# Patient Record
Sex: FEMALE | Race: WHITE | NOT HISPANIC OR LATINO | Employment: FULL TIME | URBAN - METROPOLITAN AREA
[De-identification: names, ages, dates, MRNs, and addresses within clinical notes are randomized per-mention and may not be internally consistent; named-entity substitution may affect disease eponyms.]

---

## 2024-10-29 ENCOUNTER — TELEPHONE (OUTPATIENT)
Dept: CARDIOLOGY | Facility: MEDICAL CENTER | Age: 59
End: 2024-10-29
Payer: COMMERCIAL

## 2024-10-29 ENCOUNTER — HOSPITAL ENCOUNTER (OUTPATIENT)
Dept: RADIOLOGY | Facility: MEDICAL CENTER | Age: 59
End: 2024-10-29

## 2024-10-30 ENCOUNTER — APPOINTMENT (OUTPATIENT)
Dept: RADIOLOGY | Facility: MEDICAL CENTER | Age: 59
DRG: 003 | End: 2024-10-30
Attending: STUDENT IN AN ORGANIZED HEALTH CARE EDUCATION/TRAINING PROGRAM
Payer: COMMERCIAL

## 2024-10-30 ENCOUNTER — APPOINTMENT (OUTPATIENT)
Dept: RADIOLOGY | Facility: MEDICAL CENTER | Age: 59
DRG: 003 | End: 2024-10-30
Attending: INTERNAL MEDICINE
Payer: COMMERCIAL

## 2024-10-30 ENCOUNTER — APPOINTMENT (OUTPATIENT)
Dept: RADIOLOGY | Facility: MEDICAL CENTER | Age: 59
DRG: 003 | End: 2024-10-30
Attending: EMERGENCY MEDICINE
Payer: COMMERCIAL

## 2024-10-30 ENCOUNTER — HOSPITAL ENCOUNTER (INPATIENT)
Facility: MEDICAL CENTER | Age: 59
LOS: 1 days | End: 2024-10-31
Attending: EMERGENCY MEDICINE | Admitting: INTERNAL MEDICINE
Payer: COMMERCIAL

## 2024-10-30 ENCOUNTER — APPOINTMENT (OUTPATIENT)
Dept: RADIOLOGY | Facility: MEDICAL CENTER | Age: 59
DRG: 003 | End: 2024-10-30
Attending: ANESTHESIOLOGY
Payer: COMMERCIAL

## 2024-10-30 ENCOUNTER — APPOINTMENT (OUTPATIENT)
Dept: CARDIOLOGY | Facility: MEDICAL CENTER | Age: 59
DRG: 003 | End: 2024-10-30
Attending: ANESTHESIOLOGY
Payer: COMMERCIAL

## 2024-10-30 ENCOUNTER — APPOINTMENT (OUTPATIENT)
Dept: RADIOLOGY | Facility: MEDICAL CENTER | Age: 59
DRG: 003 | End: 2024-10-30
Attending: THORACIC SURGERY (CARDIOTHORACIC VASCULAR SURGERY)
Payer: COMMERCIAL

## 2024-10-30 ENCOUNTER — ANESTHESIA EVENT (OUTPATIENT)
Dept: SURGERY | Facility: MEDICAL CENTER | Age: 59
End: 2024-10-30
Payer: COMMERCIAL

## 2024-10-30 ENCOUNTER — APPOINTMENT (OUTPATIENT)
Dept: CARDIOLOGY | Facility: MEDICAL CENTER | Age: 59
DRG: 003 | End: 2024-10-30
Attending: INTERNAL MEDICINE
Payer: COMMERCIAL

## 2024-10-30 ENCOUNTER — ANESTHESIA (OUTPATIENT)
Dept: SURGERY | Facility: MEDICAL CENTER | Age: 59
End: 2024-10-30
Payer: COMMERCIAL

## 2024-10-30 DIAGNOSIS — R57.0 CARDIOGENIC SHOCK (HCC): ICD-10-CM

## 2024-10-30 DIAGNOSIS — I21.02 ST ELEVATION MYOCARDIAL INFARCTION INVOLVING LEFT ANTERIOR DESCENDING (LAD) CORONARY ARTERY (HCC): Primary | ICD-10-CM

## 2024-10-30 PROBLEM — I51.89 SEVERE LEFT VENTRICULAR SYSTOLIC DYSFUNCTION: Status: ACTIVE | Noted: 2024-10-30

## 2024-10-30 PROBLEM — J96.01 ACUTE RESPIRATORY FAILURE WITH HYPOXIA (HCC): Status: ACTIVE | Noted: 2024-10-30

## 2024-10-30 PROBLEM — I21.3 STEMI (ST ELEVATION MYOCARDIAL INFARCTION) (HCC): Status: ACTIVE | Noted: 2024-10-30

## 2024-10-30 PROBLEM — I51.9 SEVERE LEFT VENTRICULAR SYSTOLIC DYSFUNCTION: Status: ACTIVE | Noted: 2024-10-30

## 2024-10-30 PROBLEM — E87.20 METABOLIC ACIDOSIS: Status: ACTIVE | Noted: 2024-10-30

## 2024-10-30 PROBLEM — R57.9 SHOCK (HCC): Status: ACTIVE | Noted: 2024-10-30

## 2024-10-30 PROBLEM — I21.9 ACUTE MYOCARDIAL INFARCTION (HCC): Status: ACTIVE | Noted: 2024-10-30

## 2024-10-30 PROBLEM — R79.89 ELEVATED TROPONIN: Status: ACTIVE | Noted: 2024-10-30

## 2024-10-30 LAB
ABO + RH BLD: NORMAL
ABO GROUP BLD: NORMAL
ACT BLD: 153 SEC (ref 74–137)
ACT BLD: 177 SEC (ref 74–137)
ACT BLD: 183 SEC (ref 74–137)
ACT BLD: 208 SEC (ref 74–137)
ACT BLD: 208 SEC (ref 74–137)
ACT BLD: 244 SEC (ref 74–137)
ACT BLD: 250 SEC (ref 74–137)
ACT BLD: 299 SEC (ref 74–137)
ACT BLD: 428 SEC (ref 74–137)
ALBUMIN SERPL BCP-MCNC: 2.2 G/DL (ref 3.2–4.9)
ALBUMIN SERPL BCP-MCNC: 2.4 G/DL (ref 3.2–4.9)
ALBUMIN SERPL BCP-MCNC: 2.5 G/DL (ref 3.2–4.9)
ALBUMIN SERPL BCP-MCNC: 3.8 G/DL (ref 3.2–4.9)
ALBUMIN/GLOB SERPL: 1.3 G/DL
ALBUMIN/GLOB SERPL: 1.9 G/DL
ALBUMIN/GLOB SERPL: 2.2 G/DL
ALBUMIN/GLOB SERPL: 2.4 G/DL
ALP SERPL-CCNC: 141 U/L (ref 30–99)
ALP SERPL-CCNC: 43 U/L (ref 30–99)
ALP SERPL-CCNC: 46 U/L (ref 30–99)
ALP SERPL-CCNC: 51 U/L (ref 30–99)
ALT SERPL-CCNC: 134 U/L (ref 2–50)
ALT SERPL-CCNC: 174 U/L (ref 2–50)
ALT SERPL-CCNC: 84 U/L (ref 2–50)
ALT SERPL-CCNC: 84 U/L (ref 2–50)
AMPHET UR QL SCN: NEGATIVE
ANION GAP SERPL CALC-SCNC: 14 MMOL/L (ref 7–16)
ANION GAP SERPL CALC-SCNC: 16 MMOL/L (ref 7–16)
ANION GAP SERPL CALC-SCNC: 18 MMOL/L (ref 7–16)
ANION GAP SERPL CALC-SCNC: 20 MMOL/L (ref 7–16)
ANISOCYTOSIS BLD QL SMEAR: ABNORMAL
APTT PPP: 53.7 SEC (ref 24.7–36)
APTT PPP: >240 SEC (ref 24.7–36)
APTT PPP: >240 SEC (ref 24.7–36)
ARTERIAL PATENCY WRIST A: ABNORMAL
AST SERPL-CCNC: 234 U/L (ref 12–45)
AST SERPL-CCNC: 354 U/L (ref 12–45)
AST SERPL-CCNC: 411 U/L (ref 12–45)
AST SERPL-CCNC: 419 U/L (ref 12–45)
B-OH-BUTYR SERPL-MCNC: 0.33 MMOL/L (ref 0.02–0.27)
BARBITURATES UR QL SCN: NEGATIVE
BARCODED ABORH UBTYP: 5100
BARCODED ABORH UBTYP: 6200
BARCODED ABORH UBTYP: 7300
BARCODED PRD CODE UBPRD: NORMAL
BARCODED UNIT NUM UBUNT: NORMAL
BASE EXCESS BLDA CALC-SCNC: -10 MMOL/L (ref -4–3)
BASE EXCESS BLDA CALC-SCNC: -11 MMOL/L (ref -4–3)
BASE EXCESS BLDA CALC-SCNC: -12 MMOL/L (ref -4–3)
BASE EXCESS BLDA CALC-SCNC: -2 MMOL/L (ref -4–3)
BASE EXCESS BLDA CALC-SCNC: -3 MMOL/L (ref -4–3)
BASE EXCESS BLDA CALC-SCNC: -5 MMOL/L (ref -4–3)
BASE EXCESS BLDA CALC-SCNC: -8 MMOL/L (ref -4–3)
BASE EXCESS BLDA CALC-SCNC: -9 MMOL/L (ref -4–3)
BASE EXCESS BLDA CALC-SCNC: -9 MMOL/L (ref -4–3)
BASE EXCESS BLDV CALC-SCNC: -1 MMOL/L (ref -4–3)
BASE EXCESS BLDV CALC-SCNC: -10 MMOL/L (ref -4–3)
BASE EXCESS BLDV CALC-SCNC: -11 MMOL/L (ref -4–3)
BASE EXCESS BLDV CALC-SCNC: -22 MMOL/L (ref -4–3)
BASE EXCESS BLDV CALC-SCNC: -3 MMOL/L (ref -4–3)
BASE EXCESS BLDV CALC-SCNC: -6 MMOL/L (ref -4–3)
BASE EXCESS BLDV CALC-SCNC: -9 MMOL/L (ref -4–3)
BASOPHILS # BLD AUTO: 0 % (ref 0–1.8)
BASOPHILS # BLD AUTO: 0 % (ref 0–1.8)
BASOPHILS # BLD AUTO: 0.1 % (ref 0–1.8)
BASOPHILS # BLD AUTO: 0.4 % (ref 0–1.8)
BASOPHILS # BLD: 0 K/UL (ref 0–0.12)
BASOPHILS # BLD: 0 K/UL (ref 0–0.12)
BASOPHILS # BLD: 0.02 K/UL (ref 0–0.12)
BASOPHILS # BLD: 0.2 K/UL (ref 0–0.12)
BENZODIAZ UR QL SCN: POSITIVE
BILIRUB SERPL-MCNC: 0.3 MG/DL (ref 0.1–1.5)
BILIRUB SERPL-MCNC: 0.4 MG/DL (ref 0.1–1.5)
BILIRUB SERPL-MCNC: 0.4 MG/DL (ref 0.1–1.5)
BILIRUB SERPL-MCNC: 0.7 MG/DL (ref 0.1–1.5)
BLD GP AB SCN SERPL QL: NORMAL
BODY TEMPERATURE: ABNORMAL DEGREES
BODY TEMPERATURE: NORMAL DEGREES
BREATHS SETTING VENT: 10
BREATHS SETTING VENT: 14
BREATHS SETTING VENT: 24
BREATHS SETTING VENT: 24
BREATHS SETTING VENT: 26
BREATHS SETTING VENT: 30
BUN SERPL-MCNC: 30 MG/DL (ref 8–22)
BUN SERPL-MCNC: 31 MG/DL (ref 8–22)
BUN SERPL-MCNC: 33 MG/DL (ref 8–22)
BUN SERPL-MCNC: 35 MG/DL (ref 8–22)
BUN SERPL-MCNC: 36 MG/DL (ref 8–22)
BUN SERPL-MCNC: 37 MG/DL (ref 8–22)
BURR CELLS BLD QL SMEAR: NORMAL
BURR CELLS BLD QL SMEAR: NORMAL
BZE UR QL SCN: NEGATIVE
CA-I BLD ISE-SCNC: 1.14 MMOL/L (ref 1.1–1.3)
CA-I BLD ISE-SCNC: 1.15 MMOL/L (ref 1.1–1.3)
CA-I BLD ISE-SCNC: 1.19 MMOL/L (ref 1.1–1.3)
CA-I BLD ISE-SCNC: 1.39 MMOL/L (ref 1.1–1.3)
CA-I SERPL-SCNC: 0.8 MMOL/L (ref 1.1–1.3)
CA-I SERPL-SCNC: 1.3 MMOL/L (ref 1.1–1.3)
CALCIUM ALBUM COR SERPL-MCNC: 11.2 MG/DL (ref 8.5–10.5)
CALCIUM ALBUM COR SERPL-MCNC: 7.2 MG/DL (ref 8.5–10.5)
CALCIUM ALBUM COR SERPL-MCNC: 8.6 MG/DL (ref 8.5–10.5)
CALCIUM ALBUM COR SERPL-MCNC: 9.2 MG/DL (ref 8.5–10.5)
CALCIUM SERPL-MCNC: 10.2 MG/DL (ref 8.5–10.5)
CALCIUM SERPL-MCNC: 5.8 MG/DL (ref 8.5–10.5)
CALCIUM SERPL-MCNC: 8 MG/DL (ref 8.5–10.5)
CALCIUM SERPL-MCNC: 8.4 MG/DL (ref 8.5–10.5)
CALCIUM SERPL-MCNC: 8.7 MG/DL (ref 8.5–10.5)
CALCIUM SERPL-MCNC: 9.9 MG/DL (ref 8.5–10.5)
CANNABINOIDS UR QL SCN: NEGATIVE
CFT BLD TEG: >17 MIN (ref 4.6–9.1)
CFT P HPASE BLD TEG: 10.2 MIN (ref 4.3–8.3)
CFT P HPASE BLD TEG: 15.6 MIN (ref 4.3–8.3)
CFT P HPASE BLD TEG: 7.5 MIN (ref 4.3–8.3)
CHLORIDE SERPL-SCNC: 101 MMOL/L (ref 96–112)
CHLORIDE SERPL-SCNC: 108 MMOL/L (ref 96–112)
CHLORIDE SERPL-SCNC: 108 MMOL/L (ref 96–112)
CHLORIDE SERPL-SCNC: 111 MMOL/L (ref 96–112)
CHLORIDE SERPL-SCNC: 115 MMOL/L (ref 96–112)
CHLORIDE SERPL-SCNC: 117 MMOL/L (ref 96–112)
CK SERPL-CCNC: 2370 U/L (ref 0–154)
CK SERPL-CCNC: 3196 U/L (ref 0–154)
CK SERPL-CCNC: 3983 U/L (ref 0–154)
CLOT ANGLE BLD TEG: <39 DEGREES (ref 63–78)
CLOT LYSIS 30M P MA LENFR BLD TEG: ABNORMAL % (ref 0–2.6)
CLOT LYSIS 30M P MA LENFR BLD TEG: ABNORMAL % (ref 0–2.6)
CO2 BLDA-SCNC: 16 MMOL/L (ref 20–33)
CO2 BLDA-SCNC: 17 MMOL/L (ref 20–33)
CO2 BLDA-SCNC: 17 MMOL/L (ref 20–33)
CO2 BLDA-SCNC: 19 MMOL/L (ref 20–33)
CO2 BLDA-SCNC: 20 MMOL/L (ref 20–33)
CO2 BLDA-SCNC: 20 MMOL/L (ref 20–33)
CO2 BLDA-SCNC: 22 MMOL/L (ref 20–33)
CO2 BLDA-SCNC: 24 MMOL/L (ref 20–33)
CO2 BLDA-SCNC: 24 MMOL/L (ref 20–33)
CO2 BLDV-SCNC: 18 MMOL/L (ref 20–33)
CO2 BLDV-SCNC: 20 MMOL/L (ref 20–33)
CO2 BLDV-SCNC: 22 MMOL/L (ref 20–33)
CO2 BLDV-SCNC: 23 MMOL/L (ref 20–33)
CO2 BLDV-SCNC: 23 MMOL/L (ref 20–33)
CO2 BLDV-SCNC: 27 MMOL/L (ref 20–33)
CO2 SERPL-SCNC: 12 MMOL/L (ref 20–33)
CO2 SERPL-SCNC: 17 MMOL/L (ref 20–33)
CO2 SERPL-SCNC: 18 MMOL/L (ref 20–33)
CO2 SERPL-SCNC: 18 MMOL/L (ref 20–33)
CO2 SERPL-SCNC: 20 MMOL/L (ref 20–33)
CO2 SERPL-SCNC: 22 MMOL/L (ref 20–33)
COMPONENT F 8504F: NORMAL
COMPONENT P 8504P: NORMAL
COMPONENT R 8504R: NORMAL
CREAT SERPL-MCNC: 0.6 MG/DL (ref 0.5–1.4)
CREAT SERPL-MCNC: 1.21 MG/DL (ref 0.5–1.4)
CREAT SERPL-MCNC: 1.35 MG/DL (ref 0.5–1.4)
CREAT SERPL-MCNC: 1.54 MG/DL (ref 0.5–1.4)
CREAT SERPL-MCNC: 1.54 MG/DL (ref 0.5–1.4)
CREAT SERPL-MCNC: 1.87 MG/DL (ref 0.5–1.4)
CT.EXTRINSIC BLD ROTEM: ABNORMAL MIN (ref 0.8–2.1)
DELSYS IDSYS: ABNORMAL
DELSYS IDSYS: NORMAL
EKG IMPRESSION: NORMAL
EKG IMPRESSION: NORMAL
END TIDAL CARBON DIOXIDE IECO2: 1 MMHG
END TIDAL CARBON DIOXIDE IECO2: 29 MMHG
END TIDAL CARBON DIOXIDE IECO2: 31 MMHG
END TIDAL CARBON DIOXIDE IECO2: 31 MMHG
END TIDAL CARBON DIOXIDE IECO2: 34 MMHG
END TIDAL CARBON DIOXIDE IECO2: 37 MMHG
END TIDAL CARBON DIOXIDE IECO2: 37 MMHG
END TIDAL CARBON DIOXIDE IECO2: 8 MMHG
END TIDAL CARBON DIOXIDE IECO2: 9 MMHG
EOSINOPHIL # BLD AUTO: 0 K/UL (ref 0–0.51)
EOSINOPHIL # BLD AUTO: 0 K/UL (ref 0–0.51)
EOSINOPHIL # BLD AUTO: 0.02 K/UL (ref 0–0.51)
EOSINOPHIL # BLD AUTO: 0.21 K/UL (ref 0–0.51)
EOSINOPHIL NFR BLD: 0 % (ref 0–6.9)
EOSINOPHIL NFR BLD: 0 % (ref 0–6.9)
EOSINOPHIL NFR BLD: 0.1 % (ref 0–6.9)
EOSINOPHIL NFR BLD: 0.4 % (ref 0–6.9)
ERYTHROCYTE [DISTWIDTH] IN BLOOD BY AUTOMATED COUNT: 43.5 FL (ref 35.9–50)
ERYTHROCYTE [DISTWIDTH] IN BLOOD BY AUTOMATED COUNT: 45.1 FL (ref 35.9–50)
ERYTHROCYTE [DISTWIDTH] IN BLOOD BY AUTOMATED COUNT: 46.5 FL (ref 35.9–50)
ERYTHROCYTE [DISTWIDTH] IN BLOOD BY AUTOMATED COUNT: 47.4 FL (ref 35.9–50)
EST. AVERAGE GLUCOSE BLD GHB EST-MCNC: 137 MG/DL
FENTANYL UR QL: POSITIVE
FIBRINOGEN PPP-MCNC: 135 MG/DL (ref 215–460)
FIBRINOGEN PPP-MCNC: 248 MG/DL (ref 215–460)
GFR SERPLBLD CREATININE-BSD FMLA CKD-EPI: 103 ML/MIN/1.73 M 2
GFR SERPLBLD CREATININE-BSD FMLA CKD-EPI: 31 ML/MIN/1.73 M 2
GFR SERPLBLD CREATININE-BSD FMLA CKD-EPI: 39 ML/MIN/1.73 M 2
GFR SERPLBLD CREATININE-BSD FMLA CKD-EPI: 39 ML/MIN/1.73 M 2
GFR SERPLBLD CREATININE-BSD FMLA CKD-EPI: 45 ML/MIN/1.73 M 2
GFR SERPLBLD CREATININE-BSD FMLA CKD-EPI: 52 ML/MIN/1.73 M 2
GLOBULIN SER CALC-MCNC: 0.9 G/DL (ref 1.9–3.5)
GLOBULIN SER CALC-MCNC: 1.1 G/DL (ref 1.9–3.5)
GLOBULIN SER CALC-MCNC: 1.3 G/DL (ref 1.9–3.5)
GLOBULIN SER CALC-MCNC: 3 G/DL (ref 1.9–3.5)
GLUCOSE BLD STRIP.AUTO-MCNC: 165 MG/DL (ref 65–99)
GLUCOSE BLD STRIP.AUTO-MCNC: 165 MG/DL (ref 65–99)
GLUCOSE BLD STRIP.AUTO-MCNC: 218 MG/DL (ref 65–99)
GLUCOSE BLD STRIP.AUTO-MCNC: 248 MG/DL (ref 65–99)
GLUCOSE BLD STRIP.AUTO-MCNC: 249 MG/DL (ref 65–99)
GLUCOSE BLD STRIP.AUTO-MCNC: 280 MG/DL (ref 65–99)
GLUCOSE BLD STRIP.AUTO-MCNC: 284 MG/DL (ref 65–99)
GLUCOSE BLD STRIP.AUTO-MCNC: 301 MG/DL (ref 65–99)
GLUCOSE BLD STRIP.AUTO-MCNC: 399 MG/DL (ref 65–99)
GLUCOSE BLD STRIP.AUTO-MCNC: 406 MG/DL (ref 65–99)
GLUCOSE BLD STRIP.AUTO-MCNC: 435 MG/DL (ref 65–99)
GLUCOSE BLD STRIP.AUTO-MCNC: 454 MG/DL (ref 65–99)
GLUCOSE BLD STRIP.AUTO-MCNC: 475 MG/DL (ref 65–99)
GLUCOSE SERPL-MCNC: 173 MG/DL (ref 65–99)
GLUCOSE SERPL-MCNC: 192 MG/DL (ref 65–99)
GLUCOSE SERPL-MCNC: 266 MG/DL (ref 65–99)
GLUCOSE SERPL-MCNC: 285 MG/DL (ref 65–99)
GLUCOSE SERPL-MCNC: 385 MG/DL (ref 65–99)
GLUCOSE SERPL-MCNC: 441 MG/DL (ref 65–99)
HBA1C MFR BLD: 6.4 % (ref 4–5.6)
HCO3 BLDA-SCNC: 14.5 MMOL/L (ref 17–25)
HCO3 BLDA-SCNC: 16.2 MMOL/L (ref 17–25)
HCO3 BLDA-SCNC: 16.3 MMOL/L (ref 17–25)
HCO3 BLDA-SCNC: 17.4 MMOL/L (ref 17–25)
HCO3 BLDA-SCNC: 17.5 MMOL/L (ref 17–25)
HCO3 BLDA-SCNC: 17.6 MMOL/L (ref 17–25)
HCO3 BLDA-SCNC: 18 MMOL/L (ref 17–25)
HCO3 BLDA-SCNC: 18.6 MMOL/L (ref 17–25)
HCO3 BLDA-SCNC: 18.6 MMOL/L (ref 17–25)
HCO3 BLDA-SCNC: 21 MMOL/L (ref 17–25)
HCO3 BLDA-SCNC: 22.8 MMOL/L (ref 17–25)
HCO3 BLDA-SCNC: 23.1 MMOL/L (ref 17–25)
HCO3 BLDV-SCNC: 14.8 MMOL/L (ref 24–28)
HCO3 BLDV-SCNC: 16.8 MMOL/L (ref 24–28)
HCO3 BLDV-SCNC: 17 MMOL/L (ref 24–28)
HCO3 BLDV-SCNC: 17.1 MMOL/L (ref 24–28)
HCO3 BLDV-SCNC: 18.9 MMOL/L (ref 24–28)
HCO3 BLDV-SCNC: 20.7 MMOL/L (ref 24–28)
HCO3 BLDV-SCNC: 21 MMOL/L (ref 24–28)
HCO3 BLDV-SCNC: 21.6 MMOL/L (ref 24–28)
HCO3 BLDV-SCNC: 25.2 MMOL/L (ref 24–28)
HCT VFR BLD AUTO: 17.9 % (ref 37–47)
HCT VFR BLD AUTO: 31.9 % (ref 37–47)
HCT VFR BLD AUTO: 32.3 % (ref 37–47)
HCT VFR BLD AUTO: 32.7 % (ref 37–47)
HCT VFR BLD AUTO: 44.6 % (ref 37–47)
HCT VFR BLD CALC: 32 % (ref 37–47)
HCT VFR BLD CALC: 32 % (ref 37–47)
HGB BLD-MCNC: 10.8 G/DL (ref 12–16)
HGB BLD-MCNC: 10.9 G/DL (ref 12–16)
HGB BLD-MCNC: 11.3 G/DL (ref 12–16)
HGB BLD-MCNC: 15.1 G/DL (ref 12–16)
HGB BLD-MCNC: 5.7 G/DL (ref 12–16)
HIV 1+2 AB+HIV1 P24 AG SERPL QL IA: NORMAL
HOROWITZ INDEX BLDA+IHG-RTO: 1107 MM[HG]
HOROWITZ INDEX BLDA+IHG-RTO: 1307 MM[HG]
HOROWITZ INDEX BLDA+IHG-RTO: 1360 MM[HG]
HOROWITZ INDEX BLDA+IHG-RTO: 1503 MM[HG]
HOROWITZ INDEX BLDA+IHG-RTO: 155 MM[HG]
HOROWITZ INDEX BLDA+IHG-RTO: 236 MM[HG]
HOROWITZ INDEX BLDA+IHG-RTO: 260 MM[HG]
HOROWITZ INDEX BLDA+IHG-RTO: 266 MM[HG]
HOROWITZ INDEX BLDA+IHG-RTO: 78 MM[HG]
HOROWITZ INDEX BLDA+IHG-RTO: 970 MM[HG]
HOROWITZ INDEX BLDV+IHG-RTO: 123 MM[HG]
HOROWITZ INDEX BLDV+IHG-RTO: 130 MM[HG]
HOROWITZ INDEX BLDV+IHG-RTO: 137 MM[HG]
HOROWITZ INDEX BLDV+IHG-RTO: 143 MM[HG]
HOROWITZ INDEX BLDV+IHG-RTO: 167 MM[HG]
HOROWITZ INDEX BLDV+IHG-RTO: 38 MM[HG]
HOROWITZ INDEX BLDV+IHG-RTO: 42 MM[HG]
HOROWITZ INDEX BLDV+IHG-RTO: 62 MM[HG]
IMM GRANULOCYTES # BLD AUTO: 0.58 K/UL (ref 0–0.11)
IMM GRANULOCYTES # BLD AUTO: 1.95 K/UL (ref 0–0.11)
IMM GRANULOCYTES NFR BLD AUTO: 2.5 % (ref 0–0.9)
IMM GRANULOCYTES NFR BLD AUTO: 3.8 % (ref 0–0.9)
INR PPP: 1.25 (ref 0.87–1.13)
INR PPP: 1.45 (ref 0.87–1.13)
INR PPP: 2.53 (ref 0.87–1.13)
LACTATE BLD-SCNC: 11.4 MMOL/L (ref 0.5–2)
LACTATE BLD-SCNC: 2.8 MMOL/L (ref 0.5–2)
LACTATE BLD-SCNC: 2.9 MMOL/L (ref 0.5–2)
LACTATE BLD-SCNC: 3.9 MMOL/L (ref 0.5–2)
LACTATE BLD-SCNC: 4.5 MMOL/L (ref 0.5–2)
LACTATE BLD-SCNC: 4.6 MMOL/L (ref 0.5–2)
LACTATE BLD-SCNC: 4.9 MMOL/L (ref 0.5–2)
LACTATE BLD-SCNC: 7.8 MMOL/L (ref 0.5–2)
LACTATE BLD-SCNC: 7.9 MMOL/L (ref 0.5–2)
LACTATE BLD-SCNC: 9 MMOL/L (ref 0.5–2)
LACTATE BLD-SCNC: 9.1 MMOL/L (ref 0.5–2)
LACTATE BLD-SCNC: 9.1 MMOL/L (ref 0.5–2)
LACTATE BLD-SCNC: 9.4 MMOL/L (ref 0.5–2)
LACTATE BLD-SCNC: 9.8 MMOL/L (ref 0.5–2)
LACTATE SERPL-SCNC: 10.5 MMOL/L (ref 0.5–2)
LACTATE SERPL-SCNC: 2.6 MMOL/L (ref 0.5–2)
LACTATE SERPL-SCNC: 5.2 MMOL/L (ref 0.5–2)
LACTATE SERPL-SCNC: 7.3 MMOL/L (ref 0.5–2)
LACTATE SERPL-SCNC: 8 MMOL/L (ref 0.5–2)
LIPASE SERPL-CCNC: 44 U/L (ref 11–82)
LYMPHOCYTES # BLD AUTO: 0.88 K/UL (ref 1–4.8)
LYMPHOCYTES # BLD AUTO: 1.28 K/UL (ref 1–4.8)
LYMPHOCYTES # BLD AUTO: 2.4 K/UL (ref 1–4.8)
LYMPHOCYTES # BLD AUTO: 3.61 K/UL (ref 1–4.8)
LYMPHOCYTES NFR BLD: 5.6 % (ref 22–41)
LYMPHOCYTES NFR BLD: 6.9 % (ref 22–41)
LYMPHOCYTES NFR BLD: 7.1 % (ref 22–41)
LYMPHOCYTES NFR BLD: 8.6 % (ref 22–41)
MAGNESIUM SERPL-MCNC: 1.9 MG/DL (ref 1.5–2.5)
MANUAL DIFF BLD: NORMAL
MANUAL DIFF BLD: NORMAL
MCF BLD TEG: <40 MM (ref 52–69)
MCF.PLATELET INHIB BLD ROTEM: 17.8 MM (ref 15–32)
MCF.PLATELET INHIB BLD ROTEM: 5.1 MM (ref 15–32)
MCF.PLATELET INHIB BLD ROTEM: <4 MM (ref 15–32)
MCH RBC QN AUTO: 31.1 PG (ref 27–33)
MCH RBC QN AUTO: 31.5 PG (ref 27–33)
MCH RBC QN AUTO: 32.2 PG (ref 27–33)
MCH RBC QN AUTO: 32.3 PG (ref 27–33)
MCHC RBC AUTO-ENTMCNC: 31.8 G/DL (ref 32.2–35.5)
MCHC RBC AUTO-ENTMCNC: 33.3 G/DL (ref 32.2–35.5)
MCHC RBC AUTO-ENTMCNC: 33.9 G/DL (ref 32.2–35.5)
MCHC RBC AUTO-ENTMCNC: 35.4 G/DL (ref 32.2–35.5)
MCV RBC AUTO: 101.1 FL (ref 81.4–97.8)
MCV RBC AUTO: 88.9 FL (ref 81.4–97.8)
MCV RBC AUTO: 93.2 FL (ref 81.4–97.8)
MCV RBC AUTO: 95.5 FL (ref 81.4–97.8)
METAMYELOCYTES NFR BLD MANUAL: 0.9 %
METHADONE UR QL SCN: NEGATIVE
MICROCYTES BLD QL SMEAR: ABNORMAL
MODE IMODE: ABNORMAL
MONOCYTES # BLD AUTO: 0.33 K/UL (ref 0–0.85)
MONOCYTES # BLD AUTO: 1.09 K/UL (ref 0–0.85)
MONOCYTES # BLD AUTO: 1.2 K/UL (ref 0–0.85)
MONOCYTES # BLD AUTO: 2.92 K/UL (ref 0–0.85)
MONOCYTES NFR BLD AUTO: 2.6 % (ref 0–13.4)
MONOCYTES NFR BLD AUTO: 4.3 % (ref 0–13.4)
MONOCYTES NFR BLD AUTO: 4.8 % (ref 0–13.4)
MONOCYTES NFR BLD AUTO: 5.7 % (ref 0–13.4)
MORPHOLOGY BLD-IMP: NORMAL
MORPHOLOGY BLD-IMP: NORMAL
MYELOCYTES NFR BLD MANUAL: 0.9 %
NEUTROPHILS # BLD AUTO: 11.58 K/UL (ref 1.82–7.42)
NEUTROPHILS # BLD AUTO: 19.88 K/UL (ref 1.82–7.42)
NEUTROPHILS # BLD AUTO: 23.8 K/UL (ref 1.82–7.42)
NEUTROPHILS # BLD AUTO: 41.99 K/UL (ref 1.82–7.42)
NEUTROPHILS NFR BLD: 82.6 % (ref 44–72)
NEUTROPHILS NFR BLD: 83.6 % (ref 44–72)
NEUTROPHILS NFR BLD: 86.9 % (ref 44–72)
NEUTROPHILS NFR BLD: 87.9 % (ref 44–72)
NEUTS BAND NFR BLD MANUAL: 1.7 % (ref 0–10)
NEUTS BAND NFR BLD MANUAL: 2.6 % (ref 0–10)
NRBC # BLD AUTO: 0 K/UL
NRBC BLD-RTO: 0 /100 WBC (ref 0–0.2)
NT-PROBNP SERPL IA-MCNC: 506 PG/ML (ref 0–125)
O2/TOTAL GAS SETTING VFR VENT: 100 %
O2/TOTAL GAS SETTING VFR VENT: 100 %
O2/TOTAL GAS SETTING VFR VENT: 30 %
O2/TOTAL GAS SETTING VFR VENT: 50 %
O2/TOTAL GAS SETTING VFR VENT: 70 %
O2/TOTAL GAS SETTING VFR VENT: 90 %
O2/TOTAL GAS SETTING VFR VENT: 90 %
OPIATES UR QL SCN: POSITIVE
OXYCODONE UR QL SCN: NEGATIVE
PA AA BLD-ACNC: 37 % (ref 0–11)
PA AA BLD-ACNC: 49.7 % (ref 0–11)
PA ADP BLD-ACNC: 82.3 % (ref 0–17)
PA ADP BLD-ACNC: 96.7 % (ref 0–17)
PATH REV: NORMAL
PATH REV: NORMAL
PCO2 BLDA: 34.1 MMHG (ref 26–37)
PCO2 BLDA: 36 MMHG (ref 26–37)
PCO2 BLDA: 37.5 MMHG (ref 26–37)
PCO2 BLDA: 37.6 MMHG (ref 26–37)
PCO2 BLDA: 41 MMHG (ref 26–37)
PCO2 BLDA: 41.1 MMHG (ref 26–37)
PCO2 BLDA: 42.5 MMHG (ref 26–37)
PCO2 BLDA: 43 MMHG (ref 26–37)
PCO2 BLDA: 46 MMHG (ref 26–37)
PCO2 BLDA: 47.6 MMHG (ref 26–37)
PCO2 BLDA: 47.6 MMHG (ref 26–37)
PCO2 BLDA: 48.2 MMHG (ref 26–37)
PCO2 BLDV: 17.8 MMHG (ref 41–51)
PCO2 BLDV: 44.9 MMHG (ref 41–51)
PCO2 BLDV: 44.9 MMHG (ref 41–51)
PCO2 BLDV: 47.4 MMHG (ref 41–51)
PCO2 BLDV: 49.9 MMHG (ref 41–51)
PCO2 BLDV: 50.3 MMHG (ref 41–51)
PCO2 BLDV: 58.8 MMHG (ref 41–51)
PCO2 BLDV: 59.3 MMHG (ref 41–51)
PCO2 BLDV: 91.9 MMHG (ref 41–51)
PCO2 TEMP ADJ BLDA: 33.2 MMHG (ref 26–37)
PCO2 TEMP ADJ BLDA: 33.7 MMHG (ref 26–37)
PCO2 TEMP ADJ BLDA: 34.9 MMHG (ref 26–37)
PCO2 TEMP ADJ BLDA: 37.7 MMHG (ref 26–37)
PCO2 TEMP ADJ BLDA: 38.9 MMHG (ref 26–37)
PCO2 TEMP ADJ BLDA: 40.1 MMHG (ref 26–37)
PCO2 TEMP ADJ BLDA: 40.2 MMHG (ref 26–37)
PCO2 TEMP ADJ BLDA: 40.2 MMHG (ref 26–37)
PCO2 TEMP ADJ BLDA: 46 MMHG (ref 26–37)
PCO2 TEMP ADJ BLDA: 46 MMHG (ref 26–37)
PCO2 TEMP ADJ BLDA: 46.1 MMHG (ref 26–37)
PCO2 TEMP ADJ BLDV: 16.8 MMHG (ref 41–51)
PCO2 TEMP ADJ BLDV: 41.6 MMHG (ref 41–51)
PCO2 TEMP ADJ BLDV: 41.7 MMHG (ref 41–51)
PCO2 TEMP ADJ BLDV: 45.2 MMHG (ref 41–51)
PCO2 TEMP ADJ BLDV: 47.1 MMHG (ref 41–51)
PCO2 TEMP ADJ BLDV: 49.9 MMHG (ref 41–51)
PCO2 TEMP ADJ BLDV: 58.3 MMHG (ref 41–51)
PCO2 TEMP ADJ BLDV: 59.3 MMHG (ref 41–51)
PCO2 TEMP ADJ BLDV: 86.5 MMHG (ref 41–51)
PCP UR QL SCN: NEGATIVE
PEAK INSPIRATORY PRESSURE IPIP: 10 CMH20
PEEP END EXPIRATORY PRESSURE IPEEP: 10 CMH20
PEEP END EXPIRATORY PRESSURE IPEEP: 12 CMH20
PH BLDA: 7.17 [PH] (ref 7.4–7.5)
PH BLDA: 7.19 [PH] (ref 7.4–7.5)
PH BLDA: 7.19 [PH] (ref 7.4–7.5)
PH BLDA: 7.2 [PH] (ref 7.4–7.5)
PH BLDA: 7.24 [PH] (ref 7.4–7.5)
PH BLDA: 7.25 [PH] (ref 7.4–7.5)
PH BLDA: 7.25 [PH] (ref 7.4–7.5)
PH BLDA: 7.26 [PH] (ref 7.4–7.5)
PH BLDA: 7.28 [PH] (ref 7.4–7.5)
PH BLDA: 7.3 [PH] (ref 7.4–7.5)
PH BLDA: 7.35 [PH] (ref 7.4–7.5)
PH BLDA: 7.36 [PH] (ref 7.4–7.5)
PH BLDV: 6.82 [PH] (ref 7.31–7.45)
PH BLDV: 7.15 [PH] (ref 7.31–7.45)
PH BLDV: 7.16 [PH] (ref 7.31–7.45)
PH BLDV: 7.18 [PH] (ref 7.31–7.45)
PH BLDV: 7.18 [PH] (ref 7.31–7.45)
PH BLDV: 7.19 [PH] (ref 7.31–7.45)
PH BLDV: 7.24 [PH] (ref 7.31–7.45)
PH BLDV: 7.33 [PH] (ref 7.31–7.45)
PH BLDV: 7.59 [PH] (ref 7.31–7.45)
PH TEMP ADJ BLDA: 7.19 [PH] (ref 7.4–7.5)
PH TEMP ADJ BLDA: 7.2 [PH] (ref 7.4–7.5)
PH TEMP ADJ BLDA: 7.21 [PH] (ref 7.4–7.5)
PH TEMP ADJ BLDA: 7.24 [PH] (ref 7.4–7.5)
PH TEMP ADJ BLDA: 7.27 [PH] (ref 7.4–7.5)
PH TEMP ADJ BLDA: 7.27 [PH] (ref 7.4–7.5)
PH TEMP ADJ BLDA: 7.28 [PH] (ref 7.4–7.5)
PH TEMP ADJ BLDA: 7.29 [PH] (ref 7.4–7.5)
PH TEMP ADJ BLDA: 7.32 [PH] (ref 7.4–7.5)
PH TEMP ADJ BLDA: 7.36 [PH] (ref 7.4–7.5)
PH TEMP ADJ BLDA: 7.38 [PH] (ref 7.4–7.5)
PH TEMP ADJ BLDV: 6.83 [PH] (ref 7.31–7.45)
PH TEMP ADJ BLDV: 7.15 [PH] (ref 7.31–7.45)
PH TEMP ADJ BLDV: 7.16 [PH] (ref 7.31–7.45)
PH TEMP ADJ BLDV: 7.2 [PH] (ref 7.31–7.45)
PH TEMP ADJ BLDV: 7.21 [PH] (ref 7.31–7.45)
PH TEMP ADJ BLDV: 7.21 [PH] (ref 7.31–7.45)
PH TEMP ADJ BLDV: 7.24 [PH] (ref 7.31–7.45)
PH TEMP ADJ BLDV: 7.35 [PH] (ref 7.31–7.45)
PH TEMP ADJ BLDV: 7.61 [PH] (ref 7.31–7.45)
PLATELET # BLD AUTO: 112 K/UL (ref 164–446)
PLATELET # BLD AUTO: 156 K/UL (ref 164–446)
PLATELET # BLD AUTO: 180 K/UL (ref 164–446)
PLATELET # BLD AUTO: 450 K/UL (ref 164–446)
PLATELET BLD QL SMEAR: NORMAL
PLATELET BLD QL SMEAR: NORMAL
PMV BLD AUTO: 10.9 FL (ref 9–12.9)
PMV BLD AUTO: 10.9 FL (ref 9–12.9)
PMV BLD AUTO: 11 FL (ref 9–12.9)
PMV BLD AUTO: 11.2 FL (ref 9–12.9)
PO2 BLDA: 130 MMHG (ref 64–87)
PO2 BLDA: 133 MMHG (ref 64–87)
PO2 BLDA: 155 MMHG (ref 64–87)
PO2 BLDA: 165 MMHG (ref 64–87)
PO2 BLDA: 291 MMHG (ref 64–87)
PO2 BLDA: 332 MMHG (ref 64–87)
PO2 BLDA: 392 MMHG (ref 64–87)
PO2 BLDA: 408 MMHG (ref 64–87)
PO2 BLDA: 451 MMHG (ref 64–87)
PO2 BLDA: 56 MMHG (ref 64–87)
PO2 BLDA: 68 MMHG (ref 64–87)
PO2 BLDA: 70 MMHG (ref 64–87)
PO2 BLDV: 31 MMHG (ref 25–40)
PO2 BLDV: 37 MMHG (ref 25–40)
PO2 BLDV: 38 MMHG (ref 25–40)
PO2 BLDV: 39 MMHG (ref 25–40)
PO2 BLDV: 41 MMHG (ref 25–40)
PO2 BLDV: 43 MMHG (ref 25–40)
PO2 BLDV: 50 MMHG (ref 25–40)
PO2 TEMP ADJ BLDA: 127 MMHG (ref 64–87)
PO2 TEMP ADJ BLDA: 131 MMHG (ref 64–87)
PO2 TEMP ADJ BLDA: 155 MMHG (ref 64–87)
PO2 TEMP ADJ BLDA: 165 MMHG (ref 64–87)
PO2 TEMP ADJ BLDA: 283 MMHG (ref 64–87)
PO2 TEMP ADJ BLDA: 326 MMHG (ref 64–87)
PO2 TEMP ADJ BLDA: 384 MMHG (ref 64–87)
PO2 TEMP ADJ BLDA: 401 MMHG (ref 64–87)
PO2 TEMP ADJ BLDA: 441 MMHG (ref 64–87)
PO2 TEMP ADJ BLDA: 52 MMHG (ref 64–87)
PO2 TEMP ADJ BLDA: 66 MMHG (ref 64–87)
PO2 TEMP ADJ BLDV: 31 MMHG (ref 25–40)
PO2 TEMP ADJ BLDV: 34 MMHG (ref 25–40)
PO2 TEMP ADJ BLDV: 35 MMHG (ref 25–40)
PO2 TEMP ADJ BLDV: 35 MMHG (ref 25–40)
PO2 TEMP ADJ BLDV: 37 MMHG (ref 25–40)
PO2 TEMP ADJ BLDV: 37 MMHG (ref 25–40)
PO2 TEMP ADJ BLDV: 38 MMHG (ref 25–40)
PO2 TEMP ADJ BLDV: 38 MMHG (ref 25–40)
PO2 TEMP ADJ BLDV: 46 MMHG (ref 25–40)
POIKILOCYTOSIS BLD QL SMEAR: NORMAL
POIKILOCYTOSIS BLD QL SMEAR: NORMAL
POTASSIUM BLD-SCNC: 2.7 MMOL/L (ref 3.6–5.5)
POTASSIUM BLD-SCNC: 3 MMOL/L (ref 3.6–5.5)
POTASSIUM BLD-SCNC: 3.2 MMOL/L (ref 3.6–5.5)
POTASSIUM BLD-SCNC: 4 MMOL/L (ref 3.6–5.5)
POTASSIUM SERPL-SCNC: 2.2 MMOL/L (ref 3.6–5.5)
POTASSIUM SERPL-SCNC: 3.2 MMOL/L (ref 3.6–5.5)
POTASSIUM SERPL-SCNC: 3.4 MMOL/L (ref 3.6–5.5)
POTASSIUM SERPL-SCNC: 3.9 MMOL/L (ref 3.6–5.5)
POTASSIUM SERPL-SCNC: 4.1 MMOL/L (ref 3.6–5.5)
POTASSIUM SERPL-SCNC: 4.2 MMOL/L (ref 3.6–5.5)
PRODUCT TYPE UPROD: NORMAL
PROPOXYPH UR QL SCN: NEGATIVE
PROT SERPL-MCNC: 3.1 G/DL (ref 6–8.2)
PROT SERPL-MCNC: 3.5 G/DL (ref 6–8.2)
PROT SERPL-MCNC: 3.8 G/DL (ref 6–8.2)
PROT SERPL-MCNC: 6.8 G/DL (ref 6–8.2)
PROTHROMBIN TIME: 15.7 SEC (ref 12–14.6)
PROTHROMBIN TIME: 17.7 SEC (ref 12–14.6)
PROTHROMBIN TIME: 27.4 SEC (ref 12–14.6)
RBC # BLD AUTO: 1.77 M/UL (ref 4.2–5.4)
RBC # BLD AUTO: 3.51 M/UL (ref 4.2–5.4)
RBC # BLD AUTO: 3.59 M/UL (ref 4.2–5.4)
RBC # BLD AUTO: 4.67 M/UL (ref 4.2–5.4)
RBC BLD AUTO: PRESENT
RBC BLD AUTO: PRESENT
RH BLD: NORMAL
SAO2 % BLDA: 100 % (ref 93–99)
SAO2 % BLDA: 81 % (ref 93–99)
SAO2 % BLDA: 87 % (ref 93–99)
SAO2 % BLDA: 89 % (ref 93–99)
SAO2 % BLDA: 98 % (ref 93–99)
SAO2 % BLDA: 99 % (ref 93–99)
SAO2 % BLDV: 33 %
SAO2 % BLDV: 48 %
SAO2 % BLDV: 55 %
SAO2 % BLDV: 56 %
SAO2 % BLDV: 62 %
SAO2 % BLDV: 63 %
SAO2 % BLDV: 67 %
SAO2 % BLDV: 68 %
SAO2 % BLDV: 92 %
SODIUM BLD-SCNC: 146 MMOL/L (ref 135–145)
SODIUM BLD-SCNC: 147 MMOL/L (ref 135–145)
SODIUM BLD-SCNC: 149 MMOL/L (ref 135–145)
SODIUM BLD-SCNC: 150 MMOL/L (ref 135–145)
SODIUM SERPL-SCNC: 136 MMOL/L (ref 135–145)
SODIUM SERPL-SCNC: 146 MMOL/L (ref 135–145)
SODIUM SERPL-SCNC: 146 MMOL/L (ref 135–145)
SODIUM SERPL-SCNC: 147 MMOL/L (ref 135–145)
SODIUM SERPL-SCNC: 147 MMOL/L (ref 135–145)
SODIUM SERPL-SCNC: 149 MMOL/L (ref 135–145)
SPECIMEN DRAWN FROM PATIENT: ABNORMAL
SPECIMEN DRAWN FROM PATIENT: NORMAL
TEG ALGORITHM TGALG: ABNORMAL
TIDAL VOLUME IVT: 300 ML
TIDAL VOLUME IVT: 350 ML
TROPONIN T SERPL-MCNC: 7031 NG/L (ref 6–19)
TSH SERPL DL<=0.005 MIU/L-ACNC: 2.21 UIU/ML (ref 0.38–5.33)
UNIT STATUS USTAT: NORMAL
WBC # BLD AUTO: 12.8 K/UL (ref 4.8–10.8)
WBC # BLD AUTO: 22.9 K/UL (ref 4.8–10.8)
WBC # BLD AUTO: 27.9 K/UL (ref 4.8–10.8)
WBC # BLD AUTO: 50.9 K/UL (ref 4.8–10.8)

## 2024-10-30 PROCEDURE — 85347 COAGULATION TIME ACTIVATED: CPT | Mod: 91

## 2024-10-30 PROCEDURE — 85027 COMPLETE CBC AUTOMATED: CPT | Mod: 91

## 2024-10-30 PROCEDURE — 5A0221D ASSISTANCE WITH CARDIAC OUTPUT USING IMPELLER PUMP, CONTINUOUS: ICD-10-PCS | Performed by: INTERNAL MEDICINE

## 2024-10-30 PROCEDURE — 700111 HCHG RX REV CODE 636 W/ 250 OVERRIDE (IP): Performed by: INTERNAL MEDICINE

## 2024-10-30 PROCEDURE — 84132 ASSAY OF SERUM POTASSIUM: CPT

## 2024-10-30 PROCEDURE — 85018 HEMOGLOBIN: CPT

## 2024-10-30 PROCEDURE — 81015 MICROSCOPIC EXAM OF URINE: CPT

## 2024-10-30 PROCEDURE — 85014 HEMATOCRIT: CPT | Mod: 91

## 2024-10-30 PROCEDURE — 99223 1ST HOSP IP/OBS HIGH 75: CPT | Mod: 57 | Performed by: THORACIC SURGERY (CARDIOTHORACIC VASCULAR SURGERY)

## 2024-10-30 PROCEDURE — 700111 HCHG RX REV CODE 636 W/ 250 OVERRIDE (IP): Mod: JZ | Performed by: INTERNAL MEDICINE

## 2024-10-30 PROCEDURE — 84295 ASSAY OF SERUM SODIUM: CPT

## 2024-10-30 PROCEDURE — 160031 HCHG SURGERY MINUTES - 1ST 30 MINS LEVEL 5: Performed by: THORACIC SURGERY (CARDIOTHORACIC VASCULAR SURGERY)

## 2024-10-30 PROCEDURE — 81003 URINALYSIS AUTO W/O SCOPE: CPT

## 2024-10-30 PROCEDURE — 99291 CRITICAL CARE FIRST HOUR: CPT | Mod: 25,GC | Performed by: EMERGENCY MEDICINE

## 2024-10-30 PROCEDURE — 700105 HCHG RX REV CODE 258

## 2024-10-30 PROCEDURE — 5A12012 PERFORMANCE OF CARDIAC OUTPUT, SINGLE, MANUAL: ICD-10-PCS | Performed by: INTERNAL MEDICINE

## 2024-10-30 PROCEDURE — 94003 VENT MGMT INPAT SUBQ DAY: CPT

## 2024-10-30 PROCEDURE — 700105 HCHG RX REV CODE 258: Performed by: INTERNAL MEDICINE

## 2024-10-30 PROCEDURE — 71045 X-RAY EXAM CHEST 1 VIEW: CPT

## 2024-10-30 PROCEDURE — C1894 INTRO/SHEATH, NON-LASER: HCPCS | Performed by: THORACIC SURGERY (CARDIOTHORACIC VASCULAR SURGERY)

## 2024-10-30 PROCEDURE — 027034Z DILATION OF CORONARY ARTERY, ONE ARTERY WITH DRUG-ELUTING INTRALUMINAL DEVICE, PERCUTANEOUS APPROACH: ICD-10-PCS | Performed by: INTERNAL MEDICINE

## 2024-10-30 PROCEDURE — 02HV33Z INSERTION OF INFUSION DEVICE INTO SUPERIOR VENA CAVA, PERCUTANEOUS APPROACH: ICD-10-PCS | Performed by: INTERNAL MEDICINE

## 2024-10-30 PROCEDURE — B2111ZZ FLUOROSCOPY OF MULTIPLE CORONARY ARTERIES USING LOW OSMOLAR CONTRAST: ICD-10-PCS | Performed by: INTERNAL MEDICINE

## 2024-10-30 PROCEDURE — 700111 HCHG RX REV CODE 636 W/ 250 OVERRIDE (IP): Performed by: THORACIC SURGERY (CARDIOTHORACIC VASCULAR SURGERY)

## 2024-10-30 PROCEDURE — 86923 COMPATIBILITY TEST ELECTRIC: CPT

## 2024-10-30 PROCEDURE — 700101 HCHG RX REV CODE 250: Performed by: EMERGENCY MEDICINE

## 2024-10-30 PROCEDURE — A9270 NON-COVERED ITEM OR SERVICE: HCPCS

## 2024-10-30 PROCEDURE — 84443 ASSAY THYROID STIM HORMONE: CPT

## 2024-10-30 PROCEDURE — 87086 URINE CULTURE/COLONY COUNT: CPT

## 2024-10-30 PROCEDURE — 700111 HCHG RX REV CODE 636 W/ 250 OVERRIDE (IP): Mod: JZ | Performed by: ANESTHESIOLOGY

## 2024-10-30 PROCEDURE — 36620 INSERTION CATHETER ARTERY: CPT

## 2024-10-30 PROCEDURE — 83690 ASSAY OF LIPASE: CPT

## 2024-10-30 PROCEDURE — 83735 ASSAY OF MAGNESIUM: CPT

## 2024-10-30 PROCEDURE — 71275 CT ANGIOGRAPHY CHEST: CPT

## 2024-10-30 PROCEDURE — 160042 HCHG SURGERY MINUTES - EA ADDL 1 MIN LEVEL 5: Performed by: THORACIC SURGERY (CARDIOTHORACIC VASCULAR SURGERY)

## 2024-10-30 PROCEDURE — 82330 ASSAY OF CALCIUM: CPT

## 2024-10-30 PROCEDURE — 700105 HCHG RX REV CODE 258: Performed by: ANESTHESIOLOGY

## 2024-10-30 PROCEDURE — 5A1945Z RESPIRATORY VENTILATION, 24-96 CONSECUTIVE HOURS: ICD-10-PCS | Performed by: EMERGENCY MEDICINE

## 2024-10-30 PROCEDURE — 700111 HCHG RX REV CODE 636 W/ 250 OVERRIDE (IP)

## 2024-10-30 PROCEDURE — 4A023N8 MEASUREMENT OF CARDIAC SAMPLING AND PRESSURE, BILATERAL, PERCUTANEOUS APPROACH: ICD-10-PCS | Performed by: INTERNAL MEDICINE

## 2024-10-30 PROCEDURE — 93308 TTE F-UP OR LMTD: CPT | Mod: 26 | Performed by: INTERNAL MEDICINE

## 2024-10-30 PROCEDURE — 93005 ELECTROCARDIOGRAM TRACING: CPT | Performed by: EMERGENCY MEDICINE

## 2024-10-30 PROCEDURE — 87389 HIV-1 AG W/HIV-1&-2 AB AG IA: CPT

## 2024-10-30 PROCEDURE — 93005 ELECTROCARDIOGRAM TRACING: CPT | Performed by: INTERNAL MEDICINE

## 2024-10-30 PROCEDURE — 5A2204Z RESTORATION OF CARDIAC RHYTHM, SINGLE: ICD-10-PCS | Performed by: INTERNAL MEDICINE

## 2024-10-30 PROCEDURE — C1751 CATH, INF, PER/CENT/MIDLINE: HCPCS

## 2024-10-30 PROCEDURE — 83880 ASSAY OF NATRIURETIC PEPTIDE: CPT

## 2024-10-30 PROCEDURE — 5A1522G EXTRACORPOREAL OXYGENATION, MEMBRANE, PERIPHERAL VENO-ARTERIAL: ICD-10-PCS | Performed by: THORACIC SURGERY (CARDIOTHORACIC VASCULAR SURGERY)

## 2024-10-30 PROCEDURE — 33990 INSJ PERQ VAD L HRT ARTERIAL: CPT | Performed by: INTERNAL MEDICINE

## 2024-10-30 PROCEDURE — 85384 FIBRINOGEN ACTIVITY: CPT | Mod: 91

## 2024-10-30 PROCEDURE — 96365 THER/PROPH/DIAG IV INF INIT: CPT

## 2024-10-30 PROCEDURE — 700101 HCHG RX REV CODE 250

## 2024-10-30 PROCEDURE — 33952 ECMO/ECLS INSJ PRPH CANNULA: CPT

## 2024-10-30 PROCEDURE — 99292 CRITICAL CARE ADDL 30 MIN: CPT | Mod: 25,GC | Performed by: EMERGENCY MEDICINE

## 2024-10-30 PROCEDURE — 361088 HCHG OXYGENATOR PERFUSION HLS SET ADVANCED 7.0 ADULT

## 2024-10-30 PROCEDURE — 93325 DOPPLER ECHO COLOR FLOW MAPG: CPT

## 2024-10-30 PROCEDURE — 700111 HCHG RX REV CODE 636 W/ 250 OVERRIDE (IP): Mod: JZ | Performed by: EMERGENCY MEDICINE

## 2024-10-30 PROCEDURE — 80048 BASIC METABOLIC PNL TOTAL CA: CPT | Mod: 91

## 2024-10-30 PROCEDURE — C1769 GUIDE WIRE: HCPCS | Performed by: THORACIC SURGERY (CARDIOTHORACIC VASCULAR SURGERY)

## 2024-10-30 PROCEDURE — 85610 PROTHROMBIN TIME: CPT | Mod: 91

## 2024-10-30 PROCEDURE — P9034 PLATELETS, PHERESIS: HCPCS

## 2024-10-30 PROCEDURE — 83605 ASSAY OF LACTIC ACID: CPT | Mod: 91

## 2024-10-30 PROCEDURE — 82803 BLOOD GASES ANY COMBINATION: CPT | Mod: 91

## 2024-10-30 PROCEDURE — C1751 CATH, INF, PER/CENT/MIDLINE: HCPCS | Performed by: THORACIC SURGERY (CARDIOTHORACIC VASCULAR SURGERY)

## 2024-10-30 PROCEDURE — 700111 HCHG RX REV CODE 636 W/ 250 OVERRIDE (IP): Mod: JZ

## 2024-10-30 PROCEDURE — 02HP32Z INSERTION OF MONITORING DEVICE INTO PULMONARY TRUNK, PERCUTANEOUS APPROACH: ICD-10-PCS | Performed by: INTERNAL MEDICINE

## 2024-10-30 PROCEDURE — 94799 UNLISTED PULMONARY SVC/PX: CPT

## 2024-10-30 PROCEDURE — 33947 ECMO/ECLS INITIATION ARTERY: CPT

## 2024-10-30 PROCEDURE — 80307 DRUG TEST PRSMV CHEM ANLYZR: CPT

## 2024-10-30 PROCEDURE — 86900 BLOOD TYPING SEROLOGIC ABO: CPT

## 2024-10-30 PROCEDURE — 99292 CRITICAL CARE ADDL 30 MIN: CPT | Mod: 25 | Performed by: INTERNAL MEDICINE

## 2024-10-30 PROCEDURE — P9045 ALBUMIN (HUMAN), 5%, 250 ML: HCPCS | Mod: JZ | Performed by: ANESTHESIOLOGY

## 2024-10-30 PROCEDURE — 770022 HCHG ROOM/CARE - ICU (200)

## 2024-10-30 PROCEDURE — 99153 MOD SED SAME PHYS/QHP EA: CPT

## 2024-10-30 PROCEDURE — 03HY32Z INSERTION OF MONITORING DEVICE INTO UPPER ARTERY, PERCUTANEOUS APPROACH: ICD-10-PCS | Performed by: EMERGENCY MEDICINE

## 2024-10-30 PROCEDURE — 33952 ECMO/ECLS INSJ PRPH CANNULA: CPT | Performed by: THORACIC SURGERY (CARDIOTHORACIC VASCULAR SURGERY)

## 2024-10-30 PROCEDURE — 700101 HCHG RX REV CODE 250: Performed by: ANESTHESIOLOGY

## 2024-10-30 PROCEDURE — 700117 HCHG RX CONTRAST REV CODE 255: Performed by: INTERNAL MEDICINE

## 2024-10-30 PROCEDURE — 92978 ENDOLUMINL IVUS OCT C 1ST: CPT | Mod: 26,LD | Performed by: INTERNAL MEDICINE

## 2024-10-30 PROCEDURE — 92950 HEART/LUNG RESUSCITATION CPR: CPT

## 2024-10-30 PROCEDURE — 84484 ASSAY OF TROPONIN QUANT: CPT

## 2024-10-30 PROCEDURE — 86850 RBC ANTIBODY SCREEN: CPT

## 2024-10-30 PROCEDURE — 83036 HEMOGLOBIN GLYCOSYLATED A1C: CPT

## 2024-10-30 PROCEDURE — 33993 REPOSG PERQ R/L HRT VAD: CPT

## 2024-10-30 PROCEDURE — 33947 ECMO/ECLS INITIATION ARTERY: CPT | Performed by: THORACIC SURGERY (CARDIOTHORACIC VASCULAR SURGERY)

## 2024-10-30 PROCEDURE — 36556 INSERT NON-TUNNEL CV CATH: CPT | Performed by: INTERNAL MEDICINE

## 2024-10-30 PROCEDURE — 92950 HEART/LUNG RESUSCITATION CPR: CPT | Performed by: INTERNAL MEDICINE

## 2024-10-30 PROCEDURE — A9270 NON-COVERED ITEM OR SERVICE: HCPCS | Performed by: INTERNAL MEDICINE

## 2024-10-30 PROCEDURE — 700102 HCHG RX REV CODE 250 W/ 637 OVERRIDE(OP): Performed by: INTERNAL MEDICINE

## 2024-10-30 PROCEDURE — 93460 R&L HRT ART/VENTRICLE ANGIO: CPT | Mod: 26,59 | Performed by: INTERNAL MEDICINE

## 2024-10-30 PROCEDURE — 99291 CRITICAL CARE FIRST HOUR: CPT | Mod: AI,25 | Performed by: INTERNAL MEDICINE

## 2024-10-30 PROCEDURE — 700111 HCHG RX REV CODE 636 W/ 250 OVERRIDE (IP): Performed by: STUDENT IN AN ORGANIZED HEALTH CARE EDUCATION/TRAINING PROGRAM

## 2024-10-30 PROCEDURE — B240ZZ3 ULTRASONOGRAPHY OF SINGLE CORONARY ARTERY, INTRAVASCULAR: ICD-10-PCS | Performed by: INTERNAL MEDICINE

## 2024-10-30 PROCEDURE — 700101 HCHG RX REV CODE 250: Performed by: INTERNAL MEDICINE

## 2024-10-30 PROCEDURE — 700102 HCHG RX REV CODE 250 W/ 637 OVERRIDE(OP)

## 2024-10-30 PROCEDURE — 37799 UNLISTED PX VASCULAR SURGERY: CPT

## 2024-10-30 PROCEDURE — 30233N1 TRANSFUSION OF NONAUTOLOGOUS RED BLOOD CELLS INTO PERIPHERAL VEIN, PERCUTANEOUS APPROACH: ICD-10-PCS | Performed by: INTERNAL MEDICINE

## 2024-10-30 PROCEDURE — 70450 CT HEAD/BRAIN W/O DYE: CPT

## 2024-10-30 PROCEDURE — 36620 INSERTION CATHETER ARTERY: CPT | Performed by: EMERGENCY MEDICINE

## 2024-10-30 PROCEDURE — P9016 RBC LEUKOCYTES REDUCED: HCPCS | Mod: 91

## 2024-10-30 PROCEDURE — 94002 VENT MGMT INPAT INIT DAY: CPT

## 2024-10-30 PROCEDURE — 361089

## 2024-10-30 PROCEDURE — 503001 HCHG PERFUSION: Performed by: THORACIC SURGERY (CARDIOTHORACIC VASCULAR SURGERY)

## 2024-10-30 PROCEDURE — 02HA3RZ INSERTION OF SHORT-TERM EXTERNAL HEART ASSIST SYSTEM INTO HEART, PERCUTANEOUS APPROACH: ICD-10-PCS | Performed by: INTERNAL MEDICINE

## 2024-10-30 PROCEDURE — 30233K1 TRANSFUSION OF NONAUTOLOGOUS FROZEN PLASMA INTO PERIPHERAL VEIN, PERCUTANEOUS APPROACH: ICD-10-PCS | Performed by: INTERNAL MEDICINE

## 2024-10-30 PROCEDURE — 36430 TRANSFUSION BLD/BLD COMPNT: CPT

## 2024-10-30 PROCEDURE — 33993 REPOSG PERQ R/L HRT VAD: CPT | Performed by: INTERNAL MEDICINE

## 2024-10-30 PROCEDURE — 85007 BL SMEAR W/DIFF WBC COUNT: CPT

## 2024-10-30 PROCEDURE — 85730 THROMBOPLASTIN TIME PARTIAL: CPT | Mod: 91

## 2024-10-30 PROCEDURE — B2151ZZ FLUOROSCOPY OF LEFT HEART USING LOW OSMOLAR CONTRAST: ICD-10-PCS | Performed by: INTERNAL MEDICINE

## 2024-10-30 PROCEDURE — 36415 COLL VENOUS BLD VENIPUNCTURE: CPT

## 2024-10-30 PROCEDURE — 80503 PATH CLIN CONSLTJ SF 5-20: CPT

## 2024-10-30 PROCEDURE — 82010 KETONE BODYS QUAN: CPT

## 2024-10-30 PROCEDURE — 86901 BLOOD TYPING SEROLOGIC RH(D): CPT

## 2024-10-30 PROCEDURE — 160009 HCHG ANES TIME/MIN: Performed by: THORACIC SURGERY (CARDIOTHORACIC VASCULAR SURGERY)

## 2024-10-30 PROCEDURE — 92941 PRQ TRLML REVSC TOT OCCL AMI: CPT | Mod: LD | Performed by: INTERNAL MEDICINE

## 2024-10-30 PROCEDURE — 80053 COMPREHEN METABOLIC PANEL: CPT | Mod: 91

## 2024-10-30 PROCEDURE — 85576 BLOOD PLATELET AGGREGATION: CPT | Mod: 91

## 2024-10-30 PROCEDURE — 0BH17EZ INSERTION OF ENDOTRACHEAL AIRWAY INTO TRACHEA, VIA NATURAL OR ARTIFICIAL OPENING: ICD-10-PCS | Performed by: EMERGENCY MEDICINE

## 2024-10-30 PROCEDURE — 86140 C-REACTIVE PROTEIN: CPT

## 2024-10-30 PROCEDURE — 33952 ECMO/ECLS INSJ PRPH CANNULA: CPT | Mod: AS | Performed by: NURSE PRACTITIONER

## 2024-10-30 PROCEDURE — 02WAXRZ REVISION OF SHORT-TERM EXTERNAL HEART ASSIST SYSTEM IN HEART, EXTERNAL APPROACH: ICD-10-PCS | Performed by: INTERNAL MEDICINE

## 2024-10-30 PROCEDURE — 160048 HCHG OR STATISTICAL LEVEL 1-5: Performed by: THORACIC SURGERY (CARDIOTHORACIC VASCULAR SURGERY)

## 2024-10-30 PROCEDURE — 82962 GLUCOSE BLOOD TEST: CPT | Mod: 91

## 2024-10-30 PROCEDURE — 99291 CRITICAL CARE FIRST HOUR: CPT

## 2024-10-30 PROCEDURE — 36556 INSERT NON-TUNNEL CV CATH: CPT

## 2024-10-30 PROCEDURE — 99152 MOD SED SAME PHYS/QHP 5/>YRS: CPT | Performed by: INTERNAL MEDICINE

## 2024-10-30 PROCEDURE — 93325 DOPPLER ECHO COLOR FLOW MAPG: CPT | Mod: 26 | Performed by: INTERNAL MEDICINE

## 2024-10-30 PROCEDURE — 5A1935Z RESPIRATORY VENTILATION, LESS THAN 24 CONSECUTIVE HOURS: ICD-10-PCS | Performed by: EMERGENCY MEDICINE

## 2024-10-30 PROCEDURE — 99152 MOD SED SAME PHYS/QHP 5/>YRS: CPT

## 2024-10-30 PROCEDURE — 82550 ASSAY OF CK (CPK): CPT | Mod: 91

## 2024-10-30 PROCEDURE — 85025 COMPLETE CBC W/AUTO DIFF WBC: CPT | Mod: 91

## 2024-10-30 RX ORDER — ASPIRIN 81 MG/1
81 TABLET ORAL DAILY
Status: DISCONTINUED | OUTPATIENT
Start: 2024-10-30 | End: 2024-10-30

## 2024-10-30 RX ORDER — HEPARIN SODIUM 200 [USP'U]/100ML
INJECTION, SOLUTION INTRAVENOUS
Status: COMPLETED
Start: 2024-10-30 | End: 2024-10-30

## 2024-10-30 RX ORDER — CLOPIDOGREL BISULFATE 75 MG/1
75 TABLET ORAL DAILY
Status: DISCONTINUED | OUTPATIENT
Start: 2024-10-31 | End: 2024-10-30

## 2024-10-30 RX ORDER — HEPARIN SODIUM,PORCINE 1000/ML
VIAL (ML) INJECTION
Status: DISCONTINUED | OUTPATIENT
Start: 2024-10-30 | End: 2024-10-30 | Stop reason: HOSPADM

## 2024-10-30 RX ORDER — DEXMEDETOMIDINE HYDROCHLORIDE 4 UG/ML
0-1.5 INJECTION, SOLUTION INTRAVENOUS CONTINUOUS
Status: DISCONTINUED | OUTPATIENT
Start: 2024-10-30 | End: 2024-10-31 | Stop reason: HOSPADM

## 2024-10-30 RX ORDER — SODIUM CHLORIDE 9 MG/ML
INJECTION, SOLUTION INTRAVENOUS CONTINUOUS
Status: ACTIVE | OUTPATIENT
Start: 2024-10-30 | End: 2024-10-31

## 2024-10-30 RX ORDER — CALCIUM CHLORIDE 100 MG/ML
1 INJECTION INTRAVENOUS; INTRAVENTRICULAR ONCE
Status: COMPLETED | OUTPATIENT
Start: 2024-10-30 | End: 2024-10-30

## 2024-10-30 RX ORDER — HYDROMORPHONE HYDROCHLORIDE 1 MG/ML
1 INJECTION, SOLUTION INTRAMUSCULAR; INTRAVENOUS; SUBCUTANEOUS
Status: DISCONTINUED | OUTPATIENT
Start: 2024-10-30 | End: 2024-10-31 | Stop reason: HOSPADM

## 2024-10-30 RX ORDER — POTASSIUM CHLORIDE 29.8 MG/ML
40 INJECTION INTRAVENOUS ONCE
Status: COMPLETED | OUTPATIENT
Start: 2024-10-30 | End: 2024-10-30

## 2024-10-30 RX ORDER — CALCIUM CHLORIDE 100 MG/ML
1 INJECTION INTRAVENOUS; INTRAVENTRICULAR
Status: ACTIVE | OUTPATIENT
Start: 2024-10-30 | End: 2024-10-30

## 2024-10-30 RX ORDER — GUAIFENESIN/DEXTROMETHORPHAN 100-10MG/5
10 SYRUP ORAL EVERY 6 HOURS PRN
Status: DISCONTINUED | OUTPATIENT
Start: 2024-10-30 | End: 2024-10-30

## 2024-10-30 RX ORDER — HYDROMORPHONE HYDROCHLORIDE 1 MG/ML
1-2 INJECTION, SOLUTION INTRAMUSCULAR; INTRAVENOUS; SUBCUTANEOUS
Status: DISCONTINUED | OUTPATIENT
Start: 2024-10-30 | End: 2024-10-30

## 2024-10-30 RX ORDER — POLYETHYLENE GLYCOL 3350 17 G/17G
1 POWDER, FOR SOLUTION ORAL
Status: DISCONTINUED | OUTPATIENT
Start: 2024-10-30 | End: 2024-10-30

## 2024-10-30 RX ORDER — VERAPAMIL HYDROCHLORIDE 2.5 MG/ML
INJECTION, SOLUTION INTRAVENOUS
Status: COMPLETED
Start: 2024-10-30 | End: 2024-10-30

## 2024-10-30 RX ORDER — ACETAMINOPHEN 325 MG/1
650 TABLET ORAL EVERY 6 HOURS PRN
Status: DISCONTINUED | OUTPATIENT
Start: 2024-10-30 | End: 2024-10-30

## 2024-10-30 RX ORDER — ASPIRIN 81 MG/1
81 TABLET, CHEWABLE ORAL DAILY
Status: DISCONTINUED | OUTPATIENT
Start: 2024-10-31 | End: 2024-10-31 | Stop reason: HOSPADM

## 2024-10-30 RX ORDER — AMOXICILLIN 250 MG
2 CAPSULE ORAL EVERY EVENING
Status: DISCONTINUED | OUTPATIENT
Start: 2024-10-30 | End: 2024-10-30

## 2024-10-30 RX ORDER — EPINEPHRINE HCL IN 0.9 % NACL 4MG/250ML
PLASTIC BAG, INJECTION (ML) INTRAVENOUS
Status: COMPLETED
Start: 2024-10-30 | End: 2024-10-30

## 2024-10-30 RX ORDER — EPINEPHRINE HCL IN 0.9 % NACL 4MG/250ML
0-.5 PLASTIC BAG, INJECTION (ML) INTRAVENOUS CONTINUOUS
Status: DISCONTINUED | OUTPATIENT
Start: 2024-10-30 | End: 2024-10-30

## 2024-10-30 RX ORDER — CALCIUM CHLORIDE 100 MG/ML
INJECTION INTRAVENOUS; INTRAVENTRICULAR
Status: COMPLETED
Start: 2024-10-30 | End: 2024-10-30

## 2024-10-30 RX ORDER — ALBUMIN, HUMAN INJ 5% 5 %
SOLUTION INTRAVENOUS PRN
Status: DISCONTINUED | OUTPATIENT
Start: 2024-10-30 | End: 2024-10-30 | Stop reason: SURG

## 2024-10-30 RX ORDER — ROCURONIUM BROMIDE 10 MG/ML
100 INJECTION, SOLUTION INTRAVENOUS ONCE
Status: COMPLETED | OUTPATIENT
Start: 2024-10-30 | End: 2024-10-30

## 2024-10-30 RX ORDER — MIDAZOLAM HYDROCHLORIDE 1 MG/ML
INJECTION INTRAMUSCULAR; INTRAVENOUS
Status: DISCONTINUED
Start: 2024-10-30 | End: 2024-10-30

## 2024-10-30 RX ORDER — PROMETHAZINE HYDROCHLORIDE 25 MG/1
12.5-25 TABLET ORAL EVERY 4 HOURS PRN
Status: DISCONTINUED | OUTPATIENT
Start: 2024-10-30 | End: 2024-10-30

## 2024-10-30 RX ORDER — NOREPINEPHRINE BITARTRATE 0.03 MG/ML
0-1 INJECTION, SOLUTION INTRAVENOUS CONTINUOUS
Status: DISCONTINUED | OUTPATIENT
Start: 2024-10-30 | End: 2024-10-30

## 2024-10-30 RX ORDER — ATORVASTATIN CALCIUM 80 MG/1
80 TABLET, FILM COATED ORAL EVERY EVENING
Status: DISCONTINUED | OUTPATIENT
Start: 2024-10-30 | End: 2024-10-31

## 2024-10-30 RX ORDER — FAMOTIDINE 20 MG/1
20 TABLET, FILM COATED ORAL DAILY
Status: DISCONTINUED | OUTPATIENT
Start: 2024-10-30 | End: 2024-10-30

## 2024-10-30 RX ORDER — FAMOTIDINE 20 MG/1
20 TABLET, FILM COATED ORAL 2 TIMES DAILY
Status: DISCONTINUED | OUTPATIENT
Start: 2024-10-30 | End: 2024-10-30

## 2024-10-30 RX ORDER — MIDAZOLAM HYDROCHLORIDE 1 MG/ML
1-5 INJECTION INTRAMUSCULAR; INTRAVENOUS
Status: DISCONTINUED | OUTPATIENT
Start: 2024-10-30 | End: 2024-10-30

## 2024-10-30 RX ORDER — FUROSEMIDE 10 MG/ML
80 INJECTION INTRAMUSCULAR; INTRAVENOUS
Status: DISCONTINUED | OUTPATIENT
Start: 2024-10-30 | End: 2024-10-30

## 2024-10-30 RX ORDER — ONDANSETRON 2 MG/ML
4 INJECTION INTRAMUSCULAR; INTRAVENOUS EVERY 4 HOURS PRN
Status: DISCONTINUED | OUTPATIENT
Start: 2024-10-30 | End: 2024-10-31 | Stop reason: HOSPADM

## 2024-10-30 RX ORDER — PHENYLEPHRINE HCL IN 0.9% NACL 1 MG/10 ML
SYRINGE (ML) INTRAVENOUS
Status: ACTIVE
Start: 2024-10-30 | End: 2024-10-31

## 2024-10-30 RX ORDER — ALBUMIN HUMAN 50 G/1000ML
SOLUTION INTRAVENOUS
Status: ACTIVE
Start: 2024-10-30 | End: 2024-10-31

## 2024-10-30 RX ORDER — DEXTROSE MONOHYDRATE 25 G/50ML
25 INJECTION, SOLUTION INTRAVENOUS
Status: DISCONTINUED | OUTPATIENT
Start: 2024-10-30 | End: 2024-10-30

## 2024-10-30 RX ORDER — DOBUTAMINE HYDROCHLORIDE 100 MG/100ML
5 INJECTION INTRAVENOUS CONTINUOUS
Status: DISCONTINUED | OUTPATIENT
Start: 2024-10-30 | End: 2024-10-30

## 2024-10-30 RX ORDER — DOBUTAMINE HYDROCHLORIDE 100 MG/100ML
INJECTION INTRAVENOUS
Status: DISCONTINUED | OUTPATIENT
Start: 2024-10-30 | End: 2024-10-30 | Stop reason: SURG

## 2024-10-30 RX ORDER — DEXTROSE MONOHYDRATE 25 G/50ML
12.5-25 INJECTION, SOLUTION INTRAVENOUS PRN
Status: DISCONTINUED | OUTPATIENT
Start: 2024-10-30 | End: 2024-10-31 | Stop reason: HOSPADM

## 2024-10-30 RX ORDER — CEFAZOLIN SODIUM 1 G/3ML
INJECTION, POWDER, FOR SOLUTION INTRAMUSCULAR; INTRAVENOUS PRN
Status: DISCONTINUED | OUTPATIENT
Start: 2024-10-30 | End: 2024-10-30 | Stop reason: SURG

## 2024-10-30 RX ORDER — POTASSIUM CHLORIDE 1500 MG/1
40 TABLET, EXTENDED RELEASE ORAL ONCE
Status: DISCONTINUED | OUTPATIENT
Start: 2024-10-30 | End: 2024-10-30

## 2024-10-30 RX ORDER — PROCHLORPERAZINE EDISYLATE 5 MG/ML
INJECTION INTRAMUSCULAR; INTRAVENOUS
Status: COMPLETED
Start: 2024-10-30 | End: 2024-10-30

## 2024-10-30 RX ORDER — ONDANSETRON 4 MG/1
4 TABLET, ORALLY DISINTEGRATING ORAL EVERY 4 HOURS PRN
Status: DISCONTINUED | OUTPATIENT
Start: 2024-10-30 | End: 2024-10-30

## 2024-10-30 RX ORDER — PROMETHAZINE HYDROCHLORIDE 25 MG/1
12.5-25 SUPPOSITORY RECTAL EVERY 4 HOURS PRN
Status: DISCONTINUED | OUTPATIENT
Start: 2024-10-30 | End: 2024-10-31 | Stop reason: HOSPADM

## 2024-10-30 RX ORDER — ALBUMIN HUMAN 50 G/1000ML
12.5 SOLUTION INTRAVENOUS
Status: ACTIVE | OUTPATIENT
Start: 2024-10-30 | End: 2024-10-30

## 2024-10-30 RX ORDER — POLYETHYLENE GLYCOL 3350 17 G/17G
1 POWDER, FOR SOLUTION ORAL
Status: DISCONTINUED | OUTPATIENT
Start: 2024-10-30 | End: 2024-10-31 | Stop reason: HOSPADM

## 2024-10-30 RX ORDER — HYDRALAZINE HYDROCHLORIDE 20 MG/ML
10 INJECTION INTRAMUSCULAR; INTRAVENOUS EVERY 4 HOURS PRN
Status: DISCONTINUED | OUTPATIENT
Start: 2024-10-30 | End: 2024-10-31 | Stop reason: HOSPADM

## 2024-10-30 RX ORDER — HEPARIN SODIUM 1000 [USP'U]/ML
INJECTION, SOLUTION INTRAVENOUS; SUBCUTANEOUS
Status: COMPLETED
Start: 2024-10-30 | End: 2024-10-30

## 2024-10-30 RX ORDER — NOREPINEPHRINE BITARTRATE 0.03 MG/ML
0-1 INJECTION, SOLUTION INTRAVENOUS CONTINUOUS
Status: DISCONTINUED | OUTPATIENT
Start: 2024-10-30 | End: 2024-10-31 | Stop reason: HOSPADM

## 2024-10-30 RX ORDER — DOBUTAMINE HYDROCHLORIDE 100 MG/100ML
INJECTION INTRAVENOUS
Status: COMPLETED
Start: 2024-10-30 | End: 2024-10-30

## 2024-10-30 RX ORDER — ACETAMINOPHEN 325 MG/1
650 TABLET ORAL EVERY 6 HOURS PRN
Status: DISCONTINUED | OUTPATIENT
Start: 2024-10-30 | End: 2024-10-31 | Stop reason: HOSPADM

## 2024-10-30 RX ORDER — DEXMEDETOMIDINE HYDROCHLORIDE 4 UG/ML
.1-1.5 INJECTION, SOLUTION INTRAVENOUS CONTINUOUS
Status: DISCONTINUED | OUTPATIENT
Start: 2024-10-30 | End: 2024-10-30

## 2024-10-30 RX ORDER — SODIUM CHLORIDE 9 MG/ML
1.5 INJECTION, SOLUTION INTRAVENOUS CONTINUOUS
Status: DISCONTINUED | OUTPATIENT
Start: 2024-10-30 | End: 2024-10-30

## 2024-10-30 RX ORDER — ATORVASTATIN CALCIUM 80 MG/1
80 TABLET, FILM COATED ORAL EVERY EVENING
Status: DISCONTINUED | OUTPATIENT
Start: 2024-10-30 | End: 2024-10-30

## 2024-10-30 RX ORDER — PROMETHAZINE HYDROCHLORIDE 25 MG/1
12.5-25 TABLET ORAL EVERY 4 HOURS PRN
Status: DISCONTINUED | OUTPATIENT
Start: 2024-10-30 | End: 2024-10-31 | Stop reason: HOSPADM

## 2024-10-30 RX ORDER — BISACODYL 10 MG
10 SUPPOSITORY, RECTAL RECTAL
Status: DISCONTINUED | OUTPATIENT
Start: 2024-10-30 | End: 2024-10-31 | Stop reason: HOSPADM

## 2024-10-30 RX ORDER — DOPAMINE HYDROCHLORIDE 160 MG/100ML
INJECTION, SOLUTION INTRAVENOUS
Status: COMPLETED
Start: 2024-10-30 | End: 2024-10-30

## 2024-10-30 RX ORDER — CLOPIDOGREL BISULFATE 75 MG/1
75 TABLET ORAL DAILY
Status: DISCONTINUED | OUTPATIENT
Start: 2024-10-31 | End: 2024-10-31 | Stop reason: HOSPADM

## 2024-10-30 RX ORDER — ONDANSETRON 4 MG/1
4 TABLET, ORALLY DISINTEGRATING ORAL EVERY 4 HOURS PRN
Status: DISCONTINUED | OUTPATIENT
Start: 2024-10-30 | End: 2024-10-31 | Stop reason: HOSPADM

## 2024-10-30 RX ORDER — PROCHLORPERAZINE EDISYLATE 5 MG/ML
5-10 INJECTION INTRAMUSCULAR; INTRAVENOUS EVERY 4 HOURS PRN
Status: DISCONTINUED | OUTPATIENT
Start: 2024-10-30 | End: 2024-10-31 | Stop reason: HOSPADM

## 2024-10-30 RX ORDER — LIDOCAINE HYDROCHLORIDE 20 MG/ML
INJECTION, SOLUTION INFILTRATION; PERINEURAL
Status: COMPLETED
Start: 2024-10-30 | End: 2024-10-30

## 2024-10-30 RX ORDER — CALCIUM GLUCONATE 20 MG/ML
2 INJECTION, SOLUTION INTRAVENOUS ONCE
Status: COMPLETED | OUTPATIENT
Start: 2024-10-30 | End: 2024-10-30

## 2024-10-30 RX ORDER — FAMOTIDINE 20 MG/1
20 TABLET, FILM COATED ORAL
Status: DISCONTINUED | OUTPATIENT
Start: 2024-10-31 | End: 2024-10-31 | Stop reason: HOSPADM

## 2024-10-30 RX ORDER — PHENYLEPHRINE HCL IN 0.9% NACL 1 MG/10 ML
100-500 SYRINGE (ML) INTRAVENOUS
Status: ACTIVE | OUTPATIENT
Start: 2024-10-30 | End: 2024-10-30

## 2024-10-30 RX ORDER — SODIUM CHLORIDE 9 MG/ML
INJECTION, SOLUTION INTRAVENOUS
Status: DISCONTINUED | OUTPATIENT
Start: 2024-10-30 | End: 2024-10-30 | Stop reason: SURG

## 2024-10-30 RX ORDER — AMOXICILLIN 250 MG
2 CAPSULE ORAL 2 TIMES DAILY
Status: DISCONTINUED | OUTPATIENT
Start: 2024-10-30 | End: 2024-10-31 | Stop reason: HOSPADM

## 2024-10-30 RX ORDER — NITROGLYCERIN 20 MG/100ML
0-200 INJECTION INTRAVENOUS CONTINUOUS
Status: DISCONTINUED | OUTPATIENT
Start: 2024-10-30 | End: 2024-10-30

## 2024-10-30 RX ORDER — HEPARIN SODIUM,PORCINE 1000/ML
VIAL (ML) INJECTION PRN
Status: DISCONTINUED | OUTPATIENT
Start: 2024-10-30 | End: 2024-10-30 | Stop reason: SURG

## 2024-10-30 RX ORDER — ASPIRIN 81 MG/1
81 TABLET, CHEWABLE ORAL DAILY
Status: DISCONTINUED | OUTPATIENT
Start: 2024-10-30 | End: 2024-10-30

## 2024-10-30 RX ORDER — SODIUM CHLORIDE 9 MG/ML
INJECTION, SOLUTION INTRAVENOUS CONTINUOUS
Status: DISCONTINUED | OUTPATIENT
Start: 2024-10-30 | End: 2024-10-31 | Stop reason: HOSPADM

## 2024-10-30 RX ORDER — HEPARIN SODIUM 5000 [USP'U]/100ML
0-1000 INJECTION, SOLUTION INTRAVENOUS CONTINUOUS
Status: DISCONTINUED | OUTPATIENT
Start: 2024-10-30 | End: 2024-10-30

## 2024-10-30 RX ORDER — HYDROMORPHONE HYDROCHLORIDE 2 MG/ML
2 INJECTION, SOLUTION INTRAMUSCULAR; INTRAVENOUS; SUBCUTANEOUS
Status: DISCONTINUED | OUTPATIENT
Start: 2024-10-30 | End: 2024-10-31 | Stop reason: HOSPADM

## 2024-10-30 RX ORDER — EPINEPHRINE 0.1 MG/ML
0-1 SYRINGE (ML) INJECTION
Status: ACTIVE | OUTPATIENT
Start: 2024-10-30 | End: 2024-10-30

## 2024-10-30 RX ORDER — INSULIN LISPRO 100 [IU]/ML
2-9 INJECTION, SOLUTION INTRAVENOUS; SUBCUTANEOUS EVERY 6 HOURS
Status: DISCONTINUED | OUTPATIENT
Start: 2024-10-30 | End: 2024-10-30

## 2024-10-30 RX ADMIN — HEPARIN SODIUM 330 UNITS/HR: 1000 INJECTION, SOLUTION INTRAVENOUS; SUBCUTANEOUS at 06:46

## 2024-10-30 RX ADMIN — VASOPRESSIN 0.03 UNITS/MIN: 0.2 SOLUTION INTRAVENOUS at 09:15

## 2024-10-30 RX ADMIN — ASPIRIN 81 MG: 81 TABLET, CHEWABLE ORAL at 06:00

## 2024-10-30 RX ADMIN — SODIUM BICARBONATE 100 MEQ: 84 INJECTION INTRAVENOUS at 12:21

## 2024-10-30 RX ADMIN — CALCIUM CHLORIDE 1 G: 100 INJECTION, SOLUTION INTRAVENOUS at 15:20

## 2024-10-30 RX ADMIN — HYDROMORPHONE HYDROCHLORIDE 2 MG: 2 INJECTION INTRAMUSCULAR; INTRAVENOUS; SUBCUTANEOUS at 22:49

## 2024-10-30 RX ADMIN — SODIUM CHLORIDE 11 UNITS/HR: 9 INJECTION, SOLUTION INTRAVENOUS at 16:48

## 2024-10-30 RX ADMIN — CALCIUM CHLORIDE 1 G: 100 INJECTION INTRAVENOUS; INTRAVENTRICULAR at 08:20

## 2024-10-30 RX ADMIN — SODIUM BICARBONATE 100 MEQ: 84 INJECTION INTRAVENOUS at 07:15

## 2024-10-30 RX ADMIN — EPINEPHRINE 0.1 MCG/KG/MIN: 1 INJECTION INTRAMUSCULAR; INTRAVENOUS; SUBCUTANEOUS at 04:20

## 2024-10-30 RX ADMIN — FENTANYL CITRATE 100 MCG: 50 INJECTION, SOLUTION INTRAMUSCULAR; INTRAVENOUS at 07:26

## 2024-10-30 RX ADMIN — KETAMINE HYDROCHLORIDE 100 MG: 50 INJECTION INTRAMUSCULAR; INTRAVENOUS at 01:12

## 2024-10-30 RX ADMIN — NOREPINEPHRINE BITARTRATE 0.22 MCG/KG/MIN: 1 INJECTION INTRAVENOUS at 00:13

## 2024-10-30 RX ADMIN — DOPAMINE HYDROCHLORIDE 10 MCG/KG/MIN: 160 INJECTION, SOLUTION INTRAVENOUS at 01:18

## 2024-10-30 RX ADMIN — CALCIUM GLUCONATE 2 G: 20 INJECTION, SOLUTION INTRAVENOUS at 13:07

## 2024-10-30 RX ADMIN — SODIUM BICARBONATE 25 MEQ: 84 INJECTION, SOLUTION INTRAVENOUS at 10:00

## 2024-10-30 RX ADMIN — DOBUTAMINE HYDROCHLORIDE 5 MCG/KG/MIN: 100 INJECTION INTRAVENOUS at 09:15

## 2024-10-30 RX ADMIN — FUROSEMIDE 80 MG: 10 INJECTION, SOLUTION INTRAVENOUS at 05:23

## 2024-10-30 RX ADMIN — ALBUMIN (HUMAN) 250 ML: 12.5 SOLUTION INTRAVENOUS at 10:48

## 2024-10-30 RX ADMIN — DOBUTAMINE HYDROCHLORIDE 5 MCG/KG/MIN: 100 INJECTION INTRAVENOUS at 06:41

## 2024-10-30 RX ADMIN — DOBUTAMINE HYDROCHLORIDE 5 MCG/KG/MIN: 100 INJECTION INTRAVENOUS at 05:59

## 2024-10-30 RX ADMIN — ALBUMIN (HUMAN) 250 ML: 12.5 SOLUTION INTRAVENOUS at 10:41

## 2024-10-30 RX ADMIN — SODIUM CHLORIDE: 9 INJECTION, SOLUTION INTRAVENOUS at 12:14

## 2024-10-30 RX ADMIN — SODIUM CHLORIDE: 9 INJECTION, SOLUTION INTRAVENOUS at 13:50

## 2024-10-30 RX ADMIN — AMIODARONE HYDROCHLORIDE 0.5 MG/MIN: 1.8 INJECTION, SOLUTION INTRAVENOUS at 21:22

## 2024-10-30 RX ADMIN — INSULIN HUMAN 4 UNITS: 100 INJECTION, SOLUTION PARENTERAL at 07:05

## 2024-10-30 RX ADMIN — CALCIUM GLUCONATE 2 G: 20 INJECTION, SOLUTION INTRAVENOUS at 15:05

## 2024-10-30 RX ADMIN — HEPARIN SODIUM 10000 UNITS: 1000 INJECTION, SOLUTION INTRAVENOUS; SUBCUTANEOUS at 10:10

## 2024-10-30 RX ADMIN — Medication 100 MEQ: at 12:21

## 2024-10-30 RX ADMIN — HEPARIN SODIUM: 1000 INJECTION, SOLUTION INTRAVENOUS; SUBCUTANEOUS at 02:59

## 2024-10-30 RX ADMIN — DEXMEDETOMIDINE HYDROCHLORIDE 0.2 MCG/KG/HR: 4 INJECTION, SOLUTION INTRAVENOUS at 06:37

## 2024-10-30 RX ADMIN — IOHEXOL 100 ML: 350 INJECTION, SOLUTION INTRAVENOUS at 19:15

## 2024-10-30 RX ADMIN — AMIODARONE HYDROCHLORIDE 1 MG/MIN: 1.8 INJECTION, SOLUTION INTRAVENOUS at 07:34

## 2024-10-30 RX ADMIN — EPINEPHRINE 0.03 MCG/KG/MIN: 1 INJECTION INTRAMUSCULAR; INTRAVENOUS; SUBCUTANEOUS at 00:57

## 2024-10-30 RX ADMIN — FENTANYL CITRATE 100 MCG: 50 INJECTION, SOLUTION INTRAMUSCULAR; INTRAVENOUS at 02:58

## 2024-10-30 RX ADMIN — HEPARIN SODIUM 570 UNITS/HR: 5000 INJECTION, SOLUTION INTRAVENOUS at 06:43

## 2024-10-30 RX ADMIN — ROCURONIUM BROMIDE 100 MG: 50 INJECTION, SOLUTION INTRAVENOUS at 01:12

## 2024-10-30 RX ADMIN — NOREPINEPHRINE BITARTRATE 0.7 MCG/KG/MIN: 32 SOLUTION INTRAVENOUS at 23:58

## 2024-10-30 RX ADMIN — AMIODARONE HYDROCHLORIDE 1 MG/MIN: 1.8 INJECTION, SOLUTION INTRAVENOUS at 13:17

## 2024-10-30 RX ADMIN — HEPARIN SODIUM 2000 UNITS: 200 INJECTION, SOLUTION INTRAVENOUS at 02:58

## 2024-10-30 RX ADMIN — NOREPINEPHRINE BITARTRATE 1 MCG/KG/MIN: 1 INJECTION INTRAVENOUS at 11:00

## 2024-10-30 RX ADMIN — ROCURONIUM BROMIDE 100 MG: 10 INJECTION, SOLUTION INTRAVENOUS at 09:20

## 2024-10-30 RX ADMIN — SODIUM CHLORIDE 4.5 UNITS/HR: 9 INJECTION, SOLUTION INTRAVENOUS at 05:37

## 2024-10-30 RX ADMIN — NOREPINEPHRINE BITARTRATE 0.6 MCG/KG/MIN: 1 INJECTION INTRAVENOUS at 07:35

## 2024-10-30 RX ADMIN — CALCIUM CHLORIDE 1 G: 100 INJECTION INTRAVENOUS; INTRAVENTRICULAR at 18:24

## 2024-10-30 RX ADMIN — HYDROMORPHONE HYDROCHLORIDE 1 MG: 1 INJECTION, SOLUTION INTRAMUSCULAR; INTRAVENOUS; SUBCUTANEOUS at 16:52

## 2024-10-30 RX ADMIN — EPINEPHRINE 1 MG: 0.1 INJECTION INTRAVENOUS at 18:24

## 2024-10-30 RX ADMIN — MIDAZOLAM HYDROCHLORIDE 2 MG: 1 INJECTION, SOLUTION INTRAMUSCULAR; INTRAVENOUS at 03:09

## 2024-10-30 RX ADMIN — IOHEXOL 107 ML: 350 INJECTION, SOLUTION INTRAVENOUS at 03:09

## 2024-10-30 RX ADMIN — HYDROMORPHONE HYDROCHLORIDE 2 MG: 2 INJECTION INTRAMUSCULAR; INTRAVENOUS; SUBCUTANEOUS at 18:19

## 2024-10-30 RX ADMIN — CALCIUM CHLORIDE 1 G: 100 INJECTION INTRAVENOUS; INTRAVENTRICULAR at 15:20

## 2024-10-30 RX ADMIN — NOREPINEPHRINE BITARTRATE 1 MCG/KG/MIN: 32 SOLUTION INTRAVENOUS at 12:21

## 2024-10-30 RX ADMIN — SODIUM CHLORIDE: 9 INJECTION, SOLUTION INTRAVENOUS at 09:15

## 2024-10-30 RX ADMIN — AMIODARONE HYDROCHLORIDE 1 MG/MIN: 1.8 INJECTION, SOLUTION INTRAVENOUS at 01:51

## 2024-10-30 RX ADMIN — NOREPINEPHRINE BITARTRATE 1 MCG/KG/MIN: 1 INJECTION INTRAVENOUS at 09:50

## 2024-10-30 RX ADMIN — SODIUM BICARBONATE 100 MEQ: 84 INJECTION INTRAVENOUS at 08:21

## 2024-10-30 RX ADMIN — SODIUM BICARBONATE 50 MEQ: 84 INJECTION INTRAVENOUS at 04:18

## 2024-10-30 RX ADMIN — POTASSIUM CHLORIDE 40 MEQ: 29.8 INJECTION, SOLUTION INTRAVENOUS at 14:24

## 2024-10-30 RX ADMIN — SODIUM BICARBONATE 50 MEQ: 84 INJECTION INTRAVENOUS at 05:38

## 2024-10-30 RX ADMIN — CEFAZOLIN 2 G: 1 INJECTION, POWDER, FOR SOLUTION INTRAMUSCULAR; INTRAVENOUS at 09:20

## 2024-10-30 RX ADMIN — HEPARIN SODIUM: 1000 INJECTION, SOLUTION INTRAVENOUS; SUBCUTANEOUS at 02:58

## 2024-10-30 RX ADMIN — LIDOCAINE HYDROCHLORIDE: 20 INJECTION, SOLUTION INFILTRATION; PERINEURAL at 03:00

## 2024-10-30 RX ADMIN — NOREPINEPHRINE BITARTRATE 1 MCG/KG/MIN: 32 SOLUTION INTRAVENOUS at 14:57

## 2024-10-30 RX ADMIN — FAMOTIDINE 20 MG: 10 INJECTION, SOLUTION INTRAVENOUS at 06:10

## 2024-10-30 RX ADMIN — POTASSIUM BICARBONATE 50 MEQ: 978 TABLET, EFFERVESCENT ORAL at 16:21

## 2024-10-30 ASSESSMENT — PAIN DESCRIPTION - PAIN TYPE
TYPE: ACUTE PAIN

## 2024-10-30 ASSESSMENT — ENCOUNTER SYMPTOMS
FEVER: 0
ROS GI COMMENTS: UNABLE TO ASSESS
DOUBLE VISION: 0
WEIGHT LOSS: 0
BLURRED VISION: 0
CHILLS: 0

## 2024-10-30 ASSESSMENT — FIBROSIS 4 INDEX: FIB4 SCORE: 3.35

## 2024-10-31 ENCOUNTER — APPOINTMENT (OUTPATIENT)
Dept: CARDIOLOGY | Facility: MEDICAL CENTER | Age: 59
End: 2024-10-31
Attending: INTERNAL MEDICINE
Payer: COMMERCIAL

## 2024-10-31 ENCOUNTER — APPOINTMENT (OUTPATIENT)
Dept: RADIOLOGY | Facility: MEDICAL CENTER | Age: 59
DRG: 003 | End: 2024-10-31
Attending: INTERNAL MEDICINE
Payer: COMMERCIAL

## 2024-10-31 VITALS
WEIGHT: 226.85 LBS | HEART RATE: 63 BPM | TEMPERATURE: 98.2 F | RESPIRATION RATE: 14 BRPM | OXYGEN SATURATION: 94 % | BODY MASS INDEX: 42.83 KG/M2 | HEIGHT: 61 IN | DIASTOLIC BLOOD PRESSURE: 53 MMHG | SYSTOLIC BLOOD PRESSURE: 95 MMHG

## 2024-10-31 PROBLEM — I49.01 VENTRICULAR FIBRILLATION (HCC): Status: ACTIVE | Noted: 2024-10-31

## 2024-10-31 PROBLEM — R74.01 TRANSAMINITIS: Status: ACTIVE | Noted: 2024-10-31

## 2024-10-31 PROBLEM — N17.9 AKI (ACUTE KIDNEY INJURY) (HCC): Status: ACTIVE | Noted: 2024-10-31

## 2024-10-31 PROBLEM — R58 RETROPERITONEAL BLEED: Status: ACTIVE | Noted: 2024-10-31

## 2024-10-31 LAB
ALBUMIN SERPL BCP-MCNC: 3.1 G/DL (ref 3.2–4.9)
ALBUMIN/GLOB SERPL: 2.1 G/DL
ALP SERPL-CCNC: 51 U/L (ref 30–99)
ALT SERPL-CCNC: 149 U/L (ref 2–50)
ANION GAP SERPL CALC-SCNC: 11 MMOL/L (ref 7–16)
ANION GAP SERPL CALC-SCNC: 12 MMOL/L (ref 7–16)
ANION GAP SERPL CALC-SCNC: 13 MMOL/L (ref 7–16)
ANION GAP SERPL CALC-SCNC: 15 MMOL/L (ref 7–16)
ANION GAP SERPL CALC-SCNC: 15 MMOL/L (ref 7–16)
APPEARANCE UR: ABNORMAL
ARTERIAL PATENCY WRIST A: ABNORMAL
ARTERIAL PATENCY WRIST A: NORMAL
ARTERIAL PATENCY WRIST A: NORMAL
AST SERPL-CCNC: 376 U/L (ref 12–45)
BACTERIA #/AREA URNS HPF: ABNORMAL /HPF
BASE EXCESS BLDA CALC-SCNC: 0 MMOL/L (ref -4–3)
BASE EXCESS BLDA CALC-SCNC: 1 MMOL/L (ref -4–3)
BASE EXCESS BLDA CALC-SCNC: 2 MMOL/L (ref -4–3)
BASE EXCESS BLDV CALC-SCNC: -1 MMOL/L (ref -4–3)
BASE EXCESS BLDV CALC-SCNC: 0 MMOL/L (ref -4–3)
BASE EXCESS BLDV CALC-SCNC: 1 MMOL/L (ref -4–3)
BASE EXCESS BLDV CALC-SCNC: 3 MMOL/L (ref -4–3)
BASOPHILS # BLD AUTO: 0.1 % (ref 0–1.8)
BASOPHILS # BLD AUTO: 0.2 % (ref 0–1.8)
BASOPHILS # BLD AUTO: 0.2 % (ref 0–1.8)
BASOPHILS # BLD: 0.03 K/UL (ref 0–0.12)
BILIRUB CONJ SERPL-MCNC: <0.2 MG/DL (ref 0.1–0.5)
BILIRUB INDIRECT SERPL-MCNC: NORMAL MG/DL (ref 0–1)
BILIRUB SERPL-MCNC: 0.6 MG/DL (ref 0.1–1.5)
BILIRUB UR QL STRIP.AUTO: ABNORMAL
BODY TEMPERATURE: ABNORMAL DEGREES
BODY TEMPERATURE: NORMAL DEGREES
BODY TEMPERATURE: NORMAL DEGREES
BREATHS SETTING VENT: 14
BUN SERPL-MCNC: 41 MG/DL (ref 8–22)
BUN SERPL-MCNC: 43 MG/DL (ref 8–22)
BUN SERPL-MCNC: 43 MG/DL (ref 8–22)
BUN SERPL-MCNC: 46 MG/DL (ref 8–22)
BUN SERPL-MCNC: 49 MG/DL (ref 8–22)
CALCIUM ALBUM COR SERPL-MCNC: 10.1 MG/DL (ref 8.5–10.5)
CALCIUM SERPL-MCNC: 8.3 MG/DL (ref 8.5–10.5)
CALCIUM SERPL-MCNC: 8.6 MG/DL (ref 8.5–10.5)
CALCIUM SERPL-MCNC: 9.4 MG/DL (ref 8.5–10.5)
CASTS URNS QL MICRO: ABNORMAL /LPF (ref 0–2)
CFT BLD TEG: >17 MIN (ref 4.6–9.1)
CFT P HPASE BLD TEG: 10.9 MIN (ref 4.3–8.3)
CHLORIDE SERPL-SCNC: 109 MMOL/L (ref 96–112)
CHLORIDE SERPL-SCNC: 110 MMOL/L (ref 96–112)
CLOT ANGLE BLD TEG: 44.5 DEGREES (ref 63–78)
CLOT LYSIS 30M P MA LENFR BLD TEG: 0 % (ref 0–2.6)
CO2 BLDA-SCNC: 24 MMOL/L (ref 20–33)
CO2 BLDA-SCNC: 25 MMOL/L (ref 20–33)
CO2 BLDA-SCNC: 26 MMOL/L (ref 20–33)
CO2 BLDA-SCNC: 26 MMOL/L (ref 20–33)
CO2 BLDA-SCNC: 27 MMOL/L (ref 20–33)
CO2 BLDA-SCNC: 28 MMOL/L (ref 20–33)
CO2 BLDV-SCNC: 25 MMOL/L (ref 20–33)
CO2 BLDV-SCNC: 26 MMOL/L (ref 20–33)
CO2 BLDV-SCNC: 28 MMOL/L (ref 20–33)
CO2 BLDV-SCNC: 30 MMOL/L (ref 20–33)
CO2 SERPL-SCNC: 22 MMOL/L (ref 20–33)
CO2 SERPL-SCNC: 22 MMOL/L (ref 20–33)
CO2 SERPL-SCNC: 23 MMOL/L (ref 20–33)
CO2 SERPL-SCNC: 24 MMOL/L (ref 20–33)
CO2 SERPL-SCNC: 25 MMOL/L (ref 20–33)
COLOR UR: YELLOW
CREAT SERPL-MCNC: 1.9 MG/DL (ref 0.5–1.4)
CREAT SERPL-MCNC: 1.97 MG/DL (ref 0.5–1.4)
CREAT SERPL-MCNC: 1.97 MG/DL (ref 0.5–1.4)
CREAT SERPL-MCNC: 2.36 MG/DL (ref 0.5–1.4)
CREAT SERPL-MCNC: 2.52 MG/DL (ref 0.5–1.4)
CRP SERPL HS-MCNC: 8.24 MG/DL (ref 0–0.75)
CT.EXTRINSIC BLD ROTEM: >5 MIN (ref 0.8–2.1)
DELSYS IDSYS: ABNORMAL
DELSYS IDSYS: NORMAL
DELSYS IDSYS: NORMAL
EKG IMPRESSION: NORMAL
EOSINOPHIL # BLD AUTO: 0 K/UL (ref 0–0.51)
EOSINOPHIL NFR BLD: 0 % (ref 0–6.9)
EPITHELIAL CELLS 1715: ABNORMAL /HPF (ref 0–5)
EPITHELIAL CELLS RENAL  1715R: PRESENT /HPF
ERYTHROCYTE [DISTWIDTH] IN BLOOD BY AUTOMATED COUNT: 45.4 FL (ref 35.9–50)
ERYTHROCYTE [DISTWIDTH] IN BLOOD BY AUTOMATED COUNT: 46.6 FL (ref 35.9–50)
ERYTHROCYTE [DISTWIDTH] IN BLOOD BY AUTOMATED COUNT: 46.7 FL (ref 35.9–50)
ERYTHROCYTE [DISTWIDTH] IN BLOOD BY AUTOMATED COUNT: 47.8 FL (ref 35.9–50)
FIBRINOGEN PPP-MCNC: 240 MG/DL (ref 215–460)
GFR SERPLBLD CREATININE-BSD FMLA CKD-EPI: 21 ML/MIN/1.73 M 2
GFR SERPLBLD CREATININE-BSD FMLA CKD-EPI: 23 ML/MIN/1.73 M 2
GFR SERPLBLD CREATININE-BSD FMLA CKD-EPI: 29 ML/MIN/1.73 M 2
GFR SERPLBLD CREATININE-BSD FMLA CKD-EPI: 30 ML/MIN/1.73 M 2
GLOBULIN SER CALC-MCNC: 1.5 G/DL (ref 1.9–3.5)
GLUCOSE BLD STRIP.AUTO-MCNC: 105 MG/DL (ref 65–99)
GLUCOSE BLD STRIP.AUTO-MCNC: 105 MG/DL (ref 65–99)
GLUCOSE BLD STRIP.AUTO-MCNC: 114 MG/DL (ref 65–99)
GLUCOSE BLD STRIP.AUTO-MCNC: 116 MG/DL (ref 65–99)
GLUCOSE BLD STRIP.AUTO-MCNC: 117 MG/DL (ref 65–99)
GLUCOSE BLD STRIP.AUTO-MCNC: 118 MG/DL (ref 65–99)
GLUCOSE BLD STRIP.AUTO-MCNC: 118 MG/DL (ref 65–99)
GLUCOSE BLD STRIP.AUTO-MCNC: 119 MG/DL (ref 65–99)
GLUCOSE BLD STRIP.AUTO-MCNC: 119 MG/DL (ref 65–99)
GLUCOSE BLD STRIP.AUTO-MCNC: 120 MG/DL (ref 65–99)
GLUCOSE BLD STRIP.AUTO-MCNC: 122 MG/DL (ref 65–99)
GLUCOSE BLD STRIP.AUTO-MCNC: 126 MG/DL (ref 65–99)
GLUCOSE BLD STRIP.AUTO-MCNC: 133 MG/DL (ref 65–99)
GLUCOSE BLD STRIP.AUTO-MCNC: 139 MG/DL (ref 65–99)
GLUCOSE BLD STRIP.AUTO-MCNC: 146 MG/DL (ref 65–99)
GLUCOSE BLD STRIP.AUTO-MCNC: 158 MG/DL (ref 65–99)
GLUCOSE BLD STRIP.AUTO-MCNC: 158 MG/DL (ref 65–99)
GLUCOSE BLD STRIP.AUTO-MCNC: 98 MG/DL (ref 65–99)
GLUCOSE SERPL-MCNC: 114 MG/DL (ref 65–99)
GLUCOSE SERPL-MCNC: 114 MG/DL (ref 65–99)
GLUCOSE SERPL-MCNC: 123 MG/DL (ref 65–99)
GLUCOSE SERPL-MCNC: 129 MG/DL (ref 65–99)
GLUCOSE SERPL-MCNC: 142 MG/DL (ref 65–99)
GLUCOSE UR STRIP.AUTO-MCNC: 100 MG/DL
GRANULAR CASTS  1716G: PRESENT /LPF
HCO3 BLDA-SCNC: 23.1 MMOL/L (ref 17–25)
HCO3 BLDA-SCNC: 24.4 MMOL/L (ref 17–25)
HCO3 BLDA-SCNC: 24.4 MMOL/L (ref 17–25)
HCO3 BLDA-SCNC: 24.5 MMOL/L (ref 17–25)
HCO3 BLDA-SCNC: 24.5 MMOL/L (ref 17–25)
HCO3 BLDA-SCNC: 24.9 MMOL/L (ref 17–25)
HCO3 BLDA-SCNC: 25.4 MMOL/L (ref 17–25)
HCO3 BLDA-SCNC: 26.9 MMOL/L (ref 17–25)
HCO3 BLDV-SCNC: 23.8 MMOL/L (ref 24–28)
HCO3 BLDV-SCNC: 25.3 MMOL/L (ref 24–28)
HCO3 BLDV-SCNC: 26.6 MMOL/L (ref 24–28)
HCO3 BLDV-SCNC: 28.4 MMOL/L (ref 24–28)
HCT VFR BLD AUTO: 26.2 % (ref 37–47)
HCT VFR BLD AUTO: 27 % (ref 37–47)
HCT VFR BLD AUTO: 28.4 % (ref 37–47)
HCT VFR BLD AUTO: 30.6 % (ref 37–47)
HGB BLD-MCNC: 10.1 G/DL (ref 12–16)
HGB BLD-MCNC: 11 G/DL (ref 12–16)
HGB BLD-MCNC: 9.2 G/DL (ref 12–16)
HGB BLD-MCNC: 9.6 G/DL (ref 12–16)
HOROWITZ INDEX BLDA+IHG-RTO: 207 MM[HG]
HOROWITZ INDEX BLDA+IHG-RTO: 213 MM[HG]
HOROWITZ INDEX BLDA+IHG-RTO: 230 MM[HG]
HOROWITZ INDEX BLDA+IHG-RTO: 247 MM[HG]
HOROWITZ INDEX BLDA+IHG-RTO: 377 MM[HG]
HOROWITZ INDEX BLDA+IHG-RTO: 813 MM[HG]
HOROWITZ INDEX BLDV+IHG-RTO: 147 MM[HG]
HOROWITZ INDEX BLDV+IHG-RTO: 150 MM[HG]
HOROWITZ INDEX BLDV+IHG-RTO: 150 MM[HG]
HYALINE CAST   1831: PRESENT /LPF
IMM GRANULOCYTES # BLD AUTO: 0.14 K/UL (ref 0–0.11)
IMM GRANULOCYTES # BLD AUTO: 0.14 K/UL (ref 0–0.11)
IMM GRANULOCYTES # BLD AUTO: 0.16 K/UL (ref 0–0.11)
IMM GRANULOCYTES NFR BLD AUTO: 0.7 % (ref 0–0.9)
IMM GRANULOCYTES NFR BLD AUTO: 0.7 % (ref 0–0.9)
IMM GRANULOCYTES NFR BLD AUTO: 0.8 % (ref 0–0.9)
INR PPP: 1.36 (ref 0.87–1.13)
KETONES UR STRIP.AUTO-MCNC: NEGATIVE MG/DL
LACTATE BLD-SCNC: 1.2 MMOL/L (ref 0.5–2)
LACTATE BLD-SCNC: 1.4 MMOL/L (ref 0.5–2)
LACTATE BLD-SCNC: 1.4 MMOL/L (ref 0.5–2)
LACTATE BLD-SCNC: 1.9 MMOL/L (ref 0.5–2)
LACTATE BLD-SCNC: 2.1 MMOL/L (ref 0.5–2)
LACTATE BLD-SCNC: 2.7 MMOL/L (ref 0.5–2)
LACTATE BLD-SCNC: 2.8 MMOL/L (ref 0.5–2)
LACTATE BLD-SCNC: 2.9 MMOL/L (ref 0.5–2)
LACTATE BLD-SCNC: 3 MMOL/L (ref 0.5–2)
LACTATE SERPL-SCNC: 1.8 MMOL/L (ref 0.5–2)
LACTATE SERPL-SCNC: 2.2 MMOL/L (ref 0.5–2)
LACTATE SERPL-SCNC: 3.1 MMOL/L (ref 0.5–2)
LACTATE SERPL-SCNC: 3.3 MMOL/L (ref 0.5–2)
LDH SERPL L TO P-CCNC: 1008 U/L (ref 107–266)
LEUKOCYTE ESTERASE UR QL STRIP.AUTO: NEGATIVE
LYMPHOCYTES # BLD AUTO: 1.25 K/UL (ref 1–4.8)
LYMPHOCYTES # BLD AUTO: 1.33 K/UL (ref 1–4.8)
LYMPHOCYTES # BLD AUTO: 1.46 K/UL (ref 1–4.8)
LYMPHOCYTES NFR BLD: 6.3 % (ref 22–41)
LYMPHOCYTES NFR BLD: 6.7 % (ref 22–41)
LYMPHOCYTES NFR BLD: 7.3 % (ref 22–41)
MAGNESIUM SERPL-MCNC: 1.5 MG/DL (ref 1.5–2.5)
MCF BLD TEG: 57.2 MM (ref 52–69)
MCF.PLATELET INHIB BLD ROTEM: 19.9 MM (ref 15–32)
MCH RBC QN AUTO: 30.8 PG (ref 27–33)
MCH RBC QN AUTO: 31.8 PG (ref 27–33)
MCHC RBC AUTO-ENTMCNC: 35.1 G/DL (ref 32.2–35.5)
MCHC RBC AUTO-ENTMCNC: 35.6 G/DL (ref 32.2–35.5)
MCHC RBC AUTO-ENTMCNC: 35.6 G/DL (ref 32.2–35.5)
MCHC RBC AUTO-ENTMCNC: 35.9 G/DL (ref 32.2–35.5)
MCV RBC AUTO: 86.5 FL (ref 81.4–97.8)
MCV RBC AUTO: 86.6 FL (ref 81.4–97.8)
MCV RBC AUTO: 87.6 FL (ref 81.4–97.8)
MCV RBC AUTO: 88.4 FL (ref 81.4–97.8)
MICRO URNS: ABNORMAL
MODE IMODE: ABNORMAL
MODE IMODE: NORMAL
MODE IMODE: NORMAL
MONOCYTES # BLD AUTO: 1.48 K/UL (ref 0–0.85)
MONOCYTES # BLD AUTO: 1.71 K/UL (ref 0–0.85)
MONOCYTES # BLD AUTO: 1.89 K/UL (ref 0–0.85)
MONOCYTES NFR BLD AUTO: 7.4 % (ref 0–13.4)
MONOCYTES NFR BLD AUTO: 8.6 % (ref 0–13.4)
MONOCYTES NFR BLD AUTO: 9.5 % (ref 0–13.4)
NEUTROPHILS # BLD AUTO: 16.45 K/UL (ref 1.82–7.42)
NEUTROPHILS # BLD AUTO: 16.78 K/UL (ref 1.82–7.42)
NEUTROPHILS # BLD AUTO: 16.93 K/UL (ref 1.82–7.42)
NEUTROPHILS NFR BLD: 82.9 % (ref 44–72)
NEUTROPHILS NFR BLD: 84.1 % (ref 44–72)
NEUTROPHILS NFR BLD: 84.5 % (ref 44–72)
NITRITE UR QL STRIP.AUTO: POSITIVE
NRBC # BLD AUTO: 0 K/UL
NRBC BLD-RTO: 0 /100 WBC (ref 0–0.2)
O2/TOTAL GAS SETTING VFR VENT: 30 %
PA AA BLD-ACNC: 68.3 % (ref 0–11)
PA ADP BLD-ACNC: 56.7 % (ref 0–17)
PCO2 BLDA: 29 MMHG (ref 26–37)
PCO2 BLDA: 31.8 MMHG (ref 26–37)
PCO2 BLDA: 32.7 MMHG (ref 26–37)
PCO2 BLDA: 33.3 MMHG (ref 26–37)
PCO2 BLDA: 34.7 MMHG (ref 26–37)
PCO2 BLDA: 36.6 MMHG (ref 26–37)
PCO2 BLDA: 44.5 MMHG (ref 26–37)
PCO2 BLDA: 51.5 MMHG (ref 26–37)
PCO2 BLDV: 36 MMHG (ref 41–51)
PCO2 BLDV: 38.8 MMHG (ref 41–51)
PCO2 BLDV: 50.5 MMHG (ref 41–51)
PCO2 BLDV: 53.7 MMHG (ref 41–51)
PCO2 TEMP ADJ BLDA: 28.8 MMHG (ref 26–37)
PCO2 TEMP ADJ BLDA: 31.6 MMHG (ref 26–37)
PCO2 TEMP ADJ BLDA: 32.5 MMHG (ref 26–37)
PCO2 TEMP ADJ BLDA: 33 MMHG (ref 26–37)
PCO2 TEMP ADJ BLDA: 34.2 MMHG (ref 26–37)
PCO2 TEMP ADJ BLDA: 35.8 MMHG (ref 26–37)
PCO2 TEMP ADJ BLDA: 43.4 MMHG (ref 26–37)
PCO2 TEMP ADJ BLDA: 49.9 MMHG (ref 26–37)
PCO2 TEMP ADJ BLDV: 35.7 MMHG (ref 41–51)
PCO2 TEMP ADJ BLDV: 38.5 MMHG (ref 41–51)
PCO2 TEMP ADJ BLDV: 49.8 MMHG (ref 41–51)
PCO2 TEMP ADJ BLDV: 52.3 MMHG (ref 41–51)
PEEP END EXPIRATORY PRESSURE IPEEP: 10 CMH20
PH BLDA: 7.33 [PH] (ref 7.4–7.5)
PH BLDA: 7.36 [PH] (ref 7.4–7.5)
PH BLDA: 7.43 [PH] (ref 7.4–7.5)
PH BLDA: 7.46 [PH] (ref 7.4–7.5)
PH BLDA: 7.48 [PH] (ref 7.4–7.5)
PH BLDA: 7.48 [PH] (ref 7.4–7.5)
PH BLDA: 7.49 [PH] (ref 7.4–7.5)
PH BLDA: 7.51 [PH] (ref 7.4–7.5)
PH BLDV: 7.3 [PH] (ref 7.31–7.45)
PH BLDV: 7.36 [PH] (ref 7.31–7.45)
PH BLDV: 7.4 [PH] (ref 7.31–7.45)
PH BLDV: 7.46 [PH] (ref 7.31–7.45)
PH TEMP ADJ BLDA: 7.34 [PH] (ref 7.4–7.5)
PH TEMP ADJ BLDA: 7.37 [PH] (ref 7.4–7.5)
PH TEMP ADJ BLDA: 7.44 [PH] (ref 7.4–7.5)
PH TEMP ADJ BLDA: 7.46 [PH] (ref 7.4–7.5)
PH TEMP ADJ BLDA: 7.48 [PH] (ref 7.4–7.5)
PH TEMP ADJ BLDA: 7.49 [PH] (ref 7.4–7.5)
PH TEMP ADJ BLDA: 7.5 [PH] (ref 7.4–7.5)
PH TEMP ADJ BLDA: 7.51 [PH] (ref 7.4–7.5)
PH TEMP ADJ BLDV: 7.31 [PH] (ref 7.31–7.45)
PH TEMP ADJ BLDV: 7.36 [PH] (ref 7.31–7.45)
PH TEMP ADJ BLDV: 7.4 [PH] (ref 7.31–7.45)
PH TEMP ADJ BLDV: 7.46 [PH] (ref 7.31–7.45)
PH UR STRIP.AUTO: 5.5 [PH] (ref 5–8)
PHOSPHATE SERPL-MCNC: 3.6 MG/DL (ref 2.5–4.5)
PLATELET # BLD AUTO: 105 K/UL (ref 164–446)
PLATELET # BLD AUTO: 122 K/UL (ref 164–446)
PLATELET # BLD AUTO: 136 K/UL (ref 164–446)
PLATELET # BLD AUTO: 137 K/UL (ref 164–446)
PMV BLD AUTO: 10.9 FL (ref 9–12.9)
PMV BLD AUTO: 11.3 FL (ref 9–12.9)
PMV BLD AUTO: 11.3 FL (ref 9–12.9)
PMV BLD AUTO: 11.5 FL (ref 9–12.9)
PO2 BLDA: 106 MMHG (ref 64–87)
PO2 BLDA: 113 MMHG (ref 64–87)
PO2 BLDA: 244 MMHG (ref 64–87)
PO2 BLDA: 62 MMHG (ref 64–87)
PO2 BLDA: 64 MMHG (ref 64–87)
PO2 BLDA: 69 MMHG (ref 64–87)
PO2 BLDA: 74 MMHG (ref 64–87)
PO2 BLDA: 90 MMHG (ref 64–87)
PO2 BLDV: 32 MMHG (ref 25–40)
PO2 BLDV: 44 MMHG (ref 25–40)
PO2 BLDV: 45 MMHG (ref 25–40)
PO2 BLDV: 45 MMHG (ref 25–40)
PO2 TEMP ADJ BLDA: 103 MMHG (ref 64–87)
PO2 TEMP ADJ BLDA: 110 MMHG (ref 64–87)
PO2 TEMP ADJ BLDA: 241 MMHG (ref 64–87)
PO2 TEMP ADJ BLDA: 62 MMHG (ref 64–87)
PO2 TEMP ADJ BLDA: 63 MMHG (ref 64–87)
PO2 TEMP ADJ BLDA: 68 MMHG (ref 64–87)
PO2 TEMP ADJ BLDA: 72 MMHG (ref 64–87)
PO2 TEMP ADJ BLDA: 88 MMHG (ref 64–87)
PO2 TEMP ADJ BLDV: 31 MMHG (ref 25–40)
PO2 TEMP ADJ BLDV: 43 MMHG (ref 25–40)
PO2 TEMP ADJ BLDV: 44 MMHG (ref 25–40)
PO2 TEMP ADJ BLDV: 44 MMHG (ref 25–40)
POTASSIUM SERPL-SCNC: 4.9 MMOL/L (ref 3.6–5.5)
POTASSIUM SERPL-SCNC: 5 MMOL/L (ref 3.6–5.5)
POTASSIUM SERPL-SCNC: 5 MMOL/L (ref 3.6–5.5)
POTASSIUM SERPL-SCNC: 5.4 MMOL/L (ref 3.6–5.5)
POTASSIUM SERPL-SCNC: 5.7 MMOL/L (ref 3.6–5.5)
PREALB SERPL-MCNC: 14 MG/DL (ref 18–38)
PROT SERPL-MCNC: 4.6 G/DL (ref 6–8.2)
PROT UR QL STRIP: 30 MG/DL
PROTHROMBIN TIME: 16.8 SEC (ref 12–14.6)
RBC # BLD AUTO: 2.99 M/UL (ref 4.2–5.4)
RBC # BLD AUTO: 3.12 M/UL (ref 4.2–5.4)
RBC # BLD AUTO: 3.28 M/UL (ref 4.2–5.4)
RBC # BLD AUTO: 3.46 M/UL (ref 4.2–5.4)
RBC # URNS HPF: ABNORMAL /HPF (ref 0–2)
RBC UR QL AUTO: ABNORMAL
SAO2 % BLDA: 100 % (ref 93–99)
SAO2 % BLDA: 94 % (ref 93–99)
SAO2 % BLDA: 94 % (ref 93–99)
SAO2 % BLDA: 95 % (ref 93–99)
SAO2 % BLDA: 96 % (ref 93–99)
SAO2 % BLDA: 98 % (ref 93–99)
SAO2 % BLDA: 98 % (ref 93–99)
SAO2 % BLDA: 99 % (ref 93–99)
SAO2 % BLDV: 65 %
SAO2 % BLDV: 75 %
SAO2 % BLDV: 78 %
SAO2 % BLDV: 81 %
SODIUM SERPL-SCNC: 145 MMOL/L (ref 135–145)
SODIUM SERPL-SCNC: 145 MMOL/L (ref 135–145)
SODIUM SERPL-SCNC: 147 MMOL/L (ref 135–145)
SP GR UR STRIP.AUTO: 1.02
SPECIMEN DRAWN FROM PATIENT: ABNORMAL
SPECIMEN DRAWN FROM PATIENT: NORMAL
SPECIMEN DRAWN FROM PATIENT: NORMAL
TEG ALGORITHM TGALG: ABNORMAL
TIDAL VOLUME IVT: 350 ML
UFH PPP CHRO-ACNC: <0.1 IU/ML
UROBILINOGEN UR STRIP.AUTO-MCNC: 0.2 EU/DL
WBC # BLD AUTO: 18.1 K/UL (ref 4.8–10.8)
WBC # BLD AUTO: 19.8 K/UL (ref 4.8–10.8)
WBC # BLD AUTO: 19.9 K/UL (ref 4.8–10.8)
WBC # BLD AUTO: 20 K/UL (ref 4.8–10.8)
WBC #/AREA URNS HPF: ABNORMAL /HPF

## 2024-10-31 PROCEDURE — 93325 DOPPLER ECHO COLOR FLOW MAPG: CPT

## 2024-10-31 PROCEDURE — 85025 COMPLETE CBC W/AUTO DIFF WBC: CPT | Mod: 91

## 2024-10-31 PROCEDURE — 85520 HEPARIN ASSAY: CPT

## 2024-10-31 PROCEDURE — 83735 ASSAY OF MAGNESIUM: CPT

## 2024-10-31 PROCEDURE — 99291 CRITICAL CARE FIRST HOUR: CPT | Performed by: INTERNAL MEDICINE

## 2024-10-31 PROCEDURE — 83605 ASSAY OF LACTIC ACID: CPT | Mod: 91

## 2024-10-31 PROCEDURE — 36430 TRANSFUSION BLD/BLD COMPNT: CPT

## 2024-10-31 PROCEDURE — A9270 NON-COVERED ITEM OR SERVICE: HCPCS | Performed by: INTERNAL MEDICINE

## 2024-10-31 PROCEDURE — P9045 ALBUMIN (HUMAN), 5%, 250 ML: HCPCS | Mod: JZ | Performed by: EMERGENCY MEDICINE

## 2024-10-31 PROCEDURE — 33993 REPOSG PERQ R/L HRT VAD: CPT | Performed by: INTERNAL MEDICINE

## 2024-10-31 PROCEDURE — 93005 ELECTROCARDIOGRAM TRACING: CPT | Performed by: INTERNAL MEDICINE

## 2024-10-31 PROCEDURE — 93010 ELECTROCARDIOGRAM REPORT: CPT | Mod: 59 | Performed by: INTERNAL MEDICINE

## 2024-10-31 PROCEDURE — 82803 BLOOD GASES ANY COMBINATION: CPT | Mod: 91

## 2024-10-31 PROCEDURE — 80048 BASIC METABOLIC PNL TOTAL CA: CPT | Mod: 91

## 2024-10-31 PROCEDURE — 80053 COMPREHEN METABOLIC PANEL: CPT

## 2024-10-31 PROCEDURE — 82962 GLUCOSE BLOOD TEST: CPT | Mod: 91

## 2024-10-31 PROCEDURE — 99291 CRITICAL CARE FIRST HOUR: CPT | Mod: 25 | Performed by: INTERNAL MEDICINE

## 2024-10-31 PROCEDURE — 700111 HCHG RX REV CODE 636 W/ 250 OVERRIDE (IP): Mod: JZ | Performed by: EMERGENCY MEDICINE

## 2024-10-31 PROCEDURE — 94003 VENT MGMT INPAT SUBQ DAY: CPT

## 2024-10-31 PROCEDURE — 33993 REPOSG PERQ R/L HRT VAD: CPT

## 2024-10-31 PROCEDURE — 700102 HCHG RX REV CODE 250 W/ 637 OVERRIDE(OP): Performed by: INTERNAL MEDICINE

## 2024-10-31 PROCEDURE — 85384 FIBRINOGEN ACTIVITY: CPT

## 2024-10-31 PROCEDURE — 33949 ECMO/ECLS DAILY MGMT ARTERY: CPT | Performed by: THORACIC SURGERY (CARDIOTHORACIC VASCULAR SURGERY)

## 2024-10-31 PROCEDURE — 33958 ECMO/ECLS REPOS PERPH CNULA: CPT

## 2024-10-31 PROCEDURE — 83615 LACTATE (LD) (LDH) ENZYME: CPT

## 2024-10-31 PROCEDURE — 02WAXRZ REVISION OF SHORT-TERM EXTERNAL HEART ASSIST SYSTEM IN HEART, EXTERNAL APPROACH: ICD-10-PCS | Performed by: INTERNAL MEDICINE

## 2024-10-31 PROCEDURE — 71045 X-RAY EXAM CHEST 1 VIEW: CPT

## 2024-10-31 PROCEDURE — 84134 ASSAY OF PREALBUMIN: CPT

## 2024-10-31 PROCEDURE — P9016 RBC LEUKOCYTES REDUCED: HCPCS | Mod: 91

## 2024-10-31 PROCEDURE — 99152 MOD SED SAME PHYS/QHP 5/>YRS: CPT | Performed by: INTERNAL MEDICINE

## 2024-10-31 PROCEDURE — 93312 ECHO TRANSESOPHAGEAL: CPT | Mod: 26 | Performed by: INTERNAL MEDICINE

## 2024-10-31 PROCEDURE — 84100 ASSAY OF PHOSPHORUS: CPT

## 2024-10-31 PROCEDURE — 86923 COMPATIBILITY TEST ELECTRIC: CPT | Mod: 91

## 2024-10-31 PROCEDURE — 99292 CRITICAL CARE ADDL 30 MIN: CPT | Performed by: INTERNAL MEDICINE

## 2024-10-31 PROCEDURE — 82248 BILIRUBIN DIRECT: CPT

## 2024-10-31 PROCEDURE — 33949 ECMO/ECLS DAILY MGMT ARTERY: CPT

## 2024-10-31 PROCEDURE — 700111 HCHG RX REV CODE 636 W/ 250 OVERRIDE (IP): Performed by: INTERNAL MEDICINE

## 2024-10-31 PROCEDURE — 700101 HCHG RX REV CODE 250: Performed by: INTERNAL MEDICINE

## 2024-10-31 PROCEDURE — 700111 HCHG RX REV CODE 636 W/ 250 OVERRIDE (IP): Mod: JZ

## 2024-10-31 PROCEDURE — 85610 PROTHROMBIN TIME: CPT

## 2024-10-31 RX ORDER — MAGNESIUM SULFATE HEPTAHYDRATE 40 MG/ML
4 INJECTION, SOLUTION INTRAVENOUS ONCE
Status: COMPLETED | OUTPATIENT
Start: 2024-10-31 | End: 2024-10-31

## 2024-10-31 RX ORDER — HEPARIN SODIUM 5000 [USP'U]/100ML
0-50 INJECTION, SOLUTION INTRAVENOUS CONTINUOUS
Status: DISCONTINUED | OUTPATIENT
Start: 2024-10-31 | End: 2024-10-31

## 2024-10-31 RX ORDER — HEPARIN SODIUM 5000 [USP'U]/100ML
0-50 INJECTION, SOLUTION INTRAVENOUS CONTINUOUS
Status: CANCELLED | OUTPATIENT
Start: 2024-10-31

## 2024-10-31 RX ORDER — ATORVASTATIN CALCIUM 40 MG/1
40 TABLET, FILM COATED ORAL EVERY EVENING
Status: DISCONTINUED | OUTPATIENT
Start: 2024-10-31 | End: 2024-10-31 | Stop reason: HOSPADM

## 2024-10-31 RX ORDER — ALBUMIN HUMAN 50 G/1000ML
12.5 SOLUTION INTRAVENOUS ONCE
Status: COMPLETED | OUTPATIENT
Start: 2024-10-31 | End: 2024-10-31

## 2024-10-31 RX ORDER — MIDAZOLAM HYDROCHLORIDE 1 MG/ML
2 INJECTION INTRAMUSCULAR; INTRAVENOUS ONCE
Status: COMPLETED | OUTPATIENT
Start: 2024-10-31 | End: 2024-10-31

## 2024-10-31 RX ORDER — EPINEPHRINE HCL IN DEXTROSE 5% 100 MCG/10
SYRINGE (ML) INTRAVENOUS
Status: DISCONTINUED
Start: 2024-10-31 | End: 2024-10-31 | Stop reason: HOSPADM

## 2024-10-31 RX ORDER — MIDAZOLAM HYDROCHLORIDE 1 MG/ML
INJECTION INTRAMUSCULAR; INTRAVENOUS
Status: COMPLETED
Start: 2024-10-31 | End: 2024-10-31

## 2024-10-31 RX ADMIN — NOREPINEPHRINE BITARTRATE 0.6 MCG/KG/MIN: 32 SOLUTION INTRAVENOUS at 02:35

## 2024-10-31 RX ADMIN — ALBUMIN (HUMAN) 12.5 G: 12.5 INJECTION, SOLUTION INTRAVENOUS at 00:42

## 2024-10-31 RX ADMIN — NOREPINEPHRINE BITARTRATE 0.65 MCG/KG/MIN: 32 SOLUTION INTRAVENOUS at 05:51

## 2024-10-31 RX ADMIN — MIDAZOLAM HYDROCHLORIDE 1 MG: 1 INJECTION, SOLUTION INTRAMUSCULAR; INTRAVENOUS at 08:31

## 2024-10-31 RX ADMIN — KETAMINE HYDROCHLORIDE 100 MG: 50 INJECTION INTRAMUSCULAR; INTRAVENOUS at 15:48

## 2024-10-31 RX ADMIN — CLOPIDOGREL BISULFATE 75 MG: 75 TABLET ORAL at 05:15

## 2024-10-31 RX ADMIN — SENNOSIDES AND DOCUSATE SODIUM 2 TABLET: 50; 8.6 TABLET ORAL at 05:15

## 2024-10-31 RX ADMIN — MIDAZOLAM HYDROCHLORIDE 1 MG: 1 INJECTION INTRAMUSCULAR; INTRAVENOUS at 08:31

## 2024-10-31 RX ADMIN — FAMOTIDINE 20 MG: 20 TABLET, FILM COATED ORAL at 05:15

## 2024-10-31 RX ADMIN — FENTANYL CITRATE 50 MCG: 50 INJECTION, SOLUTION INTRAMUSCULAR; INTRAVENOUS at 07:59

## 2024-10-31 RX ADMIN — VASOPRESSIN 0.02 UNITS/MIN: 0.2 SOLUTION INTRAVENOUS at 00:47

## 2024-10-31 RX ADMIN — HYDROMORPHONE HYDROCHLORIDE 1 MG: 1 INJECTION, SOLUTION INTRAMUSCULAR; INTRAVENOUS; SUBCUTANEOUS at 02:47

## 2024-10-31 RX ADMIN — HYDROMORPHONE HYDROCHLORIDE 1 MG: 1 INJECTION, SOLUTION INTRAMUSCULAR; INTRAVENOUS; SUBCUTANEOUS at 15:48

## 2024-10-31 RX ADMIN — ASPIRIN 81 MG: 81 TABLET, CHEWABLE ORAL at 05:16

## 2024-10-31 RX ADMIN — DEXMEDETOMIDINE HYDROCHLORIDE 0.3 MCG/KG/HR: 4 INJECTION, SOLUTION INTRAVENOUS at 02:23

## 2024-10-31 RX ADMIN — NOREPINEPHRINE BITARTRATE 0.65 MCG/KG/MIN: 32 SOLUTION INTRAVENOUS at 10:10

## 2024-10-31 RX ADMIN — MAGNESIUM SULFATE HEPTAHYDRATE 4 G: 40 INJECTION, SOLUTION INTRAVENOUS at 10:22

## 2024-10-31 RX ADMIN — VASOPRESSIN 0.02 UNITS/MIN: 0.2 SOLUTION INTRAVENOUS at 13:46

## 2024-10-31 RX ADMIN — DEXMEDETOMIDINE HYDROCHLORIDE 0.7 MCG/KG/HR: 4 INJECTION, SOLUTION INTRAVENOUS at 13:45

## 2024-10-31 RX ADMIN — FENTANYL CITRATE 50 MCG: 50 INJECTION, SOLUTION INTRAMUSCULAR; INTRAVENOUS at 08:31

## 2024-10-31 ASSESSMENT — PAIN DESCRIPTION - PAIN TYPE
TYPE: ACUTE PAIN

## 2024-10-31 ASSESSMENT — FIBROSIS 4 INDEX: FIB4 SCORE: 14.9

## 2024-11-02 LAB
BACTERIA UR CULT: NORMAL
SIGNIFICANT IND 70042: NORMAL
SITE SITE: NORMAL
SOURCE SOURCE: NORMAL

## 2024-11-13 LAB
LV EJECT FRACT  99904: 25
LV EJECT FRACT MOD 2C 99903: 29.1
LV EJECT FRACT MOD 4C 99902: 29.15
LV EJECT FRACT MOD BP 99901: 25.47

## 2024-11-27 ENCOUNTER — APPOINTMENT (OUTPATIENT)
Dept: RADIOLOGY | Facility: MEDICAL CENTER | Age: 59
End: 2024-11-27
Attending: STUDENT IN AN ORGANIZED HEALTH CARE EDUCATION/TRAINING PROGRAM
Payer: COMMERCIAL

## 2024-11-27 ENCOUNTER — HOSPITAL ENCOUNTER (INPATIENT)
Facility: MEDICAL CENTER | Age: 59
End: 2024-11-27
Attending: HOSPITALIST | Admitting: HOSPITALIST
Payer: COMMERCIAL

## 2024-11-27 DIAGNOSIS — G93.40 ENCEPHALOPATHY ACUTE: ICD-10-CM

## 2024-11-27 DIAGNOSIS — I21.02 ST ELEVATION MYOCARDIAL INFARCTION INVOLVING LEFT ANTERIOR DESCENDING (LAD) CORONARY ARTERY (HCC): ICD-10-CM

## 2024-11-27 PROBLEM — I74.10 AORTIC MURAL THROMBUS (HCC): Status: ACTIVE | Noted: 2024-11-27

## 2024-11-27 PROBLEM — Z71.89 ACP (ADVANCE CARE PLANNING): Status: ACTIVE | Noted: 2024-11-27

## 2024-11-27 PROBLEM — I50.20 HFREF (HEART FAILURE WITH REDUCED EJECTION FRACTION) (HCC): Status: ACTIVE | Noted: 2024-11-27

## 2024-11-27 PROBLEM — J18.9 PNEUMONIA: Status: ACTIVE | Noted: 2024-11-27

## 2024-11-27 PROBLEM — R33.9 URINARY RETENTION: Status: ACTIVE | Noted: 2024-11-27

## 2024-11-27 PROBLEM — I63.9 CVA (CEREBRAL VASCULAR ACCIDENT) (HCC): Status: ACTIVE | Noted: 2024-11-27

## 2024-11-27 PROBLEM — R73.9 HYPERGLYCEMIA: Status: ACTIVE | Noted: 2024-11-27

## 2024-11-27 PROBLEM — I25.10 CAD (CORONARY ARTERY DISEASE): Status: ACTIVE | Noted: 2024-11-27

## 2024-11-27 LAB — EKG IMPRESSION: NORMAL

## 2024-11-27 PROCEDURE — 93005 ELECTROCARDIOGRAM TRACING: CPT | Performed by: STUDENT IN AN ORGANIZED HEALTH CARE EDUCATION/TRAINING PROGRAM

## 2024-11-27 PROCEDURE — 71045 X-RAY EXAM CHEST 1 VIEW: CPT

## 2024-11-27 PROCEDURE — 93010 ELECTROCARDIOGRAM REPORT: CPT | Performed by: STUDENT IN AN ORGANIZED HEALTH CARE EDUCATION/TRAINING PROGRAM

## 2024-11-27 PROCEDURE — 99291 CRITICAL CARE FIRST HOUR: CPT | Performed by: STUDENT IN AN ORGANIZED HEALTH CARE EDUCATION/TRAINING PROGRAM

## 2024-11-27 PROCEDURE — 770020 HCHG ROOM/CARE - TELE (206)

## 2024-11-27 RX ORDER — INSULIN GLARGINE 100 [IU]/ML
23 INJECTION, SOLUTION SUBCUTANEOUS EVERY EVENING
Status: ON HOLD | COMMUNITY
End: 2025-01-07

## 2024-11-27 RX ORDER — ONDANSETRON 2 MG/ML
4 INJECTION INTRAMUSCULAR; INTRAVENOUS EVERY 4 HOURS PRN
Status: DISCONTINUED | OUTPATIENT
Start: 2024-11-27 | End: 2024-12-30 | Stop reason: HOSPADM

## 2024-11-27 RX ORDER — QUETIAPINE FUMARATE 25 MG/1
25 TABLET, FILM COATED ORAL 2 TIMES DAILY
Status: DISCONTINUED | OUTPATIENT
Start: 2024-11-27 | End: 2024-12-10

## 2024-11-27 RX ORDER — ASPIRIN 81 MG/1
81 TABLET ORAL DAILY
Status: ON HOLD | COMMUNITY
Start: 2024-11-28 | End: 2025-01-07

## 2024-11-27 RX ORDER — DEXTROSE MONOHYDRATE 25 G/50ML
25 INJECTION, SOLUTION INTRAVENOUS
Status: DISCONTINUED | OUTPATIENT
Start: 2024-11-27 | End: 2024-12-07

## 2024-11-27 RX ORDER — FUROSEMIDE 20 MG/1
20 TABLET ORAL
Status: DISCONTINUED | OUTPATIENT
Start: 2024-11-27 | End: 2024-12-30 | Stop reason: HOSPADM

## 2024-11-27 RX ORDER — MIDODRINE HYDROCHLORIDE 5 MG/1
10 TABLET ORAL
Status: DISCONTINUED | OUTPATIENT
Start: 2024-11-28 | End: 2024-11-28

## 2024-11-27 RX ORDER — INSULIN ASPART 100 [IU]/ML
0-3 INJECTION, SOLUTION INTRAVENOUS; SUBCUTANEOUS NIGHTLY PRN
Status: ON HOLD | COMMUNITY
End: 2025-01-07

## 2024-11-27 RX ORDER — PROCHLORPERAZINE EDISYLATE 5 MG/ML
5-10 INJECTION INTRAMUSCULAR; INTRAVENOUS EVERY 4 HOURS PRN
Status: DISCONTINUED | OUTPATIENT
Start: 2024-11-27 | End: 2024-12-22

## 2024-11-27 RX ORDER — LINEZOLID 2 MG/ML
600 INJECTION, SOLUTION INTRAVENOUS EVERY 12 HOURS
Status: DISCONTINUED | OUTPATIENT
Start: 2024-11-28 | End: 2024-11-30

## 2024-11-27 RX ORDER — LANSOPRAZOLE 30 MG/1
30 TABLET, ORALLY DISINTEGRATING, DELAYED RELEASE ORAL
Status: ON HOLD | COMMUNITY
Start: 2024-11-28 | End: 2025-01-07

## 2024-11-27 RX ORDER — ENOXAPARIN SODIUM 100 MG/ML
1 INJECTION SUBCUTANEOUS 2 TIMES DAILY
Status: DISCONTINUED | OUTPATIENT
Start: 2024-11-28 | End: 2024-12-02

## 2024-11-27 RX ORDER — ASPIRIN 81 MG/1
81 TABLET, CHEWABLE ORAL DAILY
Status: DISCONTINUED | OUTPATIENT
Start: 2024-11-28 | End: 2024-11-27

## 2024-11-27 RX ORDER — ASPIRIN 81 MG/1
81 TABLET, CHEWABLE ORAL DAILY
Status: DISCONTINUED | OUTPATIENT
Start: 2024-11-28 | End: 2024-12-03

## 2024-11-27 RX ORDER — ONDANSETRON 4 MG/1
4 TABLET, ORALLY DISINTEGRATING ORAL EVERY 4 HOURS PRN
Status: DISCONTINUED | OUTPATIENT
Start: 2024-11-27 | End: 2024-11-28

## 2024-11-27 RX ORDER — MIDODRINE HYDROCHLORIDE 5 MG/1
10 TABLET ORAL 3 TIMES DAILY
Status: ON HOLD | COMMUNITY
End: 2025-01-07

## 2024-11-27 RX ORDER — INSULIN LISPRO 100 [IU]/ML
1-6 INJECTION, SOLUTION INTRAVENOUS; SUBCUTANEOUS
Status: DISCONTINUED | OUTPATIENT
Start: 2024-11-28 | End: 2024-12-07

## 2024-11-27 RX ORDER — INSULIN ASPART 100 [IU]/ML
0-20 INJECTION, SOLUTION INTRAVENOUS; SUBCUTANEOUS
Status: ON HOLD | COMMUNITY
End: 2025-01-07

## 2024-11-27 RX ORDER — THIAMINE HCL 50 MG
100 TABLET ORAL DAILY
Status: ON HOLD | COMMUNITY
Start: 2024-11-28 | End: 2025-01-07

## 2024-11-27 RX ORDER — VITAMIN B COMPLEX
1000 TABLET ORAL DAILY
Status: ON HOLD | COMMUNITY
Start: 2024-11-28 | End: 2025-01-07

## 2024-11-27 RX ORDER — INSULIN LISPRO 100 [IU]/ML
0.2 INJECTION, SOLUTION INTRAVENOUS; SUBCUTANEOUS
Status: DISCONTINUED | OUTPATIENT
Start: 2024-11-28 | End: 2024-12-07

## 2024-11-27 RX ORDER — ATORVASTATIN CALCIUM 80 MG/1
80 TABLET, FILM COATED ORAL EVERY EVENING
Status: DISCONTINUED | OUTPATIENT
Start: 2024-11-28 | End: 2024-12-30 | Stop reason: HOSPADM

## 2024-11-27 RX ORDER — ACETAMINOPHEN 500 MG
1000 TABLET ORAL EVERY 8 HOURS
Status: ON HOLD | COMMUNITY
End: 2025-01-07

## 2024-11-27 RX ORDER — ATORVASTATIN CALCIUM 10 MG/1
80 TABLET, FILM COATED ORAL NIGHTLY
Status: ON HOLD | COMMUNITY
End: 2025-01-07

## 2024-11-27 RX ORDER — QUETIAPINE FUMARATE 100 MG/1
150 TABLET, FILM COATED ORAL NIGHTLY
Status: ON HOLD | COMMUNITY
End: 2025-01-07

## 2024-11-27 RX ORDER — PROMETHAZINE HYDROCHLORIDE 12.5 MG/1
12.5-25 SUPPOSITORY RECTAL EVERY 4 HOURS PRN
Status: DISCONTINUED | OUTPATIENT
Start: 2024-11-27 | End: 2024-12-30 | Stop reason: HOSPADM

## 2024-11-27 RX ORDER — LABETALOL HYDROCHLORIDE 5 MG/ML
10 INJECTION, SOLUTION INTRAVENOUS EVERY 4 HOURS PRN
Status: DISCONTINUED | OUTPATIENT
Start: 2024-11-27 | End: 2024-12-30 | Stop reason: HOSPADM

## 2024-11-27 RX ORDER — VALPROIC ACID 250 MG/5ML
500 SOLUTION ORAL 2 TIMES DAILY
Status: DISCONTINUED | OUTPATIENT
Start: 2024-11-27 | End: 2024-12-12

## 2024-11-27 RX ORDER — GUANFACINE 1 MG/1
2 TABLET ORAL NIGHTLY
Status: ON HOLD | COMMUNITY
End: 2025-01-07

## 2024-11-27 RX ORDER — PROMETHAZINE HYDROCHLORIDE 25 MG/1
12.5-25 TABLET ORAL EVERY 4 HOURS PRN
Status: DISCONTINUED | OUTPATIENT
Start: 2024-11-27 | End: 2024-11-28

## 2024-11-27 RX ORDER — QUETIAPINE FUMARATE 25 MG/1
25 TABLET, FILM COATED ORAL 2 TIMES DAILY
Status: ON HOLD | COMMUNITY
End: 2025-01-07

## 2024-11-27 RX ORDER — PHENOL 1.4 %
10 AEROSOL, SPRAY (ML) MUCOUS MEMBRANE NIGHTLY
Status: ON HOLD | COMMUNITY
End: 2025-01-07

## 2024-11-27 RX ORDER — NYSTATIN 100000 [USP'U]/ML
500000 SUSPENSION ORAL 4 TIMES DAILY
Status: ON HOLD | COMMUNITY
End: 2025-01-07

## 2024-11-27 ASSESSMENT — FIBROSIS 4 INDEX: FIB4 SCORE: 4.22

## 2024-11-27 ASSESSMENT — PAIN DESCRIPTION - PAIN TYPE: TYPE: ACUTE PAIN

## 2024-11-27 NOTE — PROGRESS NOTES
[facility] DIRECT ADMISSION REPORT  Transferring facility: [facility]  Transferring physician: jason    Chief complaint: chest pain  Pertinent history & patient course:     59-year-old female history of obesity, hypertension, diabetes presented to [facility] as a transfer from [facility] with chest pain.  Diagnosed with NSTEMI eventually transferred to the [facility] with an Impella device as patient had two-vessel disease that was difficult to intervene on    [facility] patient had to coronary stents successfully placed and hospital course complicated by pneumonia which patient is currently on IV Zosyn that was started on 11/26/2024.    Hospital course complicated by encephalopathy/delirium requiring as needed Haldol and Seroquel.  Also consulted psychiatry/===started on Depakote     Intermittent restraints.    Tube Feeds ongoing    Patient has a mural thrombus for which she was started on weight-based Lovenox.  They stated that they will be starting patient back on her Eliquis today.      Is on Lantus 23 units for her hyperglycemia     known EF is 25%.  Patient has been on midodrine p.o. and this has not allowed them to use goal-directed medical therapy due to her hypotension    Patient has some abdominal hematomas likely related to Lovenox injections patient's hemoglobin has been stable otherwise      Pertinent imaging & lab results:   Consultants called prior to transfer and pertinent input from consultants:  N/a  Code Status: full per transferring provider, I personally verified with the transferring provider patient's code status and the transferring provider has confirmed this with the patient.  Reason for Transfer: Transfer back agreement  Further work up or recommendations requested prior to transfer: n/a    Patient accepted for transfer: Yes  Accepting [facility] Facility: [facility] - Nursing to notify the Triage Coordinator in the RTOC via Voalte or Phone ext. [phone] when patient arrives to the unit. The Triage  Coordinator will assign the admitting provider.    Consultants to be called upon arrival:   Cardiology consult    Admission status: Inpatient.   Floor requested: tele      The admitting provider is the point of contact for questions or concerns regarding patient's care.

## 2024-11-28 ENCOUNTER — HOSPITAL ENCOUNTER (OUTPATIENT)
Dept: RADIOLOGY | Facility: MEDICAL CENTER | Age: 59
End: 2024-11-28

## 2024-11-28 ENCOUNTER — APPOINTMENT (OUTPATIENT)
Dept: RADIOLOGY | Facility: MEDICAL CENTER | Age: 59
End: 2024-11-28
Attending: STUDENT IN AN ORGANIZED HEALTH CARE EDUCATION/TRAINING PROGRAM
Payer: COMMERCIAL

## 2024-11-28 LAB
ALBUMIN SERPL BCP-MCNC: 3 G/DL (ref 3.2–4.9)
ALBUMIN/GLOB SERPL: 0.8 G/DL
ALP SERPL-CCNC: 162 U/L (ref 30–99)
ALT SERPL-CCNC: 14 U/L (ref 2–50)
ANION GAP SERPL CALC-SCNC: 12 MMOL/L (ref 7–16)
APPEARANCE UR: CLEAR
AST SERPL-CCNC: 26 U/L (ref 12–45)
BACTERIA #/AREA URNS HPF: ABNORMAL /HPF
BACTERIA BLD CULT: NORMAL
BASOPHILS # BLD AUTO: 0.9 % (ref 0–1.8)
BASOPHILS # BLD: 0.13 K/UL (ref 0–0.12)
BILIRUB SERPL-MCNC: 0.7 MG/DL (ref 0.1–1.5)
BILIRUB UR QL STRIP.AUTO: NEGATIVE
BUN SERPL-MCNC: 41 MG/DL (ref 8–22)
CALCIUM ALBUM COR SERPL-MCNC: 8.6 MG/DL (ref 8.5–10.5)
CALCIUM SERPL-MCNC: 7.8 MG/DL (ref 8.5–10.5)
CASTS URNS QL MICRO: ABNORMAL /LPF (ref 0–2)
CHLORIDE SERPL-SCNC: 118 MMOL/L (ref 96–112)
CO2 SERPL-SCNC: 17 MMOL/L (ref 20–33)
COLOR UR: YELLOW
CREAT SERPL-MCNC: 1.34 MG/DL (ref 0.5–1.4)
CRP SERPL HS-MCNC: 15.61 MG/DL (ref 0–0.75)
EOSINOPHIL # BLD AUTO: 0.12 K/UL (ref 0–0.51)
EOSINOPHIL NFR BLD: 0.8 % (ref 0–6.9)
EPITHELIAL CELLS 1715: ABNORMAL /HPF (ref 0–5)
ERYTHROCYTE [DISTWIDTH] IN BLOOD BY AUTOMATED COUNT: 72.1 FL (ref 35.9–50)
ERYTHROCYTE [SEDIMENTATION RATE] IN BLOOD BY WESTERGREN METHOD: 140 MM/HOUR (ref 0–25)
GFR SERPLBLD CREATININE-BSD FMLA CKD-EPI: 46 ML/MIN/1.73 M 2
GLOBULIN SER CALC-MCNC: 4 G/DL (ref 1.9–3.5)
GLUCOSE BLD STRIP.AUTO-MCNC: 147 MG/DL (ref 65–99)
GLUCOSE BLD STRIP.AUTO-MCNC: 160 MG/DL (ref 65–99)
GLUCOSE BLD STRIP.AUTO-MCNC: 184 MG/DL (ref 65–99)
GLUCOSE BLD STRIP.AUTO-MCNC: 208 MG/DL (ref 65–99)
GLUCOSE SERPL-MCNC: 134 MG/DL (ref 65–99)
GLUCOSE UR STRIP.AUTO-MCNC: NEGATIVE MG/DL
HCT VFR BLD AUTO: 35.5 % (ref 37–47)
HGB BLD-MCNC: 10.7 G/DL (ref 12–16)
KETONES UR STRIP.AUTO-MCNC: ABNORMAL MG/DL
LEUKOCYTE ESTERASE UR QL STRIP.AUTO: ABNORMAL
LYMPHOCYTES # BLD AUTO: 0.49 K/UL (ref 1–4.8)
LYMPHOCYTES NFR BLD: 3.4 % (ref 22–41)
MAGNESIUM SERPL-MCNC: 2.9 MG/DL (ref 1.5–2.5)
MANUAL DIFF BLD: NORMAL
MCH RBC QN AUTO: 31 PG (ref 27–33)
MCHC RBC AUTO-ENTMCNC: 30.1 G/DL (ref 32.2–35.5)
MCV RBC AUTO: 102.9 FL (ref 81.4–97.8)
MICRO URNS: ABNORMAL
MONOCYTES # BLD AUTO: 1.75 K/UL (ref 0–0.85)
MONOCYTES NFR BLD AUTO: 12.1 % (ref 0–13.4)
MORPHOLOGY BLD-IMP: NORMAL
MYELOCYTES NFR BLD MANUAL: 2.6 %
NEUTROPHILS # BLD AUTO: 11.63 K/UL (ref 1.82–7.42)
NEUTROPHILS NFR BLD: 80.2 % (ref 44–72)
NITRITE UR QL STRIP.AUTO: NEGATIVE
NRBC # BLD AUTO: 0 K/UL
NRBC BLD-RTO: 0 /100 WBC (ref 0–0.2)
NT-PROBNP SERPL IA-MCNC: ABNORMAL PG/ML (ref 0–125)
PH UR STRIP.AUTO: 6.5 [PH] (ref 5–8)
PLATELET # BLD AUTO: 382 K/UL (ref 164–446)
PLATELET BLD QL SMEAR: NORMAL
PMV BLD AUTO: 12.3 FL (ref 9–12.9)
POTASSIUM SERPL-SCNC: 5.2 MMOL/L (ref 3.6–5.5)
PROCALCITONIN SERPL-MCNC: 0.12 NG/ML
PROT SERPL-MCNC: 7 G/DL (ref 6–8.2)
PROT UR QL STRIP: 30 MG/DL
RBC # BLD AUTO: 3.45 M/UL (ref 4.2–5.4)
RBC # URNS HPF: ABNORMAL /HPF (ref 0–2)
RBC BLD AUTO: NORMAL
RBC UR QL AUTO: ABNORMAL
SIGNIFICANT IND 70042: NORMAL
SITE SITE: NORMAL
SODIUM SERPL-SCNC: 147 MMOL/L (ref 135–145)
SOURCE SOURCE: NORMAL
SP GR UR STRIP.AUTO: 1.02
UROBILINOGEN UR STRIP.AUTO-MCNC: 1 EU/DL
WBC # BLD AUTO: 14.5 K/UL (ref 4.8–10.8)
WBC #/AREA URNS HPF: ABNORMAL /HPF

## 2024-11-28 PROCEDURE — 83880 ASSAY OF NATRIURETIC PEPTIDE: CPT

## 2024-11-28 PROCEDURE — 700111 HCHG RX REV CODE 636 W/ 250 OVERRIDE (IP): Mod: JZ | Performed by: STUDENT IN AN ORGANIZED HEALTH CARE EDUCATION/TRAINING PROGRAM

## 2024-11-28 PROCEDURE — 700102 HCHG RX REV CODE 250 W/ 637 OVERRIDE(OP): Performed by: HOSPITALIST

## 2024-11-28 PROCEDURE — 87641 MR-STAPH DNA AMP PROBE: CPT

## 2024-11-28 PROCEDURE — 99233 SBSQ HOSP IP/OBS HIGH 50: CPT | Performed by: HOSPITALIST

## 2024-11-28 PROCEDURE — 81001 URINALYSIS AUTO W/SCOPE: CPT

## 2024-11-28 PROCEDURE — 80053 COMPREHEN METABOLIC PANEL: CPT

## 2024-11-28 PROCEDURE — A9270 NON-COVERED ITEM OR SERVICE: HCPCS | Performed by: HOSPITALIST

## 2024-11-28 PROCEDURE — 85007 BL SMEAR W/DIFF WBC COUNT: CPT

## 2024-11-28 PROCEDURE — 84145 PROCALCITONIN (PCT): CPT

## 2024-11-28 PROCEDURE — 36415 COLL VENOUS BLD VENIPUNCTURE: CPT

## 2024-11-28 PROCEDURE — 700102 HCHG RX REV CODE 250 W/ 637 OVERRIDE(OP): Performed by: STUDENT IN AN ORGANIZED HEALTH CARE EDUCATION/TRAINING PROGRAM

## 2024-11-28 PROCEDURE — 85652 RBC SED RATE AUTOMATED: CPT

## 2024-11-28 PROCEDURE — 83735 ASSAY OF MAGNESIUM: CPT

## 2024-11-28 PROCEDURE — 85027 COMPLETE CBC AUTOMATED: CPT

## 2024-11-28 PROCEDURE — 82962 GLUCOSE BLOOD TEST: CPT | Mod: 91

## 2024-11-28 PROCEDURE — 700105 HCHG RX REV CODE 258: Performed by: STUDENT IN AN ORGANIZED HEALTH CARE EDUCATION/TRAINING PROGRAM

## 2024-11-28 PROCEDURE — 87040 BLOOD CULTURE FOR BACTERIA: CPT

## 2024-11-28 PROCEDURE — 770020 HCHG ROOM/CARE - TELE (206)

## 2024-11-28 PROCEDURE — A9270 NON-COVERED ITEM OR SERVICE: HCPCS | Performed by: STUDENT IN AN ORGANIZED HEALTH CARE EDUCATION/TRAINING PROGRAM

## 2024-11-28 PROCEDURE — 86140 C-REACTIVE PROTEIN: CPT

## 2024-11-28 RX ORDER — PROMETHAZINE HYDROCHLORIDE 25 MG/1
12.5-25 TABLET ORAL EVERY 4 HOURS PRN
Status: DISCONTINUED | OUTPATIENT
Start: 2024-11-28 | End: 2024-12-29

## 2024-11-28 RX ORDER — MIDODRINE HYDROCHLORIDE 5 MG/1
5 TABLET ORAL
Status: DISCONTINUED | OUTPATIENT
Start: 2024-11-28 | End: 2024-12-03

## 2024-11-28 RX ORDER — ONDANSETRON 4 MG/1
4 TABLET, ORALLY DISINTEGRATING ORAL EVERY 4 HOURS PRN
Status: DISCONTINUED | OUTPATIENT
Start: 2024-11-28 | End: 2024-12-29

## 2024-11-28 RX ADMIN — ENOXAPARIN SODIUM 80 MG: 100 INJECTION SUBCUTANEOUS at 06:17

## 2024-11-28 RX ADMIN — Medication 10 MG: at 20:47

## 2024-11-28 RX ADMIN — LINEZOLID 600 MG: 600 INJECTION, SOLUTION INTRAVENOUS at 06:26

## 2024-11-28 RX ADMIN — TICAGRELOR 90 MG: 90 TABLET ORAL at 17:03

## 2024-11-28 RX ADMIN — FUROSEMIDE 20 MG: 20 TABLET ORAL at 03:52

## 2024-11-28 RX ADMIN — QUETIAPINE FUMARATE 25 MG: 50 TABLET ORAL at 06:16

## 2024-11-28 RX ADMIN — ASPIRIN 81 MG: 81 TABLET, CHEWABLE ORAL at 06:16

## 2024-11-28 RX ADMIN — LINEZOLID 600 MG: 600 INJECTION, SOLUTION INTRAVENOUS at 17:21

## 2024-11-28 RX ADMIN — INSULIN LISPRO 1 UNITS: 100 INJECTION, SOLUTION INTRAVENOUS; SUBCUTANEOUS at 17:36

## 2024-11-28 RX ADMIN — MIDODRINE HYDROCHLORIDE 10 MG: 5 TABLET ORAL at 08:43

## 2024-11-28 RX ADMIN — INSULIN LISPRO 2 UNITS: 100 INJECTION, SOLUTION INTRAVENOUS; SUBCUTANEOUS at 20:41

## 2024-11-28 RX ADMIN — QUETIAPINE FUMARATE 150 MG: 100 TABLET ORAL at 20:47

## 2024-11-28 RX ADMIN — INSULIN LISPRO 1 UNITS: 100 INJECTION, SOLUTION INTRAVENOUS; SUBCUTANEOUS at 08:56

## 2024-11-28 RX ADMIN — MIDODRINE HYDROCHLORIDE 5 MG: 5 TABLET ORAL at 17:03

## 2024-11-28 RX ADMIN — VALPROIC ACID 500 MG: 250 SOLUTION ORAL at 17:03

## 2024-11-28 RX ADMIN — PIPERACILLIN AND TAZOBACTAM 3.38 G: 3; .375 INJECTION, POWDER, FOR SOLUTION INTRAVENOUS at 12:47

## 2024-11-28 RX ADMIN — PIPERACILLIN AND TAZOBACTAM 3.38 G: 3; .375 INJECTION, POWDER, FOR SOLUTION INTRAVENOUS at 20:47

## 2024-11-28 RX ADMIN — PIPERACILLIN AND TAZOBACTAM 3.38 G: 3; .375 INJECTION, POWDER, FOR SOLUTION INTRAVENOUS at 03:53

## 2024-11-28 RX ADMIN — INSULIN GLARGINE-YFGN 17 UNITS: 100 INJECTION, SOLUTION SUBCUTANEOUS at 17:38

## 2024-11-28 RX ADMIN — ATORVASTATIN CALCIUM 80 MG: 80 TABLET, FILM COATED ORAL at 19:22

## 2024-11-28 RX ADMIN — VALPROIC ACID 500 MG: 250 SOLUTION ORAL at 06:26

## 2024-11-28 RX ADMIN — TICAGRELOR 90 MG: 90 TABLET ORAL at 06:17

## 2024-11-28 RX ADMIN — ENOXAPARIN SODIUM 80 MG: 100 INJECTION SUBCUTANEOUS at 17:02

## 2024-11-28 RX ADMIN — QUETIAPINE FUMARATE 25 MG: 50 TABLET ORAL at 17:03

## 2024-11-28 ASSESSMENT — FIBROSIS 4 INDEX: FIB4 SCORE: 1.07

## 2024-11-28 ASSESSMENT — PAIN DESCRIPTION - PAIN TYPE
TYPE: ACUTE PAIN

## 2024-11-28 NOTE — PROGRESS NOTES
Hospital Medicine Daily Progress Note    Date of Service  11/28/2024    Chief Complaint  Kiara Qureshi is a 59 y.o. female admitted 11/27/2024 with STEMI    Hospital Course  Patient has a history of obesity, hypertension, hyperlipidemia.  She was seen at [facility] as a transfer initially for anterior STEMI requiring lytics.  Patient underwent an LAD PCI but hospital course was complicated by retroperitoneal bleed, ventricular fibrillation and PEA arrest and cardiogenic shock requiring VA ECMO and Impella.  She is now status post ECMO decannulation on November 4 and had left femoral embolectomy, PCI to the LAD, unsuccessful PCI to OM1 and Impella removal on November 8.  Hospital course was complicated by patient developing encephalopathy and delirium, to which she was given valproic acid and quetiapine.  Patient has been weaned off her present medications and was found to have hypotension to which she was started on p.o. midodrine 10 mg 3 times daily.  Goal-directed medical therapy was held due to borderline low blood pressure.  Her echocardiogram on November 24 shows 25 to 30% EF with akinesis of the apical, apical anterior wall, apical septum, apical inferior wall, and apical lateral wall of the left ventricle.  Hypokinesis seen of the basal anterolateral wall, mid anterolateral wall, anterior wall, mid inferolateral wall, and basal inferolateral wall of the left ventricle.     On November 25, patient had CT angiogram of the chest which shows pulmonary edema, small bilateral pleural effusions, small 3 mm filling defect along the lateral wall of the descending thoracic aorta which may represent focal aortic mural thrombus versus ulcerated plaque.  CT angiogram of the abdomen pelvis showing increased organization of the rim-enhancing left retroperitoneal hematomas measuring up to 9.3 x 4.4 cm.  Tiny filling defect/mural thrombus within the posterior right common iliac artery.  Multiple nonenhancing intermediate  density fluid collections within the subcutaneous tissues overlying the right lower quadrant and right inguinal regions measuring up to 5.7 x 2.4 cm.  Additional nonenhancing low-density fluid collection closely associated with the left femoral vein measuring 4.7 x 3.9 cm, likely represents evolving hematomas.     Due to patient's uptrending leukocytosis and CAT scan findings of suspected pneumonia, patient was started on vancomycin and Zosyn on November 26.  Patient was then transferred back to [facility] for further management.    Interval Problem Update  11/28 patient is in bed, patient is new to me today, patient was transferred from [facility] last night, records have been reviewed, continue cardiac medications, continue IV antibiotics, tube feedings, patient continues to be encephalopathic oriented x 1, does not follows commands, monitoring daily labs, discussed with bedside nurse charge nurse  pharmacist.    Overall prognosis is guarded    I have discussed this patient's plan of care and discharge plan at IDT rounds today with Case Management, Nursing, Nursing leadership, and other members of the IDT team.    Consultants/Specialty      Code Status  Full Code    Disposition  The patient is not medically cleared for discharge to home or a post-acute facility.      I have placed the appropriate orders for post-discharge needs.    Review of Systems  Review of Systems   Unable to perform ROS: Medical condition        Physical Exam  Temp:  [36.4 °C (97.5 °F)] 36.4 °C (97.5 °F)  Pulse:  [49-81] 71  Resp:  [18] 18  BP: (112-129)/(62-80) 116/65  SpO2:  [92 %-95 %] 94 %    Physical Exam  Vitals and nursing note reviewed.   Constitutional:       Appearance: She is ill-appearing.   Eyes:      General:         Right eye: No discharge.         Left eye: No discharge.      Conjunctiva/sclera: Conjunctivae normal.   Cardiovascular:      Rate and Rhythm: Normal rate and regular rhythm.      Pulses: Normal pulses.      Heart  sounds: Normal heart sounds.   Pulmonary:      Effort: Pulmonary effort is normal.      Breath sounds: Normal breath sounds.   Abdominal:      General: Bowel sounds are normal. There is no distension.      Tenderness: There is no abdominal tenderness.      Comments: Small tunneling groin area, mild discharge.    Musculoskeletal:         General: Normal range of motion.      Cervical back: Normal range of motion and neck supple.      Right lower leg: No edema.      Left lower leg: No edema.   Skin:     General: Skin is warm.      Capillary Refill: Capillary refill takes less than 2 seconds.   Neurological:      General: No focal deficit present.      Mental Status: She is alert.      Comments: Oriented x 1   Psychiatric:         Mood and Affect: Mood normal.         Fluids    Intake/Output Summary (Last 24 hours) at 11/28/2024 1147  Last data filed at 11/28/2024 0353  Gross per 24 hour   Intake --   Output 1500 ml   Net -1500 ml        Laboratory  Recent Labs     11/28/24  0135   WBC 14.5*   RBC 3.45*   HEMOGLOBIN 10.7*   HEMATOCRIT 35.5*   .9*   MCH 31.0   MCHC 30.1*   RDW 72.1*   PLATELETCT 382   MPV 12.3     Recent Labs     11/28/24  0135   SODIUM 147*   POTASSIUM 5.2   CHLORIDE 118*   CO2 17*   GLUCOSE 134*   BUN 41*   CREATININE 1.34   CALCIUM 7.8*                   Imaging  DX-ABDOMEN FOR TUBE PLACEMENT   Final Result      There is a new small bowel feeding tube which terminates in the small bowel of the right mid abdomen.      DX-CHEST-LIMITED (1 VIEW)   Final Result         Asymmetric pulmonary infiltrates, left worse than right.           Assessment/Plan  * Heart failure with reduced ejection fraction (HCC)- (present on admission)  Assessment & Plan  Unable to start goal-directed medical therapy due to hypotension.  Echocardiogram showing EF 25 to 30% EF with akinesis of the apical, apical anterior wall, apical septum, apical inferior wall, and apical lateral wall of the left ventricle.  Hypokinesis  seen of the basal anterolateral wall, mid anterolateral wall, anterior wall, mid inferolateral wall, and basal inferolateral wall of the left ventricle.  Continue midodrine 10 po tid, lipitor 80 mg po qhs   at bedside, appears euvolemic. Start lasix 20 mg po daily, cautious diuresis    Continue monitoring for signs of fluid overload    ACP (advance care planning)  Assessment & Plan  Patient was full code at outside facility.  Attempted to call  to inquire about CODE STATUS unsuccessful.  Patient will be left as full code for now.    Pneumonia  Assessment & Plan  Patient found to have increasing white blood cell count up to 15,000 at outside facility  CT angio chest showing persistent irregular tree-in-bud nodularity and bilateral bronchial wall thickening on a background of groundglass opacities within the dependent portions of the right middle, right lower, and left lower lobes (right greater than left). Increased bilateral upper lobe patchy perihilar groundglass opacities.   Started on vancomycin and Zosyn on 9/26 at outside facility on 11/26, can continue for now   Send COVID swab    Continue Zosyn and Zyvox  Follow-up cultures    Aortic mural thrombus (HCC)  Assessment & Plan  CT at outside facility showing focal aortic mural thrombus along the lateral wall of the descending thoracic aorta and also a tiny filling defect/mural thrombus within the posterior right common iliac artery  Patient has been on Lovenox outside facility, continue Lovenox IV  Monitor H&H    Continue monitoring H&H    Encephalopathy  Assessment & Plan  Hospital course complicated by encephalopathy.  Likely from combination of CVA, STEMI, hyperglycemia  Restraints ordered  Has a nasojejunal feeding tube placed by interventional radiology from outside facility  Continue Seroquel and Depakene as recommended by neurology/psychiatry at outside facility.    CVA (cerebral vascular accident) (HCC)  Assessment & Plan  Patient had MRI on  11/17/24 at OSH which shwed Punctate acute infarct of the right centrum semiovale. Scattered foci of enhancement in multiple vascular distributions as described may represent recent subacute infarcts. Diffuse stippled susceptibility signal throughout the supratentorial and infratentorial brain. Overall findings may represent sequelae of fat emboli or amyloid related angiopathy.   Continue asa, lipitor, lovenox    Continue to be encephalopathic    CAD (coronary artery disease)  Assessment & Plan  Patient with recent complicated STEMI with PCI to LAD and mid LAD.  Complicated by V-fib arrest and cardiogenic shock requiring VA ECMO and Impella and retroperitoneal hematomas. At [facility] underwent impella supported PCI to mid LAD just distal to prior stent but unsuccessful with PCI to 100% occluded OM1 on 11/6. Patient was decannulated and had Impella removed.    Continue Lipitor    Continue monitoring    Urinary retention  Assessment & Plan  Beckwith catheter in place    Hyperglycemia  Assessment & Plan  Has had uncontrolled sugars outside hospital  Sliding scale           VTE prophylaxis: Lovenox    I have performed a physical exam and reviewed and updated ROS and Plan today (11/28/2024). In review of yesterday's note (11/27/2024), there are no changes except as documented above.      Total time of 55 minutes spent prepping to see patient (e.g. reviewing  tests/imaging results, notes from consultants, bedside nurse, night shift ) obtaining and/or reviewing separately obtained history. Performing a medically appropriate examination and evaluation.  Counseling and educating the patient.  Ordering medications, tests, or procedures.  Referring and communicating with other health care professionals.  Documenting clinical information in EPIC.  Independently interpreting results and communicating results to patient.  Care coordination.

## 2024-11-28 NOTE — HOSPITAL COURSE
Patient has a history of obesity, hypertension, hyperlipidemia.  She was seen at [facility] as a transfer initially for anterior STEMI requiring lytics.  Patient underwent an LAD PCI but hospital course was complicated by retroperitoneal bleed, ventricular fibrillation and PEA arrest and cardiogenic shock requiring VA ECMO and Impella.  She is now status post ECMO decannulation on November 4 and had left femoral embolectomy, PCI to the LAD, unsuccessful PCI to OM1 and Impella removal on November 8.  Hospital course was complicated by patient developing encephalopathy and delirium, to which she was given valproic acid and quetiapine.  Patient has been weaned off her present medications and was found to have hypotension to which she was started on p.o. midodrine 10 mg 3 times daily.  Goal-directed medical therapy was held due to borderline low blood pressure.  Her echocardiogram on November 24 shows 25 to 30% EF with akinesis of the apical, apical anterior wall, apical septum, apical inferior wall, and apical lateral wall of the left ventricle.  Hypokinesis seen of the basal anterolateral wall, mid anterolateral wall, anterior wall, mid inferolateral wall, and basal inferolateral wall of the left ventricle.     On November 25, patient had CT angiogram of the chest which shows pulmonary edema, small bilateral pleural effusions, small 3 mm filling defect along the lateral wall of the descending thoracic aorta which may represent focal aortic mural thrombus versus ulcerated plaque.  CT angiogram of the abdomen pelvis showing increased organization of the rim-enhancing left retroperitoneal hematomas measuring up to 9.3 x 4.4 cm.  Tiny filling defect/mural thrombus within the posterior right common iliac artery.  Multiple nonenhancing intermediate density fluid collections within the subcutaneous tissues overlying the right lower quadrant and right inguinal regions measuring up to 5.7 x 2.4 cm.  Additional nonenhancing  low-density fluid collection closely associated with the left femoral vein measuring 4.7 x 3.9 cm, likely represents evolving hematomas.     Due to patient's uptrending leukocytosis and CAT scan findings of suspected pneumonia, patient was started on vancomycin and Zosyn on November 26.  Patient was then transferred back to [facility] for further management.

## 2024-11-28 NOTE — PROGRESS NOTES
Monitor Summary    Rhythm:  SB-ST with 2.9 and 3 second arrest    Rate:      Ectopy:  r PVC, 10 beats of Bundle (HR touched 147)    .16  /  .06  /   .37      Strip not available

## 2024-11-28 NOTE — ASSESSMENT & PLAN NOTE
CT at outside facility showing focal aortic mural thrombus along the lateral wall of the descending thoracic aorta and also a tiny filling defect/mural thrombus within the posterior right common iliac artery  Patient has been on Lovenox outside facility, continue Lovenox IV  Monitor H&H    Continue monitoring H&H     Diabetes

## 2024-11-28 NOTE — ASSESSMENT & PLAN NOTE
Hospital course complicated by encephalopathy.  Likely from combination of CVA, STEMI, hyperglycemia  Restraints ordered  Has a nasojejunal feeding tube placed by interventional radiology from outside facility  Continue Seroquel and Depakene as recommended by neurology/psychiatry at outside facility.    Multifactorial  As per records from [facility] probably anoxic/embolic/delirium

## 2024-11-28 NOTE — PROGRESS NOTES
4 Eyes Skin Assessment Completed by KASIE Samuel and KASIE Treoj.    Head WDL  Ears Redness and Blanching  Nose Redness and Blanching  Mouth WDL  Neck Redness and Blanching  Breast/Chest WDL  Shoulder Blades WDL  Spine Redness and Blanching  (R) Arm/Elbow/Hand Redness, Blanching, Abrasion, and Scab  (L) Arm/Elbow/Hand Redness and Blanching  Abdomen Redness and Scab, discoloration, open wounds, x 12 staples, bruising  Groin Redness, scabs, excoriation, discoloration  Scrotum/Coccyx/Buttocks Redness  (R) Leg Redness, scabs, wound  (L) Leg Scabs, Redness, wound  (R) Heel/Foot/Toe Redness and Blanching, skin tear on R big toe  (L) Heel/Foot/Toe Redness and Blanching                                                Devices In Places Tele Box, Blood Pressure Cuff, Pulse Ox, and Beckwith      Interventions In Place Heel Mepilex, Sacral Mepilex, Low Air Loss Mattress, Barrier Cream, and Heels Loaded W/Pillows    Possible Skin Injury Yes    Pictures Uploaded Into Epic Yes  Wound Consult Placed Yes  RN Wound Prevention Protocol Ordered Yes

## 2024-11-28 NOTE — ASSESSMENT & PLAN NOTE
Patient found to have increasing white blood cell count up to 15,000 at outside facility  CT angio chest showing persistent irregular tree-in-bud nodularity and bilateral bronchial wall thickening on a background of groundglass opacities within the dependent portions of the right middle, right lower, and left lower lobes (right greater than left). Increased bilateral upper lobe patchy perihilar groundglass opacities.   Started on vancomycin and Zosyn on 9/26 at outside facility on 11/26, can continue for now   Send COVID swab    Continue Zosyn and Zyvox  Follow-up cultures

## 2024-11-28 NOTE — PROGRESS NOTES
Pt arrived to floor with [facility]    NG tube, de jesus cather, double lumen PICC, and bilateral soft wrist restraints present on admission    Pt is A &O x0    RN assumed care of patient. Monitor room notified. Admitting MD notified, RTOC notified    Fall Risk Score:  HIGH  Fall risk interventions in place: Place yellow fall risk ID band on patient, Provide patient/family education based on risk assessment, Educate patient/family to call staff for assistance when getting out of bed, Place fall precaution signage outside patient door, Place patient in room close to nursing station, Utilize bed/chair fall alarm, Notify charge of high risk for huddle, and Bed alarm connected correctly  Bed type: Low air loss (Ray Score less than 17 interventions in place)  Patient on cardiac monitor: Yes  IVF/IV medications: Not Applicable   Oxygen: How many liters 2L, Traced the line to wall oxygen, and No oxygen tank in room  Bedside sitter: Not Applicable   Isolation: Not applicable      /80   Pulse 79   Resp 18   Wt 85.6 kg (188 lb 11.4 oz)   SpO2 95%   BMI 35.68 kg/m²       RN requested restraint order from admitting MD Block

## 2024-11-28 NOTE — ASSESSMENT & PLAN NOTE
Unable to start goal-directed medical therapy due to hypotension.  Echocardiogram showing EF 25 to 30% EF with akinesis of the apical, apical anterior wall, apical septum, apical inferior wall, and apical lateral wall of the left ventricle.  Hypokinesis seen of the basal anterolateral wall, mid anterolateral wall, anterior wall, mid inferolateral wall, and basal inferolateral wall of the left ventricle.  Continue midodrine 10 po tid, lipitor 80 mg po qhs   at bedside, appears euvolemic. Start lasix 20 mg po daily, cautious diuresis    Continue monitoring for signs of fluid overload

## 2024-11-28 NOTE — ASSESSMENT & PLAN NOTE
Patient had MRI on 11/17/24 at OSH which shwed Punctate acute infarct of the right centrum semiovale. Scattered foci of enhancement in multiple vascular distributions as described may represent recent subacute infarcts. Diffuse stippled susceptibility signal throughout the supratentorial and infratentorial brain. Overall findings may represent sequelae of fat emboli or amyloid related angiopathy.   Continue asa, lipitor, lovenox    Continue to be encephalopathic  Neuro evaluated patient at [facility].

## 2024-11-28 NOTE — H&P
Hospital Medicine History & Physical Note    Date of Service  11/27/2024    Primary Care Physician  Jhon Salinas P.A.-C.    Consultants  None    Code Status  Full Code    Chief Complaint  STEMI    History of Presenting Illness  Kiara Qureshi is a 59 y.o. female who presented 11/27/2024 with STEMI.    Patient has a history of obesity, hypertension, hyperlipidemia.  She was seen at [facility] as a transfer initially for anterior STEMI requiring lytics.  Patient underwent an LAD PCI but hospital course was complicated by retroperitoneal bleed, ventricular fibrillation and PEA arrest and cardiogenic shock requiring VA ECMO and Impella.  She is now status post ECMO decannulation on November 4 and had left femoral embolectomy, PCI to the LAD, unsuccessful PCI to OM1 and Impella removal on November 8.  Hospital course was complicated by patient developing encephalopathy and delirium, to which she was given valproic acid and quetiapine.  Patient has been weaned off her present medications and was found to have hypotension to which she was started on p.o. midodrine 10 mg 3 times daily.  Goal-directed medical therapy was held due to borderline low blood pressure.  Her echocardiogram on November 24 shows 25 to 30% EF with akinesis of the apical, apical anterior wall, apical septum, apical inferior wall, and apical lateral wall of the left ventricle.  Hypokinesis seen of the basal anterolateral wall, mid anterolateral wall, anterior wall, mid inferolateral wall, and basal inferolateral wall of the left ventricle.    On November 25, patient had CT angiogram of the chest which shows pulmonary edema, small bilateral pleural effusions, small 3 mm filling defect along the lateral wall of the descending thoracic aorta which may represent focal aortic mural thrombus versus ulcerated plaque.  CT angiogram of the abdomen pelvis showing increased organization of the rim-enhancing left retroperitoneal hematomas measuring up to  9.3 x 4.4 cm.  Tiny filling defect/mural thrombus within the posterior right common iliac artery.  Multiple nonenhancing intermediate density fluid collections within the subcutaneous tissues overlying the right lower quadrant and right inguinal regions measuring up to 5.7 x 2.4 cm.  Additional nonenhancing low-density fluid collection closely associated with the left femoral vein measuring 4.7 x 3.9 cm, likely represents evolving hematomas.    Due to patient's uptrending leukocytosis and CAT scan findings of suspected pneumonia, patient was started on vancomycin and Zosyn on November 26.  Patient was then transferred back to [facility] for further management.    I discussed the plan of care with patient.    Review of Systems  ROS  Encephalopathic    Past Medical History   has a past medical history of Dyslipidemia (7/5/2016), Essential hypertension (7/5/2016), Obesity, morbid, BMI 40.0-49.9 (HCC) (7/5/2016), and Type II diabetes mellitus, well controlled ([facility]) (7/5/2016).    Surgical History   has a past surgical history that includes insertion,cannula for ecmo,adult (Left, 10/30/2024).     Family History  family history includes Heart Attack (age of onset: 49) in her paternal grandmother; Heart Disease in her mother; Heart Disease (age of onset: 70) in her father.   Family history reviewed with patient. There is no family history that is pertinent to the chief complaint.     Social History   reports that she has never smoked. She has never used smokeless tobacco. She reports that she does not drink alcohol and does not use drugs.    Allergies  No Known Allergies    Medications  Prior to Admission Medications   Prescriptions Last Dose Informant Patient Reported? Taking?   aspirin 81 MG tablet   Yes No   Sig: Take 81 mg by mouth every day.   lisinopril (PRINIVIL) 5 MG Tab   Yes No   Sig: Take 5 mg by mouth every day.   metformin (GLUCOPHAGE) 500 MG Tab   Yes No   Sig: Take  by mouth 2 times a day, with meals.  "  simvastatin (ZOCOR) 20 MG Tab   Yes No   Sig: Take 20 mg by mouth every evening.      Facility-Administered Medications: None       Physical Exam                             Physical Exam  Constitutional:       Comments: Lethargic, follows simple commands  Overall generalized weakness. No focal deficits   HENT:      Head: Normocephalic.      Nose: Nose normal.      Comments: Feeding tube in place     Mouth/Throat:      Mouth: Mucous membranes are moist.   Eyes:      Pupils: Pupils are equal, round, and reactive to light.   Cardiovascular:      Rate and Rhythm: Normal rate and regular rhythm.      Pulses: Normal pulses.   Pulmonary:      Effort: Pulmonary effort is normal.   Abdominal:      General: Abdomen is flat.   Genitourinary:     Comments: Beckwith catheter in place  Musculoskeletal:      Cervical back: Neck supple.   Skin:     General: Skin is warm.   Neurological:      Mental Status: She is disoriented.         Laboratory:          Recent Labs     11/25/24  0315 11/26/24  0521 11/27/24  0222   ALTSGPT 14 14 12   ASTSGOT 18 21 26   ALKPHOSPHAT 163* 166* 161*   TBILIRUBIN 0.7 0.7 0.8         No results for input(s): \"NTPROBNP\" in the last 72 hours.      No results for input(s): \"TROPONINT\" in the last 72 hours.    Imaging:  DX-CHEST-LIMITED (1 VIEW)    (Results Pending)       X-Ray:  I have personally reviewed the images and compared with prior images.    Assessment/Plan:  Justification for Admission Status  I anticipate this patient will require at least two midnights for appropriate medical management, necessitating inpatient admission because patient has aortic mural thrombus    Patient will need a Telemetry bed on MEDICAL service .  The need is secondary to aortic mural thrombus.    * Heart failure with reduced ejection fraction (HCC)- (present on admission)  Assessment & Plan  Unable to start goal-directed medical therapy due to hypotension.  Echocardiogram showing EF 25 to 30% EF with akinesis of the " apical, apical anterior wall, apical septum, apical inferior wall, and apical lateral wall of the left ventricle.  Hypokinesis seen of the basal anterolateral wall, mid anterolateral wall, anterior wall, mid inferolateral wall, and basal inferolateral wall of the left ventricle.  Continue midodrine 10 po tid, lipitor 80 mg po qhs   at bedside, appears euvolemic. Start lasix 20 mg po daily, cautious diuresis    ACP (advance care planning)  Assessment & Plan  Patient was full code at outside facility.  Attempted to call  to inquire about CODE STATUS unsuccessful.  Patient will be left as full code for now.    Pneumonia  Assessment & Plan  Patient found to have increasing white blood cell count up to 15,000 at outside facility  CT angio chest showing persistent irregular tree-in-bud nodularity and bilateral bronchial wall thickening on a background of groundglass opacities within the dependent portions of the right middle, right lower, and left lower lobes (right greater than left). Increased bilateral upper lobe patchy perihilar groundglass opacities.   Started on vancomycin and Zosyn on 9/26 at outside facility on 11/26, can continue for now   Send COVID swab    Aortic mural thrombus (HCC)  Assessment & Plan  CT at outside facility showing focal aortic mural thrombus along the lateral wall of the descending thoracic aorta and also a tiny filling defect/mural thrombus within the posterior right common iliac artery  Patient has been on Lovenox outside facility, continue Lovenox IV  Monitor H&H    Encephalopathy  Assessment & Plan  Hospital course complicated by encephalopathy.  Likely from combination of CVA, STEMI, hyperglycemia  Restraints ordered  Has a nasojejunal feeding tube placed by interventional radiology from outside facility  Continue Seroquel and Depakene as recommended by neurology/psychiatry at outside facility.    CVA (cerebral vascular accident) (HCC)  Assessment & Plan  Patient had MRI  on 11/17/24 at OSH which shwed Punctate acute infarct of the right centrum semiovale. Scattered foci of enhancement in multiple vascular distributions as described may represent recent subacute infarcts. Diffuse stippled susceptibility signal throughout the supratentorial and infratentorial brain. Overall findings may represent sequelae of fat emboli or amyloid related angiopathy.   Continue asa, lipitor, lovenox    CAD (coronary artery disease)  Assessment & Plan  Patient with recent complicated STEMI with PCI to LAD and mid LAD.  Complicated by V-fib arrest and cardiogenic shock requiring VA ECMO and Impella and retroperitoneal hematomas. At [facility] underwent impella supported PCI to mid LAD just distal to prior stent but unsuccessful with PCI to 100% occluded OM1 on 11/6. Patient was decannulated and had Impella removed.    Continue Lipitor    Urinary retention  Assessment & Plan  Beckwith catheter in place    Hyperglycemia  Assessment & Plan  Has had uncontrolled sugars outside hospital  Sliding scale        Patient is critically ill.   The patient continues to have: Aortic mural thrombus, hospital-acquired pneumonia, heart failure with ejection fraction  The vital organ system that is affected is the: Cardiovascular system, lungs  If untreated there is a high chance of deterioration into: Pulmonary embolism, sepsis, respiratory failure  And eventually death.   The critical care that I am providing today is: Therapeutic Lovenox, IV vancomycin/IV Zosyn  The critical that has been undertaken is medically complex.   There has been no overlap in critical care time.   Critical Care Time not including procedures: 43        VTE prophylaxis: therapeutic anticoagulation with lovenox

## 2024-11-28 NOTE — PROGRESS NOTES
Pt attempted to get out of bed and has been pulling at tele leads and de jesus    Tele sitter ordered and placed in room    Notified [facility] for x3 soft wrist restraints and x4 side rails in place

## 2024-11-28 NOTE — PROGRESS NOTES
Spoke with Dietian Kim Sol[facility] over [facility] about patients tube feed.  Patient has cortrak verified with xray that terminates in the small bowel of the right mid abdomen. There was no frequency on diet that was ordered, and per Kim patient can start continuous tube feed at 25 mL/hr, goal 55 mL/hr and to keep at 25 mL/hr until dietitian can come assess dietary needs and consult for tomorrow.  Spoke with MD about Dietitian's recommendations.  MD okay with patient starting cont. Feeds at glucerna 1.2 at 25 mL/hr until consult.

## 2024-11-28 NOTE — ASSESSMENT & PLAN NOTE
Patient with recent complicated STEMI with PCI to LAD and mid LAD.  Complicated by V-fib arrest and cardiogenic shock requiring VA ECMO and Impella and retroperitoneal hematomas. At [facility] underwent impella supported PCI to mid LAD just distal to prior stent but unsuccessful with PCI to 100% occluded OM1 on 11/6. Patient was decannulated and had Impella removed.    Continue Lipitor    Continue monitoring  Continue telemetry.

## 2024-11-28 NOTE — CARE PLAN
The patient is Watcher - Medium risk of patient condition declining or worsening    Shift Goals  Clinical Goals: Safety, skin integrity, IV abx  Patient Goals: water to keep mouth moist    Progress made toward(s) clinical / shift goals:    Problem: Knowledge Deficit - Standard  Goal: Patient and family/care givers will demonstrate understanding of plan of care, disease process/condition, diagnostic tests and medications  Outcome: Progressing  Note: Pt and family updated on POC, all current questions answered at this time       Problem: Safety - Medical Restraint  Goal: Remains free of injury from restraints (Restraint for Interference with Medical Device)  Outcome: Progressing  Note: Q2 assessment of restraints  Goal: Free from restraint(s) (Restraint for Interference with Medical Device)  Outcome: Progressing  Note: Discussed restraints with family       Patient is not progressing towards the following goals:

## 2024-11-28 NOTE — ASSESSMENT & PLAN NOTE
Patient was full code at outside facility.  Attempted to call  to inquire about CODE STATUS unsuccessful.  Patient will be left as full code for now.    Palliative consulted.

## 2024-11-29 PROBLEM — S30.1XXA ABDOMINAL HEMATOMA: Status: ACTIVE | Noted: 2024-11-29

## 2024-11-29 LAB
ALBUMIN SERPL BCP-MCNC: 3 G/DL (ref 3.2–4.9)
ALBUMIN/GLOB SERPL: 0.8 G/DL
ALP SERPL-CCNC: 175 U/L (ref 30–99)
ALT SERPL-CCNC: 13 U/L (ref 2–50)
ANION GAP SERPL CALC-SCNC: 12 MMOL/L (ref 7–16)
AST SERPL-CCNC: 25 U/L (ref 12–45)
BILIRUB SERPL-MCNC: 0.7 MG/DL (ref 0.1–1.5)
BUN SERPL-MCNC: 45 MG/DL (ref 8–22)
CALCIUM ALBUM COR SERPL-MCNC: 8.5 MG/DL (ref 8.5–10.5)
CALCIUM SERPL-MCNC: 7.7 MG/DL (ref 8.5–10.5)
CHLORIDE SERPL-SCNC: 118 MMOL/L (ref 96–112)
CO2 SERPL-SCNC: 17 MMOL/L (ref 20–33)
CREAT SERPL-MCNC: 1.5 MG/DL (ref 0.5–1.4)
CRP SERPL HS-MCNC: 10.71 MG/DL (ref 0–0.75)
ERYTHROCYTE [DISTWIDTH] IN BLOOD BY AUTOMATED COUNT: 72.8 FL (ref 35.9–50)
GFR SERPLBLD CREATININE-BSD FMLA CKD-EPI: 40 ML/MIN/1.73 M 2
GLOBULIN SER CALC-MCNC: 3.8 G/DL (ref 1.9–3.5)
GLUCOSE BLD STRIP.AUTO-MCNC: 143 MG/DL (ref 65–99)
GLUCOSE BLD STRIP.AUTO-MCNC: 149 MG/DL (ref 65–99)
GLUCOSE BLD STRIP.AUTO-MCNC: 176 MG/DL (ref 65–99)
GLUCOSE BLD STRIP.AUTO-MCNC: 209 MG/DL (ref 65–99)
GLUCOSE SERPL-MCNC: 200 MG/DL (ref 65–99)
HCT VFR BLD AUTO: 35 % (ref 37–47)
HGB BLD-MCNC: 10.8 G/DL (ref 12–16)
MAGNESIUM SERPL-MCNC: 2.8 MG/DL (ref 1.5–2.5)
MCH RBC QN AUTO: 32 PG (ref 27–33)
MCHC RBC AUTO-ENTMCNC: 30.9 G/DL (ref 32.2–35.5)
MCV RBC AUTO: 103.6 FL (ref 81.4–97.8)
PHOSPHATE SERPL-MCNC: 4 MG/DL (ref 2.5–4.5)
PLATELET # BLD AUTO: 333 K/UL (ref 164–446)
PMV BLD AUTO: 12.4 FL (ref 9–12.9)
POTASSIUM SERPL-SCNC: 4.6 MMOL/L (ref 3.6–5.5)
PREALB SERPL-MCNC: 15.6 MG/DL (ref 18–38)
PROT SERPL-MCNC: 6.8 G/DL (ref 6–8.2)
RBC # BLD AUTO: 3.38 M/UL (ref 4.2–5.4)
SCCMEC + MECA PNL NOSE NAA+PROBE: NEGATIVE
SODIUM SERPL-SCNC: 147 MMOL/L (ref 135–145)
WBC # BLD AUTO: 10.7 K/UL (ref 4.8–10.8)

## 2024-11-29 PROCEDURE — 84134 ASSAY OF PREALBUMIN: CPT

## 2024-11-29 PROCEDURE — 99233 SBSQ HOSP IP/OBS HIGH 50: CPT | Performed by: HOSPITALIST

## 2024-11-29 PROCEDURE — 80053 COMPREHEN METABOLIC PANEL: CPT

## 2024-11-29 PROCEDURE — A9270 NON-COVERED ITEM OR SERVICE: HCPCS | Performed by: STUDENT IN AN ORGANIZED HEALTH CARE EDUCATION/TRAINING PROGRAM

## 2024-11-29 PROCEDURE — 700102 HCHG RX REV CODE 250 W/ 637 OVERRIDE(OP): Performed by: STUDENT IN AN ORGANIZED HEALTH CARE EDUCATION/TRAINING PROGRAM

## 2024-11-29 PROCEDURE — 82962 GLUCOSE BLOOD TEST: CPT | Mod: 91

## 2024-11-29 PROCEDURE — 700102 HCHG RX REV CODE 250 W/ 637 OVERRIDE(OP)

## 2024-11-29 PROCEDURE — 700102 HCHG RX REV CODE 250 W/ 637 OVERRIDE(OP): Performed by: HOSPITALIST

## 2024-11-29 PROCEDURE — A9270 NON-COVERED ITEM OR SERVICE: HCPCS | Performed by: HOSPITALIST

## 2024-11-29 PROCEDURE — 92610 EVALUATE SWALLOWING FUNCTION: CPT

## 2024-11-29 PROCEDURE — 86140 C-REACTIVE PROTEIN: CPT

## 2024-11-29 PROCEDURE — 97535 SELF CARE MNGMENT TRAINING: CPT

## 2024-11-29 PROCEDURE — A9270 NON-COVERED ITEM OR SERVICE: HCPCS

## 2024-11-29 PROCEDURE — 99497 ADVNCD CARE PLAN 30 MIN: CPT | Mod: 25 | Performed by: NURSE PRACTITIONER

## 2024-11-29 PROCEDURE — 97163 PT EVAL HIGH COMPLEX 45 MIN: CPT

## 2024-11-29 PROCEDURE — 85027 COMPLETE CBC AUTOMATED: CPT

## 2024-11-29 PROCEDURE — 700111 HCHG RX REV CODE 636 W/ 250 OVERRIDE (IP): Mod: JZ | Performed by: STUDENT IN AN ORGANIZED HEALTH CARE EDUCATION/TRAINING PROGRAM

## 2024-11-29 PROCEDURE — 700105 HCHG RX REV CODE 258: Performed by: STUDENT IN AN ORGANIZED HEALTH CARE EDUCATION/TRAINING PROGRAM

## 2024-11-29 PROCEDURE — 83735 ASSAY OF MAGNESIUM: CPT

## 2024-11-29 PROCEDURE — 97167 OT EVAL HIGH COMPLEX 60 MIN: CPT

## 2024-11-29 PROCEDURE — 99223 1ST HOSP IP/OBS HIGH 75: CPT | Mod: 25 | Performed by: NURSE PRACTITIONER

## 2024-11-29 PROCEDURE — 770020 HCHG ROOM/CARE - TELE (206)

## 2024-11-29 PROCEDURE — 84100 ASSAY OF PHOSPHORUS: CPT

## 2024-11-29 RX ORDER — QUETIAPINE FUMARATE 25 MG/1
50 TABLET, FILM COATED ORAL ONCE
Status: COMPLETED | OUTPATIENT
Start: 2024-11-29 | End: 2024-11-29

## 2024-11-29 RX ADMIN — INSULIN LISPRO 2 UNITS: 100 INJECTION, SOLUTION INTRAVENOUS; SUBCUTANEOUS at 09:21

## 2024-11-29 RX ADMIN — PIPERACILLIN AND TAZOBACTAM 3.38 G: 3; .375 INJECTION, POWDER, FOR SOLUTION INTRAVENOUS at 13:15

## 2024-11-29 RX ADMIN — ATORVASTATIN CALCIUM 80 MG: 80 TABLET, FILM COATED ORAL at 17:30

## 2024-11-29 RX ADMIN — PIPERACILLIN AND TAZOBACTAM 3.38 G: 3; .375 INJECTION, POWDER, FOR SOLUTION INTRAVENOUS at 05:45

## 2024-11-29 RX ADMIN — VALPROIC ACID 500 MG: 250 SOLUTION ORAL at 17:28

## 2024-11-29 RX ADMIN — TICAGRELOR 90 MG: 90 TABLET ORAL at 05:29

## 2024-11-29 RX ADMIN — QUETIAPINE FUMARATE 25 MG: 50 TABLET ORAL at 17:29

## 2024-11-29 RX ADMIN — INSULIN GLARGINE-YFGN 17 UNITS: 100 INJECTION, SOLUTION SUBCUTANEOUS at 17:55

## 2024-11-29 RX ADMIN — INSULIN LISPRO 1 UNITS: 100 INJECTION, SOLUTION INTRAVENOUS; SUBCUTANEOUS at 20:50

## 2024-11-29 RX ADMIN — QUETIAPINE FUMARATE 25 MG: 50 TABLET ORAL at 05:29

## 2024-11-29 RX ADMIN — ENOXAPARIN SODIUM 80 MG: 100 INJECTION SUBCUTANEOUS at 17:30

## 2024-11-29 RX ADMIN — ENOXAPARIN SODIUM 80 MG: 100 INJECTION SUBCUTANEOUS at 05:29

## 2024-11-29 RX ADMIN — Medication 10 MG: at 20:52

## 2024-11-29 RX ADMIN — TICAGRELOR 90 MG: 90 TABLET ORAL at 17:29

## 2024-11-29 RX ADMIN — LINEZOLID 600 MG: 600 INJECTION, SOLUTION INTRAVENOUS at 05:44

## 2024-11-29 RX ADMIN — FUROSEMIDE 20 MG: 20 TABLET ORAL at 05:29

## 2024-11-29 RX ADMIN — MIDODRINE HYDROCHLORIDE 5 MG: 5 TABLET ORAL at 13:09

## 2024-11-29 RX ADMIN — LINEZOLID 600 MG: 600 INJECTION, SOLUTION INTRAVENOUS at 17:39

## 2024-11-29 RX ADMIN — QUETIAPINE FUMARATE 150 MG: 100 TABLET ORAL at 20:52

## 2024-11-29 RX ADMIN — ASPIRIN 81 MG: 81 TABLET, CHEWABLE ORAL at 05:29

## 2024-11-29 RX ADMIN — VALPROIC ACID 500 MG: 250 SOLUTION ORAL at 05:29

## 2024-11-29 RX ADMIN — PIPERACILLIN AND TAZOBACTAM 3.38 G: 3; .375 INJECTION, POWDER, FOR SOLUTION INTRAVENOUS at 20:46

## 2024-11-29 RX ADMIN — QUETIAPINE FUMARATE 50 MG: 25 TABLET ORAL at 00:33

## 2024-11-29 ASSESSMENT — PAIN DESCRIPTION - PAIN TYPE
TYPE: ACUTE PAIN
TYPE: ACUTE PAIN

## 2024-11-29 ASSESSMENT — COGNITIVE AND FUNCTIONAL STATUS - GENERAL
MOVING TO AND FROM BED TO CHAIR: A LOT
TURNING FROM BACK TO SIDE WHILE IN FLAT BAD: A LOT
WALKING IN HOSPITAL ROOM: TOTAL
SUGGESTED CMS G CODE MODIFIER DAILY ACTIVITY: CN
MOBILITY SCORE: 10
DAILY ACTIVITIY SCORE: 6
HELP NEEDED FOR BATHING: TOTAL
STANDING UP FROM CHAIR USING ARMS: A LOT
DRESSING REGULAR LOWER BODY CLOTHING: TOTAL
EATING MEALS: TOTAL
MOVING FROM LYING ON BACK TO SITTING ON SIDE OF FLAT BED: A LOT
DRESSING REGULAR UPPER BODY CLOTHING: TOTAL
CLIMB 3 TO 5 STEPS WITH RAILING: TOTAL
SUGGESTED CMS G CODE MODIFIER MOBILITY: CL
TOILETING: TOTAL
PERSONAL GROOMING: TOTAL

## 2024-11-29 ASSESSMENT — ACTIVITIES OF DAILY LIVING (ADL): TOILETING: INDEPENDENT

## 2024-11-29 ASSESSMENT — SOCIAL DETERMINANTS OF HEALTH (SDOH)
WITHIN THE LAST YEAR, HAVE YOU BEEN KICKED, HIT, SLAPPED, OR OTHERWISE PHYSICALLY HURT BY YOUR PARTNER OR EX-PARTNER?: PATIENT UNABLE TO ANSWER
WITHIN THE LAST YEAR, HAVE YOU BEEN HUMILIATED OR EMOTIONALLY ABUSED IN OTHER WAYS BY YOUR PARTNER OR EX-PARTNER?: PATIENT UNABLE TO ANSWER
WITHIN THE LAST YEAR, HAVE TO BEEN RAPED OR FORCED TO HAVE ANY KIND OF SEXUAL ACTIVITY BY YOUR PARTNER OR EX-PARTNER?: PATIENT UNABLE TO ANSWER
WITHIN THE LAST YEAR, HAVE YOU BEEN AFRAID OF YOUR PARTNER OR EX-PARTNER?: PATIENT UNABLE TO ANSWER

## 2024-11-29 ASSESSMENT — FIBROSIS 4 INDEX: FIB4 SCORE: 1.23

## 2024-11-29 ASSESSMENT — GAIT ASSESSMENTS: GAIT LEVEL OF ASSIST: UNABLE TO PARTICIPATE

## 2024-11-29 NOTE — CARE PLAN
The patient is Watcher - Medium risk of patient condition declining or worsening    Shift Goals  Clinical Goals: safety, skin integrity, abx, monitor labs, vss  Patient Goals: morenita  Family Goals: na    Progress made toward(s) clinical / shift goals:    Problem: Safety - Medical Restraint  Goal: Remains free of injury from restraints (Restraint for Interference with Medical Device)  Outcome: Progressing  Goal: Free from restraint(s) (Restraint for Interference with Medical Device)  Outcome: Progressing     Problem: Skin Integrity  Goal: Skin integrity is maintained or improved  Outcome: Progressing. Q2 turns, offloading, low airloss mattress      Problem: Fall Risk  Goal: Patient will remain free from falls  Outcome: Progressing. Bed alarm on. Tele sitter on. Appropriate signage in place      Problem: Pain - Standard  Goal: Alleviation of pain or a reduction in pain to the patient’s comfort goal  Outcome: Progressing       Patient is not progressing towards the following goals:      Problem: Knowledge Deficit - Standard  Goal: Patient and family/care givers will demonstrate understanding of plan of care, disease process/condition, diagnostic tests and medications  Outcome: Not Progressing

## 2024-11-29 NOTE — THERAPY
"Speech Language Pathology   Clinical Swallow Evaluation     Patient Name: Kiara Qureshi  AGE:  59 y.o., SEX:  female  Medical Record #: [id number]  Date of Service: 11/29/2024      History of Present Illness  Kiara Qureshi is a 59 y.o. female admitted 11/27/2024 with STEMI. Patient underwent an LAD PCI but hospital course was complicated by retroperitoneal bleed, ventricular fibrillation and PEA arrest and cardiogenic shock requiring VA ECMO and Impella.  She is now status post ECMO decannulation on November 4 and had left femoral embolectomy, PCI to the LAD, unsuccessful PCI to OM1 and Impella removal on November 8.      PMHx:obesity, hypertension, hyperlipidemia    CT of head 10/30/24:  1.  Artifact degrades evaluation.  2.  No definite significant intracranial hemorrhage or large major vascular territory infarct is seen.    CXR 11/27/24: Asymmetric pulmonary infiltrates, left worse than right.      General Information:  Vitals  O2 Delivery Device: None - Room Air  Level of Consciousness: Alert, Awake  Patient Behaviors: Confused, Restless  Orientation: Disoriented x 4  Follows Directives: No      Prior Living Situation & Level of Function:  Prior Services: Unable To Determine At This Time  Communication: Unknown  Swallowing: Unknown  Seen by SLP at [facility], last note on 11/25/24: \"Impressions: Suspect grossly functional oropharyngeal swallow, however impacted by persisting AMS + hyperactive delirium. Continue to recommend puree/thin liquids for optimal safety w/ 1:1 supervision + strict aspiration precautions (pt awake/alert, upright, small bites/sips, slow pace, avoid force feeding, low threshold to hold PO) + may benefit from more frequent smaller snacks vs larger meals. NG will remain as primary nutritional support given ongoing AMS and reduced PO intake. Will continue to follow, suspect ability to upgrade diet pending improved mentation.\"       Oral Mechanism Evaluation:  Dentition: Edentulous "   Facial Symmetry: Pt did not follow commands to assess  Facial Sensation: Pt did not follow commands to assess     Labial Observations: Pt did not follow commands to assess   Lingual Observations: Pt did not follow commands to assess  Motor Speech: WFL            Laryngeal Function:  Secretion Management: Adequate  Voice Quality: WFL  Cough: Perceptually WNL (reflexive cough)       Subjective  Patient received with RN at bedside. Patient awake with bilateral wrist restraints in place. Speech mostly incoherent with islands of coherent speech. Pt was a non-historian.      Assessment  Current Method of Nutrition: NGT  Positioning: Staley's (60-90 degrees)  Bolus Administration: SLP    O2 Delivery Device: None - Room Air  Factor(s) Affecting Performance: Impaired mental status, Impaired command following  Tracheostomy : No      Swallowing Trials:  Swallowing Trials  Ice: WFL  Thin Liquid (TN0): WFL (8 oz)  Pureed (PU4): WFL  Regular (RG7): Impaired    Comments: Adequate bolus acceptance and containment. Patient agreeable to water but required encouragement to take 2 bites each of pudding and saltine cracker. Presumed timely AP transit and bolus formation evidenced by absence of oral residue following trials of thin liquids and pudding. No overt s/sx of aspiration with trials of thins via straw (consecutive) and pudding. Prolonged mastication with periods of paucity resulted in diffuse moderate oral residue following trials of saltine cracker. Pt provided with liquid wash which resulted in immediate coughing response concerning for airway invasion. Mild-moderate oral residue remained and pt talking with residue in mouth. Spontaneous cleansing swallows eventually cleared oral residue.     Clinical Impressions  Patient is presenting with clinical signs most consistent with a moderate oral phase dysphagia suspect r/t AMS. Pharyngeal swallow appears to be functional. A modified diet is indicated. Discussed with RN regarding  "holding tube feeds to promote appetite, as medically appropriate, but would defer to MD/RD. SLP following.       Recommendations  Diet Consistency: Full Liquids  Instrumentation: Instrumental swallow study pending clinical progress  Medication: Whole with liquid, Non Oral, As tolerated  Supervision: 1:1 feeding with constant supervision  Positioning: Fully upright and midline during oral intake  Risk Management : Small bites/sips, Slow rate of intake  Oral Care: Q4h         SLP Treatment Plan  Treatment Plan: Dysphagia Treatment, Patient/Family/Caregiver Training  SLP Frequency: 3x Per Week  Estimated Duration: Until Therapy Goals Met      Anticipated Discharge Needs  Discharge Recommendations: Recommend post-acute placement for additional speech therapy services prior to discharge home   Therapy Recommendations Upon DC: Dysphagia Training, Patient / Family / Caregiver Education        Patient / Family Goals  Patient / Family Goal #1: \"I'll eat later when I'm more hungry\"  Short Term Goals  Short Term Goal # 1: Patient will consume a full liquid diet with no overt s/sx of aspiration given 1:1 feeding.      Dorothy Lora, SLP   "

## 2024-11-29 NOTE — DIETARY
Nutrition Services: Initial Assessment     Day 2 of admit. Kiara Qureshi is a 59 y.o. female with admitting DX of HFrEF (heart failure with reduced ejection fraction) ([facility]) [I50.20]     Consult received for enteral nutrition.     Current problem list:  HFrEF  ACP  Aortic mural thrombus  CAD  CVA  Encephalopathy  Hyperglycemia  Pneumonia  Urinary retention    Nutrition Assessment:      Weight: 82.2 kg (181 lb 3.5 oz)  Weight taken via bed scale on 11/29  Ideal body weight: 47.6 kg (105 lb)  Percent IBW: 173%  Body mass index is 34.26 kg/m². BMI classification: Obesity Class I.  11/29/24 82.2 kg (181 lb 3.5 oz)   10/31/24 84.9 kg (187 lb 2.7 oz)   10/30/24 90.2 kg (198 lb 13.7 oz)   11/21/22 95 kg (209 lb 7 oz)     Question accuracy of 10/30 wt given weight 11 lb less taken the next day. Using 10/31 weight, pt has lost 6lb (3%) x 1 month, which is not clinically significant.    Calculation/Equation:   REE per MSJ = 1336 x 1.2 = 1603 kcal    Total calories/day: 2066-4650 (17-20 kcal/kg)  Total grams protein/day: 71-95 (1.5-2 g/kg IBW)    Objective:  Pt transferred from [facility]. Admitting complaint of chest pain.  Pertinent medical hx:   Past Medical History:   Diagnosis Date    Dyslipidemia 7/5/2016    Essential hypertension 7/5/2016    Obesity, morbid, BMI 40.0-49.9 ([facility]) 7/5/2016    Type II diabetes mellitus, well controlled ([facility]) 7/5/2016   Nutrition support: indicated to meet nutrition needs given encephalopathy  Enteral access: NGT, position KUB verified  SLP consult pending  Pertinent labs: POC glucose 209, sodium 147, chloride 118, BUN 45, creatinine 1.50, GFR 40  Pertinent meds: Lipitor, Lasix, SSI  Skin/wounds: no edema noted.  PI to pelvis, other non-pressure injuries - wound consult pending  Food Allergies: NKFA  Last BM: 11/29/24 per flowsheets  Most appropriate formula based on RD evaluation: Pivot 1.5 Daren given ongoing hyperglycemia and elevated protein needs given wounds     Current  diet order:   NPO    Subjective:   Pt not appropriate for interview at this time given disoriented x 4.     Nutrition Focused Physical Exam (NFPE)   Weight loss: 6 lb x 1 month. RD suspects this change related to ongoing clinical complications.  Muscle mass: Well-nourished  Subcutaneous fat: Well-nourished  Fluid Accumulation: none  Reduced  Strength: N/A in acute care setting.     Nutrition Diagnosis:      Inadequate oral intake related to intubation status as evidenced by need for nutrition support.     Unable to fully assess for malnutrition at this time. However, likely does not meet criteria.    Nutrition Interventions:      Initiate Pivot 1.5 Daren at 25 ml/hr continuous and advance per protocol to goal rate of 40 ml/hr.  Goal tube feed volume provides 1440 kcals, 90 g protein, and 720 ml free water daily.   Additional fluids per MD  Patient aware of active plan of care as appropriate.     Nutrition Monitoring and Evaluation:     Monitor nutrition POC  Additional fluids per MD/DO  Monitor vital signs pertinent to nutrition         RD following and will provide updated recommendations as indicated.

## 2024-11-29 NOTE — ASSESSMENT & PLAN NOTE
Might be related to cardiac device  Monitoring hb  Patient on lovenox due to rigth common iliac art thrombosis.

## 2024-11-29 NOTE — CONSULTS
MRN: [id number]  Date of palliative consult: 2024  Reason for consult: ACP/GOC  Referring provider: Dr. Jesus Simpson  Location of consult: [facility]  Additional consulting services: hospital medicine    HPI:   Kiara Qureshi is a 59 y.o. female with medical history significant for STEMI s/p PCI to LAD with course complicated by PEA arrest/cardiogenic shock requiring VA-ECMO (10/30/2024), impella (10/30/2024), retroperitoneal hematoma, and transfer to [facility]. She is now s/p ECMO decannulation on , L femoral embolectomy, PCI to the LAD, unsuccessful PCI to OM1, and impella removal . Patient had MRI on  at OSH with acute and subacute infracts. She had ongoing delirium and AMS throughout her stay at [facility]. Patient was found to have pneumonia and has been started on antibiotics. She was transferred back from [facility] on 2024. Palliative care was consulted to assist with GOC discussions.     Additional Pertinent Medical History: obesity, HTN, HLD    ROS:    Review of Systems   Reason unable to perform ROS: mentation.     PE:   Recent vital signs  BMI: Body mass index is 34.26 kg/m².    Temp (24hrs), Av.4 °C (97.6 °F), Min:36.3 °C (97.3 °F), Max:36.7 °C (98 °F)  Temperature: 36.5 °C (97.7 °F), Monitored Temp: 36.2 °C (97.2 °F)  Pulse  Av.9  Min: 49  Max: 100   Blood Pressure: (!) 146/86       Physical Exam  Vitals reviewed: in restraints, agitated.   Constitutional:       Appearance: She is obese. She is ill-appearing.   Neurological:      Mental Status: She is alert. She is disoriented.       ASSESSMENT/PLAN WITH SHARED DECISION MAKING:   PHYSICAL ASPECTS OF CARE  Palliative Performance Scale: 30%    # s/p STEMI c/b cardiac arrest and cardiogenic shock requiring VA-ECMO and impella  # Pneumonia  # CVA  # Encephalopathy  # Hyperglycemia     SOCIAL ASPECTS OF CARE  Patient lives with her spouse, Bruno, in [place], [place]. 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 They have not children but have a lot of  "support from their [employer] and family in [place].    SPIRITUAL ASPECTS OF CARE   Patient and her spouse are very active in their [employer]. Bruno is open to a visit from a . Order placed and discussed with  Jared.     GOALS OF CARE/SERIOUS ILLNESS CONVERSATION  Consult received and EMR reviewed, including records from previous hospitalization at both [facility] and [facility]. PC APRN discussed case with MD, updates appreciated. Met with patient at bedside. A&Ox0. No family at bedside, unable to participate in GOC discussion at this time.    PC APRN returned to bedside later and patient's spouse, Bruno, was present. Introduced self, role of palliative care, and reason for consultation. Bruno was agreeable to consultation/ACP discussion at this time at bedside, declined conference room. Bruno provided update on the social and medical history.     Assessed Bruno's understanding of current medical status, overall health picture, and options for future care. Demonstrates a good understanding of the events in this hospitalization and preceding hospitalizations.     Explored Bruno's worries/concerns and Bruno shares that he was told at [facility] that patient's delirium should be improving but this has seemed to worsen with change in location.  He states \"this is not her.\"  Empathetic support provided. Discussed that delirium certainly can worsen with change in location. Additionally, discussed concern that patient's encephalopathy is likely multifactorial and that patient's infarcts and/or hypoxic/anoxic injury may also be playing a role. Discussed that nursing will continue to assess mentation and discussed strategies to minimize delirium including normalizing sleep/wake cycles.    Given that patient's mental status has not demonstrated significant improvement, PC APRN explored patient's values in preferences in order to establish GOC. Bruno states that he and patient never discussed her healthcare preferences as she was relatively " healthy prior to this admission. Patient has never completed an advance directive.    Bruno inquired about possible living arrangements following this hospitalization should mental status not improve. Discussed the option of long term care vs home with hospice. Brief introduction to the philosophy of hospice as a future care option should Bruno feel that burden of ongoing medical treatment outweighs benefit.    Discussed the issue of code status briefly. Discussed the option of changing code status to reflect DNAR status. Education provided that this does not preclude pt from receiving all other offered medical care up until the point of cardiac arrest. Bruno verbalized understanding but expresses feeling shocked by the discussion and notes that nobody has discussed this with him at [facility]. Empathetic support provided and reassured Bruno that no decision needs to be made today. Bruno would like time to pray about this and discuss with his support system. All questions answered and contact information provided. Update provided to nursing staff and MD.    Active listening, reflection, reminiscing, validation & normalization, and empathic support utilized throughout this encounter.      Code Status: Full    ACP Documents: None    25 minutes spent discussing advance care planning, this time excludes any other billed services.    Interval diagnostic studies and medical documentation entries pertinent to this case were reviewed independently by me. This patient has at least one acute or chronic illness or injury that poses a threat to life or bodily function. This patient suffers from a high risk of morbidity from additional invasive diagnostic testing or intensive treatment. Discussion of recommendations and coordination of care undertaken with primary provider/treatment team.      BEA Minor  Palliative Care Nurse Practitioner   [phone]

## 2024-11-29 NOTE — CARE PLAN
The patient is Watcher - Medium risk of patient condition declining or worsening    Shift Goals  Clinical Goals: Safety, skin integrity, ABX therapy, monitor labs, VSS  Patient Goals: morenita  Family Goals: na    Progress made toward(s) clinical / shift goals:    Problem: Knowledge Deficit - Standard  Goal: Patient and family/care givers will demonstrate understanding of plan of care, disease process/condition, diagnostic tests and medications  Outcome: Progressing     Problem: Safety - Medical Restraint  Goal: Remains free of injury from restraints (Restraint for Interference with Medical Device)  Outcome: Progressing  Goal: Free from restraint(s) (Restraint for Interference with Medical Device)  Outcome: Progressing     Problem: Skin Integrity  Goal: Skin integrity is maintained or improved  Outcome: Progressing       Patient is not progressing towards the following goals:

## 2024-11-29 NOTE — PROGRESS NOTES
Hospital Medicine Daily Progress Note    Date of Service  11/29/2024    Chief Complaint  Kiara Qureshi is a 59 y.o. female admitted 11/27/2024 with STEMI    Hospital Course  Patient has a history of obesity, hypertension, hyperlipidemia.  She was seen at [facility] as a transfer initially for anterior STEMI requiring lytics.  Patient underwent an LAD PCI but hospital course was complicated by retroperitoneal bleed, ventricular fibrillation and PEA arrest and cardiogenic shock requiring VA ECMO and Impella.  She is now status post ECMO decannulation on November 4 and had left femoral embolectomy, PCI to the LAD, unsuccessful PCI to OM1 and Impella removal on November 8.  Hospital course was complicated by patient developing encephalopathy and delirium, to which she was given valproic acid and quetiapine.  Patient has been weaned off her present medications and was found to have hypotension to which she was started on p.o. midodrine 10 mg 3 times daily.  Goal-directed medical therapy was held due to borderline low blood pressure.  Her echocardiogram on November 24 shows 25 to 30% EF with akinesis of the apical, apical anterior wall, apical septum, apical inferior wall, and apical lateral wall of the left ventricle.  Hypokinesis seen of the basal anterolateral wall, mid anterolateral wall, anterior wall, mid inferolateral wall, and basal inferolateral wall of the left ventricle.     On November 25, patient had CT angiogram of the chest which shows pulmonary edema, small bilateral pleural effusions, small 3 mm filling defect along the lateral wall of the descending thoracic aorta which may represent focal aortic mural thrombus versus ulcerated plaque.  CT angiogram of the abdomen pelvis showing increased organization of the rim-enhancing left retroperitoneal hematomas measuring up to 9.3 x 4.4 cm.  Tiny filling defect/mural thrombus within the posterior right common iliac artery.  Multiple nonenhancing intermediate  density fluid collections within the subcutaneous tissues overlying the right lower quadrant and right inguinal regions measuring up to 5.7 x 2.4 cm.  Additional nonenhancing low-density fluid collection closely associated with the left femoral vein measuring 4.7 x 3.9 cm, likely represents evolving hematomas.     Due to patient's uptrending leukocytosis and CAT scan findings of suspected pneumonia, patient was started on vancomycin and Zosyn on November 26.  Patient was then transferred back to [facility] for further management.    Interval Problem Update  11/28 patient is in bed, patient is new to me today, patient was transferred from [facility] last night, records have been reviewed, continue cardiac medications, continue IV antibiotics, tube feedings, patient continues to be encephalopathic oriented x 1, does not follows commands, monitoring daily labs, discussed with bedside nurse charge nurse  pharmacist.  11/29 patient is in bed, she continues to be encephalopathic, she is partially oriented x 1, no focal weakness, continue tolerating tube feedings, increase free water flushes due to hypernatremia, discussed with palliative care discussed with bedside nurse charge nurse  pharmacist continue IV antibiotics, mental status not improving there is concerned of anoxic encephalopathy as per records reviewed from [facility], will discuss with patient's , overall prognosis is guarded        I have discussed this patient's plan of care and discharge plan at IDT rounds today with Case Management, Nursing, Nursing leadership, and other members of the IDT team.    Consultants/Specialty      Code Status  Full Code    Disposition  The patient is not medically cleared for discharge to home or a post-acute facility.      I have placed the appropriate orders for post-discharge needs.    Review of Systems  Review of Systems   Unable to perform ROS: Medical condition        Physical Exam  Temp:  [36.4 °C (97.5  °F)-36.7 °C (98.1 °F)] 36.4 °C (97.5 °F)  Pulse:  [] 100  Resp:  [16-26] 26  BP: (102-146)/(57-86) 108/83  SpO2:  [93 %-100 %] 99 %    Physical Exam  Vitals and nursing note reviewed.   Constitutional:       Appearance: She is ill-appearing.   Eyes:      General:         Right eye: No discharge.         Left eye: No discharge.      Conjunctiva/sclera: Conjunctivae normal.   Cardiovascular:      Rate and Rhythm: Normal rate and regular rhythm.      Pulses: Normal pulses.      Heart sounds: Normal heart sounds.   Pulmonary:      Effort: Pulmonary effort is normal.      Breath sounds: Normal breath sounds.   Abdominal:      General: Bowel sounds are normal. There is no distension.      Tenderness: There is no abdominal tenderness.      Comments: Small tunneling groin area, mild discharge.    Musculoskeletal:         General: Normal range of motion.      Cervical back: Normal range of motion and neck supple.      Right lower leg: No edema.      Left lower leg: No edema.   Skin:     General: Skin is warm.      Capillary Refill: Capillary refill takes less than 2 seconds.   Neurological:      General: No focal deficit present.      Mental Status: She is alert.      Comments: Oriented x 1   Psychiatric:         Mood and Affect: Mood normal.         Fluids    Intake/Output Summary (Last 24 hours) at 11/29/2024 1429  Last data filed at 11/29/2024 1345  Gross per 24 hour   Intake 1120 ml   Output 925 ml   Net 195 ml        Laboratory  Recent Labs     11/28/24 0135 11/29/24  0324   WBC 14.5* 10.7   RBC 3.45* 3.38*   HEMOGLOBIN 10.7* 10.8*   HEMATOCRIT 35.5* 35.0*   .9* 103.6*   MCH 31.0 32.0   MCHC 30.1* 30.9*   RDW 72.1* 72.8*   PLATELETCT 382 333   MPV 12.3 12.4     Recent Labs     11/28/24 0135 11/29/24  0324   SODIUM 147* 147*   POTASSIUM 5.2 4.6   CHLORIDE 118* 118*   CO2 17* 17*   GLUCOSE 134* 200*   BUN 41* 45*   CREATININE 1.34 1.50*   CALCIUM 7.8* 7.7*                   Imaging  DX-OUTSIDE IMAGES-DX  CHEST   Final Result      DX-OUTSIDE IMAGES-DX ABDOMEN   Final Result      MR-OUTSIDE IMAGES-MR BRAIN   Final Result      CT-OUTSIDE IMAGES-CT HEAD   Final Result      CT-OUTSIDE IMAGES-CT ABDOMEN/PELVIS   Final Result      CT-OUTSIDE IMAGES-CT CHEST   Final Result      DX-ABDOMEN FOR TUBE PLACEMENT   Final Result      There is a new small bowel feeding tube which terminates in the small bowel of the right mid abdomen.      DX-CHEST-LIMITED (1 VIEW)   Final Result         Asymmetric pulmonary infiltrates, left worse than right.           Assessment/Plan  * Heart failure with reduced ejection fraction (HCC)- (present on admission)  Assessment & Plan  Unable to start goal-directed medical therapy due to hypotension.  Echocardiogram showing EF 25 to 30% EF with akinesis of the apical, apical anterior wall, apical septum, apical inferior wall, and apical lateral wall of the left ventricle.  Hypokinesis seen of the basal anterolateral wall, mid anterolateral wall, anterior wall, mid inferolateral wall, and basal inferolateral wall of the left ventricle.  Continue midodrine 10 po tid, lipitor 80 mg po qhs   at bedside, appears euvolemic. Start lasix 20 mg po daily, cautious diuresis    Continue monitoring for signs of fluid overload    Abdominal hematoma  Assessment & Plan  Might be related to cardiac device  Monitoring hb  Patient on lovenox due to rigth common iliac art thrombosis.    ACP (advance care planning)  Assessment & Plan  Patient was full code at outside facility.  Attempted to call  to inquire about CODE STATUS unsuccessful.  Patient will be left as full code for now.    Palliative consulted.     Pneumonia  Assessment & Plan  Patient found to have increasing white blood cell count up to 15,000 at outside facility  CT angio chest showing persistent irregular tree-in-bud nodularity and bilateral bronchial wall thickening on a background of groundglass opacities within the dependent portions of the  right middle, right lower, and left lower lobes (right greater than left). Increased bilateral upper lobe patchy perihilar groundglass opacities.   Started on vancomycin and Zosyn on 9/26 at outside facility on 11/26, can continue for now   Send COVID swab    Continue Zosyn and Zyvox  Follow-up cultures    Aortic mural thrombus (HCC)  Assessment & Plan  CT at outside facility showing focal aortic mural thrombus along the lateral wall of the descending thoracic aorta and also a tiny filling defect/mural thrombus within the posterior right common iliac artery  Patient has been on Lovenox outside facility, continue Lovenox IV  Monitor H&H    Continue monitoring H&H      Encephalopathy  Assessment & Plan  Hospital course complicated by encephalopathy.  Likely from combination of CVA, STEMI, hyperglycemia  Restraints ordered  Has a nasojejunal feeding tube placed by interventional radiology from outside facility  Continue Seroquel and Depakene as recommended by neurology/psychiatry at outside facility.    Multifactorial  As per records from [facility] probably anoxic/embolic/delirium     CVA (cerebral vascular accident) (HCC)  Assessment & Plan  Patient had MRI on 11/17/24 at OSH which shwed Punctate acute infarct of the right centrum semiovale. Scattered foci of enhancement in multiple vascular distributions as described may represent recent subacute infarcts. Diffuse stippled susceptibility signal throughout the supratentorial and infratentorial brain. Overall findings may represent sequelae of fat emboli or amyloid related angiopathy.   Continue asa, lipitor, lovenox    Continue to be encephalopathic  Neuro evaluated patient at [facility].       CAD (coronary artery disease)  Assessment & Plan  Patient with recent complicated STEMI with PCI to LAD and mid LAD.  Complicated by V-fib arrest and cardiogenic shock requiring VA ECMO and Impella and retroperitoneal hematomas. At [facility] underwent impella supported PCI to mid LAD just  distal to prior stent but unsuccessful with PCI to 100% occluded OM1 on 11/6. Patient was decannulated and had Impella removed.    Continue Lipitor    Continue monitoring  Continue telemetry.     Urinary retention  Assessment & Plan  Beckwith catheter in place    Hyperglycemia  Assessment & Plan  Has had uncontrolled sugars outside hospital  Sliding scale           VTE prophylaxis: Lovenox    I have performed a physical exam and reviewed and updated ROS and Plan today (11/29/2024). In review of yesterday's note (11/28/2024), there are no changes except as documented above.      Total time of 52 minutes spent prepping to see patient (e.g. reviewing  tests/imaging results, notes from consultants, bedside nurse, night shift ) obtaining and/or reviewing separately obtained history. Performing a medically appropriate examination and evaluation.  Counseling and educating the patient.  Ordering medications, tests, or procedures.  Referring and communicating with other health care professionals.  Documenting clinical information in EPIC.  Independently interpreting results and communicating results to patient.  Care coordination.

## 2024-11-29 NOTE — THERAPY
Occupational Therapy   Initial Evaluation     Patient Name: Kiara Qureshi  Age:  59 y.o., Sex:  female  Medical Record #: [id number]  Today's Date: 11/29/2024     Precautions  Precautions: (P) Fall Risk, Nasogastric Tube, Swallow Precautions  Comments: (P) EF 25-30%    Assessment  Patient is a 59 y.o. female with a diagnosis of heart failure with reduced ejection fraction. Pt was initially transferred to [facility] for anterior STEMI requiring lytics. Pt underwent a LAD PCI but hospital course was complicated by retroperitoneal bleed, ventricular fibrillation and PEA arrest and cardiogenic shock requiring VA ECMO and Impella. Pt had ECMO decannulation on November 4th and then had left femoral embolectomy, PCI to the LAD, unsuccessful PCI to OM1 and Impella removal on November 8. 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 Additional factors influencing patient status / progress: PMHx includes obesity, hypertension, hyperlipidemia.      During OT eval, pt presented with impaired strength, balance, activity tolerance, cognition, coordination and mobility. Upon arrival, pt was severely soiled in large loose BM and required 3 person totalA to get pt and bed cleaned up while managing pt's impulsivity. Pt followed ~50% of one-step directions with multimodal cues. Appeared to have intact BUE AROM but generalized weakness grossly equal. Pt needed totalA for bed mobility but once sitting, could maintain her balance with SBA. MaxA x2 people HHA for one STS, unable to attempt transfer. Pt required grossly totalA for her ADLs. Recommend post-acute placement.     Plan    Occupational Therapy Initial Treatment Plan   Treatment Interventions: (P) Self Care / Activities of Daily Living, Adaptive Equipment, Neuro Re-Education / Balance, Therapeutic Exercises, Therapeutic Activity, Family / Caregiver Training  Treatment Frequency: (P) 3 Times  "per Week  Duration: (P) Until Therapy Goals Met    DC Equipment Recommendations: (P) Unable to determine at this time  Discharge Recommendations: (P) Recommend post-acute placement for additional occupational therapy services prior to discharge home     Subjective    \"Thank you, I appreciate it.\"      Objective     11/29/24 1510   Initial Contact Note    Initial Contact Note Order Received and Verified, Occupational Therapy Evaluation in Progress with Full Report to Follow.   Prior Living Situation   Prior Services Home-Independent   Housing / Facility 1 Story House   Steps Into Home 1   Steps In Home 0   Bathroom Set up Walk In Shower;Built-In Shower Chair   Equipment Owned Tub / Shower Seat  (built-in)   Lives with - Patient's Self Care Capacity Spouse   Comments Pt's spouse present and supportive, provided hx and home setup. Reports pt was fully independent at baseline. Spouse works part-time   Prior Level of ADL Function   Self Feeding Independent   Grooming / Hygiene Independent   Bathing Independent   Dressing Independent   Toileting Independent   Prior Level of IADL Function   Medication Management Independent   Laundry Independent   Kitchen Mobility Independent   Finances Independent   Home Management Independent   Shopping Independent   Prior Level Of Mobility Independent Without Device in Community   Driving / Transportation Driving Independent   Occupation (Pre-Hospital Vocational) Unable To Determine At This Time   Precautions   Precautions Fall Risk;Nasogastric Tube;Swallow Precautions   Comments EF 25-30%   Pain   Intervention Declines   Cognition    Cognition / Consciousness X   Speech/ Communication Delayed Responses   Orientation Level Not Oriented to Year;Not Oriented to Month;Not Oriented to Day;Not Oriented to Time;Not Oriented to Place;Not Oriented to Reason   Level of Consciousness Alert   Ability To Follow Commands 1 Step  (50%)   Safety Awareness Impaired;Impulsive   New Learning Impaired " "  Attention Impaired   Sequencing Impaired   Initiation Impaired   Comments Follows grossly 50% one-step directions with multimodal cues. Impulsive, grabs at lines. Minimal verbal responses but did coherently say \"thank you, I appreciate it\" at the end   Active ROM Upper Body   Active ROM Upper Body  WDL   Comments Appears to have gross ROM intact, able to reach with each arm to give high 5 at approximately 100* shoulder flexion   Strength Upper Body   Upper Body Strength  X   Gross Strength Generalized Weakness, Equal Bilaterally.    Comments Generalized weakness but able to use her BUEs as lateral support sitting EOB   Sensation Upper Body   Upper Extremity Sensation  Not Tested   Upper Body Muscle Tone   Upper Body Muscle Tone  WDL   Coordination Upper Body   Coordination X   Comments poor direction following   Balance Assessment   Sitting Balance (Static) Fair -   Sitting Balance (Dynamic) Poor +   Standing Balance (Static) Poor   Standing Balance (Dynamic) Trace +   Weight Shift Sitting Fair   Weight Shift Standing Poor   Comments Maintains static sitting balance EOB with SBA. HHA x2 people for one stand   Bed Mobility    Supine to Sit Total Assist   Sit to Supine Total Assist   Scooting Total Assist   Rolling Total Assist to Rt.;Total Assist to Lt.   Comments use of bed features, two person assist   ADL Assessment   Eating Total Assist   Upper Body Dressing Total Assist   Lower Body Dressing Total Assist   Toileting Total Assist  (large loose incontinent BM on arrival, totalA of 3 people to manage cleanup due to quantity and managing pt's impulsive behaviors)   How much help from another person does the patient currently need...   Putting on and taking off regular lower body clothing? 1   Bathing (including washing, rinsing, and drying)? 1   Toileting, which includes using a toilet, bedpan, or urinal? 1   Putting on and taking off regular upper body clothing? 1   Taking care of personal grooming such as " brushing teeth? 1   Eating meals? 1   6 Clicks Daily Activity Score 6   Functional Mobility   Sit to Stand Maximal Assist   Bed, Chair, Wheelchair Transfer Unable to Participate   Toilet Transfers Unable to Participate   Mobility EOB, STS x1 with two person HHA   Activity Tolerance   Sitting Edge of Bed ~7-8 min   Standing <30 seconds   Patient / Family Goals   Patient / Family Goal #1 For pt to get into a chair, per pt's    Short Term Goals   Short Term Goal # 1 Pt will follow one-step directions 100% of the time during ADLs   Short Term Goal # 2 Pt will change gown with modA   Short Term Goal # 3 Pt will perform seated g/h with Jun   Short Term Goal # 4 Pt will transfer to [facility] with modA, second person for safety   Education Group   Role of Occupational Therapist Patient Response Patient;Family;Acceptance;Explanation;Verbal Demonstration   Additional Comments Initiated family education with spouse on strategies to reduce and prevent pt delirium   Occupational Therapy Initial Treatment Plan    Treatment Interventions Self Care / Activities of Daily Living;Adaptive Equipment;Neuro Re-Education / Balance;Therapeutic Exercises;Therapeutic Activity;Family / Caregiver Training   Treatment Frequency 3 Times per Week   Duration Until Therapy Goals Met   Problem List   Problem List Decreased Active Daily Living Skills;Decreased Homemaking Skills;Decreased Upper Extremity Strength Right;Decreased Upper Extremity Strength Left;Decreased Functional Mobility;Decreased Activity Tolerance;Safety Awareness Deficits / Cognition;Impaired Coordination Right Upper Extremity;Impaired Coordination Left Upper Extremity;Impaired Cognitive Function;Impaired Postural Control / Balance   Anticipated Discharge Equipment and Recommendations   DC Equipment Recommendations Unable to determine at this time   Discharge Recommendations Recommend post-acute placement for additional occupational therapy services prior to discharge home    Interdisciplinary Plan of Care Collaboration   IDT Collaboration with  Nursing;Family / Caregiver;Physical Therapist   Patient Position at End of Therapy In Bed;Bed Alarm On;Wrist Restraints Applied;Call Light within Reach;Family / Friend in Room   Collaboration Comments RN assist during and updated, pt's  present and supportive   Session Information   Date / Session Number  11/29 #1 (1/3, 12/5)     Pt seen for OT eval in coordination with PT due to the need for two skilled therapists due to profound cognitive deficits and limited mobility. Due to the medical complexity, the skill of both practitioners is needed to monitor vitals, patient status, and adjust the intervention to fit the patient's needs and goals.

## 2024-11-29 NOTE — DISCHARGE PLANNING
Care Transition Team Assessment    Emergency contact  Spouse Bruno Mayberry [phone]    LMSW conducted assessment. Pt is currently JAYLIN. Pt is a readmit.   Pt was first transferred from [facility] for chest pain on 10/30/24. Pt was then transferred from [facility] to [facility] on 10/31/24. Pt had a transfer back agreement. Pt was now transferred back from [facility] back to [facility] on 11/27/24. Pt admitted for Heart failure with reduced ejection fraction (HCC).    Pt was an acute   Information Source  Orientation Level: Disoriented X4  Informant's Name: BRUNO MAYBERRY    Readmission Evaluation  Is this a readmission?: Yes - planned readmission    Elopement Risk  Legal Hold: No  Ambulatory or Self Mobile in Wheelchair: No-Not an Elopement Risk  Elopement Risk: Not at Risk for Elopement    Interdisciplinary Discharge Planning  Primary Care Physician: JEANETTE PAYNE-    Discharge Preparedness  What is your plan after discharge?: Uncertain - pending medical team collaboration  What are your discharge supports?: Spouse  Prior Functional Level: Independent with Activities of Daily Living  Difficulity with ADLs: None  Difficulity with IADLs: None    Functional Assesment  Prior Functional Level: Independent with Activities of Daily Living    Finances  Financial Barriers to Discharge: No  Prescription Coverage: Yes    Domestic Abuse  Possible Abuse/Neglect Reported to:: Not Applicable    Psychological Assessment  History of Substance Abuse: None  History of Psychiatric Problems: No  Non-compliant with Treatment: No  Newly Diagnosed Illness: No    Discharge Risks or Barriers  Discharge risks or barriers?: Complex medical needs  Patient risk factors: Complex medical needs    Anticipated Discharge Information  Discharge Disposition: D/T to SNF with Medicare cert in anticipation of skilled care (03)  Discharge Address: [address] [place] [id number]  Discharge Contact Phone Number: [phone]

## 2024-11-29 NOTE — PROGRESS NOTES
Monitor Summary:   Rhythm: SR  Rate: 69-94  Measurement: .15/.04/.35  Ectopy: 3 second arrest, R PVC, coup  No strip available

## 2024-11-29 NOTE — PROGRESS NOTES
Bedside report received from off going RN: Iraida, assumed care of patient.      Fall Risk Score: HIGH RISK  Fall risk interventions in place: Place yellow fall risk ID band on patient, Provide patient/family education based on risk assessment, Educate patient/family to call staff for assistance when getting out of bed, Place fall precaution signage outside patient door, Utilize bed/chair fall alarm, Notify charge of high risk for huddle, and Bed alarm connected correctly  Bed type: low airloss (Ray Score less than 17 interventions in place)  Patient on cardiac monitor: Yes  IVF/IV medications: Infusion per MAR (n/a  Oxygen: Room Air  Bedside sitter: Not Applicable. Tele sitter present and active.   Isolation: Not applicable

## 2024-11-30 VITALS
BODY MASS INDEX: 34.05 KG/M2 | HEIGHT: 61 IN | OXYGEN SATURATION: 92 % | WEIGHT: 180.34 LBS | DIASTOLIC BLOOD PRESSURE: 75 MMHG | TEMPERATURE: 98.1 F | HEART RATE: 87 BPM | RESPIRATION RATE: 20 BRPM | SYSTOLIC BLOOD PRESSURE: 118 MMHG

## 2024-11-30 LAB
ANION GAP SERPL CALC-SCNC: 11 MMOL/L (ref 7–16)
BUN SERPL-MCNC: 39 MG/DL (ref 8–22)
CALCIUM SERPL-MCNC: 7.8 MG/DL (ref 8.5–10.5)
CHLORIDE SERPL-SCNC: 117 MMOL/L (ref 96–112)
CO2 SERPL-SCNC: 17 MMOL/L (ref 20–33)
CREAT SERPL-MCNC: 1.4 MG/DL (ref 0.5–1.4)
ERYTHROCYTE [DISTWIDTH] IN BLOOD BY AUTOMATED COUNT: 70.9 FL (ref 35.9–50)
GFR SERPLBLD CREATININE-BSD FMLA CKD-EPI: 43 ML/MIN/1.73 M 2
GLUCOSE BLD STRIP.AUTO-MCNC: 156 MG/DL (ref 65–99)
GLUCOSE BLD STRIP.AUTO-MCNC: 180 MG/DL (ref 65–99)
GLUCOSE BLD STRIP.AUTO-MCNC: 204 MG/DL (ref 65–99)
GLUCOSE BLD STRIP.AUTO-MCNC: 256 MG/DL (ref 65–99)
GLUCOSE SERPL-MCNC: 191 MG/DL (ref 65–99)
HCT VFR BLD AUTO: 33.8 % (ref 37–47)
HGB BLD-MCNC: 10.5 G/DL (ref 12–16)
MCH RBC QN AUTO: 32 PG (ref 27–33)
MCHC RBC AUTO-ENTMCNC: 31.1 G/DL (ref 32.2–35.5)
MCV RBC AUTO: 103 FL (ref 81.4–97.8)
PLATELET # BLD AUTO: 312 K/UL (ref 164–446)
PMV BLD AUTO: 11.9 FL (ref 9–12.9)
POTASSIUM SERPL-SCNC: 4.2 MMOL/L (ref 3.6–5.5)
RBC # BLD AUTO: 3.28 M/UL (ref 4.2–5.4)
SODIUM SERPL-SCNC: 145 MMOL/L (ref 135–145)
WBC # BLD AUTO: 8.1 K/UL (ref 4.8–10.8)

## 2024-11-30 PROCEDURE — 700102 HCHG RX REV CODE 250 W/ 637 OVERRIDE(OP): Performed by: HOSPITALIST

## 2024-11-30 PROCEDURE — A9270 NON-COVERED ITEM OR SERVICE: HCPCS | Performed by: STUDENT IN AN ORGANIZED HEALTH CARE EDUCATION/TRAINING PROGRAM

## 2024-11-30 PROCEDURE — 700102 HCHG RX REV CODE 250 W/ 637 OVERRIDE(OP): Performed by: STUDENT IN AN ORGANIZED HEALTH CARE EDUCATION/TRAINING PROGRAM

## 2024-11-30 PROCEDURE — 99233 SBSQ HOSP IP/OBS HIGH 50: CPT | Performed by: HOSPITALIST

## 2024-11-30 PROCEDURE — 85027 COMPLETE CBC AUTOMATED: CPT

## 2024-11-30 PROCEDURE — 700111 HCHG RX REV CODE 636 W/ 250 OVERRIDE (IP): Performed by: STUDENT IN AN ORGANIZED HEALTH CARE EDUCATION/TRAINING PROGRAM

## 2024-11-30 PROCEDURE — 770020 HCHG ROOM/CARE - TELE (206)

## 2024-11-30 PROCEDURE — 80048 BASIC METABOLIC PNL TOTAL CA: CPT

## 2024-11-30 PROCEDURE — A9270 NON-COVERED ITEM OR SERVICE: HCPCS | Performed by: HOSPITALIST

## 2024-11-30 PROCEDURE — 700105 HCHG RX REV CODE 258: Performed by: STUDENT IN AN ORGANIZED HEALTH CARE EDUCATION/TRAINING PROGRAM

## 2024-11-30 PROCEDURE — 82962 GLUCOSE BLOOD TEST: CPT | Mod: 91

## 2024-11-30 RX ORDER — QUETIAPINE FUMARATE 25 MG/1
50 TABLET, FILM COATED ORAL ONCE
Status: DISCONTINUED | OUTPATIENT
Start: 2024-12-01 | End: 2024-12-01

## 2024-11-30 RX ADMIN — INSULIN LISPRO 2 UNITS: 100 INJECTION, SOLUTION INTRAVENOUS; SUBCUTANEOUS at 18:04

## 2024-11-30 RX ADMIN — PIPERACILLIN AND TAZOBACTAM 3.38 G: 3; .375 INJECTION, POWDER, FOR SOLUTION INTRAVENOUS at 21:40

## 2024-11-30 RX ADMIN — LINEZOLID 600 MG: 600 INJECTION, SOLUTION INTRAVENOUS at 05:25

## 2024-11-30 RX ADMIN — TICAGRELOR 90 MG: 90 TABLET ORAL at 18:05

## 2024-11-30 RX ADMIN — ENOXAPARIN SODIUM 80 MG: 100 INJECTION SUBCUTANEOUS at 18:07

## 2024-11-30 RX ADMIN — INSULIN LISPRO 1 UNITS: 100 INJECTION, SOLUTION INTRAVENOUS; SUBCUTANEOUS at 21:59

## 2024-11-30 RX ADMIN — ASPIRIN 81 MG: 81 TABLET, CHEWABLE ORAL at 05:22

## 2024-11-30 RX ADMIN — VALPROIC ACID 500 MG: 250 SOLUTION ORAL at 18:42

## 2024-11-30 RX ADMIN — PIPERACILLIN AND TAZOBACTAM 3.38 G: 3; .375 INJECTION, POWDER, FOR SOLUTION INTRAVENOUS at 13:15

## 2024-11-30 RX ADMIN — MIDODRINE HYDROCHLORIDE 5 MG: 5 TABLET ORAL at 18:05

## 2024-11-30 RX ADMIN — QUETIAPINE FUMARATE 25 MG: 50 TABLET ORAL at 18:07

## 2024-11-30 RX ADMIN — QUETIAPINE FUMARATE 150 MG: 100 TABLET ORAL at 21:43

## 2024-11-30 RX ADMIN — INSULIN GLARGINE-YFGN 17 UNITS: 100 INJECTION, SOLUTION SUBCUTANEOUS at 18:05

## 2024-11-30 RX ADMIN — ATORVASTATIN CALCIUM 80 MG: 80 TABLET, FILM COATED ORAL at 18:07

## 2024-11-30 RX ADMIN — VALPROIC ACID 500 MG: 250 SOLUTION ORAL at 05:22

## 2024-11-30 RX ADMIN — QUETIAPINE FUMARATE 25 MG: 50 TABLET ORAL at 05:22

## 2024-11-30 RX ADMIN — Medication 10 MG: at 21:43

## 2024-11-30 RX ADMIN — PIPERACILLIN AND TAZOBACTAM 3.38 G: 3; .375 INJECTION, POWDER, FOR SOLUTION INTRAVENOUS at 05:27

## 2024-11-30 RX ADMIN — ENOXAPARIN SODIUM 80 MG: 100 INJECTION SUBCUTANEOUS at 05:22

## 2024-11-30 RX ADMIN — FUROSEMIDE 20 MG: 20 TABLET ORAL at 05:22

## 2024-11-30 RX ADMIN — INSULIN LISPRO 3 UNITS: 100 INJECTION, SOLUTION INTRAVENOUS; SUBCUTANEOUS at 07:51

## 2024-11-30 RX ADMIN — INSULIN LISPRO 1 UNITS: 100 INJECTION, SOLUTION INTRAVENOUS; SUBCUTANEOUS at 13:18

## 2024-11-30 RX ADMIN — TICAGRELOR 90 MG: 90 TABLET ORAL at 05:22

## 2024-11-30 ASSESSMENT — FIBROSIS 4 INDEX: FIB4 SCORE: 1.31

## 2024-11-30 NOTE — CARE PLAN
The patient is Watcher - Medium risk of patient condition declining or worsening    Shift Goals  Clinical Goals: safety, skin integrity, abx, VSS, mentation  Patient Goals: morenita  Family Goals: na    Progress made toward(s) clinical / shift goals:    Problem: Safety - Medical Restraint  Goal: Remains free of injury from restraints (Restraint for Interference with Medical Device)  Outcome: Progressing. Q2h range of motion in restraints and assessment of skin under restraints      Problem: Skin Integrity  Goal: Skin integrity is maintained or improved  Outcome: Progressing. Q2h turns      Problem: Fall Risk  Goal: Patient will remain free from falls  Outcome: Progressing. Bed alarm on, tele sitter active      Problem: Pain - Standard  Goal: Alleviation of pain or a reduction in pain to the patient’s comfort goal  Outcome: Progressing       Patient is not progressing towards the following goals:      Problem: Knowledge Deficit - Standard  Goal: Patient and family/care givers will demonstrate understanding of plan of care, disease process/condition, diagnostic tests and medications  Outcome: Not Progressing

## 2024-11-30 NOTE — THERAPY
Physical Therapy   Initial Evaluation     Patient Name: Kiara Qureshi  Age:  59 y.o., Sex:  female  Medical Record #: [id number]  Today's Date: 11/29/2024     Precautions  Precautions: Fall Risk;Swallow Precautions;Nasogastric Tube  Comments: EF 25-30%    Assessment  Patient is a 59 y.o. female with hx of obesity, hypertension, hyperlipidemia, recent STEMI s/p LAD PCI with hospital course complicated by retroperitoneal bleed, ventricular fibrillation and PEA arrest and cardiogenic shock requiring VA ECMO (decannulated 11/4/24) and Impella. Pt now transferred back to [facility] for management of HFrEF, PNA, aortic mural thrombus, and AMS. PT eval complete, pt currently presents below her functional baseline due to impaired strength, cognition, activity tolerance, balance, gait, and overall mobility. Pt is intermittently following commands throughout and needing redirecting. Pt is at Max A generally with 2p assist due to generalized weakness. Pt demonstrates ability to stand and potentially begin walking if she can follow instructions better. Recommend post acute placement, will follow.     Plan    Physical Therapy Initial Treatment Plan   Treatment Plan : Bed Mobility, Gait Training, Neuro Re-Education / Balance, Self Care / Home Evaluation, Stair Training, Therapeutic Activities, Therapeutic Exercise  Treatment Frequency: 3 Times per Week  Duration: Until Therapy Goals Met    DC Equipment Recommendations: Unable to determine at this time  Discharge Recommendations: Recommend post-acute placement for additional physical therapy services prior to discharge home         Vitals   O2 (LPM) 0   O2 Delivery Device None - Room Air   Prior Living Situation   Prior Services None;Home-Independent   Housing / Facility 1 Story House   Steps Into Home 1   Steps In Home 0   Lives with - Patient's Self Care Capacity Spouse   Comments Pt's spouse present and supportive, provided hx and home setup. Reports pt was fully independent  at baseline. Spouse works part-time   Prior Level of Functional Mobility   Bed Mobility Independent   Transfer Status Independent   Ambulation Independent   Ambulation Distance community distances   Assistive Devices Used None   Stairs Independent   Cognition    Cognition / Consciousness X   Speech/ Communication Delayed Responses   Level of Consciousness Alert   Ability To Follow Commands 1 Step  (~50% of the time)   Safety Awareness Impaired;Impulsive   New Learning Impaired   Attention Impaired   Sequencing Impaired   Initiation Impaired   Comments grabbing at lines occasionally, redirectable. pt speaking in clear sentences at times, mostly hard to understand throughout   Active ROM Lower Body    Active ROM Lower Body  WDL   Comments appears to have full ROM with movement in bed and at EOB   Strength Lower Body   Lower Body Strength  X   Comments bilateral leg strength appears generally 4/5, pt not fully participating in MMT's   Sensation Lower Body   Comments NT   Coordination Lower Body    Comments NT, poor direction following   Vision   Vision Comments appears intact, pt able to scan in all directions   Balance Assessment   Sitting Balance (Static) Fair -   Sitting Balance (Dynamic) Poor +   Standing Balance (Static) Poor   Standing Balance (Dynamic) Trace +   Weight Shift Sitting Fair   Weight Shift Standing Poor   Comments pt able to maintain balance at EOB for ~5 min unsupported as well as scoot herself backwards   Bed Mobility    Supine to Sit Total Assist   Sit to Supine Total Assist   Scooting Total Assist   Rolling Maximal Assist to Rt.;Maximum Assist to Lt.   Comments increased assist due to poor sequencing and initiation   Gait Analysis   Gait Level Of Assist Unable to Participate   Functional Mobility   Sit to Stand Maximal Assist   Bed, Chair, Wheelchair Transfer Unable to Participate   Mobility STS x1 with 2p HHA   Comments pt stood for ~5 seconds before sitting down, unsure if fatigue or pt just  wanting to   6 Clicks Assessment - How much HELP from from another person do you currently need... (If the patient hasn't done an activity recently, how much help from another person do you think he/she would need if he/she tried?)   Turning from your back to your side while in a flat bed without using bedrails? 2   Moving from lying on your back to sitting on the side of a flat bed without using bedrails? 2   Moving to and from a bed to a chair (including a wheelchair)? 2   Standing up from a chair using your arms (e.g., wheelchair, or bedside chair)? 2   Walking in hospital room? 1   Climbing 3-5 steps with a railing? 1   6 clicks Mobility Score 10   Short Term Goals    Short Term Goal # 1 pt will move supine<>eob with spv in 6 tx for bed mobility.   Short Term Goal # 2 pt will complete spt with fww and spv in 6 tx for functional mobility.   Short Term Goal # 3 pt will ambulate 150 ft with fww and spv in 6 tx for household distances.   Education Group   Education Provided Role of Physical Therapist   Role of Physical Therapist Patient Response Patient;Significant Other;Acceptance;Explanation;Verbal Demonstration   Physical Therapy Initial Treatment Plan    Treatment Plan  Bed Mobility;Gait Training;Neuro Re-Education / Balance;Self Care / Home Evaluation;Stair Training;Therapeutic Activities;Therapeutic Exercise   Treatment Frequency 3 Times per Week   Duration Until Therapy Goals Met   Problem List    Problems Impaired Bed Mobility;Impaired Transfers;Impaired Ambulation;Functional Strength Deficit;Impaired Balance;Impaired Coordination;Decreased Activity Tolerance;Safety Awareness Deficits / Cognition   Anticipated Discharge Equipment and Recommendations   DC Equipment Recommendations Unable to determine at this time   Discharge Recommendations Recommend post-acute placement for additional physical therapy services prior to discharge home   Interdisciplinary Plan of Care Collaboration   IDT Collaboration with   Family / Caregiver;Nursing;Occupational Therapist   Patient Position at End of Therapy In Bed;Bed Alarm On;Wrist Restraints Applied;Call Light within Reach;Family / Friend in Room   Collaboration Comments RN updated   Session Information   Date / Session Number  11/29- 1 (1/3, 12/5)       Patient seen for team evaluation with Occupational Therapist for the following reason(s):  Patient required 2 person assistance for safety and to provide effective interventions. Each discipline assisted patient with appropriate and separate goals. Due to the medical complexity, the skill of both practitioners is needed to monitor vitals, patient status, and adjust the intervention to fit the patient's needs and goals..

## 2024-11-30 NOTE — PROGRESS NOTES
Bedside report received from off going RN: Giovany, assumed care of patient.      Fall Risk Score: HIGH RISK  Fall risk interventions in place: Place yellow fall risk ID band on patient, Provide patient/family education based on risk assessment, Educate patient/family to call staff for assistance when getting out of bed, Place fall precaution signage outside patient door, Utilize bed/chair fall alarm, Notify charge of high risk for huddle, and Bed alarm connected correctly  Bed type: low airloss (Ray Score less than 17 interventions in place)  Patient on cardiac monitor: Yes  IVF/IV medications: Infusion per MAR (n/a  Oxygen: Room Air  Bedside sitter: Not Applicable. Tele sitter present and active.   Isolation: Not applicable

## 2024-11-30 NOTE — PROGRESS NOTES
Hospital Medicine Daily Progress Note    Date of Service  11/30/2024    Chief Complaint  Kiara Qureshi is a 59 y.o. female admitted 11/27/2024 with STEMI    Hospital Course  Patient has a history of obesity, hypertension, hyperlipidemia.  She was seen at [facility] as a transfer initially for anterior STEMI requiring lytics.  Patient underwent an LAD PCI but hospital course was complicated by retroperitoneal bleed, ventricular fibrillation and PEA arrest and cardiogenic shock requiring VA ECMO and Impella.  She is now status post ECMO decannulation on November 4 and had left femoral embolectomy, PCI to the LAD, unsuccessful PCI to OM1 and Impella removal on November 8.  Hospital course was complicated by patient developing encephalopathy and delirium, to which she was given valproic acid and quetiapine.  Patient has been weaned off her present medications and was found to have hypotension to which she was started on p.o. midodrine 10 mg 3 times daily.  Goal-directed medical therapy was held due to borderline low blood pressure.  Her echocardiogram on November 24 shows 25 to 30% EF with akinesis of the apical, apical anterior wall, apical septum, apical inferior wall, and apical lateral wall of the left ventricle.  Hypokinesis seen of the basal anterolateral wall, mid anterolateral wall, anterior wall, mid inferolateral wall, and basal inferolateral wall of the left ventricle.     On November 25, patient had CT angiogram of the chest which shows pulmonary edema, small bilateral pleural effusions, small 3 mm filling defect along the lateral wall of the descending thoracic aorta which may represent focal aortic mural thrombus versus ulcerated plaque.  CT angiogram of the abdomen pelvis showing increased organization of the rim-enhancing left retroperitoneal hematomas measuring up to 9.3 x 4.4 cm.  Tiny filling defect/mural thrombus within the posterior right common iliac artery.  Multiple nonenhancing intermediate  density fluid collections within the subcutaneous tissues overlying the right lower quadrant and right inguinal regions measuring up to 5.7 x 2.4 cm.  Additional nonenhancing low-density fluid collection closely associated with the left femoral vein measuring 4.7 x 3.9 cm, likely represents evolving hematomas.     Due to patient's uptrending leukocytosis and CAT scan findings of suspected pneumonia, patient was started on vancomycin and Zosyn on November 26.  Patient was then transferred back to [facility] for further management.    Interval Problem Update  11/28 patient is in bed, patient is new to me today, patient was transferred from [facility] last night, records have been reviewed, continue cardiac medications, continue IV antibiotics, tube feedings, patient continues to be encephalopathic oriented x 1, does not follows commands, monitoring daily labs, discussed with bedside nurse charge nurse  pharmacist.  11/29 patient is in bed, she continues to be encephalopathic, she is partially oriented x 1, no focal weakness, continue tolerating tube feedings, increase free water flushes due to hypernatremia, discussed with palliative care discussed with bedside nurse charge nurse  pharmacist continue IV antibiotics, mental status not improving there is concerned of anoxic encephalopathy as per records reviewed from [facility], will discuss with patient's , overall prognosis is guarded  11/30 patient is resting in bed, still on restraints since patient is trying to pull out her NG tube and Beckwith catheter, patient still oriented x 1, does not follows commands, she is able to tell me her name, continue IV Zosyn, continue close monitoring for any signs of bleeding, wound care consult pending, hopefully she will able to start eating better and will be able to remove NG tube, discussed with bedside nurse charge nurse  pharmacist.        I have discussed this patient's plan of care and discharge  plan at IDT rounds today with Case Management, Nursing, Nursing leadership, and other members of the IDT team.    Consultants/Specialty      Code Status  Full Code    Disposition  The patient is not medically cleared for discharge to home or a post-acute facility.      I have placed the appropriate orders for post-discharge needs.    Review of Systems  Review of Systems   Unable to perform ROS: Medical condition        Physical Exam  Temp:  [36.3 °C (97.3 °F)-36.5 °C (97.7 °F)] 36.5 °C (97.7 °F)  Pulse:  [77-85] 83  Resp:  [16-17] 16  BP: (106-139)/(55-73) 124/73  SpO2:  [88 %-96 %] 95 %    Physical Exam  Vitals and nursing note reviewed.   Constitutional:       Appearance: She is ill-appearing.   Eyes:      General:         Right eye: No discharge.         Left eye: No discharge.      Conjunctiva/sclera: Conjunctivae normal.   Cardiovascular:      Rate and Rhythm: Normal rate and regular rhythm.      Pulses: Normal pulses.      Heart sounds: Normal heart sounds.   Pulmonary:      Effort: Pulmonary effort is normal.      Breath sounds: Normal breath sounds.   Abdominal:      General: Bowel sounds are normal. There is no distension.      Tenderness: There is no abdominal tenderness.      Comments: Small tunneling groin area, mild discharge.    Musculoskeletal:         General: Normal range of motion.      Cervical back: Normal range of motion and neck supple.      Right lower leg: No edema.      Left lower leg: No edema.   Skin:     General: Skin is warm.      Capillary Refill: Capillary refill takes less than 2 seconds.   Neurological:      General: No focal deficit present.      Mental Status: She is alert.      Comments: Oriented x 1   Psychiatric:         Mood and Affect: Mood normal.         Fluids    Intake/Output Summary (Last 24 hours) at 11/30/2024 1355  Last data filed at 11/30/2024 0508  Gross per 24 hour   Intake 360 ml   Output 850 ml   Net -490 ml        Laboratory  Recent Labs     11/28/24  0135  11/29/24 0324 11/30/24  0341   WBC 14.5* 10.7 8.1   RBC 3.45* 3.38* 3.28*   HEMOGLOBIN 10.7* 10.8* 10.5*   HEMATOCRIT 35.5* 35.0* 33.8*   .9* 103.6* 103.0*   MCH 31.0 32.0 32.0   MCHC 30.1* 30.9* 31.1*   RDW 72.1* 72.8* 70.9*   PLATELETCT 382 333 312   MPV 12.3 12.4 11.9     Recent Labs     11/28/24  0135 11/29/24 0324 11/30/24 0341   SODIUM 147* 147* 145   POTASSIUM 5.2 4.6 4.2   CHLORIDE 118* 118* 117*   CO2 17* 17* 17*   GLUCOSE 134* 200* 191*   BUN 41* 45* 39*   CREATININE 1.34 1.50* 1.40   CALCIUM 7.8* 7.7* 7.8*                   Imaging  DX-OUTSIDE IMAGES-DX CHEST   Final Result      DX-OUTSIDE IMAGES-DX ABDOMEN   Final Result      MR-OUTSIDE IMAGES-MR BRAIN   Final Result      CT-OUTSIDE IMAGES-CT HEAD   Final Result      CT-OUTSIDE IMAGES-CT ABDOMEN/PELVIS   Final Result      CT-OUTSIDE IMAGES-CT CHEST   Final Result      DX-ABDOMEN FOR TUBE PLACEMENT   Final Result      There is a new small bowel feeding tube which terminates in the small bowel of the right mid abdomen.      DX-CHEST-LIMITED (1 VIEW)   Final Result         Asymmetric pulmonary infiltrates, left worse than right.           Assessment/Plan  * Heart failure with reduced ejection fraction (HCC)- (present on admission)  Assessment & Plan  Unable to start goal-directed medical therapy due to hypotension.  Echocardiogram showing EF 25 to 30% EF with akinesis of the apical, apical anterior wall, apical septum, apical inferior wall, and apical lateral wall of the left ventricle.  Hypokinesis seen of the basal anterolateral wall, mid anterolateral wall, anterior wall, mid inferolateral wall, and basal inferolateral wall of the left ventricle.  Continue midodrine 10 po tid, lipitor 80 mg po qhs   at bedside, appears euvolemic. Start lasix 20 mg po daily, cautious diuresis    Continue monitoring for signs of fluid overload    Abdominal hematoma  Assessment & Plan  Might be related to cardiac device  Monitoring hb  Patient on lovenox  due to rigth common iliac art thrombosis.    ACP (advance care planning)  Assessment & Plan  Patient was full code at outside facility.  Attempted to call  to inquire about CODE STATUS unsuccessful.  Patient will be left as full code for now.    Palliative consulted.     Pneumonia  Assessment & Plan  Patient found to have increasing white blood cell count up to 15,000 at outside facility  CT angio chest showing persistent irregular tree-in-bud nodularity and bilateral bronchial wall thickening on a background of groundglass opacities within the dependent portions of the right middle, right lower, and left lower lobes (right greater than left). Increased bilateral upper lobe patchy perihilar groundglass opacities.   Started on vancomycin and Zosyn on 9/26 at outside facility on 11/26, can continue for now   Send COVID swab    Continue Zosyn and Zyvox  Follow-up cultures    Aortic mural thrombus (HCC)  Assessment & Plan  CT at outside facility showing focal aortic mural thrombus along the lateral wall of the descending thoracic aorta and also a tiny filling defect/mural thrombus within the posterior right common iliac artery  Patient has been on Lovenox outside facility, continue Lovenox IV  Monitor H&H    Continue monitoring H&H      Encephalopathy  Assessment & Plan  Hospital course complicated by encephalopathy.  Likely from combination of CVA, STEMI, hyperglycemia  Restraints ordered  Has a nasojejunal feeding tube placed by interventional radiology from outside facility  Continue Seroquel and Depakene as recommended by neurology/psychiatry at outside facility.    Multifactorial  As per records from [facility] probably anoxic/embolic/delirium     CVA (cerebral vascular accident) (HCC)  Assessment & Plan  Patient had MRI on 11/17/24 at OSH which shwed Punctate acute infarct of the right centrum semiovale. Scattered foci of enhancement in multiple vascular distributions as described may represent recent subacute  infarcts. Diffuse stippled susceptibility signal throughout the supratentorial and infratentorial brain. Overall findings may represent sequelae of fat emboli or amyloid related angiopathy.   Continue asa, lipitor, lovenox    Continue to be encephalopathic  Neuro evaluated patient at [facility].       CAD (coronary artery disease)  Assessment & Plan  Patient with recent complicated STEMI with PCI to LAD and mid LAD.  Complicated by V-fib arrest and cardiogenic shock requiring VA ECMO and Impella and retroperitoneal hematomas. At [facility] underwent impella supported PCI to mid LAD just distal to prior stent but unsuccessful with PCI to 100% occluded OM1 on 11/6. Patient was decannulated and had Impella removed.    Continue Lipitor    Continue monitoring  Continue telemetry.     Urinary retention  Assessment & Plan  Beckwith catheter in place    Hyperglycemia  Assessment & Plan  Has had uncontrolled sugars outside hospital  Sliding scale           VTE prophylaxis: Lovenox    I have performed a physical exam and reviewed and updated ROS and Plan today (11/30/2024). In review of yesterday's note (11/29/2024), there are no changes except as documented above.    Total time of 51 minutes spent prepping to see patient (e.g. reviewing  tests/imaging results, notes from consultants, bedside nurse, night shift ) obtaining and/or reviewing separately obtained history. Performing a medically appropriate examination and evaluation.  Counseling and educating the patient.  Ordering medications, tests, or procedures.  Referring and communicating with other health care professionals.  Documenting clinical information in EPIC.  Independently interpreting results and communicating results to patient.  Care coordination.

## 2024-11-30 NOTE — PROGRESS NOTES
Monitor Summary  Rhythm: sinus rhythm  Rate: 70-80    Ectopy: pvc  .13 / .08 / .32  No rhythm strips

## 2024-11-30 NOTE — PROGRESS NOTES
Offered patient food and pt spit up food and did not want to try. Tube feeds off for the moment to encourage hunger to eat/drink food and water.

## 2024-11-30 NOTE — PROGRESS NOTES
Changed patient and cleaned her bed. While performing tasks, pt was reaching for her PICC line and her NG tube multiple times.  Attempted to use words to redirect patient, but had to use physical contact to remove patients hands form nose and right arm.  MD notified of restraint renewal.

## 2024-12-01 LAB
ANION GAP SERPL CALC-SCNC: 14 MMOL/L (ref 7–16)
BUN SERPL-MCNC: 36 MG/DL (ref 8–22)
CALCIUM SERPL-MCNC: 7.5 MG/DL (ref 8.5–10.5)
CHLORIDE SERPL-SCNC: 116 MMOL/L (ref 96–112)
CO2 SERPL-SCNC: 16 MMOL/L (ref 20–33)
CREAT SERPL-MCNC: 1.08 MG/DL (ref 0.5–1.4)
ERYTHROCYTE [DISTWIDTH] IN BLOOD BY AUTOMATED COUNT: 73.1 FL (ref 35.9–50)
GFR SERPLBLD CREATININE-BSD FMLA CKD-EPI: 59 ML/MIN/1.73 M 2
GLUCOSE BLD STRIP.AUTO-MCNC: 106 MG/DL (ref 65–99)
GLUCOSE BLD STRIP.AUTO-MCNC: 151 MG/DL (ref 65–99)
GLUCOSE BLD STRIP.AUTO-MCNC: 162 MG/DL (ref 65–99)
GLUCOSE BLD STRIP.AUTO-MCNC: 178 MG/DL (ref 65–99)
GLUCOSE SERPL-MCNC: 204 MG/DL (ref 65–99)
HCT VFR BLD AUTO: 36.6 % (ref 37–47)
HGB BLD-MCNC: 11.3 G/DL (ref 12–16)
MAGNESIUM SERPL-MCNC: 2.4 MG/DL (ref 1.5–2.5)
MCH RBC QN AUTO: 32.7 PG (ref 27–33)
MCHC RBC AUTO-ENTMCNC: 30.9 G/DL (ref 32.2–35.5)
MCV RBC AUTO: 105.8 FL (ref 81.4–97.8)
PLATELET # BLD AUTO: 251 K/UL (ref 164–446)
PMV BLD AUTO: 11.7 FL (ref 9–12.9)
POTASSIUM SERPL-SCNC: 4.1 MMOL/L (ref 3.6–5.5)
RBC # BLD AUTO: 3.46 M/UL (ref 4.2–5.4)
SODIUM SERPL-SCNC: 146 MMOL/L (ref 135–145)
WBC # BLD AUTO: 8 K/UL (ref 4.8–10.8)

## 2024-12-01 PROCEDURE — 700102 HCHG RX REV CODE 250 W/ 637 OVERRIDE(OP): Performed by: HOSPITALIST

## 2024-12-01 PROCEDURE — 85027 COMPLETE CBC AUTOMATED: CPT

## 2024-12-01 PROCEDURE — 36415 COLL VENOUS BLD VENIPUNCTURE: CPT

## 2024-12-01 PROCEDURE — 770020 HCHG ROOM/CARE - TELE (206)

## 2024-12-01 PROCEDURE — 80048 BASIC METABOLIC PNL TOTAL CA: CPT

## 2024-12-01 PROCEDURE — 700102 HCHG RX REV CODE 250 W/ 637 OVERRIDE(OP): Performed by: STUDENT IN AN ORGANIZED HEALTH CARE EDUCATION/TRAINING PROGRAM

## 2024-12-01 PROCEDURE — 82962 GLUCOSE BLOOD TEST: CPT

## 2024-12-01 PROCEDURE — A9270 NON-COVERED ITEM OR SERVICE: HCPCS

## 2024-12-01 PROCEDURE — 700102 HCHG RX REV CODE 250 W/ 637 OVERRIDE(OP)

## 2024-12-01 PROCEDURE — 83735 ASSAY OF MAGNESIUM: CPT

## 2024-12-01 PROCEDURE — 700111 HCHG RX REV CODE 636 W/ 250 OVERRIDE (IP): Mod: JZ | Performed by: STUDENT IN AN ORGANIZED HEALTH CARE EDUCATION/TRAINING PROGRAM

## 2024-12-01 PROCEDURE — A9270 NON-COVERED ITEM OR SERVICE: HCPCS | Performed by: STUDENT IN AN ORGANIZED HEALTH CARE EDUCATION/TRAINING PROGRAM

## 2024-12-01 PROCEDURE — A9270 NON-COVERED ITEM OR SERVICE: HCPCS | Performed by: HOSPITALIST

## 2024-12-01 PROCEDURE — 700105 HCHG RX REV CODE 258: Performed by: STUDENT IN AN ORGANIZED HEALTH CARE EDUCATION/TRAINING PROGRAM

## 2024-12-01 PROCEDURE — 99233 SBSQ HOSP IP/OBS HIGH 50: CPT | Performed by: HOSPITALIST

## 2024-12-01 RX ORDER — QUETIAPINE FUMARATE 25 MG/1
50 TABLET, FILM COATED ORAL ONCE
Status: COMPLETED | OUTPATIENT
Start: 2024-12-01 | End: 2024-12-01

## 2024-12-01 RX ORDER — LORAZEPAM 2 MG/ML
0.5 INJECTION INTRAMUSCULAR EVERY 8 HOURS PRN
Status: DISCONTINUED | OUTPATIENT
Start: 2024-12-01 | End: 2024-12-14

## 2024-12-01 RX ADMIN — INSULIN GLARGINE-YFGN 17 UNITS: 100 INJECTION, SOLUTION SUBCUTANEOUS at 17:31

## 2024-12-01 RX ADMIN — QUETIAPINE FUMARATE 25 MG: 50 TABLET ORAL at 05:58

## 2024-12-01 RX ADMIN — VALPROIC ACID 500 MG: 250 SOLUTION ORAL at 06:03

## 2024-12-01 RX ADMIN — QUETIAPINE FUMARATE 25 MG: 50 TABLET ORAL at 17:11

## 2024-12-01 RX ADMIN — PIPERACILLIN AND TAZOBACTAM 3.38 G: 3; .375 INJECTION, POWDER, FOR SOLUTION INTRAVENOUS at 12:49

## 2024-12-01 RX ADMIN — TICAGRELOR 90 MG: 90 TABLET ORAL at 17:11

## 2024-12-01 RX ADMIN — Medication 10 MG: at 21:23

## 2024-12-01 RX ADMIN — ASPIRIN 81 MG: 81 TABLET, CHEWABLE ORAL at 05:59

## 2024-12-01 RX ADMIN — INSULIN LISPRO 6 UNITS: 100 INJECTION, SOLUTION INTRAVENOUS; SUBCUTANEOUS at 07:37

## 2024-12-01 RX ADMIN — QUETIAPINE FUMARATE 50 MG: 25 TABLET ORAL at 00:32

## 2024-12-01 RX ADMIN — INSULIN LISPRO 1 UNITS: 100 INJECTION, SOLUTION INTRAVENOUS; SUBCUTANEOUS at 17:31

## 2024-12-01 RX ADMIN — PIPERACILLIN AND TAZOBACTAM 3.38 G: 3; .375 INJECTION, POWDER, FOR SOLUTION INTRAVENOUS at 06:08

## 2024-12-01 RX ADMIN — ENOXAPARIN SODIUM 80 MG: 100 INJECTION SUBCUTANEOUS at 06:00

## 2024-12-01 RX ADMIN — FUROSEMIDE 20 MG: 20 TABLET ORAL at 05:58

## 2024-12-01 RX ADMIN — VALPROIC ACID 500 MG: 250 SOLUTION ORAL at 17:12

## 2024-12-01 RX ADMIN — INSULIN LISPRO 1 UNITS: 100 INJECTION, SOLUTION INTRAVENOUS; SUBCUTANEOUS at 21:38

## 2024-12-01 RX ADMIN — PIPERACILLIN AND TAZOBACTAM 3.38 G: 3; .375 INJECTION, POWDER, FOR SOLUTION INTRAVENOUS at 21:34

## 2024-12-01 RX ADMIN — QUETIAPINE FUMARATE 150 MG: 100 TABLET ORAL at 21:24

## 2024-12-01 RX ADMIN — INSULIN LISPRO 1 UNITS: 100 INJECTION, SOLUTION INTRAVENOUS; SUBCUTANEOUS at 07:36

## 2024-12-01 RX ADMIN — TICAGRELOR 90 MG: 90 TABLET ORAL at 05:58

## 2024-12-01 RX ADMIN — ATORVASTATIN CALCIUM 80 MG: 80 TABLET, FILM COATED ORAL at 17:12

## 2024-12-01 RX ADMIN — ENOXAPARIN SODIUM 80 MG: 100 INJECTION SUBCUTANEOUS at 17:12

## 2024-12-01 ASSESSMENT — FIBROSIS 4 INDEX: FIB4 SCORE: 1.63

## 2024-12-01 ASSESSMENT — PAIN DESCRIPTION - PAIN TYPE: TYPE: ACUTE PAIN

## 2024-12-01 NOTE — PROGRESS NOTES
Bedside report received from off going RN/tech: Giovany, assumed care of patient.     Fall Risk Score: HIGH RISK  Fall risk interventions in place: Place yellow fall risk ID band on patient, Provide patient/family education based on risk assessment, Educate patient/family to call staff for assistance when getting out of bed, Place fall precaution signage outside patient door, Place patient in room close to nursing station, and Utilize bed/chair fall alarm  Bed type: Low air loss (Ray Score less than 17 interventions in place)  Patient on cardiac monitor: Yes  IVF/IV medications: Infusion per MAR (List Med(s)) Zosyn  Oxygen: Room Air  Bedside sitter: Telesitter  Isolation: Not applicable

## 2024-12-01 NOTE — CARE PLAN
The patient is Watcher - Medium risk of patient condition declining or worsening    Shift Goals  Clinical Goals: safety, tube feed, reorient  Patient Goals: morenita  Family Goals: MORENITA    Progress made toward(s) clinical / shift goals:      Problem: Safety - Medical Restraint  Goal: Remains free of injury from restraints (Restraint for Interference with Medical Device)  Outcome: Progressing  Note: Patient in bilateral wrist and one ankle restraint. Patient free from injury. Q2 restraint checks completed.     Problem: Fall Risk  Goal: Patient will remain free from falls  Outcome: Progressing  Note: Appropriate fall precautions are in place. Tele sitter in room     Problem: Care Map:  Admission Optimal Outcome for the Heart Failure Patient  Goal: Admission:  Optimal Care of the heart failure patient  Outcome: Progressing  Note: Patient on daily weights, monitoring I&O's.       Patient is not progressing towards the following goals:

## 2024-12-01 NOTE — CARE PLAN
Shift Goals  Clinical Goals: Safety  Patient Goals: JAYLIN  Family Goals: JAYLIN    Progress made toward(s) clinical / shift goals:        Problem: Skin Integrity  Goal: Skin integrity is maintained or improved  Outcome: Progressing  Note: Skin integrity monitored throughout the shift. Pressure ulcer preventions measures in place. TAPs, q2 turns with wedges, low airloss, and de jesus. Heels floated with pillows/heel float boots. Heel offloading dressing in place.       Problem: Fall Risk  Goal: Patient will remain free from falls  Outcome: Progressing  Note: Pt educated on importance of calling nurse before getting up. Fall precautions in place. Bed locked in lowest position. Call light and belongings within reach. Wristband and proper sign placed. Bed alarm on. No skid socks applied. Room free of clutter.       Problem: Care Map:  Day 3 Optimal Outcome for the Heart Failure Patient  Goal: Day 3:  Optimal Care of the heart failure patient  Outcome: Progressing  Note: Pt educated on HF exacerbation precipitants and importance of stoplight tool. Daily wts and strict I & Os performed. Daily BMP ordered. Edema assessed. Pt educated on importance of low Na diet and fluid restriction.

## 2024-12-01 NOTE — PROGRESS NOTES
Bedside report received from off going RN/tech: Geronimo, assumed care of patient.     Fall Risk Score: HIGH RISK  Fall risk interventions in place: Place yellow fall risk ID band on patient, Provide patient/family education based on risk assessment, Educate patient/family to call staff for assistance when getting out of bed, Place fall precaution signage outside patient door, Place patient in room close to nursing station, Utilize bed/chair fall alarm, Notify charge of high risk for huddle, Tele-sitter, and Bed alarm connected correctly  Bed type: Low air loss (Ray Score less than 17 interventions in place)  Patient on cardiac monitor: Yes  IVF/IV medications: Infusion per MAR (List Med(s)) IV ABX  Oxygen: Room Air  Bedside sitter: Not Applicable   Isolation: Not applicable

## 2024-12-01 NOTE — PROGRESS NOTES
Monitor Summary  Rhythm: Normal Sinus Rhythm  Rate: 68-92  Ectopy: PVCs, Couplets  .13 / .10 / .40        12 hr Chart check.

## 2024-12-01 NOTE — CARE PLAN
The patient is Watcher - Medium risk of patient condition declining or worsening    Shift Goals  Clinical Goals: Safety  Patient Goals: JAYLIN  Family Goals: JAYLIN    Progress made toward(s) clinical / shift goals:        Problem: Skin Integrity  Goal: Skin integrity is maintained or improved  Outcome: Progressing  Note: Skin integrity monitored throughout the shift. Pressure ulcer preventions measures in place. TAPs, q2 turns with wedges, low airloss, and de jesus. Heels floated with pillows/heel float boots. Heel offloading dressing in place.       Problem: Fall Risk  Goal: Patient will remain free from falls  Outcome: Progressing  Note: Pt educated on importance of calling nurse before getting up. Fall precautions in place. Bed locked in lowest position. Call light and belongings within reach. Wristband and proper sign placed. Bed alarm on. No skid socks applied. Room free of clutter.

## 2024-12-01 NOTE — PROGRESS NOTES
Monitor Summary  Rhythm: SB-SR  Rate: 56-88  Ectopy: (R) PVC  .13 / .08 / .43    No strip uploaded

## 2024-12-01 NOTE — PROGRESS NOTES
Hospital Medicine Daily Progress Note    Date of Service  12/1/2024    Chief Complaint  Kiara Qureshi is a 59 y.o. female admitted 11/27/2024 with STEMI    Hospital Course  Patient has a history of obesity, hypertension, hyperlipidemia.  She was seen at [facility] as a transfer initially for anterior STEMI requiring lytics.  Patient underwent an LAD PCI but hospital course was complicated by retroperitoneal bleed, ventricular fibrillation and PEA arrest and cardiogenic shock requiring VA ECMO and Impella.  She is now status post ECMO decannulation on November 4 and had left femoral embolectomy, PCI to the LAD, unsuccessful PCI to OM1 and Impella removal on November 8.  Hospital course was complicated by patient developing encephalopathy and delirium, to which she was given valproic acid and quetiapine.  Patient has been weaned off her present medications and was found to have hypotension to which she was started on p.o. midodrine 10 mg 3 times daily.  Goal-directed medical therapy was held due to borderline low blood pressure.  Her echocardiogram on November 24 shows 25 to 30% EF with akinesis of the apical, apical anterior wall, apical septum, apical inferior wall, and apical lateral wall of the left ventricle.  Hypokinesis seen of the basal anterolateral wall, mid anterolateral wall, anterior wall, mid inferolateral wall, and basal inferolateral wall of the left ventricle.     On November 25, patient had CT angiogram of the chest which shows pulmonary edema, small bilateral pleural effusions, small 3 mm filling defect along the lateral wall of the descending thoracic aorta which may represent focal aortic mural thrombus versus ulcerated plaque.  CT angiogram of the abdomen pelvis showing increased organization of the rim-enhancing left retroperitoneal hematomas measuring up to 9.3 x 4.4 cm.  Tiny filling defect/mural thrombus within the posterior right common iliac artery.  Multiple nonenhancing intermediate  density fluid collections within the subcutaneous tissues overlying the right lower quadrant and right inguinal regions measuring up to 5.7 x 2.4 cm.  Additional nonenhancing low-density fluid collection closely associated with the left femoral vein measuring 4.7 x 3.9 cm, likely represents evolving hematomas.     Due to patient's uptrending leukocytosis and CAT scan findings of suspected pneumonia, patient was started on vancomycin and Zosyn on November 26.  Patient was then transferred back to [facility] for further management.    Interval Problem Update  11/28 patient is in bed, patient is new to me today, patient was transferred from [facility] last night, records have been reviewed, continue cardiac medications, continue IV antibiotics, tube feedings, patient continues to be encephalopathic oriented x 1, does not follows commands, monitoring daily labs, discussed with bedside nurse charge nurse  pharmacist.  11/29 patient is in bed, she continues to be encephalopathic, she is partially oriented x 1, no focal weakness, continue tolerating tube feedings, increase free water flushes due to hypernatremia, discussed with palliative care discussed with bedside nurse charge nurse  pharmacist continue IV antibiotics, mental status not improving there is concerned of anoxic encephalopathy as per records reviewed from [facility], will discuss with patient's , overall prognosis is guarded  11/30 patient is resting in bed, still on restraints since patient is trying to pull out her NG tube and Beckwith catheter, patient still oriented x 1, does not follows commands, she is able to tell me her name, continue IV Zosyn, continue close monitoring for any signs of bleeding, wound care consult pending, hopefully she will able to start eating better and will be able to remove NG tube, discussed with bedside nurse charge nurse  pharmacist.  12/1 patient in bed, still not oriented, patient is in bed,  continue iv abx, still encephalopathic, does not appear in pain or in distress, tolerating tube feeding, discussed with nurse staff, , pharmacist. Blood work reviewed, patient had impalla removed 11/8, ok to remove staplers today.       I have discussed this patient's plan of care and discharge plan at IDT rounds today with Case Management, Nursing, Nursing leadership, and other members of the IDT team.    Consultants/Specialty      Code Status  Full Code    Disposition  The patient is not medically cleared for discharge to home or a post-acute facility.    I have placed the appropriate orders for post-discharge needs.    Review of Systems  Review of Systems   Unable to perform ROS: Medical condition        Physical Exam  Temp:  [36.5 °C (97.7 °F)-37.1 °C (98.8 °F)] 36.8 °C (98.2 °F)  Pulse:  [56-92] 83  Resp:  [18-20] 18  BP: (107-132)/() 126/70  SpO2:  [90 %-97 %] 92 %    Physical Exam  Vitals and nursing note reviewed.   Constitutional:       Appearance: She is ill-appearing.   Eyes:      General:         Right eye: No discharge.         Left eye: No discharge.      Conjunctiva/sclera: Conjunctivae normal.   Cardiovascular:      Rate and Rhythm: Normal rate and regular rhythm.      Pulses: Normal pulses.      Heart sounds: Normal heart sounds.   Pulmonary:      Effort: Pulmonary effort is normal.      Breath sounds: Normal breath sounds.   Abdominal:      General: Bowel sounds are normal. There is no distension.      Tenderness: There is no abdominal tenderness.      Comments: Small tunneling groin area, mild discharge.    Musculoskeletal:         General: Normal range of motion.      Cervical back: Normal range of motion and neck supple.      Right lower leg: No edema.      Left lower leg: No edema.   Skin:     General: Skin is warm.      Capillary Refill: Capillary refill takes less than 2 seconds.   Neurological:      General: No focal deficit present.      Mental Status: She is alert.       Comments: Oriented x 1   Psychiatric:         Mood and Affect: Mood normal.       Fluids    Intake/Output Summary (Last 24 hours) at 12/1/2024 1230  Last data filed at 12/1/2024 0931  Gross per 24 hour   Intake 1520 ml   Output 1800 ml   Net -280 ml        Laboratory  Recent Labs     11/29/24  0324 11/30/24  0341 12/01/24  0137   WBC 10.7 8.1 8.0   RBC 3.38* 3.28* 3.46*   HEMOGLOBIN 10.8* 10.5* 11.3*   HEMATOCRIT 35.0* 33.8* 36.6*   .6* 103.0* 105.8*   MCH 32.0 32.0 32.7   MCHC 30.9* 31.1* 30.9*   RDW 72.8* 70.9* 73.1*   PLATELETCT 333 312 251   MPV 12.4 11.9 11.7     Recent Labs     11/29/24 0324 11/30/24  0341 12/01/24  0137   SODIUM 147* 145 146*   POTASSIUM 4.6 4.2 4.1   CHLORIDE 118* 117* 116*   CO2 17* 17* 16*   GLUCOSE 200* 191* 204*   BUN 45* 39* 36*   CREATININE 1.50* 1.40 1.08   CALCIUM 7.7* 7.8* 7.5*                   Imaging  DX-OUTSIDE IMAGES-DX CHEST   Final Result      DX-OUTSIDE IMAGES-DX ABDOMEN   Final Result      MR-OUTSIDE IMAGES-MR BRAIN   Final Result      CT-OUTSIDE IMAGES-CT HEAD   Final Result      CT-OUTSIDE IMAGES-CT ABDOMEN/PELVIS   Final Result      CT-OUTSIDE IMAGES-CT CHEST   Final Result      DX-ABDOMEN FOR TUBE PLACEMENT   Final Result      There is a new small bowel feeding tube which terminates in the small bowel of the right mid abdomen.      DX-CHEST-LIMITED (1 VIEW)   Final Result         Asymmetric pulmonary infiltrates, left worse than right.           Assessment/Plan  * Heart failure with reduced ejection fraction (HCC)- (present on admission)  Assessment & Plan  Unable to start goal-directed medical therapy due to hypotension.  Echocardiogram showing EF 25 to 30% EF with akinesis of the apical, apical anterior wall, apical septum, apical inferior wall, and apical lateral wall of the left ventricle.  Hypokinesis seen of the basal anterolateral wall, mid anterolateral wall, anterior wall, mid inferolateral wall, and basal inferolateral wall of the left  ventricle.  Continue midodrine 10 po tid, lipitor 80 mg po qhs   at bedside, appears euvolemic. Start lasix 20 mg po daily, cautious diuresis    Continue monitoring for signs of fluid overload    Abdominal hematoma  Assessment & Plan  Might be related to cardiac device  Monitoring hb  Patient on lovenox due to rigth common iliac art thrombosis.    ACP (advance care planning)  Assessment & Plan  Patient was full code at outside facility.  Attempted to call  to inquire about CODE STATUS unsuccessful.  Patient will be left as full code for now.    Palliative consulted.     Pneumonia  Assessment & Plan  Patient found to have increasing white blood cell count up to 15,000 at outside facility  CT angio chest showing persistent irregular tree-in-bud nodularity and bilateral bronchial wall thickening on a background of groundglass opacities within the dependent portions of the right middle, right lower, and left lower lobes (right greater than left). Increased bilateral upper lobe patchy perihilar groundglass opacities.   Started on vancomycin and Zosyn on 9/26 at outside facility on 11/26, can continue for now   Send COVID swab    Continue Zosyn and Zyvox  Follow-up cultures  Completing abx.     Aortic mural thrombus (HCC)  Assessment & Plan  CT at outside facility showing focal aortic mural thrombus along the lateral wall of the descending thoracic aorta and also a tiny filling defect/mural thrombus within the posterior right common iliac artery  Patient has been on Lovenox outside facility, continue Lovenox IV  Monitor H&H    Continue monitoring H&H  Continue lovenox transition to po eliquis 5 mg bid as per [facility] rec when patient able to tolerate po.      Encephalopathy  Assessment & Plan  Hospital course complicated by encephalopathy.  Likely from combination of CVA, STEMI, hyperglycemia  Restraints ordered  Has a nasojejunal feeding tube placed by interventional radiology from outside facility  Continue  Seroquel and Depakene as recommended by neurology/psychiatry at outside facility.    Multifactorial  As per records from [facility] probably anoxic/embolic/delirium   Continue encephalopathic.     CVA (cerebral vascular accident) (HCC)  Assessment & Plan  Patient had MRI on 11/17/24 at [facility]SH which shwed Punctate acute infarct of the right centrum semiovale. Scattered foci of enhancement in multiple vascular distributions as described may represent recent subacute infarcts. Diffuse stippled susceptibility signal throughout the supratentorial and infratentorial brain. Overall findings may represent sequelae of fat emboli or amyloid related angiopathy.   Continue asa, lipitor, lovenox    Continue to be encephalopathic  Neuro evaluated patient at [facility].       CAD (coronary artery disease)  Assessment & Plan  Patient with recent complicated STEMI with PCI to LAD and mid LAD.  Complicated by V-fib arrest and cardiogenic shock requiring VA ECMO and Impella and retroperitoneal hematomas. At [facility] underwent impella supported PCI to mid LAD just distal to prior stent but unsuccessful with PCI to 100% occluded OM1 on 11/6. Patient was decannulated and had Impella removed.    Continue Lipitor    Continue monitoring  Continue telemetry.     DAPT.     Urinary retention  Assessment & Plan  Beckwith catheter in place    Hyperglycemia  Assessment & Plan  Has had uncontrolled sugars outside hospital  Sliding scale           VTE prophylaxis: Lovenox    I have performed a physical exam and reviewed and updated ROS and Plan today (12/1/2024). In review of yesterday's note (11/30/2024), there are no changes except as documented above.    Total time of 53 minutes spent prepping to see patient (e.g. reviewing  tests/imaging results, notes from consultants, bedside nurse, night shift ) obtaining and/or reviewing separately obtained history. Performing a medically appropriate examination and evaluation.  Counseling and educating the patient.  Ordering  medications, tests, or procedures.  Referring and communicating with other health care professionals.  Documenting clinical information in EPIC.  Independently interpreting results and communicating results to patient.  Care coordination.

## 2024-12-02 ENCOUNTER — APPOINTMENT (OUTPATIENT)
Dept: RADIOLOGY | Facility: MEDICAL CENTER | Age: 59
End: 2024-12-02
Attending: HOSPITALIST
Payer: COMMERCIAL

## 2024-12-02 LAB
ANION GAP SERPL CALC-SCNC: 10 MMOL/L (ref 7–16)
BUN SERPL-MCNC: 35 MG/DL (ref 8–22)
CALCIUM SERPL-MCNC: 7.9 MG/DL (ref 8.5–10.5)
CHLORIDE SERPL-SCNC: 116 MMOL/L (ref 96–112)
CO2 SERPL-SCNC: 19 MMOL/L (ref 20–33)
CREAT SERPL-MCNC: 1.11 MG/DL (ref 0.5–1.4)
ERYTHROCYTE [DISTWIDTH] IN BLOOD BY AUTOMATED COUNT: 69.7 FL (ref 35.9–50)
GFR SERPLBLD CREATININE-BSD FMLA CKD-EPI: 57 ML/MIN/1.73 M 2
GLUCOSE BLD STRIP.AUTO-MCNC: 142 MG/DL (ref 65–99)
GLUCOSE BLD STRIP.AUTO-MCNC: 143 MG/DL (ref 65–99)
GLUCOSE BLD STRIP.AUTO-MCNC: 203 MG/DL (ref 65–99)
GLUCOSE BLD STRIP.AUTO-MCNC: 214 MG/DL (ref 65–99)
GLUCOSE SERPL-MCNC: 198 MG/DL (ref 65–99)
HCT VFR BLD AUTO: 33.6 % (ref 37–47)
HGB BLD-MCNC: 10.2 G/DL (ref 12–16)
MAGNESIUM SERPL-MCNC: 2.4 MG/DL (ref 1.5–2.5)
MCH RBC QN AUTO: 31.7 PG (ref 27–33)
MCHC RBC AUTO-ENTMCNC: 30.4 G/DL (ref 32.2–35.5)
MCV RBC AUTO: 104.3 FL (ref 81.4–97.8)
PLATELET # BLD AUTO: 258 K/UL (ref 164–446)
PMV BLD AUTO: 11.7 FL (ref 9–12.9)
POTASSIUM SERPL-SCNC: 4 MMOL/L (ref 3.6–5.5)
RBC # BLD AUTO: 3.22 M/UL (ref 4.2–5.4)
SODIUM SERPL-SCNC: 145 MMOL/L (ref 135–145)
WBC # BLD AUTO: 9.3 K/UL (ref 4.8–10.8)

## 2024-12-02 PROCEDURE — 90471 IMMUNIZATION ADMIN: CPT

## 2024-12-02 PROCEDURE — 80048 BASIC METABOLIC PNL TOTAL CA: CPT

## 2024-12-02 PROCEDURE — 36415 COLL VENOUS BLD VENIPUNCTURE: CPT

## 2024-12-02 PROCEDURE — 700111 HCHG RX REV CODE 636 W/ 250 OVERRIDE (IP): Performed by: STUDENT IN AN ORGANIZED HEALTH CARE EDUCATION/TRAINING PROGRAM

## 2024-12-02 PROCEDURE — 700111 HCHG RX REV CODE 636 W/ 250 OVERRIDE (IP): Performed by: HOSPITALIST

## 2024-12-02 PROCEDURE — 700102 HCHG RX REV CODE 250 W/ 637 OVERRIDE(OP): Performed by: STUDENT IN AN ORGANIZED HEALTH CARE EDUCATION/TRAINING PROGRAM

## 2024-12-02 PROCEDURE — 3E0234Z INTRODUCTION OF SERUM, TOXOID AND VACCINE INTO MUSCLE, PERCUTANEOUS APPROACH: ICD-10-PCS | Performed by: HOSPITALIST

## 2024-12-02 PROCEDURE — 85027 COMPLETE CBC AUTOMATED: CPT

## 2024-12-02 PROCEDURE — 770020 HCHG ROOM/CARE - TELE (206)

## 2024-12-02 PROCEDURE — 90677 PCV20 VACCINE IM: CPT | Performed by: HOSPITALIST

## 2024-12-02 PROCEDURE — A9270 NON-COVERED ITEM OR SERVICE: HCPCS | Performed by: STUDENT IN AN ORGANIZED HEALTH CARE EDUCATION/TRAINING PROGRAM

## 2024-12-02 PROCEDURE — 99233 SBSQ HOSP IP/OBS HIGH 50: CPT | Performed by: HOSPITALIST

## 2024-12-02 PROCEDURE — 83735 ASSAY OF MAGNESIUM: CPT

## 2024-12-02 PROCEDURE — 700102 HCHG RX REV CODE 250 W/ 637 OVERRIDE(OP): Performed by: HOSPITALIST

## 2024-12-02 PROCEDURE — 82962 GLUCOSE BLOOD TEST: CPT | Mod: 91

## 2024-12-02 PROCEDURE — A9270 NON-COVERED ITEM OR SERVICE: HCPCS | Performed by: HOSPITALIST

## 2024-12-02 RX ADMIN — ASPIRIN 81 MG: 81 TABLET, CHEWABLE ORAL at 06:10

## 2024-12-02 RX ADMIN — Medication 10 MG: at 20:41

## 2024-12-02 RX ADMIN — ATORVASTATIN CALCIUM 80 MG: 80 TABLET, FILM COATED ORAL at 17:20

## 2024-12-02 RX ADMIN — TICAGRELOR 90 MG: 90 TABLET ORAL at 06:00

## 2024-12-02 RX ADMIN — VALPROIC ACID 500 MG: 250 SOLUTION ORAL at 06:12

## 2024-12-02 RX ADMIN — PNEUMOCOCCAL 20-VALENT CONJUGATE VACCINE 0.5 ML
2.2; 2.2; 2.2; 2.2; 2.2; 2.2; 2.2; 2.2; 2.2; 2.2; 2.2; 2.2; 2.2; 2.2; 2.2; 2.2; 4.4; 2.2; 2.2; 2.2 INJECTION, SUSPENSION INTRAMUSCULAR at 20:36

## 2024-12-02 RX ADMIN — QUETIAPINE FUMARATE 150 MG: 100 TABLET ORAL at 20:41

## 2024-12-02 RX ADMIN — INSULIN GLARGINE-YFGN 17 UNITS: 100 INJECTION, SOLUTION SUBCUTANEOUS at 17:51

## 2024-12-02 RX ADMIN — LORAZEPAM 0.5 MG: 2 INJECTION INTRAMUSCULAR; INTRAVENOUS at 23:43

## 2024-12-02 RX ADMIN — INSULIN LISPRO 2 UNITS: 100 INJECTION, SOLUTION INTRAVENOUS; SUBCUTANEOUS at 08:59

## 2024-12-02 RX ADMIN — INSULIN LISPRO 6 UNITS: 100 INJECTION, SOLUTION INTRAVENOUS; SUBCUTANEOUS at 08:59

## 2024-12-02 RX ADMIN — INSULIN LISPRO 2 UNITS: 100 INJECTION, SOLUTION INTRAVENOUS; SUBCUTANEOUS at 20:52

## 2024-12-02 RX ADMIN — QUETIAPINE FUMARATE 25 MG: 50 TABLET ORAL at 06:11

## 2024-12-02 RX ADMIN — VALPROIC ACID 500 MG: 250 SOLUTION ORAL at 17:21

## 2024-12-02 RX ADMIN — APIXABAN 5 MG: 5 TABLET, FILM COATED ORAL at 17:19

## 2024-12-02 RX ADMIN — QUETIAPINE FUMARATE 25 MG: 50 TABLET ORAL at 17:20

## 2024-12-02 RX ADMIN — TICAGRELOR 90 MG: 90 TABLET ORAL at 17:20

## 2024-12-02 RX ADMIN — ENOXAPARIN SODIUM 80 MG: 100 INJECTION SUBCUTANEOUS at 06:11

## 2024-12-02 ASSESSMENT — PAIN DESCRIPTION - PAIN TYPE: TYPE: ACUTE PAIN

## 2024-12-02 ASSESSMENT — FIBROSIS 4 INDEX: FIB4 SCORE: 1.59

## 2024-12-02 NOTE — PROGRESS NOTES
Bedside report received from off going RN/tech: Geronimo, assumed care of patient@[id number].     Fall Risk Score: HIGH RISK  Fall risk interventions in place: Place yellow fall risk ID band on patient, Provide patient/family education based on risk assessment, Educate patient/family to call staff for assistance when getting out of bed, Place fall precaution signage outside patient door, Place patient in room close to nursing station, Utilize bed/chair fall alarm, Notify charge of high risk for huddle, Tele-sitter, and Bed alarm connected correctly  Bed type: Low air loss (Ray Score less than 17 interventions in place)  Patient on cardiac monitor: Yes  IVF/IV medications: Not Applicable   Oxygen: Room Air  Bedside sitter: Not Applicable ,tele sitter  Isolation: Not applicable

## 2024-12-02 NOTE — DOCUMENTATION QUERY
"[facility]                                                                       Query Response Note      PATIENT:               JOURDAN MAYBERRY  ACCT #:                  [id number]  MRN:                     [id number]  :                      1965  ADMIT DATE:       2024 8:57 PM  DISCH DATE:          RESPONDING  PROVIDER #:        [id number]           QUERY TEXT:    \"Heart failure with reduced ejection fraction\" is documented in the medical record without a documented acuity level.  Can the acuity of the heart failure be further specified based on the clinical indicators and required treatments??     Note: If you agree with a diagnosis listed above, please remember to include it in your concurrent daily documentation and onto the Discharge Summary.    The patient's Clinical Indicators include:  (H&P) \"Heart failure with reduced ejection fraction - Unable to start goal-directed medical therapy due to hypotension. - Echocardiogram showing EF 25 to 30% EF with akinesis of the apical, apical anterior wall, apical septum, apical inferior wall, and apical lateral wall of the left ventricle.\"    (PN ) \"Heart failure with reduced ejection fraction - Unable to start goal-directed medical therapy due to hypotension.\"     (PN ) \"Heart failure with reduced ejection fraction - Unable to start goal-directed medical therapy due to hypotension.\"    Labs: NT-proBNP : 68455    Risk Factors: Aortic mural thrombus, CAD, transfer initially for anterior STEMI requiring lytics, hospital course was complicated by retroperitoneal bleed, ventricular fibrillation and PEA arrest and cardiogenic shock requiring VA ECMO and Impella    Tx: Unable to start goal-directed medical therapy due to hypotension, lasix 20 mg po daily, cautious diuresis    If you have any questions, please contact:  Jordan Sanders RN [facility]" [facility]  [email or web address]  Jordan Augustine [address]  Options provided:   -- Acute on Chronic Heart failure with reduced ejection fraction   -- Exacerbation or decompensation Heart failure with reduced ejection fraction   -- Acute Heart failure with reduced ejection fraction   -- Chronic Heart failure with reduced ejection fraction   -- Other explanation, (please specify other explanation)      Query created by: Jordan Augustine on 12/2/2024 9:16 AM    RESPONSE TEXT:    Chronic Heart failure with reduced ejection fraction          Electronically signed by:  PATRICK MOLINA MD 12/2/2024 12:00 PM

## 2024-12-02 NOTE — PROGRESS NOTES
Hospital Medicine Daily Progress Note    Date of Service  12/2/2024    Chief Complaint  Kiara Qureshi is a 59 y.o. female admitted 11/27/2024 with STEMI    Hospital Course  Patient has a history of obesity, hypertension, hyperlipidemia.  She was seen at [facility] as a transfer initially for anterior STEMI requiring lytics.  Patient underwent an LAD PCI but hospital course was complicated by retroperitoneal bleed, ventricular fibrillation and PEA arrest and cardiogenic shock requiring VA ECMO and Impella.  She is now status post ECMO decannulation on November 4 and had left femoral embolectomy, PCI to the LAD, unsuccessful PCI to OM1 and Impella removal on November 8.  Hospital course was complicated by patient developing encephalopathy and delirium, to which she was given valproic acid and quetiapine.  Patient has been weaned off her present medications and was found to have hypotension to which she was started on p.o. midodrine 10 mg 3 times daily.  Goal-directed medical therapy was held due to borderline low blood pressure.  Her echocardiogram on November 24 shows 25 to 30% EF with akinesis of the apical, apical anterior wall, apical septum, apical inferior wall, and apical lateral wall of the left ventricle.  Hypokinesis seen of the basal anterolateral wall, mid anterolateral wall, anterior wall, mid inferolateral wall, and basal inferolateral wall of the left ventricle.     On November 25, patient had CT angiogram of the chest which shows pulmonary edema, small bilateral pleural effusions, small 3 mm filling defect along the lateral wall of the descending thoracic aorta which may represent focal aortic mural thrombus versus ulcerated plaque.  CT angiogram of the abdomen pelvis showing increased organization of the rim-enhancing left retroperitoneal hematomas measuring up to 9.3 x 4.4 cm.  Tiny filling defect/mural thrombus within the posterior right common iliac artery.  Multiple nonenhancing intermediate  density fluid collections within the subcutaneous tissues overlying the right lower quadrant and right inguinal regions measuring up to 5.7 x 2.4 cm.  Additional nonenhancing low-density fluid collection closely associated with the left femoral vein measuring 4.7 x 3.9 cm, likely represents evolving hematomas.     Due to patient's uptrending leukocytosis and CAT scan findings of suspected pneumonia, patient was started on vancomycin and Zosyn on November 26.  Patient was then transferred back to [facility] for further management.    Interval Problem Update  11/28 patient is in bed, patient is new to me today, patient was transferred from [facility] last night, records have been reviewed, continue cardiac medications, continue IV antibiotics, tube feedings, patient continues to be encephalopathic oriented x 1, does not follows commands, monitoring daily labs, discussed with bedside nurse charge nurse  pharmacist.  11/29 patient is in bed, she continues to be encephalopathic, she is partially oriented x 1, no focal weakness, continue tolerating tube feedings, increase free water flushes due to hypernatremia, discussed with palliative care discussed with bedside nurse charge nurse  pharmacist continue IV antibiotics, mental status not improving there is concerned of anoxic encephalopathy as per records reviewed from [facility], will discuss with patient's , overall prognosis is guarded  11/30 patient is resting in bed, still on restraints since patient is trying to pull out her NG tube and Beckwith catheter, patient still oriented x 1, does not follows commands, she is able to tell me her name, continue IV Zosyn, continue close monitoring for any signs of bleeding, wound care consult pending, hopefully she will able to start eating better and will be able to remove NG tube, discussed with bedside nurse charge nurse  pharmacist.  12/1 patient in bed, still not oriented, patient is in bed,  continue iv abx, still encephalopathic, does not appear in pain or in distress, tolerating tube feeding, discussed with nurse staff, , pharmacist. Blood work reviewed, patient had impalla removed 11/8, ok to remove staplers today.   12/2 patient is in bed, still confused, discussed with  all questions answered, will transition to eliquis today since patient has been stable on lovenox, discussed with bedside nurse, , clinical pharmacist.       I have discussed this patient's plan of care and discharge plan at IDT rounds today with Case Management, Nursing, Nursing leadership, and other members of the IDT team.    Consultants/Specialty      Code Status  Full Code    Disposition  The patient is not medically cleared for discharge to home or a post-acute facility.      I have placed the appropriate orders for post-discharge needs.    Review of Systems  Review of Systems   Unable to perform ROS: Medical condition        Physical Exam  Temp:  [36.5 °C (97.7 °F)-37 °C (98.6 °F)] 36.7 °C (98.1 °F)  Pulse:  [78-88] 78  Resp:  [16-20] 18  BP: (109-130)/(71-87) 111/87  SpO2:  [90 %-97 %] 90 %    Physical Exam  Vitals and nursing note reviewed.   Constitutional:       Appearance: She is ill-appearing.   Eyes:      General: No scleral icterus.     Conjunctiva/sclera: Conjunctivae normal.   Cardiovascular:      Rate and Rhythm: Normal rate and regular rhythm.      Pulses: Normal pulses.      Heart sounds: Normal heart sounds.   Pulmonary:      Effort: Pulmonary effort is normal.      Breath sounds: Normal breath sounds.   Abdominal:      General: Bowel sounds are normal. There is no distension.      Tenderness: There is no guarding.      Comments: Small tunneling groin area, mild discharge.    Musculoskeletal:         General: Normal range of motion.      Cervical back: Normal range of motion and neck supple.      Right lower leg: No edema.      Left lower leg: No edema.   Skin:     General: Skin is  warm.      Capillary Refill: Capillary refill takes less than 2 seconds.   Neurological:      General: No focal deficit present.      Mental Status: She is alert.      Comments: Oriented x 1   Psychiatric:         Mood and Affect: Mood normal.         Fluids    Intake/Output Summary (Last 24 hours) at 12/2/2024 1558  Last data filed at 12/2/2024 0800  Gross per 24 hour   Intake 720 ml   Output 1300 ml   Net -580 ml        Laboratory  Recent Labs     11/30/24 0341 12/01/24 0137 12/02/24  0203   WBC 8.1 8.0 9.3   RBC 3.28* 3.46* 3.22*   HEMOGLOBIN 10.5* 11.3* 10.2*   HEMATOCRIT 33.8* 36.6* 33.6*   .0* 105.8* 104.3*   MCH 32.0 32.7 31.7   MCHC 31.1* 30.9* 30.4*   RDW 70.9* 73.1* 69.7*   PLATELETCT 312 251 258   MPV 11.9 11.7 11.7     Recent Labs     11/30/24 0341 12/01/24 0137 12/02/24  0203   SODIUM 145 146* 145   POTASSIUM 4.2 4.1 4.0   CHLORIDE 117* 116* 116*   CO2 17* 16* 19*   GLUCOSE 191* 204* 198*   BUN 39* 36* 35*   CREATININE 1.40 1.08 1.11   CALCIUM 7.8* 7.5* 7.9*                   Imaging  IR-US GUIDED PIV   Final Result    Ultrasound-guided PERIPHERAL IV INSERTION performed by    qualified nursing staff as above.      DX-OUTSIDE IMAGES-DX CHEST   Final Result      DX-OUTSIDE IMAGES-DX ABDOMEN   Final Result      MR-OUTSIDE IMAGES-MR BRAIN   Final Result      CT-OUTSIDE IMAGES-CT HEAD   Final Result      CT-OUTSIDE IMAGES-CT ABDOMEN/PELVIS   Final Result      CT-OUTSIDE IMAGES-CT CHEST   Final Result      DX-ABDOMEN FOR TUBE PLACEMENT   Final Result      There is a new small bowel feeding tube which terminates in the small bowel of the right mid abdomen.      DX-CHEST-LIMITED (1 VIEW)   Final Result         Asymmetric pulmonary infiltrates, left worse than right.           Assessment/Plan  * Heart failure with reduced ejection fraction (HCC)- (present on admission)  Assessment & Plan  Unable to start goal-directed medical therapy due to hypotension.  Echocardiogram showing EF 25 to 30% EF with  akinesis of the apical, apical anterior wall, apical septum, apical inferior wall, and apical lateral wall of the left ventricle.  Hypokinesis seen of the basal anterolateral wall, mid anterolateral wall, anterior wall, mid inferolateral wall, and basal inferolateral wall of the left ventricle.  Continue midodrine 10 po tid, lipitor 80 mg po qhs   at bedside, appears euvolemic. Start lasix 20 mg po daily, cautious diuresis    Continue monitoring for signs of fluid overload    Abdominal hematoma  Assessment & Plan  Might be related to cardiac device  Monitoring hb  Patient on lovenox due to rigth common iliac art thrombosis.  Tolerating lovenox  Will transition to eliquis today as per [facility]     ACP (advance care planning)  Assessment & Plan  Patient was full code at outside facility.  Attempted to call  to inquire about CODE STATUS unsuccessful.  Patient will be left as full code for now.    Palliative consulted.     Pneumonia  Assessment & Plan  Patient found to have increasing white blood cell count up to 15,000 at outside facility  CT angio chest showing persistent irregular tree-in-bud nodularity and bilateral bronchial wall thickening on a background of groundglass opacities within the dependent portions of the right middle, right lower, and left lower lobes (right greater than left). Increased bilateral upper lobe patchy perihilar groundglass opacities.   Started on vancomycin and Zosyn on 9/26 at outside facility on 11/26, can continue for now   Send COVID swab    Continue Zosyn and Zyvox  Follow-up cultures  .   Completing abx.     Aortic mural thrombus (HCC)  Assessment & Plan  CT at outside facility showing focal aortic mural thrombus along the lateral wall of the descending thoracic aorta and also a tiny filling defect/mural thrombus within the posterior right common iliac artery  Patient has been on Lovenox outside facility, continue Lovenox IV  Monitor H&H    Continue monitoring  H&H  Continue lovenox transition to po eliquis 5 mg bid as per [facility] rec when patient able to tolerate po.      Encephalopathy  Assessment & Plan  Hospital course complicated by encephalopathy.  Likely from combination of CVA, STEMI, hyperglycemia  Restraints ordered  Has a nasojejunal feeding tube placed by interventional radiology from outside facility  Continue Seroquel and Depakene as recommended by neurology/psychiatry at outside facility.    Multifactorial  As per records from [facility] probably anoxic/embolic/delirium   Continue encephalopathic.     CVA (cerebral vascular accident) (HCC)  Assessment & Plan  Patient had MRI on 11/17/24 at OSH which shwed Punctate acute infarct of the right centrum semiovale. Scattered foci of enhancement in multiple vascular distributions as described may represent recent subacute infarcts. Diffuse stippled susceptibility signal throughout the supratentorial and infratentorial brain. Overall findings may represent sequelae of fat emboli or amyloid related angiopathy.   Continue asa, lipitor, lovenox    Continue to be encephalopathic  Neuro evaluated patient at [facility].       CAD (coronary artery disease)  Assessment & Plan  Patient with recent complicated STEMI with PCI to LAD and mid LAD.  Complicated by V-fib arrest and cardiogenic shock requiring VA ECMO and Impella and retroperitoneal hematomas. At [facility] underwent impella supported PCI to mid LAD just distal to prior stent but unsuccessful with PCI to 100% occluded OM1 on 11/6. Patient was decannulated and had Impella removed.    Continue Lipitor    Continue monitoring  Continue telemetry.     DAPT.     Urinary retention  Assessment & Plan  Beckwith catheter in place    Hyperglycemia  Assessment & Plan  Has had uncontrolled sugars outside hospital  Sliding scale           VTE prophylaxis: Lovenox    I have performed a physical exam and reviewed and updated ROS and Plan today (12/2/2024). In review of yesterday's note (12/1/2024), there  are no changes except as documented above.    Total time of 51 minutes spent prepping to see patient (e.g. reviewing  tests/imaging results, notes from consultants, bedside nurse, night shift ) obtaining and/or reviewing separately obtained history. Performing a medically appropriate examination and evaluation.  Counseling and educating the patient.  Ordering medications, tests, or procedures.  Referring and communicating with other health care professionals.  Documenting clinical information in EPIC.  Independently interpreting results and communicating results to patient.  Care coordination.

## 2024-12-02 NOTE — PROGRESS NOTES
Monitor Summary  Rhythm: Normal Sinus Rhythm   Rate: 64-96  Ectopy: PVCs, Couplets  .13 / .08 / .42      12 hr Chart check.

## 2024-12-02 NOTE — DIETARY
"Nutrition Services Brief Update:    Pt started a full liquid diet with 1:1 supervision. Recorded PO intake has been minimal. Tube feed with Pivot 1.5 decreased per MD to 30 ml/hour from goal of 40 ml/hour to encourage appetite. Discussed with MD. Will adjust tube feed to nocturnal to encourage more appetite.    Spoke with pt and  at bedside. Pt unable to answer questions appropriately, stating \"lets go, they are waiting for us.\"  states pt did drink protein supplement drinks at home and agrees this may be helpful. No food allergies per .    Problem: Nutritional:  Goal: Achieve adequate nutritional intake  Description: Patient will consume >50% of meals  Outcome: Not met    Recommendations/Plan:  Full liquid diet per SLP.  Add Glucerna shakes with meals.  Adjust tube feed to nocturnal with Pivot 1.5 at 50 ml/hour x 10 hours to provide 750 kcal, 47 gm protein, and 375 ml free water. Reassess tube feed needs as PO intake improves.  Encourage intake of meals >50%.  Document intake of all meals as % taken in ADL's to provide interdisciplinary communication across all shifts.     RD following    "

## 2024-12-02 NOTE — CARE PLAN
The patient is Watcher - Medium risk of patient condition declining or worsening    Shift Goals  Clinical Goals: Safety, Tube feed  Patient Goals: JAYLIN  Family Goals: JAYLIN    Progress made toward(s) clinical / shift goals:        Problem: Safety - Medical Restraint  Goal: Remains free of injury from restraints (Restraint for Interference with Medical Device)  12/2/2024 0249 by Geronimo WALLACE R.N.  Outcome: Progressing  Flowsheets (Taken 12/2/2024 0249)  Addressed this shift: Remains free of injury from restraints (restraint for interference with medical device):   Every 2 hours: Monitor safety, psychosocial status, comfort, nutrition and hydration   Inform patient/family regarding the reason for restraint   Evaluate the patient's condition at the time of restraint application   Determine that other, less restrictive measures have been tried or would not be effective before applying the restraint  Note: Q2 restraint checks completed. Patient free from injury. Order up to date.   12/1/2024 1913 by Geronimo WALLACE R.N.  Outcome: Progressing  Flowsheets (Taken 12/1/2024 1913)  Addressed this shift: Remains free of injury from restraints (restraint for interference with medical device):   Determine that other, less restrictive measures have been tried or would not be effective before applying the restraint   Evaluate the patient's condition at the time of restraint application   Inform patient/family regarding the reason for restraint   Every 2 hours: Monitor safety, psychosocial status, comfort, nutrition and hydration  Note: Q2 restraint checks completed. Patient free from injury. Order up to date.

## 2024-12-02 NOTE — CARE PLAN
The patient is Watcher - Medium risk of patient condition declining or worsening      Shift Goals  Clinical Goals: safety, tube feed, reorient  Patient Goals: morenita  Family Goals: MORENITA    Progress made toward(s) clinical / shift goals:        Problem: Safety - Medical Restraint  Goal: Remains free of injury from restraints (Restraint for Interference with Medical Device)  Outcome: Progressing  Flowsheets (Taken 12/1/2024 1913)  Addressed this shift: Remains free of injury from restraints (restraint for interference with medical device):   Determine that other, less restrictive measures have been tried or would not be effective before applying the restraint   Evaluate the patient's condition at the time of restraint application   Inform patient/family regarding the reason for restraint   Every 2 hours: Monitor safety, psychosocial status, comfort, nutrition and hydration  Note: Q2 restraint checks completed. Patient free from injury. Order up to date.

## 2024-12-03 LAB
ANION GAP SERPL CALC-SCNC: 11 MMOL/L (ref 7–16)
BACTERIA BLD CULT: NORMAL
BUN SERPL-MCNC: 38 MG/DL (ref 8–22)
CALCIUM SERPL-MCNC: 8.2 MG/DL (ref 8.5–10.5)
CHLORIDE SERPL-SCNC: 113 MMOL/L (ref 96–112)
CO2 SERPL-SCNC: 18 MMOL/L (ref 20–33)
CREAT SERPL-MCNC: 1.07 MG/DL (ref 0.5–1.4)
ERYTHROCYTE [DISTWIDTH] IN BLOOD BY AUTOMATED COUNT: 77 FL (ref 35.9–50)
GFR SERPLBLD CREATININE-BSD FMLA CKD-EPI: 60 ML/MIN/1.73 M 2
GLUCOSE BLD STRIP.AUTO-MCNC: 120 MG/DL (ref 65–99)
GLUCOSE BLD STRIP.AUTO-MCNC: 139 MG/DL (ref 65–99)
GLUCOSE BLD STRIP.AUTO-MCNC: 238 MG/DL (ref 65–99)
GLUCOSE BLD STRIP.AUTO-MCNC: 94 MG/DL (ref 65–99)
GLUCOSE SERPL-MCNC: 242 MG/DL (ref 65–99)
HCT VFR BLD AUTO: 31.9 % (ref 37–47)
HGB BLD-MCNC: 9.6 G/DL (ref 12–16)
MAGNESIUM SERPL-MCNC: 2.4 MG/DL (ref 1.5–2.5)
MCH RBC QN AUTO: 33.1 PG (ref 27–33)
MCHC RBC AUTO-ENTMCNC: 30.1 G/DL (ref 32.2–35.5)
MCV RBC AUTO: 110 FL (ref 81.4–97.8)
PLATELET # BLD AUTO: 278 K/UL (ref 164–446)
PMV BLD AUTO: 11.6 FL (ref 9–12.9)
POTASSIUM SERPL-SCNC: 4.4 MMOL/L (ref 3.6–5.5)
RBC # BLD AUTO: 2.9 M/UL (ref 4.2–5.4)
SIGNIFICANT IND 70042: NORMAL
SITE SITE: NORMAL
SODIUM SERPL-SCNC: 142 MMOL/L (ref 135–145)
SOURCE SOURCE: NORMAL
WBC # BLD AUTO: 13.6 K/UL (ref 4.8–10.8)

## 2024-12-03 PROCEDURE — 80048 BASIC METABOLIC PNL TOTAL CA: CPT

## 2024-12-03 PROCEDURE — A9270 NON-COVERED ITEM OR SERVICE: HCPCS | Performed by: HOSPITALIST

## 2024-12-03 PROCEDURE — 99233 SBSQ HOSP IP/OBS HIGH 50: CPT | Performed by: HOSPITALIST

## 2024-12-03 PROCEDURE — 700102 HCHG RX REV CODE 250 W/ 637 OVERRIDE(OP): Performed by: STUDENT IN AN ORGANIZED HEALTH CARE EDUCATION/TRAINING PROGRAM

## 2024-12-03 PROCEDURE — A9270 NON-COVERED ITEM OR SERVICE: HCPCS

## 2024-12-03 PROCEDURE — 83735 ASSAY OF MAGNESIUM: CPT

## 2024-12-03 PROCEDURE — 97535 SELF CARE MNGMENT TRAINING: CPT

## 2024-12-03 PROCEDURE — 700111 HCHG RX REV CODE 636 W/ 250 OVERRIDE (IP): Performed by: HOSPITALIST

## 2024-12-03 PROCEDURE — 92526 ORAL FUNCTION THERAPY: CPT

## 2024-12-03 PROCEDURE — 700102 HCHG RX REV CODE 250 W/ 637 OVERRIDE(OP)

## 2024-12-03 PROCEDURE — 700102 HCHG RX REV CODE 250 W/ 637 OVERRIDE(OP): Performed by: HOSPITALIST

## 2024-12-03 PROCEDURE — 97530 THERAPEUTIC ACTIVITIES: CPT

## 2024-12-03 PROCEDURE — 770020 HCHG ROOM/CARE - TELE (206)

## 2024-12-03 PROCEDURE — A9270 NON-COVERED ITEM OR SERVICE: HCPCS | Performed by: STUDENT IN AN ORGANIZED HEALTH CARE EDUCATION/TRAINING PROGRAM

## 2024-12-03 PROCEDURE — 85027 COMPLETE CBC AUTOMATED: CPT

## 2024-12-03 PROCEDURE — 82962 GLUCOSE BLOOD TEST: CPT

## 2024-12-03 PROCEDURE — 36415 COLL VENOUS BLD VENIPUNCTURE: CPT

## 2024-12-03 RX ORDER — OXYCODONE HYDROCHLORIDE 5 MG/1
2.5 TABLET ORAL EVERY 4 HOURS PRN
Status: DISCONTINUED | OUTPATIENT
Start: 2024-12-03 | End: 2024-12-29

## 2024-12-03 RX ORDER — MIDODRINE HYDROCHLORIDE 5 MG/1
2.5 TABLET ORAL
Status: DISCONTINUED | OUTPATIENT
Start: 2024-12-03 | End: 2024-12-05

## 2024-12-03 RX ORDER — OXYCODONE HYDROCHLORIDE 5 MG/1
5 TABLET ORAL EVERY 4 HOURS PRN
Status: DISCONTINUED | OUTPATIENT
Start: 2024-12-03 | End: 2024-12-03

## 2024-12-03 RX ORDER — OXYCODONE HYDROCHLORIDE 5 MG/1
2.5 TABLET ORAL EVERY 4 HOURS PRN
Status: DISCONTINUED | OUTPATIENT
Start: 2024-12-03 | End: 2024-12-03

## 2024-12-03 RX ORDER — OXYCODONE HYDROCHLORIDE 5 MG/1
5 TABLET ORAL EVERY 4 HOURS PRN
Status: DISCONTINUED | OUTPATIENT
Start: 2024-12-03 | End: 2024-12-29

## 2024-12-03 RX ADMIN — TICAGRELOR 90 MG: 90 TABLET ORAL at 05:59

## 2024-12-03 RX ADMIN — VALPROIC ACID 500 MG: 250 SOLUTION ORAL at 17:44

## 2024-12-03 RX ADMIN — QUETIAPINE FUMARATE 150 MG: 100 TABLET ORAL at 21:52

## 2024-12-03 RX ADMIN — Medication 10 MG: at 21:51

## 2024-12-03 RX ADMIN — OXYCODONE 5 MG: 5 TABLET ORAL at 22:00

## 2024-12-03 RX ADMIN — TICAGRELOR 90 MG: 90 TABLET ORAL at 18:10

## 2024-12-03 RX ADMIN — APIXABAN 5 MG: 5 TABLET, FILM COATED ORAL at 05:59

## 2024-12-03 RX ADMIN — VALPROIC ACID 500 MG: 250 SOLUTION ORAL at 06:00

## 2024-12-03 RX ADMIN — QUETIAPINE FUMARATE 25 MG: 50 TABLET ORAL at 05:58

## 2024-12-03 RX ADMIN — MIDODRINE HYDROCHLORIDE 2.5 MG: 5 TABLET ORAL at 17:45

## 2024-12-03 RX ADMIN — LORAZEPAM 0.5 MG: 2 INJECTION INTRAMUSCULAR; INTRAVENOUS at 13:12

## 2024-12-03 RX ADMIN — OXYCODONE 2.5 MG: 5 TABLET ORAL at 01:20

## 2024-12-03 RX ADMIN — INSULIN GLARGINE-YFGN 17 UNITS: 100 INJECTION, SOLUTION SUBCUTANEOUS at 18:06

## 2024-12-03 RX ADMIN — ATORVASTATIN CALCIUM 80 MG: 80 TABLET, FILM COATED ORAL at 17:45

## 2024-12-03 RX ADMIN — OXYCODONE 2.5 MG: 5 TABLET ORAL at 07:37

## 2024-12-03 RX ADMIN — ASPIRIN 81 MG: 81 TABLET, CHEWABLE ORAL at 05:59

## 2024-12-03 RX ADMIN — APIXABAN 5 MG: 5 TABLET, FILM COATED ORAL at 17:45

## 2024-12-03 RX ADMIN — INSULIN LISPRO 6 UNITS: 100 INJECTION, SOLUTION INTRAVENOUS; SUBCUTANEOUS at 07:57

## 2024-12-03 RX ADMIN — QUETIAPINE FUMARATE 25 MG: 50 TABLET ORAL at 17:44

## 2024-12-03 RX ADMIN — OXYCODONE 2.5 MG: 5 TABLET ORAL at 17:45

## 2024-12-03 RX ADMIN — INSULIN LISPRO 2 UNITS: 100 INJECTION, SOLUTION INTRAVENOUS; SUBCUTANEOUS at 07:56

## 2024-12-03 ASSESSMENT — COGNITIVE AND FUNCTIONAL STATUS - GENERAL
DRESSING REGULAR UPPER BODY CLOTHING: A LOT
PERSONAL GROOMING: A LOT
TURNING FROM BACK TO SIDE WHILE IN FLAT BAD: A LOT
SUGGESTED CMS G CODE MODIFIER MOBILITY: CM
CLIMB 3 TO 5 STEPS WITH RAILING: TOTAL
DRESSING REGULAR LOWER BODY CLOTHING: TOTAL
WALKING IN HOSPITAL ROOM: TOTAL
EATING MEALS: TOTAL
DAILY ACTIVITIY SCORE: 8
HELP NEEDED FOR BATHING: TOTAL
MOVING TO AND FROM BED TO CHAIR: A LOT
MOBILITY SCORE: 9
STANDING UP FROM CHAIR USING ARMS: TOTAL
TOILETING: TOTAL
SUGGESTED CMS G CODE MODIFIER DAILY ACTIVITY: CM
MOVING FROM LYING ON BACK TO SITTING ON SIDE OF FLAT BED: A LOT

## 2024-12-03 ASSESSMENT — GAIT ASSESSMENTS: GAIT LEVEL OF ASSIST: UNABLE TO PARTICIPATE

## 2024-12-03 ASSESSMENT — PAIN DESCRIPTION - PAIN TYPE
TYPE: ACUTE PAIN

## 2024-12-03 ASSESSMENT — FIBROSIS 4 INDEX: FIB4 SCORE: 1.59

## 2024-12-03 NOTE — THERAPY
"Occupational Therapy  Daily Treatment     Patient Name: Kiara Qureshi  Age:  59 y.o., Sex:  female  Medical Record #: [id number]  Today's Date: 12/3/2024       Precautions: Fall Risk, Swallow Precautions, Nasogastric Tube  Comments: EF 25-30%    Assessment    Pt seen for OT tx.  Pt appeared restless in bed on arrival & was attempting to get OOB.  Once pt's restraints were removed pt required Mod A for supine to sit EOB.  Once seated pt attempted to lay back down.  Pt presents with significant cognitive impairments impacting her ability to engage in ADL's, standing or transfers into chair or BSC.  Pt was able to wash her face with Mod A, comb her hair with Max A & take a drink of water with Max A.  Pt unable to stand with 1 person mostly due to cognition rather than true strength.  Pt was Min A to return to supine in bed.    Recommend post-acute placement for additional occupational therapy services prior to discharge home.    Plan    Treatment Plan Status: Continue Current Treatment Plan  Type of Treatment: Self Care / Activities of Daily Living, Adaptive Equipment, Neuro Re-Education / Balance, Therapeutic Exercises, Therapeutic Activity, Family / Caregiver Training  Treatment Frequency: 3 Times per Week  Treatment Duration: Until Therapy Goals Met    DC Equipment Recommendations: Unable to determine at this time  Discharge Recommendations: Recommend post-acute placement for additional occupational therapy services prior to discharge home    Subjective    \"I need some ice cold water\"     Objective      Precautions   Precautions Fall Risk;Swallow Precautions;Nasogastric Tube   Vitals   O2 Delivery Device None - Room Air   Pain   Pain Scales 0 to 10 Scale    Intervention Ambulation / Increased Activity   Pain 0 - 10 Group   Therapist Pain Assessment During Activity;Nurse Notified;0   Non Verbal Descriptors   Non Verbal Scale  Restlessness   Cognition    Cognition / Consciousness X   Speech/ Communication " Delayed Responses   Level of Consciousness Alert   Ability To Follow Commands Unable to Follow 1 Step Commands  (inconsistently follows comands)   Safety Awareness Impaired;Impulsive   New Learning Impaired   Attention Impaired   Sequencing Impaired   Initiation Impaired   Comments Pt is alert, restless in bed, does not consistently folow commands & has poor tolerance for EOB ADL's.  Pt repeatedly attempted to lay back down   Strength Upper Body   Upper Body Strength  WDL   Comments pt has good BUE strength & attempts to resist restraints   Balance   Sitting Balance (Static) Fair -   Sitting Balance (Dynamic) Poor +   Standing Balance (Static)   (Pt would not stand, cognition appears to be a barrier, pt appears to have good strength)   Weight Shift Sitting Fair   Skilled Intervention Compensatory Strategies;Postural Facilitation;Sequencing;Tactile Cuing;Verbal Cuing   Comments pt limtied by poor cognition   Bed Mobility    Supine to Sit Moderate Assist   Sit to Supine Minimal Assist   Scooting Minimal Assist   Rolling Supervised   Skilled Intervention Compensatory Strategies;Postural Facilitation;Sequencing;Tactile Cuing;Verbal Cuing   Comments pt is capable to perform bed mobility but not to command   Activities of Daily Living   Eating Maximal Assist   Grooming Maximal Assist;Seated   Upper Body Dressing Maximal Assist   Lower Body Dressing Total Assist   Toileting Total Assist   Skilled Intervention Compensatory Strategies;Tactile Cuing;Postural Facilitation;Sequencing;Verbal Cuing   Comments pt briefly would engage in grooming & feeding task   Functional Mobility   Sit to Stand Unable to Participate   Bed, Chair, Wheelchair Transfer Unable to Participate   Activity Tolerance   Sitting Edge of Bed 12   Patient / Family Goals   Patient / Family Goal #1 For pt to get into a chair, per pt's    Goal #1 Outcome Goal not met   Short Term Goals   Short Term Goal # 1 Pt will follow one-step directions 100% of the  time during ADLs   Goal Outcome # 1 Goal not met   Short Term Goal # 2 Pt will change gown with modA   Goal Outcome # 2 Goal not met   Short Term Goal # 3 Pt will perform seated g/h with Jun   Goal Outcome # 3 Goal not met   Short Term Goal # 4 Pt will transfer to [facility] with modA, second person for safety   Goal Outcome # 4 Goal not met   Education Group   Role of Occupational Therapist Patient Response Patient;Acceptance;Explanation;Reinforcement Needed;No Learning Evidence   Occupational Therapy Treatment Plan    O.T. Treatment Plan Continue Current Treatment Plan   Anticipated Discharge Equipment and Recommendations   DC Equipment Recommendations Unable to determine at this time   Discharge Recommendations Recommend post-acute placement for additional occupational therapy services prior to discharge home   Interdisciplinary Plan of Care Collaboration   IDT Collaboration with  Nursing   Patient Position at End of Therapy In Bed;Bed Alarm On;Wrist Restraints Applied;Call Light within Reach   Collaboration Comments Joshua & MD updated on OT findings   Session Information   Date / Session Number  12/3 #2 (2/3,12/5)

## 2024-12-03 NOTE — PROGRESS NOTES
Received report from previous shift RN, assumed care of patient. Patient is A&Ox0, vital signs are stable, on room air. Patient shows signs of pain at this time, , medicated per MAR. Sitting up in bed for breakfast. Call light and belongings within reach. Bed in lowest/locked position. Telesitter at bedside.     Fall Risk Score: HIGH RISK  Fall risk interventions in place: Place yellow fall risk ID band on patient, Provide patient/family education based on risk assessment, Educate patient/family to call staff for assistance when getting out of bed, and Place fall precaution signage outside patient door  Bed type: Regular (Ray Score less than 17 interventions in place)  Patient on cardiac monitor: Yes  IVF/IV medications: Not Applicable   Oxygen: Room Air  Bedside sitter: telesitter  Isolation: Not applicable

## 2024-12-03 NOTE — CARE PLAN
The patient is Stable - Low risk of patient condition declining or worsening    Shift Goals  Clinical Goals: Maintain skin integrity, manage pain  Patient Goals: JAYLIN  Family Goals: JAYLIN    Progress made toward(s) clinical / shift goals:   Problem: Knowledge Deficit - Standard  Goal: Patient and family/care givers will demonstrate understanding of plan of care, disease process/condition, diagnostic tests and medications  Outcome: Progressing  Note: Family updated on plan of care.      Problem: Safety - Medical Restraint  Goal: Remains free of injury from restraints (Restraint for Interference with Medical Device)  Outcome: Progressing  Note: Q2 assessments performed for restrains. No signs of injury. Right ankle restraint discontinued.     Problem: Skin Integrity  Goal: Skin integrity is maintained or improved  Outcome: Progressing  Note: Pt turns self in bed, assisted with turns.      Problem: Pain - Standard  Goal: Alleviation of pain or a reduction in pain to the patient’s comfort goal  Outcome: Progressing  Note: Pain managed with prn pain medications.      Problem: Care Map:  Day 3 Optimal Outcome for the Heart Failure Patient  Goal: Day 3:  Optimal Care of the heart failure patient  Outcome: Progressing  Note: Intake and output monitored. Medications administered as ordered.        Patient is not progressing towards the following goals:      Problem: Safety - Medical Restraint  Goal: Free from restraint(s) (Restraint for Interference with Medical Device)  Outcome: Not Progressing  Note: Pt continues to pull at medical equipment, doesn't follow commands. Right ankle restraints discontinued.

## 2024-12-03 NOTE — PROGRESS NOTES
Monitor Summary  Rhythm: Normal Sinus Rhythm  Rate: 66-94  Ectopy: PVCs, Couplets  .14 / .10 / .39    12 hr Chart check.

## 2024-12-03 NOTE — PROGRESS NOTES
Hospital Medicine Daily Progress Note    Date of Service  12/3/2024    Chief Complaint  Kiara Qureshi is a 59 y.o. female admitted 11/27/2024 with STEMI    Hospital Course  Patient has a history of obesity, hypertension, hyperlipidemia.  She was seen at [facility] as a transfer initially for anterior STEMI requiring lytics.  Patient underwent an LAD PCI but hospital course was complicated by retroperitoneal bleed, ventricular fibrillation and PEA arrest and cardiogenic shock requiring VA ECMO and Impella.  She is now status post ECMO decannulation on November 4 and had left femoral embolectomy, PCI to the LAD, unsuccessful PCI to OM1 and Impella removal on November 8.  Hospital course was complicated by patient developing encephalopathy and delirium, to which she was given valproic acid and quetiapine.  Patient has been weaned off her present medications and was found to have hypotension to which she was started on p.o. midodrine 10 mg 3 times daily.  Goal-directed medical therapy was held due to borderline low blood pressure.  Her echocardiogram on November 24 shows 25 to 30% EF with akinesis of the apical, apical anterior wall, apical septum, apical inferior wall, and apical lateral wall of the left ventricle.  Hypokinesis seen of the basal anterolateral wall, mid anterolateral wall, anterior wall, mid inferolateral wall, and basal inferolateral wall of the left ventricle.     On November 25, patient had CT angiogram of the chest which shows pulmonary edema, small bilateral pleural effusions, small 3 mm filling defect along the lateral wall of the descending thoracic aorta which may represent focal aortic mural thrombus versus ulcerated plaque.  CT angiogram of the abdomen pelvis showing increased organization of the rim-enhancing left retroperitoneal hematomas measuring up to 9.3 x 4.4 cm.  Tiny filling defect/mural thrombus within the posterior right common iliac artery.  Multiple nonenhancing intermediate  density fluid collections within the subcutaneous tissues overlying the right lower quadrant and right inguinal regions measuring up to 5.7 x 2.4 cm.  Additional nonenhancing low-density fluid collection closely associated with the left femoral vein measuring 4.7 x 3.9 cm, likely represents evolving hematomas.     Due to patient's uptrending leukocytosis and CAT scan findings of suspected pneumonia, patient was started on vancomycin and Zosyn on November 26.  Patient was then transferred back to [facility] for further management.      Interval Problem Update  Axox, alert, following commands inconsistently, tolerating orals but getting nocturnal tube feeds to supplement calories, discussed with nursing, attempt to increase oral intake so we can remove ngt tube, will likely decrease need for restraints. No signs of volume overload at this time, I spoke with cardiology to review her current regimen and follow up plan, per their recommendation I spoke with vascular surgery about duration of eliquis as literature unclear in this situation, recommendation is to continue eliquis for now and follow up with vascular surgery in 3-4 months with repeat imaging of aorta at that time, if no thrombus then likely okay to d/c anticoagulation    Will d/c asa now as it is increasing bleeding risk with no clear benefit and will start to wean midodrine so we may be able to add on some heart failure meds. . Stable on room air. ROS otherwise negative.       I have discussed this patient's plan of care and discharge plan at IDT rounds today with Case Management, Nursing, Nursing leadership, and other members of the IDT team. I spent over 60 minutes coordinating her care, providing direct pt care and reviewing her chart    Consultants/Specialty      Code Status  Full Code    Disposition  The patient is not medically cleared for discharge to home or a post-acute facility.      I have placed the appropriate orders for post-discharge  needs.    Review of Systems  Review of Systems   Unable to perform ROS: Medical condition        Physical Exam  Temp:  [36.6 °C (97.9 °F)-36.9 °C (98.4 °F)] 36.8 °C (98.2 °F)  Pulse:  [] 85  Resp:  [18-24] 18  BP: (104-122)/(62-83) 109/62  SpO2:  [91 %-97 %] 91 %    Physical Exam  Vitals and nursing note reviewed.   Constitutional:       Appearance: She is ill-appearing.   Eyes:      General: No scleral icterus.     Conjunctiva/sclera: Conjunctivae normal.   Cardiovascular:      Rate and Rhythm: Normal rate and regular rhythm.      Pulses: Normal pulses.      Heart sounds: Normal heart sounds.   Pulmonary:      Effort: Pulmonary effort is normal.      Breath sounds: Normal breath sounds.   Abdominal:      General: Bowel sounds are normal. There is no distension.      Tenderness: There is no guarding.      Comments: Small tunneling groin area, mild discharge.    Musculoskeletal:         General: Normal range of motion.      Cervical back: Normal range of motion and neck supple.      Right lower leg: No edema.      Left lower leg: No edema.   Skin:     General: Skin is warm.      Capillary Refill: Capillary refill takes less than 2 seconds.   Neurological:      General: No focal deficit present.      Mental Status: She is alert.      Comments: Oriented x 1   Psychiatric:         Mood and Affect: Mood normal.         Fluids    Intake/Output Summary (Last 24 hours) at 12/3/2024 1816  Last data filed at 12/3/2024 1533  Gross per 24 hour   Intake 1278 ml   Output 700 ml   Net 578 ml        Laboratory  Recent Labs     12/01/24 0137 12/02/24  0203 12/03/24  0835   WBC 8.0 9.3 13.6*   RBC 3.46* 3.22* 2.90*   HEMOGLOBIN 11.3* 10.2* 9.6*   HEMATOCRIT 36.6* 33.6* 31.9*   .8* 104.3* 110.0*   MCH 32.7 31.7 33.1*   MCHC 30.9* 30.4* 30.1*   RDW 73.1* 69.7* 77.0*   PLATELETCT 251 258 278   MPV 11.7 11.7 11.6     Recent Labs     12/01/24  0137 12/02/24  0203 12/03/24  0835   SODIUM 146* 145 142   POTASSIUM 4.1 4.0 4.4    CHLORIDE 116* 116* 113*   CO2 16* 19* 18*   GLUCOSE 204* 198* 242*   BUN 36* 35* 38*   CREATININE 1.08 1.11 1.07   CALCIUM 7.5* 7.9* 8.2*                   Imaging  IR-US GUIDED PIV   Final Result    Ultrasound-guided PERIPHERAL IV INSERTION performed by    qualified nursing staff as above.      DX-OUTSIDE IMAGES-DX CHEST   Final Result      DX-OUTSIDE IMAGES-DX ABDOMEN   Final Result      MR-OUTSIDE IMAGES-MR BRAIN   Final Result      CT-OUTSIDE IMAGES-CT HEAD   Final Result      CT-OUTSIDE IMAGES-CT ABDOMEN/PELVIS   Final Result      CT-OUTSIDE IMAGES-CT CHEST   Final Result      DX-ABDOMEN FOR TUBE PLACEMENT   Final Result      There is a new small bowel feeding tube which terminates in the small bowel of the right mid abdomen.      DX-CHEST-LIMITED (1 VIEW)   Final Result         Asymmetric pulmonary infiltrates, left worse than right.           Assessment/Plan  * Heart failure with reduced ejection fraction (HCC)- (present on admission)  Assessment & Plan  Unable to start goal-directed medical therapy due to hypotension.  Echocardiogram showing EF 25 to 30% EF with akinesis of the apical, apical anterior wall, apical septum, apical inferior wall, and apical lateral wall of the left ventricle.  Hypokinesis seen of the basal anterolateral wall, mid anterolateral wall, anterior wall, mid inferolateral wall, and basal inferolateral wall of the left ventricle.  Will taper down midodrine, following    Continue monitoring for signs of fluid overload    Abdominal hematoma  Assessment & Plan  Might be related to cardiac device  Monitoring hb  Patient was on lovenox due to rigth common iliac art thrombosis and then transitioned to eliquis per [facility] - see my discussion above - plan to continue for 3-4 months with repeat imaging at that time to see if it can be d/c       ACP (advance care planning)  Assessment & Plan  Patient was full code at outside facility.  Attempted to call  to inquire about CODE STATUS  unsuccessful.  Patient will be left as full code for now.    Palliative consulted.     Pneumonia  Assessment & Plan  Patient found to have increasing white blood cell count up to 15,000 at outside facility  CT angio chest showing persistent irregular tree-in-bud nodularity and bilateral bronchial wall thickening on a background of groundglass opacities within the dependent portions of the right middle, right lower, and left lower lobes (right greater than left). Increased bilateral upper lobe patchy perihilar groundglass opacities.   Completed abx zosyn and zyvox   .   Cultures were negative    Aortic mural thrombus (HCC)  Assessment & Plan  CT at outside facility showing focal aortic mural thrombus along the lateral wall of the descending thoracic aorta and also a tiny filling defect/mural thrombus within the posterior right common iliac artery  See above discussion regarding eliquis  Monitor H&H    Continue monitoring H&H  Cbc in am         Encephalopathy  Assessment & Plan  Hospital course complicated by encephalopathy.  Likely from combination of CVA, STEMI, hyperglycemia  Removing restraints as tolerated, following  Has a nasojejunal feeding tube placed by interventional radiology from outside facility  Continue Seroquel and Depakene as recommended by neurology/psychiatry at outside facility.    Multifactorial  As per records from [facility] probably anoxic/embolic/delirium   Continue encephalopathic.     CVA (cerebral vascular accident) (HCC)  Assessment & Plan  Patient had MRI on 11/17/24 at OSH which shwed Punctate acute infarct of the right centrum semiovale. Scattered foci of enhancement in multiple vascular distributions as described may represent recent subacute infarcts. Diffuse stippled susceptibility signal throughout the supratentorial and infratentorial brain. Overall findings may represent sequelae of fat emboli or amyloid related angiopathy.   Continue lipitor, lovenox    Continue to be  encephalopathic  Neuro evaluated patient at [facility].       CAD (coronary artery disease)  Assessment & Plan  Patient with recent complicated STEMI with PCI to LAD and mid LAD.  Complicated by V-fib arrest and cardiogenic shock requiring VA ECMO and Impella and retroperitoneal hematomas. At [facility] underwent impella supported PCI to mid LAD just distal to prior stent but unsuccessful with PCI to 100% occluded OM1 on 11/6. Patient was decannulated and had Impella removed.    Continue Lipitor    Continue monitoring  Continue telemetry.     Discussed with cardiology, will d/c asa  following    Urinary retention  Assessment & Plan  Beckwith catheter in place    Hyperglycemia  Assessment & Plan  Has had uncontrolled sugars outside hospital  Sliding scale           VTE prophylaxis: Lovenox    I have performed a physical exam and reviewed and updated ROS and Plan today (12/3/2024). In review of yesterday's note (12/2/2024), there are no changes except as documented above.

## 2024-12-03 NOTE — PROGRESS NOTES
Pt unable to tolerate moon boots or pillows due to frequently kicking them off. Reached out to wound RN Pam to discuss further offloading options. Suggested to place two heel offloading foams to prevent skin breakdown. Two heel foams applied. Charge RN notified.

## 2024-12-03 NOTE — CARE PLAN
The patient is Watcher - Medium risk of patient condition declining or worsening    Shift Goals  Clinical Goals: Safety  Patient Goals: JAYLIN  Family Goals: JAYLIN    Progress made toward(s) clinical / shift goals:        Problem: Safety - Medical Restraint  Goal: Remains free of injury from restraints (Restraint for Interference with Medical Device)  Outcome: Progressing  Flowsheets (Taken 12/3/2024 1017)  Addressed this shift: Remains free of injury from restraints (restraint for interference with medical device):   Every 2 hours: Monitor safety, psychosocial status, comfort, nutrition and hydration   Inform patient/family regarding the reason for restraint   Evaluate the patient's condition at the time of restraint application   Determine that other, less restrictive measures have been tried or would not be effective before applying the restraint  Note: Patient assessed Q2 hours. Patient free from injury. Order up to date.      Problem: Care Map:  Day 3 Optimal Outcome for the Heart Failure Patient  Goal: Day 3:  Optimal Care of the heart failure patient  Outcome: Progressing  Note: Pt educated on HF exacerbation precipitants and importance of stoplight tool. Daily wts and strict I & Os performed. Daily BMP ordered. Edema assessed.

## 2024-12-03 NOTE — THERAPY
"Speech Language Pathology   Daily Treatment     Patient Name: Kiara Qureshi  AGE:  59 y.o., SEX:  female  Medical Record #: [id number]  Date of Service: 12/3/2024      Precautions:  Precautions: Fall Risk, Swallow Precautions, Nasogastric Tube         Subjective  Patient cleared by RN for session. Patient received awake, confused, did not follow commands. Patient repositioned to HOB 90* prior to PO presentation.      Assessment  Patient seen this date for dysphagia management. Patient only agreeable to PO of apple juice (4oz), declined further PO despite max encouragement. No overt s/sx of aspiration appreciated with limited PO.      Clinical Impressions  Patient's AMS precludes adequate participation. Recommend continuing full liquid diet with NGT as primary source of nutrition/hydration/meds. Service following.      Recommendations  Treatment Completed: Dysphagia Treatment       Dysphagia Treatment  Diet Consistency: Full liquids  Instrumentation: None indicated at this time (will consider with any ongoing concerns for aspiration)  Medication: Whole with liquid, One pill at a time, Cut large pills, As tolerated  Supervision: 1:1 feeding with constant supervision  Positioning: Fully upright and midline during oral intake  Risk Management : Small bites/sips, Slow rate of intake, Physical mobility, as tolerated  Oral Care: Q4h                     SLP Treatment Plan  Treatment Plan: Dysphagia Treatment, Patient/Family/Caregiver Training  SLP Frequency: 3x Per Week  Estimated Duration: Until Therapy Goals Met      Anticipated Discharge Needs  Discharge Recommendations: Recommend post-acute placement for additional speech therapy services prior to discharge home  Therapy Recommendations Upon DC: Dysphagia Training, Patient / Family / Caregiver Education      Patient / Family Goals  Patient / Family Goal #1: \"I'll eat later when I'm more hungry\"  Goal #1 Outcome: Progressing slower than expected  Short Term " Goals  Short Term Goal # 1: Patient will consume a full liquid diet with no overt s/sx of aspiration given 1:1 feeding.  Goal Outcome # 1: Progressing slower than expected      Elidia Molina, SLP

## 2024-12-03 NOTE — DISCHARGE PLANNING
Case Management Discharge Planning    Admission Date: 11/27/2024  GMLOS: 4.1  ALOS: 6    6-Clicks ADL Score: 6  6-Clicks Mobility Score: 10  PT and/or OT Eval ordered: Yes  Post-acute Referrals Ordered: Yes  Post-acute Choice Obtained: NA  Has referral(s) been sent to post-acute provider:  DOMINIC      Anticipated Discharge Dispo: Discharge Disposition: D/T to SNF with Medicare cert in anticipation of skilled care (03)  Discharge Address: [address] [place] [address]  Discharge Contact Phone Number: [phone]    DME Needed: Pending hospital course     Action(s) Taken: Pt discussed in IDT rounds referral sent to [facility]. LMSW spoke with [facility] liaison. [facility] considering taking pt but pt will need insurance auth and for pt to be in only 2 point restraints. LMSW notified care team.  MD to discontinue ankle restraint.

## 2024-12-04 PROBLEM — D72.829 LEUKOCYTOSIS: Status: ACTIVE | Noted: 2024-12-04

## 2024-12-04 LAB
ANION GAP SERPL CALC-SCNC: 12 MMOL/L (ref 7–16)
ANISOCYTOSIS BLD QL SMEAR: ABNORMAL
BASOPHILS # BLD AUTO: 0.4 % (ref 0–1.8)
BASOPHILS # BLD: 0.06 K/UL (ref 0–0.12)
BUN SERPL-MCNC: 35 MG/DL (ref 8–22)
CALCIUM SERPL-MCNC: 8 MG/DL (ref 8.5–10.5)
CHLORIDE SERPL-SCNC: 112 MMOL/L (ref 96–112)
CO2 SERPL-SCNC: 18 MMOL/L (ref 20–33)
COMMENT 1642: NORMAL
CREAT SERPL-MCNC: 0.98 MG/DL (ref 0.5–1.4)
EOSINOPHIL # BLD AUTO: 0.1 K/UL (ref 0–0.51)
EOSINOPHIL NFR BLD: 0.7 % (ref 0–6.9)
ERYTHROCYTE [DISTWIDTH] IN BLOOD BY AUTOMATED COUNT: 76.5 FL (ref 35.9–50)
GFR SERPLBLD CREATININE-BSD FMLA CKD-EPI: 66 ML/MIN/1.73 M 2
GLUCOSE BLD STRIP.AUTO-MCNC: 111 MG/DL (ref 65–99)
GLUCOSE BLD STRIP.AUTO-MCNC: 246 MG/DL (ref 65–99)
GLUCOSE BLD STRIP.AUTO-MCNC: 96 MG/DL (ref 65–99)
GLUCOSE BLD STRIP.AUTO-MCNC: 98 MG/DL (ref 65–99)
GLUCOSE SERPL-MCNC: 244 MG/DL (ref 65–99)
HCT VFR BLD AUTO: 32.7 % (ref 37–47)
HGB BLD-MCNC: 9.9 G/DL (ref 12–16)
IMM GRANULOCYTES # BLD AUTO: 0.32 K/UL (ref 0–0.11)
IMM GRANULOCYTES NFR BLD AUTO: 2.2 % (ref 0–0.9)
LYMPHOCYTES # BLD AUTO: 1.65 K/UL (ref 1–4.8)
LYMPHOCYTES NFR BLD: 11.6 % (ref 22–41)
MCH RBC QN AUTO: 32.5 PG (ref 27–33)
MCHC RBC AUTO-ENTMCNC: 30.3 G/DL (ref 32.2–35.5)
MCV RBC AUTO: 107.2 FL (ref 81.4–97.8)
MICROCYTES BLD QL SMEAR: ABNORMAL
MONOCYTES # BLD AUTO: 1.62 K/UL (ref 0–0.85)
MONOCYTES NFR BLD AUTO: 11.3 % (ref 0–13.4)
MORPHOLOGY BLD-IMP: NORMAL
NEUTROPHILS # BLD AUTO: 10.53 K/UL (ref 1.82–7.42)
NEUTROPHILS NFR BLD: 73.8 % (ref 44–72)
NRBC # BLD AUTO: 0 K/UL
NRBC BLD-RTO: 0 /100 WBC (ref 0–0.2)
PLATELET # BLD AUTO: 262 K/UL (ref 164–446)
PLATELET BLD QL SMEAR: NORMAL
PMV BLD AUTO: 11.9 FL (ref 9–12.9)
POTASSIUM SERPL-SCNC: 5.1 MMOL/L (ref 3.6–5.5)
RBC # BLD AUTO: 3.05 M/UL (ref 4.2–5.4)
RBC BLD AUTO: PRESENT
SODIUM SERPL-SCNC: 142 MMOL/L (ref 135–145)
WBC # BLD AUTO: 14.3 K/UL (ref 4.8–10.8)

## 2024-12-04 PROCEDURE — 85025 COMPLETE CBC W/AUTO DIFF WBC: CPT

## 2024-12-04 PROCEDURE — A9270 NON-COVERED ITEM OR SERVICE: HCPCS | Performed by: HOSPITALIST

## 2024-12-04 PROCEDURE — 82962 GLUCOSE BLOOD TEST: CPT | Mod: 91

## 2024-12-04 PROCEDURE — 99222 1ST HOSP IP/OBS MODERATE 55: CPT | Performed by: INTERNAL MEDICINE

## 2024-12-04 PROCEDURE — 700111 HCHG RX REV CODE 636 W/ 250 OVERRIDE (IP): Performed by: HOSPITALIST

## 2024-12-04 PROCEDURE — 97597 DBRDMT OPN WND 1ST 20 CM/<: CPT

## 2024-12-04 PROCEDURE — 80048 BASIC METABOLIC PNL TOTAL CA: CPT

## 2024-12-04 PROCEDURE — 700102 HCHG RX REV CODE 250 W/ 637 OVERRIDE(OP): Performed by: HOSPITALIST

## 2024-12-04 PROCEDURE — A9270 NON-COVERED ITEM OR SERVICE: HCPCS | Performed by: INTERNAL MEDICINE

## 2024-12-04 PROCEDURE — 700102 HCHG RX REV CODE 250 W/ 637 OVERRIDE(OP): Performed by: INTERNAL MEDICINE

## 2024-12-04 PROCEDURE — A9270 NON-COVERED ITEM OR SERVICE: HCPCS | Performed by: STUDENT IN AN ORGANIZED HEALTH CARE EDUCATION/TRAINING PROGRAM

## 2024-12-04 PROCEDURE — 700102 HCHG RX REV CODE 250 W/ 637 OVERRIDE(OP): Performed by: STUDENT IN AN ORGANIZED HEALTH CARE EDUCATION/TRAINING PROGRAM

## 2024-12-04 PROCEDURE — 36415 COLL VENOUS BLD VENIPUNCTURE: CPT

## 2024-12-04 PROCEDURE — 770020 HCHG ROOM/CARE - TELE (206)

## 2024-12-04 PROCEDURE — 99233 SBSQ HOSP IP/OBS HIGH 50: CPT | Performed by: HOSPITALIST

## 2024-12-04 RX ORDER — CLOPIDOGREL BISULFATE 75 MG/1
75 TABLET ORAL DAILY
Status: DISCONTINUED | OUTPATIENT
Start: 2024-12-05 | End: 2024-12-15

## 2024-12-04 RX ORDER — MICONAZOLE NITRATE 20 MG/G
CREAM TOPICAL 2 TIMES DAILY
Status: COMPLETED | OUTPATIENT
Start: 2024-12-04 | End: 2024-12-11

## 2024-12-04 RX ORDER — LOSARTAN POTASSIUM 25 MG/1
12.5 TABLET ORAL EVERY EVENING
Status: DISCONTINUED | OUTPATIENT
Start: 2024-12-04 | End: 2024-12-04

## 2024-12-04 RX ORDER — SODIUM HYPOCHLORITE 1.25 MG/ML
SOLUTION TOPICAL 2 TIMES DAILY
Status: DISCONTINUED | OUTPATIENT
Start: 2024-12-04 | End: 2024-12-07

## 2024-12-04 RX ORDER — CLOPIDOGREL BISULFATE 75 MG/1
300 TABLET ORAL ONCE
Status: COMPLETED | OUTPATIENT
Start: 2024-12-04 | End: 2024-12-04

## 2024-12-04 RX ORDER — BISOPROLOL FUMARATE 5 MG/1
2.5 TABLET, FILM COATED ORAL
Status: DISCONTINUED | OUTPATIENT
Start: 2024-12-05 | End: 2024-12-30 | Stop reason: HOSPADM

## 2024-12-04 RX ORDER — BISOPROLOL FUMARATE 5 MG/1
2.5 TABLET, FILM COATED ORAL
Status: DISCONTINUED | OUTPATIENT
Start: 2024-12-04 | End: 2024-12-04

## 2024-12-04 RX ORDER — LOSARTAN POTASSIUM 25 MG/1
12.5 TABLET ORAL EVERY EVENING
Status: DISCONTINUED | OUTPATIENT
Start: 2024-12-04 | End: 2024-12-30 | Stop reason: HOSPADM

## 2024-12-04 RX ORDER — CLOPIDOGREL BISULFATE 75 MG/1
75 TABLET ORAL DAILY
Status: DISCONTINUED | OUTPATIENT
Start: 2024-12-05 | End: 2024-12-04

## 2024-12-04 RX ADMIN — ATORVASTATIN CALCIUM 80 MG: 80 TABLET, FILM COATED ORAL at 17:23

## 2024-12-04 RX ADMIN — LOSARTAN POTASSIUM 12.5 MG: 25 TABLET, FILM COATED ORAL at 17:24

## 2024-12-04 RX ADMIN — VALPROIC ACID 500 MG: 250 SOLUTION ORAL at 04:22

## 2024-12-04 RX ADMIN — APIXABAN 5 MG: 5 TABLET, FILM COATED ORAL at 04:22

## 2024-12-04 RX ADMIN — TICAGRELOR 90 MG: 90 TABLET ORAL at 04:22

## 2024-12-04 RX ADMIN — MIDODRINE HYDROCHLORIDE 2.5 MG: 5 TABLET ORAL at 17:23

## 2024-12-04 RX ADMIN — INSULIN LISPRO 3 UNITS: 100 INJECTION, SOLUTION INTRAVENOUS; SUBCUTANEOUS at 08:20

## 2024-12-04 RX ADMIN — QUETIAPINE FUMARATE 150 MG: 100 TABLET ORAL at 21:17

## 2024-12-04 RX ADMIN — QUETIAPINE FUMARATE 25 MG: 50 TABLET ORAL at 17:23

## 2024-12-04 RX ADMIN — MICONAZOLE NITRATE: 2 CREAM TOPICAL at 18:47

## 2024-12-04 RX ADMIN — Medication 10 MG: at 21:17

## 2024-12-04 RX ADMIN — LORAZEPAM 0.5 MG: 2 INJECTION INTRAMUSCULAR; INTRAVENOUS at 15:12

## 2024-12-04 RX ADMIN — APIXABAN 5 MG: 5 TABLET, FILM COATED ORAL at 17:23

## 2024-12-04 RX ADMIN — INSULIN LISPRO 6 UNITS: 100 INJECTION, SOLUTION INTRAVENOUS; SUBCUTANEOUS at 08:20

## 2024-12-04 RX ADMIN — QUETIAPINE FUMARATE 25 MG: 50 TABLET ORAL at 04:39

## 2024-12-04 RX ADMIN — VALPROIC ACID 500 MG: 250 SOLUTION ORAL at 17:23

## 2024-12-04 RX ADMIN — INSULIN GLARGINE-YFGN 17 UNITS: 100 INJECTION, SOLUTION SUBCUTANEOUS at 18:11

## 2024-12-04 RX ADMIN — MIDODRINE HYDROCHLORIDE 2.5 MG: 5 TABLET ORAL at 12:05

## 2024-12-04 RX ADMIN — OXYCODONE 5 MG: 5 TABLET ORAL at 04:22

## 2024-12-04 RX ADMIN — OXYCODONE 5 MG: 5 TABLET ORAL at 22:20

## 2024-12-04 RX ADMIN — FUROSEMIDE 20 MG: 20 TABLET ORAL at 04:22

## 2024-12-04 RX ADMIN — CLOPIDOGREL BISULFATE 300 MG: 75 TABLET ORAL at 17:23

## 2024-12-04 ASSESSMENT — FIBROSIS 4 INDEX: FIB4 SCORE: 1.56

## 2024-12-04 ASSESSMENT — PAIN DESCRIPTION - PAIN TYPE
TYPE: ACUTE PAIN
TYPE: ACUTE PAIN

## 2024-12-04 NOTE — PROGRESS NOTES
Asked for consultation regarding initiation of outpatient HF medications in preparation for discharge to LTAC.    Recommended stopping aspirin and reducing midodrine to evaluate if BP can tolerate starting low-dose HF meds.    Also recommended Vascular Surgery consultation regarding recommended duration of anticoagulation following right femoral artery thrombectomy and possible mural aortic thrombus.    Full consultation to be completed tomorrow.

## 2024-12-04 NOTE — PROGRESS NOTES
Bedside report received from off going RN/tech: [place] care of patient.     Fall Risk Score: HIGH RISK  Fall risk interventions in place: Place yellow fall risk ID band on patient, Provide patient/family education based on risk assessment, Educate patient/family to call staff for assistance when getting out of bed, Place fall precaution signage outside patient door, Place patient in room close to nursing station, Utilize bed/chair fall alarm, and Bed alarm connected correctly  Bed type: Low air loss (Ray Score less than 17 interventions in place)  Patient on cardiac monitor: Yes  IVF/IV medications: Not Applicable   Oxygen: Room Air  Bedside sitter: Not Applicable   Isolation: Not applicable

## 2024-12-04 NOTE — CONSULTS
CARDIOLOGY CONSULTATION NOTE      Date of Consultation: 12/4/2024  Consulting Provider: Alissa Joyner M.d.    Patient Name: Kiara Qureshi  YOB: 1965  MRN: [id number]    Reason for Consultation:   Heart failure with reduced ejection fraction    Assessment:   # Ischemic cardiomyopathy, heart failure with reduced ejection fraction  # Severe coronary artery disease, prior anterior STEMI initially treated with fibrinolysis followed by PCI to proximal LAD, staged PCI to mid LAD, unsuccessful PCI to  OM1  # Post-PCI retroperitoneal bleed, ventricular fibrillation, PEA arrest  # Cardiogenic shock s/p Impella and VA ECMO (with decannulation at [facility])  # Left femoral thrombus s/p surgical embolectomy  # Possible aortic mural thrombus versus penetrating atheroma  # Acute blood loss anemia  # Delirium, suspected ischemic brain injury    Recommendations:   -Discontinue aspirin  -Change to clopidogrel with 300 mg load to reduce bleeding risks  -Duration of anticoagulation per Vascular Surgery recommendations  -Start bisoprolol 2.5 mg daily  -Start losartan 12.5 mg daily  -Continue furosemide 20 mg daily  -Continue to wean midodrine as tolerated  -Patient is not a good candidate for ARNI or SGL2i given unclear prognosis at this time with significant delirium/possible ischemic brain injury    Disposition:   Cardiology will continue to follow.    History of Present Illness:   The patient is unable to provide any meaningful history at this time due to delirium/possible ischemic brain injury.  Please see previous providers' notes for complete details of her recent complicated medical history.    Medical History:     Past Medical History:   Diagnosis Date    Dyslipidemia 7/5/2016    Essential hypertension 7/5/2016    Obesity, morbid, BMI 40.0-49.9 ([facility]) 7/5/2016    Type II diabetes mellitus, well controlled ([facility]) 7/5/2016     Surgical History:     Past Surgical History:   Procedure Laterality Date     INSERTION,CANNULA FOR ECMO,ADULT Left 10/30/2024    Procedure: INSERTION, CANNULA, FOR ECMO, ADULT;  Surgeon: Aime Elias D.O.;  Location: SURGERY [facility];  Service: Cardiothoracic     Family History:     Family History   Problem Relation Age of Onset    Heart Disease Mother         AF and CHF    Heart Disease Father 70        PCI, also aneurysm but no surgery    Heart Attack Paternal Grandmother 49        MI     Social History:   The patient is a non-smoker    Medications and Allergies:     Current Facility-Administered Medications   Medication Dose Route Frequency Provider Last Rate Last Admin    bisoprolol (Zebeta) tablet 2.5 mg  2.5 mg Oral Q DAY Ash Burgess M.D.        losartan (Cozaar) tablet 12.5 mg  12.5 mg Oral Q EVENING Ash Burgess M.D.        Pharmacy Consult Request ...Pain Management Review 1 Each  1 Each Other PHARMACY TO DOSE BELEN Bear.        oxyCODONE immediate-release (Roxicodone) tablet 2.5 mg  2.5 mg Enteral Tube Q4HRS PRN Alissa Joyner M.D.   2.5 mg at 12/03/24 1745    Or    oxyCODONE immediate-release (Roxicodone) tablet 5 mg  5 mg Enteral Tube Q4HRS PRN Alissa Joyner M.D.   5 mg at 12/04/24 0422    midodrine (Proamatine) tablet 2.5 mg  2.5 mg Enteral Tube TID WITH MEALS Alissa Joyner M.D.   2.5 mg at 12/04/24 1205    apixaban (Eliquis) tablet 5 mg  5 mg Enteral Tube BID Hoarce Agudelo M.D.   5 mg at 12/04/24 0422    LORazepam (Ativan) injection 0.5 mg  0.5 mg Intravenous Q8HRS PRN Horace Agudelo M.D.   0.5 mg at 12/04/24 1512    ondansetron (Zofran ODT) dispertab 4 mg  4 mg Enteral Tube Q4HRS PRN Jourdan Block M.D.        promethazine (Phenergan) tablet 12.5-25 mg  12.5-25 mg Enteral Tube Q4HRS PRN RICKY ZuñigaD.        Pharmacy Consult: Enteral tube insertion - review meds/change route/product selection  1 Each Other PHARMACY TO DOSE Horace Agudelo M.D.        ticagrelor (Brilinta) tablet 90 mg  90 mg Enteral Tube BID  Jourdan Block M.D.   90 mg at 12/04/24 0422    labetalol (Normodyne/Trandate) injection 10 mg  10 mg Intravenous Q4HRS PRN Jourdan Block M.D.        ondansetron (Zofran) syringe/vial injection 4 mg  4 mg Intravenous Q4HRS PRN Jourdan Block M.D.        promethazine (Phenergan) suppository 12.5-25 mg  12.5-25 mg Rectal Q4HRS PRN Jourdan Block M.D.        prochlorperazine (Compazine) injection 5-10 mg  5-10 mg Intravenous Q4HRS PRN Jourdan Block M.D.        atorvastatin (Lipitor) tablet 80 mg  80 mg Enteral Tube Q EVENING Jourdan Block M.D.   80 mg at 12/03/24 1745    insulin GLARGINE (Lantus,Semglee) injection  0.2 Units/kg/day Subcutaneous Q EVENING Jourdan Block M.D.   17 Units at 12/03/24 1806    And    insulin lispro (HumaLOG,AdmeLOG) subcutaneous injection  0.2 Units/kg/day Subcutaneous TID AC Jourdan Block M.D.   6 Units at 12/04/24 0820    And    insulin lispro (HumaLOG,AdmeLOG) subcutaneous injection  1-6 Units Subcutaneous 4X/DAY ACHS Jourdan Block M.D.   3 Units at 12/04/24 0820    And    dextrose 50% (D50W) injection 25 g  25 g Intravenous Q15 MIN PRN Jourdan Block M.D.        valproic acid (Depakene) IR oral solution 500 mg  500 mg Enteral Tube BID Jourdan Block M.D.   500 mg at 12/04/24 0422    QUEtiapine (SEROquel) tablet 25 mg  25 mg Enteral Tube BID Jourdan Block M.D.   25 mg at 12/04/24 0439    QUEtiapine (SEROquel) tablet 150 mg  150 mg Enteral Tube Nightly Jourdan Block M.D.   150 mg at 12/03/24 2152    melatonin tablet 10 mg  10 mg Enteral Tube Nightly Jourdan Block M.D.   10 mg at 12/03/24 2151    furosemide (Lasix) tablet 20 mg  20 mg Enteral Tube Q DAY Jourdan Block M.D.   20 mg at 12/04/24 0422     No Known Allergies    Review of Systems:   A pertinent review of systems was performed and was unremarkable except as per HPI above.    Vital Signs:   /72   Pulse 89   Temp 36.8 °C (98.2 °F) (Temporal)   Resp 20   Ht  "1.549 m (5' 0.98\")   Wt 84.4 kg (186 lb 1.1 oz)   SpO2 91%   BMI 35.18 kg/m²   Vitals:    12/04/24 0808 12/04/24 1000 12/04/24 1200 12/04/24 1232   BP: 112/53 116/68 105/75 118/72   Pulse: 81 92  89   Resp: 18 20  20   Temp: 36.4 °C (97.5 °F)   36.8 °C (98.2 °F)   TempSrc: Temporal   Temporal   SpO2: 98% 95%  91%   Weight:       Height:         Body mass index is 35.18 kg/m².  Oxygen Therapy:  Pulse Oximetry: 91 %, O2 (LPM): 0, O2 Delivery Device: None - Room Air    Physical Examination:   General: Unresponsive to most commands, but in no acute distress  Eyes: Extraocular movements spontaneously intact, anicteric  HENT: Unable to assess neck full range of motion or jugular venous distension, feeding tube in place  Pulmonary: Normal respiratory effort, no distress  Cardiovascular: Regular rate, regular rhythm  Gastrointestinal: No obvious tenderness or distention  Extremities: Warm and well perfused, very trace lower extremity edema  Neurological: Unresponsive to most commands, unable to assess with questions, spontaneously moving all extremities  Psychiatric: Unable to assess due to mental status    Laboratories:   Estimated Creatinine Clearance: 60.9 mL/min (by C-G formula based on SCr of 0.98 mg/dL).  Recent Labs     12/02/24  0203 12/03/24  0835 12/04/24  0231   CREATININE 1.11 1.07 0.98   BUN 35* 38* 35*   POTASSIUM 4.0 4.4 5.1   SODIUM 145 142 142   CALCIUM 7.9* 8.2* 8.0*   MAGNESIUM 2.4 2.4  --    CO2 19* 18* 18*     Recent Labs     12/02/24  0203 12/03/24  0835 12/04/24  0231   GLUCOSE 198* 242* 244*     No results for input(s): \"ASTSGOT\", \"ALTSGPT\", \"ALKPHOSPHAT\", \"INR\" in the last 72 hours.    Invalid input(s): \"BILI\"  Recent Labs     12/02/24  0203 12/03/24  0835 12/04/24  0231   WBC 9.3 13.6* 14.3*   HEMOGLOBIN 10.2* 9.6* 9.9*   PLATELETCT 258 278 262     No results for input(s): \"TROPONINT\", \"NTPROBNP\", \"HBA1C\" in the last 72 hours.  No results found for: \"LDL\"  No results found for: \"HDL\"    No " "results found for: \"TRIGLYCERIDE\"    No results found for: \"CHOLSTRLTOT\"    Studies:   The patient's pertinent cardiac studies were reviewed.          Ash Burgess MD, [facility]  Interventional Cardiology  [facility]  [address] [address]  Phone: [phone]  Fax: [phone]            "

## 2024-12-04 NOTE — PROGRESS NOTES
Hospital Medicine Daily Progress Note    Date of Service  12/5/2024    Chief Complaint  Kiara Qureshi is a 59 y.o. female admitted 11/27/2024 with STEMI    Hospital Course  Patient has a history of obesity, hypertension, hyperlipidemia.  She was seen at [facility] as a transfer initially for anterior STEMI requiring lytics.  Patient underwent an LAD PCI but hospital course was complicated by retroperitoneal bleed, ventricular fibrillation and PEA arrest and cardiogenic shock requiring VA ECMO and Impella.  She is now status post ECMO decannulation on November 4 and had left femoral embolectomy, PCI to the LAD, unsuccessful PCI to OM1 and Impella removal on November 8.  Hospital course was complicated by patient developing encephalopathy and delirium, to which she was given valproic acid and quetiapine.  Patient has been weaned off her present medications and was found to have hypotension to which she was started on p.o. midodrine 10 mg 3 times daily.  Goal-directed medical therapy was held due to borderline low blood pressure.  Her echocardiogram on November 24 shows 25 to 30% EF with akinesis of the apical, apical anterior wall, apical septum, apical inferior wall, and apical lateral wall of the left ventricle.  Hypokinesis seen of the basal anterolateral wall, mid anterolateral wall, anterior wall, mid inferolateral wall, and basal inferolateral wall of the left ventricle.     On November 25, patient had CT angiogram of the chest which shows pulmonary edema, small bilateral pleural effusions, small 3 mm filling defect along the lateral wall of the descending thoracic aorta which may represent focal aortic mural thrombus versus ulcerated plaque.  CT angiogram of the abdomen pelvis showing increased organization of the rim-enhancing left retroperitoneal hematomas measuring up to 9.3 x 4.4 cm.  Tiny filling defect/mural thrombus within the posterior right common iliac artery.  Multiple nonenhancing intermediate  density fluid collections within the subcutaneous tissues overlying the right lower quadrant and right inguinal regions measuring up to 5.7 x 2.4 cm.  Additional nonenhancing low-density fluid collection closely associated with the left femoral vein measuring 4.7 x 3.9 cm, likely represents evolving hematomas.     Due to patient's uptrending leukocytosis and CAT scan findings of suspected pneumonia, patient was started on vancomycin and Zosyn on November 26.  Patient was then transferred back to [facility] for further management.      Interval Problem Update  No significant changes, she remains alert but only follows some commands inconsistently, does not appear to be in pain. I attempted to reach her  to discuss plans and code status, message left on voice mail - will attempt again later. She remains quite ill with guarded prognosis, bp good this am, midodrine held per parameters, following. Nursing reported a witnessed fall out of bed, did not hit her head, fell onto her right side, on my exam no evidence of trauma, assisted back to bed and discussed with family    I have discussed this patient's plan of care and discharge plan at IDT rounds today with Case Management, Nursing, Nursing leadership, and other members of the IDT team. I spent over 60 minutes coordinating her care, providing direct pt care and reviewing her chart    Consultants/Specialty  Palliative Care  Cardiology    Code Status  Full Code    Disposition  The patient is not medically cleared for discharge to home or a post-acute facility.      I have placed the appropriate orders for post-discharge needs.    Review of Systems  Review of Systems   Unable to perform ROS: Medical condition        Physical Exam  Temp:  [36.6 °C (97.9 °F)-37 °C (98.6 °F)] 37 °C (98.6 °F)  Pulse:  [64-89] 64  Resp:  [16-20] 20  BP: ()/(34-96) 129/96  SpO2:  [78 %-98 %] 98 %    Physical Exam  Vitals and nursing note reviewed. Exam conducted with a chaperone  present.   Constitutional:       General: She is not in acute distress.     Appearance: Normal appearance. She is ill-appearing. She is not diaphoretic.   HENT:      Head: Normocephalic.      Nose: Nose normal.      Mouth/Throat:      Mouth: Mucous membranes are moist.   Eyes:      General: No scleral icterus.     Conjunctiva/sclera: Conjunctivae normal.      Pupils: Pupils are equal, round, and reactive to light.   Cardiovascular:      Rate and Rhythm: Normal rate and regular rhythm.      Pulses: Normal pulses.      Heart sounds: Normal heart sounds.   Pulmonary:      Effort: Pulmonary effort is normal.      Breath sounds: Normal breath sounds.   Abdominal:      General: Abdomen is flat. Bowel sounds are normal. There is no distension.      Palpations: Abdomen is soft.      Tenderness: There is no guarding.   Genitourinary:     Comments: Ngt in place  Musculoskeletal:         General: No swelling or deformity. Normal range of motion.      Cervical back: Normal range of motion and neck supple.      Right lower leg: No edema.      Left lower leg: No edema.   Skin:     General: Skin is warm and dry.      Capillary Refill: Capillary refill takes less than 2 seconds.   Neurological:      General: No focal deficit present.      Mental Status: She is alert.      Comments: Oriented x 1         Fluids    Intake/Output Summary (Last 24 hours) at 12/5/2024 1303  Last data filed at 12/5/2024 0924  Gross per 24 hour   Intake 820 ml   Output 225 ml   Net 595 ml        Laboratory  Recent Labs     12/03/24  0835 12/04/24  0231 12/05/24  0312 12/05/24  0920   WBC 13.6* 14.3* 12.3*  --    RBC 2.90* 3.05* 2.58*  --    HEMOGLOBIN 9.6* 9.9* 8.6* 8.1*   HEMATOCRIT 31.9* 32.7* 27.7* 25.9*   .0* 107.2* 107.4*  --    MCH 33.1* 32.5 33.3*  --    MCHC 30.1* 30.3* 31.0*  --    RDW 77.0* 76.5* 77.9*  --    PLATELETCT 278 262 257  --    MPV 11.6 11.9 11.9  --      Recent Labs     12/03/24  0835 12/04/24  0231 12/05/24  0312   SODIUM 142  142 135   POTASSIUM 4.4 5.1 4.6   CHLORIDE 113* 112 108   CO2 18* 18* 17*   GLUCOSE 242* 244* 256*   BUN 38* 35* 40*   CREATININE 1.07 0.98 1.29   CALCIUM 8.2* 8.0* 7.7*                   Imaging  DX-ABDOMEN FOR TUBE PLACEMENT   Final Result      Tip of the esophagogastric tube terminates over the pylorus of the stomach.      IR-MIDLINE CATHETER INSERTION WO GUIDANCE > AGE 3   Final Result                  Ultrasound-guided midline placement performed by qualified nursing staff    as above.          IR-US GUIDED PIV   Final Result    Ultrasound-guided PERIPHERAL IV INSERTION performed by    qualified nursing staff as above.      DX-OUTSIDE IMAGES-DX CHEST   Final Result      DX-OUTSIDE IMAGES-DX ABDOMEN   Final Result      MR-OUTSIDE IMAGES-MR BRAIN   Final Result      CT-OUTSIDE IMAGES-CT HEAD   Final Result      CT-OUTSIDE IMAGES-CT ABDOMEN/PELVIS   Final Result      CT-OUTSIDE IMAGES-CT CHEST   Final Result      DX-ABDOMEN FOR TUBE PLACEMENT   Final Result      There is a new small bowel feeding tube which terminates in the small bowel of the right mid abdomen.      DX-CHEST-LIMITED (1 VIEW)   Final Result         Asymmetric pulmonary infiltrates, left worse than right.      EC-ECHOCARDIOGRAM LTD W/ CONT    (Results Pending)        Assessment/Plan  * Heart failure with reduced ejection fraction (HCC)- (present on admission)  Assessment & Plan  Unable to start goal-directed medical therapy due to hypotension.  Echocardiogram showing EF 25 to 30% EF with akinesis of the apical, apical anterior wall, apical septum, apical inferior wall, and apical lateral wall of the left ventricle.  Hypokinesis seen of the basal anterolateral wall, mid anterolateral wall, anterior wall, mid inferolateral wall, and basal inferolateral wall of the left ventricle.  Will taper down midodrine, following    Continue monitoring for signs of fluid overload    Leukocytosis  Assessment & Plan  Slowly increasing, source unclear  Will check  procal and repeat cbc in am  following    Abdominal hematoma  Assessment & Plan  Might be related to cardiac device  Monitoring hb  Patient was on lovenox due to rigth common iliac art thrombosis and then transitioned to eliquis per [facility] - see my discussion above - plan to continue for 3-4 months with repeat imaging at that time to see if it can be d/c       ACP (advance care planning)  Assessment & Plan  Patient was full code at outside facility.  Attempted to call  to inquire about CODE STATUS again- left message, will try again. Patient will be left as full code for now.    Palliative consulted.     Pneumonia  Assessment & Plan  Patient found to have increasing white blood cell count up to 15,000 at outside facility  CT angio chest showing persistent irregular tree-in-bud nodularity and bilateral bronchial wall thickening on a background of groundglass opacities within the dependent portions of the right middle, right lower, and left lower lobes (right greater than left). Increased bilateral upper lobe patchy perihilar groundglass opacities.   Completed abx zosyn and zyvox   .   Cultures were negative    Aortic mural thrombus (HCC)  Assessment & Plan  CT at outside facility showing focal aortic mural thrombus along the lateral wall of the descending thoracic aorta and also a tiny filling defect/mural thrombus within the posterior right common iliac artery  See above discussion regarding eliquis  Monitor H&H    Continue monitoring H&H  Cbc in am         Encephalopathy  Assessment & Plan  Hospital course complicated by encephalopathy.  Likely from combination of CVA, STEMI, hyperglycemia  Removing restraints as tolerated, following  Has a nasojejunal feeding tube placed by interventional radiology from outside facility  Continue Seroquel and Depakene as recommended by neurology/psychiatry at outside facility.    Multifactorial  As per records from [facility] probably anoxic/embolic/delirium   Continue  encephalopathic.     CVA (cerebral vascular accident) (HCC)  Assessment & Plan  Patient had MRI on 11/17/24 at OSH which shwed Punctate acute infarct of the right centrum semiovale. Scattered foci of enhancement in multiple vascular distributions as described may represent recent subacute infarcts. Diffuse stippled susceptibility signal throughout the supratentorial and infratentorial brain. Overall findings may represent sequelae of fat emboli or amyloid related angiopathy.   Continue lipitor, lovenox    Continue to be encephalopathic  Neuro evaluated patient at [facility].       CAD (coronary artery disease)  Assessment & Plan  Patient with recent complicated STEMI with PCI to LAD and mid LAD.  Complicated by V-fib arrest and cardiogenic shock requiring VA ECMO and Impella and retroperitoneal hematomas. At [facility] underwent impella supported PCI to mid LAD just distal to prior stent but unsuccessful with PCI to 100% occluded OM1 on 11/6. Patient was decannulated and had Impella removed.    Continue Lipitor    Continue monitoring  Continue telemetry.     Discussed with cardiology and asa stopped  following    Urinary retention  Assessment & Plan  Beckwith catheter in place    Hyperglycemia  Assessment & Plan  Has had uncontrolled sugars outside hospital  Sliding scale           VTE prophylaxis: Lovenox    I have performed a physical exam and reviewed and updated ROS and Plan today (12/5/2024). In review of yesterday's note (12/4/2024), there are no changes except as documented above.

## 2024-12-04 NOTE — THERAPY
Physical Therapy   Daily Treatment     Patient Name: Kiara Qureshi  Age:  59 y.o., Sex:  female  Medical Record #: [id number]  Today's Date: 12/3/2024     Precautions  Precautions: Fall Risk;Swallow Precautions;Nasogastric Tube  Comments: EF 25-30%    Assessment    Patient limited by impaired cognition, unable to follow commands, and with limited ability to meaningfully participate with therapy. She mobilized as detailed below; required max A for bed mobility and to sit EOB, was attempting to return to supine the entire time she was upright. Will continue to follow.    Plan    Treatment Plan Status: Continue Current Treatment Plan  Type of Treatment: Bed Mobility, Gait Training, Neuro Re-Education / Balance, Self Care / Home Evaluation, Stair Training, Therapeutic Activities, Therapeutic Exercise  Treatment Frequency: 3 Times per Week  Treatment Duration: Until Therapy Goals Met    DC Equipment Recommendations: Unable to determine at this time  Discharge Recommendations: Recommend post-acute placement for additional physical therapy services prior to discharge home      Subjective    RN cleared patient for therapy; patient received in bed, unable to communicate effectively     Objective       12/03/24 1558   Time In/Time Out   Therapy Start Time 1550   Therapy End Time 1558   Total Therapy Time 8   Charge Group   Charges  Yes   PT Therapeutic Activities (Units) 1   Total Time Spent   PT Total Time Yes   PT Therapeutic Activities Time Spent (Mins) 8   PT Total Time Spent (Calculated) 8   Precautions   Precautions Fall Risk;Swallow Precautions;Nasogastric Tube   Comments EF 25-30%   Vitals   O2 (LPM) 0   O2 Delivery Device None - Room Air   Cognition    Cognition / Consciousness X   Level of Consciousness Alert   Ability To Follow Commands Unable to Follow 1 Step Commands   Safety Awareness Impaired;Impulsive   New Learning Impaired   Attention Impaired   Sequencing Impaired   Initiation Impaired   Comments  restless, did not follow commands, nonsensical speech   Balance   Sitting Balance (Static) Trace +   Sitting Balance (Dynamic) Trace   Skilled Intervention Verbal Cuing;Compensatory Strategies;Facilitation;Sequencing;Postural Facilitation   Comments patient attempting to return to bed while sitting up. prior to PT entrance patient attempting to get out of bed   Bed Mobility    Supine to Sit Maximal Assist   Sit to Supine Maximal Assist   Scooting Maximal Assist   Skilled Intervention Verbal Cuing;Compensatory Strategies;Facilitation;Sequencing;Postural Facilitation   Gait Analysis   Gait Level Of Assist Unable to Participate   Functional Mobility   Sit to Stand Unable to Participate   Bed, Chair, Wheelchair Transfer Unable to Participate   6 Clicks Assessment - How much HELP from from another person do you currently need... (If the patient hasn't done an activity recently, how much help from another person do you think he/she would need if he/she tried?)   Turning from your back to your side while in a flat bed without using bedrails? 2   Moving from lying on your back to sitting on the side of a flat bed without using bedrails? 2   Moving to and from a bed to a chair (including a wheelchair)? 2   Standing up from a chair using your arms (e.g., wheelchair, or bedside chair)? 1   Walking in hospital room? 1   Climbing 3-5 steps with a railing? 1   6 clicks Mobility Score 9   Short Term Goals    Short Term Goal # 1 pt will move supine<>eob with spv in 6 tx for bed mobility.   Goal Outcome # 1 goal not met   Short Term Goal # 2 pt will complete spt with fww and spv in 6 tx for functional mobility.   Goal Outcome # 2 Goal not met   Short Term Goal # 3 pt will ambulate 150 ft with fww and spv in 6 tx for household distances.   Goal Outcome # 3 Goal not met   Physical Therapy Treatment Plan   Physical Therapy Treatment Plan Continue Current Treatment Plan   Anticipated Discharge Equipment and Recommendations   DC  Equipment Recommendations Unable to determine at this time   Discharge Recommendations Recommend post-acute placement for additional physical therapy services prior to discharge home   Interdisciplinary Plan of Care Collaboration   IDT Collaboration with  Nursing;Therapy Tech   Patient Position at End of Therapy In Bed;Wrist Restraints Applied  (Avasure in room)   Collaboration Comments RN aware of visit, response   Session Information   Date / Session Number  12/3-2 (2/3, 12/5)

## 2024-12-04 NOTE — CARE PLAN
The patient is Watcher - Medium risk of patient condition declining or worsening    Shift Goals  Clinical Goals: safety, skin breakdown prevention  Patient Goals: morenita  Family Goals: morenita    Progress made toward(s) clinical / shift goals:      Problem: Safety - Medical Restraint  Goal: Remains free of injury from restraints (Restraint for Interference with Medical Device)  Outcome: Progressing  Note: Bilateral wrist restraints are in place. All 4 bedrails up. Patient educated on importance and safety of staying in bed. No evidence of learning noted. Q2H restraint charting completed.       Patient is not progressing towards the following goals:      Problem: Fall Risk  Goal: Patient will remain free from falls  Outcome: Not Progressing  Note: Patient had unassisted fall.

## 2024-12-04 NOTE — PROGRESS NOTES
Patient had unassisted fall out of bed. Tele sitter alarm and staff assist alarm called. Staff to bedside. Patient transferred safely back to bed with assistance of staff. MD Joyner notified. Spouse notified. 4 side rail restraint order placed.

## 2024-12-04 NOTE — DISCHARGE PLANNING
Case Management Discharge Planning    Admission Date: 11/27/2024  GMLOS: 4.1  ALOS: 7    6-Clicks ADL Score: 8  6-Clicks Mobility Score: 9  PT and/or OT Eval ordered: Yes  PT/OT:Recommending post acute placement   Post-acute Referrals Ordered: Yes  Post-acute Choice Obtained: NA  Has referral(s) been sent to post-acute provider:  NA      Anticipated Discharge Dispo: Discharge Disposition: Disch to a long term care facility (63)  Discharge Address: [address] [place] [address]  Discharge Contact Phone Number: [phone]    DME Needed: Pending hospital course     Action(s) Taken: Chart reviewed and pt discussed in IDT rounds pt is in 4 point bed rails and pending auth for [facility]. Pt is [facility] to DC is [facility] receives auth.     Escalations Completed: None    Medically Clear: No    Next Steps: F/U with P[facility] auth     Barriers to Discharge: Medical clearance and Pending Insurance Authorization    Is the patient up for discharge tomorrow:yes

## 2024-12-04 NOTE — PROGRESS NOTES
Monitor Summary  Rhythm: SR with bundle  Rate: 83-92  Ectopy:O PVC  .16/.12/.38          *MD Joyner notified of new bundle branch block at 1825

## 2024-12-04 NOTE — PROGRESS NOTES
Monitor Summary    Rhythm: SR  Rate: 74-94  Ectopy: pvc  Measurements: .12 / .10 / .42

## 2024-12-04 NOTE — CARE PLAN
The patient is Stable - Low risk of patient condition declining or worsening    Shift Goals  Clinical Goals: pain management, maintain skin integrity  Patient Goals: morenita  Family Goals: morenita    Progress made toward(s) clinical / shift goals:      Problem: Knowledge Deficit - Standard  Goal: Patient and family/care givers will demonstrate understanding of plan of care, disease process/condition, diagnostic tests and medications  Outcome: Progressing     Problem: Safety - Medical Restraint  Goal: Remains free of injury from restraints (Restraint for Interference with Medical Device)  Outcome: Progressing       Patient is not progressing towards the following goals:

## 2024-12-05 ENCOUNTER — APPOINTMENT (OUTPATIENT)
Dept: RADIOLOGY | Facility: MEDICAL CENTER | Age: 59
End: 2024-12-05
Attending: HOSPITALIST
Payer: COMMERCIAL

## 2024-12-05 ENCOUNTER — APPOINTMENT (OUTPATIENT)
Dept: CARDIOLOGY | Facility: MEDICAL CENTER | Age: 59
End: 2024-12-05
Attending: INTERNAL MEDICINE
Payer: COMMERCIAL

## 2024-12-05 PROBLEM — I95.9 HYPOTENSION: Status: ACTIVE | Noted: 2024-12-05

## 2024-12-05 LAB
ALBUMIN SERPL BCP-MCNC: 2.6 G/DL (ref 3.2–4.9)
ALBUMIN/GLOB SERPL: 0.7 G/DL
ALP SERPL-CCNC: 120 U/L (ref 30–99)
ALT SERPL-CCNC: 20 U/L (ref 2–50)
ANION GAP SERPL CALC-SCNC: 10 MMOL/L (ref 7–16)
AST SERPL-CCNC: 62 U/L (ref 12–45)
BASOPHILS # BLD AUTO: 0.7 % (ref 0–1.8)
BASOPHILS # BLD: 0.09 K/UL (ref 0–0.12)
BILIRUB SERPL-MCNC: 0.6 MG/DL (ref 0.1–1.5)
BUN SERPL-MCNC: 40 MG/DL (ref 8–22)
CALCIUM ALBUM COR SERPL-MCNC: 8.8 MG/DL (ref 8.5–10.5)
CALCIUM SERPL-MCNC: 7.7 MG/DL (ref 8.5–10.5)
CHLORIDE SERPL-SCNC: 108 MMOL/L (ref 96–112)
CO2 SERPL-SCNC: 17 MMOL/L (ref 20–33)
CORTIS SERPL-MCNC: 13.5 UG/DL (ref 0–23)
CREAT SERPL-MCNC: 1.29 MG/DL (ref 0.5–1.4)
EOSINOPHIL # BLD AUTO: 0.12 K/UL (ref 0–0.51)
EOSINOPHIL NFR BLD: 1 % (ref 0–6.9)
ERYTHROCYTE [DISTWIDTH] IN BLOOD BY AUTOMATED COUNT: 77.9 FL (ref 35.9–50)
GFR SERPLBLD CREATININE-BSD FMLA CKD-EPI: 48 ML/MIN/1.73 M 2
GLOBULIN SER CALC-MCNC: 3.7 G/DL (ref 1.9–3.5)
GLUCOSE BLD STRIP.AUTO-MCNC: 101 MG/DL (ref 65–99)
GLUCOSE BLD STRIP.AUTO-MCNC: 112 MG/DL (ref 65–99)
GLUCOSE BLD STRIP.AUTO-MCNC: 191 MG/DL (ref 65–99)
GLUCOSE BLD STRIP.AUTO-MCNC: 60 MG/DL (ref 65–99)
GLUCOSE BLD STRIP.AUTO-MCNC: 85 MG/DL (ref 65–99)
GLUCOSE SERPL-MCNC: 256 MG/DL (ref 65–99)
HCT VFR BLD AUTO: 25.9 % (ref 37–47)
HCT VFR BLD AUTO: 27.7 % (ref 37–47)
HGB BLD-MCNC: 8.1 G/DL (ref 12–16)
HGB BLD-MCNC: 8.6 G/DL (ref 12–16)
IMM GRANULOCYTES # BLD AUTO: 0.37 K/UL (ref 0–0.11)
IMM GRANULOCYTES NFR BLD AUTO: 3 % (ref 0–0.9)
LACTATE SERPL-SCNC: 2.1 MMOL/L (ref 0.5–2)
LYMPHOCYTES # BLD AUTO: 1.6 K/UL (ref 1–4.8)
LYMPHOCYTES NFR BLD: 13.1 % (ref 22–41)
MAGNESIUM SERPL-MCNC: 2.5 MG/DL (ref 1.5–2.5)
MCH RBC QN AUTO: 33.3 PG (ref 27–33)
MCHC RBC AUTO-ENTMCNC: 31 G/DL (ref 32.2–35.5)
MCV RBC AUTO: 107.4 FL (ref 81.4–97.8)
MONOCYTES # BLD AUTO: 1.25 K/UL (ref 0–0.85)
MONOCYTES NFR BLD AUTO: 10.2 % (ref 0–13.4)
NEUTROPHILS # BLD AUTO: 8.83 K/UL (ref 1.82–7.42)
NEUTROPHILS NFR BLD: 72 % (ref 44–72)
NRBC # BLD AUTO: 0 K/UL
NRBC BLD-RTO: 0 /100 WBC (ref 0–0.2)
PLATELET # BLD AUTO: 257 K/UL (ref 164–446)
PMV BLD AUTO: 11.9 FL (ref 9–12.9)
POTASSIUM SERPL-SCNC: 4.6 MMOL/L (ref 3.6–5.5)
PROCALCITONIN SERPL-MCNC: 0.16 NG/ML
PROT SERPL-MCNC: 6.3 G/DL (ref 6–8.2)
RBC # BLD AUTO: 2.58 M/UL (ref 4.2–5.4)
SODIUM SERPL-SCNC: 135 MMOL/L (ref 135–145)
T4 FREE SERPL-MCNC: 1.42 NG/DL (ref 0.93–1.7)
TSH SERPL DL<=0.005 MIU/L-ACNC: 6.69 UIU/ML (ref 0.38–5.33)
WBC # BLD AUTO: 12.3 K/UL (ref 4.8–10.8)

## 2024-12-05 PROCEDURE — 83605 ASSAY OF LACTIC ACID: CPT

## 2024-12-05 PROCEDURE — 80053 COMPREHEN METABOLIC PANEL: CPT

## 2024-12-05 PROCEDURE — 770020 HCHG ROOM/CARE - TELE (206)

## 2024-12-05 PROCEDURE — A9270 NON-COVERED ITEM OR SERVICE: HCPCS

## 2024-12-05 PROCEDURE — 84443 ASSAY THYROID STIM HORMONE: CPT

## 2024-12-05 PROCEDURE — 700111 HCHG RX REV CODE 636 W/ 250 OVERRIDE (IP): Performed by: HOSPITALIST

## 2024-12-05 PROCEDURE — 82962 GLUCOSE BLOOD TEST: CPT | Mod: 91

## 2024-12-05 PROCEDURE — 84439 ASSAY OF FREE THYROXINE: CPT

## 2024-12-05 PROCEDURE — 83735 ASSAY OF MAGNESIUM: CPT

## 2024-12-05 PROCEDURE — 76937 US GUIDE VASCULAR ACCESS: CPT

## 2024-12-05 PROCEDURE — A9270 NON-COVERED ITEM OR SERVICE: HCPCS | Performed by: HOSPITALIST

## 2024-12-05 PROCEDURE — 700105 HCHG RX REV CODE 258

## 2024-12-05 PROCEDURE — 700102 HCHG RX REV CODE 250 W/ 637 OVERRIDE(OP): Performed by: STUDENT IN AN ORGANIZED HEALTH CARE EDUCATION/TRAINING PROGRAM

## 2024-12-05 PROCEDURE — 84145 PROCALCITONIN (PCT): CPT

## 2024-12-05 PROCEDURE — 05H933Z INSERTION OF INFUSION DEVICE INTO RIGHT BRACHIAL VEIN, PERCUTANEOUS APPROACH: ICD-10-PCS | Performed by: HOSPITALIST

## 2024-12-05 PROCEDURE — 85014 HEMATOCRIT: CPT

## 2024-12-05 PROCEDURE — 82533 TOTAL CORTISOL: CPT

## 2024-12-05 PROCEDURE — 92526 ORAL FUNCTION THERAPY: CPT

## 2024-12-05 PROCEDURE — 700102 HCHG RX REV CODE 250 W/ 637 OVERRIDE(OP): Performed by: HOSPITALIST

## 2024-12-05 PROCEDURE — 85025 COMPLETE CBC W/AUTO DIFF WBC: CPT

## 2024-12-05 PROCEDURE — 700101 HCHG RX REV CODE 250: Performed by: HOSPITALIST

## 2024-12-05 PROCEDURE — A9270 NON-COVERED ITEM OR SERVICE: HCPCS | Performed by: STUDENT IN AN ORGANIZED HEALTH CARE EDUCATION/TRAINING PROGRAM

## 2024-12-05 PROCEDURE — 700101 HCHG RX REV CODE 250: Performed by: STUDENT IN AN ORGANIZED HEALTH CARE EDUCATION/TRAINING PROGRAM

## 2024-12-05 PROCEDURE — 85018 HEMOGLOBIN: CPT

## 2024-12-05 PROCEDURE — 99232 SBSQ HOSP IP/OBS MODERATE 35: CPT | Performed by: INTERNAL MEDICINE

## 2024-12-05 PROCEDURE — 36415 COLL VENOUS BLD VENIPUNCTURE: CPT

## 2024-12-05 PROCEDURE — 700117 HCHG RX CONTRAST REV CODE 255: Performed by: INTERNAL MEDICINE

## 2024-12-05 PROCEDURE — 99497 ADVNCD CARE PLAN 30 MIN: CPT | Performed by: HOSPITALIST

## 2024-12-05 PROCEDURE — 700102 HCHG RX REV CODE 250 W/ 637 OVERRIDE(OP)

## 2024-12-05 PROCEDURE — 93306 TTE W/DOPPLER COMPLETE: CPT

## 2024-12-05 PROCEDURE — 99233 SBSQ HOSP IP/OBS HIGH 50: CPT | Performed by: HOSPITALIST

## 2024-12-05 RX ORDER — MIDODRINE HYDROCHLORIDE 5 MG/1
2.5 TABLET ORAL ONCE
Status: COMPLETED | OUTPATIENT
Start: 2024-12-05 | End: 2024-12-05

## 2024-12-05 RX ORDER — PANTOPRAZOLE SODIUM 40 MG/10ML
40 INJECTION, POWDER, LYOPHILIZED, FOR SOLUTION INTRAVENOUS DAILY
Status: DISCONTINUED | OUTPATIENT
Start: 2024-12-05 | End: 2024-12-07

## 2024-12-05 RX ORDER — MIDODRINE HYDROCHLORIDE 5 MG/1
2.5 TABLET ORAL
Status: DISCONTINUED | OUTPATIENT
Start: 2024-12-05 | End: 2024-12-05

## 2024-12-05 RX ORDER — MIDODRINE HYDROCHLORIDE 5 MG/1
5 TABLET ORAL
Status: DISCONTINUED | OUTPATIENT
Start: 2024-12-05 | End: 2024-12-05

## 2024-12-05 RX ORDER — SODIUM CHLORIDE 9 MG/ML
250 INJECTION, SOLUTION INTRAVENOUS ONCE
Status: COMPLETED | OUTPATIENT
Start: 2024-12-05 | End: 2024-12-05

## 2024-12-05 RX ORDER — SODIUM CHLORIDE 9 MG/ML
500 INJECTION, SOLUTION INTRAVENOUS ONCE
Status: COMPLETED | OUTPATIENT
Start: 2024-12-05 | End: 2024-12-05

## 2024-12-05 RX ORDER — MIDODRINE HYDROCHLORIDE 5 MG/1
5 TABLET ORAL ONCE
Status: COMPLETED | OUTPATIENT
Start: 2024-12-05 | End: 2024-12-05

## 2024-12-05 RX ORDER — MIDODRINE HYDROCHLORIDE 5 MG/1
5 TABLET ORAL
Status: DISCONTINUED | OUTPATIENT
Start: 2024-12-05 | End: 2024-12-10

## 2024-12-05 RX ADMIN — VALPROIC ACID 500 MG: 250 SOLUTION ORAL at 05:30

## 2024-12-05 RX ADMIN — DAKIN'S SOLUTION 0.125% (QUARTER STRENGTH) 5 ML: 0.12 SOLUTION at 17:50

## 2024-12-05 RX ADMIN — MIDODRINE HYDROCHLORIDE 2.5 MG: 5 TABLET ORAL at 10:53

## 2024-12-05 RX ADMIN — LORAZEPAM 0.5 MG: 2 INJECTION INTRAMUSCULAR; INTRAVENOUS at 11:24

## 2024-12-05 RX ADMIN — MICONAZOLE NITRATE: 2 CREAM TOPICAL at 17:50

## 2024-12-05 RX ADMIN — MIDODRINE HYDROCHLORIDE 5 MG: 5 TABLET ORAL at 07:28

## 2024-12-05 RX ADMIN — DEXTROSE MONOHYDRATE 25 G: 25 INJECTION, SOLUTION INTRAVENOUS at 20:12

## 2024-12-05 RX ADMIN — ATORVASTATIN CALCIUM 80 MG: 80 TABLET, FILM COATED ORAL at 17:50

## 2024-12-05 RX ADMIN — SODIUM CHLORIDE 500 ML: 9 INJECTION, SOLUTION INTRAVENOUS at 06:05

## 2024-12-05 RX ADMIN — Medication 5 MG: at 20:12

## 2024-12-05 RX ADMIN — APIXABAN 5 MG: 5 TABLET, FILM COATED ORAL at 17:50

## 2024-12-05 RX ADMIN — SODIUM CHLORIDE 250 ML: 9 INJECTION, SOLUTION INTRAVENOUS at 00:22

## 2024-12-05 RX ADMIN — SODIUM CHLORIDE 250 ML: 9 INJECTION, SOLUTION INTRAVENOUS at 02:18

## 2024-12-05 RX ADMIN — VALPROIC ACID 500 MG: 250 SOLUTION ORAL at 17:50

## 2024-12-05 RX ADMIN — PANTOPRAZOLE SODIUM 40 MG: 40 INJECTION, POWDER, FOR SOLUTION INTRAVENOUS at 13:24

## 2024-12-05 RX ADMIN — LORAZEPAM 0.5 MG: 2 INJECTION INTRAMUSCULAR; INTRAVENOUS at 23:58

## 2024-12-05 RX ADMIN — MIDODRINE HYDROCHLORIDE 5 MG: 5 TABLET ORAL at 17:50

## 2024-12-05 RX ADMIN — MIDODRINE HYDROCHLORIDE 5 MG: 5 TABLET ORAL at 00:08

## 2024-12-05 RX ADMIN — INSULIN GLARGINE-YFGN 17 UNITS: 100 INJECTION, SOLUTION SUBCUTANEOUS at 18:01

## 2024-12-05 RX ADMIN — SODIUM CHLORIDE 250 ML: 9 INJECTION, SOLUTION INTRAVENOUS at 00:49

## 2024-12-05 RX ADMIN — INSULIN LISPRO 1 UNITS: 100 INJECTION, SOLUTION INTRAVENOUS; SUBCUTANEOUS at 07:37

## 2024-12-05 RX ADMIN — OXYCODONE 5 MG: 5 TABLET ORAL at 13:24

## 2024-12-05 RX ADMIN — MIDODRINE HYDROCHLORIDE 5 MG: 5 TABLET ORAL at 04:06

## 2024-12-05 RX ADMIN — APIXABAN 5 MG: 5 TABLET, FILM COATED ORAL at 04:06

## 2024-12-05 RX ADMIN — HUMAN ALBUMIN MICROSPHERES AND PERFLUTREN 3 ML: 10; .22 INJECTION, SOLUTION INTRAVENOUS at 22:45

## 2024-12-05 RX ADMIN — DAKIN'S SOLUTION 0.125% (QUARTER STRENGTH) 473 ML: 0.12 SOLUTION at 04:55

## 2024-12-05 RX ADMIN — QUETIAPINE FUMARATE 25 MG: 50 TABLET ORAL at 17:50

## 2024-12-05 RX ADMIN — QUETIAPINE FUMARATE 150 MG: 100 TABLET ORAL at 20:13

## 2024-12-05 RX ADMIN — CLOPIDOGREL BISULFATE 75 MG: 75 TABLET ORAL at 05:30

## 2024-12-05 RX ADMIN — MICONAZOLE NITRATE: 2 CREAM TOPICAL at 05:42

## 2024-12-05 RX ADMIN — QUETIAPINE FUMARATE 25 MG: 50 TABLET ORAL at 09:02

## 2024-12-05 ASSESSMENT — PAIN DESCRIPTION - PAIN TYPE
TYPE: ACUTE PAIN

## 2024-12-05 ASSESSMENT — FIBROSIS 4 INDEX: FIB4 SCORE: 1.59

## 2024-12-05 NOTE — PROGRESS NOTES
NOC HOSPITALIST CROSS COVER    Notified by RN regarding hypotension.  On clear if patient is symptomatic as she is nonverbal.  1 dose of midodrine 5 mg p.o. was given with transient improvement in hypotension.   later notified of ongoing hypotension.    Patient seen and evaluated at bedside.  She has noted to be clinically dry on exam with dry mucous membranes and no evidence of peripheral edema.  Lung sounds are clear to auscultation.  Abdomen is soft, nontender, nondistended.  Stat labs demonstrating trace elevation in lactic acid at 2.1, uptrending creatinine of 1.29, up from 0.98 yesterday.  Given that the patient appears clinically dry and has worsening kidney function, decision was made to give small fluid boluses.  She has received a total of 1.25 L over the course of the evening.    Vitals:    12/05/24 0540   BP: (!) 77/55   Pulse: 85   Resp:    Temp:    SpO2:       Plan:  # Hypotension  -Increase midodrine to 5 mg p.o.  -Increase hold parameters on antihypertensives  -1.25 L IV fluid bolus given in 250 cc increments  -Subclinical hypothyroidism - could consider replacement  -Timed H&H ordered to evaluate for worsening anemia    Patient is at a very high risk for clinical decompensation. She has a very poor prognosis. Low threshold for transfer to IMCU vs ICU if hypotension not improving. Discussed with collaborating physician who agrees with plan of care. Agree with ongoing goals of care conversations.     -----------------------------------------------------------------------------------------------------------    Electronically signed by:  Ronna Norton, JOSE, BEA, JENIFER-BC  Hospitalist Services

## 2024-12-05 NOTE — PROGRESS NOTES
CARDIOLOGY PROGRESS NOTE      Date of Consultation: 12/5/2024  Consulting Provider: Alissa Joyner M.d.    Patient Name: Kiara Qureshi  YOB: 1965  MRN: [id number]    Assessment:   # Ischemic cardiomyopathy, heart failure with reduced ejection fraction  # Severe coronary artery disease, prior anterior STEMI initially treated with fibrinolysis followed by PCI to proximal LAD, staged PCI to mid LAD, unsuccessful PCI to  OM1  # Post-PCI retroperitoneal bleed, ventricular fibrillation, PEA arrest  # Cardiogenic shock s/p Impella and VA ECMO (with decannulation at [facility])  # Left femoral thrombus s/p surgical embolectomy  # Possible aortic mural thrombus versus penetrating atheroma  # Acute blood loss anemia  # Delirium, suspected ischemic brain injury    Recommendations:   -Obtain limited echocardiogram to assess for any significant recovery of LV function  -Continue clopidogrel 75 mg daily  -Duration of anticoagulation per Vascular Surgery recommendations  -Start PPI for GI prophylaxis  -Continue bisoprolol 2.5 mg daily  -Stop losartan  -Can change to as needed furosemide 20 mg for weight gain/edema  -Continue to wean midodrine as tolerated, but this can be continued with concomitant beta-blocker therapy  -Patient is not a good candidate for MRA, ARNI or SGL2i given unclear prognosis at this time with significant delirium/possible ischemic brain injury    Disposition:   Cardiology will sign off at this time.    Subjective/Events:   Did not tolerate addition of very low dose neurohormonal blockade.  This morning is following commands better, but continues to be unable to respond verbally.    Medications and Allergies:     Current Facility-Administered Medications   Medication Dose Route Frequency Provider Last Rate Last Admin    midodrine (Proamatine) tablet 5 mg  5 mg Enteral Tube TID WITH MEALS Alissa Joyner M.D.        bisoprolol (Zebeta) tablet 2.5 mg  2.5 mg Enteral Tube Q DAY Danielle AREVALO  RADHA Norton        [Held by provider] losartan (Cozaar) tablet 12.5 mg  12.5 mg Enteral Tube Q EVENING BRET WeaverPCase   12.5 mg at 12/04/24 1724    clopidogrel (Plavix) tablet 75 mg  75 mg Enteral Tube DAILY Alissa Joyner M.D.   75 mg at 12/05/24 0530    miconazole (Micotin) 2 % cream   Topical BID Alissa Joyner M.D.   Given at 12/05/24 0542    dakins 0.125% (1/4 strength) topical soln   Topical BID Alissa Joyner M.D.   473 mL at 12/05/24 0455    Pharmacy Consult Request ...Pain Management Review 1 Each  1 Each Other PHARMACY TO DOSE PREET Bear        oxyCODONE immediate-release (Roxicodone) tablet 2.5 mg  2.5 mg Enteral Tube Q4HRS PRN Alissa Joyner M.D.   2.5 mg at 12/03/24 1745    Or    oxyCODONE immediate-release (Roxicodone) tablet 5 mg  5 mg Enteral Tube Q4HRS PRN Alissa Joyner M.D.   5 mg at 12/04/24 2220    apixaban (Eliquis) tablet 5 mg  5 mg Enteral Tube BID Horace Agudelo M.D.   5 mg at 12/05/24 0406    LORazepam (Ativan) injection 0.5 mg  0.5 mg Intravenous Q8HRS PRN Horace Agudelo M.D.   0.5 mg at 12/05/24 1124    ondansetron (Zofran ODT) dispertab 4 mg  4 mg Enteral Tube Q4HRS PRN Jourdan Block M.D.        promethazine (Phenergan) tablet 12.5-25 mg  12.5-25 mg Enteral Tube Q4HRS PRN Jourdan Block M.D.        Pharmacy Consult: Enteral tube insertion - review meds/change route/product selection  1 Each Other PHARMACY TO DOSE Horace Agudelo M.D.        labetalol (Normodyne/Trandate) injection 10 mg  10 mg Intravenous Q4HRS PRN Jourdan Block M.D.        ondansetron (Zofran) syringe/vial injection 4 mg  4 mg Intravenous Q4HRS PRN Jourdan Block M.D.        promethazine (Phenergan) suppository 12.5-25 mg  12.5-25 mg Rectal Q4HRS PRN Jourdan Block M.D.        prochlorperazine (Compazine) injection 5-10 mg  5-10 mg Intravenous Q4HRS PRN Jourdan Block M.D.        atorvastatin (Lipitor) tablet 80 mg  80 mg Enteral Tube Q EVENING Jourdan Ponce  "ELENA Block   80 mg at 12/04/24 1723    insulin GLARGINE (Lantus,Semglee) injection  0.2 Units/kg/day Subcutaneous Q EVENING Jourdan Block M.D.   17 Units at 12/04/24 1811    And    insulin lispro (HumaLOG,AdmeLOG) subcutaneous injection  0.2 Units/kg/day Subcutaneous TID AC Jourdan Block M.D.   6 Units at 12/04/24 0820    And    insulin lispro (HumaLOG,AdmeLOG) subcutaneous injection  1-6 Units Subcutaneous 4X/DAY ACHS Jourdan Block M.D.   1 Units at 12/05/24 0737    And    dextrose 50% (D50W) injection 25 g  25 g Intravenous Q15 MIN PRN Jourdan Block M.D.        valproic acid (Depakene) IR oral solution 500 mg  500 mg Enteral Tube BID Jourdan Block M.D.   500 mg at 12/05/24 0530    QUEtiapine (SEROquel) tablet 25 mg  25 mg Enteral Tube BID Jourdan Block M.D.   25 mg at 12/05/24 0902    QUEtiapine (SEROquel) tablet 150 mg  150 mg Enteral Tube Nightly Jourdan Block M.D.   150 mg at 12/04/24 2117    melatonin tablet 10 mg  10 mg Enteral Tube Nightly Jourdan Block M.D.   10 mg at 12/04/24 2117    [Held by provider] furosemide (Lasix) tablet 20 mg  20 mg Enteral Tube Q DAY Jourdan Block M.D.   20 mg at 12/04/24 0422     No Known Allergies    Review of Systems:   A pertinent review of systems was performed and was unremarkable except as per HPI above.    Vital Signs:   BP (!) 129/96 Comment: RN Notifed  Pulse 64   Temp 37 °C (98.6 °F) (Temporal)   Resp 20   Ht 1.549 m (5' 0.98\")   Wt 85.2 kg (187 lb 13.3 oz)   SpO2 98%   BMI 35.51 kg/m²   Vitals:    12/05/24 0945 12/05/24 0957 12/05/24 1030 12/05/24 1120   BP: (!) 80/52 100/58 94/58 (!) 129/96   Pulse:    64   Resp:    20   Temp:    37 °C (98.6 °F)   TempSrc:    Temporal   SpO2: 93% 90%  98%   Weight:       Height:         Body mass index is 35.51 kg/m².  Oxygen Therapy:  Pulse Oximetry: 98 %, O2 (LPM): 0, O2 Delivery Device: None - Room Air    Physical Examination:   General: Improved responsiveness to some motor " "commands, in no acute distress  Eyes: Extraocular movements spontaneously intact, anicteric  HENT: Unable to assess neck full range of motion or jugular venous distension, feeding tube in place  Pulmonary: Normal respiratory effort, no distress  Cardiovascular: Regular rate, regular rhythm  Gastrointestinal: No obvious tenderness or distention  Extremities: Warm and well perfused, very trace lower extremity edema  Neurological: Able to move hands and toes to commands, spontaneously moving all extremities  Psychiatric: Unable to assess due to mental status    Laboratories:   Estimated Creatinine Clearance: 46.6 mL/min (by C-G formula based on SCr of 1.29 mg/dL).  Recent Labs     12/03/24  0835 12/04/24 0231 12/05/24 0312   CREATININE 1.07 0.98 1.29   BUN 38* 35* 40*   POTASSIUM 4.4 5.1 4.6   SODIUM 142 142 135   CALCIUM 8.2* 8.0* 7.7*   MAGNESIUM 2.4  --  2.5   CO2 18* 18* 17*   ALBUMIN  --   --  2.6*     Recent Labs     12/03/24  0835 12/04/24  0231 12/05/24  0312   GLUCOSE 242* 244* 256*     Recent Labs     12/05/24 0312   ASTSGOT 62*   ALTSGPT 20   ALKPHOSPHAT 120*     Recent Labs     12/03/24  0835 12/04/24  0231 12/05/24  0312 12/05/24  0920   WBC 13.6* 14.3* 12.3*  --    HEMOGLOBIN 9.6* 9.9* 8.6* 8.1*   PLATELETCT 278 262 257  --      No results for input(s): \"TROPONINT\", \"NTPROBNP\", \"HBA1C\" in the last 72 hours.  No results found for: \"LDL\"  No results found for: \"HDL\"    No results found for: \"TRIGLYCERIDE\"    No results found for: \"CHOLSTRLTOT\"    Studies:   The patient's pertinent cardiac studies were reviewed.        Ash Burgess MD, [facility]  Interventional Cardiology  [facility], [address]  [address] [address]  Phone: [phone]  Fax: [phone]            "

## 2024-12-05 NOTE — PROGRESS NOTES
Bedside report received from off going RN/tech: Jolynn assumed care of patient.     Fall Risk Score: HIGH RISK  Fall risk interventions in place: Place yellow fall risk ID band on patient, Provide patient/family education based on risk assessment, Educate patient/family to call staff for assistance when getting out of bed, Place fall precaution signage outside patient door, Place patient in room close to nursing station, Utilize bed/chair fall alarm, Notify charge of high risk for huddle, Tele-sitter, and Bed alarm connected correctly  Bed type: Low air loss (Ray Score less than 17 interventions in place)  Patient on cardiac monitor: Yes  IVF/IV medications: Not Applicable   Oxygen: Room Air  Bedside sitter: Not Applicable   Isolation: Not applicable

## 2024-12-05 NOTE — PROGRESS NOTES
Hospital Medicine Daily Progress Note    Date of Service  12/5/2024    Chief Complaint  Kiara Qureshi is a 59 y.o. female admitted 11/27/2024 with STEMI    Hospital Course  Patient has a history of obesity, hypertension, hyperlipidemia.  She was seen at [facility] as a transfer initially for anterior STEMI requiring lytics.  Patient underwent an LAD PCI but hospital course was complicated by retroperitoneal bleed, ventricular fibrillation and PEA arrest and cardiogenic shock requiring VA ECMO and Impella.  She is now status post ECMO decannulation on November 4 and had left femoral embolectomy, PCI to the LAD, unsuccessful PCI to OM1 and Impella removal on November 8.  Hospital course was complicated by patient developing encephalopathy and delirium, to which she was given valproic acid and quetiapine.  Patient has been weaned off her present medications and was found to have hypotension to which she was started on p.o. midodrine 10 mg 3 times daily.  Goal-directed medical therapy was held due to borderline low blood pressure.  Her echocardiogram on November 24 shows 25 to 30% EF with akinesis of the apical, apical anterior wall, apical septum, apical inferior wall, and apical lateral wall of the left ventricle.  Hypokinesis seen of the basal anterolateral wall, mid anterolateral wall, anterior wall, mid inferolateral wall, and basal inferolateral wall of the left ventricle.     On November 25, patient had CT angiogram of the chest which shows pulmonary edema, small bilateral pleural effusions, small 3 mm filling defect along the lateral wall of the descending thoracic aorta which may represent focal aortic mural thrombus versus ulcerated plaque.  CT angiogram of the abdomen pelvis showing increased organization of the rim-enhancing left retroperitoneal hematomas measuring up to 9.3 x 4.4 cm.  Tiny filling defect/mural thrombus within the posterior right common iliac artery.  Multiple nonenhancing intermediate  density fluid collections within the subcutaneous tissues overlying the right lower quadrant and right inguinal regions measuring up to 5.7 x 2.4 cm.  Additional nonenhancing low-density fluid collection closely associated with the left femoral vein measuring 4.7 x 3.9 cm, likely represents evolving hematomas.     Due to patient's uptrending leukocytosis and CAT scan findings of suspected pneumonia, patient was started on vancomycin and Zosyn on November 26.  Patient was then transferred back to [facility] for further management.      Interval Problem Update  Mumbling some, unable to follow commands for me, I did speak with her brother and he reports she has a long history of severe hearing impairment, I then spoke with her , he will bring in her hearing aids to see if this can impact her ability to follow commands. She pulled out her cortrack accidentally and is only taking ice by mouth, spitting out pills, so new one was placed. Hypotensive overnight therefore I reversed the midodrine taper, held lasix and stopped the losartan, I spoke with cardiology and coordinated the changes, they would like to add bisoprol if tolerated for adrenegic blockade and will also recheck an echo to see if there has been any improvement in EF. I had a long discussion with patient's mother and brother, then with her  and mother who added her sister on speaker phone. We discussed prognosis, goals of care, discharge plan and code status. Her sister Joi is a nurse and encouraged family to continue with full code, she communicated they believe patient has a good chance of recovery. They listened to my recommendations which included consideration of dnr/dni. They would like to proceed with LTAC transfer once approved by insurance.     I have discussed this patient's plan of care and discharge plan at IDT rounds today with Case Management, Nursing, Nursing leadership, and other members of the IDT team. I spent over 60 minutes  coordinating her care, providing direct pt care and reviewing her chart    Consultants/Specialty  Palliative Care  Cardiology    Code Status  Full Code    Disposition  The patient is not medically cleared for discharge to home or a post-acute facility.      I have placed the appropriate orders for post-discharge needs.    Review of Systems  Review of Systems   Unable to perform ROS: Medical condition        Physical Exam  Temp:  [36.6 °C (97.9 °F)-37 °C (98.6 °F)] 37 °C (98.6 °F)  Pulse:  [64-89] 64  Resp:  [16-20] 20  BP: ()/(34-96) 129/96  SpO2:  [78 %-98 %] 98 %    Physical Exam  Vitals and nursing note reviewed. Exam conducted with a chaperone present.   Constitutional:       General: She is not in acute distress.     Appearance: Normal appearance. She is ill-appearing. She is not diaphoretic.   HENT:      Head: Normocephalic.      Nose: Nose normal.      Mouth/Throat:      Mouth: Mucous membranes are moist.   Eyes:      General: No scleral icterus.     Conjunctiva/sclera: Conjunctivae normal.      Pupils: Pupils are equal, round, and reactive to light.   Cardiovascular:      Rate and Rhythm: Normal rate and regular rhythm.      Pulses: Normal pulses.      Heart sounds: Normal heart sounds.   Pulmonary:      Effort: Pulmonary effort is normal.      Breath sounds: Normal breath sounds.   Abdominal:      General: Abdomen is flat. Bowel sounds are normal. There is no distension.      Palpations: Abdomen is soft.      Tenderness: There is no guarding.   Genitourinary:     Comments: Ngt in place  Musculoskeletal:         General: No swelling or deformity. Normal range of motion.      Cervical back: Normal range of motion and neck supple.      Right lower leg: No edema.      Left lower leg: No edema.   Skin:     General: Skin is warm and dry.      Capillary Refill: Capillary refill takes less than 2 seconds.      Comments: Wounds bilateral groins, no d/c, packing in place   Neurological:      General: No focal  deficit present.      Mental Status: She is alert.      Comments: Oriented x 1         Fluids    Intake/Output Summary (Last 24 hours) at 12/5/2024 1553  Last data filed at 12/5/2024 1345  Gross per 24 hour   Intake 820 ml   Output 225 ml   Net 595 ml        Laboratory  Recent Labs     12/03/24  0835 12/04/24  0231 12/05/24  0312 12/05/24  0920   WBC 13.6* 14.3* 12.3*  --    RBC 2.90* 3.05* 2.58*  --    HEMOGLOBIN 9.6* 9.9* 8.6* 8.1*   HEMATOCRIT 31.9* 32.7* 27.7* 25.9*   .0* 107.2* 107.4*  --    MCH 33.1* 32.5 33.3*  --    MCHC 30.1* 30.3* 31.0*  --    RDW 77.0* 76.5* 77.9*  --    PLATELETCT 278 262 257  --    MPV 11.6 11.9 11.9  --      Recent Labs     12/03/24  0835 12/04/24  0231 12/05/24  0312   SODIUM 142 142 135   POTASSIUM 4.4 5.1 4.6   CHLORIDE 113* 112 108   CO2 18* 18* 17*   GLUCOSE 242* 244* 256*   BUN 38* 35* 40*   CREATININE 1.07 0.98 1.29   CALCIUM 8.2* 8.0* 7.7*                   Imaging  DX-ABDOMEN FOR TUBE PLACEMENT   Final Result      Tip of the esophagogastric tube terminates over the pylorus of the stomach.      IR-MIDLINE CATHETER INSERTION WO GUIDANCE > AGE 3   Final Result                  Ultrasound-guided midline placement performed by qualified nursing staff    as above.          IR-US GUIDED PIV   Final Result    Ultrasound-guided PERIPHERAL IV INSERTION performed by    qualified nursing staff as above.      DX-OUTSIDE IMAGES-DX CHEST   Final Result      DX-OUTSIDE IMAGES-DX ABDOMEN   Final Result      MR-OUTSIDE IMAGES-MR BRAIN   Final Result      CT-OUTSIDE IMAGES-CT HEAD   Final Result      CT-OUTSIDE IMAGES-CT ABDOMEN/PELVIS   Final Result      CT-OUTSIDE IMAGES-CT CHEST   Final Result      DX-ABDOMEN FOR TUBE PLACEMENT   Final Result      There is a new small bowel feeding tube which terminates in the small bowel of the right mid abdomen.      DX-CHEST-LIMITED (1 VIEW)   Final Result         Asymmetric pulmonary infiltrates, left worse than right.      EC-ECHOCARDIOGRAM LTD W/  CONT    (Results Pending)        Assessment/Plan  * Heart failure with reduced ejection fraction (HCC)- (present on admission)  Assessment & Plan  Unable to start goal-directed medical therapy due to hypotension.  Echocardiogram showing EF 25 to 30% EF with akinesis of the apical, apical anterior wall, apical septum, apical inferior wall, and apical lateral wall of the left ventricle.  Hypokinesis seen of the basal anterolateral wall, mid anterolateral wall, anterior wall, mid inferolateral wall, and basal inferolateral wall of the left ventricle.    Continue monitoring for signs of fluid overload, see above, currently euvolemic small fluid bolus was given overnight - will hold lasix  Discussed with cardiology, low dose bisoprolol as tolerated, repeat echo pending, did not tolerated losartan    Hypotension  Assessment & Plan  See above, adjusting medication/ cardiac regimen to address, coordinating with cardiology  Following closely     Leukocytosis  Assessment & Plan  Could be related to hematoma  Improving overnight and normal pro luisito  Cbc in am     Abdominal hematoma  Assessment & Plan  Might be related to cardiac device  Monitoring hb  Patient was on lovenox due to rigth common iliac art thrombosis and then transitioned to eliquis per [facility] - see my discussion above - plan to continue for 3-4 months with repeat imaging at that time to see if it can be d/c   Cbc in am     ACP (advance care planning)  Assessment & Plan  See my discussion above, I spoke with family regarding goals of care and code status for 22 minutes, they would like to continue with full code    Pneumonia  Assessment & Plan  Patient found to have increasing white blood cell count up to 15,000 at outside facility  CT angio chest showing persistent irregular tree-in-bud nodularity and bilateral bronchial wall thickening on a background of groundglass opacities within the dependent portions of the right middle, right lower, and left lower lobes  (right greater than left). Increased bilateral upper lobe patchy perihilar groundglass opacities.   Completed abx zosyn and zyvox   .   Cultures were negative    Aortic mural thrombus (HCC)  Assessment & Plan  CT at outside facility showing focal aortic mural thrombus along the lateral wall of the descending thoracic aorta and also a tiny filling defect/mural thrombus within the posterior right common iliac artery  See above discussion regarding eliquis  Monitor H&H    Continue monitoring H&H  Cbc in am         Encephalopathy  Assessment & Plan  Hospital course complicated by encephalopathy.  Likely from combination of CVA, STEMI, hyperglycemia  Removing restraints as tolerated, following  Has a nasojejunal feeding tube placed by interventional radiology from outside facility  Continue Seroquel and Depakene as recommended by neurology/psychiatry at outside facility.    Multifactorial  As per records from [facility] probably anoxic/embolic/delirium   Ongoing encephalopathy, suspected anoxic injury, no significant improvement, will get hearing aids to see if this helps  following    CVA (cerebral vascular accident) (HCC)  Assessment & Plan  Patient had MRI on 11/17/24 at OSH which shwed Punctate acute infarct of the right centrum semiovale. Scattered foci of enhancement in multiple vascular distributions as described may represent recent subacute infarcts. Diffuse stippled susceptibility signal throughout the supratentorial and infratentorial brain. Overall findings may represent sequelae of fat emboli or amyloid related angiopathy.   Continue lipitor, lovenox    Continue to be encephalopathic  Neuro evaluated patient at [facility].       CAD (coronary artery disease)  Assessment & Plan  Patient with recent complicated STEMI with PCI to LAD and mid LAD.  Complicated by V-fib arrest and cardiogenic shock requiring VA ECMO and Impella and retroperitoneal hematomas. At [facility] underwent impella supported PCI to mid LAD just distal to  prior stent but unsuccessful with PCI to 100% occluded OM1 on 11/6. Patient was decannulated and had Impella removed.    Continue Lipitor    Continue monitoring  Continue telemetry.     Discussed with cardiology and asa stopped and replaced with plavix  following    Urinary retention  Assessment & Plan  Beckwith catheter in place    Hyperglycemia  Assessment & Plan  Has had uncontrolled sugars outside hospital  Sliding scale           VTE prophylaxis: Lovenox    I have performed a physical exam and reviewed and updated ROS and Plan today (12/5/2024). In review of yesterday's note (12/4/2024), there are no changes except as documented above.

## 2024-12-05 NOTE — WOUND TEAM
[facility]  Inpatient Services  Initial Wound and Skin Care Evaluation    Admission Date: 11/27/2024     Last order of IP CONSULT TO WOUND CARE was found on 11/30/2024 from Hospital Encounter on 11/27/2024     HPI, PMH, SH: Reviewed    Past Surgical History:   Procedure Laterality Date    INSERTION,CANNULA FOR ECMO,ADULT Left 10/30/2024    Procedure: INSERTION, CANNULA, FOR ECMO, ADULT;  Surgeon: Aime Elias D.O.;  Location: SURGERY [facility];  Service: Cardiothoracic     Social History     Tobacco Use    Smoking status: Never    Smokeless tobacco: Never   Substance Use Topics    Alcohol use: No     No chief complaint on file.    Diagnosis: HFrEF (heart failure with reduced ejection fraction) ([facility]) [I50.20]    Unit where seen by Wound Team: [id number]     WOUND CONSULT RELATED TO:  Bilateral Groin    WOUND TEAM PLAN OF CARE - Frequency of Follow-up:   Nursing to follow dressing orders written for wound care. Contact wound team if area fails to progress, deteriorates or with any questions/concerns if something comes up before next scheduled follow up (See below as to whether wound is following and frequency of wound follow up)   Weekly - Bilateral Groin    WOUND HISTORY:   Pt is a 59yr old female with history of Obesity, and HTN. Pt was initially seen at [facility] for anterior STEMI. Pt underwent LAD PCI but hospital course was complicated by retroperitoneal bleed, V Fib and PEA arrest ultimately requiring ECMO and Impella. Pt was decannulated from ECMO on 11/4 and had left femoral embolectomy. Impella was then removed on 11/8. Pts ECHO on 11/24 revealed EF of 25-30%. Pt has bilateral groin wounds from ECMO and Impella. Wound team was consulted to evaluate.        WOUND ASSESSMENT/LDA  Wound 11/28/24 Full Thickness Wound Open Incision Pelvis;Groin Anterior Left (Active)   Date First Assessed/Time First Assessed: 11/28/24 0211   Present on Original Admission: Yes  Hand Hygiene Completed: Yes   Primary Wound Type: Full Thickness Wound  Surgical Wound Type: Open Incision  Location: Pelvis;Groin  Wound Orientation: Anterior...      Assessments 12/4/2024  4:00 PM   Wound Image      Site Assessment Yellow;Red;Painful;Slough   Periwound Assessment Clean;Dry;Intact   Margins Attached edges;Defined edges   Closure Adhesive bandage   Drainage Amount Small   Drainage Description Tan   Treatments Cleansed;Site care;Nonselective debridement;Offloading;Chemical debridement   Wound Cleansing Approved Wound Cleanser   Periwound Protectant No-sting Skin Prep   Dressing Status Clean;Dry;Intact   Dressing Changed Changed   Dressing Cleansing/Solutions 1/4 Strength Dakin's Solution   Dressing Options Plain Strip Packing;Nonadhesive Foam;Hypafix Tape   Dressing Change/Treatment Frequency Every Shift, and As Needed   NEXT Dressing Change/Treatment Date 12/05/24   NEXT Weekly Photo (Inpatient Only) 12/11/24   Wound Team Following Weekly   Non-staged Wound Description Full thickness   Wound Length (cm) 3 cm   Wound Width (cm) 6 cm   Wound Depth (cm) 2.3 cm   Wound Surface Area (cm^2) 18 cm^2   Wound Volume (cm^3) 41.4 cm^3   Shape Oval   Wound Odor None   WOUND NURSE ONLY - Time Spent with Patient (mins) 60       Wound 11/27/24 Soft Tissue Necrosis Groin Anterior Right (Active)   Date First Assessed/Time First Assessed: 11/27/24 2300   Present on Original Admission: Yes  Primary Wound Type: Soft Tissue Necrosis  Location: Groin  Wound Orientation: Anterior  Laterality: Right      Assessments 12/4/2024  5:00 PM   Wound Image      Site Assessment Red;Yellow;Painful;Slough   Periwound Assessment Clean;Intact;Dry   Margins Attached edges;Defined edges   Closure Adhesive bandage   Drainage Amount Small   Drainage Description Serosanguineous   Treatments Cleansed;Nonselective debridement;Site care;Offloading;Chemical debridement   Wound Cleansing Approved Wound Cleanser   Periwound Protectant No-sting Skin Prep   Dressing Status  Clean;Dry;Intact   Dressing Changed Changed   Dressing Cleansing/Solutions 1/4 Strength Dakin's Solution   Dressing Options Plain Strip Packing;Nonadhesive Foam;Hypafix Tape   Dressing Change/Treatment Frequency Every Shift, and As Needed   NEXT Dressing Change/Treatment Date 12/04/24   NEXT Weekly Photo (Inpatient Only) 12/11/24   Wound Team Following Weekly   Non-staged Wound Description Full thickness   Shape Irregular   Wound Odor None                                    Vascular:    JAGJIT:   No results found.    Lab Values:    Lab Results   Component Value Date/Time    WBC 14.3 (H) 12/04/2024 02:31 AM    RBC 3.05 (L) 12/04/2024 02:31 AM    HEMOGLOBIN 9.9 (L) 12/04/2024 02:31 AM    HEMATOCRIT 32.7 (L) 12/04/2024 02:31 AM    CREACTPROT 10.71 (H) 11/29/2024 03:24 AM    SEDRATEWES 140 (H) 11/28/2024 01:35 AM    HBA1C 6.2 (H) 11/01/2024 06:46 PM    HBA1C 6.4 (H) 10/30/2024 03:18 AM         Culture Results show:  No results found for this or any previous visit (from the past 720 hours).    Pain Level/Medicated:  None, Tolerated without pain medication       INTERVENTIONS BY WOUND TEAM:  Chart and images reviewed. Discussed with bedside RN. All areas of concern (based on picture review, LDA review and discussion with bedside RN) have been thoroughly assessed. Documentation of areas based on significant findings. This RN in to assess patient. Performed standard wound care which includes appropriate positioning, dressing removal and non-selective debridement. Pictures and measurements obtained weekly if/when required.    Wound:  Right Groin Full thickness Necrosis  Preparation for Dressing removal: Removed without difficulty  Cleansed/Non-selectively Debrided with:  Wound cleanser and Gauze  Non-Excisional Conservative Sharp debridement: Slough debrided away using scissors and forceps < 20cm2 debrided down to slough and Adipose.  No Bleeding noted.  Brianna wound: Cleansed with Wound cleanser and Gauze, Prepped with No  "Sting  Primary Dressing:  Dakins ordered, 1/2\" strip packing applied.   Secondary (Outer) Dressing: Secured with nonadhesive foam and hypafix tape     Wound:  Left Groin Full thickness open incision  Preparation for Dressing removal: Removed without difficulty  Cleansed/Non-selectively Debrided with:  Wound cleanser and Gauze  Non-Excisional Conservative Sharp debridement: Slough debrided away using scissors and forceps < 20cm2 debrided down to slough and Adipose.  No Bleeding noted.  Brianna wound: Cleansed with Wound cleanser and Gauze, Prepped with No Sting  Primary Dressing:  Dakins ordered, 1/2\" strip packing applied.   Secondary (Outer) Dressing: Secured with nonadhesive foam and hypafix tape     Area Assessed: Abdomen/Pannus  Area intact, Bruising to abdomen    Area Assessed: Back  Area intact,     Area Assessed:  Sacrococcygeal area  Area intact, some moisture noted. Micontin ordered, sacral offloading dressing applied.    Area Assessed:  Bilateral I.T.s  Area intact,     Area Assessed:  BLE & Knees  Area intact,     Area Assessed:  Bilateral ankles  Area intact,     Area Assessed:  Bilateral heels  Area intact, Heels red but quick to lalit. Offloading dressings applied.       Advanced Wound Care Discharge Planning  Number of Clinicians necessary to complete wound care: 2 - Confused   Is patient requiring IV pain medications for dressing changes:  No   Length of time for dressing change 30 min. (This does not include chart review, pre-medication time, set up, clean up or time spent charting.)    Interdisciplinary consultation: Patient, Bedside RN, Ana BOWEN (Wound RN).  Pressure injury and staging reviewed with N/A.    EVALUATION / RATIONALE FOR TREATMENT:     Date:  12/04/24  Wound Status:  Initial evaluation    Bilateral groin sites with necrotic tissue, adipose and tan purulent drainage. Discussed with bedside RN who will speak with MD regarding surgical consult. Pt would benefit from NPWT application but " "would like surgery to clear pt from any exposed structures prior to vac application. Dakins ordered to chemically debride.          Goals: Steady decrease in wound area and depth weekly.    NURSING PLAN OF CARE ORDERS:  Dressing changes: See Dressing Care orders  RN Prevention Protocol    NUTRITION RECOMMENDATIONS   Wound Team Recommendations:  N/A    DIET ORDERS (From admission to next 24h)       Start     Ordered    12/02/24 1232  Supplements  ALL MEALS        Question Answer Comment   Which Supplement Glucerna    Glucerna: Glucerna Shake Carton        12/02/24 1231    12/02/24 1229  Diet: Diet Tube Feed; Formula: Pivot (Nocturnal tube feed x 10 hours, suggest from 2000 to 0600.); Pivot: Pivot 1.5 RTH; Goal Rate (mL/Hour): 50; Duration: 10 HR; Tube Feed Time Unit: Hours/Night  ALL MEALS        Question Answer Comment   Diet Diet Tube Feed    Formula: Pivot Nocturnal tube feed x 10 hours, suggest from 2000 to 0600.   Pivot: Pivot 1.5 RTH    Goal Rate (mL/Hour) 50    Route NG    Duration 10 HR    Tube Feed Time Unit Hours/Night       Placed in \"And\" Linked Group    12/02/24 1229    11/29/24 1454  Diet Order Diet: Full Liquid; Tray Modifications (optional): SLP - 1:1 Supervision by Nursing  ALL MEALS        Question Answer Comment   Diet: Full Liquid    Tray Modifications (optional) SLP - 1:1 Supervision by Nursing        11/29/24 1453                    PREVENTATIVE INTERVENTIONS:    Q shift Ray - performed per nursing policy  Q shift pressure point assessments - performed per nursing policy    Surface/Positioning  Low Airloss - Currently in Place  Reposition q 2 hours - Currently in Place    Offloading/Redistribution  Sacral offloading dressing (Silicone dressing) - Applied this Visit  Heel offloading dressing (Silicone dressing) - Applied this Visit      Respiratory  N/A    Containment/Moisture Prevention    Dri-hemanth pad - Currently in Place  Beckwith Catheter - Currently in Place    Anticipated discharge " plans:  TBD        Vac Discharge Needs:  Vac Discharge plan is purely a recommendation from wound team and not a requirement for discharge unless otherwise stated by physician.  Not Applicable Pt not on a wound vac

## 2024-12-05 NOTE — PROGRESS NOTES
Bedside RN notified me that pt's cortrac from [facility] was removed by patient.  Dr TOBY Joyner verified that pt still needs feeding tube.  IRIS placed by superuser without complication.  Secured at 69cm to left nare with previously placed bridle

## 2024-12-05 NOTE — PROGRESS NOTES
2355: BEA Danielson notified on BP of 80/42, MAP 55, HR 78. Patient is easily arousable, unable to assess if patient is symptomatic due to mentation. 5 mg enteral midodrine ordered. Q15 min auto BP running.     0017: 250 mL bolus of NS ordered.     0045: manual BP 70/36, MAP 47, HR 72, additional 250 mL bolus of NS ordered. Q15 min auto BP running. See flowsheets for sequential vitals.

## 2024-12-05 NOTE — CARE PLAN
The patient is Stable - Low risk of patient condition declining or worsening    Shift Goals  Clinical Goals: safety  Patient Goals: morenita  Family Goals: morenita    Progress made toward(s) clinical / shift goals:      Problem: Safety - Medical Restraint  Goal: Remains free of injury from restraints (Restraint for Interference with Medical Device)  Outcome: Progressing     Problem: Skin Integrity  Goal: Skin integrity is maintained or improved  Outcome: Progressing       Patient is not progressing towards the following goals:

## 2024-12-05 NOTE — PROCEDURES
Vascular Access Team    Date of Insertion: 12/5/2024  Arm Circumference: n/a  Line Length: 10  Line Size: 20g  Vein Occupancy %: 32  Reason for Midline: Access  Labs: WBC 12.3, , BUN 40, Cr 1.29, GFR 48, INR NA    Orders confirmed, vessel patency confirmed with ultrasound. Risks and benefits of procedure explained to patient and education regarding line associated bloodstream infections provided. Questions answered.     PowerGlide Midline placed in RUE per licensed provider order with ultrasound guidance. 20g, 10 cm line placed in Brachial vein after 1 attempt(s).  Catheter inserted with brisk blood return. Secured with 0cm external from insertion site.  Line flushed without resistance with 10 mL 0.9% normal saline.  Midline secured with Biopatch and Tegaderm.     Midline placement is confirmed by nurse using ultrasound and ability to flush and draw blood. Midline is appropriate for use at this time.  No X-ray is needed for placement confirmation. Pt tolerated procedure well.  Patient condition relayed to unit RN or ordering physician via this post procedure note in the EMR.    Ultrasound images uploaded to PACS and viewable in the EMR - yes  Ultrasound imaged printed and placed in paper chart - no      BARD PowerGlide Midline ref # [id number], Lot # [id number], Expiration Date 04/30/2025

## 2024-12-05 NOTE — THERAPY
Speech Language Pathology   Daily Treatment     Patient Name: Kiara Qureshi  AGE:  59 y.o., SEX:  female  Medical Record #: [id number]  Date of Service: 12/5/2024      Precautions:  Precautions: Fall Risk, Swallow Precautions, Nasogastric Tube         Subjective  Patient cleared by RN for session. Patient with incoherent speech, unable to follow commands, poor participation.      Assessment  Patient seen this date for dysphagia management. Patient only agreeable to PO of thins by straw, refused further PO despite max encouragement from clinician and RN. No overt s/sx of aspiration appreciated. Per RN, patient's  provided patient with watermelon and patient consumed without difficulty.       Clinical Impressions  Patient's AMS precludes adequate participation. Recommend continuing full liquid snacks with NGT as primary source of nutrition/hydration/meds. Consider nocturnal feedings only to promote appetite. Okay for  to bring outside food if patient requests it.          Recommendations  Treatment Completed: Dysphagia Treatment       Dysphagia Treatment  Diet Consistency: Full liquids  Instrumentation: None indicated at this time  Medication: Whole with liquid, One pill at a time, Cut large pills, As tolerated  Supervision: 1:1 feeding with constant supervision  Positioning: Fully upright and midline during oral intake  Risk Management : Small bites/sips, Slow rate of intake, Reduce environmental distractions, Physical mobility, as tolerated  Oral Care: Q4h                     SLP Treatment Plan  Treatment Plan: Dysphagia Treatment, Patient/Family/Caregiver Training  SLP Frequency: 3x Per Week  Estimated Duration: Until Therapy Goals Met      Anticipated Discharge Needs  Discharge Recommendations: Recommend post-acute placement for additional speech therapy services prior to discharge home  Therapy Recommendations Upon DC: Dysphagia Training, Patient / Family / Caregiver Education      Patient /  "Family Goals  Patient / Family Goal #1: \"I'll eat later when I'm more hungry\"  Goal #1 Outcome: Progressing slower than expected  Short Term Goals  Short Term Goal # 1: Patient will consume a full liquid diet with no overt s/sx of aspiration given 1:1 feeding.  Goal Outcome # 1: Progressing slower than expected      Elidiawagner Molina SLP  "

## 2024-12-05 NOTE — CARE PLAN
The patient is Watcher - Medium risk of patient condition declining or worsening    Shift Goals  Clinical Goals: safety, hemodynamic stability  Patient Goals: morenita  Family Goals: morenita    Progress made toward(s) clinical / shift goals:      Problem: Safety - Medical Restraint  Goal: Remains free of injury from restraints (Restraint for Interference with Medical Device)  Outcome: Progressing  Note: Patient in bilateral wrist restraints and has all four bedrails up. Q2H restraint checks completed and charted.     Problem: Care Map:  Admission Optimal Outcome for the Heart Failure Patient  Goal: Admission:  Optimal Care of the heart failure patient  Outcome: Progressing  Note: Cardiology on board for appropriate medications       Patient is not progressing towards the following goals:

## 2024-12-05 NOTE — THERAPY
Physical Therapy Contact Note    Patient Name: Kiara Qureshi  Age:  59 y.o., Sex:  female  Medical Record #: [id number]  Today's Date: 12/5/2024    RN requesting to hold PT at this time due to hemodynamic instability. Will monitor pt status and follow up as appropriate.     Mil Douglas, PT, DPT

## 2024-12-05 NOTE — PROGRESS NOTES
Monitor Summary    Rhythm: SR  Rate: 72-92  Ectopy: (o) pvc, trigem, junctional   Measurements: .14 / .09 / .34

## 2024-12-05 NOTE — WOUND TEAM
Assisted Ishan RAMSEY (Wound RN), with wound care, non-selective debridement performed using wound cleanser/NS and gauze. Please see Ishan RAMSEY (Wound RN) wound note for further wound care details.

## 2024-12-05 NOTE — DISCHARGE PLANNING
Case Management Discharge Planning    Admission Date: 11/27/2024  GMLOS: 4.1  ALOS: 8    6-Clicks ADL Score: 8  6-Clicks Mobility Score: 9  PT and/or OT Eval ordered: Yes  Post-acute Referrals Ordered: Yes  Post-acute Choice Obtained: Yes  Has referral(s) been sent to post-acute provider:  Yes      Anticipated Discharge Dispo: Discharge Disposition: Disch to a long term care facility (63)  Discharge Address: [facility] [address]   [place] [address]  Discharge Contact Phone Number: [phone]    DME Needed: No    Action(s) Taken: RNCM reached out to [employer] Liaison regarding status of insurance Auth. Fernando Oro with [employer]; insurance Auth is still pending for this patient.      UPDATE AT 0140- fernando Oro with [employer]; they are still waiting on insurance auth for this patient.     Escalations Completed: None    Medically Clear: Yes    Next Steps: F/U with medical team regarding D/C needs/ barriers.     Barriers to Discharge: Pending Insurance Authorization    Is the patient up for discharge tomorrow: No

## 2024-12-06 PROBLEM — K59.00 CONSTIPATION: Status: ACTIVE | Noted: 2024-12-06

## 2024-12-06 LAB
ANION GAP SERPL CALC-SCNC: 8 MMOL/L (ref 7–16)
BASOPHILS # BLD AUTO: 0.5 % (ref 0–1.8)
BASOPHILS # BLD: 0.05 K/UL (ref 0–0.12)
BUN SERPL-MCNC: 38 MG/DL (ref 8–22)
CALCIUM SERPL-MCNC: 7.7 MG/DL (ref 8.5–10.5)
CHLORIDE SERPL-SCNC: 112 MMOL/L (ref 96–112)
CO2 SERPL-SCNC: 17 MMOL/L (ref 20–33)
CREAT SERPL-MCNC: 1.24 MG/DL (ref 0.5–1.4)
EOSINOPHIL # BLD AUTO: 0.17 K/UL (ref 0–0.51)
EOSINOPHIL NFR BLD: 1.5 % (ref 0–6.9)
ERYTHROCYTE [DISTWIDTH] IN BLOOD BY AUTOMATED COUNT: 77 FL (ref 35.9–50)
GFR SERPLBLD CREATININE-BSD FMLA CKD-EPI: 50 ML/MIN/1.73 M 2
GLUCOSE BLD STRIP.AUTO-MCNC: 154 MG/DL (ref 65–99)
GLUCOSE BLD STRIP.AUTO-MCNC: 156 MG/DL (ref 65–99)
GLUCOSE BLD STRIP.AUTO-MCNC: 68 MG/DL (ref 65–99)
GLUCOSE BLD STRIP.AUTO-MCNC: 82 MG/DL (ref 65–99)
GLUCOSE BLD STRIP.AUTO-MCNC: 82 MG/DL (ref 65–99)
GLUCOSE SERPL-MCNC: 211 MG/DL (ref 65–99)
HCT VFR BLD AUTO: 26.8 % (ref 37–47)
HGB BLD-MCNC: 8.4 G/DL (ref 12–16)
IMM GRANULOCYTES # BLD AUTO: 0.26 K/UL (ref 0–0.11)
IMM GRANULOCYTES NFR BLD AUTO: 2.3 % (ref 0–0.9)
LV EJECT FRACT MOD 2C 99903: 31.79
LV EJECT FRACT MOD 4C 99902: 21.51
LV EJECT FRACT MOD BP 99901: 28.26
LYMPHOCYTES # BLD AUTO: 1.33 K/UL (ref 1–4.8)
LYMPHOCYTES NFR BLD: 12 % (ref 22–41)
MAGNESIUM SERPL-MCNC: 2.5 MG/DL (ref 1.5–2.5)
MCH RBC QN AUTO: 33.1 PG (ref 27–33)
MCHC RBC AUTO-ENTMCNC: 31.3 G/DL (ref 32.2–35.5)
MCV RBC AUTO: 105.5 FL (ref 81.4–97.8)
MONOCYTES # BLD AUTO: 1.14 K/UL (ref 0–0.85)
MONOCYTES NFR BLD AUTO: 10.3 % (ref 0–13.4)
NEUTROPHILS # BLD AUTO: 8.12 K/UL (ref 1.82–7.42)
NEUTROPHILS NFR BLD: 73.4 % (ref 44–72)
NRBC # BLD AUTO: 0 K/UL
NRBC BLD-RTO: 0 /100 WBC (ref 0–0.2)
PLATELET # BLD AUTO: 279 K/UL (ref 164–446)
PMV BLD AUTO: 11.5 FL (ref 9–12.9)
POTASSIUM SERPL-SCNC: 4.6 MMOL/L (ref 3.6–5.5)
RBC # BLD AUTO: 2.54 M/UL (ref 4.2–5.4)
SODIUM SERPL-SCNC: 137 MMOL/L (ref 135–145)
WBC # BLD AUTO: 11.1 K/UL (ref 4.8–10.8)

## 2024-12-06 PROCEDURE — 80048 BASIC METABOLIC PNL TOTAL CA: CPT

## 2024-12-06 PROCEDURE — A9270 NON-COVERED ITEM OR SERVICE: HCPCS | Performed by: HOSPITALIST

## 2024-12-06 PROCEDURE — 82962 GLUCOSE BLOOD TEST: CPT | Mod: 91

## 2024-12-06 PROCEDURE — 700111 HCHG RX REV CODE 636 W/ 250 OVERRIDE (IP): Mod: JZ

## 2024-12-06 PROCEDURE — 99233 SBSQ HOSP IP/OBS HIGH 50: CPT | Performed by: HOSPITALIST

## 2024-12-06 PROCEDURE — 700101 HCHG RX REV CODE 250: Performed by: STUDENT IN AN ORGANIZED HEALTH CARE EDUCATION/TRAINING PROGRAM

## 2024-12-06 PROCEDURE — 85025 COMPLETE CBC W/AUTO DIFF WBC: CPT

## 2024-12-06 PROCEDURE — 770020 HCHG ROOM/CARE - TELE (206)

## 2024-12-06 PROCEDURE — 83735 ASSAY OF MAGNESIUM: CPT

## 2024-12-06 PROCEDURE — 99222 1ST HOSP IP/OBS MODERATE 55: CPT | Performed by: SURGERY

## 2024-12-06 PROCEDURE — A9270 NON-COVERED ITEM OR SERVICE: HCPCS | Performed by: STUDENT IN AN ORGANIZED HEALTH CARE EDUCATION/TRAINING PROGRAM

## 2024-12-06 PROCEDURE — 93306 TTE W/DOPPLER COMPLETE: CPT | Mod: 26 | Performed by: INTERNAL MEDICINE

## 2024-12-06 PROCEDURE — 36415 COLL VENOUS BLD VENIPUNCTURE: CPT

## 2024-12-06 PROCEDURE — 700111 HCHG RX REV CODE 636 W/ 250 OVERRIDE (IP): Performed by: HOSPITALIST

## 2024-12-06 PROCEDURE — 700102 HCHG RX REV CODE 250 W/ 637 OVERRIDE(OP): Performed by: HOSPITALIST

## 2024-12-06 PROCEDURE — 700102 HCHG RX REV CODE 250 W/ 637 OVERRIDE(OP): Performed by: STUDENT IN AN ORGANIZED HEALTH CARE EDUCATION/TRAINING PROGRAM

## 2024-12-06 PROCEDURE — 700105 HCHG RX REV CODE 258: Performed by: HOSPITALIST

## 2024-12-06 RX ORDER — HALOPERIDOL 5 MG/ML
2.5 INJECTION INTRAMUSCULAR ONCE
Status: COMPLETED | OUTPATIENT
Start: 2024-12-06 | End: 2024-12-06

## 2024-12-06 RX ORDER — BISACODYL 10 MG
10 SUPPOSITORY, RECTAL RECTAL DAILY
Status: DISCONTINUED | OUTPATIENT
Start: 2024-12-06 | End: 2024-12-30 | Stop reason: HOSPADM

## 2024-12-06 RX ORDER — SODIUM CHLORIDE 9 MG/ML
500 INJECTION, SOLUTION INTRAVENOUS ONCE
Status: COMPLETED | OUTPATIENT
Start: 2024-12-06 | End: 2024-12-06

## 2024-12-06 RX ADMIN — MIDODRINE HYDROCHLORIDE 5 MG: 5 TABLET ORAL at 08:33

## 2024-12-06 RX ADMIN — QUETIAPINE FUMARATE 25 MG: 50 TABLET ORAL at 04:08

## 2024-12-06 RX ADMIN — MIDODRINE HYDROCHLORIDE 5 MG: 5 TABLET ORAL at 17:28

## 2024-12-06 RX ADMIN — SODIUM CHLORIDE 500 ML: 9 INJECTION, SOLUTION INTRAVENOUS at 11:47

## 2024-12-06 RX ADMIN — MIDODRINE HYDROCHLORIDE 5 MG: 5 TABLET ORAL at 11:51

## 2024-12-06 RX ADMIN — APIXABAN 5 MG: 5 TABLET, FILM COATED ORAL at 04:08

## 2024-12-06 RX ADMIN — QUETIAPINE FUMARATE 150 MG: 100 TABLET ORAL at 20:15

## 2024-12-06 RX ADMIN — VALPROIC ACID 500 MG: 250 SOLUTION ORAL at 04:10

## 2024-12-06 RX ADMIN — VALPROIC ACID 500 MG: 250 SOLUTION ORAL at 17:28

## 2024-12-06 RX ADMIN — OXYCODONE 5 MG: 5 TABLET ORAL at 01:40

## 2024-12-06 RX ADMIN — LORAZEPAM 0.5 MG: 2 INJECTION INTRAMUSCULAR; INTRAVENOUS at 15:41

## 2024-12-06 RX ADMIN — Medication 5 MG: at 20:15

## 2024-12-06 RX ADMIN — HALOPERIDOL LACTATE 2.5 MG: 5 INJECTION, SOLUTION INTRAMUSCULAR at 02:17

## 2024-12-06 RX ADMIN — INSULIN GLARGINE-YFGN 17 UNITS: 100 INJECTION, SOLUTION SUBCUTANEOUS at 17:44

## 2024-12-06 RX ADMIN — DEXTROSE MONOHYDRATE 25 G: 25 INJECTION, SOLUTION INTRAVENOUS at 20:38

## 2024-12-06 RX ADMIN — MICONAZOLE NITRATE: 2 CREAM TOPICAL at 17:28

## 2024-12-06 RX ADMIN — INSULIN LISPRO 1 UNITS: 100 INJECTION, SOLUTION INTRAVENOUS; SUBCUTANEOUS at 08:40

## 2024-12-06 RX ADMIN — PANTOPRAZOLE SODIUM 40 MG: 40 INJECTION, POWDER, FOR SOLUTION INTRAVENOUS at 04:08

## 2024-12-06 RX ADMIN — ATORVASTATIN CALCIUM 80 MG: 80 TABLET, FILM COATED ORAL at 17:28

## 2024-12-06 RX ADMIN — APIXABAN 5 MG: 5 TABLET, FILM COATED ORAL at 17:28

## 2024-12-06 RX ADMIN — DAKIN'S SOLUTION 0.125% (QUARTER STRENGTH) 5 ML: 0.12 SOLUTION at 18:00

## 2024-12-06 RX ADMIN — MICONAZOLE NITRATE: 2 CREAM TOPICAL at 04:08

## 2024-12-06 RX ADMIN — BISACODYL 10 MG: 10 SUPPOSITORY RECTAL at 15:41

## 2024-12-06 RX ADMIN — CLOPIDOGREL BISULFATE 75 MG: 75 TABLET ORAL at 04:08

## 2024-12-06 RX ADMIN — DAKIN'S SOLUTION 0.125% (QUARTER STRENGTH) 30 ML: 0.12 SOLUTION at 04:24

## 2024-12-06 RX ADMIN — QUETIAPINE FUMARATE 25 MG: 50 TABLET ORAL at 17:42

## 2024-12-06 ASSESSMENT — FIBROSIS 4 INDEX: FIB4 SCORE: 2.93

## 2024-12-06 ASSESSMENT — PAIN DESCRIPTION - PAIN TYPE
TYPE: ACUTE PAIN
TYPE: ACUTE PAIN

## 2024-12-06 NOTE — WOUND TEAM
Wound consult received for VAC placement to BL groins per MD Conley. Wound team to see patient and place Vac tomorrow.

## 2024-12-06 NOTE — PROGRESS NOTES
Bedside report received from off going RN/tech: KASIE Schultz, assumed care of patient.     Fall Risk Score: HIGH RISK  Fall risk interventions in place: Provide patient/family education based on risk assessment, Educate patient/family to call staff for assistance when getting out of bed, Place fall precaution signage outside patient door, Place patient in room close to nursing station, Utilize bed/chair fall alarm, Notify charge of high risk for huddle, Tele-sitter, and Bed alarm connected correctly  Bed type: Regular (Ray Score less than 17 interventions in place)  Patient on cardiac monitor: Yes  IVF/IV medications: Not Applicable   Oxygen: Room Air  Bedside sitter: Not Applicable   Isolation: Not applicable

## 2024-12-06 NOTE — PROGRESS NOTES
Monitor Summary  Rhythm: Normal Sinus Rhythm and Sinus Tachycardia  Rate: 75 - 107  Ectopy: PVCs, PACs  .13 / .10 / .34

## 2024-12-06 NOTE — DISCHARGE PLANNING
-1248  DPA confirmed with Lissa at [facility] that facility is not contracted with pt's insurance.

## 2024-12-06 NOTE — PROGRESS NOTES
Hospital Medicine Daily Progress Note    Date of Service  12/6/2024    Chief Complaint  Kiara Qureshi is a 59 y.o. female admitted 11/27/2024 with STEMI    Hospital Course  Patient has a history of obesity, hypertension, hyperlipidemia.  She was seen at [facility] as a transfer initially for anterior STEMI requiring lytics.  Patient underwent an LAD PCI but hospital course was complicated by retroperitoneal bleed, ventricular fibrillation and PEA arrest and cardiogenic shock requiring VA ECMO and Impella.  She is now status post ECMO decannulation on November 4 and had left femoral embolectomy, PCI to the LAD, unsuccessful PCI to OM1 and Impella removal on November 8.  Hospital course was complicated by patient developing encephalopathy and delirium, to which she was given valproic acid and quetiapine.  Patient has been weaned off her present medications and was found to have hypotension to which she was started on p.o. midodrine 10 mg 3 times daily.  Goal-directed medical therapy was held due to borderline low blood pressure.  Her echocardiogram on November 24 shows 25 to 30% EF with akinesis of the apical, apical anterior wall, apical septum, apical inferior wall, and apical lateral wall of the left ventricle.  Hypokinesis seen of the basal anterolateral wall, mid anterolateral wall, anterior wall, mid inferolateral wall, and basal inferolateral wall of the left ventricle.     On November 25, patient had CT angiogram of the chest which shows pulmonary edema, small bilateral pleural effusions, small 3 mm filling defect along the lateral wall of the descending thoracic aorta which may represent focal aortic mural thrombus versus ulcerated plaque.  CT angiogram of the abdomen pelvis showing increased organization of the rim-enhancing left retroperitoneal hematomas measuring up to 9.3 x 4.4 cm.  Tiny filling defect/mural thrombus within the posterior right common iliac artery.  Multiple nonenhancing intermediate  density fluid collections within the subcutaneous tissues overlying the right lower quadrant and right inguinal regions measuring up to 5.7 x 2.4 cm.  Additional nonenhancing low-density fluid collection closely associated with the left femoral vein measuring 4.7 x 3.9 cm, likely represents evolving hematomas.     Due to patient's uptrending leukocytosis and CAT scan findings of suspected pneumonia, patient was started on vancomycin and Zosyn on November 26.  Patient was then transferred back to [facility] for further management.      Interval Problem Update  Alert and mumbling again, not following commands, her  says he will bring in the hearing aides today to see if that helps. Hypotensive again this am, resolved with iv fluids. Insurance denied LTAC so we appealed, I completed a peer to peer with their physician and she told me they would approve after all. She continues to require frequent adjustment of cardiac meds and close monitoring of fluid status, needed a fuid bolus today. I also spoke with vascular as wound care requested their input regarding the deep bilateral groin wounds and proximity to her femoral artery, vascular kindly saw patient and recommended bilateral wound vacs. I also spoke with cardiology and close coordination with nursing. Patient is highly complex over 65 minutes was spent on her direct and coordinating care by myself today.     I have discussed this patient's plan of care and discharge plan at IDT rounds today with Case Management, Nursing, Nursing leadership, and other members of the IDT team. I spent over 60 minutes coordinating her care, providing direct pt care and reviewing her chart    Consultants/Specialty  Palliative Care  Cardiology    Code Status  Full Code    Disposition  The patient is not medically cleared for discharge to home or a post-acute facility.      I have placed the appropriate orders for post-discharge needs.    Review of Systems  Review of Systems   Unable  to perform ROS: Medical condition        Physical Exam  Temp:  [36.5 °C (97.7 °F)-37.3 °C (99.1 °F)] 37.3 °C (99.1 °F)  Pulse:  [75-89] 89  Resp:  [18-20] 20  BP: ()/(39-81) 108/59  SpO2:  [90 %-99 %] 90 %    Physical Exam  Vitals and nursing note reviewed. Exam conducted with a chaperone present.   Constitutional:       General: She is not in acute distress.     Appearance: Normal appearance. She is ill-appearing. She is not diaphoretic.   HENT:      Head: Normocephalic.      Nose: Nose normal.      Mouth/Throat:      Mouth: Mucous membranes are moist.   Eyes:      General: No scleral icterus.     Conjunctiva/sclera: Conjunctivae normal.      Pupils: Pupils are equal, round, and reactive to light.   Cardiovascular:      Rate and Rhythm: Normal rate and regular rhythm.      Pulses: Normal pulses.      Heart sounds: Normal heart sounds.   Pulmonary:      Effort: Pulmonary effort is normal.      Breath sounds: Normal breath sounds.   Abdominal:      General: Abdomen is flat. Bowel sounds are normal. There is no distension.      Palpations: Abdomen is soft.      Tenderness: There is no guarding.   Genitourinary:     Comments: Ngt in place  Musculoskeletal:         General: No swelling or deformity. Normal range of motion.      Cervical back: Normal range of motion and neck supple.      Right lower leg: No edema.      Left lower leg: No edema.   Skin:     General: Skin is warm and dry.      Capillary Refill: Capillary refill takes less than 2 seconds.      Comments: Wounds bilateral groins, no d/c, deep packing in place   Neurological:      General: No focal deficit present.      Mental Status: She is alert.      Comments: Oriented x 1         Fluids    Intake/Output Summary (Last 24 hours) at 12/6/2024 1642  Last data filed at 12/6/2024 1500  Gross per 24 hour   Intake 795 ml   Output 700 ml   Net 95 ml        Laboratory  Recent Labs     12/04/24  0231 12/05/24  0312 12/05/24  0920 12/06/24  0138   WBC 14.3*  12.3*  --  11.1*   RBC 3.05* 2.58*  --  2.54*   HEMOGLOBIN 9.9* 8.6* 8.1* 8.4*   HEMATOCRIT 32.7* 27.7* 25.9* 26.8*   .2* 107.4*  --  105.5*   MCH 32.5 33.3*  --  33.1*   MCHC 30.3* 31.0*  --  31.3*   RDW 76.5* 77.9*  --  77.0*   PLATELETCT 262 257  --  279   MPV 11.9 11.9  --  11.5     Recent Labs     12/04/24  0231 12/05/24  0312 12/06/24  0138   SODIUM 142 135 137   POTASSIUM 5.1 4.6 4.6   CHLORIDE 112 108 112   CO2 18* 17* 17*   GLUCOSE 244* 256* 211*   BUN 35* 40* 38*   CREATININE 0.98 1.29 1.24   CALCIUM 8.0* 7.7* 7.7*                   Imaging  EC-ECHOCARDIOGRAM COMPLETE W/ CONT   Final Result      DX-ABDOMEN FOR TUBE PLACEMENT   Final Result      Tip of the esophagogastric tube terminates over the pylorus of the stomach.      IR-MIDLINE CATHETER INSERTION WO GUIDANCE > AGE 3   Final Result                  Ultrasound-guided midline placement performed by qualified nursing staff    as above.          IR-US GUIDED PIV   Final Result    Ultrasound-guided PERIPHERAL IV INSERTION performed by    qualified nursing staff as above.      DX-OUTSIDE IMAGES-DX CHEST   Final Result      DX-OUTSIDE IMAGES-DX ABDOMEN   Final Result      MR-OUTSIDE IMAGES-MR BRAIN   Final Result      CT-OUTSIDE IMAGES-CT HEAD   Final Result      CT-OUTSIDE IMAGES-CT ABDOMEN/PELVIS   Final Result      CT-OUTSIDE IMAGES-CT CHEST   Final Result      DX-ABDOMEN FOR TUBE PLACEMENT   Final Result      There is a new small bowel feeding tube which terminates in the small bowel of the right mid abdomen.      DX-CHEST-LIMITED (1 VIEW)   Final Result         Asymmetric pulmonary infiltrates, left worse than right.           Assessment/Plan  * Heart failure with reduced ejection fraction (HCC)- (present on admission)  Assessment & Plan  Unable to start goal-directed medical therapy due to hypotension.  Echocardiogram showing EF 25 to 30% EF with akinesis of the apical, apical anterior wall, apical septum, apical inferior wall, and apical  lateral wall of the left ventricle.  Hypokinesis seen of the basal anterolateral wall, mid anterolateral wall, anterior wall, mid inferolateral wall, and basal inferolateral wall of the left ventricle.    Continue monitoring for signs of fluid overload, see above, currently euvolemic small fluid bolus was given overnight - will hold lasix  Discussed with cardiology, low dose bisoprolol as tolerated, repeat echo pending, did not tolerated losartan    Constipation  Assessment & Plan  + flatus  Increasing bowel regimen  following    Hypotension  Assessment & Plan  See above, on going today, required small fluid bolus after sbp in 70's now improved, again adjusting cardiac meds in coordination with cardiology  following    Leukocytosis  Assessment & Plan  Could be related to hematoma  Improving overnight and normal pro luisito  Cbc in am     Abdominal hematoma  Assessment & Plan  Might be related to cardiac device  Monitoring hb  Patient was on lovenox due to rigth common iliac art thrombosis and then transitioned to eliquis per [facility] - see my discussion above - plan to continue for 3-4 months with repeat imaging at that time to see if it can be d/c   Cbc in am     ACP (advance care planning)  Assessment & Plan  See my discussion above, I spoke with family regarding goals of care and code status for 22 minutes, they would like to continue with full code    Pneumonia  Assessment & Plan  Patient found to have increasing white blood cell count up to 15,000 at outside facility  CT angio chest showing persistent irregular tree-in-bud nodularity and bilateral bronchial wall thickening on a background of groundglass opacities within the dependent portions of the right middle, right lower, and left lower lobes (right greater than left). Increased bilateral upper lobe patchy perihilar groundglass opacities.   Completed abx zosyn and zyvox   .   Cultures were negative    Aortic mural thrombus (HCC)  Assessment & Plan  CT at outside  facility showing focal aortic mural thrombus along the lateral wall of the descending thoracic aorta and also a tiny filling defect/mural thrombus within the posterior right common iliac artery  See above discussion regarding eliquis  Monitor H&H    Continue monitoring H&H  Cbc in am         Encephalopathy  Assessment & Plan  Hospital course complicated by encephalopathy.  Likely from combination of CVA, STEMI, hyperglycemia  Removing restraints as tolerated, following  Has a nasojejunal feeding tube placed by interventional radiology from outside facility  Continue Seroquel and Depakene as recommended by neurology/psychiatry at outside facility.    Multifactorial  As per records from [facility] probably anoxic/embolic/delirium   Ongoing encephalopathy, suspected anoxic injury, no significant improvement, will get hearing aids to see if this helps  following    CVA (cerebral vascular accident) (HCC)  Assessment & Plan  Patient had MRI on 11/17/24 at OSH which shwed Punctate acute infarct of the right centrum semiovale. Scattered foci of enhancement in multiple vascular distributions as described may represent recent subacute infarcts. Diffuse stippled susceptibility signal throughout the supratentorial and infratentorial brain. Overall findings may represent sequelae of fat emboli or amyloid related angiopathy.   Continue lipitor, lovenox    Continue to be encephalopathic  Neuro evaluated patient at [facility].       CAD (coronary artery disease)  Assessment & Plan  Patient with recent complicated STEMI with PCI to LAD and mid LAD.  Complicated by V-fib arrest and cardiogenic shock requiring VA ECMO and Impella and retroperitoneal hematomas. At [facility] underwent impella supported PCI to mid LAD just distal to prior stent but unsuccessful with PCI to 100% occluded OM1 on 11/6. Patient was decannulated and had Impella removed.    Continue Lipitor    Continue monitoring  Continue telemetry.     Discussed with cardiology and asa  stopped and replaced with plavix  Following  Repeat echo shows persistently low EF, see above    Urinary retention  Assessment & Plan  Beckwith catheter in place    Hyperglycemia  Assessment & Plan  Has had uncontrolled sugars outside hospital  Sliding scale           VTE prophylaxis: Lovenox    I have performed a physical exam and reviewed and updated ROS and Plan today (12/6/2024). In review of yesterday's note (12/5/2024), there are no changes except as documented above.

## 2024-12-06 NOTE — PROGRESS NOTES
Bedside report received from off going RN/tech: Lisa, assumed care of patient.     Fall Risk Score: HIGH RISK  Fall risk interventions in place: Place yellow fall risk ID band on patient, Provide patient/family education based on risk assessment, Educate patient/family to call staff for assistance when getting out of bed, Place fall precaution signage outside patient door, Place patient in room close to nursing station, Utilize bed/chair fall alarm, Tele-sitter, and Bed alarm connected correctly  Bed type: Low air loss (Ary Score less than 17 interventions in place)  Patient on cardiac monitor: Yes  IVF/IV medications: Not Applicable   Oxygen: Room Air  Bedside sitter: Not Applicable   Isolation: Not applicable

## 2024-12-06 NOTE — DISCHARGE PLANNING
Case Management Discharge Planning    Admission Date: 11/27/2024  GMLOS: 4.1  ALOS: 9    6-Clicks ADL Score: 8  6-Clicks Mobility Score: 9  PT and/or OT Eval ordered: Yes  Post-acute Referrals Ordered: Yes  Post-acute Choice Obtained: Yes  Has referral(s) been sent to post-acute provider:  Yes    Anticipated Discharge Dispo: Discharge Disposition: Disch to a long term care facility (63)  Discharge Address: [address]   [place] [address]  Discharge Contact Phone Number: [phone]    DME Needed: No    Action(s) Taken: RNCM reached out to Preethi with [employer] for update on insurance auth. Per Preethi, insurance auth was denied by [facility].     [facility] will send P2P information shortly.     4681- RNFROILAN provided peer to peer information to MD. [phone] reference number [id number].    Patient needs bilateral groin wound vacs per Vascular surgery.    CM will follow up with [employer] for update after peer to peer.     1991-  Per MD, Insurance will Auth for [facility]. RNFROILAN reached out to [facility] for bed availability and she reports they do not have a bed today. No planned d/c over the weekend; [facility] will let weekend CM know if they have a bed.     Escalations Completed: Provider    Medically Clear: Yes    Next Steps: F/U with [facility] for bed availability over the weekend.    Barriers to Discharge: Pending Insurance Authorization    Is the patient up for discharge tomorrow: No

## 2024-12-06 NOTE — CARE PLAN
The patient is Watcher - Medium risk of patient condition declining or worsening    Shift Goals  Clinical Goals: Safety  Patient Goals: JAYLIN  Family Goals: jaylin    Progress made toward(s) clinical / shift goals:    Problem: Safety - Medical Restraint  Goal: Remains free of injury from restraints (Restraint for Interference with Medical Device)  Description: INTERVENTIONS:  1. Determine that other, less restrictive measures have been tried or would not be effective before applying the restraint  2. Evaluate the patient's condition at the time of restraint application  3. Educate patient/family regarding the reason for restraint  4. Q2H: Monitor safety, psychosocial status, comfort, circulation, respiratory status, LOC, nutrition and hydration  Outcome: Progressing  Goal: Free from restraint(s) (Restraint for Interference with Medical Device)  Description: INTERVENTIONS:  1.  ONCE/SHIFT or MINIMUM Q12H: Assess and document the continuing need for restraints  2.  Q24H: Continued use of restraint requires LIP to perform face to face examination and written order  3.  Identify and implement measures to help patient regain control  4.  Educate patient/family on discontinuation criteria   5.  Assess patient's understanding and retention of education provided  6.  Assess readiness for release & initiate progressive release per protocol  7.  Identify and document criteria for restraints  Outcome: Progressing       Soft wrist restraints remain in place for patient safety and for line/device pulling/removal. Continued monitoring in place.

## 2024-12-06 NOTE — PROCEDURES
VAT at bedside per primary RN request. Midline not drawing blood nor able to flush. Dressing and claves changed out, midline retracted a small amount and repositioned. Midline now drawing and flushing easily.

## 2024-12-06 NOTE — PROGRESS NOTES
Monitor Summary  Rhythm: SR  Heart Rate: 70-86  Ectopy: PVC, Coup, 6bt vtach  .14/.06/.40

## 2024-12-06 NOTE — CONSULTS
VASCULAR SURGERY CONSULTATION      DATE OF CONSULTATION: 12/6/2024     REFERRING PHYSICIAN: Alissa Sullivan MD    CONSULTING PHYSICIAN: Dino Conley M.D.    REASON FOR CONSULTATION: Evaluate patient to determine if wound VAC should be placed in groin wounds bilaterally.     HISTORY OF PRESENT ILLNESS: The patient is a 59 y.o. female who has multiple comorbidities who was transferred to [facility] after experiencing a significant cardiac event.  The patient was placed on ECMO.  Patient returned to [facility] fairly deconditioned.  She has wounds in her groins bilaterally which are suggestive of either some type of incision breakdown from the cannulation sites.  Currently these are relatively clean and there is some areas of necrosis..     PAST MEDICAL HISTORY:  has a past medical history of Dyslipidemia (7/5/2016), Essential hypertension (7/5/2016), Obesity, morbid, BMI 40.0-49.9 ([facility]) (7/5/2016), and Type II diabetes mellitus, well controlled ([facility]) (7/5/2016).     PAST SURGICAL HISTORY:  has a past surgical history that includes insertion,cannula for ecmo,adult (Left, 10/30/2024).     ALLERGIES: No Known Allergies     CURRENT MEDICATIONS:   Home Medications       Reviewed by Lizzie Blackwell R.N. (Registered Nurse) on 11/27/24 at 2321  Med List Status: Complete     Medication Last Dose Status   acetaminophen (TYLENOL) 500 MG Tab  Active   apixaban (ELIQUIS) 5mg Tab  Active   aspirin 81 MG EC tablet  Active   aspirin 81 MG tablet  Active   atorvastatin (LIPITOR) 10 MG Tab  Active   guanFACINE (TENEX) 1 MG Tab  Active   insulin aspart (NOVOLOG) 100 UNIT/ML Solution  Active   insulin aspart (NOVOLOG) 100 UNIT/ML Solution  Active   insulin glargine 100 UNIT/ML SC SOLN  Active   lansoprazole (PREVACID) 30 MG Tablet Delayed Release Dispersible  Active   lisinopril (PRINIVIL) 5 MG Tab  Active   Melatonin 10 MG Tab  Active   metformin (GLUCOPHAGE) 500 MG Tab  Active   midodrine (PROAMATINE) 5 MG Tab  Active   nystatin (MYCOSTATIN)  092130 UNIT/ML Suspension  Active   QUEtiapine (SEROQUEL) 100 MG Tab  Active   QUEtiapine (SEROQUEL) 25 MG Tab  Active   simvastatin (ZOCOR) 20 MG Tab  Active   thiamine (VITAMIN B-1) 50 MG Tab  Active   ticagrelor (BRILINTA) 90 MG Tab tablet  Active   valproate Sodium (DEPAKENE) 250 MG/5ML Solution  Active   valproate Sodium (DEPAKENE) 250 MG/5ML Solution  Active   vitamin D3 (CHOLECALCIFEROL) 1000 Unit (25 mcg) Tab  Active                  Audit from Redirected Encounters    **Home medications have not yet been reviewed for this encounter**         FAMILY HISTORY:   Family History   Problem Relation Age of Onset    Heart Disease Mother         AF and CHF    Heart Disease Father 70        PCI, also aneurysm but no surgery    Heart Attack Paternal Grandmother 49        MI       History reviewed. No pertinent family history.       SOCIAL HISTORY:   Social History     Tobacco Use    Smoking status: Never    Smokeless tobacco: Never   Substance and Sexual Activity    Alcohol use: No    Drug use: No    Sexual activity: Not on file           All other systems were reviewed and are negative (AMA/CMS criteria)                General -generally deconditioned  Bilateral groin wounds being packed    LABORATORY VALUES:   Recent Labs     12/04/24 0231 12/05/24 0312 12/05/24  0920 12/06/24  0138   WBC 14.3* 12.3*  --  11.1*   RBC 3.05* 2.58*  --  2.54*   HEMOGLOBIN 9.9* 8.6* 8.1* 8.4*   HEMATOCRIT 32.7* 27.7* 25.9* 26.8*   .2* 107.4*  --  105.5*   MCH 32.5 33.3*  --  33.1*   MCHC 30.3* 31.0*  --  31.3*   RDW 76.5* 77.9*  --  77.0*   PLATELETCT 262 257  --  279   MPV 11.9 11.9  --  11.5     Recent Labs     12/04/24 0231 12/05/24 0312 12/06/24  0138   SODIUM 142 135 137   POTASSIUM 5.1 4.6 4.6   CHLORIDE 112 108 112   CO2 18* 17* 17*   GLUCOSE 244* 256* 211*   BUN 35* 40* 38*   CREATININE 0.98 1.29 1.24   CALCIUM 8.0* 7.7* 7.7*     Recent Labs     12/05/24  0312   ASTSGOT 62*   ALTSGPT 20   TBILIRUBIN 0.6   ALKPHOSPHAT  120*   GLOBULIN 3.7*            IMAGING:   EC-ECHOCARDIOGRAM COMPLETE W/ CONT   Final Result      DX-ABDOMEN FOR TUBE PLACEMENT   Final Result      Tip of the esophagogastric tube terminates over the pylorus of the stomach.      IR-MIDLINE CATHETER INSERTION WO GUIDANCE > AGE 3   Final Result                  Ultrasound-guided midline placement performed by qualified nursing staff    as above.          IR-US GUIDED PIV   Final Result    Ultrasound-guided PERIPHERAL IV INSERTION performed by    qualified nursing staff as above.      DX-OUTSIDE IMAGES-DX CHEST   Final Result      DX-OUTSIDE IMAGES-DX ABDOMEN   Final Result      MR-OUTSIDE IMAGES-MR BRAIN   Final Result      CT-OUTSIDE IMAGES-CT HEAD   Final Result      CT-OUTSIDE IMAGES-CT ABDOMEN/PELVIS   Final Result      CT-OUTSIDE IMAGES-CT CHEST   Final Result      DX-ABDOMEN FOR TUBE PLACEMENT   Final Result      There is a new small bowel feeding tube which terminates in the small bowel of the right mid abdomen.      DX-CHEST-LIMITED (1 VIEW)   Final Result         Asymmetric pulmonary infiltrates, left worse than right.          IMPRESSION AND PLAN:     Active Hospital Problems    Diagnosis     Hypotension [I95.9]     Leukocytosis [D72.829]     Abdominal hematoma [S30.1XXA]     Heart failure with reduced ejection fraction (HCC) [I50.20]     Hyperglycemia [R73.9]     Urinary retention [R33.9]     CAD (coronary artery disease) [I25.10]     CVA (cerebral vascular accident) (HCC) [I63.9]     Encephalopathy [G93.40]     Aortic mural thrombus (HCC) [I74.10]     Pneumonia [J18.9]     ACP (advance care planning) [Z71.89]      Groin wounds as a result of ECMO cannulation    I do believe that is reasonable to place wound vacs bilaterally.  We will follow-up with the patient in approximately 1 month to assess healing.    Agree with placing wound VAC   follow-up outpatient    ____________________________________   Dino Conley M.D.          DD: 12/6/2024   DT: 12:07  PM

## 2024-12-07 LAB
ANION GAP SERPL CALC-SCNC: 11 MMOL/L (ref 7–16)
BASOPHILS # BLD AUTO: 0.6 % (ref 0–1.8)
BASOPHILS # BLD: 0.07 K/UL (ref 0–0.12)
BUN SERPL-MCNC: 32 MG/DL (ref 8–22)
CALCIUM SERPL-MCNC: 7.9 MG/DL (ref 8.5–10.5)
CHLORIDE SERPL-SCNC: 110 MMOL/L (ref 96–112)
CO2 SERPL-SCNC: 15 MMOL/L (ref 20–33)
CREAT SERPL-MCNC: 1.16 MG/DL (ref 0.5–1.4)
EOSINOPHIL # BLD AUTO: 0.08 K/UL (ref 0–0.51)
EOSINOPHIL NFR BLD: 0.7 % (ref 0–6.9)
ERYTHROCYTE [DISTWIDTH] IN BLOOD BY AUTOMATED COUNT: 84 FL (ref 35.9–50)
GFR SERPLBLD CREATININE-BSD FMLA CKD-EPI: 54 ML/MIN/1.73 M 2
GLUCOSE BLD STRIP.AUTO-MCNC: 109 MG/DL (ref 65–99)
GLUCOSE BLD STRIP.AUTO-MCNC: 110 MG/DL (ref 65–99)
GLUCOSE BLD STRIP.AUTO-MCNC: 116 MG/DL (ref 65–99)
GLUCOSE BLD STRIP.AUTO-MCNC: 122 MG/DL (ref 65–99)
GLUCOSE SERPL-MCNC: 188 MG/DL (ref 65–99)
HCT VFR BLD AUTO: 32 % (ref 37–47)
HGB BLD-MCNC: 9.6 G/DL (ref 12–16)
IMM GRANULOCYTES # BLD AUTO: 0.16 K/UL (ref 0–0.11)
IMM GRANULOCYTES NFR BLD AUTO: 1.4 % (ref 0–0.9)
LYMPHOCYTES # BLD AUTO: 0.97 K/UL (ref 1–4.8)
LYMPHOCYTES NFR BLD: 8.4 % (ref 22–41)
MCH RBC QN AUTO: 33.4 PG (ref 27–33)
MCHC RBC AUTO-ENTMCNC: 30 G/DL (ref 32.2–35.5)
MCV RBC AUTO: 111.5 FL (ref 81.4–97.8)
MONOCYTES # BLD AUTO: 0.95 K/UL (ref 0–0.85)
MONOCYTES NFR BLD AUTO: 8.2 % (ref 0–13.4)
NEUTROPHILS # BLD AUTO: 9.37 K/UL (ref 1.82–7.42)
NEUTROPHILS NFR BLD: 80.7 % (ref 44–72)
NRBC # BLD AUTO: 0 K/UL
NRBC BLD-RTO: 0 /100 WBC (ref 0–0.2)
PLATELET # BLD AUTO: 287 K/UL (ref 164–446)
PMV BLD AUTO: 11.8 FL (ref 9–12.9)
POTASSIUM SERPL-SCNC: 4.7 MMOL/L (ref 3.6–5.5)
RBC # BLD AUTO: 2.87 M/UL (ref 4.2–5.4)
SODIUM SERPL-SCNC: 136 MMOL/L (ref 135–145)
WBC # BLD AUTO: 11.6 K/UL (ref 4.8–10.8)

## 2024-12-07 PROCEDURE — 700102 HCHG RX REV CODE 250 W/ 637 OVERRIDE(OP): Performed by: STUDENT IN AN ORGANIZED HEALTH CARE EDUCATION/TRAINING PROGRAM

## 2024-12-07 PROCEDURE — 85025 COMPLETE CBC W/AUTO DIFF WBC: CPT

## 2024-12-07 PROCEDURE — 770020 HCHG ROOM/CARE - TELE (206)

## 2024-12-07 PROCEDURE — 97607 NEG PRS WND THR NDME<=50SQCM: CPT

## 2024-12-07 PROCEDURE — A9270 NON-COVERED ITEM OR SERVICE: HCPCS | Performed by: HOSPITALIST

## 2024-12-07 PROCEDURE — 99233 SBSQ HOSP IP/OBS HIGH 50: CPT | Performed by: HOSPITALIST

## 2024-12-07 PROCEDURE — 700111 HCHG RX REV CODE 636 W/ 250 OVERRIDE (IP): Performed by: HOSPITALIST

## 2024-12-07 PROCEDURE — 82962 GLUCOSE BLOOD TEST: CPT

## 2024-12-07 PROCEDURE — 36415 COLL VENOUS BLD VENIPUNCTURE: CPT

## 2024-12-07 PROCEDURE — 700102 HCHG RX REV CODE 250 W/ 637 OVERRIDE(OP): Performed by: HOSPITALIST

## 2024-12-07 PROCEDURE — 80048 BASIC METABOLIC PNL TOTAL CA: CPT

## 2024-12-07 PROCEDURE — A9270 NON-COVERED ITEM OR SERVICE: HCPCS | Performed by: STUDENT IN AN ORGANIZED HEALTH CARE EDUCATION/TRAINING PROGRAM

## 2024-12-07 RX ORDER — LANSOPRAZOLE 30 MG/1
30 TABLET, ORALLY DISINTEGRATING, DELAYED RELEASE ORAL DAILY
Status: DISCONTINUED | OUTPATIENT
Start: 2024-12-08 | End: 2024-12-29

## 2024-12-07 RX ORDER — SODIUM BICARBONATE 650 MG/1
650 TABLET ORAL 2 TIMES DAILY
Status: DISCONTINUED | OUTPATIENT
Start: 2024-12-07 | End: 2024-12-09

## 2024-12-07 RX ORDER — DEXTROSE MONOHYDRATE 25 G/50ML
25 INJECTION, SOLUTION INTRAVENOUS
Status: DISCONTINUED | OUTPATIENT
Start: 2024-12-07 | End: 2024-12-17

## 2024-12-07 RX ORDER — INSULIN LISPRO 100 [IU]/ML
1-6 INJECTION, SOLUTION INTRAVENOUS; SUBCUTANEOUS
Status: DISCONTINUED | OUTPATIENT
Start: 2024-12-07 | End: 2024-12-07

## 2024-12-07 RX ORDER — INSULIN LISPRO 100 [IU]/ML
1-6 INJECTION, SOLUTION INTRAVENOUS; SUBCUTANEOUS
Status: DISCONTINUED | OUTPATIENT
Start: 2024-12-07 | End: 2024-12-17

## 2024-12-07 RX ORDER — SODIUM HYPOCHLORITE 1.25 MG/ML
SOLUTION TOPICAL PRN
Status: DISCONTINUED | OUTPATIENT
Start: 2024-12-07 | End: 2024-12-26

## 2024-12-07 RX ORDER — DEXTROSE MONOHYDRATE 25 G/50ML
25 INJECTION, SOLUTION INTRAVENOUS
Status: DISCONTINUED | OUTPATIENT
Start: 2024-12-07 | End: 2024-12-07

## 2024-12-07 RX ADMIN — VALPROIC ACID 500 MG: 250 SOLUTION ORAL at 04:53

## 2024-12-07 RX ADMIN — MICONAZOLE NITRATE: 2 CREAM TOPICAL at 05:06

## 2024-12-07 RX ADMIN — LORAZEPAM 0.5 MG: 2 INJECTION INTRAMUSCULAR; INTRAVENOUS at 16:07

## 2024-12-07 RX ADMIN — SODIUM BICARBONATE 650 MG: 650 TABLET ORAL at 10:02

## 2024-12-07 RX ADMIN — QUETIAPINE FUMARATE 150 MG: 100 TABLET ORAL at 20:52

## 2024-12-07 RX ADMIN — QUETIAPINE FUMARATE 25 MG: 50 TABLET ORAL at 17:46

## 2024-12-07 RX ADMIN — MIDODRINE HYDROCHLORIDE 5 MG: 5 TABLET ORAL at 11:58

## 2024-12-07 RX ADMIN — APIXABAN 5 MG: 5 TABLET, FILM COATED ORAL at 04:52

## 2024-12-07 RX ADMIN — MIDODRINE HYDROCHLORIDE 5 MG: 5 TABLET ORAL at 08:11

## 2024-12-07 RX ADMIN — SODIUM BICARBONATE 650 MG: 650 TABLET ORAL at 18:00

## 2024-12-07 RX ADMIN — APIXABAN 5 MG: 5 TABLET, FILM COATED ORAL at 17:46

## 2024-12-07 RX ADMIN — PANTOPRAZOLE SODIUM 40 MG: 40 INJECTION, POWDER, FOR SOLUTION INTRAVENOUS at 04:53

## 2024-12-07 RX ADMIN — QUETIAPINE FUMARATE 25 MG: 50 TABLET ORAL at 04:53

## 2024-12-07 RX ADMIN — LORAZEPAM 0.5 MG: 2 INJECTION INTRAMUSCULAR; INTRAVENOUS at 01:56

## 2024-12-07 RX ADMIN — ATORVASTATIN CALCIUM 80 MG: 80 TABLET, FILM COATED ORAL at 17:46

## 2024-12-07 RX ADMIN — MICONAZOLE NITRATE 1 APPLICATION: 2 CREAM TOPICAL at 17:56

## 2024-12-07 RX ADMIN — INSULIN GLARGINE-YFGN 9 UNITS: 100 INJECTION, SOLUTION SUBCUTANEOUS at 17:57

## 2024-12-07 RX ADMIN — DAKIN'S SOLUTION 0.125% (QUARTER STRENGTH) 10 ML: 0.12 SOLUTION at 05:05

## 2024-12-07 RX ADMIN — VALPROIC ACID 500 MG: 250 SOLUTION ORAL at 17:46

## 2024-12-07 RX ADMIN — CLOPIDOGREL BISULFATE 75 MG: 75 TABLET ORAL at 04:53

## 2024-12-07 RX ADMIN — OXYCODONE 5 MG: 5 TABLET ORAL at 22:29

## 2024-12-07 RX ADMIN — Medication 5 MG: at 20:52

## 2024-12-07 ASSESSMENT — PAIN DESCRIPTION - PAIN TYPE
TYPE: ACUTE PAIN
TYPE: ACUTE PAIN

## 2024-12-07 ASSESSMENT — FIBROSIS 4 INDEX: FIB4 SCORE: 2.85

## 2024-12-07 NOTE — CARE PLAN
The patient is Stable - Low risk of patient condition declining or worsening    Shift Goals  Clinical Goals: safety, oral nutrition  Patient Goals: morenita  Family Goals: morenita    Progress made toward(s) clinical / shift goals:    Problem: Safety - Medical Restraint  Goal: Remains free of injury from restraints (Restraint for Interference with Medical Device)  12/7/2024 0422 by Essie Dockery R.N.  Outcome: Progressing  Flowsheets (Taken 12/3/2024 0633 by Geronimo Townsend V RCaseNCase)  Addressed this shift: Remains free of injury from restraints (restraint for interference with medical device):   Every 2 hours: Monitor safety, psychosocial status, comfort, nutrition and hydration   Inform patient/family regarding the reason for restraint   Evaluate the patient's condition at the time of restraint application   Determine that other, less restrictive measures have been tried or would not be effective before applying the restraint  12/7/2024 0421 by Essie Dockery R.N.  Outcome: Progressing       Patient is not progressing towards the following goals:

## 2024-12-07 NOTE — PROGRESS NOTES
Hospital Medicine Daily Progress Note    Date of Service  12/7/2024    Chief Complaint  Kiara Qureshi is a 59 y.o. female admitted 11/27/2024 with STEMI    Hospital Course  Patient has a history of obesity, hypertension, hyperlipidemia.  She was seen at [facility] as a transfer initially for anterior STEMI requiring lytics.  Patient underwent an LAD PCI but hospital course was complicated by retroperitoneal bleed, ventricular fibrillation and PEA arrest and cardiogenic shock requiring VA ECMO and Impella.  She is now status post ECMO decannulation on November 4 and had left femoral embolectomy, PCI to the LAD, unsuccessful PCI to OM1 and Impella removal on November 8.  Hospital course was complicated by patient developing encephalopathy and delirium, to which she was given valproic acid and quetiapine.  Patient has been weaned off her present medications and was found to have hypotension to which she was started on p.o. midodrine 10 mg 3 times daily.  Goal-directed medical therapy was held due to borderline low blood pressure.  Her echocardiogram on November 24 shows 25 to 30% EF with akinesis of the apical, apical anterior wall, apical septum, apical inferior wall, and apical lateral wall of the left ventricle.  Hypokinesis seen of the basal anterolateral wall, mid anterolateral wall, anterior wall, mid inferolateral wall, and basal inferolateral wall of the left ventricle.     On November 25, patient had CT angiogram of the chest which shows pulmonary edema, small bilateral pleural effusions, small 3 mm filling defect along the lateral wall of the descending thoracic aorta which may represent focal aortic mural thrombus versus ulcerated plaque.  CT angiogram of the abdomen pelvis showing increased organization of the rim-enhancing left retroperitoneal hematomas measuring up to 9.3 x 4.4 cm.  Tiny filling defect/mural thrombus within the posterior right common iliac artery.  Multiple nonenhancing intermediate  "density fluid collections within the subcutaneous tissues overlying the right lower quadrant and right inguinal regions measuring up to 5.7 x 2.4 cm.  Additional nonenhancing low-density fluid collection closely associated with the left femoral vein measuring 4.7 x 3.9 cm, likely represents evolving hematomas.     Due to patient's uptrending leukocytosis and CAT scan findings of suspected pneumonia, patient was started on vancomycin and Zosyn on November 26.  Patient was then transferred back to [facility] for further management.      Interval Problem Update  More interactive today with hearing aids in, she has been speaking, saying things like \"I think i'll have water\", she did not recognize her  and she is not following consistently today but is a little more alert, hypoglycemic this am, will decrease lantus, she is tolerating tube feeds, bp in 90's overnight, discussed with cardiology, plan for bilateral groin wound vacs to be placed. Awaiting bed at [facility], continue to follow closely     I have discussed this patient's plan of care and discharge plan at IDT rounds today with Case Management, Nursing, Nursing leadership, and other members of the IDT team. I spent over 60 minutes coordinating her care, providing direct pt care and reviewing her chart    Consultants/Specialty  Palliative Care  Cardiology    Code Status  Full Code    Disposition  The patient is not medically cleared for discharge to home or a post-acute facility.      I have placed the appropriate orders for post-discharge needs.    Review of Systems  Review of Systems   Unable to perform ROS: Medical condition        Physical Exam  Temp:  [36.6 °C (97.9 °F)-37.2 °C (99 °F)] 36.7 °C (98.1 °F)  Pulse:  [] 85  Resp:  [18-20] 18  BP: ()/(46-81) 110/71  SpO2:  [91 %-97 %] 97 %    Physical Exam  Vitals and nursing note reviewed. Exam conducted with a chaperone present.   Constitutional:       General: She is not in acute distress.     " Appearance: Normal appearance. She is ill-appearing. She is not diaphoretic.   HENT:      Head: Normocephalic.      Nose: Nose normal.      Mouth/Throat:      Mouth: Mucous membranes are moist.   Eyes:      General: No scleral icterus.     Conjunctiva/sclera: Conjunctivae normal.      Pupils: Pupils are equal, round, and reactive to light.   Cardiovascular:      Rate and Rhythm: Normal rate and regular rhythm.      Pulses: Normal pulses.      Heart sounds: Normal heart sounds.   Pulmonary:      Effort: Pulmonary effort is normal.      Breath sounds: Normal breath sounds.   Abdominal:      General: Abdomen is flat. Bowel sounds are normal. There is no distension.      Palpations: Abdomen is soft.      Tenderness: There is no guarding.   Genitourinary:     Comments: Ngt in place  Musculoskeletal:         General: No swelling or deformity. Normal range of motion.      Cervical back: Normal range of motion and neck supple.      Right lower leg: No edema.      Left lower leg: No edema.   Skin:     General: Skin is warm and dry.      Capillary Refill: Capillary refill takes less than 2 seconds.      Comments: Wounds bilateral groins, no d/c, deep packing in place   Neurological:      General: No focal deficit present.      Mental Status: She is alert.      Comments: Oriented x 1         Fluids    Intake/Output Summary (Last 24 hours) at 12/7/2024 1552  Last data filed at 12/7/2024 1500  Gross per 24 hour   Intake 1180 ml   Output 1750 ml   Net -570 ml        Laboratory  Recent Labs     12/05/24  0312 12/05/24  0920 12/06/24  0138 12/07/24  0036   WBC 12.3*  --  11.1* 11.6*   RBC 2.58*  --  2.54* 2.87*   HEMOGLOBIN 8.6* 8.1* 8.4* 9.6*   HEMATOCRIT 27.7* 25.9* 26.8* 32.0*   .4*  --  105.5* 111.5*   MCH 33.3*  --  33.1* 33.4*   MCHC 31.0*  --  31.3* 30.0*   RDW 77.9*  --  77.0* 84.0*   PLATELETCT 257  --  279 287   MPV 11.9  --  11.5 11.8     Recent Labs     12/05/24  0312 12/06/24  0138 12/07/24  0036   SODIUM 135  137 136   POTASSIUM 4.6 4.6 4.7   CHLORIDE 108 112 110   CO2 17* 17* 15*   GLUCOSE 256* 211* 188*   BUN 40* 38* 32*   CREATININE 1.29 1.24 1.16   CALCIUM 7.7* 7.7* 7.9*                   Imaging  EC-ECHOCARDIOGRAM COMPLETE W/ CONT   Final Result      DX-ABDOMEN FOR TUBE PLACEMENT   Final Result      Tip of the esophagogastric tube terminates over the pylorus of the stomach.      IR-MIDLINE CATHETER INSERTION WO GUIDANCE > AGE 3   Final Result                  Ultrasound-guided midline placement performed by qualified nursing staff    as above.          IR-US GUIDED PIV   Final Result    Ultrasound-guided PERIPHERAL IV INSERTION performed by    qualified nursing staff as above.      DX-OUTSIDE IMAGES-DX CHEST   Final Result      DX-OUTSIDE IMAGES-DX ABDOMEN   Final Result      MR-OUTSIDE IMAGES-MR BRAIN   Final Result      CT-OUTSIDE IMAGES-CT HEAD   Final Result      CT-OUTSIDE IMAGES-CT ABDOMEN/PELVIS   Final Result      CT-OUTSIDE IMAGES-CT CHEST   Final Result      DX-ABDOMEN FOR TUBE PLACEMENT   Final Result      There is a new small bowel feeding tube which terminates in the small bowel of the right mid abdomen.      DX-CHEST-LIMITED (1 VIEW)   Final Result         Asymmetric pulmonary infiltrates, left worse than right.           Assessment/Plan  * Heart failure with reduced ejection fraction (HCC)- (present on admission)  Assessment & Plan  Unable to start goal-directed medical therapy due to hypotension.  Echocardiogram showing EF 25 to 30% EF with akinesis of the apical, apical anterior wall, apical septum, apical inferior wall, and apical lateral wall of the left ventricle.  Hypokinesis seen of the basal anterolateral wall, mid anterolateral wall, anterior wall, mid inferolateral wall, and basal inferolateral wall of the left ventricle.    Continue monitoring for signs of fluid overload, see above, currently euvolemic small fluid bolus was given overnight - will hold lasix  Discussed with cardiology, low  dose bisoprolol as tolerated, repeat echo pending, did not tolerated losartan    Constipation  Assessment & Plan  + flatus  Increasing bowel regimen  following    Hypotension  Assessment & Plan  See above, bp improved but still low, no need for bolus today, did not tolerate attempt to wean midodrine  Continue to follow and titrate as needed    Leukocytosis  Assessment & Plan  Could be related to hematoma  Much improved normal pro luisito  Cbc in am     Abdominal hematoma  Assessment & Plan  Might be related to cardiac device  Monitoring hb  Patient was on lovenox due to rigth common iliac art thrombosis and then transitioned to eliquis per [facility] - see my discussion above - plan to continue for 3-4 months with repeat imaging at that time to see if it can be d/c   Cbc in am     ACP (advance care planning)  Assessment & Plan  See my discussion above, I spoke with family regarding goals of care and code status for 22 minutes, they would like to continue with full code    Pneumonia  Assessment & Plan  Patient found to have increasing white blood cell count up to 15,000 at outside facility  CT angio chest showing persistent irregular tree-in-bud nodularity and bilateral bronchial wall thickening on a background of groundglass opacities within the dependent portions of the right middle, right lower, and left lower lobes (right greater than left). Increased bilateral upper lobe patchy perihilar groundglass opacities.   Completed abx zosyn and zyvox   .   Cultures were negative    Aortic mural thrombus (HCC)  Assessment & Plan  CT at outside facility showing focal aortic mural thrombus along the lateral wall of the descending thoracic aorta and also a tiny filling defect/mural thrombus within the posterior right common iliac artery  See above discussion regarding eliquis  Monitor H&H    Continue monitoring H&H  Cbc in am         Encephalopathy  Assessment & Plan  Hospital course complicated by encephalopathy.  Likely from  combination of CVA, STEMI, hyperglycemia  Removing restraints as tolerated, following  Has a nasojejunal feeding tube placed by interventional radiology from outside facility  Continue Seroquel and Depakene as recommended by neurology/psychiatry at outside facility.    Multifactorial  As per records from [email or web address] probably anoxic/embolic/delirium   Ongoing encephalopathy, suspected anoxic injury, no significant improvement, will get hearing aids to see if this helps  following    CVA (cerebral vascular accident) (HCC)  Assessment & Plan  Patient had MRI on 11/17/24 at [facility] which shwed Punctate acute infarct of the right centrum semiovale. Scattered foci of enhancement in multiple vascular distributions as described may represent recent subacute infarcts. Diffuse stippled susceptibility signal throughout the supratentorial and infratentorial brain. Overall findings may represent sequelae of fat emboli or amyloid related angiopathy.   Continue lipitor, lovenox    Continue to be encephalopathic  Neuro evaluated patient at [facility] [[email or web address]       CAD (coronary artery disease)  Assessment & Plan  Patient with recent complicated STEMI with PCI to LAD and mid LAD.  Complicated by V-fib arrest and cardiogenic shock requiring VA ECMO and Impella and retroperitoneal hematomas. At [email or web address]] underwent impella supported PCI to mid LAD just distal to prior stent but unsuccessful with PCI to 100% occluded OM1 on 11/6. Patient was decannulated and had Impella removed.    Continue Lipitor    Continue monitoring  Continue telemetry.     Discussed with cardiology and asa stopped and replaced with plavix  Following  Repeat echo shows persistently low EF, see above    Urinary retention  Assessment & Plan  Beckwith catheter in place    Hyperglycemia  Assessment & Plan  Has had uncontrolled sugars outside hospital  Sliding scale      Metabolic acidemia- (present on admission)  Assessment & Plan  Worsening  Will add bicarb  Following           VTE prophylaxis:  Lovenox    I have performed a physical exam and reviewed and updated ROS and Plan today (12/7/2024). In review of yesterday's note (12/6/2024), there are no changes except as documented above.

## 2024-12-07 NOTE — PROGRESS NOTES
Hypoglycemia Intervention    Hypoglycemia protocol intervention:  Blood glucose 68 at 2030.  Intervention: 25 g IV dextrose per MAR, pt too drowsy for PO intake  Repeat blood glucose 156 at 2057.

## 2024-12-07 NOTE — DISCHARGE PLANNING
Case Management Discharge Planning    Admission Date: 11/27/2024  GMLOS: 4.1  ALOS: 10    6-Clicks ADL Score: 8  6-Clicks Mobility Score: 9  PT and/or OT Eval ordered: Yes  Post-acute Referrals Ordered: Yes  Post-acute Choice Obtained: NA  Has referral(s) been sent to post-acute provider:  DOMINIC      Anticipated Discharge Dispo: Discharge Disposition: Disch to a long term care facility (63)  Discharge Address: [facility] [address]   [place] [address]  Discharge Contact Phone Number: [phone]    DME Needed: Pending hospital course     Action(s) Taken: Pt discussed in IDT rounds insurance has approved LTACH placement. Pt to have wound vacs placed today. Pt is [facility] to [facility] to [facility]""S is bed is available.     LMSW reached out to [facility] to verify bed availability.     0901 Per Preethi with [facility] there is no bed available today.

## 2024-12-07 NOTE — PROGRESS NOTES
Bedside report received from off going RN/tech: NOC, assumed care of patient.     Fall Risk Score: HIGH RISK  Fall risk interventions in place: Place yellow fall risk ID band on patient, Provide patient/family education based on risk assessment, Educate patient/family to call staff for assistance when getting out of bed, Place fall precaution signage outside patient door, Place patient in room close to nursing station, Utilize bed/chair fall alarm, Notify charge of high risk for huddle, Tele-sitter, and Bed alarm connected correctly  Bed type: Low air loss (Ray Score less than 17 interventions in place)  Patient on cardiac monitor: Yes  IVF/IV medications: Not Applicable   Oxygen: Room Air  Bedside sitter: Tele sitter    Isolation: Not applicable      Patient Oriented to self only, on the monitor SR/ST.  Patient has fall precautions in place. Tele sitter at bedside. Hourly rounding in place.

## 2024-12-07 NOTE — PROGRESS NOTES
Palliative Care follow-up  PC APRN discussed case with MD, updates appreciated. Per GOC discussion yesterday with multiple family members and MD, continue with full code status. PC APRN to bedside, no family present. Per nursing, Bruno left to get patient's hearing aids.  .  Thank you for allowing Palliative Care to support this patient and family. Contact [phone] for additional assistance, change in patient status, or with any questions/concerns.      BEA Minor  Palliative Care Nurse Practitioner   [phone]

## 2024-12-07 NOTE — CARE PLAN
The patient is Watcher - Medium risk of patient condition declining or worsening    Shift Goals  Clinical Goals: safety, oral nutrition  Patient Goals: morenita  Family Goals: morenita    Progress made toward(s) clinical / shift goals:      Problem: Safety - Medical Restraint  Goal: Remains free of injury from restraints (Restraint for Interference with Medical Device)  Outcome: Progressing  Note: Patient in bilateral wrist restraints with all 4 side rails up. Patient free from injury       Patient is not progressing towards the following goals:

## 2024-12-08 LAB
ALBUMIN SERPL BCP-MCNC: 2.4 G/DL (ref 3.2–4.9)
ALBUMIN/GLOB SERPL: 0.6 G/DL
ALP SERPL-CCNC: 125 U/L (ref 30–99)
ALT SERPL-CCNC: 16 U/L (ref 2–50)
ANION GAP SERPL CALC-SCNC: 10 MMOL/L (ref 7–16)
AST SERPL-CCNC: 41 U/L (ref 12–45)
BASOPHILS # BLD AUTO: 0.3 % (ref 0–1.8)
BASOPHILS # BLD: 0.03 K/UL (ref 0–0.12)
BILIRUB SERPL-MCNC: 0.9 MG/DL (ref 0.1–1.5)
BUN SERPL-MCNC: 31 MG/DL (ref 8–22)
CALCIUM ALBUM COR SERPL-MCNC: 8.8 MG/DL (ref 8.5–10.5)
CALCIUM SERPL-MCNC: 7.5 MG/DL (ref 8.5–10.5)
CHLORIDE SERPL-SCNC: 109 MMOL/L (ref 96–112)
CO2 SERPL-SCNC: 18 MMOL/L (ref 20–33)
CREAT SERPL-MCNC: 1.05 MG/DL (ref 0.5–1.4)
EOSINOPHIL # BLD AUTO: 0.23 K/UL (ref 0–0.51)
EOSINOPHIL NFR BLD: 2.4 % (ref 0–6.9)
ERYTHROCYTE [DISTWIDTH] IN BLOOD BY AUTOMATED COUNT: 77.7 FL (ref 35.9–50)
GFR SERPLBLD CREATININE-BSD FMLA CKD-EPI: 61 ML/MIN/1.73 M 2
GLOBULIN SER CALC-MCNC: 4 G/DL (ref 1.9–3.5)
GLUCOSE BLD STRIP.AUTO-MCNC: 145 MG/DL (ref 65–99)
GLUCOSE BLD STRIP.AUTO-MCNC: 151 MG/DL (ref 65–99)
GLUCOSE BLD STRIP.AUTO-MCNC: 154 MG/DL (ref 65–99)
GLUCOSE BLD STRIP.AUTO-MCNC: 243 MG/DL (ref 65–99)
GLUCOSE SERPL-MCNC: 298 MG/DL (ref 65–99)
HCT VFR BLD AUTO: 27.4 % (ref 37–47)
HGB BLD-MCNC: 8.5 G/DL (ref 12–16)
IMM GRANULOCYTES # BLD AUTO: 0.13 K/UL (ref 0–0.11)
IMM GRANULOCYTES NFR BLD AUTO: 1.3 % (ref 0–0.9)
LYMPHOCYTES # BLD AUTO: 0.88 K/UL (ref 1–4.8)
LYMPHOCYTES NFR BLD: 9.1 % (ref 22–41)
MCH RBC QN AUTO: 32.9 PG (ref 27–33)
MCHC RBC AUTO-ENTMCNC: 31 G/DL (ref 32.2–35.5)
MCV RBC AUTO: 106.2 FL (ref 81.4–97.8)
MONOCYTES # BLD AUTO: 0.83 K/UL (ref 0–0.85)
MONOCYTES NFR BLD AUTO: 8.5 % (ref 0–13.4)
NEUTROPHILS # BLD AUTO: 7.62 K/UL (ref 1.82–7.42)
NEUTROPHILS NFR BLD: 78.4 % (ref 44–72)
NRBC # BLD AUTO: 0 K/UL
NRBC BLD-RTO: 0 /100 WBC (ref 0–0.2)
PLATELET # BLD AUTO: 275 K/UL (ref 164–446)
PMV BLD AUTO: 11.6 FL (ref 9–12.9)
POTASSIUM SERPL-SCNC: 4.5 MMOL/L (ref 3.6–5.5)
PROT SERPL-MCNC: 6.4 G/DL (ref 6–8.2)
RBC # BLD AUTO: 2.58 M/UL (ref 4.2–5.4)
SODIUM SERPL-SCNC: 137 MMOL/L (ref 135–145)
WBC # BLD AUTO: 9.7 K/UL (ref 4.8–10.8)

## 2024-12-08 PROCEDURE — A9270 NON-COVERED ITEM OR SERVICE: HCPCS | Performed by: HOSPITALIST

## 2024-12-08 PROCEDURE — 99233 SBSQ HOSP IP/OBS HIGH 50: CPT | Performed by: HOSPITALIST

## 2024-12-08 PROCEDURE — 700102 HCHG RX REV CODE 250 W/ 637 OVERRIDE(OP): Performed by: HOSPITALIST

## 2024-12-08 PROCEDURE — 80053 COMPREHEN METABOLIC PANEL: CPT

## 2024-12-08 PROCEDURE — 85025 COMPLETE CBC W/AUTO DIFF WBC: CPT

## 2024-12-08 PROCEDURE — A9270 NON-COVERED ITEM OR SERVICE: HCPCS | Performed by: STUDENT IN AN ORGANIZED HEALTH CARE EDUCATION/TRAINING PROGRAM

## 2024-12-08 PROCEDURE — 770020 HCHG ROOM/CARE - TELE (206)

## 2024-12-08 PROCEDURE — 700111 HCHG RX REV CODE 636 W/ 250 OVERRIDE (IP): Performed by: HOSPITALIST

## 2024-12-08 PROCEDURE — 82962 GLUCOSE BLOOD TEST: CPT

## 2024-12-08 PROCEDURE — 700102 HCHG RX REV CODE 250 W/ 637 OVERRIDE(OP): Performed by: STUDENT IN AN ORGANIZED HEALTH CARE EDUCATION/TRAINING PROGRAM

## 2024-12-08 RX ADMIN — QUETIAPINE FUMARATE 150 MG: 100 TABLET ORAL at 20:03

## 2024-12-08 RX ADMIN — ATORVASTATIN CALCIUM 80 MG: 80 TABLET, FILM COATED ORAL at 16:27

## 2024-12-08 RX ADMIN — LANSOPRAZOLE 30 MG: 30 TABLET, ORALLY DISINTEGRATING ORAL at 06:28

## 2024-12-08 RX ADMIN — SODIUM BICARBONATE 650 MG: 650 TABLET ORAL at 16:27

## 2024-12-08 RX ADMIN — LORAZEPAM 0.5 MG: 2 INJECTION INTRAMUSCULAR; INTRAVENOUS at 05:17

## 2024-12-08 RX ADMIN — MICONAZOLE NITRATE 1 APPLICATION: 2 CREAM TOPICAL at 16:50

## 2024-12-08 RX ADMIN — APIXABAN 5 MG: 5 TABLET, FILM COATED ORAL at 05:17

## 2024-12-08 RX ADMIN — VALPROIC ACID 500 MG: 250 SOLUTION ORAL at 05:17

## 2024-12-08 RX ADMIN — APIXABAN 5 MG: 5 TABLET, FILM COATED ORAL at 16:27

## 2024-12-08 RX ADMIN — QUETIAPINE FUMARATE 25 MG: 50 TABLET ORAL at 16:27

## 2024-12-08 RX ADMIN — CLOPIDOGREL BISULFATE 75 MG: 75 TABLET ORAL at 05:17

## 2024-12-08 RX ADMIN — MICONAZOLE NITRATE: 2 CREAM TOPICAL at 06:28

## 2024-12-08 RX ADMIN — MIDODRINE HYDROCHLORIDE 5 MG: 5 TABLET ORAL at 07:54

## 2024-12-08 RX ADMIN — QUETIAPINE FUMARATE 25 MG: 50 TABLET ORAL at 05:17

## 2024-12-08 RX ADMIN — OXYCODONE 5 MG: 5 TABLET ORAL at 16:26

## 2024-12-08 RX ADMIN — INSULIN LISPRO 2 UNITS: 100 INJECTION, SOLUTION INTRAVENOUS; SUBCUTANEOUS at 08:11

## 2024-12-08 RX ADMIN — Medication 5 MG: at 19:58

## 2024-12-08 RX ADMIN — INSULIN GLARGINE-YFGN 9 UNITS: 100 INJECTION, SOLUTION SUBCUTANEOUS at 16:48

## 2024-12-08 RX ADMIN — VALPROIC ACID 500 MG: 250 SOLUTION ORAL at 16:27

## 2024-12-08 RX ADMIN — LORAZEPAM 0.5 MG: 2 INJECTION INTRAMUSCULAR; INTRAVENOUS at 19:54

## 2024-12-08 RX ADMIN — INSULIN LISPRO 1 UNITS: 100 INJECTION, SOLUTION INTRAVENOUS; SUBCUTANEOUS at 22:49

## 2024-12-08 RX ADMIN — SODIUM BICARBONATE 650 MG: 650 TABLET ORAL at 05:17

## 2024-12-08 ASSESSMENT — FIBROSIS 4 INDEX: FIB4 SCORE: 2.85

## 2024-12-08 ASSESSMENT — PAIN DESCRIPTION - PAIN TYPE
TYPE: ACUTE PAIN
TYPE: ACUTE PAIN;CHRONIC PAIN

## 2024-12-08 NOTE — CARE PLAN
The patient is Stable - Low risk of patient condition declining or worsening    Shift Goals  Clinical Goals: Safety, wound vac, oral nutrion  Patient Goals: JAYLIN  Family Goals: jaylin    Progress made toward(s) clinical / shift goals:    Problem: Safety - Medical Restraint  Goal: Free from restraint(s) (Restraint for Interference with Medical Device)  Note: Restraints assessed and charted per protocol.      Problem: Safety  Goal: Will remain free from injury  Note: Treaded socks and bed/strip alarm on, side rails up x 4. Call light within reach. Pt educated to call for assistance. Reinforce as needed. Continue to monitor.          Patient is not progressing towards the following goals:

## 2024-12-08 NOTE — CARE PLAN
The patient is Stable - Low risk of patient condition declining or worsening    Shift Goals  Clinical Goals: Safety, wound vac, oral nutrion  Patient Goals: JAYLIN  Family Goals: jaylin    Progress made toward(s) clinical / shift goals:  Patient had wound vacs placed, skin precautions in place.      Problem: Safety - Medical Restraint  Goal: Remains free of injury from restraints (Restraint for Interference with Medical Device)  Outcome: Progressing     Problem: Safety  Goal: Will remain free from injury  Outcome: Progressing  Goal: Will remain free from falls  Outcome: Progressing     Problem: Skin Integrity  Goal: Risk for impaired skin integrity will decrease  Outcome: Progressing       Patient is not progressing towards the following goals:

## 2024-12-08 NOTE — CARE PLAN
The patient is Watcher - Medium risk of patient condition declining or worsening    Shift Goals  Clinical Goals: safety, oral intake, skin integ, monitor mentation  Patient Goals: morenita  Family Goals: updates    Progress made toward(s) clinical / shift goals:    Problem: Safety - Medical Restraint  Goal: Remains free of injury from restraints (Restraint for Interference with Medical Device)  Outcome: Progressing     Problem: Safety  Goal: Will remain free from injury  Outcome: Progressing     Problem: Skin Integrity  Goal: Risk for impaired skin integrity will decrease  Outcome: Progressing       Patient is not progressing towards the following goals:

## 2024-12-08 NOTE — WOUND TEAM
[facility]  Inpatient Services  Wound and Skin Care Follow-up    Admission Date: 11/27/2024     Last order of IP CONSULT TO WOUND CARE was found on 12/6/2024 from Hospital Encounter on 11/27/2024     HPI, PMH, SH: Reviewed    Past Surgical History:   Procedure Laterality Date    INSERTION,CANNULA FOR ECMO,ADULT Left 10/30/2024    Procedure: INSERTION, CANNULA, FOR ECMO, ADULT;  Surgeon: Aime Elias D.O.;  Location: SURGERY [facility];  Service: Cardiothoracic     Social History     Tobacco Use    Smoking status: Never    Smokeless tobacco: Never   Substance Use Topics    Alcohol use: No     No chief complaint on file.    Diagnosis: HFrEF (heart failure with reduced ejection fraction) (Formerly [facility]) [I50.20]    Unit where seen by Wound Team: [id number]     WOUND FOLLOW UP RELATED TO:  bilateral groin        WOUND TEAM PLAN OF CARE - Frequency of Follow-up:   Nursing to follow dressing orders written for wound care. Contact wound team if area fails to progress, deteriorates or with any questions/concerns if something comes up before next scheduled follow up (See below as to whether wound is following and frequency of wound follow up)  Dressing changes by wound team:                   NPWT change 3 times weekly - bilateral groin     WOUND HISTORY:       Pt is a 59yr old female with history of Obesity, and HTN. Pt was initially seen at [facility] for anterior STEMI. Pt underwent LAD PCI but hospital course was complicated by retroperitoneal bleed, V Fib and PEA arrest ultimately requiring ECMO and Impella. Pt was decannulated from ECMO on 11/4 and had left femoral embolectomy. Impella was then removed on 11/8. Pts ECHO on 11/24 revealed EF of 25-30%. Pt has bilateral groin wounds from ECMO and Impella. Wound team was consulted to evaluate.        WOUND ASSESSMENT/LDA  Wound 12/07/24 Full Thickness Wound Groin Left (Active)   Date First Assessed/Time First Assessed: 12/07/24 1721   Present on Original Admission:  No  Hand Hygiene Completed: Yes  Primary Wound Type: Full Thickness Wound  Location: Groin  Laterality: Left      Assessments 12/7/2024  5:00 PM   Wound Image      Site Assessment Yellow;Red   Periwound Assessment Red   Margins Defined edges;Unattached edges   Closure Secondary intention   Drainage Amount Scant   Drainage Description Serosanguineous   Treatments Cleansed;Nonselective debridement;Site care   Wound Cleansing Approved Wound Cleanser   Periwound Protectant No-sting Skin Prep;Paste Ring;Drape   Dressing Status Clean;Intact;Dry   Dressing Changed New   Dressing Cleansing/Solutions 1/4 Strength Dakin's Solution   Dressing Options Wound Vac   Dressing Change/Treatment Frequency Monday, Wednesday, Friday, and As Needed   NEXT Dressing Change/Treatment Date 12/09/24   NEXT Weekly Photo (Inpatient Only) 12/13/24   Wound Team Following 3x Weekly   Non-staged Wound Description Full thickness   Wound Length (cm) 3 cm   Wound Width (cm) 7 cm   Wound Depth (cm) 2 cm   Wound Surface Area (cm^2) 21 cm^2   Wound Volume (cm^3) 42 cm^3   Shape oval       Wound 12/07/24 Full Thickness Wound Groin Right (Active)   Date First Assessed/Time First Assessed: 12/07/24 1721   Present on Original Admission: No  Hand Hygiene Completed: Yes  Primary Wound Type: Full Thickness Wound  Location: Groin  Laterality: Right      Assessments 12/7/2024  5:00 PM   Wound Image      Site Assessment Red;Yellow;Tan   Periwound Assessment Red   Margins Defined edges;Unattached edges   Closure Secondary intention   Drainage Amount Small   Drainage Description Serosanguineous   Treatments Cleansed;Nonselective debridement;Site care   Wound Cleansing Approved Wound Cleanser   Periwound Protectant No-sting Skin Prep;Paste Ring;Drape   Dressing Status Clean;Dry;Intact   Dressing Changed New   Dressing Cleansing/Solutions Normal Saline   Dressing Options Wound Vac   Dressing Change/Treatment Frequency Monday, Wednesday, Friday, and As Needed   NEXT  Dressing Change/Treatment Date 12/09/24   NEXT Weekly Photo (Inpatient Only) 12/13/24   Wound Team Following 3x Weekly   Non-staged Wound Description Full thickness   Wound Length (cm) 1.7 cm   Wound Width (cm) 3 cm   Wound Depth (cm) 4 cm   Wound Surface Area (cm^2) 5.1 cm^2   Wound Volume (cm^3) 20.4 cm^3   Shape oval       Negative Pressure Wound Therapy 12/07/24 Groin Left (Active)   Placement Date/Time: 12/07/24 1722   Location: Groin  Laterality: Left      Assessments 12/7/2024  5:00 PM   Wound Image      NPWT Pump Mode / Pressure Setting 125 mmHg   Dressing Type Small;Black Foam (Veraflo)   Number of Foam Pieces Used 1   Canister Changed No   NEXT Dressing Change/Treatment Date 12/09/24   VAC VeraFlo Irrigant 1/4 Strength Dakins   VAC VeraFlo Soak Time (mins) 6   VAC VeraFlo Instill Volume (ml) 12   VAC VeraFlo - Therapy Time (hrs) 2   VAC VeraFlo Pressure (mm/Hg) 125 mmHg       Negative Pressure Wound Therapy 12/07/24 Groin Right (Active)   Placement Date/Time: 12/07/24 1722   Present on Original Admission: No  Location: Groin  Laterality: Right      Assessments 12/7/2024  5:00 PM   Wound Image      NPWT Pump Mode / Pressure Setting 125 mmHg   Dressing Type Small;Black Foam (Veraflo)   Number of Foam Pieces Used 2   Canister Changed No   NEXT Dressing Change/Treatment Date 12/09/24   VAC VeraFlo Irrigant Normal Saline   VAC VeraFlo Soak Time (mins) 6   VAC VeraFlo Instill Volume (ml) 18   VAC VeraFlo - Therapy Time (hrs) 2   VAC VeraFlo Pressure (mm/Hg) 125 mmHg        Vascular:    JAGJIT:   No results found.    Lab Values:    Lab Results   Component Value Date/Time    WBC 11.6 (H) 12/07/2024 12:36 AM    RBC 2.87 (L) 12/07/2024 12:36 AM    HEMOGLOBIN 9.6 (L) 12/07/2024 12:36 AM    HEMATOCRIT 32.0 (L) 12/07/2024 12:36 AM    CREACTPROT 10.71 (H) 11/29/2024 03:24 AM    SEDRATEWES 140 (H) 11/28/2024 01:35 AM    HBA1C 6.2 (H) 11/01/2024 06:46 PM    HBA1C 6.4 (H) 10/30/2024 03:18 AM         Culture Results show:  No  results found for this or any previous visit (from the past 720 hours).    Pain Level/Medicated:  None, Tolerated without pain medication       INTERVENTIONS BY WOUND TEAM:  Chart and images reviewed. Discussed with bedside RN. All areas of concern (based on picture review, LDA review and discussion with bedside RN) have been thoroughly assessed. Documentation of areas based on significant findings. This RN in to assess patient. Performed standard wound care which includes appropriate positioning, dressing removal and non-selective debridement. Pictures and measurements obtained weekly if/when required.    Wound:  Bilateral groins  Cleansed/Non-selectively Debrided with:  Wound cleanser and Gauze  Brianna wound: Cleansed with Wound cleanser and Gauze, Prepped with No Sting, Paste Rings, and Drape  Primary Dressing:  Black VF tucked into wound beds and bridged.  Seals intact  Secondary (Outer) Dressing: Secured with drape as patient attempted to remove tubing multiple times          Advanced Wound Care Discharge Planning  Number of Clinicians necessary to complete wound care: 1  Is patient requiring IV pain medications for dressing changes:  No   Length of time for dressing change 60 min. (This does not include chart review, pre-medication time, set up, clean up or time spent charting.)    Interdisciplinary consultation: Patient, Bedside RN (Obdulia ), Sade GARCÍA (Wound PT).  Pressure injury and staging reviewed with N/A.    EVALUATION / RATIONALE FOR TREATMENT:     Date:  12/07/24  Wound Status:  Initial evaluation    Bilateral wound beds with slough present.  VF NPWT applied to assist with mechanical debridement, cleansing the wound bed, increase oxygenation and granulation to the area and healing the wound by secondary intention.        Date:  12/04/24  Wound Status:  Initial evaluation    Bilateral groin sites with necrotic tissue, adipose and tan purulent drainage. Discussed with bedside RN who will speak with MD  "regarding surgical consult. Pt would benefit from NPWT application but would like surgery to clear pt from any exposed structures prior to vac application. Dakins ordered to chemically debride.          Goals: Steady decrease in wound area and depth weekly.    NURSING PLAN OF CARE ORDERS:  Dressing changes: See Dressing Care orders  Skin care: See Skin Care orders  RN Prevention Protocol    NUTRITION RECOMMENDATIONS   Wound Team Recommendations:  N/A     DIET ORDERS (From admission to next 24h)       Start     Ordered    12/05/24 0929  Diet Order Diet: Full Liquid; Tray Modifications (optional): SLP - 1:1 Supervision by Nursing, SLP - One Item Per Meal  ALL MEALS        Question Answer Comment   Diet: Full Liquid    Tray Modifications (optional) SLP - 1:1 Supervision by Nursing    Tray Modifications (optional) SLP - One Item Per Meal        12/05/24 0928    12/02/24 1232  Supplements  ALL MEALS        Question Answer Comment   Which Supplement Glucerna    Glucerna: Glucerna Shake Carton        12/02/24 1231    12/02/24 1229  Diet: Diet Tube Feed; Formula: Pivot (Nocturnal tube feed x 10 hours, suggest from 2000 to 0600.); Pivot: Pivot 1.5 RTH; Goal Rate (mL/Hour): 50; Duration: 10 HR; Tube Feed Time Unit: Hours/Night  ALL MEALS        Question Answer Comment   Diet Diet Tube Feed    Formula: Pivot Nocturnal tube feed x 10 hours, suggest from 2000 to 0600.   Pivot: Pivot 1.5 RTH    Goal Rate (mL/Hour) 50    Route NG    Duration 10 HR    Tube Feed Time Unit Hours/Night       Placed in \"And\" Linked Group    12/02/24 1229                    PREVENTATIVE INTERVENTIONS:   Q shift Ray - performed per nursing policy  Q shift pressure point assessments - performed per nursing policy    Surface/Positioning  Low Airloss - Currently in Place  Reposition q 2 hours - Currently in Place  TAPs Turning system - Currently in Place    Offloading/Redistribution  Heel offloading dressing (Silicone dressing) - Currently in Place  Heel " float boots (Prevalon boot) - Currently in Place      Containment/Moisture Prevention    Beckwith Catheter - Currently in Place    Anticipated discharge plans:  TBD        Vac Discharge Needs:  Vac Discharge plan is purely a recommendation from wound team and not a requirement for discharge unless otherwise stated by physician.  Veraflo Vac while inpatient, ok to transition to Regular Vac on discharge

## 2024-12-09 ENCOUNTER — APPOINTMENT (OUTPATIENT)
Dept: RADIOLOGY | Facility: MEDICAL CENTER | Age: 59
End: 2024-12-09
Payer: COMMERCIAL

## 2024-12-09 ENCOUNTER — APPOINTMENT (OUTPATIENT)
Dept: RADIOLOGY | Facility: MEDICAL CENTER | Age: 59
End: 2024-12-09
Attending: HOSPITALIST
Payer: COMMERCIAL

## 2024-12-09 LAB
ALBUMIN SERPL BCP-MCNC: 2.4 G/DL (ref 3.2–4.9)
ALBUMIN/GLOB SERPL: 0.6 G/DL
ALP SERPL-CCNC: 109 U/L (ref 30–99)
ALT SERPL-CCNC: 13 U/L (ref 2–50)
AMMONIA PLAS-SCNC: 18 UMOL/L (ref 11–45)
ANION GAP SERPL CALC-SCNC: 8 MMOL/L (ref 7–16)
ANISOCYTOSIS BLD QL SMEAR: ABNORMAL
AST SERPL-CCNC: 43 U/L (ref 12–45)
BASE EXCESS BLDA CALC-SCNC: -3 MMOL/L (ref -4–3)
BASOPHILS # BLD AUTO: 0.5 % (ref 0–1.8)
BASOPHILS # BLD: 0.05 K/UL (ref 0–0.12)
BILIRUB SERPL-MCNC: 1 MG/DL (ref 0.1–1.5)
BODY TEMPERATURE: 36.7 CENTIGRADE
BUN SERPL-MCNC: 27 MG/DL (ref 8–22)
CALCIUM ALBUM COR SERPL-MCNC: 8.9 MG/DL (ref 8.5–10.5)
CALCIUM SERPL-MCNC: 7.6 MG/DL (ref 8.5–10.5)
CHLORIDE SERPL-SCNC: 111 MMOL/L (ref 96–112)
CO2 SERPL-SCNC: 20 MMOL/L (ref 20–33)
COMMENT 1642: NORMAL
CREAT SERPL-MCNC: 0.98 MG/DL (ref 0.5–1.4)
EKG IMPRESSION: NORMAL
EOSINOPHIL # BLD AUTO: 0.16 K/UL (ref 0–0.51)
EOSINOPHIL NFR BLD: 1.7 % (ref 0–6.9)
ERYTHROCYTE [DISTWIDTH] IN BLOOD BY AUTOMATED COUNT: 78.4 FL (ref 35.9–50)
GFR SERPLBLD CREATININE-BSD FMLA CKD-EPI: 66 ML/MIN/1.73 M 2
GLOBULIN SER CALC-MCNC: 4.1 G/DL (ref 1.9–3.5)
GLUCOSE BLD STRIP.AUTO-MCNC: 108 MG/DL (ref 65–99)
GLUCOSE BLD STRIP.AUTO-MCNC: 135 MG/DL (ref 65–99)
GLUCOSE BLD STRIP.AUTO-MCNC: 190 MG/DL (ref 65–99)
GLUCOSE SERPL-MCNC: 164 MG/DL (ref 65–99)
HCO3 BLDA-SCNC: 20 MMOL/L (ref 21–28)
HCT VFR BLD AUTO: 29.2 % (ref 37–47)
HGB BLD-MCNC: 8.7 G/DL (ref 12–16)
IMM GRANULOCYTES # BLD AUTO: 0.09 K/UL (ref 0–0.11)
IMM GRANULOCYTES NFR BLD AUTO: 1 % (ref 0–0.9)
INHALED O2 FLOW RATE: ABNORMAL L/MIN
LACTATE SERPL-SCNC: 1.5 MMOL/L (ref 0.5–2)
LYMPHOCYTES # BLD AUTO: 0.9 K/UL (ref 1–4.8)
LYMPHOCYTES NFR BLD: 9.8 % (ref 22–41)
MACROCYTES BLD QL SMEAR: ABNORMAL
MCH RBC QN AUTO: 32 PG (ref 27–33)
MCHC RBC AUTO-ENTMCNC: 29.8 G/DL (ref 32.2–35.5)
MCV RBC AUTO: 107.4 FL (ref 81.4–97.8)
MONOCYTES # BLD AUTO: 0.75 K/UL (ref 0–0.85)
MONOCYTES NFR BLD AUTO: 8.1 % (ref 0–13.4)
MORPHOLOGY BLD-IMP: NORMAL
NEUTROPHILS # BLD AUTO: 7.26 K/UL (ref 1.82–7.42)
NEUTROPHILS NFR BLD: 78.9 % (ref 44–72)
NRBC # BLD AUTO: 0 K/UL
NRBC BLD-RTO: 0 /100 WBC (ref 0–0.2)
PCO2 BLDA: 27.4 MMHG (ref 32–48)
PCO2 TEMP ADJ BLDA: 27 MMHG (ref 32–48)
PH BLDA: 7.48 [PH] (ref 7.35–7.45)
PH TEMP ADJ BLDA: 7.48 [PH] (ref 7.35–7.45)
PLATELET # BLD AUTO: 280 K/UL (ref 164–446)
PLATELET BLD QL SMEAR: NORMAL
PMV BLD AUTO: 11.5 FL (ref 9–12.9)
PO2 BLDA: 71.9 MMHG (ref 83–108)
PO2 TEMP ADJ BLDA: 70.5 MMHG (ref 64–87)
POTASSIUM SERPL-SCNC: 4.3 MMOL/L (ref 3.6–5.5)
PROCALCITONIN SERPL-MCNC: 0.12 NG/ML
PROT SERPL-MCNC: 6.5 G/DL (ref 6–8.2)
RBC # BLD AUTO: 2.72 M/UL (ref 4.2–5.4)
RBC BLD AUTO: PRESENT
SAO2 % BLDA: 92.9 % (ref 93–99)
SODIUM SERPL-SCNC: 139 MMOL/L (ref 135–145)
T3FREE SERPL-MCNC: 1.89 PG/ML (ref 2–4.4)
WBC # BLD AUTO: 9.2 K/UL (ref 4.8–10.8)

## 2024-12-09 PROCEDURE — 82803 BLOOD GASES ANY COMBINATION: CPT

## 2024-12-09 PROCEDURE — A9270 NON-COVERED ITEM OR SERVICE: HCPCS | Performed by: HOSPITALIST

## 2024-12-09 PROCEDURE — 71045 X-RAY EXAM CHEST 1 VIEW: CPT

## 2024-12-09 PROCEDURE — 82962 GLUCOSE BLOOD TEST: CPT | Mod: 91

## 2024-12-09 PROCEDURE — 84145 PROCALCITONIN (PCT): CPT

## 2024-12-09 PROCEDURE — 700102 HCHG RX REV CODE 250 W/ 637 OVERRIDE(OP): Performed by: HOSPITALIST

## 2024-12-09 PROCEDURE — 70450 CT HEAD/BRAIN W/O DYE: CPT

## 2024-12-09 PROCEDURE — 85025 COMPLETE CBC W/AUTO DIFF WBC: CPT

## 2024-12-09 PROCEDURE — 80053 COMPREHEN METABOLIC PANEL: CPT

## 2024-12-09 PROCEDURE — 770000 HCHG ROOM/CARE - INTERMEDIATE ICU *

## 2024-12-09 PROCEDURE — 99291 CRITICAL CARE FIRST HOUR: CPT | Performed by: HOSPITALIST

## 2024-12-09 PROCEDURE — 700102 HCHG RX REV CODE 250 W/ 637 OVERRIDE(OP)

## 2024-12-09 PROCEDURE — 82140 ASSAY OF AMMONIA: CPT

## 2024-12-09 PROCEDURE — 700102 HCHG RX REV CODE 250 W/ 637 OVERRIDE(OP): Performed by: STUDENT IN AN ORGANIZED HEALTH CARE EDUCATION/TRAINING PROGRAM

## 2024-12-09 PROCEDURE — 700111 HCHG RX REV CODE 636 W/ 250 OVERRIDE (IP)

## 2024-12-09 PROCEDURE — 93005 ELECTROCARDIOGRAM TRACING: CPT | Mod: TC

## 2024-12-09 PROCEDURE — 83605 ASSAY OF LACTIC ACID: CPT

## 2024-12-09 PROCEDURE — 84481 FREE ASSAY (FT-3): CPT

## 2024-12-09 PROCEDURE — 306565 RIGID MIT RESTRAINT(PAIR): Performed by: HOSPITALIST

## 2024-12-09 PROCEDURE — 700111 HCHG RX REV CODE 636 W/ 250 OVERRIDE (IP): Performed by: HOSPITALIST

## 2024-12-09 PROCEDURE — A9270 NON-COVERED ITEM OR SERVICE: HCPCS | Performed by: STUDENT IN AN ORGANIZED HEALTH CARE EDUCATION/TRAINING PROGRAM

## 2024-12-09 PROCEDURE — A9270 NON-COVERED ITEM OR SERVICE: HCPCS

## 2024-12-09 PROCEDURE — 93010 ELECTROCARDIOGRAM REPORT: CPT | Performed by: INTERNAL MEDICINE

## 2024-12-09 RX ORDER — HYDROMORPHONE HYDROCHLORIDE 1 MG/ML
0.25 INJECTION, SOLUTION INTRAMUSCULAR; INTRAVENOUS; SUBCUTANEOUS ONCE
Status: COMPLETED | OUTPATIENT
Start: 2024-12-09 | End: 2024-12-09

## 2024-12-09 RX ADMIN — LANSOPRAZOLE 30 MG: 30 TABLET, ORALLY DISINTEGRATING ORAL at 05:50

## 2024-12-09 RX ADMIN — VALPROIC ACID 500 MG: 250 SOLUTION ORAL at 05:50

## 2024-12-09 RX ADMIN — BISACODYL 10 MG: 10 SUPPOSITORY RECTAL at 06:00

## 2024-12-09 RX ADMIN — LORAZEPAM 0.5 MG: 2 INJECTION INTRAMUSCULAR; INTRAVENOUS at 22:36

## 2024-12-09 RX ADMIN — HYDROMORPHONE HYDROCHLORIDE 0.25 MG: 1 INJECTION, SOLUTION INTRAMUSCULAR; INTRAVENOUS; SUBCUTANEOUS at 02:00

## 2024-12-09 RX ADMIN — MIDODRINE HYDROCHLORIDE 5 MG: 5 TABLET ORAL at 07:59

## 2024-12-09 RX ADMIN — QUETIAPINE FUMARATE 25 MG: 50 TABLET ORAL at 05:50

## 2024-12-09 RX ADMIN — MICONAZOLE NITRATE: 2 CREAM TOPICAL at 05:51

## 2024-12-09 RX ADMIN — MICONAZOLE NITRATE: 2 CREAM TOPICAL at 18:00

## 2024-12-09 RX ADMIN — CLOPIDOGREL BISULFATE 75 MG: 75 TABLET ORAL at 05:50

## 2024-12-09 RX ADMIN — VALPROIC ACID 500 MG: 250 SOLUTION ORAL at 17:12

## 2024-12-09 RX ADMIN — Medication 5 MG: at 21:00

## 2024-12-09 RX ADMIN — APIXABAN 5 MG: 5 TABLET, FILM COATED ORAL at 17:09

## 2024-12-09 RX ADMIN — LORAZEPAM 0.5 MG: 2 INJECTION INTRAMUSCULAR; INTRAVENOUS at 11:51

## 2024-12-09 RX ADMIN — OXYCODONE 5 MG: 5 TABLET ORAL at 09:02

## 2024-12-09 RX ADMIN — ATORVASTATIN CALCIUM 80 MG: 80 TABLET, FILM COATED ORAL at 17:09

## 2024-12-09 RX ADMIN — APIXABAN 5 MG: 5 TABLET, FILM COATED ORAL at 05:50

## 2024-12-09 RX ADMIN — BISOPROLOL FUMARATE 2.5 MG: 5 TABLET ORAL at 05:50

## 2024-12-09 RX ADMIN — QUETIAPINE FUMARATE 25 MG: 50 TABLET ORAL at 17:09

## 2024-12-09 RX ADMIN — QUETIAPINE FUMARATE 150 MG: 100 TABLET ORAL at 20:03

## 2024-12-09 RX ADMIN — INSULIN GLARGINE-YFGN 9 UNITS: 100 INJECTION, SOLUTION SUBCUTANEOUS at 17:15

## 2024-12-09 ASSESSMENT — PAIN DESCRIPTION - PAIN TYPE
TYPE: ACUTE PAIN
TYPE: ACUTE PAIN;CHRONIC PAIN
TYPE: ACUTE PAIN
TYPE: ACUTE PAIN
TYPE: ACUTE PAIN;SURGICAL PAIN
TYPE: ACUTE PAIN

## 2024-12-09 ASSESSMENT — FIBROSIS 4 INDEX: FIB4 SCORE: 2.51

## 2024-12-09 NOTE — PROGRESS NOTES
Hospital Medicine Daily Progress Note    Date of Service  12/9/2024    Chief Complaint  Kiara Qureshi is a 59 y.o. female admitted 11/27/2024 with STEMI    Hospital Course  Patient has a history of obesity, hypertension, hyperlipidemia.  She was seen at [facility] as a transfer initially for anterior STEMI requiring lytics.  Patient underwent an LAD PCI but hospital course was complicated by retroperitoneal bleed, ventricular fibrillation and PEA arrest and cardiogenic shock requiring VA ECMO and Impella.  She is now status post ECMO decannulation on November 4 and had left femoral embolectomy, PCI to the LAD, unsuccessful PCI to OM1 and Impella removal on November 8.  Hospital course was complicated by patient developing encephalopathy and delirium, to which she was given valproic acid and quetiapine.  Patient has been weaned off her present medications and was found to have hypotension to which she was started on p.o. midodrine 10 mg 3 times daily.  Goal-directed medical therapy was held due to borderline low blood pressure.  Her echocardiogram on November 24 shows 25 to 30% EF with akinesis of the apical, apical anterior wall, apical septum, apical inferior wall, and apical lateral wall of the left ventricle.  Hypokinesis seen of the basal anterolateral wall, mid anterolateral wall, anterior wall, mid inferolateral wall, and basal inferolateral wall of the left ventricle.     On November 25, patient had CT angiogram of the chest which shows pulmonary edema, small bilateral pleural effusions, small 3 mm filling defect along the lateral wall of the descending thoracic aorta which may represent focal aortic mural thrombus versus ulcerated plaque.  CT angiogram of the abdomen pelvis showing increased organization of the rim-enhancing left retroperitoneal hematomas measuring up to 9.3 x 4.4 cm.  Tiny filling defect/mural thrombus within the posterior right common iliac artery.  Multiple nonenhancing intermediate  density fluid collections within the subcutaneous tissues overlying the right lower quadrant and right inguinal regions measuring up to 5.7 x 2.4 cm.  Additional nonenhancing low-density fluid collection closely associated with the left femoral vein measuring 4.7 x 3.9 cm, likely represents evolving hematomas.     Due to patient's uptrending leukocytosis and CAT scan findings of suspected pneumonia, patient was started on vancomycin and Zosyn on November 26.  Patient was then transferred back to [facility] for further management.      Interval Problem Update    Not following commands today and cheyne florez breathing since last night, I reviewed abg and it is consistent with resp alkalosis. Her RR has increased. I consulted with critical care who will eval and also discussed with cardiology. No focal changes, continues to tolerate tube feeds and wounds vac in place,     I have discussed this patient's plan of care and discharge plan at IDT rounds today with Case Management, Nursing, Nursing leadership, and other members of the IDT team. I spent over 60 minutes coordinating her care, providing direct pt care and reviewing her chart    Addendum 18:40 intermittent bradycardia noted on tele - will check 12 lead, continue following closely, beta blocker stopped    Consultants/Specialty  Palliative Care  Cardiology    Code Status  Full Code    Disposition  The patient is not medically cleared for discharge to home or a post-acute facility.      I have placed the appropriate orders for post-discharge needs.    Review of Systems  Review of Systems   Unable to perform ROS: Medical condition        Physical Exam  Temp:  [36.6 °C (97.8 °F)-36.7 °C (98.1 °F)] 36.6 °C (97.8 °F)  Pulse:  [] 91  Resp:  [20-67] 31  BP: ()/(44-86) 115/53  SpO2:  [82 %-99 %] 82 %    Physical Exam  Vitals and nursing note reviewed. Exam conducted with a chaperone present.   Constitutional:       General: She is not in acute distress.      Appearance: Normal appearance. She is ill-appearing. She is not diaphoretic.   HENT:      Head: Normocephalic.      Nose: Nose normal.      Mouth/Throat:      Mouth: Mucous membranes are moist.   Eyes:      General: No scleral icterus.     Conjunctiva/sclera: Conjunctivae normal.      Pupils: Pupils are equal, round, and reactive to light.   Cardiovascular:      Rate and Rhythm: Normal rate and regular rhythm.      Pulses: Normal pulses.      Heart sounds: Normal heart sounds.   Pulmonary:      Effort: Pulmonary effort is normal.      Breath sounds: Normal breath sounds.   Abdominal:      General: Abdomen is flat. Bowel sounds are normal. There is no distension.      Palpations: Abdomen is soft.      Tenderness: There is no guarding.   Genitourinary:     Comments: Ngt in place  Musculoskeletal:         General: No swelling or deformity. Normal range of motion.      Cervical back: Normal range of motion and neck supple.      Right lower leg: No edema.      Left lower leg: No edema.   Skin:     General: Skin is warm and dry.      Capillary Refill: Capillary refill takes less than 2 seconds.      Comments: Wounds bilateral groins, bilateral wound vac    Neurological:      General: No focal deficit present.      Mental Status: She is alert.      Comments: Oriented x 1         Fluids    Intake/Output Summary (Last 24 hours) at 12/9/2024 1700  Last data filed at 12/9/2024 1400  Gross per 24 hour   Intake 360 ml   Output 600 ml   Net -240 ml        Laboratory  Recent Labs     12/07/24  0036 12/08/24  0645 12/09/24  0122   WBC 11.6* 9.7 9.2   RBC 2.87* 2.58* 2.72*   HEMOGLOBIN 9.6* 8.5* 8.7*   HEMATOCRIT 32.0* 27.4* 29.2*   .5* 106.2* 107.4*   MCH 33.4* 32.9 32.0   MCHC 30.0* 31.0* 29.8*   RDW 84.0* 77.7* 78.4*   PLATELETCT 287 275 280   MPV 11.8 11.6 11.5     Recent Labs     12/07/24  0036 12/08/24  0645 12/09/24  0122   SODIUM 136 137 139   POTASSIUM 4.7 4.5 4.3   CHLORIDE 110 109 111   CO2 15* 18* 20   GLUCOSE  188* 298* 164*   BUN 32* 31* 27*   CREATININE 1.16 1.05 0.98   CALCIUM 7.9* 7.5* 7.6*                   Imaging  DX-CHEST-PORTABLE (1 VIEW)   Final Result         1.  Mild pulmonary edema and/or infiltrates.      EC-ECHOCARDIOGRAM COMPLETE W/ CONT   Final Result      DX-ABDOMEN FOR TUBE PLACEMENT   Final Result      Tip of the esophagogastric tube terminates over the pylorus of the stomach.      IR-MIDLINE CATHETER INSERTION WO GUIDANCE > AGE 3   Final Result                  Ultrasound-guided midline placement performed by qualified nursing staff    as above.          IR-US GUIDED PIV   Final Result    Ultrasound-guided PERIPHERAL IV INSERTION performed by    qualified nursing staff as above.      DX-OUTSIDE IMAGES-DX CHEST   Final Result      DX-OUTSIDE IMAGES-DX ABDOMEN   Final Result      MR-OUTSIDE IMAGES-MR BRAIN   Final Result      CT-OUTSIDE IMAGES-CT HEAD   Final Result      CT-OUTSIDE IMAGES-CT ABDOMEN/PELVIS   Final Result      CT-OUTSIDE IMAGES-CT CHEST   Final Result      DX-ABDOMEN FOR TUBE PLACEMENT   Final Result      There is a new small bowel feeding tube which terminates in the small bowel of the right mid abdomen.      DX-CHEST-LIMITED (1 VIEW)   Final Result         Asymmetric pulmonary infiltrates, left worse than right.      CT-HEAD W/O    (Results Pending)        Assessment/Plan  * Heart failure with reduced ejection fraction (HCC)- (present on admission)  Assessment & Plan  Unable to start goal-directed medical therapy due to hypotension.  Echocardiogram showing EF 25 to 30% EF with akinesis of the apical, apical anterior wall, apical septum, apical inferior wall, and apical lateral wall of the left ventricle.  Hypokinesis seen of the basal anterolateral wall, mid anterolateral wall, anterior wall, mid inferolateral wall, and basal inferolateral wall of the left ventricle.    Continue monitoring for signs of fluid overload, see above, currently euvolemic small fluid bolus was given overnight  - will hold lasix  Discussed with cardiology and critical care - see above, did not tolerated losartan    Constipation  Assessment & Plan  + bm  Continue bowel regimen  following    Hypotension  Assessment & Plan  See above, bp improved but still low, no need for bolus today, did not tolerate attempt to wean midodrine  Continue to follow and titrate as needed    Leukocytosis  Assessment & Plan  Could be related to hematoma  Much improved normal pro luisito  Cbc in am     Abdominal hematoma  Assessment & Plan  Might be related to cardiac device  Clinically stable and h/h in stable range  Patient was on lovenox due to rigth common iliac art thrombosis and then transitioned to eliquis per [facility] - see my discussion above - plan to continue for 3-4 months with repeat imaging at that time to see if it can be d/c   Following  Cbc in am     ACP (advance care planning)  Assessment & Plan  See my discussion above, I spoke with family regarding goals of care and code status for 22 minutes, they would like to continue with full code    Pneumonia  Assessment & Plan  Patient found to have increasing white blood cell count up to 15,000 at outside facility  CT angio chest showing persistent irregular tree-in-bud nodularity and bilateral bronchial wall thickening on a background of groundglass opacities within the dependent portions of the right middle, right lower, and left lower lobes (right greater than left). Increased bilateral upper lobe patchy perihilar groundglass opacities.   Completed abx zosyn and zyvox   .   Cultures were negative    Aortic mural thrombus (HCC)  Assessment & Plan  CT at outside facility showing focal aortic mural thrombus along the lateral wall of the descending thoracic aorta and also a tiny filling defect/mural thrombus within the posterior right common iliac artery  See above discussion regarding eliquis  Monitor H&H    Continue monitoring H&H  Cbc in am         Encephalopathy  Assessment & Plan  Hospital  course complicated by encephalopathy.  Likely from combination of CVA, STEMI, hyperglycemia  Removing restraints as tolerated, following  Has a nasojejunal feeding tube placed by interventional radiology from outside facility  Continue Seroquel and Depakene as recommended by neurology/psychiatry at outside facility.    Multifactorial  As per records from [facility] probably anoxic/embolic/delirium    CVA (cerebral vascular accident) (HCC)  Assessment & Plan  Patient had MRI on 11/17/24 at [facility] which shwed Punctate acute infarct of the right centrum semiovale. Scattered foci of enhancement in multiple vascular distributions as described may represent recent subacute infarcts. Diffuse stippled susceptibility signal throughout the supratentorial and infratentorial brain. Overall findings may represent sequelae of fat emboli or amyloid related angiopathy.   Continue lipitor, lovenox    Continue to be encephalopathic  Neuro evaluated patient at [facility].   Poor prognosis, now developing cheyne florez, will repeat CT  Guarded prognosis      CAD (coronary artery disease)  Assessment & Plan  Patient with recent complicated STEMI with PCI to LAD and mid LAD.  Complicated by V-fib arrest and cardiogenic shock requiring VA ECMO and Impella and retroperitoneal hematomas. At [facility] underwent impella supported PCI to mid LAD just distal to prior stent but unsuccessful with PCI to 100% occluded OM1 on 11/6. Patient was decannulated and had Impella removed.    Continue Lipitor    Continue monitoring  Continue telemetry.     Discussed with cardiology and asa stopped and replaced with plavix  Following  Repeat echo shows persistently low EF, see above  Cardiology had recommended bisoprolol but due to cheyne florez critical care recommends we stop this - so will hold - resume lasix and follow    Urinary retention  Assessment & Plan  Beckwith catheter in place    Hyperglycemia  Assessment & Plan    Sliding scale      Metabolic acidemia- (present on  admission)  Assessment & Plan  resolved  Bmp in am  Following           VTE prophylaxis: Lovenox    I have performed a physical exam and reviewed and updated ROS and Plan today (12/9/2024). In review of yesterday's note (12/8/2024), there are no changes except as documented above. She remains critically ill, I provided 45 minutes of critical care time including management of respiratory distress, no overlap

## 2024-12-09 NOTE — PROGRESS NOTES
4 Eyes Skin Assessment Completed by Saud RN and KASIE Perry.    Head WDL  Ears Redness and Blanching  Nose WDL  Mouth dry, dry lips  Neck WDL  Breast/Chest Redness and Bruising  Shoulder Blades WDL  Spine WDL  (R) Arm/Elbow/Hand Redness, Blanching, and Bruising  (L) Arm/Elbow/Hand Redness and Bruising  Abdomen Redness, Blanching, and Bruising    Groin Redness and Bruising 2 wound vacs  Scrotum/Coccyx/Buttocks Redness and Blanching    (R) Leg WDL  (L) Leg WDL  (R) Heel/Foot/Toe Redness and Blanching  (L) Heel/Foot/Toe Redness and Blanching          Devices In Places ECG, Blood Pressure Cuff, Pulse Ox, Beckwith, and Cortrak      Interventions In Place InterDry, Heel Mepilex, Sacral Mepilex, TAP System, Pillows, Q2 Turns, and Low Air Loss Mattress    Possible Skin Injury No    Pictures Uploaded Into Epic Yes  Wound Consult Placed N/A  RN Wound Prevention Protocol Ordered No

## 2024-12-09 NOTE — PROGRESS NOTES
Hospital Medicine Daily Progress Note    Date of Service  12/8/2024    Chief Complaint  Kiara Qureshi is a 59 y.o. female admitted 11/27/2024 with STEMI    Hospital Course  Patient has a history of obesity, hypertension, hyperlipidemia.  She was seen at [facility] as a transfer initially for anterior STEMI requiring lytics.  Patient underwent an LAD PCI but hospital course was complicated by retroperitoneal bleed, ventricular fibrillation and PEA arrest and cardiogenic shock requiring VA ECMO and Impella.  She is now status post ECMO decannulation on November 4 and had left femoral embolectomy, PCI to the LAD, unsuccessful PCI to OM1 and Impella removal on November 8.  Hospital course was complicated by patient developing encephalopathy and delirium, to which she was given valproic acid and quetiapine.  Patient has been weaned off her present medications and was found to have hypotension to which she was started on p.o. midodrine 10 mg 3 times daily.  Goal-directed medical therapy was held due to borderline low blood pressure.  Her echocardiogram on November 24 shows 25 to 30% EF with akinesis of the apical, apical anterior wall, apical septum, apical inferior wall, and apical lateral wall of the left ventricle.  Hypokinesis seen of the basal anterolateral wall, mid anterolateral wall, anterior wall, mid inferolateral wall, and basal inferolateral wall of the left ventricle.     On November 25, patient had CT angiogram of the chest which shows pulmonary edema, small bilateral pleural effusions, small 3 mm filling defect along the lateral wall of the descending thoracic aorta which may represent focal aortic mural thrombus versus ulcerated plaque.  CT angiogram of the abdomen pelvis showing increased organization of the rim-enhancing left retroperitoneal hematomas measuring up to 9.3 x 4.4 cm.  Tiny filling defect/mural thrombus within the posterior right common iliac artery.  Multiple nonenhancing intermediate  density fluid collections within the subcutaneous tissues overlying the right lower quadrant and right inguinal regions measuring up to 5.7 x 2.4 cm.  Additional nonenhancing low-density fluid collection closely associated with the left femoral vein measuring 4.7 x 3.9 cm, likely represents evolving hematomas.     Due to patient's uptrending leukocytosis and CAT scan findings of suspected pneumonia, patient was started on vancomycin and Zosyn on November 26.  Patient was then transferred back to [facility] for further management.      Interval Problem Update  No significant cognitive change today, remains axox1, not following consistently, bp slightly improved, did have increased RR earlier, etiology unclear, now normal, no focal deficits, tolerated wound vac placement, continues to tolerate tube feeds. Mild tachycardia earlier, now in 90's. She remains critically ill. Following closely. Awaiting bed at [facility]    I have discussed this patient's plan of care and discharge plan at IDT rounds today with Case Management, Nursing, Nursing leadership, and other members of the IDT team. I spent over 60 minutes coordinating her care, providing direct pt care and reviewing her chart    Consultants/Specialty  Palliative Care  Cardiology    Code Status  Full Code    Disposition  The patient is not medically cleared for discharge to home or a post-acute facility.      I have placed the appropriate orders for post-discharge needs.    Review of Systems  Review of Systems   Unable to perform ROS: Medical condition        Physical Exam  Temp:  [36.1 °C (97 °F)-36.8 °C (98.2 °F)] 36.6 °C (97.9 °F)  Pulse:  [] 99  Resp:  [18-24] 20  BP: (100-133)/() 133/108  SpO2:  [90 %-98 %] 95 %    Physical Exam  Vitals and nursing note reviewed. Exam conducted with a chaperone present.   Constitutional:       General: She is not in acute distress.     Appearance: Normal appearance. She is ill-appearing. She is not diaphoretic.   HENT:       Head: Normocephalic.      Nose: Nose normal.      Mouth/Throat:      Mouth: Mucous membranes are moist.   Eyes:      General: No scleral icterus.     Conjunctiva/sclera: Conjunctivae normal.      Pupils: Pupils are equal, round, and reactive to light.   Cardiovascular:      Rate and Rhythm: Normal rate and regular rhythm.      Pulses: Normal pulses.      Heart sounds: Normal heart sounds.   Pulmonary:      Effort: Pulmonary effort is normal.      Breath sounds: Normal breath sounds.   Abdominal:      General: Abdomen is flat. Bowel sounds are normal. There is no distension.      Palpations: Abdomen is soft.      Tenderness: There is no guarding.   Genitourinary:     Comments: Ngt in place  Musculoskeletal:         General: No swelling or deformity. Normal range of motion.      Cervical back: Normal range of motion and neck supple.      Right lower leg: No edema.      Left lower leg: No edema.   Skin:     General: Skin is warm and dry.      Capillary Refill: Capillary refill takes less than 2 seconds.      Comments: Wounds bilateral groins, bilateral wound vac    Neurological:      General: No focal deficit present.      Mental Status: She is alert.      Comments: Oriented x 1         Fluids    Intake/Output Summary (Last 24 hours) at 12/8/2024 1707  Last data filed at 12/8/2024 1345  Gross per 24 hour   Intake 700 ml   Output 700 ml   Net 0 ml        Laboratory  Recent Labs     12/06/24 0138 12/07/24  0036 12/08/24  0645   WBC 11.1* 11.6* 9.7   RBC 2.54* 2.87* 2.58*   HEMOGLOBIN 8.4* 9.6* 8.5*   HEMATOCRIT 26.8* 32.0* 27.4*   .5* 111.5* 106.2*   MCH 33.1* 33.4* 32.9   MCHC 31.3* 30.0* 31.0*   RDW 77.0* 84.0* 77.7*   PLATELETCT 279 287 275   MPV 11.5 11.8 11.6     Recent Labs     12/06/24 0138 12/07/24  0036 12/08/24  0645   SODIUM 137 136 137   POTASSIUM 4.6 4.7 4.5   CHLORIDE 112 110 109   CO2 17* 15* 18*   GLUCOSE 211* 188* 298*   BUN 38* 32* 31*   CREATININE 1.24 1.16 1.05   CALCIUM 7.7* 7.9* 7.5*                    Imaging  EC-ECHOCARDIOGRAM COMPLETE W/ CONT   Final Result      DX-ABDOMEN FOR TUBE PLACEMENT   Final Result      Tip of the esophagogastric tube terminates over the pylorus of the stomach.      IR-MIDLINE CATHETER INSERTION WO GUIDANCE > AGE 3   Final Result                  Ultrasound-guided midline placement performed by qualified nursing staff    as above.          IR-US GUIDED PIV   Final Result    Ultrasound-guided PERIPHERAL IV INSERTION performed by    qualified nursing staff as above.      DX-OUTSIDE IMAGES-DX CHEST   Final Result      DX-OUTSIDE IMAGES-DX ABDOMEN   Final Result      MR-OUTSIDE IMAGES-MR BRAIN   Final Result      CT-OUTSIDE IMAGES-CT HEAD   Final Result      CT-OUTSIDE IMAGES-CT ABDOMEN/PELVIS   Final Result      CT-OUTSIDE IMAGES-CT CHEST   Final Result      DX-ABDOMEN FOR TUBE PLACEMENT   Final Result      There is a new small bowel feeding tube which terminates in the small bowel of the right mid abdomen.      DX-CHEST-LIMITED (1 VIEW)   Final Result         Asymmetric pulmonary infiltrates, left worse than right.           Assessment/Plan  * Heart failure with reduced ejection fraction (HCC)- (present on admission)  Assessment & Plan  Unable to start goal-directed medical therapy due to hypotension.  Echocardiogram showing EF 25 to 30% EF with akinesis of the apical, apical anterior wall, apical septum, apical inferior wall, and apical lateral wall of the left ventricle.  Hypokinesis seen of the basal anterolateral wall, mid anterolateral wall, anterior wall, mid inferolateral wall, and basal inferolateral wall of the left ventricle.    Continue monitoring for signs of fluid overload, see above, currently euvolemic small fluid bolus was given overnight - will hold lasix  Discussed with cardiology, low dose bisoprolol as tolerated, repeat echo pending, did not tolerated losartan    Constipation  Assessment & Plan  + bm  Continue bowel  regimen  following    Hypotension  Assessment & Plan  See above, bp improved but still low, no need for bolus today, did not tolerate attempt to wean midodrine  Continue to follow and titrate as needed    Leukocytosis  Assessment & Plan  Could be related to hematoma  Much improved normal pro luisito  Cbc in am     Abdominal hematoma  Assessment & Plan  Might be related to cardiac device  Monitoring hb  Patient was on lovenox due to rigth common iliac art thrombosis and then transitioned to eliquis per [facility] - see my discussion above - plan to continue for 3-4 months with repeat imaging at that time to see if it can be d/c   Cbc in am     ACP (advance care planning)  Assessment & Plan  See my discussion above, I spoke with family regarding goals of care and code status for 22 minutes, they would like to continue with full code    Pneumonia  Assessment & Plan  Patient found to have increasing white blood cell count up to 15,000 at outside facility  CT angio chest showing persistent irregular tree-in-bud nodularity and bilateral bronchial wall thickening on a background of groundglass opacities within the dependent portions of the right middle, right lower, and left lower lobes (right greater than left). Increased bilateral upper lobe patchy perihilar groundglass opacities.   Completed abx zosyn and zyvox   .   Cultures were negative    Aortic mural thrombus (HCC)  Assessment & Plan  CT at outside facility showing focal aortic mural thrombus along the lateral wall of the descending thoracic aorta and also a tiny filling defect/mural thrombus within the posterior right common iliac artery  See above discussion regarding eliquis  Monitor H&H    Continue monitoring H&H  Cbc in am         Encephalopathy  Assessment & Plan  Hospital course complicated by encephalopathy.  Likely from combination of CVA, STEMI, hyperglycemia  Removing restraints as tolerated, following  Has a nasojejunal feeding tube placed by interventional  radiology from outside facility  Continue Seroquel and Depakene as recommended by neurology/psychiatry at outside facility.    Multifactorial  As per records from [facility] probably anoxic/embolic/delirium   Ongoing encephalopathy, suspected anoxic injury, no significant improvement, will get hearing aids to see if this helps  following    CVA (cerebral vascular accident) (HCC)  Assessment & Plan  Patient had MRI on 11/17/24 at OSH which shwed Punctate acute infarct of the right centrum semiovale. Scattered foci of enhancement in multiple vascular distributions as described may represent recent subacute infarcts. Diffuse stippled susceptibility signal throughout the supratentorial and infratentorial brain. Overall findings may represent sequelae of fat emboli or amyloid related angiopathy.   Continue lipitor, lovenox    Continue to be encephalopathic  Neuro evaluated patient at [facility].   Mild improvement with use of hearing aids, will continue to follow, I will check ammonia and TSH      CAD (coronary artery disease)  Assessment & Plan  Patient with recent complicated STEMI with PCI to LAD and mid LAD.  Complicated by V-fib arrest and cardiogenic shock requiring VA ECMO and Impella and retroperitoneal hematomas. At [facility] underwent impella supported PCI to mid LAD just distal to prior stent but unsuccessful with PCI to 100% occluded OM1 on 11/6. Patient was decannulated and had Impella removed.    Continue Lipitor    Continue monitoring  Continue telemetry.     Discussed with cardiology and asa stopped and replaced with plavix  Following  Repeat echo shows persistently low EF, see above    Urinary retention  Assessment & Plan  Beckwith catheter in place    Hyperglycemia  Assessment & Plan    Sliding scale      Metabolic acidemia- (present on admission)  Assessment & Plan  improving  Continue bicarb  Bmp in am  Following           VTE prophylaxis: Lovenox    I have performed a physical exam and reviewed and updated ROS and Plan  today (12/8/2024). In review of yesterday's note (12/7/2024), there are no changes except as documented above.

## 2024-12-09 NOTE — PROGRESS NOTES
Monitor Summary:   Rhythm: SR/ST  Rate:   Measurement: .14/.11/.34  Ectopy: O PVC, R Coup

## 2024-12-09 NOTE — PROGRESS NOTES
NOC HOSPITALIST CROSS COVER    Notified by RN regarding Cheyne-Lo breathing pattern while awake and asleep. She has a history of heart failure, but is not in fluid overload at this time. Patient is not receiving aggressive narcotic administration. History of stroke, which may be contributing. Electrolytes are within normal limits.     ABG demonstrating respiratory alkalosis. CXR showing mild pulmonary edema. Procal is negative.     Patient is currently hemodynamically stable. Discussed with collaborating MD. Will continue to monitor closely.       Vitals:    12/09/24 0357   BP: 113/65   Pulse: 100   Resp: 20   Temp: 36.7 °C (98.1 °F)   SpO2: 94%      -----------------------------------------------------------------------------------------------------------    Electronically signed by:  oRnna Norton, JOSE, BEA, JENIFER-BC  Hospitalist Services

## 2024-12-09 NOTE — CARE PLAN
The patient is Watcher - Medium risk of patient condition declining or worsening    Shift Goals  Clinical Goals: safety, oral intake, skin integ, monitor mentation  Patient Goals: morenita  Family Goals: updates    Progress made toward(s) clinical / shift goals:  Progressing      Problem: Safety - Medical Restraint  Goal: Remains free of injury from restraints (Restraint for Interference with Medical Device)  Outcome: Progressing  Note: Pt assessed Q2 hours, no injury appreciated underneath or near restraints. Q2 hour limb mobility performed     Problem: Safety  Goal: Will remain free from falls  Outcome: Progressing  Note: Bed in lowest position and locked, strip alarm in use, call light within reach       Patient is not progressing towards the following goals:      Problem: Safety - Medical Restraint  Goal: Free from restraint(s) (Restraint for Interference with Medical Device)  Outcome: Not Progressing  Note: Pt continuing to pull at catheter and enteral tube, unable to discontinue restraints safely at this time

## 2024-12-09 NOTE — PROGRESS NOTES
Received report from previous shift RN, assumed care of patient. Patient is A&Ox0 , vital signs are stable, on 1L O2. Patient shows no signs of pain at this time, no signs of distress or discomfort. Call light and belongings within reach. Bed in lowest/locked position. Telesitter at bedside.         Fall Risk Score: HIGH RISK  Fall risk interventions in place: Place yellow fall risk ID band on patient, Provide patient/family education based on risk assessment, Educate patient/family to call staff for assistance when getting out of bed, Place fall precaution signage outside patient door, Place patient in room close to nursing station, Utilize bed/chair fall alarm, Notify charge of high risk for huddle, and Tele-sitter  Bed type: Regular (Ray Score less than 17 interventions in place)  Patient on cardiac monitor: Yes  IVF/IV medications: Not Applicable   Oxygen: How many liters 1L  Bedside sitter: telesitter  Isolation: Not applicable

## 2024-12-09 NOTE — PROGRESS NOTES
Assumed care of patient. Alarm parameters verified. Bed in lowest position. Bilateral wrist restrain were in placed.

## 2024-12-09 NOTE — DISCHARGE PLANNING
Case Management Discharge Planning    Admission Date: 11/27/2024  GMLOS: 4.1  ALOS: 12    6-Clicks ADL Score: 8  6-Clicks Mobility Score: 9        Anticipated Discharge Dispo: Discharge Disposition: Disch to a long term care facility (63)  Discharge Address: [address] [place] [address]  Discharge Contact Phone Number: [phone]    DME Needed: No    Action(s) Taken: Per [facility] there are no beds available today.

## 2024-12-09 NOTE — PROGRESS NOTES
Monitor Summary  Rate: SR/ST   Ectopy: Frequent PVCs, Occasional Bigem/Trigem, Rare Couplets/Triplets  .12/.10/.32

## 2024-12-09 NOTE — PROGRESS NOTES
Called by Dr. Joyner for concern over increasing respiratory distress.     The patient is altered, moves all 4 extremities, and agitated. She has cheyne florez breathing pattern but is not in respiratory distress and is currently on 1 L NC oxygen with normal SpO2. Blood gas shows respiratory alkalosis. Her extremities are warm and well perfused. Her CXR has mild pulmonary edema and recently lasix has been held.     I would hold beta blocker, resume lasix, expand workup for cheyne florez breathing including CTH. As she is not critically ill a formal critical care consult is not being performed but is available upon request should her condition change. If the primary team wants her to be more closely monitored in the IMCU that would be fine.     Yadiel Farias M.D.

## 2024-12-10 LAB
ANION GAP SERPL CALC-SCNC: 8 MMOL/L (ref 7–16)
ANISOCYTOSIS BLD QL SMEAR: ABNORMAL
BASOPHILS # BLD AUTO: 0.7 % (ref 0–1.8)
BASOPHILS # BLD: 0.05 K/UL (ref 0–0.12)
BUN SERPL-MCNC: 27 MG/DL (ref 8–22)
CALCIUM SERPL-MCNC: 7.4 MG/DL (ref 8.5–10.5)
CHLORIDE SERPL-SCNC: 112 MMOL/L (ref 96–112)
CO2 SERPL-SCNC: 21 MMOL/L (ref 20–33)
COMMENT 1642: NORMAL
CREAT SERPL-MCNC: 0.83 MG/DL (ref 0.5–1.4)
EOSINOPHIL # BLD AUTO: 0.16 K/UL (ref 0–0.51)
EOSINOPHIL NFR BLD: 2.1 % (ref 0–6.9)
ERYTHROCYTE [DISTWIDTH] IN BLOOD BY AUTOMATED COUNT: 77.5 FL (ref 35.9–50)
GFR SERPLBLD CREATININE-BSD FMLA CKD-EPI: 81 ML/MIN/1.73 M 2
GLUCOSE BLD STRIP.AUTO-MCNC: 100 MG/DL (ref 65–99)
GLUCOSE BLD STRIP.AUTO-MCNC: 115 MG/DL (ref 65–99)
GLUCOSE BLD STRIP.AUTO-MCNC: 134 MG/DL (ref 65–99)
GLUCOSE BLD STRIP.AUTO-MCNC: 200 MG/DL (ref 65–99)
GLUCOSE SERPL-MCNC: 160 MG/DL (ref 65–99)
HCT VFR BLD AUTO: 26.7 % (ref 37–47)
HGB BLD-MCNC: 8.1 G/DL (ref 12–16)
IMM GRANULOCYTES # BLD AUTO: 0.07 K/UL (ref 0–0.11)
IMM GRANULOCYTES NFR BLD AUTO: 0.9 % (ref 0–0.9)
LYMPHOCYTES # BLD AUTO: 0.64 K/UL (ref 1–4.8)
LYMPHOCYTES NFR BLD: 8.5 % (ref 22–41)
MACROCYTES BLD QL SMEAR: ABNORMAL
MCH RBC QN AUTO: 32.7 PG (ref 27–33)
MCHC RBC AUTO-ENTMCNC: 30.3 G/DL (ref 32.2–35.5)
MCV RBC AUTO: 107.7 FL (ref 81.4–97.8)
MONOCYTES # BLD AUTO: 0.58 K/UL (ref 0–0.85)
MONOCYTES NFR BLD AUTO: 7.7 % (ref 0–13.4)
MORPHOLOGY BLD-IMP: NORMAL
NEUTROPHILS # BLD AUTO: 6.01 K/UL (ref 1.82–7.42)
NEUTROPHILS NFR BLD: 80.1 % (ref 44–72)
NRBC # BLD AUTO: 0 K/UL
NRBC BLD-RTO: 0 /100 WBC (ref 0–0.2)
PLATELET # BLD AUTO: 265 K/UL (ref 164–446)
PLATELET BLD QL SMEAR: NORMAL
PMV BLD AUTO: 11.7 FL (ref 9–12.9)
POTASSIUM SERPL-SCNC: 4.6 MMOL/L (ref 3.6–5.5)
RBC # BLD AUTO: 2.48 M/UL (ref 4.2–5.4)
RBC BLD AUTO: PRESENT
SODIUM SERPL-SCNC: 141 MMOL/L (ref 135–145)
WBC # BLD AUTO: 7.5 K/UL (ref 4.8–10.8)

## 2024-12-10 PROCEDURE — 700102 HCHG RX REV CODE 250 W/ 637 OVERRIDE(OP): Performed by: STUDENT IN AN ORGANIZED HEALTH CARE EDUCATION/TRAINING PROGRAM

## 2024-12-10 PROCEDURE — 4A10X4Z MONITORING OF CENTRAL NERVOUS ELECTRICAL ACTIVITY, EXTERNAL APPROACH: ICD-10-PCS | Performed by: STUDENT IN AN ORGANIZED HEALTH CARE EDUCATION/TRAINING PROGRAM

## 2024-12-10 PROCEDURE — 95816 EEG AWAKE AND DROWSY: CPT | Mod: 26 | Performed by: STUDENT IN AN ORGANIZED HEALTH CARE EDUCATION/TRAINING PROGRAM

## 2024-12-10 PROCEDURE — A9270 NON-COVERED ITEM OR SERVICE: HCPCS | Performed by: HOSPITALIST

## 2024-12-10 PROCEDURE — 80048 BASIC METABOLIC PNL TOTAL CA: CPT

## 2024-12-10 PROCEDURE — 700102 HCHG RX REV CODE 250 W/ 637 OVERRIDE(OP): Performed by: HOSPITALIST

## 2024-12-10 PROCEDURE — 85025 COMPLETE CBC W/AUTO DIFF WBC: CPT

## 2024-12-10 PROCEDURE — A9270 NON-COVERED ITEM OR SERVICE: HCPCS | Performed by: STUDENT IN AN ORGANIZED HEALTH CARE EDUCATION/TRAINING PROGRAM

## 2024-12-10 PROCEDURE — 95816 EEG AWAKE AND DROWSY: CPT | Performed by: STUDENT IN AN ORGANIZED HEALTH CARE EDUCATION/TRAINING PROGRAM

## 2024-12-10 PROCEDURE — 700101 HCHG RX REV CODE 250: Performed by: HOSPITALIST

## 2024-12-10 PROCEDURE — 82962 GLUCOSE BLOOD TEST: CPT

## 2024-12-10 PROCEDURE — 97605 NEG PRS WND THER DME<=50SQCM: CPT

## 2024-12-10 PROCEDURE — 770000 HCHG ROOM/CARE - INTERMEDIATE ICU *

## 2024-12-10 PROCEDURE — 99233 SBSQ HOSP IP/OBS HIGH 50: CPT | Performed by: HOSPITALIST

## 2024-12-10 RX ORDER — DAPAGLIFLOZIN 10 MG/1
10 TABLET, FILM COATED ORAL DAILY
Status: DISCONTINUED | OUTPATIENT
Start: 2024-12-10 | End: 2024-12-29

## 2024-12-10 RX ORDER — MIDODRINE HYDROCHLORIDE 5 MG/1
2.5 TABLET ORAL
Status: DISPENSED | OUTPATIENT
Start: 2024-12-10 | End: 2024-12-11

## 2024-12-10 RX ORDER — DAPAGLIFLOZIN 10 MG/1
10 TABLET, FILM COATED ORAL DAILY
Status: DISCONTINUED | OUTPATIENT
Start: 2024-12-10 | End: 2024-12-10

## 2024-12-10 RX ADMIN — MIDODRINE HYDROCHLORIDE 2.5 MG: 5 TABLET ORAL at 12:21

## 2024-12-10 RX ADMIN — DAKIN'S SOLUTION 0.125% (QUARTER STRENGTH) 473 ML: 0.12 SOLUTION at 12:21

## 2024-12-10 RX ADMIN — ATORVASTATIN CALCIUM 80 MG: 80 TABLET, FILM COATED ORAL at 17:11

## 2024-12-10 RX ADMIN — APIXABAN 5 MG: 5 TABLET, FILM COATED ORAL at 17:11

## 2024-12-10 RX ADMIN — VALPROIC ACID 500 MG: 250 SOLUTION ORAL at 17:10

## 2024-12-10 RX ADMIN — Medication 5 MG: at 21:15

## 2024-12-10 RX ADMIN — MICONAZOLE NITRATE 1 G: 2 CREAM TOPICAL at 05:37

## 2024-12-10 RX ADMIN — DAPAGLIFLOZIN 10 MG: 10 TABLET, FILM COATED ORAL at 17:11

## 2024-12-10 RX ADMIN — MIDODRINE HYDROCHLORIDE 5 MG: 5 TABLET ORAL at 01:27

## 2024-12-10 RX ADMIN — MIDODRINE HYDROCHLORIDE 5 MG: 5 TABLET ORAL at 05:36

## 2024-12-10 RX ADMIN — MIDODRINE HYDROCHLORIDE 2.5 MG: 5 TABLET ORAL at 17:11

## 2024-12-10 RX ADMIN — QUETIAPINE FUMARATE 25 MG: 50 TABLET ORAL at 05:36

## 2024-12-10 RX ADMIN — APIXABAN 5 MG: 5 TABLET, FILM COATED ORAL at 05:36

## 2024-12-10 RX ADMIN — VALPROIC ACID 500 MG: 250 SOLUTION ORAL at 05:37

## 2024-12-10 RX ADMIN — LANSOPRAZOLE 30 MG: 30 TABLET, ORALLY DISINTEGRATING ORAL at 05:36

## 2024-12-10 RX ADMIN — INSULIN LISPRO 1 UNITS: 100 INJECTION, SOLUTION INTRAVENOUS; SUBCUTANEOUS at 05:33

## 2024-12-10 RX ADMIN — QUETIAPINE FUMARATE 150 MG: 100 TABLET ORAL at 21:15

## 2024-12-10 RX ADMIN — MICONAZOLE NITRATE: 2 CREAM TOPICAL at 17:11

## 2024-12-10 RX ADMIN — CLOPIDOGREL BISULFATE 75 MG: 75 TABLET ORAL at 05:36

## 2024-12-10 ASSESSMENT — PAIN DESCRIPTION - PAIN TYPE
TYPE: ACUTE PAIN

## 2024-12-10 ASSESSMENT — FIBROSIS 4 INDEX: FIB4 SCORE: 2.66

## 2024-12-10 NOTE — DISCHARGE PLANNING
Case Management Discharge Planning    Admission Date: 11/27/2024  GMLOS: 4.1  ALOS: 13    Anticipated Discharge Dispo: Discharge Disposition: Disch to a long term care facility (63)  Discharge Address: [address] [place] [address]  Discharge Contact Phone Number: [phone]    DME Needed: No    Action(s) Taken: Chart reviewed pt discussed in IDT rounds. She currently is charted on bilateral mitts and soft wrist restraints. [facility] had accepted but pt needs improved mentation.    Escalations Completed: None    Medically Clear: No    Next Steps: Follow for discharge needs    Barriers to Discharge: Medical clearance    Is the patient up for discharge tomorrow: No

## 2024-12-10 NOTE — CARE PLAN
The patient is Watcher - Medium risk of patient condition declining or worsening    Shift Goals  Clinical Goals: Improve Hemodynamic Stability and Prevent Further Hypotension. Promote Wound Healing and Prevent Infection.Ensure Safety and Prevent Injury Related to Restraints. Manage Encephalopathy and Improve Cognitive Function. Promote Comfort and Manage Pain.  Patient Goals: JAYLIN  Family Goals: JAYLIN      Problem: Safety - Medical Restraint  Goal: Remains free of injury from restraints (Restraint for Interference with Medical Device)  Outcome: Progressing  Goal: Free from restraint(s) (Restraint for Interference with Medical Device)  Outcome: Progressing     Problem: Safety  Goal: Will remain free from injury  Outcome: Progressing  Goal: Will remain free from falls  Outcome: Progressing     Problem: Skin Integrity  Goal: Risk for impaired skin integrity will decrease  Outcome: Progressing     Problem: Respiratory  Goal: Patient will achieve/maintain optimum respiratory ventilation and gas exchange  Outcome: Progressing     Problem: Risk for Aspiration  Goal: Patient's risk for aspiration will be absent or decrease  Outcome: Progressing     Problem: Urinary - Renal Perfusion  Goal: Ability to achieve and maintain adequate renal perfusion and functioning will improve  Outcome: Progressing     Problem: Urinary Elimination  Goal: Establish and maintain regular urinary output  Outcome: Progressing

## 2024-12-10 NOTE — PROGRESS NOTES
Report given to Missy FERRO.  Pt tranpsorted to [id number] via bed and RN. Tele monitor in place and patient in SR. VS stable. A&Ox0.

## 2024-12-10 NOTE — WOUND TEAM
[facility]  Inpatient Services  Wound and Skin Care Follow-up    Admission Date: 11/27/2024     Last order of IP CONSULT TO WOUND CARE was found on 12/6/2024 from Hospital Encounter on 11/27/2024     HPI, PMH, SH: Reviewed    Past Surgical History:   Procedure Laterality Date    INSERTION,CANNULA FOR ECMO,ADULT Left 10/30/2024    Procedure: INSERTION, CANNULA, FOR ECMO, ADULT;  Surgeon: Aime Elias D.O.;  Location: SURGERY [facility];  Service: Cardiothoracic     Social History     Tobacco Use    Smoking status: Never    Smokeless tobacco: Never   Substance Use Topics    Alcohol use: No     No chief complaint on file.    Diagnosis: HFrEF (heart failure with reduced ejection fraction) (Formerly [facility]) [I50.20]    Unit where seen by Wound Team: [id number]     WOUND FOLLOW UP RELATED TO:  Bilateral groin VF NPWT       WOUND TEAM PLAN OF CARE - Frequency of Follow-up:   Nursing to follow dressing orders written for wound care. Contact wound team if area fails to progress, deteriorates or with any questions/concerns if something comes up before next scheduled follow up (See below as to whether wound is following and frequency of wound follow up)  Dressing changes by wound team:                   NPWT change 3 times weekly - Bilateral groin    WOUND HISTORY:       Pt is a 59yr old female with history of Obesity, and HTN. Pt was initially seen at [facility] for anterior STEMI. Pt underwent LAD PCI but hospital course was complicated by retroperitoneal bleed, V Fib and PEA arrest ultimately requiring ECMO and Impella. Pt was decannulated from ECMO on 11/4 and had left femoral embolectomy. Impella was then removed on 11/8. Pts ECHO on 11/24 revealed EF of 25-30%. Pt has bilateral groin wounds from ECMO and Impella. Wound team was consulted to evaluate.        WOUND ASSESSMENT/LDA  Wound 12/07/24 Full Thickness Wound Groin Left (Active)   Date First Assessed/Time First Assessed: 12/07/24 1721   Present on Original  Admission: No  Hand Hygiene Completed: Yes  Primary Wound Type: Full Thickness Wound  Location: Groin  Laterality: Left      Assessments 12/10/2024 11:00 AM   Site Assessment Red;Slough;Tunneling   Periwound Assessment Red   Margins Defined edges;Unattached edges   Closure Secondary intention   Drainage Amount Scant   Drainage Description Serosanguineous   Treatments Cleansed;Nonselective debridement;Site care   Wound Cleansing Approved Wound Cleanser   Periwound Protectant Paste Ring;No-sting Skin Prep;Drape   Dressing Status Clean;Dry;Intact   Dressing Changed Changed   Dressing Cleansing/Solutions 1/4 Strength Dakin's Solution   Dressing Options Wound Vac   Dressing Change/Treatment Frequency Monday, Wednesday, Friday, and As Needed   NEXT Dressing Change/Treatment Date 12/13/24   NEXT Weekly Photo (Inpatient Only) 12/13/24   Wound Team Following 3x Weekly   Non-staged Wound Description Full thickness   Tunneling Clock Position of Wound 6 o'clock   Shape oval   Wound Odor None   WOUND NURSE ONLY - Time Spent with Patient (mins) 45       Wound 12/07/24 Full Thickness Wound Groin Right (Active)   Date First Assessed/Time First Assessed: 12/07/24 1721   Present on Original Admission: No  Hand Hygiene Completed: Yes  Primary Wound Type: Full Thickness Wound  Location: Groin  Laterality: Right      Assessments 12/10/2024 11:00 AM   Site Assessment Slough;Red   Periwound Assessment Pink   Margins Attached edges;Defined edges   Closure Secondary intention   Drainage Amount Small   Drainage Description Serosanguineous   Wound Cleansing Approved Wound Cleanser   Periwound Protectant Drape;Paste Ring;No-sting Skin Prep   Dressing Status Clean;Dry;Intact   Dressing Changed New   Dressing Cleansing/Solutions 1/4 Strength Dakin's Solution   Dressing Options Wound Vac   Dressing Change/Treatment Frequency Monday, Wednesday, Friday, and As Needed   NEXT Dressing Change/Treatment Date 12/13/24   NEXT Weekly Photo (Inpatient  Only) 12/13/24   Wound Team Following 3x Weekly   Non-staged Wound Description Full thickness   Shape oval   Wound Odor None   WOUND NURSE ONLY - Time Spent with Patient (mins) 45       Negative Pressure Wound Therapy 12/07/24 Groin Left (Active)   Placement Date/Time: 12/07/24 1722   Location: Groin  Laterality: Left      Assessments 12/10/2024 11:00 AM   Wound Image     NPWT Pump Mode / Pressure Setting 125 mmHg;Continuous;Ulta   Dressing Type Small;Black Foam (Veraflo)   Number of Foam Pieces Used 2   Canister Changed No   NEXT Dressing Change/Treatment Date 12/12/24   VAC VeraFlo Irrigant 1/4 Strength Dakins   VAC VeraFlo Soak Time (mins) 6   VAC VeraFlo Instill Volume (ml) 10   VAC VeraFlo - Therapy Time (hrs) 2   VAC VeraFlo Pressure (mm/Hg) 125 mmHg       Negative Pressure Wound Therapy 12/07/24 Groin Right (Active)   Placement Date/Time: 12/07/24 1722   Present on Original Admission: No  Location: Groin  Laterality: Right      Assessments 12/10/2024 11:00 AM   NPWT Pump Mode / Pressure Setting Ulta;Continuous;125 mmHg   Dressing Type Small;Black Foam (Veraflo)   Number of Foam Pieces Used 2   Canister Changed No   NEXT Dressing Change/Treatment Date 12/12/24   VAC VeraFlo Irrigant 1/4 Strength Dakins   VAC VeraFlo Soak Time (mins) 6   VAC VeraFlo Instill Volume (ml) 16   VAC VeraFlo - Therapy Time (hrs) 2   VAC VeraFlo Pressure (mm/Hg) 125 mmHg        Vascular:    JAGJIT:   No results found.    Lab Values:    Lab Results   Component Value Date/Time    WBC 7.5 12/10/2024 01:36 AM    RBC 2.48 (L) 12/10/2024 01:36 AM    HEMOGLOBIN 8.1 (L) 12/10/2024 01:36 AM    HEMATOCRIT 26.7 (L) 12/10/2024 01:36 AM    CREACTPROT 10.71 (H) 11/29/2024 03:24 AM    SEDRATEWES 140 (H) 11/28/2024 01:35 AM    HBA1C 6.2 (H) 11/01/2024 06:46 PM    HBA1C 6.4 (H) 10/30/2024 03:18 AM         Culture Results show:  No results found for this or any previous visit (from the past 720 hours).    Pain Level/Medicated:  None, Tolerated without  pain medication       INTERVENTIONS BY WOUND TEAM:  Chart and images reviewed. Discussed with bedside RN. All areas of concern (based on picture review, LDA review and discussion with bedside RN) have been thoroughly assessed. Documentation of areas based on significant findings. This RN in to assess patient. Performed standard wound care which includes appropriate positioning, dressing removal and non-selective debridement. Pictures and measurements obtained weekly if/when required.    Wound:  Bilateral Groin NPWT  Preparation for Dressing removal: Dressing soaked with wound cleanser  Cleansed/Non-selectively Debrided with:  Wound cleanser and Gauze  Brianna wound: Cleansed with Wound cleanser and Gauze, Prepped with No Sting, Paste Rings, and Drape  Primary Dressing:  Black foam  Secondary (Outer) Dressing: Drape, trac pad    Advanced Wound Care Discharge Planning  Number of Clinicians necessary to complete wound care: 2-3  Is patient requiring IV pain medications for dressing changes:  No   Length of time for dressing change 90 min. (This does not include chart review, pre-medication time, set up, clean up or time spent charting.)    Interdisciplinary consultation: Patient, Bedside RN (Bradley), Jodi HASTINGS (Wound RN).      EVALUATION / RATIONALE FOR TREATMENT:     Date:  12/10/24  Wound Status:  Wound progressing as expected    Left groin: Groin noted with layer of slough. Wound has small area of tunneling at 6o clock, a small piece of black foam was tucked into this tunnel to assist with closure.   Right Groin: Noted with a layer of slough, VF NPWT was changed from NS to Dakins to assist with chemical debridement.   Prior to vac removal, skin around both groin sites appeared to be moist, and VF instillation amount was reduced.     Date:  12/07/24  Wound Status:  Initial evaluation    Bilateral wound beds with slough present.  VF NPWT applied to assist with mechanical debridement, cleansing the wound bed, increase  "oxygenation and granulation to the area and healing the wound by secondary intention.        Date:  12/04/24  Wound Status:  Initial evaluation    Bilateral groin sites with necrotic tissue, adipose and tan purulent drainage. Discussed with bedside RN who will speak with MD regarding surgical consult. Pt would benefit from NPWT application but would like surgery to clear pt from any exposed structures prior to vac application. Dakins ordered to chemically debride.          Goals: Steady decrease in wound area and depth weekly.    NURSING PLAN OF CARE ORDERS:  No new orders this visit    NUTRITION RECOMMENDATIONS   Wound Team Recommendations:  N/A     DIET ORDERS (From admission to next 24h)       Start     Ordered    12/05/24 0929  Diet Order Diet: Full Liquid; Tray Modifications (optional): SLP - 1:1 Supervision by Nursing, SLP - One Item Per Meal  ALL MEALS        Question Answer Comment   Diet: Full Liquid    Tray Modifications (optional) SLP - 1:1 Supervision by Nursing    Tray Modifications (optional) SLP - One Item Per Meal        12/05/24 0928    12/02/24 1232  Supplements  ALL MEALS        Question Answer Comment   Which Supplement Glucerna    Glucerna: Glucerna Shake Carton        12/02/24 1231    12/02/24 1229  Diet: Diet Tube Feed; Formula: Pivot (Nocturnal tube feed x 10 hours, suggest from 2000 to 0600.); Pivot: Pivot 1.5 RTH; Goal Rate (mL/Hour): 50; Duration: 10 HR; Tube Feed Time Unit: Hours/Night  ALL MEALS        Question Answer Comment   Diet Diet Tube Feed    Formula: Pivot Nocturnal tube feed x 10 hours, suggest from 2000 to 0600.   Pivot: Pivot 1.5 RTH    Goal Rate (mL/Hour) 50    Route NG    Duration 10 HR    Tube Feed Time Unit Hours/Night       Placed in \"And\" Linked Group    12/02/24 1229                    PREVENTATIVE INTERVENTIONS:   Q shift Ray - performed per nursing policy  Q shift pressure point assessments - performed per nursing policy    Surface/Positioning  ICU Low Airloss - " Currently in Place  Reposition q 2 hours - Currently in Place  TAPs Turning system - Currently in Place  Z Hemanth Pillow - Currently in Place    Offloading/Redistribution  Sacral offloading dressing (Silicone dressing) - Currently in Place  Heel offloading dressing (Silicone dressing) - Currently in Place      Containment/Moisture Prevention    Dri-hemanth pad - Currently in Place  Beckwith Catheter - Currently in Place    Anticipated discharge plans:  TBD        Vac Discharge Needs:  Vac Discharge plan is purely a recommendation from wound team and not a requirement for discharge unless otherwise stated by physician.  Veraflo Vac while inpatient, ok to transition to Regular Vac on discharge

## 2024-12-10 NOTE — PROCEDURES
INPATIENT ROUTINE VIDEO ELECTROENCEPHALOGRAM REPORT    REFERRING PROVIDER: Dr. lester  DOS: 12/10/24   ROOM: [place] [id number]  TOTAL RECORDING TIME: 0 hours and 28 minutes of total recording time    INDICATION:  Kiara Qureshi 59 y.o. female presenting with altered mental status    RELEVANT TREATMENTS/MEDICATIONS:  midodrine, 2.5 mg, TID WITH MEALS  lansoprazole, 30 mg, DAILY  insulin GLARGINE, 9 Units, Q EVENING   And  insulin lispro, 1-6 Units, 4X/DAY ACHS  bisacodyl, 10 mg, DAILY  melatonin, 5 mg, Nightly  [Held by provider] bisoprolol, 2.5 mg, Q DAY  [Held by provider] losartan, 12.5 mg, Q EVENING  clopidogrel, 75 mg, DAILY  miconazole, , BID  apixaban, 5 mg, BID  Pharmacy, 1 Each, PHARMACY TO DOSE  atorvastatin, 80 mg, Q EVENING  valproic acid, 500 mg, BID  QUEtiapine, 25 mg, BID  QUEtiapine, 150 mg, Nightly  furosemide, 20 mg, Q DAY         TECHNIQUE:   Routine VEEG was set up by a Neurodiagnostic technologist who performed education to the patient and staff. A minimum of 23 electrodes and 23 channel recording was setup and performed by Neurodiagnostic technologist, in accordance with the international 10-20 system. The study was reviewed in bipolar and referential montages. The recording examined the patient in the  awake and drowsy state(s).     DESCRIPTION OF THE RECORD:  EEG background: The background was continuous, intermittently asymmetrical, and poorly defined and/or fragmented 7-8 Hz posterior dominant rhythm, with a mixture of theta>delta activity . Reactivity was seen. Spontaneous variability was seen. State changes  were seen.  During drowsiness, a loss of myogenic artifact and theta/delta frequencies were seen.     EEG Sleep: N2 sleep architecture was not seen.    ICTAL AND INTERICTAL FINDINGS:   No focal or generalized epileptiform activity noted.     Frequent bitemporal independent as well as synchronous slowing.    No definite seizures.     ACTIVATION PROCEDURES:   NA    EKG: Sampling of the  EKG recording showed sinus rhythm    EVENTS:    No clinical events were captured or reported.      INTERPRETATION:  Abnormal video EEG recording in the awake and drowsy state(s):  -Mild background slowing suggestive of diffuse/multifocal cerebral dysfunction and consistent with a non-specific encephalopathy. Clinical correlation recommended.   -Frequent bitemporal independent as well as synchronous slowing suggest of nonspecific dysfunction in these regions.   -Epileptiform discharges : No epileptiform discharges were seen.  -No seizures.   -Clinical Events: None    Note: This EEG does not rule out the possibility of seizures or exclude a diagnosis of epilepsy.  If the clinical suspicion remains high for seizures, a prolonged video EEG recording to capture clinical or subclinical events may be helpful.      Miles Camilo MD  Department of Neurology at [facility]  General Neurologist and Epileptologist  Director of [facility]'s Level III Comprehensive Epilepsy Program  Professor of Clinical Neurology, [facility].   Phone: [phone]  Fax: [phone]  E-mail: [email or web address]

## 2024-12-10 NOTE — PROGRESS NOTES
Notified Dr. Joyner about patient's 3X bradycardia events of 15-20 seconds. Stat EKG ordered.

## 2024-12-10 NOTE — CARE PLAN
The patient is Stable - Low risk of patient condition declining or worsening    Shift Goals  Clinical Goals: Improve mentation, hemodynamic stability, CT head  Patient Goals: morenita  Family Goals: updates    Progress made toward(s) clinical / shift goals:    Problem: Safety - Medical Restraint  Goal: Remains free of injury from restraints (Restraint for Interference with Medical Device)  Outcome: Progressing  Note: Patient assessed frequently for needs. Circulatory status checked and patient repositioned frequently. Restraint charting and necessity of restraints documented Q2 hours.     Problem: Skin Integrity  Goal: Risk for impaired skin integrity will decrease  Outcome: Progressing  Note: Skin integrity monitored throughout the shift. Pressure ulcer preventions measures in place. TAPs, pt turning self inbed, low airloss, and de jesus for moisture prevention. Heels floated with pillows. Heel/elbow offloading dressing in place.

## 2024-12-10 NOTE — WOUND TEAM
Assisted  Evelyn (Wound RN) , with wound care and NPWT dressing change to BL groin, non-selective debridement performed using wound cleanser/NS and gauze. Please see  Evelyn's  wound note for further wound care details.

## 2024-12-10 NOTE — PROGRESS NOTES
"Hospital Medicine Daily Progress Note    Date of Service  12/10/2024    Chief Complaint  Kiara Qureshi is a 59 y.o. female admitted 11/27/2024 with CP    Hospital Course  60yo PMHx BMI 40, HTN, HLD, HFrEF, CAD.  Seen here for STEMI.  Treated initially with lytics then went to [facility] where she had a ZACHARY placed to LAD.  Post op course complicated by rteroperitoneal hematoma, cardiogenic shock, PEA arrest requiring VA ECMO and impella.  Sent to [facility].  There decanulated on 11/4, L femoral artery embolectomy, PCI to LAD and OM1, Impella came out on 11/8.  LVEF 25-30%.  Further imaging showed a possible focal aortic mural thrombus vs ulcerated plaque.       Interval Problem Update  ROS limited by mental status.  Pt is agitated and when I ask her why she says \"I don't know\"    AFebrile  HR 80s  SBP   On 2 LNC  UOP 975ml/24hrs    I have discussed this patient's plan of care and discharge plan at IDT rounds today with Case Management, Nursing, Nursing leadership, and other members of the IDT team.    Consultants/Specialty  cardiology    Code Status  Full Code    Disposition  The patient is not medically cleared for discharge to home or a post-acute facility.      I have placed the appropriate orders for post-discharge needs.    Review of Systems  Review of Systems   Unable to perform ROS: Mental status change        Physical Exam  Temp:  [35.9 °C (96.6 °F)-37 °C (98.6 °F)] 36.3 °C (97.4 °F)  Pulse:  [] 87  Resp:  [14-67] 34  BP: ()/(51-86) 83/68  SpO2:  [90 %-99 %] 98 %    Physical Exam  Constitutional:       General: She is not in acute distress.     Appearance: Normal appearance. She is well-developed. She is not diaphoretic.   HENT:      Head: Normocephalic and atraumatic.   Neck:      Vascular: No JVD.   Cardiovascular:      Rate and Rhythm: Normal rate and regular rhythm.      Heart sounds: No murmur heard.  Pulmonary:      Effort: Pulmonary effort is normal. No respiratory distress.      Breath " sounds: No stridor. No wheezing or rales.   Abdominal:      Palpations: Abdomen is soft.      Tenderness: There is no abdominal tenderness. There is no guarding or rebound.   Musculoskeletal:      Right lower leg: No edema.      Left lower leg: No edema.   Skin:     General: Skin is warm and dry.      Findings: No rash.   Neurological:      Comments: O to self  Follows commands with repeated prompting  Alert attends to examiner  Speech is slurred   Face symmetric  Antigravity x 4   Psychiatric:         Mood and Affect: Mood is anxious.         Behavior: Behavior is agitated.         Fluids    Intake/Output Summary (Last 24 hours) at 12/10/2024 0632  Last data filed at 12/10/2024 0200  Gross per 24 hour   Intake 270 ml   Output 675 ml   Net -405 ml        Laboratory  Recent Labs     12/08/24  0645 12/09/24  0122 12/10/24  0136   WBC 9.7 9.2 7.5   RBC 2.58* 2.72* 2.48*   HEMOGLOBIN 8.5* 8.7* 8.1*   HEMATOCRIT 27.4* 29.2* 26.7*   .2* 107.4* 107.7*   MCH 32.9 32.0 32.7   MCHC 31.0* 29.8* 30.3*   RDW 77.7* 78.4* 77.5*   PLATELETCT 275 280 265   MPV 11.6 11.5 11.7     Recent Labs     12/08/24  0645 12/09/24  0122 12/10/24  0136   SODIUM 137 139 141   POTASSIUM 4.5 4.3 4.6   CHLORIDE 109 111 112   CO2 18* 20 21   GLUCOSE 298* 164* 160*   BUN 31* 27* 27*   CREATININE 1.05 0.98 0.83   CALCIUM 7.5* 7.6* 7.4*                   Imaging  CT-HEAD W/O   Final Result         1.  No acute intracranial abnormality is identified, there are nonspecific white matter changes, commonly associated with small vessel ischemic disease.  Associated mild cerebral atrophy is noted.   2.  Bilateral sinusitis changes   3.  Atherosclerosis.               DX-CHEST-PORTABLE (1 VIEW)   Final Result         1.  Mild pulmonary edema and/or infiltrates.      EC-ECHOCARDIOGRAM COMPLETE W/ CONT   Final Result      DX-ABDOMEN FOR TUBE PLACEMENT   Final Result      Tip of the esophagogastric tube terminates over the pylorus of the stomach.       IR-MIDLINE CATHETER INSERTION WO GUIDANCE > AGE 3   Final Result                  Ultrasound-guided midline placement performed by qualified nursing staff    as above.          IR-US GUIDED PIV   Final Result    Ultrasound-guided PERIPHERAL IV INSERTION performed by    qualified nursing staff as above.      DX-OUTSIDE IMAGES-DX CHEST   Final Result      DX-OUTSIDE IMAGES-DX ABDOMEN   Final Result      MR-OUTSIDE IMAGES-MR BRAIN   Final Result      CT-OUTSIDE IMAGES-CT HEAD   Final Result      CT-OUTSIDE IMAGES-CT ABDOMEN/PELVIS   Final Result      CT-OUTSIDE IMAGES-CT CHEST   Final Result      DX-ABDOMEN FOR TUBE PLACEMENT   Final Result      There is a new small bowel feeding tube which terminates in the small bowel of the right mid abdomen.      DX-CHEST-LIMITED (1 VIEW)   Final Result         Asymmetric pulmonary infiltrates, left worse than right.           Assessment/Plan  * Heart failure with reduced ejection fraction (HCC)- (present on admission)  Assessment & Plan  ICM LVEF 25-30%  S/p revascularization  Soft pressures limiting titration of GDMT.  Start SGLT2i  Add in MRA, ARB, BB as pressures allow    Constipation  Assessment & Plan  + bm  Continue bowel regimen  following    Hypotension  Assessment & Plan  follow    Leukocytosis  Assessment & Plan  resolved  Could be related to hematoma  normal pro luisito  Cbc in am     Abdominal hematoma  Assessment & Plan  Might be related to cardiac device  Clinically stable and h/h in stable range  Patient was on lovenox due to rigth common iliac art thrombosis and then transitioned to eliquis per [facility] - see my discussion above - plan to continue for 3-4 months with repeat imaging at that time to see if it can be d/c   Following  Cbc in am     ACP (advance care planning)  Assessment & Plan  See my discussion above, I spoke with family regarding goals of care and code status for 22 minutes, they would like to continue with full code    Pneumonia  Assessment & Plan  S/p  completed course of pip/tazo and vanc  Cultures were negative    Aortic mural thrombus (HCC)  Assessment & Plan  CT at outside facility showing focal aortic mural thrombus along the lateral wall of the descending thoracic aorta and also a tiny filling defect/mural thrombus within the posterior right common iliac artery  apixaban  Monitor H&H    Continue monitoring H&H  Cbc in am         Encephalopathy  Assessment & Plan  Hospital course complicated by encephalopathy.  Likely from combination of CVA, STEMI, hyperglycemia  Removing restraints as tolerated, following  Has a nasojejunal feeding tube placed by interventional radiology from outside facility  Continue Seroquel and Depakene as recommended by neurology/psychiatry at outside facility.    Multifactorial  As per records from [facility] probably anoxic/embolic/delirium  Start to titrate off of seroquel: DC day time doses  Cont melatonin    CVA (cerebral vascular accident) (HCC)  Assessment & Plan  Patient had MRI on 11/17/24 at OSH which shwed Punctate acute infarct of the right centrum semiovale. Scattered foci of enhancement in multiple vascular distributions as described may represent recent subacute infarcts. Diffuse stippled susceptibility signal throughout the supratentorial and infratentorial brain. Overall findings may represent sequelae of fat emboli or amyloid related angiopathy.   High dose statin  Apixaban  plavix    Continue to be encephalopathic  Neuro evaluated patient at [facility].   Poor prognosis, now developing cheyne florez, will repeat CT  Guarded prognosis      CAD (coronary artery disease)  Assessment & Plan  Patient with recent complicated STEMI with PCI to LAD and mid LAD.  Complicated by V-fib arrest and cardiogenic shock requiring VA ECMO and Impella and retroperitoneal hematomas. At [facility] underwent impella supported PCI to mid LAD just distal to prior stent but unsuccessful with PCI to 100% occluded OM1 on 11/6. Patient was decannulated and had  Impella removed.    Continue Lipitor    Continue monitoring  Continue telemetry.     Discussed with cardiology and asa stopped and replaced with plavix  Following  Repeat echo shows persistently low EF, see above  Cardiology had recommended bisoprolol but due to cheyne florez critical care recommends we stop this - so will hold - resume lasix and follow  At present pressures do not allow BB, ARB    Urinary retention  Assessment & Plan  Beckwith catheter in place    Hyperglycemia  Assessment & Plan    Sliding scale      Metabolic acidemia- (present on admission)  Assessment & Plan  resolved  Bmp in am  Following           VTE prophylaxis: VTE Selection    I have performed a physical exam and reviewed and updated ROS and Plan today (12/10/2024). In review of yesterday's note (12/9/2024), there are no changes except as documented above.

## 2024-12-10 NOTE — THERAPY
12/09/24 1631   Interdisciplinary Plan of Care Collaboration   Collaboration Comments OT treatment attempted. Pt has transitioned to a higher level of care due to altered mentation and labored breathing. Will hold and follow up when pt medically appropriate.

## 2024-12-10 NOTE — PROGRESS NOTES
4 Eyes Skin Assessment Completed by Saud RN and KASIE Perry.     Head WDL  Ears Redness and Blanching  Nose WDL  Mouth dry, dry lips  Neck WDL  Breast/Chest Redness, Blanching and Bruising  Shoulder Blades Redness and Blanching  Spine WDL  (R) Arm/Elbow/Hand Redness, Blanching, and Bruising  (L) Arm/Elbow/Hand Redness and Bruising  Abdomen Redness, Blanching, and Bruising    Groin Redness and Bruising 2 wound vacs  Scrotum/Coccyx/Buttocks Redness and Blanching    (R) Leg WDL  (L) Leg WDL  (R) Heel/Foot/Toe Redness and Blanching  (L) Heel/Foot/Toe Redness and Blanching              Devices In Places ECG, Blood Pressure Cuff, Pulse Ox, Beckwith, wound vac x2, and Cortrak        Interventions In Place InterDry, Heel Mepilex, Sacral Mepilex, TAP System, Pillows, Q2 Turns, and Low Air Loss Mattress     Possible Skin Injury No     Pictures Uploaded Into Epic Yes  Wound Consult Placed N/A  RN Wound Prevention Protocol Ordered No

## 2024-12-10 NOTE — CARE PLAN
The patient is Unstable - High likelihood or risk of patient condition declining or worsening    Shift Goals  Clinical Goals: maintain pt safety, encourage PO intake  Patient Goals: JAYLIN  Family Goals: updates    Progress made toward(s) clinical / shift goals:  Patient got transfer to [facility] do to respiratory distress for closer monitoring.       Problem: Safety - Medical Restraint  Goal: Remains free of injury from restraints (Restraint for Interference with Medical Device)  12/9/2024 1748 by Saud Ritchie RCaseN.  Outcome: Progressing  12/9/2024 1748 by Saud Ritchie RCaseN.  Outcome: Progressing     Problem: Skin Integrity  Goal: Risk for impaired skin integrity will decrease  Outcome: Progressing     Problem: Hemodynamics  Goal: Patient's hemodynamics, fluid balance and neurologic status will be stable or improve  Outcome: Progressing       Patient is not progressing towards the following goals:

## 2024-12-10 NOTE — PROGRESS NOTES
4 Eyes Skin Assessment Completed by KASIE Holcomb and KASIE Louis.    Head {Head:90565}  Ears {Ears:46730}  Nose {Nose:82156}  Mouth {Mouth:62851}  Neck {Neck:47621}  Breast/Chest {Breast/Chest:76734}  Shoulder Blades {Shoulder Blades:47296}  Spine {Spine:30837}  (R) Arm/Elbow/Hand {(R) Arm/Elbow/Hand:27225}  (L) Arm/Elbow/Hand {(L) Arm/Elbow/Hand:03590}  Abdomen {Abdomen:68607}  Groin {Groin:31762}  Scrotum/Coccyx/Buttocks {Scrotum/Coccyx/Buttocks:71339}  (R) Leg {(R) Le}  (L) Leg {(L) Le}  (R) Heel/Foot/Toe {(R) Heel/Foot/Toe:14579}  (L) Heel/Foot/Toe {(L) Heel/Foot/Toe:66171}          Devices In Places {Devices In Place:48837}      Interventions In Place {Interventions In Place:33893}    Possible Skin Injury {Possible Skin Injury:88375}    Pictures Uploaded Into Epic {Pictures Uploaded Into Epic:47058}  Wound Consult Placed {Wound Consult Placed:54765}  RN Wound Prevention Protocol Ordered {YES/NO:}

## 2024-12-10 NOTE — PROGRESS NOTES
MONITOR SUMMARY  Rate:SR/ST  Rhythm:  Ectopy:  Measurements:  0.152/0.092/0.392            Scar event

## 2024-12-10 NOTE — PROGRESS NOTES
Bradycardic episodes unabled to be noted on 12 lead. Will re-attempt later, pt a & O x 0, VSS, 100%, 2L oxy mask, HR 72.

## 2024-12-11 LAB
BASOPHILS # BLD AUTO: 0.8 % (ref 0–1.8)
BASOPHILS # BLD: 0.05 K/UL (ref 0–0.12)
EOSINOPHIL # BLD AUTO: 0.14 K/UL (ref 0–0.51)
EOSINOPHIL NFR BLD: 2.4 % (ref 0–6.9)
ERYTHROCYTE [DISTWIDTH] IN BLOOD BY AUTOMATED COUNT: 77.8 FL (ref 35.9–50)
GLUCOSE BLD STRIP.AUTO-MCNC: 136 MG/DL (ref 65–99)
GLUCOSE BLD STRIP.AUTO-MCNC: 156 MG/DL (ref 65–99)
GLUCOSE BLD STRIP.AUTO-MCNC: 157 MG/DL (ref 65–99)
GLUCOSE BLD STRIP.AUTO-MCNC: 163 MG/DL (ref 65–99)
HCT VFR BLD AUTO: 27.3 % (ref 37–47)
HGB BLD-MCNC: 8.3 G/DL (ref 12–16)
IMM GRANULOCYTES # BLD AUTO: 0.07 K/UL (ref 0–0.11)
IMM GRANULOCYTES NFR BLD AUTO: 1.2 % (ref 0–0.9)
LYMPHOCYTES # BLD AUTO: 0.65 K/UL (ref 1–4.8)
LYMPHOCYTES NFR BLD: 11 % (ref 22–41)
MCH RBC QN AUTO: 32.4 PG (ref 27–33)
MCHC RBC AUTO-ENTMCNC: 30.4 G/DL (ref 32.2–35.5)
MCV RBC AUTO: 106.6 FL (ref 81.4–97.8)
MONOCYTES # BLD AUTO: 0.53 K/UL (ref 0–0.85)
MONOCYTES NFR BLD AUTO: 9 % (ref 0–13.4)
NEUTROPHILS # BLD AUTO: 4.45 K/UL (ref 1.82–7.42)
NEUTROPHILS NFR BLD: 75.6 % (ref 44–72)
NRBC # BLD AUTO: 0 K/UL
NRBC BLD-RTO: 0 /100 WBC (ref 0–0.2)
PLATELET # BLD AUTO: 286 K/UL (ref 164–446)
PMV BLD AUTO: 11.1 FL (ref 9–12.9)
RBC # BLD AUTO: 2.56 M/UL (ref 4.2–5.4)
WBC # BLD AUTO: 5.9 K/UL (ref 4.8–10.8)

## 2024-12-11 PROCEDURE — 85025 COMPLETE CBC W/AUTO DIFF WBC: CPT

## 2024-12-11 PROCEDURE — 306565 RIGID MIT RESTRAINT(PAIR): Performed by: HOSPITALIST

## 2024-12-11 PROCEDURE — 99232 SBSQ HOSP IP/OBS MODERATE 35: CPT | Performed by: HOSPITALIST

## 2024-12-11 PROCEDURE — A9270 NON-COVERED ITEM OR SERVICE: HCPCS | Performed by: HOSPITALIST

## 2024-12-11 PROCEDURE — 700102 HCHG RX REV CODE 250 W/ 637 OVERRIDE(OP): Performed by: STUDENT IN AN ORGANIZED HEALTH CARE EDUCATION/TRAINING PROGRAM

## 2024-12-11 PROCEDURE — 700102 HCHG RX REV CODE 250 W/ 637 OVERRIDE(OP): Performed by: HOSPITALIST

## 2024-12-11 PROCEDURE — 770000 HCHG ROOM/CARE - INTERMEDIATE ICU *

## 2024-12-11 PROCEDURE — A9270 NON-COVERED ITEM OR SERVICE: HCPCS | Performed by: STUDENT IN AN ORGANIZED HEALTH CARE EDUCATION/TRAINING PROGRAM

## 2024-12-11 PROCEDURE — 97535 SELF CARE MNGMENT TRAINING: CPT

## 2024-12-11 PROCEDURE — 97530 THERAPEUTIC ACTIVITIES: CPT

## 2024-12-11 PROCEDURE — 82962 GLUCOSE BLOOD TEST: CPT | Mod: 91

## 2024-12-11 RX ORDER — QUETIAPINE FUMARATE 25 MG/1
75 TABLET, FILM COATED ORAL NIGHTLY
Status: DISCONTINUED | OUTPATIENT
Start: 2024-12-11 | End: 2024-12-12

## 2024-12-11 RX ADMIN — LANSOPRAZOLE 30 MG: 30 TABLET, ORALLY DISINTEGRATING ORAL at 05:53

## 2024-12-11 RX ADMIN — Medication 5 MG: at 20:22

## 2024-12-11 RX ADMIN — CLOPIDOGREL BISULFATE 75 MG: 75 TABLET ORAL at 05:53

## 2024-12-11 RX ADMIN — BISACODYL 10 MG: 10 SUPPOSITORY RECTAL at 05:53

## 2024-12-11 RX ADMIN — INSULIN LISPRO 1 UNITS: 100 INJECTION, SOLUTION INTRAVENOUS; SUBCUTANEOUS at 17:00

## 2024-12-11 RX ADMIN — DAPAGLIFLOZIN 10 MG: 10 TABLET, FILM COATED ORAL at 05:54

## 2024-12-11 RX ADMIN — INSULIN GLARGINE-YFGN 9 UNITS: 100 INJECTION, SOLUTION SUBCUTANEOUS at 18:00

## 2024-12-11 RX ADMIN — APIXABAN 5 MG: 5 TABLET, FILM COATED ORAL at 17:57

## 2024-12-11 RX ADMIN — VALPROIC ACID 500 MG: 250 SOLUTION ORAL at 17:57

## 2024-12-11 RX ADMIN — VALPROIC ACID 500 MG: 250 SOLUTION ORAL at 05:53

## 2024-12-11 RX ADMIN — ATORVASTATIN CALCIUM 80 MG: 80 TABLET, FILM COATED ORAL at 17:57

## 2024-12-11 RX ADMIN — INSULIN LISPRO 1 UNITS: 100 INJECTION, SOLUTION INTRAVENOUS; SUBCUTANEOUS at 12:00

## 2024-12-11 RX ADMIN — QUETIAPINE FUMARATE 75 MG: 25 TABLET ORAL at 20:22

## 2024-12-11 RX ADMIN — FUROSEMIDE 20 MG: 20 TABLET ORAL at 05:53

## 2024-12-11 RX ADMIN — MICONAZOLE NITRATE: 2 CREAM TOPICAL at 05:53

## 2024-12-11 RX ADMIN — APIXABAN 5 MG: 5 TABLET, FILM COATED ORAL at 05:53

## 2024-12-11 RX ADMIN — INSULIN LISPRO 1 UNITS: 100 INJECTION, SOLUTION INTRAVENOUS; SUBCUTANEOUS at 20:20

## 2024-12-11 ASSESSMENT — COGNITIVE AND FUNCTIONAL STATUS - GENERAL
WALKING IN HOSPITAL ROOM: TOTAL
MOBILITY SCORE: 8
TOILETING: TOTAL
CLIMB 3 TO 5 STEPS WITH RAILING: TOTAL
PERSONAL GROOMING: TOTAL
EATING MEALS: TOTAL
SUGGESTED CMS G CODE MODIFIER DAILY ACTIVITY: CM
SUGGESTED CMS G CODE MODIFIER MOBILITY: CM
MOVING TO AND FROM BED TO CHAIR: TOTAL
STANDING UP FROM CHAIR USING ARMS: TOTAL
DRESSING REGULAR UPPER BODY CLOTHING: A LOT
DRESSING REGULAR LOWER BODY CLOTHING: TOTAL
DAILY ACTIVITIY SCORE: 7
TURNING FROM BACK TO SIDE WHILE IN FLAT BAD: A LOT
MOVING FROM LYING ON BACK TO SITTING ON SIDE OF FLAT BED: A LOT
HELP NEEDED FOR BATHING: TOTAL

## 2024-12-11 ASSESSMENT — GAIT ASSESSMENTS: GAIT LEVEL OF ASSIST: UNABLE TO PARTICIPATE

## 2024-12-11 ASSESSMENT — PAIN DESCRIPTION - PAIN TYPE
TYPE: ACUTE PAIN

## 2024-12-11 ASSESSMENT — FIBROSIS 4 INDEX: FIB4 SCORE: 2.46

## 2024-12-11 NOTE — PROGRESS NOTES
Paged Dr Shannon regarding BP sustaining 80s/50s after multiple retakes. Informed MD seroquel was just given an hour ago and dose had been increased. Pt is more relaxed at this time. No new orders. Recheck in 1 hr per MD.

## 2024-12-11 NOTE — PROGRESS NOTES
"Hospital Medicine Daily Progress Note    Date of Service  12/11/2024    Chief Complaint  Kiara Qureshi is a 59 y.o. female admitted 11/27/2024 with CP    Hospital Course  58yo PMHx BMI 40, HTN, HLD, HFrEF, CAD.  Seen here for STEMI.  Treated initially with lytics then went to [facility] where she had a ZACHARY placed to LAD.  Post op course complicated by rteroperitoneal hematoma, cardiogenic shock, PEA arrest requiring VA ECMO and impella.  Sent to [facility].  There decanulated on 11/4, L femoral artery embolectomy, PCI to LAD and OM1, Impella came out on 11/8.  LVEF 25-30%.  Further imaging showed a possible focal aortic mural thrombus vs ulcerated plaque.       Interval Problem Update  ROS limited by mental status.  Pt is agitated and when I ask her why she says \"I don't know\"    AFebrile  HR 70-80s  -130  On 1 LNC  UOP 1050ml/24hrs    I have discussed this patient's plan of care and discharge plan at IDT rounds today with Case Management, Nursing, Nursing leadership, and other members of the IDT team.    Consultants/Specialty  cardiology    Code Status  Full Code    Disposition  The patient is not medically cleared for discharge to home or a post-acute facility.      I have placed the appropriate orders for post-discharge needs.    Review of Systems  Review of Systems   Unable to perform ROS: Mental status change        Physical Exam  Temp:  [36.4 °C (97.5 °F)-37.6 °C (99.6 °F)] 37.6 °C (99.6 °F)  Pulse:  [67-97] 78  Resp:  [17-50] 34  BP: ()/(44-76) 110/56  SpO2:  [92 %-99 %] 92 %    Physical Exam  Constitutional:       General: She is not in acute distress.     Appearance: Normal appearance. She is well-developed. She is not diaphoretic.   HENT:      Head: Normocephalic and atraumatic.   Neck:      Vascular: No JVD.   Cardiovascular:      Rate and Rhythm: Normal rate and regular rhythm.      Heart sounds: No murmur heard.  Pulmonary:      Effort: Pulmonary effort is normal. No respiratory distress.      " Breath sounds: No stridor. No wheezing or rales.   Abdominal:      Palpations: Abdomen is soft.      Tenderness: There is no abdominal tenderness. There is no guarding or rebound.   Musculoskeletal:      Right lower leg: No edema.      Left lower leg: No edema.   Skin:     General: Skin is warm and dry.      Findings: No rash.   Neurological:      Comments: O to self  Follows commands with repeated prompting  Alert attends to examiner  Speech is slurred   Face symmetric  Antigravity x 4   Psychiatric:         Mood and Affect: Mood is anxious.         Behavior: Behavior is agitated.         Fluids    Intake/Output Summary (Last 24 hours) at 12/11/2024 0651  Last data filed at 12/11/2024 0621  Gross per 24 hour   Intake 720 ml   Output 1400 ml   Net -680 ml        Laboratory  Recent Labs     12/09/24  0122 12/10/24  0136 12/11/24  0211   WBC 9.2 7.5 5.9   RBC 2.72* 2.48* 2.56*   HEMOGLOBIN 8.7* 8.1* 8.3*   HEMATOCRIT 29.2* 26.7* 27.3*   .4* 107.7* 106.6*   MCH 32.0 32.7 32.4   MCHC 29.8* 30.3* 30.4*   RDW 78.4* 77.5* 77.8*   PLATELETCT 280 265 286   MPV 11.5 11.7 11.1     Recent Labs     12/09/24  0122 12/10/24  0136   SODIUM 139 141   POTASSIUM 4.3 4.6   CHLORIDE 111 112   CO2 20 21   GLUCOSE 164* 160*   BUN 27* 27*   CREATININE 0.98 0.83   CALCIUM 7.6* 7.4*                   Imaging  CT-HEAD W/O   Final Result         1.  No acute intracranial abnormality is identified, there are nonspecific white matter changes, commonly associated with small vessel ischemic disease.  Associated mild cerebral atrophy is noted.   2.  Bilateral sinusitis changes   3.  Atherosclerosis.               DX-CHEST-PORTABLE (1 VIEW)   Final Result         1.  Mild pulmonary edema and/or infiltrates.      EC-ECHOCARDIOGRAM COMPLETE W/ CONT   Final Result      DX-ABDOMEN FOR TUBE PLACEMENT   Final Result      Tip of the esophagogastric tube terminates over the pylorus of the stomach.      IR-MIDLINE CATHETER INSERTION WO GUIDANCE > AGE  3   Final Result                  Ultrasound-guided midline placement performed by qualified nursing staff    as above.          IR-US GUIDED PIV   Final Result    Ultrasound-guided PERIPHERAL IV INSERTION performed by    qualified nursing staff as above.      DX-OUTSIDE IMAGES-DX CHEST   Final Result      DX-OUTSIDE IMAGES-DX ABDOMEN   Final Result      MR-OUTSIDE IMAGES-MR BRAIN   Final Result      CT-OUTSIDE IMAGES-CT HEAD   Final Result      CT-OUTSIDE IMAGES-CT ABDOMEN/PELVIS   Final Result      CT-OUTSIDE IMAGES-CT CHEST   Final Result      DX-ABDOMEN FOR TUBE PLACEMENT   Final Result      There is a new small bowel feeding tube which terminates in the small bowel of the right mid abdomen.      DX-CHEST-LIMITED (1 VIEW)   Final Result         Asymmetric pulmonary infiltrates, left worse than right.           Assessment/Plan  * Heart failure with reduced ejection fraction (HCC)- (present on admission)  Assessment & Plan  ICM LVEF 25-30%  S/p revascularization  Soft pressures limiting titration of GDMT.  Start SGLT2i  Add in MRA, ARB, BB as pressures allow:  -starting BIsoprolol 2.5 today    Constipation  Assessment & Plan  + bm  Continue bowel regimen  following    Hypotension  Assessment & Plan  follow    Leukocytosis  Assessment & Plan  resolved  Could be related to hematoma  normal pro luisito  Cbc in am     Abdominal hematoma  Assessment & Plan  Might be related to cardiac device  Clinically stable and h/h in stable range  Patient was on lovenox due to rigth common iliac art thrombosis and then transitioned to eliquis per [facility] - see my discussion above - plan to continue for 3-4 months with repeat imaging at that time to see if it can be d/c   Following  Cbc in am     ACP (advance care planning)  Assessment & Plan  See my discussion above, I spoke with family regarding goals of care and code status for 22 minutes, they would like to continue with full code    Pneumonia  Assessment & Plan  S/p completed course  of pip/tazo and vanc  Cultures were negative    Aortic mural thrombus (HCC)  Assessment & Plan  CT at outside facility showing focal aortic mural thrombus along the lateral wall of the descending thoracic aorta and also a tiny filling defect/mural thrombus within the posterior right common iliac artery  apixaban  Monitor H&H    Continue monitoring H&H  Cbc in am         Encephalopathy  Assessment & Plan  Hospital course complicated by encephalopathy.  Likely from combination of CVA, STEMI, hyperglycemia  Removing restraints as tolerated, following  Has a nasojejunal feeding tube placed by interventional radiology from outside facility  Continue Seroquel and Depakene as recommended by neurology/psychiatry at outside facility.    Multifactorial  As per records from [facility] probably anoxic/embolic/delirium  Start to titrate off of seroquel:day time dose DC'd 12.10.  decrease evening dose to 75mg tonigh  Cont melatonin    CVA (cerebral vascular accident) (HCC)  Assessment & Plan  Patient had MRI on 11/17/24 at OSH which shwed Punctate acute infarct of the right centrum semiovale. Scattered foci of enhancement in multiple vascular distributions as described may represent recent subacute infarcts. Diffuse stippled susceptibility signal throughout the supratentorial and infratentorial brain. Overall findings may represent sequelae of fat emboli or amyloid related angiopathy.   High dose statin  Apixaban  plavix    Continue to be encephalopathic  Neuro evaluated patient at [facility].   Poor prognosis, now developing cheyne florez, will repeat CT  Guarded prognosis      CAD (coronary artery disease)  Assessment & Plan  Patient with recent complicated STEMI with PCI to LAD and mid LAD.  Complicated by V-fib arrest and cardiogenic shock requiring VA ECMO and Impella and retroperitoneal hematomas. At [facility] underwent impella supported PCI to mid LAD just distal to prior stent but unsuccessful with PCI to 100% occluded OM1 on 11/6.  Patient was decannulated and had Impella removed.    Continue Lipitor    Continue monitoring  Continue telemetry.     Discussed with cardiology and asa stopped and replaced with plavix  Following  Repeat echo shows persistently low EF, see above  Cardiology had recommended bisoprolol but due to cheyne florez critical care recommends we stop this - so will hold - resume lasix and follow  At present pressures do not allow BB, ARB    Urinary retention  Assessment & Plan  Beckwith catheter in place    Hyperglycemia  Assessment & Plan    Sliding scale      Metabolic acidemia- (present on admission)  Assessment & Plan  resolved  Bmp in am  Following           VTE prophylaxis: VTE Selection    I have performed a physical exam and reviewed and updated ROS and Plan today (12/11/2024). In review of yesterday's note (12/10/2024), there are no changes except as documented above.

## 2024-12-11 NOTE — THERAPY
"Physical Therapy   Daily Treatment     Patient Name: Kiara Qureshi  Age:  59 y.o., Sex:  female  Medical Record #: [id number]  Today's Date: 12/11/2024     Precautions  Precautions: Fall Risk;Swallow Precautions;Nasogastric Tube  Comments: EF=25-30%    Assessment    Reorders received. Today, pt is met in bed, moving spontaneously, reaching for lines. Pt does not respond to name, not able to follow any commands, and needs total assist to come to sitting EOB, attempting to pull NG tube throughout. Pt shows poor trunk control, needing total assist to maintain safe sitting, falling to right and unable to follow cues to participate in attempt to stabilize and find neutral. Pt is not currently able to participate actively in therapy, will hold and monitor once a week to check for more appropriate level of cognition.     Plan    Treatment Plan Status: Modify Current Treatment Plan  Type of Treatment: Bed Mobility, Therapeutic Activities, Self Care / Home Evaluation, Therapeutic Exercise, Neuro Re-Education / Balance, Gait Training  Treatment Frequency: 1 Time per Week (decrease to 1x/week to monitor for improved cogntion and ability to participate.)  Treatment Duration: Until Therapy Goals Met    DC Equipment Recommendations: Unable to determine at this time  Discharge Recommendations: Recommend post-acute placement for additional physical therapy services prior to discharge home      Objective       12/11/24 1150   Precautions   Precautions Fall Risk;Swallow Precautions;Nasogastric Tube   Comments EF=25-30%   Vitals   O2 (LPM) 1   O2 Delivery Device Silicone Nasal Cannula   Pain 0 - 10 Group   Therapist Pain Assessment During Activity;Nurse Notified;0  (irritation with being assisted to EOB, no specific c/o pain)   Cognition    Cognition / Consciousness X   Speech/ Communication   (no response to questions, not able to follow commands, no eye contact, does say \"stop\" when lines are being adjusted and pt is " prevented from pulling NG tube)   Level of Consciousness Alert   Ability To Follow Commands Unable to Follow 1 Step Commands   Safety Awareness Impaired;Impulsive   New Learning Impaired   Attention Impaired   Sequencing Impaired   Initiation Impaired   Active ROM Lower Body    Active ROM Lower Body  WDL   Comments spontaneous movement only, not following commands for AROM   Strength Lower Body   Lower Body Strength  X   Comments unable to follow commands for MMT   Balance   Sitting Balance (Static) Dependent   Sitting Balance (Dynamic) Dependent   Weight Shift Sitting Absent   Weight Shift Standing Absent   Skilled Intervention Verbal Cuing;Facilitation;Postural Facilitation   Comments Heavy R lean, not able to follow cues to weight shift to L   Bed Mobility    Supine to Sit Total Assist   Sit to Supine Total Assist   Scooting Total Assist   Rolling Total Assist to Rt.;Total Assist to Lt.   Skilled Intervention Verbal Cuing;Sequencing   Comments not following commands, but moving spontaneously in bed unassisted.   Gait Analysis   Gait Level Of Assist Unable to Participate   Functional Mobility   Sit to Stand Unable to Participate   Bed, Chair, Wheelchair Transfer Unable to Participate   6 Clicks Assessment - How much HELP from from another person do you currently need... (If the patient hasn't done an activity recently, how much help from another person do you think he/she would need if he/she tried?)   Turning from your back to your side while in a flat bed without using bedrails? 2   Moving from lying on your back to sitting on the side of a flat bed without using bedrails? 2   Moving to and from a bed to a chair (including a wheelchair)? 1   Standing up from a chair using your arms (e.g., wheelchair, or bedside chair)? 1   Walking in hospital room? 1   Climbing 3-5 steps with a railing? 1   6 clicks Mobility Score 8   Activity Tolerance   Sitting Edge of Bed 11 supported   Short Term Goals    Short Term Goal # 1  pt will move supine<>eob with spv in 6 tx for bed mobility.   Goal Outcome # 1 goal not met   Short Term Goal # 2 pt will complete spt with fww and spv in 6 tx for functional mobility.   Goal Outcome # 2 Goal not met   Short Term Goal # 3 pt will ambulate 150 ft with fww and spv in 6 tx for household distances.   Goal Outcome # 3 Goal not met   Education Group   Role of Physical Therapist Patient Response Patient;Acceptance;Explanation;Reinforcement Needed   Physical Therapy Treatment Plan   Physical Therapy Treatment Plan Modify Current Treatment Plan   Treatment Plan  Bed Mobility;Therapeutic Activities;Self Care / Home Evaluation;Therapeutic Exercise;Neuro Re-Education / Balance;Gait Training   Treatment Frequency 1 Time per Week  (decrease to 1x/week to monitor for improved cogntion and ability to participate.)   Duration Until Therapy Goals Met   Anticipated Discharge Equipment and Recommendations   DC Equipment Recommendations Unable to determine at this time   Discharge Recommendations Recommend post-acute placement for additional physical therapy services prior to discharge home   Interdisciplinary Plan of Care Collaboration   IDT Collaboration with  Nursing   Patient Position at End of Therapy In Bed;Wrist Restraints Applied;Bed Alarm On  (B mitts, B wrist restraints.)   Collaboration Comments Nsg present and assisting.   Session Information   Date / Session Number  12/11-3 (1/1, 12/18)

## 2024-12-11 NOTE — CARE PLAN
The patient is Stable - Low risk of patient condition declining or worsening    Shift Goals  Clinical Goals: safety, stable BP  Patient Goals: JAYLIN  Family Goals: JAYLIN    Progress made toward(s) clinical / shift goals:  bp soft but stable. Continue to monitor per MD      Problem: Safety  Goal: Will remain free from injury  Outcome: Progressing  Goal: Will remain free from falls  Outcome: Progressing     Problem: Skin Integrity  Goal: Risk for impaired skin integrity will decrease  Outcome: Progressing     Problem: Respiratory  Goal: Patient will achieve/maintain optimum respiratory ventilation and gas exchange  Outcome: Progressing     Problem: Urinary - Renal Perfusion  Goal: Ability to achieve and maintain adequate renal perfusion and functioning will improve  Outcome: Progressing     Problem: Urinary Elimination  Goal: Establish and maintain regular urinary output  Outcome: Progressing

## 2024-12-11 NOTE — DIETARY
Nutrition support weekly update:  Day 13 of admit.  Kiara Qureshi is a 59 y.o. female with admitting DX of HFrEF (heart failure with reduced ejection fraction) ([facility]) [I50.20]    Tube feeding initiated on 11/2924. Current TF via NGT is Pivot 1.5 at 50 ml/hour x 10 hours to provide 750 kcal, 47 gm protein, and 375 ml free water. This meets ~50% of estimated needs.    Assessment:  Weight 12/10: 94.3 kg via bed scale. Suspect this is inaccurate as wts have generally varied from 80-85 kg this admit  Re-estimate of nutritional needs not indicated.    Evaluation:   Transferred from  to [facility] on 12/9; now on IMCU.   Per 12/5 SLP note, limited participation w/ SLP r/t AMS. PO intake has been poor, 0% last 2 meals documented per ADLs.  NOC feeds Pivot 1.5 overnight at 50 mL/hr per I/Os  Current diet order is Full liquid, 1 item per meal, 1:1 supervision by nursing.  Labs: glucose 160, Ca 7.4. 24-hr POC glucose 108-200 - generally has been 130s-150s  MAR: dulcolax, lasix (last admin 12/4), lantus, SSI, midodrine, oxycodone  Skin: full thickness b/l groin wounds - progressing as expected per 12/10 Wound Team update note. +general edema  Last BM: 12/9, type 6  Current feeding no longer appropriate - requiring full needs to be met from EN due to limited oral diet order.    Inadequate oral intake related to intubation status as evidenced by need for nutrition support.   Resolved - pt no longer intubated    New: Inadequate oral intake r/t AMS AEB pt requiring nutrition support to meet needs.    Recommendations/Plan:  Increase TF to meet 100% of needs as diet has been downgraded to 1 item per meal and pt has poor oral intake.  Adjust TF to Pivot 1.5 Daren at 40 ml/hr continuous.Goal tube feed volume provides 1440 kcals, 90 g protein, and 720 ml free water daily.   Reconsider adjusting TF intake for PO once advanced to full meal trays.  Additional fluids per MD.    RD continues to follow.

## 2024-12-12 ENCOUNTER — APPOINTMENT (OUTPATIENT)
Dept: RADIOLOGY | Facility: MEDICAL CENTER | Age: 59
End: 2024-12-12
Attending: HOSPITALIST
Payer: COMMERCIAL

## 2024-12-12 LAB
BASOPHILS # BLD AUTO: 0.8 % (ref 0–1.8)
BASOPHILS # BLD: 0.06 K/UL (ref 0–0.12)
EOSINOPHIL # BLD AUTO: 0.16 K/UL (ref 0–0.51)
EOSINOPHIL NFR BLD: 2.2 % (ref 0–6.9)
ERYTHROCYTE [DISTWIDTH] IN BLOOD BY AUTOMATED COUNT: 79.4 FL (ref 35.9–50)
GLUCOSE BLD STRIP.AUTO-MCNC: 201 MG/DL (ref 65–99)
GLUCOSE BLD STRIP.AUTO-MCNC: 202 MG/DL (ref 65–99)
GLUCOSE BLD STRIP.AUTO-MCNC: 224 MG/DL (ref 65–99)
HCT VFR BLD AUTO: 30.1 % (ref 37–47)
HGB BLD-MCNC: 8.9 G/DL (ref 12–16)
IMM GRANULOCYTES # BLD AUTO: 0.09 K/UL (ref 0–0.11)
IMM GRANULOCYTES NFR BLD AUTO: 1.2 % (ref 0–0.9)
LYMPHOCYTES # BLD AUTO: 0.97 K/UL (ref 1–4.8)
LYMPHOCYTES NFR BLD: 13.2 % (ref 22–41)
MCH RBC QN AUTO: 31.6 PG (ref 27–33)
MCHC RBC AUTO-ENTMCNC: 29.6 G/DL (ref 32.2–35.5)
MCV RBC AUTO: 106.7 FL (ref 81.4–97.8)
MONOCYTES # BLD AUTO: 0.72 K/UL (ref 0–0.85)
MONOCYTES NFR BLD AUTO: 9.8 % (ref 0–13.4)
NEUTROPHILS # BLD AUTO: 5.33 K/UL (ref 1.82–7.42)
NEUTROPHILS NFR BLD: 72.8 % (ref 44–72)
NRBC # BLD AUTO: 0 K/UL
NRBC BLD-RTO: 0 /100 WBC (ref 0–0.2)
PLATELET # BLD AUTO: 332 K/UL (ref 164–446)
PMV BLD AUTO: 11.4 FL (ref 9–12.9)
RBC # BLD AUTO: 2.82 M/UL (ref 4.2–5.4)
WBC # BLD AUTO: 7.3 K/UL (ref 4.8–10.8)

## 2024-12-12 PROCEDURE — 85025 COMPLETE CBC W/AUTO DIFF WBC: CPT

## 2024-12-12 PROCEDURE — 99232 SBSQ HOSP IP/OBS MODERATE 35: CPT | Performed by: HOSPITALIST

## 2024-12-12 PROCEDURE — 700102 HCHG RX REV CODE 250 W/ 637 OVERRIDE(OP): Performed by: HOSPITALIST

## 2024-12-12 PROCEDURE — A9270 NON-COVERED ITEM OR SERVICE: HCPCS | Performed by: HOSPITALIST

## 2024-12-12 PROCEDURE — 770006 HCHG ROOM/CARE - MED/SURG/GYN SEMI*

## 2024-12-12 PROCEDURE — 700101 HCHG RX REV CODE 250: Performed by: HOSPITALIST

## 2024-12-12 PROCEDURE — 700102 HCHG RX REV CODE 250 W/ 637 OVERRIDE(OP): Performed by: STUDENT IN AN ORGANIZED HEALTH CARE EDUCATION/TRAINING PROGRAM

## 2024-12-12 PROCEDURE — A9270 NON-COVERED ITEM OR SERVICE: HCPCS | Performed by: STUDENT IN AN ORGANIZED HEALTH CARE EDUCATION/TRAINING PROGRAM

## 2024-12-12 PROCEDURE — 82962 GLUCOSE BLOOD TEST: CPT

## 2024-12-12 RX ORDER — VALPROIC ACID 250 MG/5ML
250 SOLUTION ORAL EVERY 8 HOURS
Status: DISCONTINUED | OUTPATIENT
Start: 2024-12-12 | End: 2024-12-15

## 2024-12-12 RX ADMIN — ATORVASTATIN CALCIUM 80 MG: 80 TABLET, FILM COATED ORAL at 17:08

## 2024-12-12 RX ADMIN — INSULIN LISPRO 2 UNITS: 100 INJECTION, SOLUTION INTRAVENOUS; SUBCUTANEOUS at 11:00

## 2024-12-12 RX ADMIN — APIXABAN 5 MG: 5 TABLET, FILM COATED ORAL at 17:08

## 2024-12-12 RX ADMIN — BISACODYL 10 MG: 10 SUPPOSITORY RECTAL at 05:17

## 2024-12-12 RX ADMIN — VALPROIC ACID 250 MG: 250 SOLUTION ORAL at 17:31

## 2024-12-12 RX ADMIN — INSULIN GLARGINE-YFGN 9 UNITS: 100 INJECTION, SOLUTION SUBCUTANEOUS at 18:26

## 2024-12-12 RX ADMIN — INSULIN LISPRO 2 UNITS: 100 INJECTION, SOLUTION INTRAVENOUS; SUBCUTANEOUS at 18:26

## 2024-12-12 RX ADMIN — INSULIN LISPRO 2 UNITS: 100 INJECTION, SOLUTION INTRAVENOUS; SUBCUTANEOUS at 21:35

## 2024-12-12 RX ADMIN — CLOPIDOGREL BISULFATE 75 MG: 75 TABLET ORAL at 05:17

## 2024-12-12 RX ADMIN — Medication 5 MG: at 21:38

## 2024-12-12 RX ADMIN — LANSOPRAZOLE 30 MG: 30 TABLET, ORALLY DISINTEGRATING ORAL at 05:17

## 2024-12-12 RX ADMIN — BISOPROLOL FUMARATE 2.5 MG: 5 TABLET ORAL at 05:18

## 2024-12-12 RX ADMIN — VALPROIC ACID 500 MG: 250 SOLUTION ORAL at 05:18

## 2024-12-12 RX ADMIN — FUROSEMIDE 20 MG: 20 TABLET ORAL at 05:17

## 2024-12-12 RX ADMIN — DAKIN'S SOLUTION 0.125% (QUARTER STRENGTH) 473 ML: 0.12 SOLUTION at 17:32

## 2024-12-12 RX ADMIN — DAPAGLIFLOZIN 10 MG: 10 TABLET, FILM COATED ORAL at 05:17

## 2024-12-12 RX ADMIN — OXYCODONE 5 MG: 5 TABLET ORAL at 17:08

## 2024-12-12 RX ADMIN — APIXABAN 5 MG: 5 TABLET, FILM COATED ORAL at 05:17

## 2024-12-12 ASSESSMENT — FIBROSIS 4 INDEX: FIB4 SCORE: 2.46

## 2024-12-12 ASSESSMENT — PAIN DESCRIPTION - PAIN TYPE
TYPE: ACUTE PAIN

## 2024-12-12 NOTE — PROCEDURES
VAT at bedside to place IV access in pt who had clotted off midline. Unfortunately unable to place another midline as pt's vasculature is too small (midline and PICC) so VAT RN placed PIV. Pt currently has no IV meds.

## 2024-12-12 NOTE — CARE PLAN
The patient is Stable - Low risk of patient condition declining or worsening    Shift Goals  Clinical Goals: safety, stable BP  Patient Goals: JAYLIN  Family Goals: JAYLIN    Progress made toward(s) clinical / shift goals:      Problem: Safety  Goal: Will remain free from injury  Outcome: Progressing  Goal: Will remain free from falls  Outcome: Progressing     Problem: Skin Integrity  Goal: Risk for impaired skin integrity will decrease  Outcome: Progressing     Problem: Hemodynamics  Goal: Patient's hemodynamics, fluid balance and neurologic status will be stable or improve  Outcome: Progressing     Problem: Respiratory  Goal: Patient will achieve/maintain optimum respiratory ventilation and gas exchange  Outcome: Progressing     Problem: Risk for Aspiration  Goal: Patient's risk for aspiration will be absent or decrease  Outcome: Progressing     Problem: Urinary - Renal Perfusion  Goal: Ability to achieve and maintain adequate renal perfusion and functioning will improve  Outcome: Progressing     Problem: Nutrition  Goal: Patient's nutritional and fluid intake will be adequate or improve  Outcome: Progressing     Problem: Urinary Elimination  Goal: Establish and maintain regular urinary output  Outcome: Progressing

## 2024-12-12 NOTE — PROGRESS NOTES
4 Eyes Skin Assessment Completed by KASIE Steiner and KASIE Benitez.    Head WDL  Ears WDL  Nose WDL  Mouth dried blood  Neck WDL  Breast/Chest WDL  Shoulder Blades WDL  Spine WDL  (R) Arm/Elbow/Hand Redness, Blanching, Bruising, and Scab  (L) Arm/Elbow/Hand Redness, Blanching, and Bruising  Abdomen Bruising  Groin Redness  Scrotum/Coccyx/Buttocks Redness and Blanching  (R) Leg Redness bilateral groin sites-wound vac in place  (L) Leg Redness bilateral groin sties-wound vacin place  (R) Heel/Foot/Toe Redness, Blanching, and Boggy  (L) Heel/Foot/Toe Redness, Blanching, and Boggy          Devices In Places Beckwith and Nasal Cannula. NG tube, Wound Vac to bilateral groin sites      Interventions In Place Gray Ear Foams, Heel Mepilex, Pillows, and Barrier Cream    Possible Skin Injury Yes    Pictures Uploaded Into Epic N/A-wound care already following  Wound Consult Placed N/A-wound care already following  RN Wound Prevention Protocol Ordered Yes

## 2024-12-12 NOTE — DIETARY
Nutrition Update: Follow-up for TF Care Plan  Day 15 of admit.  Kiara Qureshi is a 59 y.o. female with admitting DX of HFrEF (heart failure with reduced ejection fraction) ([facility]) [I50.20].    Current Diet: Full liquids (one item per meal); 1:1 supervision by nursing; TF Pivot 1.5, goal rate 40 mL/hr continuous feeds.    TF Pivot 1.5 is back at goal rate 40 mL/hr. Intake of PO diet remains minimal per ADLs.  100% of needs from EN remain appropriate due to diet limited to 1 item/meal.    Inadequate oral intake r/t AMS AEB pt requiring nutrition support to meet needs.   Nutrition Dx Status: Ongoing     Problem: Nutritional:  Goal: Achieve adequate nutritional intake  Description: Patient will meet >85% of estimated needs from EN  Outcome: Met    RD continues to follow.

## 2024-12-12 NOTE — THERAPY
Occupational Therapy  Daily Treatment     Patient Name: Kiara Qureshi  Age:  59 y.o., Sex:  female  Medical Record #: [id number]  Today's Date: 12/11/2024     Precautions: Fall Risk, Swallow Precautions, Nasogastric Tube  Comments: EF= 25-30%    Assessment    Pt seen for OT tx. Pt requires max-total A to complete ADL tasks. Pt had incomprehensible speech and unable to follow cues despite increased multimodal cues. She did not appear to startle or seek when loud stimuli was presented. She was observed to spontaneously use BUE with volitional movements; frequently grabbing at NG tube. When assessing visual tracking, she appeared to fixate on object when it was presented but was unable to track object or find object. Will continue to follow for ongoing acute OT services at a lower frequency until pt's cognition improves with intent to increase once she is able to meaningfully participate in therapy.     Plan    Treatment Plan Status: Modify Current Treatment Plan  Type of Treatment: Self Care / Activities of Daily Living, Neuro Re-Education / Balance, Therapeutic Exercises, Therapeutic Activity, Family / Caregiver Training, Adaptive Equipment  Treatment Frequency: 2 Times per Week  Treatment Duration: Until Therapy Goals Met    DC Equipment Recommendations: Unable to determine at this time  Discharge Recommendations: Recommend post-acute placement for additional occupational therapy services prior to discharge home     Objective    Vitals   O2 (LPM) 1   O2 Delivery Device Silicone Nasal Cannula   Pain 0 - 10 Group   Therapist Pain Assessment Post Activity Pain Same as Prior to Activity;Nurse Notified  (No c/o pain or outward signs of pain)   Cognition    Cognition / Consciousness X   Speech/ Communication Incomprehensible Words   Level of Consciousness Alert   Ability To Follow Commands Unable to Follow 1 Step Commands   Safety Awareness Impaired;Impulsive   New Learning Impaired   Attention Impaired    Sequencing Impaired   Initiation Impaired   Comments Pt with incomprehensible speech and unable to follow cues despite increased multimodal cues. Did not appear to startle or seek when loud stimuli was presented   Strength Upper Body   Comments able to spontateously use BUE with volitional movements; frequently grabbing at NG tube   Bed Mobility    Comments Bed level only   Activities of Daily Living   Eating Total Assist   Grooming Maximal Assist  (oral care as she was observed to have blood in her mouth from biting her lips; face washing)   Upper Body Dressing Maximal Assist   Lower Body Dressing Total Assist   Skilled Intervention Verbal Cuing;Tactile Cuing;Sequencing;Compensatory Strategies   How much help from another person does the patient currently need...   6 Clicks Daily Activity Score 7   Functional Mobility   Sit to Stand Unable to Participate   Mobility Bed level only   Visual Perception   Comments Unable to visually track   Patient / Family Goals   Patient / Family Goal #1 For pt to get into a chair, per pt's    Goal #1 Outcome Goal not met   Short Term Goals   Short Term Goal # 1 Pt will follow one-step directions 100% of the time during ADLs   Goal Outcome # 1 Goal not met   Short Term Goal # 2 Pt will change gown with modA   Goal Outcome # 2 Goal not met   Short Term Goal # 3 Pt will perform seated g/h with Jun   Goal Outcome # 3 Goal not met   Short Term Goal # 4 Pt will transfer to [facility] with modA, second person for safety   Goal Outcome # 4 Goal not met   Education Group   Education Provided Role of Occupational Therapist   Role of Occupational Therapist Patient Response Patient;Explanation;No Learning Evidence

## 2024-12-12 NOTE — DISCHARGE PLANNING
Case Management Discharge Planning    Admission Date: 11/27/2024  GMLOS: 4.1  ALOS: 15      Anticipated Discharge Dispo: Discharge Disposition: Disch to a long term care facility (63)  Discharge Address: [address] [place] [address]  Discharge Contact Phone Number: [phone]    DME Needed: No    Action(s) Taken: Chart reviewed, pt discussed in IDT rounds. Patient has bilateral groin wound vac and currently in bilateral mitt and soft wrist restraints. PAMS if following.    Escalations Completed: None    Medically Clear: No    Next Steps: Follow for discharge needs    Barriers to Discharge: Medical clearance and Pending Placement    Is the patient up for discharge tomorrow: No

## 2024-12-12 NOTE — CARE PLAN
The patient is Watcher - Medium risk of patient condition declining or worsening    Shift Goals  Clinical Goals: Safety, Hemodynamic stability, improved mentation  Patient Goals: JAYLIN  Family Goals: JAYLIN    Progress made toward(s) clinical / shift goals:      Problem: Safety - Medical Restraint  Goal: Remains free of injury from restraints (Restraint for Interference with Medical Device)  Outcome: Progressing     Problem: Safety  Goal: Will remain free from injury  Outcome: Progressing  Goal: Will remain free from falls  Outcome: Progressing     Problem: Skin Integrity  Goal: Risk for impaired skin integrity will decrease  Outcome: Progressing     Problem: Psychosocial  Goal: Patient's level of anxiety will decrease  Outcome: Progressing     Problem: Hemodynamics  Goal: Patient's hemodynamics, fluid balance and neurologic status will be stable or improve  Outcome: Progressing     Problem: Respiratory  Goal: Patient will achieve/maintain optimum respiratory ventilation and gas exchange  Outcome: Progressing     Problem: Nutrition  Goal: Patient's nutritional and fluid intake will be adequate or improve  Outcome: Progressing     Problem: Urinary Elimination  Goal: Establish and maintain regular urinary output  Outcome: Progressing       Patient is not progressing towards the following goals:      Problem: Safety - Medical Restraint  Goal: Free from restraint(s) (Restraint for Interference with Medical Device)  Outcome: Not Progressing   Patient unable to follow commands at this time, currently unsafe to remove Medical Restraints.

## 2024-12-12 NOTE — PROGRESS NOTES
Hospital Medicine Daily Progress Note    Date of Service  12/12/2024    Chief Complaint  Kiara Qureshi is a 59 y.o. female admitted 11/27/2024 with CP    Hospital Course  60yo PMHx BMI 40, HTN, HLD, HFrEF, CAD.  Seen here for STEMI.  Treated initially with lytics then went to [facility] where she had a ZACHARY placed to LAD.  Post op course complicated by rteroperitoneal hematoma, cardiogenic shock, PEA arrest requiring VA ECMO and impella.  Sent to [facility].  There decanulated on 11/4, L femoral artery embolectomy, PCI to LAD and OM1, Impella came out on 11/8.  LVEF 25-30%.  Further imaging showed a possible focal aortic mural thrombus vs ulcerated plaque.       Interval Problem Update  ROS limited by mental status.    Patient remains agitated, but less so.  Is able to express her needs occasionally now    AFebrile  HR 60-70s  -130  On 1 LNC      I have discussed this patient's plan of care and discharge plan at IDT rounds today with Case Management, Nursing, Nursing leadership, and other members of the IDT team.    Consultants/Specialty  cardiology    Code Status  Full Code    Disposition  Medically Cleared  I have placed the appropriate orders for post-discharge needs.    Review of Systems  Review of Systems   Unable to perform ROS: Mental status change        Physical Exam  Temp:  [36.5 °C (97.7 °F)-36.9 °C (98.5 °F)] 36.8 °C (98.3 °F)  Pulse:  [66-97] 79  Resp:  [14-44] 44  BP: ()/(52-67) 114/53  SpO2:  [86 %-98 %] 97 %    Physical Exam  Constitutional:       General: She is not in acute distress.     Appearance: Normal appearance. She is well-developed. She is not diaphoretic.   HENT:      Head: Normocephalic and atraumatic.   Neck:      Vascular: No JVD.   Cardiovascular:      Rate and Rhythm: Normal rate and regular rhythm.      Heart sounds: No murmur heard.  Pulmonary:      Effort: Pulmonary effort is normal. No respiratory distress.      Breath sounds: No stridor. No wheezing or rales.    Abdominal:      Palpations: Abdomen is soft.      Tenderness: There is no abdominal tenderness. There is no guarding or rebound.   Musculoskeletal:      Right lower leg: No edema.      Left lower leg: No edema.   Skin:     General: Skin is warm and dry.      Findings: No rash.   Neurological:      Comments: O to self  Follows commands   Alert attends to examiner  Speech is slurred   Face symmetric  Antigravity x 4   Psychiatric:         Mood and Affect: Mood is anxious.         Behavior: Behavior is agitated.         Fluids    Intake/Output Summary (Last 24 hours) at 12/12/2024 0659  Last data filed at 12/12/2024 0646  Gross per 24 hour   Intake 480 ml   Output 1700 ml   Net -1220 ml        Laboratory  Recent Labs     12/10/24  0136 12/11/24  0211 12/12/24  0223   WBC 7.5 5.9 7.3   RBC 2.48* 2.56* 2.82*   HEMOGLOBIN 8.1* 8.3* 8.9*   HEMATOCRIT 26.7* 27.3* 30.1*   .7* 106.6* 106.7*   MCH 32.7 32.4 31.6   MCHC 30.3* 30.4* 29.6*   RDW 77.5* 77.8* 79.4*   PLATELETCT 265 286 332   MPV 11.7 11.1 11.4     Recent Labs     12/10/24  0136   SODIUM 141   POTASSIUM 4.6   CHLORIDE 112   CO2 21   GLUCOSE 160*   BUN 27*   CREATININE 0.83   CALCIUM 7.4*                   Imaging  CT-HEAD W/O   Final Result         1.  No acute intracranial abnormality is identified, there are nonspecific white matter changes, commonly associated with small vessel ischemic disease.  Associated mild cerebral atrophy is noted.   2.  Bilateral sinusitis changes   3.  Atherosclerosis.               DX-CHEST-PORTABLE (1 VIEW)   Final Result         1.  Mild pulmonary edema and/or infiltrates.      EC-ECHOCARDIOGRAM COMPLETE W/ CONT   Final Result      DX-ABDOMEN FOR TUBE PLACEMENT   Final Result      Tip of the esophagogastric tube terminates over the pylorus of the stomach.      IR-MIDLINE CATHETER INSERTION WO GUIDANCE > AGE 3   Final Result                  Ultrasound-guided midline placement performed by qualified nursing staff    as above.           IR-US GUIDED PIV   Final Result    Ultrasound-guided PERIPHERAL IV INSERTION performed by    qualified nursing staff as above.      DX-OUTSIDE IMAGES-DX CHEST   Final Result      DX-OUTSIDE IMAGES-DX ABDOMEN   Final Result      MR-OUTSIDE IMAGES-MR BRAIN   Final Result      CT-OUTSIDE IMAGES-CT HEAD   Final Result      CT-OUTSIDE IMAGES-CT ABDOMEN/PELVIS   Final Result      CT-OUTSIDE IMAGES-CT CHEST   Final Result      DX-ABDOMEN FOR TUBE PLACEMENT   Final Result      There is a new small bowel feeding tube which terminates in the small bowel of the right mid abdomen.      DX-CHEST-LIMITED (1 VIEW)   Final Result         Asymmetric pulmonary infiltrates, left worse than right.           Assessment/Plan  * Heart failure with reduced ejection fraction (HCC)- (present on admission)  Assessment & Plan  ICM LVEF 25-30%  S/p revascularization  Soft pressures limiting titration of GDMT.  Start SGLT2i  Add in MRA, ARB, BB as pressures allow:  -starting BIsoprolol 2.5 today    Constipation  Assessment & Plan  + bm  Continue bowel regimen  following    Hypotension  Assessment & Plan  follow    Leukocytosis  Assessment & Plan  resolved  Could be related to hematoma  normal pro luisito  Cbc in am     Abdominal hematoma  Assessment & Plan  Might be related to cardiac device  Clinically stable and h/h in stable range  Patient was on lovenox due to rigth common iliac art thrombosis and then transitioned to eliquis per [facility] - see my discussion above - plan to continue for 3-4 months with repeat imaging at that time to see if it can be d/c   Following  Cbc in am     ACP (advance care planning)  Assessment & Plan  See my discussion above, I spoke with family regarding goals of care and code status for 22 minutes, they would like to continue with full code    Pneumonia  Assessment & Plan  S/p completed course of pip/tazo and vanc  Cultures were negative    Aortic mural thrombus (HCC)  Assessment & Plan  CT at outside facility  showing focal aortic mural thrombus along the lateral wall of the descending thoracic aorta and also a tiny filling defect/mural thrombus within the posterior right common iliac artery  apixaban  Monitor H&H    Continue monitoring H&H  Cbc in am         Encephalopathy  Assessment & Plan  Hospital course complicated by encephalopathy.  Likely from combination of CVA, STEMI, hyperglycemia  Removing restraints as tolerated, following  Has a nasojejunal feeding tube placed by interventional radiology from outside facility  Continue Seroquel and Depakene as recommended by neurology/psychiatry at outside facility.    Multifactorial  As per records from [facility] probably anoxic/embolic/delirium  Start to titrate off of seroquel:day time dose DC'd 12.10.  decrease evening dose to 75mg tonigh  Cont melatonin    CVA (cerebral vascular accident) (HCC)  Assessment & Plan  Patient had MRI on 11/17/24 at [facility] which shwed Punctate acute infarct of the right centrum semiovale. Scattered foci of enhancement in multiple vascular distributions as described may represent recent subacute infarcts. Diffuse stippled susceptibility signal throughout the supratentorial and infratentorial brain. Overall findings may represent sequelae of fat emboli or amyloid related angiopathy.   High dose statin  Apixaban  plavix    Continues to be encephalopathic  Neuro evaluated patient at [facility].   Have been titrating off seroquel and pt's mental status has improved as her agitation has diminished  DC seroquel qhs dose  If she cont's to improve will start to taper off of valproate      CAD (coronary artery disease)  Assessment & Plan  Patient with recent complicated STEMI with PCI to LAD and mid LAD.  Complicated by V-fib arrest and cardiogenic shock requiring VA ECMO and Impella and retroperitoneal hematomas. At [facility] underwent impella supported PCI to mid LAD just distal to prior stent but unsuccessful with PCI to 100% occluded OM1 on 11/6. Patient was  decannulated and had Impella removed.    Continue Lipitor    Continue monitoring  Continue telemetry.     Discussed with cardiology and asa stopped and replaced with plavix  Following  Repeat echo shows persistently low EF, see above  Cardiology had recommended bisoprolol but due to cheyne florez critical care recommends we stop this - so will hold - resume lasix and follow  At present pressures do not allow BB, ARB    Urinary retention  Assessment & Plan  Beckwith catheter in place    Hyperglycemia  Assessment & Plan    Sliding scale      Metabolic acidemia- (present on admission)  Assessment & Plan  resolved  Bmp in am  Following           VTE prophylaxis: VTE Selection    I have performed a physical exam and reviewed and updated ROS and Plan today (12/12/2024). In review of yesterday's note (12/11/2024), there are no changes except as documented above.

## 2024-12-12 NOTE — CARE PLAN
The patient is Stable - Low risk of patient condition declining or worsening    Shift Goals  Clinical Goals: Safety, Hemodynamic stability, improved mentation  Patient Goals: to get out of restraints  Family Goals: JAYLIN    Progress made toward(s) clinical / shift goals:      Problem: Safety - Medical Restraint  Goal: Remains free of injury from restraints (Restraint for Interference with Medical Device)  Outcome: Progressing     Problem: Safety  Goal: Will remain free from injury  Outcome: Progressing  Goal: Will remain free from falls  Outcome: Progressing     Problem: Skin Integrity  Goal: Risk for impaired skin integrity will decrease  Outcome: Progressing     Problem: Nutrition  Goal: Patient's nutritional and fluid intake will be adequate or improve  Outcome: Progressing     Problem: Urinary Elimination  Goal: Establish and maintain regular urinary output  Outcome: Progressing

## 2024-12-12 NOTE — PROCEDURES
VAT at bedside to change dressing on powerglide midline per protocol, upon assessment visible blood in lumen. VAT attempted to flush line without success, VAT removed line as midline completely occluded. Bedside RN notified.

## 2024-12-13 ENCOUNTER — APPOINTMENT (OUTPATIENT)
Dept: RADIOLOGY | Facility: MEDICAL CENTER | Age: 59
End: 2024-12-13
Attending: INTERNAL MEDICINE
Payer: COMMERCIAL

## 2024-12-13 PROBLEM — J96.01 ACUTE HYPOXIC RESPIRATORY FAILURE (HCC): Status: ACTIVE | Noted: 2024-12-13

## 2024-12-13 PROBLEM — E87.0 HYPERNATREMIA: Status: ACTIVE | Noted: 2024-12-13

## 2024-12-13 LAB
ALBUMIN SERPL BCP-MCNC: 2.7 G/DL (ref 3.2–4.9)
BUN SERPL-MCNC: 43 MG/DL (ref 8–22)
CALCIUM ALBUM COR SERPL-MCNC: 9.4 MG/DL (ref 8.5–10.5)
CALCIUM SERPL-MCNC: 8.4 MG/DL (ref 8.5–10.5)
CHLORIDE SERPL-SCNC: 119 MMOL/L (ref 96–112)
CO2 SERPL-SCNC: 24 MMOL/L (ref 20–33)
CREAT SERPL-MCNC: 1.18 MG/DL (ref 0.5–1.4)
GFR SERPLBLD CREATININE-BSD FMLA CKD-EPI: 53 ML/MIN/1.73 M 2
GLUCOSE BLD STRIP.AUTO-MCNC: 205 MG/DL (ref 65–99)
GLUCOSE BLD STRIP.AUTO-MCNC: 211 MG/DL (ref 65–99)
GLUCOSE BLD STRIP.AUTO-MCNC: 214 MG/DL (ref 65–99)
GLUCOSE BLD STRIP.AUTO-MCNC: 255 MG/DL (ref 65–99)
GLUCOSE SERPL-MCNC: 214 MG/DL (ref 65–99)
MAGNESIUM SERPL-MCNC: 2.6 MG/DL (ref 1.5–2.5)
PHOSPHATE SERPL-MCNC: 3 MG/DL (ref 2.5–4.5)
POTASSIUM SERPL-SCNC: 4.1 MMOL/L (ref 3.6–5.5)
SODIUM SERPL-SCNC: 155 MMOL/L (ref 135–145)

## 2024-12-13 PROCEDURE — 99232 SBSQ HOSP IP/OBS MODERATE 35: CPT

## 2024-12-13 PROCEDURE — 700102 HCHG RX REV CODE 250 W/ 637 OVERRIDE(OP): Performed by: HOSPITALIST

## 2024-12-13 PROCEDURE — A9270 NON-COVERED ITEM OR SERVICE: HCPCS | Performed by: HOSPITALIST

## 2024-12-13 PROCEDURE — A9270 NON-COVERED ITEM OR SERVICE: HCPCS | Performed by: STUDENT IN AN ORGANIZED HEALTH CARE EDUCATION/TRAINING PROGRAM

## 2024-12-13 PROCEDURE — 700102 HCHG RX REV CODE 250 W/ 637 OVERRIDE(OP): Performed by: STUDENT IN AN ORGANIZED HEALTH CARE EDUCATION/TRAINING PROGRAM

## 2024-12-13 PROCEDURE — 80069 RENAL FUNCTION PANEL: CPT

## 2024-12-13 PROCEDURE — 71045 X-RAY EXAM CHEST 1 VIEW: CPT

## 2024-12-13 PROCEDURE — 82962 GLUCOSE BLOOD TEST: CPT

## 2024-12-13 PROCEDURE — 97605 NEG PRS WND THER DME<=50SQCM: CPT

## 2024-12-13 PROCEDURE — 92526 ORAL FUNCTION THERAPY: CPT

## 2024-12-13 PROCEDURE — 770020 HCHG ROOM/CARE - TELE (206)

## 2024-12-13 PROCEDURE — 83735 ASSAY OF MAGNESIUM: CPT

## 2024-12-13 PROCEDURE — 99233 SBSQ HOSP IP/OBS HIGH 50: CPT | Performed by: INTERNAL MEDICINE

## 2024-12-13 PROCEDURE — 99497 ADVNCD CARE PLAN 30 MIN: CPT | Performed by: INTERNAL MEDICINE

## 2024-12-13 PROCEDURE — 36415 COLL VENOUS BLD VENIPUNCTURE: CPT

## 2024-12-13 RX ADMIN — INSULIN GLARGINE-YFGN 9 UNITS: 100 INJECTION, SOLUTION SUBCUTANEOUS at 17:06

## 2024-12-13 RX ADMIN — APIXABAN 5 MG: 5 TABLET, FILM COATED ORAL at 18:00

## 2024-12-13 RX ADMIN — OXYCODONE 5 MG: 5 TABLET ORAL at 05:48

## 2024-12-13 RX ADMIN — CLOPIDOGREL BISULFATE 75 MG: 75 TABLET ORAL at 05:44

## 2024-12-13 RX ADMIN — Medication 5 MG: at 20:30

## 2024-12-13 RX ADMIN — BISACODYL 10 MG: 10 SUPPOSITORY RECTAL at 05:44

## 2024-12-13 RX ADMIN — INSULIN LISPRO 3 UNITS: 100 INJECTION, SOLUTION INTRAVENOUS; SUBCUTANEOUS at 09:41

## 2024-12-13 RX ADMIN — ATORVASTATIN CALCIUM 80 MG: 80 TABLET, FILM COATED ORAL at 18:00

## 2024-12-13 RX ADMIN — FUROSEMIDE 20 MG: 20 TABLET ORAL at 05:44

## 2024-12-13 RX ADMIN — OXYCODONE 5 MG: 5 TABLET ORAL at 17:08

## 2024-12-13 RX ADMIN — BISOPROLOL FUMARATE 2.5 MG: 5 TABLET ORAL at 05:44

## 2024-12-13 RX ADMIN — INSULIN LISPRO 2 UNITS: 100 INJECTION, SOLUTION INTRAVENOUS; SUBCUTANEOUS at 20:30

## 2024-12-13 RX ADMIN — VALPROIC ACID 250 MG: 250 SOLUTION ORAL at 21:45

## 2024-12-13 RX ADMIN — VALPROIC ACID 250 MG: 250 SOLUTION ORAL at 05:44

## 2024-12-13 RX ADMIN — APIXABAN 5 MG: 5 TABLET, FILM COATED ORAL at 05:44

## 2024-12-13 RX ADMIN — VALPROIC ACID 250 MG: 250 SOLUTION ORAL at 14:05

## 2024-12-13 RX ADMIN — DAPAGLIFLOZIN 10 MG: 10 TABLET, FILM COATED ORAL at 05:44

## 2024-12-13 RX ADMIN — INSULIN LISPRO 2 UNITS: 100 INJECTION, SOLUTION INTRAVENOUS; SUBCUTANEOUS at 17:05

## 2024-12-13 RX ADMIN — LANSOPRAZOLE 30 MG: 30 TABLET, ORALLY DISINTEGRATING ORAL at 05:44

## 2024-12-13 ASSESSMENT — FIBROSIS 4 INDEX: FIB4 SCORE: 2.12

## 2024-12-13 ASSESSMENT — PAIN DESCRIPTION - PAIN TYPE
TYPE: ACUTE PAIN

## 2024-12-13 NOTE — PROGRESS NOTES
Received report and assumed care of patient at change of shift. Patient is A&Ox0, on 2L O2 nasal cannula, and is restless at this time, medication not available. Telesitter in place, bilateral soft wrist restraints in place. Tube feed running at goal rate of 40 mL/hr. Patient assessment completed, bed in lowest position, and call light and personal belongings are within reach. Patient expressed no further needs at this time.

## 2024-12-13 NOTE — CARE PLAN
The patient is Stable - Low risk of patient condition declining or worsening    Shift Goals  Clinical Goals: Patient will maintain safety with restraints in place. Patient will tolerate tube feeding at goal rate. Patient will maintain adequate oxygen saturation above 90%.  Patient Goals: JAYLIN  Family Goals: JAYLIN    Progress made toward(s) clinical / shift goals:      Problem: Safety - Medical Restraint  Goal: Remains free of injury from restraints (Restraint for Interference with Medical Device)  Outcome: Progressing     Problem: Safety  Goal: Will remain free from injury  Outcome: Progressing  Goal: Will remain free from falls  Outcome: Progressing       Patient is not progressing towards the following goals:

## 2024-12-13 NOTE — PROGRESS NOTES
4 Eyes Skin Assessment Completed by KASIE Tian and NOMI Gray.    Head WDL  Ears Redness and Blanching  Nose Redness and Blanching  Mouth Redness, Discoloration, and Bleeding  Neck WDL  Breast/Chest WDL  Shoulder Blades WDL  Spine WDL  (R) Arm/Elbow/Hand Redness, Blanching, and Bruising  (L) Arm/Elbow/Hand Redness, Blanching, and Bruising  Abdomen Bruising  Groin WDL  Scrotum/Coccyx/Buttocks Redness, Blanching, and Excoriation  (R) Leg Bruising  (L) Leg Bruising  (R) Heel/Foot/Toe Redness, Blanching, and Boggy  (L) Heel/Foot/Toe Redness, Blanching, and Boggy          Devices In Places Tele Box, Blood Pressure Cuff, and Pulse Ox      Interventions In Place Gray Ear Foams, Heel Mepilex, TAP System, Pillows, and Elbow Mepilex    Possible Skin Injury No    Pictures Uploaded Into Epic Yes  Wound Consult Placed N/A  RN Wound Prevention Protocol Ordered Yes

## 2024-12-13 NOTE — PROGRESS NOTES
Pt gasping for air and gurgling. Upon assessment, pt coughed up blood clots and had apeic episode. Nasal cannula found removed and O2 saturation at 80%. Pt placed on 6L to recover, oral care and suctioning provided. MD Mobley notified. Pt is alert, HR 70s, RR 20, O2 95% on 2L NC.

## 2024-12-13 NOTE — PROGRESS NOTES
Report called to Arika D' Amico at this time on telemetry, VSS, pt. On 2L NC, de jesus and NG tube with bridal in place, x2 wound vac's to groin in place.

## 2024-12-13 NOTE — PROGRESS NOTES
Report received from Vicky FERRO. Assumed care of patient upon arrival to unit. Patient currently A & O x 0, pt is alert but does not follow commands, and moving all extremities; on 2 L O2 NC. Pt placed on monitor, monitor room notified. Bilateral wrist restraints in place, no signs of injury. Beckwith, NGT, and bilateral groin wound vacs in place.

## 2024-12-13 NOTE — PROGRESS NOTES
4 Eyes Skin Assessment Completed by KASIE Matos and KASIE Harrison.    Head WDL  Ears WDL  Nose WDL  Mouth WDL  Neck WDL  Breast/Chest WDL  Shoulder Blades WDL  Spine WDL  (R) Arm/Elbow/Hand Redness and Blanching  (L) Arm/Elbow/Hand Redness and Blanching  Abdomen WDL  Groin Redness  Scrotum/Coccyx/Buttocks Redness and Blanching  (R) Leg Wound vac in place to groin  (L) Leg Wound vac in place to groin  (R) Heel/Foot/Toe Redness and Blanching  (L) Heel/Foot/Toe Redness and Blanching          Devices In Places Beckwith and Nasal Cannula, wound vac, NG tube      Interventions In Place Gray Ear Foams, Heel Mepilex, Pillows, Barrier Cream, and Pressure Redistribution Mattress, elbow Mepilex    Possible Skin Injury Yes    Pictures Uploaded Into Epic Yes  Wound Consult Placed N/A, already following  RN Wound Prevention Protocol Ordered Yes

## 2024-12-13 NOTE — ASSESSMENT & PLAN NOTE
In setting of heart failure, recent cardiogenic shock  Patient demonstrates cheynne florez breathing pattern which is consistent with her suspected anoxic brain injury  Patient appears to be experiencing aspiration events causing bradycardia  Now on room air   RT protocol

## 2024-12-13 NOTE — PROGRESS NOTES
Hospital Medicine Daily Progress Note    Date of Service  12/13/2024    Chief Complaint  Kiara Qureshi is a 59 y.o. female admitted 11/27/2024 with CP    Hospital Course  58yo PMHx BMI 40, HTN, HLD, HFrEF, CAD.  Seen here for STEMI.  Treated initially with lytics then went to [facility] where she had a ZACHARY placed to LAD.  Post op course complicated by rteroperitoneal hematoma, cardiogenic shock, PEA arrest requiring VA ECMO and impella.  Sent to [facility].  There decanulated on 11/4, L femoral artery embolectomy, PCI to LAD and OM1, Impella came out on 11/8.  LVEF 25-30%.  Further imaging showed a possible focal aortic mural thrombus vs ulcerated plaque.       Interval Problem Update  Patient was seen and examined at bedside.  Patient is having apenic episodes with bradycardia.     Transfer to tele  NPO on tube feeds  Goals of care conversations with family - code status changed to DNR, yes to intubation  Wound vac management  Titrating off seoquel  Na 155  4L fluid deficit  Increase free water flushes from 120cc q4 hours to 200cc q4 hours    I have discussed this patient's plan of care and discharge plan at IDT rounds today with Case Management, Nursing, Nursing leadership, and other members of the IDT team.    Consultants/Specialty  cardiology    Code Status  DNAR, I OK    Disposition  The patient is not medically cleared for discharge to home or a post-acute facility.      I have placed the appropriate orders for post-discharge needs.    Review of Systems  Review of Systems   Unable to perform ROS: Mental status change        Physical Exam  Temp:  [36.6 °C (97.8 °F)-36.9 °C (98.4 °F)] 36.9 °C (98.4 °F)  Pulse:  [69-99] 77  Resp:  [18-22] 18  BP: (105-126)/(54-87) 115/63  SpO2:  [92 %-96 %] 92 %    Physical Exam  Constitutional:       General: She is not in acute distress.     Appearance: Normal appearance. She is well-developed. She is ill-appearing.   HENT:      Head: Normocephalic and atraumatic.      Nose:       Comments: NG in place     Mouth/Throat:      Mouth: Mucous membranes are dry.      Comments: Black film of dried saliva coating roof of mouth and tongue  Neck:      Vascular: No JVD.   Cardiovascular:      Rate and Rhythm: Normal rate and regular rhythm.      Heart sounds: No murmur heard.  Pulmonary:      Effort: Pulmonary effort is normal.      Breath sounds: Rhonchi present.   Abdominal:      Palpations: Abdomen is soft.      Tenderness: There is no abdominal tenderness. There is no guarding or rebound.   Musculoskeletal:      Right lower leg: No edema.      Left lower leg: No edema.   Skin:     General: Skin is warm and dry.      Findings: No bruising or rash.   Neurological:      Comments: O to self  Does not follow commands  Alert attends to examiner  Does not verbalize  Face symmetric   Psychiatric:         Mood and Affect: Mood is anxious.         Behavior: Behavior is agitated.         Fluids    Intake/Output Summary (Last 24 hours) at 12/13/2024 1702  Last data filed at 12/13/2024 1500  Gross per 24 hour   Intake 120 ml   Output 2050 ml   Net -1930 ml        Laboratory  Recent Labs     12/11/24  0211 12/12/24  0223   WBC 5.9 7.3   RBC 2.56* 2.82*   HEMOGLOBIN 8.3* 8.9*   HEMATOCRIT 27.3* 30.1*   .6* 106.7*   MCH 32.4 31.6   MCHC 30.4* 29.6*   RDW 77.8* 79.4*   PLATELETCT 286 332   MPV 11.1 11.4     Recent Labs     12/13/24  1559   SODIUM 155*   POTASSIUM 4.1   CHLORIDE 119*   CO2 24   GLUCOSE 214*   BUN 43*   CREATININE 1.18   CALCIUM 8.4*                     Imaging  DX-CHEST-LIMITED (1 VIEW)   Final Result      1.  Mild cardiomegaly with evidence of mild to moderate pulmonary edema with minimal worsening since previous exam.      CT-HEAD W/O   Final Result         1.  No acute intracranial abnormality is identified, there are nonspecific white matter changes, commonly associated with small vessel ischemic disease.  Associated mild cerebral atrophy is noted.   2.  Bilateral sinusitis changes   3.   Atherosclerosis.               DX-CHEST-PORTABLE (1 VIEW)   Final Result         1.  Mild pulmonary edema and/or infiltrates.      EC-ECHOCARDIOGRAM COMPLETE W/ CONT   Final Result      DX-ABDOMEN FOR TUBE PLACEMENT   Final Result      Tip of the esophagogastric tube terminates over the pylorus of the stomach.      IR-MIDLINE CATHETER INSERTION WO GUIDANCE > AGE 3   Final Result                  Ultrasound-guided midline placement performed by qualified nursing staff    as above.          IR-US GUIDED PIV   Final Result    Ultrasound-guided PERIPHERAL IV INSERTION performed by    qualified nursing staff as above.      DX-OUTSIDE IMAGES-DX CHEST   Final Result      DX-OUTSIDE IMAGES-DX ABDOMEN   Final Result      MR-OUTSIDE IMAGES-MR BRAIN   Final Result      CT-OUTSIDE IMAGES-CT HEAD   Final Result      CT-OUTSIDE IMAGES-CT ABDOMEN/PELVIS   Final Result      CT-OUTSIDE IMAGES-CT CHEST   Final Result      DX-ABDOMEN FOR TUBE PLACEMENT   Final Result      There is a new small bowel feeding tube which terminates in the small bowel of the right mid abdomen.      DX-CHEST-LIMITED (1 VIEW)   Final Result         Asymmetric pulmonary infiltrates, left worse than right.      IR-US GUIDED PIV    (Results Pending)        Assessment/Plan  * Heart failure with reduced ejection fraction (HCC)- (present on admission)  Assessment & Plan  ICM LVEF 25-30%  S/p revascularization  Soft pressures limiting titration of GDMT.  Start SGLT2i  Add in MRA, ARB, BB as pressures allow:  -starting BIsoprolol 2.5 today    Hypernatremia  Assessment & Plan  Na 155  4L fluid deficit  Increase free water flushes from 120cc q4 hours to 200cc q4 hours    Acute hypoxic respiratory failure (HCC)  Assessment & Plan  In setting of heart failure, recent cardiogenic shock  Patient demonstrates cheynne florez breathing pattern which is consistent with her suspected anoxic brain injury  Patient appears to be experiencing aspiration events causing  bradycardia  Continue supportive care  Patient is NPO    ACP (advance care planning)  Assessment & Plan  Discussed plan of care and code status with patient  Bruno ([phone]) and sister-in-law Laina ([phone]). I explained that Kiara has experienced a suspected anoxic brain injury during her current medical course that has required ECMO and impella, CVA, Vfib arrest. I shared my concern that Laina may not make a meaningful recovery from this and that her current and future quality of life appears limited. I explained that in the event of another CP arrest her quality of life would be further impacted in the event she were to survive such an episode. Laina and Bruno made the decision to change Kiara's code status to DNR, yes to intubaiton. Laina will be driving in from [place] in the coming days to have further goals of care conversations. Total of 48 minutes spent in discussion and coordination of advance care planning.    Encephalopathy  Assessment & Plan  Hospital course complicated by encephalopathy.  Likely from combination of CVA, STEMI, hyperglycemia  Removing restraints as tolerated, following  Has a nasojejunal feeding tube placed by interventional radiology from outside facility  Continue Seroquel and Depakene as recommended by neurology/psychiatry at outside facility.    Multifactorial  As per records from [facility] probably anoxic/embolic/delirium  Start to titrate off of seroquel:day time dose DC'd 12.10.    Evening dose to 75mg tonigh  Cont melatonin    CVA (cerebral vascular accident) (HCC)  Assessment & Plan  Patient had MRI on 11/17/24 at [facility] which shwed Punctate acute infarct of the right centrum semiovale. Scattered foci of enhancement in multiple vascular distributions as described may represent recent subacute infarcts. Diffuse stippled susceptibility signal throughout the supratentorial and infratentorial brain. Overall findings may represent sequelae of fat emboli or amyloid  related angiopathy.   High dose statin  Apixaban  plavix    Continues to be encephalopathic  Neuro evaluated patient at [facility].   Have been titrating off seroquel and pt's mental status has improved as her agitation has diminished  DC seroquel qhs dose  If she cont's to improve will start to taper off of valproate      Constipation  Assessment & Plan  + bm  Continue bowel regimen  following    Abdominal hematoma  Assessment & Plan  Might be related to cardiac device  Clinically stable and h/h in stable range  Patient was on lovenox due to rigth common iliac art thrombosis and then transitioned to eliquis per [facility] - see my discussion above - plan to continue for 3-4 months with repeat imaging at that time to see if it can be d/c   Following  Cbc in am     CAD (coronary artery disease)  Assessment & Plan  Patient with recent complicated STEMI with PCI to LAD and mid LAD.  Complicated by V-fib arrest and cardiogenic shock requiring VA ECMO and Impella and retroperitoneal hematomas. At [facility] underwent impella supported PCI to mid LAD just distal to prior stent but unsuccessful with PCI to 100% occluded OM1 on 11/6. Patient was decannulated and had Impella removed.    Continue Lipitor    Continue monitoring  Continue telemetry.     Discussed with cardiology and asa stopped and replaced with plavix  Following  Repeat echo shows persistently low EF, see above  Cardiology had recommended bisoprolol but due to cheyne florez critical care recommends we stop this - so will hold - resume lasix and follow  At present pressures do not allow BB, ARB    Hyperglycemia  Assessment & Plan    Sliding scale      Hypotension  Assessment & Plan  follow    Leukocytosis  Assessment & Plan  resolved  Could be related to hematoma  normal pro luisito  Cbc in am     Pneumonia  Assessment & Plan  S/p completed course of pip/tazo and vanc  Cultures were negative    Aortic mural thrombus (HCC)  Assessment & Plan  CT at outside facility showing focal  aortic mural thrombus along the lateral wall of the descending thoracic aorta and also a tiny filling defect/mural thrombus within the posterior right common iliac artery  apixaban  Monitor H&H    Continue monitoring H&H  Cbc in am         Metabolic acidemia- (present on admission)  Assessment & Plan  resolved  Bmp in am  Following      Urinary retention  Assessment & Plan  Beckwith catheter in place         VTE prophylaxis:    therapeutic anticoagulation with eliquis 5 mg BID      I have performed a physical exam and reviewed and updated ROS and Plan today (12/13/2024). In review of yesterday's note (12/12/2024), there are no changes except as documented above.      Greater than 51 minutes spent prepping to see patient (e.g. review of tests) obtaining and/or reviewing separately obtained history. Performing a medically appropriate examination and/ evaluation.  Counseling and educating the patient/family/caregiver.  Ordering medications, tests, or procedures.  Referring and communicating with other health care professionals.  Documenting clinical information in EPIC.  Independently interpreting results and communicating results to patient/family/caregiver.  Care coordination.

## 2024-12-13 NOTE — CARE PLAN
The patient is Watcher - Medium risk of patient condition declining or worsening    Shift Goals  Clinical Goals: hemodynamic stability, tube feed, wound care, pt safety  Patient Goals: JAYLIN  Family Goals: JAYLIN    Progress made toward(s) clinical / shift goals:    Problem: Safety  Goal: Will remain free from injury  Outcome: Progressing  Note: Pt continually trying to pull lines and remove O2. Telesitter and bilateral soft wrist restraints in place. Frequent rounding in place and pt room near nurses station.   Intervention: Provide assistance with mobility  Note: Pt moves all extremities and is restless.      Problem: Skin Integrity  Goal: Risk for impaired skin integrity will decrease  Outcome: Progressing  Note: Assess skin and monitor for skin breakdown. Alleviate pressure to bony prominences and provide assistance with turning, repositioning, ROM and mobility as appropriate. Use of wound vacs, barrier cream, TAPS, and turns.        Patient is not progressing towards the following goals:

## 2024-12-13 NOTE — THERAPY
"Speech Language Pathology   Daily Treatment     Patient Name: Kiara Qureshi  AGE:  59 y.o., SEX:  female  Medical Record #: [id number]  Date of Service: 12/13/2024      Precautions:  Precautions: Fall Risk, Swallow Precautions, Nasogastric Tube         Subjective  Patient asleep upon arrival, roused to verbal cues. Pt stated \"okay\" to PO; otherwise, no verbalizations. Intermittent rapid WOB appreciated prior to PO trials.       Assessment  Patient seen this date for dysphagia management. Oral care provided by SLP prior to PO trials. Diffuse dried bloody secretions noted throughout oral cavity. RN aware. Ice chips x2 presented. No apparent swallow trigger appreciated despite cues. Pt with weak, unproductive cough response following trials that persisted. Pt noted to intermittently close eyes throughout session; requiring cues. Further trials deferred d/t concern for pt safety. RN updated and SLP requested RN to change diet order to NPO.       Clinical Impressions  Patient presents with clinical indicators concerning for oropharyngeal dysphagia. Recommend pt be made NPO with NGT for main source of nutrition. Please provide frequent, diligent oral care as able. Service to follow to determine pt appropriateness for oral diet vs need for diagnostic swallow study.      Recommendations  Diet Consistency: NPO/NGT  Instrumentation: Instrumental swallow study pending clinical progress  Medication: Non Oral  Oral Care: Q4h      SLP Treatment Plan  Treatment Plan: Dysphagia Treatment  SLP Frequency: 3x Per Week  Estimated Duration: Until Therapy Goals Met      Anticipated Discharge Needs  Discharge Recommendations: Recommend post-acute placement for additional speech therapy services prior to discharge home  Therapy Recommendations Upon DC: Dysphagia Training, Patient / Family / Caregiver Education      Patient / Family Goals  Patient / Family Goal #1: \"I'll eat later when I'm more hungry\"  Goal #1 Outcome: Progressing " slower than expected  Short Term Goals  Short Term Goal # 1: Patient will consume a full liquid diet with no overt s/sx of aspiration given 1:1 feeding.  Goal Outcome # 1: Goal not met  Short Term Goal # 2: Pt will participate in prefeeding trials with SLP without overt s/sx of aspiration      Chicho Sewell, SLP

## 2024-12-14 PROBLEM — G93.40 ENCEPHALOPATHY ACUTE: Status: ACTIVE | Noted: 2024-12-14

## 2024-12-14 LAB
ALBUMIN SERPL BCP-MCNC: 2.6 G/DL (ref 3.2–4.9)
ALBUMIN/GLOB SERPL: 0.6 G/DL
ALP SERPL-CCNC: 123 U/L (ref 30–99)
ALT SERPL-CCNC: 9 U/L (ref 2–50)
ANION GAP SERPL CALC-SCNC: 11 MMOL/L (ref 7–16)
AST SERPL-CCNC: 30 U/L (ref 12–45)
BASE EXCESS BLDA CALC-SCNC: 2 MMOL/L (ref -4–3)
BASOPHILS # BLD AUTO: 1.2 % (ref 0–1.8)
BASOPHILS # BLD: 0.12 K/UL (ref 0–0.12)
BILIRUB SERPL-MCNC: 0.6 MG/DL (ref 0.1–1.5)
BODY TEMPERATURE: 36 CENTIGRADE
BUN SERPL-MCNC: 46 MG/DL (ref 8–22)
CALCIUM ALBUM COR SERPL-MCNC: 9 MG/DL (ref 8.5–10.5)
CALCIUM SERPL-MCNC: 7.9 MG/DL (ref 8.5–10.5)
CHLORIDE SERPL-SCNC: 119 MMOL/L (ref 96–112)
CO2 SERPL-SCNC: 23 MMOL/L (ref 20–33)
CREAT SERPL-MCNC: 0.91 MG/DL (ref 0.5–1.4)
EOSINOPHIL # BLD AUTO: 0.33 K/UL (ref 0–0.51)
EOSINOPHIL NFR BLD: 3.3 % (ref 0–6.9)
ERYTHROCYTE [DISTWIDTH] IN BLOOD BY AUTOMATED COUNT: 83.1 FL (ref 35.9–50)
GFR SERPLBLD CREATININE-BSD FMLA CKD-EPI: 73 ML/MIN/1.73 M 2
GLOBULIN SER CALC-MCNC: 4.3 G/DL (ref 1.9–3.5)
GLUCOSE BLD STRIP.AUTO-MCNC: 138 MG/DL (ref 65–99)
GLUCOSE BLD STRIP.AUTO-MCNC: 144 MG/DL (ref 65–99)
GLUCOSE BLD STRIP.AUTO-MCNC: 153 MG/DL (ref 65–99)
GLUCOSE BLD STRIP.AUTO-MCNC: 159 MG/DL (ref 65–99)
GLUCOSE SERPL-MCNC: 231 MG/DL (ref 65–99)
HCO3 BLDA-SCNC: 26 MMOL/L (ref 21–28)
HCT VFR BLD AUTO: 35.1 % (ref 37–47)
HGB BLD-MCNC: 10.6 G/DL (ref 12–16)
IMM GRANULOCYTES # BLD AUTO: 0.2 K/UL (ref 0–0.11)
IMM GRANULOCYTES NFR BLD AUTO: 2 % (ref 0–0.9)
INHALED O2 FLOW RATE: NORMAL L/MIN
LYMPHOCYTES # BLD AUTO: 1.48 K/UL (ref 1–4.8)
LYMPHOCYTES NFR BLD: 14.7 % (ref 22–41)
MAGNESIUM SERPL-MCNC: 2.6 MG/DL (ref 1.5–2.5)
MCH RBC QN AUTO: 33.8 PG (ref 27–33)
MCHC RBC AUTO-ENTMCNC: 30.2 G/DL (ref 32.2–35.5)
MCV RBC AUTO: 111.8 FL (ref 81.4–97.8)
MONOCYTES # BLD AUTO: 0.91 K/UL (ref 0–0.85)
MONOCYTES NFR BLD AUTO: 9 % (ref 0–13.4)
NEUTROPHILS # BLD AUTO: 7.02 K/UL (ref 1.82–7.42)
NEUTROPHILS NFR BLD: 69.8 % (ref 44–72)
NRBC # BLD AUTO: 0.02 K/UL
NRBC BLD-RTO: 0.2 /100 WBC (ref 0–0.2)
PCO2 BLDA: 40.7 MMHG (ref 32–48)
PCO2 TEMP ADJ BLDA: 39 MMHG (ref 32–48)
PH BLDA: 7.43 [PH] (ref 7.35–7.45)
PH TEMP ADJ BLDA: 7.44 [PH] (ref 7.35–7.45)
PHOSPHATE SERPL-MCNC: 2.9 MG/DL (ref 2.5–4.5)
PLATELET # BLD AUTO: 338 K/UL (ref 164–446)
PMV BLD AUTO: 11.1 FL (ref 9–12.9)
PO2 BLDA: 87.2 MMHG (ref 83–108)
PO2 TEMP ADJ BLDA: 81.8 MMHG (ref 64–87)
POTASSIUM SERPL-SCNC: 4.1 MMOL/L (ref 3.6–5.5)
PROT SERPL-MCNC: 6.9 G/DL (ref 6–8.2)
RBC # BLD AUTO: 3.14 M/UL (ref 4.2–5.4)
SAO2 % BLDA: 95.3 % (ref 93–99)
SODIUM SERPL-SCNC: 153 MMOL/L (ref 135–145)
WBC # BLD AUTO: 10.1 K/UL (ref 4.8–10.8)

## 2024-12-14 PROCEDURE — 85025 COMPLETE CBC W/AUTO DIFF WBC: CPT

## 2024-12-14 PROCEDURE — 80053 COMPREHEN METABOLIC PANEL: CPT

## 2024-12-14 PROCEDURE — 700111 HCHG RX REV CODE 636 W/ 250 OVERRIDE (IP): Performed by: INTERNAL MEDICINE

## 2024-12-14 PROCEDURE — 99233 SBSQ HOSP IP/OBS HIGH 50: CPT | Performed by: INTERNAL MEDICINE

## 2024-12-14 PROCEDURE — 82962 GLUCOSE BLOOD TEST: CPT

## 2024-12-14 PROCEDURE — 700101 HCHG RX REV CODE 250: Performed by: HOSPITALIST

## 2024-12-14 PROCEDURE — 770020 HCHG ROOM/CARE - TELE (206)

## 2024-12-14 PROCEDURE — 82803 BLOOD GASES ANY COMBINATION: CPT

## 2024-12-14 PROCEDURE — 36415 COLL VENOUS BLD VENIPUNCTURE: CPT

## 2024-12-14 PROCEDURE — 83735 ASSAY OF MAGNESIUM: CPT

## 2024-12-14 PROCEDURE — 700105 HCHG RX REV CODE 258: Performed by: INTERNAL MEDICINE

## 2024-12-14 PROCEDURE — 84100 ASSAY OF PHOSPHORUS: CPT

## 2024-12-14 RX ORDER — ENOXAPARIN SODIUM 100 MG/ML
80 INJECTION SUBCUTANEOUS EVERY 12 HOURS
Status: DISCONTINUED | OUTPATIENT
Start: 2024-12-14 | End: 2024-12-15

## 2024-12-14 RX ORDER — SODIUM CHLORIDE 450 MG/100ML
INJECTION, SOLUTION INTRAVENOUS CONTINUOUS
Status: DISCONTINUED | OUTPATIENT
Start: 2024-12-14 | End: 2024-12-15

## 2024-12-14 RX ADMIN — SODIUM CHLORIDE: 4.5 INJECTION, SOLUTION INTRAVENOUS at 21:20

## 2024-12-14 RX ADMIN — SODIUM CHLORIDE: 4.5 INJECTION, SOLUTION INTRAVENOUS at 16:00

## 2024-12-14 RX ADMIN — DAKIN'S SOLUTION 0.125% (QUARTER STRENGTH) 473 ML: 0.12 SOLUTION at 13:24

## 2024-12-14 RX ADMIN — ENOXAPARIN SODIUM 80 MG: 100 INJECTION SUBCUTANEOUS at 17:53

## 2024-12-14 ASSESSMENT — COGNITIVE AND FUNCTIONAL STATUS - GENERAL
DRESSING REGULAR UPPER BODY CLOTHING: A LOT
STANDING UP FROM CHAIR USING ARMS: TOTAL
DAILY ACTIVITIY SCORE: 7
SUGGESTED CMS G CODE MODIFIER DAILY ACTIVITY: CM
MOVING TO AND FROM BED TO CHAIR: TOTAL
CLIMB 3 TO 5 STEPS WITH RAILING: TOTAL
SUGGESTED CMS G CODE MODIFIER MOBILITY: CM
PERSONAL GROOMING: TOTAL
EATING MEALS: TOTAL
WALKING IN HOSPITAL ROOM: TOTAL
MOVING FROM LYING ON BACK TO SITTING ON SIDE OF FLAT BED: A LOT
TOILETING: TOTAL
TURNING FROM BACK TO SIDE WHILE IN FLAT BAD: A LOT
DRESSING REGULAR LOWER BODY CLOTHING: TOTAL
MOBILITY SCORE: 8
HELP NEEDED FOR BATHING: TOTAL

## 2024-12-14 ASSESSMENT — PAIN DESCRIPTION - PAIN TYPE: TYPE: ACUTE PAIN

## 2024-12-14 ASSESSMENT — FIBROSIS 4 INDEX: FIB4 SCORE: 2.08

## 2024-12-14 NOTE — CARE PLAN
The patient is Unstable - High likelihood or risk of patient condition declining or worsening    Shift Goals  Clinical Goals: Safety  Patient Goals: JAYLIN  Family Goals: JAYLIN    Progress made toward(s) clinical / shift goals:    Problem: Safety - Medical Restraint  Goal: Remains free of injury from restraints (Restraint for Interference with Medical Device)  Outcome: Progressing     Problem: Skin Integrity  Goal: Risk for impaired skin integrity will decrease  Outcome: Progressing       Patient is not progressing towards the following goals:      Problem: Respiratory  Goal: Patient will achieve/maintain optimum respiratory ventilation and gas exchange  Outcome: Not Met  Periodic apnea and bradycardia

## 2024-12-14 NOTE — PROGRESS NOTES
0122-  Notified by monitor tech of 4.2 second sinus arrest.  This coincided with with periodic apnea which the patient has been experiencing per dayshift report and charting.  Upon entering the room patient was awake and breathing, returned to normal sinus rhythm.  SPO2 in high 80's BP in low 100's.  APRN notified, will continue to monitor.    0400- Patient removed IRIS gastric tube despite bilateral wrist restraints, telemonitor and bridle device.  Charge RN aware, attempting to find IRIS and Bridle certified RN for replacement.

## 2024-12-14 NOTE — WOUND TEAM
[facility]  Inpatient Services  Wound and Skin Care Follow-up    Admission Date: 11/27/2024     Last order of IP CONSULT TO WOUND CARE was found on 12/6/2024 from Hospital Encounter on 11/27/2024     HPI, PMH, SH: Reviewed    Past Surgical History:   Procedure Laterality Date    INSERTION,CANNULA FOR ECMO,ADULT Left 10/30/2024    Procedure: INSERTION, CANNULA, FOR ECMO, ADULT;  Surgeon: Aime Elias D.O.;  Location: SURGERY [facility];  Service: Cardiothoracic     Social History     Tobacco Use    Smoking status: Never    Smokeless tobacco: Never   Substance Use Topics    Alcohol use: No     No chief complaint on file.    Diagnosis: HFrEF (heart failure with reduced ejection fraction) ([facility]) [I50.20]    Unit where seen by Wound Team: T819/00     WOUND FOLLOW UP RELATED TO:  BL Groin NPWT change       WOUND TEAM PLAN OF CARE - Frequency of Follow-up:   Nursing to follow dressing orders written for wound care. Contact wound team if area fails to progress, deteriorates or with any questions/concerns if something comes up before next scheduled follow up (See below as to whether wound is following and frequency of wound follow up)  Dressing changes by wound team:                   NPWT change 3 times weekly - BL Groin    WOUND HISTORY:       Pt is a 59yr old female with history of Obesity, and HTN. Pt was initially seen at [facility] for anterior STEMI. Pt underwent LAD PCI but hospital course was complicated by retroperitoneal bleed, V Fib and PEA arrest ultimately requiring ECMO and Impella. Pt was decannulated from ECMO on 11/4 and had left femoral embolectomy. Impella was then removed on 11/8. Pts ECHO on 11/24 revealed EF of 25-30%. Pt has bilateral groin wounds from ECMO and Impella. Wound team was consulted to evaluate.        WOUND ASSESSMENT/LDA  Wound 12/07/24 Full Thickness Wound Groin Left (Active)   Date First Assessed/Time First Assessed: 12/07/24 1721   Present on Original Admission: No   Hand Hygiene Completed: Yes  Primary Wound Type: Full Thickness Wound  Location: Groin  Laterality: Left      Assessments 12/13/2024  4:00 PM   Site Assessment Slough;Red   Periwound Assessment Clean;Dry;Intact   Margins Defined edges;Unattached edges   Closure Secondary intention   Drainage Amount Scant   Drainage Description Serosanguineous   Treatments Cleansed   Wound Cleansing Approved Wound Cleanser   Periwound Protectant Skin Protectant Wipes to Periwound;Paste Ring;Drape   Dressing Status Clean;Dry;Intact   Dressing Changed Changed   Dressing Cleansing/Solutions 1/4 Strength Dakin's Solution   Dressing Options Wound Vac   Dressing Change/Treatment Frequency Monday, Wednesday, Friday, and As Needed   NEXT Dressing Change/Treatment Date 12/16/24   NEXT Weekly Photo (Inpatient Only) 12/16/24   Wound Team Following 3x Weekly   Non-staged Wound Description Full thickness   Shape Oval   Wound Odor Mild   WOUND NURSE ONLY - Time Spent with Patient (mins) 45       Wound 12/07/24 Full Thickness Wound Groin Right (Active)   Date First Assessed/Time First Assessed: 12/07/24 1721   Present on Original Admission: No  Hand Hygiene Completed: Yes  Primary Wound Type: Full Thickness Wound  Location: Groin  Laterality: Right      Assessments 12/13/2024  4:00 PM   Site Assessment Slough;Red   Periwound Assessment Induration   Margins Defined edges;Unattached edges   Closure Secondary intention   Drainage Amount Scant   Drainage Description Serosanguineous   Treatments Cleansed;Site care   Wound Cleansing Approved Wound Cleanser   Periwound Protectant Skin Protectant Wipes to Periwound;Drape;Paste Ring   Dressing Status Clean;Dry;Intact   Dressing Changed Changed   Dressing Cleansing/Solutions 1/4 Strength Dakin's Solution   Dressing Options Wound Vac   Dressing Change/Treatment Frequency Monday, Wednesday, Friday, and As Needed   NEXT Dressing Change/Treatment Date 12/16/24   NEXT Weekly Photo (Inpatient Only) 12/16/24    Wound Team Following 3x Weekly   Non-staged Wound Description Full thickness   Shape Round   Wound Odor None       Negative Pressure Wound Therapy 12/07/24 Groin Left (Active)   Placement Date/Time: 12/07/24 1722   Location: Groin  Laterality: Left      Assessments 12/13/2024  4:00 PM   Vacuum Serial Number [id number]   NPWT Pump Mode / Pressure Setting Ulta;Continuous;125 mmHg   Dressing Type Small;Black Foam (Veraflo)   Number of Foam Pieces Used 1   Canister Changed No   NEXT Dressing Change/Treatment Date 12/16/24   VAC VeraFlo Irrigant 1/4 Strength Dakins   VAC VeraFlo Soak Time (mins) 6   VAC VeraFlo Instill Volume (ml) 12   VAC VeraFlo - Therapy Time (hrs) 2   VAC VeraFlo Pressure (mm/Hg) 125 mmHg       Negative Pressure Wound Therapy 12/07/24 Groin Right (Active)   Placement Date/Time: 12/07/24 1722   Present on Original Admission: No  Location: Groin  Laterality: Right      Assessments 12/13/2024  4:00 PM   NPWT Pump Mode / Pressure Setting Ulta;Continuous;125 mmHg   Dressing Type Small;Black Foam (Veraflo)   Number of Foam Pieces Used 2   Canister Changed No   NEXT Dressing Change/Treatment Date 12/16/24   VAC VeraFlo Irrigant Normal Saline   VAC VeraFlo Soak Time (mins) 6   VAC VeraFlo Instill Volume (ml) 18   VAC VeraFlo - Therapy Time (hrs) 2   VAC VeraFlo Pressure (mm/Hg) 125 mmHg        Vascular:    JAGJIT:   No results found.    Lab Values:    Lab Results   Component Value Date/Time    WBC 7.3 12/12/2024 02:23 AM    RBC 2.82 (L) 12/12/2024 02:23 AM    HEMOGLOBIN 8.9 (L) 12/12/2024 02:23 AM    HEMATOCRIT 30.1 (L) 12/12/2024 02:23 AM    CREACTPROT 10.71 (H) 11/29/2024 03:24 AM    SEDRATEWES 140 (H) 11/28/2024 01:35 AM    HBA1C 6.2 (H) 11/01/2024 06:46 PM    HBA1C 6.4 (H) 10/30/2024 03:18 AM         Culture Results show:  No results found for this or any previous visit (from the past 720 hours).    Pain Level/Medicated:  None, Tolerated without pain medication       INTERVENTIONS BY WOUND TEAM:  Chart and  images reviewed. Discussed with bedside RN. All areas of concern (based on picture review, LDA review and discussion with bedside RN) have been thoroughly assessed. Documentation of areas based on significant findings. This RN in to assess patient. Performed standard wound care which includes appropriate positioning, dressing removal and non-selective debridement. Pictures and measurements obtained weekly if/when required.    Wound:  BL Groin  Preparation for Dressing removal: Removed without difficulty  Cleansed/Non-selectively Debrided with:  Wound cleanser and Gauze  Brianna wound: Cleansed with Wound cleanser and Gauze, Prepped with No Sting, Paste Rings, Drape, and Hydrocolloid  Primary Dressing:  Black foam used to fill wound bed and bridged superiorly over abdomen. Secured with drape and trac pad.  Secondary (Outer) Dressing: Each respective machine's suction restarted at 125mmHg, no leaks detected. Silicone adhesive foam applied under each VAC tubing to offload it.    Advanced Wound Care Discharge Planning  Number of Clinicians necessary to complete wound care: 2  Is patient requiring IV pain medications for dressing changes:  No   Length of time for dressing change 45 min. (This does not include chart review, pre-medication time, set up, clean up or time spent charting.)    Interdisciplinary consultation: Patient, Bedside RNHelen (Wound Tech)    EVALUATION / RATIONALE FOR TREATMENT:     Date:  12/13/24  Wound Status:  Wound deteriorating    BL Groin wound beds with increased amounts of slough and malodor.  Patient is already on Dakins VF, will continue with this to topically clean wound beds.    Date:  12/10/24  Wound Status:  Wound progressing as expected    Left groin: Groin noted with layer of slough. Wound has small area of tunneling at 6o clock, a small piece of black foam was tucked into this tunnel to assist with closure.   Right Groin: Noted with a layer of slough, VF NPWT was changed from NS to  "Dakins to assist with chemical debridement.   Prior to vac removal, skin around both groin sites appeared to be moist, and VF instillation amount was reduced.     Date:  12/07/24  Wound Status:  Initial evaluation    Bilateral wound beds with slough present.  VF NPWT applied to assist with mechanical debridement, cleansing the wound bed, increase oxygenation and granulation to the area and healing the wound by secondary intention.        Date:  12/04/24  Wound Status:  Initial evaluation    Bilateral groin sites with necrotic tissue, adipose and tan purulent drainage. Discussed with bedside RN who will speak with MD regarding surgical consult. Pt would benefit from NPWT application but would like surgery to clear pt from any exposed structures prior to vac application. Dakins ordered to chemically debride.          Goals: Steady decrease in wound area and depth weekly.    NURSING PLAN OF CARE ORDERS:  No new orders this visit    NUTRITION RECOMMENDATIONS   Wound Team Recommendations:  N/A     DIET ORDERS (From admission to next 24h)       Start     Ordered    12/13/24 1414  Diet NPO Restrict to: With Tube Feed  ALL MEALS        Question:  Diet NPO Restrict to:  Answer:  With Tube Feed    12/13/24 1413    12/10/24 1651  Diet: Diet Tube Feed; Formula: Pivot; Pivot: Pivot 1.5 RTH; Goal Rate (mL/Hour): 40; Duration: 24 HR  ALL MEALS        Question Answer Comment   Diet Diet Tube Feed    Formula: Pivot    Pivot: Pivot 1.5 RTH    Goal Rate (mL/Hour) 40    Route NG    Duration 24 HR       Placed in \"And\" Linked Group    12/10/24 1653    12/02/24 1232  Supplements  ALL MEALS        Question Answer Comment   Which Supplement Glucerna    Glucerna: Glucerna Shake Carton        12/02/24 1231                    PREVENTATIVE INTERVENTIONS:   Q shift Ray - performed per nursing policy  Q shift pressure point assessments - performed per nursing policy    Surface/Positioning  Low Airloss - Currently in Place  Reposition q 2 " hours - Currently in Place  TAPs Turning system - Currently in Place    Respiratory  Silicone O2 tubing - Currently in Place  Gray Foam Ear protectors - Currently in Place    Anticipated discharge plans:  TBD        Vac Discharge Needs:  Vac Discharge plan is purely a recommendation from wound team and not a requirement for discharge unless otherwise stated by physician.  Veraflo Vac while inpatient, ok to transition to Regular Vac on discharge

## 2024-12-14 NOTE — CARE PLAN
The patient is Unstable - High likelihood or risk of patient condition declining or worsening    Shift Goals  Clinical Goals: safety,  Patient Goals: JAYLIN  Family Goals: JAYLIN    Progress made toward(s) clinical / shift goals:      Problem: Safety - Medical Restraint  Goal: Remains free of injury from restraints (Restraint for Interference with Medical Device)  Outcome: Progressing  Goal: Free from restraint(s) (Restraint for Interference with Medical Device)  Outcome: Progressing     Problem: Skin Integrity  Goal: Risk for impaired skin integrity will decrease  Outcome: Progressing     Problem: Hemodynamics  Goal: Patient's hemodynamics, fluid balance and neurologic status will be stable or improve  Outcome: Progressing     Patient is not progressing towards the following goals:      Problem: Respiratory  Goal: Patient will achieve/maintain optimum respiratory ventilation and gas exchange  Outcome: Not Progressing    Pt having frequent episodes of apneic breathing.

## 2024-12-14 NOTE — PROGRESS NOTES
Bedside report received from off going RN/tech: Noah assumed care of patient.     Fall Risk Score: HIGH RISK  Fall risk interventions in place: Place yellow fall risk ID band on patient, Provide patient/family education based on risk assessment, Educate patient/family to call staff for assistance when getting out of bed, Place fall precaution signage outside patient door, Place patient in room close to nursing station, and Utilize bed/chair fall alarm  Bed type: Low air loss (Ray Score less than 17 interventions in place)  Patient on cardiac monitor: Yes  IVF/IV medications: Not Applicable   Oxygen: How many liters 2L  Bedside sitter: Not Applicable   Isolation: Not applicable

## 2024-12-14 NOTE — PROGRESS NOTES
Hospital Medicine Daily Progress Note    Date of Service  12/14/2024    Chief Complaint  Kiara Qureshi is a 59 y.o. female admitted 11/27/2024 with encephalopathy    Hospital Course  58 yo woman HTN, HLD who was at [facility] for anterior STEMI s/p lytics and had LAD PCI and unsuccessful PCI for OM1 complicated by RP bleed, vfib and PEA arrest with cardiogenic shock needing ECMO and imeplla.  ECMO was decannulated 11/4, Impella removed 11/8.  She also had encephalopathy and delirium on valproic acid and Seroquel.  TTE showed EF 25-30%, she did not tolerate GDMT due to hypotension.  She had a CT chest on 11/25 that showed focal aortic mural thrombus versus ulcerated plaque to descending thoracic aorta and CTA AP with rim-enhancing RP hematoma and small thrombus in right common iliac artery, multiple nonenhancing fluid collections and subcu tissue in right lower and right inguinal regions, possible evolving hematomas and left femoral vein.  She was started on Vanco and Zosyn and transferred back to [facility].  She completed linezolid and Zosyn around 12/1.  Cardiology was consulted for medical management of her heart failure. Dr. Conley with vascular surgery was consulted, recommended wound vacs to groin wounds.  On 12/9 she became more altered with respiratory distress and transferred to [facility].  CT head was negative for acute changes.  EEG was negative for seizure activity.  She was high on high dose of Seroquel that was tapered off.  She remains on NG tube for tube feeds.    Interval Problem Update  NSVT on tele  Having ongoing pauses in breathing and HR in 30s during those times.  Patient pulled out her NG tube overnight, working on having it replaced with IRIS  She has dark material orally that is actually improved per nursing. No signs of bleeding  She's been impulsive, remains in restraints  Hypernatremia decreased to 153  Per chart review at [facility], she had an MRI 11/17/2024 that showed acute infarcts of right  centrum seminal ovale, scattered subacute infarcts and diffuse stippled susceptibility signal throughout supratentorial and infratentorial brain.  I will repeat MRI  I updated her , he plans to visit tomorrow, possibly with the patient's sister    I have discussed this patient's plan of care and discharge plan at IDT rounds today with Case Management, Nursing, Nursing leadership, and other members of the IDT team.    Consultants/Specialty  cardiology and vascular surgery    Code Status  DNAR, I OK    Disposition  The patient is not medically cleared for discharge to home or a post-acute facility.  Anticipate discharge to: a long-term acute care hospital    I have placed the appropriate orders for post-discharge needs.    Review of Systems  Review of Systems   Unable to perform ROS: Medical condition        Physical Exam  Temp:  [36.6 °C (97.9 °F)-36.8 °C (98.2 °F)] 36.8 °C (98.2 °F)  Pulse:  [62-88] 80  Resp:  [18] 18  BP: (109-114)/(67-82) 113/79  SpO2:  [90 %-99 %] 90 %    Physical Exam  Constitutional:       Appearance: She is ill-appearing.      Comments: Patient's eyes are open, she does not track me.  The nurse says she occasionally tracks   HENT:      Head: Normocephalic.      Mouth/Throat:      Mouth: Mucous membranes are dry.      Comments: Oropharynx and tongue coating in thick sticky black residue, no bright red blood seen  Eyes:      Comments: Pupils are dilated, equal and reactive to light   Cardiovascular:      Rate and Rhythm: Normal rate and regular rhythm.   Pulmonary:      Breath sounds: Rhonchi present.      Comments: Periodic pauses in breathing, irregular breathing  Abdominal:      General: There is no distension.      Palpations: Abdomen is soft.      Tenderness: There is no abdominal tenderness.   Musculoskeletal:      Right lower leg: No edema.      Left lower leg: No edema.      Comments: Wound vacs bilaterally to groin   Neurological:      Comments: Does not mouth any words or  groans/moans to exam, does not follow any simple commands, spontaneously pulls at both wrist restraints, withdraws both legs to pain stimuli         Fluids    Intake/Output Summary (Last 24 hours) at 12/14/2024 1717  Last data filed at 12/14/2024 1600  Gross per 24 hour   Intake 1200 ml   Output 2000 ml   Net -800 ml        Laboratory  Recent Labs     12/12/24  0223 12/14/24  0309   WBC 7.3 10.1   RBC 2.82* 3.14*   HEMOGLOBIN 8.9* 10.6*   HEMATOCRIT 30.1* 35.1*   .7* 111.8*   MCH 31.6 33.8*   MCHC 29.6* 30.2*   RDW 79.4* 83.1*   PLATELETCT 332 338   MPV 11.4 11.1     Recent Labs     12/13/24  1559 12/14/24  0309   SODIUM 155* 153*   POTASSIUM 4.1 4.1   CHLORIDE 119* 119*   CO2 24 23   GLUCOSE 214* 231*   BUN 43* 46*   CREATININE 1.18 0.91   CALCIUM 8.4* 7.9*                   Imaging  DX-CHEST-LIMITED (1 VIEW)   Final Result      1.  Mild cardiomegaly with evidence of mild to moderate pulmonary edema with minimal worsening since previous exam.      CT-HEAD W/O   Final Result         1.  No acute intracranial abnormality is identified, there are nonspecific white matter changes, commonly associated with small vessel ischemic disease.  Associated mild cerebral atrophy is noted.   2.  Bilateral sinusitis changes   3.  Atherosclerosis.               DX-CHEST-PORTABLE (1 VIEW)   Final Result         1.  Mild pulmonary edema and/or infiltrates.      EC-ECHOCARDIOGRAM COMPLETE W/ CONT   Final Result      DX-ABDOMEN FOR TUBE PLACEMENT   Final Result      Tip of the esophagogastric tube terminates over the pylorus of the stomach.      IR-MIDLINE CATHETER INSERTION WO GUIDANCE > AGE 3   Final Result                  Ultrasound-guided midline placement performed by qualified nursing staff    as above.          IR-US GUIDED PIV   Final Result    Ultrasound-guided PERIPHERAL IV INSERTION performed by    qualified nursing staff as above.      DX-OUTSIDE IMAGES-DX CHEST   Final Result      DX-OUTSIDE IMAGES-DX ABDOMEN    Final Result      MR-OUTSIDE IMAGES-MR BRAIN   Final Result      CT-OUTSIDE IMAGES-CT HEAD   Final Result      CT-OUTSIDE IMAGES-CT ABDOMEN/PELVIS   Final Result      CT-OUTSIDE IMAGES-CT CHEST   Final Result      DX-ABDOMEN FOR TUBE PLACEMENT   Final Result      There is a new small bowel feeding tube which terminates in the small bowel of the right mid abdomen.      DX-CHEST-LIMITED (1 VIEW)   Final Result         Asymmetric pulmonary infiltrates, left worse than right.      IR-US GUIDED PIV    (Results Pending)   MR-BRAIN-W/O    (Results Pending)        Assessment/Plan  * Heart failure with reduced ejection fraction (HCC)- (present on admission)  Assessment & Plan  ICM LVEF 25-30%  S/p revascularization  Soft pressures limiting titration of GDMT.  Farxiga  Resuming losartan  Critical care had recommended holding bisoprolol because of Cheyne-Lo breathing  Dehydrated, holding Lasix    Hypernatremia  Assessment & Plan  Persistent hyponatremia.  Last NG tube  Started on half NS infusion  Trend level    Acute hypoxic respiratory failure (HCC)  Assessment & Plan  In setting of heart failure, recent cardiogenic shock  Patient demonstrates cheynne lo breathing pattern which is consistent with her suspected anoxic brain injury  Patient appears to be experiencing aspiration events causing bradycardia  Continue supportive care  Patient is NPO    Constipation  Assessment & Plan  + bm  Continue bowel regimen  following    Hypotension  Assessment & Plan  Improving, resuming losartan    Leukocytosis  Assessment & Plan  resolved  Could be related to hematoma  normal pro luisito  Trend CBC    Abdominal hematoma  Assessment & Plan  Might be related to cardiac device  Clinically stable and h/h in stable range  Patient was on lovenox due to rigth common iliac art thrombosis and then transitioned to eliquis per [facility]  - plan to continue for 3-4 months with repeat imaging at that time to see if it can be d/c   Following  Cbc in  am     ACP (advance care planning)  Assessment & Plan  Dr. Mobley: Discussed plan of care and code status with patient  Bruno ([phone]) and sister-in-law Laina ([phone]). I explained that Kiara has experienced a suspected anoxic brain injury during her current medical course that has required ECMO and impella, CVA, Vfib arrest. I shared my concern that Laina may not make a meaningful recovery from this and that her current and future quality of life appears limited. I explained that in the event of another CP arrest her quality of life would be further impacted in the event she were to survive such an episode. Laina and Bruno made the decision to change Kiara's code status to DNR, yes to intubaiton. Laina will be driving in from [place] in the coming days to have further goals of care conversations. Total of 48 minutes spent in discussion and coordination of advance care planning.    Pneumonia  Assessment & Plan  S/p completed course of pip/tazo and vanc  Cultures were negative    Aortic mural thrombus (HCC)  Assessment & Plan  CT at outside facility showing focal aortic mural thrombus along the lateral wall of the descending thoracic aorta and also a tiny filling defect/mural thrombus within the posterior right common iliac artery  apixaban  Monitor H&H    Continue monitoring H&H        Encephalopathy  Assessment & Plan  Hospital course complicated by encephalopathy.  Likely from combination of CVA, STEMI, hyperglycemia  Removing restraints as tolerated, following  Has a nasojejunal feeding tube placed by interventional radiology from outside facility  Was on Seroquel and Depakene as recommended by neurology/psychiatry at outside facility.    Multifactorial  Continues to be encephalopathic. CTH no acute findings. EEG neg for seizure activity  Neuro evaluated patient at [facility].   Have been titrating off seroquel and pt's mental status has improved as her agitation has diminished  DC seroquel qhs  dose  If she cont's to improve will start to taper off of valproate  Repeat MRI brain    CVA (cerebral vascular accident) (HCC)  Assessment & Plan  Patient had MRI on 11/17/24 at [facility] which shwed Punctate acute infarct of the right centrum semiovale. Scattered foci of enhancement in multiple vascular distributions as described may represent recent subacute infarcts. Diffuse stippled susceptibility signal throughout the supratentorial and infratentorial brain. Overall findings may represent sequelae of fat emboli or amyloid related angiopathy.   High dose statin  Apixaban  plavix    Continues to be encephalopathic. CTH no acute findings. EEG neg for seizure activity  Neuro evaluated patient at [facility].   Have been titrating off seroquel and pt's mental status has improved as her agitation has diminished  DC seroquel qhs dose  If she cont's to improve will start to taper off of valproate  Repeat MRI brain      CAD (coronary artery disease)  Assessment & Plan  Patient with recent complicated STEMI with PCI to LAD and mid LAD.  Complicated by V-fib arrest and cardiogenic shock requiring VA ECMO and Impella and retroperitoneal hematomas. At [facility] underwent impella supported PCI to mid LAD just distal to prior stent but unsuccessful with PCI to 100% occluded OM1 on 11/6. Patient was decannulated and had Impella removed.    Discussed with cardiology and asa stopped and replaced with plavix. Cont Eliquis, statin  Repeat echo shows persistently low EF, 25-30%  Cardiology had recommended bisoprolol but due to cheyne florez critical care recommends we stop this - so will hold   Resuming losartan and monitor BP  Lost her NGT and hypernatremic, will hold lasix      Urinary retention  Assessment & Plan  Beckwith catheter in place    Hyperglycemia  Assessment & Plan  Holding long-acting insulin, lost her NG tube  ISS      Metabolic acidemia- (present on admission)  Assessment & Plan  resolved  Bmp in am  Following           VTE  prophylaxis:    therapeutic anticoagulation with eliquis 5 mg BID      I have performed a physical exam and reviewed and updated ROS and Plan today (12/14/2024). In review of yesterday's note (12/13/2024), there are no changes except as documented above.

## 2024-12-14 NOTE — PROGRESS NOTES
Monitor Summary  Rhythm: SB  Rate: 50-56  Ectopy: R PVC, 26 beats of Vtach  Measurement: .10/.09/.37

## 2024-12-15 ENCOUNTER — APPOINTMENT (OUTPATIENT)
Dept: RADIOLOGY | Facility: MEDICAL CENTER | Age: 59
End: 2024-12-15
Attending: INTERNAL MEDICINE
Payer: COMMERCIAL

## 2024-12-15 LAB
ALBUMIN SERPL BCP-MCNC: 2.4 G/DL (ref 3.2–4.9)
BASOPHILS # BLD AUTO: 1.2 % (ref 0–1.8)
BASOPHILS # BLD: 0.1 K/UL (ref 0–0.12)
BUN SERPL-MCNC: 44 MG/DL (ref 8–22)
CALCIUM ALBUM COR SERPL-MCNC: 9 MG/DL (ref 8.5–10.5)
CALCIUM SERPL-MCNC: 7.7 MG/DL (ref 8.5–10.5)
CHLORIDE SERPL-SCNC: 123 MMOL/L (ref 96–112)
CO2 SERPL-SCNC: 24 MMOL/L (ref 20–33)
CREAT SERPL-MCNC: 0.96 MG/DL (ref 0.5–1.4)
EOSINOPHIL # BLD AUTO: 0.12 K/UL (ref 0–0.51)
EOSINOPHIL NFR BLD: 1.5 % (ref 0–6.9)
ERYTHROCYTE [DISTWIDTH] IN BLOOD BY AUTOMATED COUNT: 84 FL (ref 35.9–50)
GFR SERPLBLD CREATININE-BSD FMLA CKD-EPI: 68 ML/MIN/1.73 M 2
GLUCOSE BLD STRIP.AUTO-MCNC: 113 MG/DL (ref 65–99)
GLUCOSE BLD STRIP.AUTO-MCNC: 139 MG/DL (ref 65–99)
GLUCOSE BLD STRIP.AUTO-MCNC: 154 MG/DL (ref 65–99)
GLUCOSE BLD STRIP.AUTO-MCNC: 185 MG/DL (ref 65–99)
GLUCOSE SERPL-MCNC: 144 MG/DL (ref 65–99)
HCT VFR BLD AUTO: 35.1 % (ref 37–47)
HGB BLD-MCNC: 10.3 G/DL (ref 12–16)
IMM GRANULOCYTES # BLD AUTO: 0.18 K/UL (ref 0–0.11)
IMM GRANULOCYTES NFR BLD AUTO: 2.2 % (ref 0–0.9)
LYMPHOCYTES # BLD AUTO: 1.72 K/UL (ref 1–4.8)
LYMPHOCYTES NFR BLD: 21.1 % (ref 22–41)
MAGNESIUM SERPL-MCNC: 2.5 MG/DL (ref 1.5–2.5)
MCH RBC QN AUTO: 32.5 PG (ref 27–33)
MCHC RBC AUTO-ENTMCNC: 29.3 G/DL (ref 32.2–35.5)
MCV RBC AUTO: 110.7 FL (ref 81.4–97.8)
MONOCYTES # BLD AUTO: 0.73 K/UL (ref 0–0.85)
MONOCYTES NFR BLD AUTO: 8.9 % (ref 0–13.4)
NEUTROPHILS # BLD AUTO: 5.31 K/UL (ref 1.82–7.42)
NEUTROPHILS NFR BLD: 65.1 % (ref 44–72)
NRBC # BLD AUTO: 0.02 K/UL
NRBC BLD-RTO: 0.2 /100 WBC (ref 0–0.2)
PHOSPHATE SERPL-MCNC: 4 MG/DL (ref 2.5–4.5)
PLATELET # BLD AUTO: 308 K/UL (ref 164–446)
PMV BLD AUTO: 11.1 FL (ref 9–12.9)
POTASSIUM SERPL-SCNC: 4.5 MMOL/L (ref 3.6–5.5)
RBC # BLD AUTO: 3.17 M/UL (ref 4.2–5.4)
SODIUM SERPL-SCNC: 151 MMOL/L (ref 135–145)
SODIUM SERPL-SCNC: 156 MMOL/L (ref 135–145)
SODIUM SERPL-SCNC: 157 MMOL/L (ref 135–145)
WBC # BLD AUTO: 8.2 K/UL (ref 4.8–10.8)

## 2024-12-15 PROCEDURE — 700105 HCHG RX REV CODE 258: Performed by: INTERNAL MEDICINE

## 2024-12-15 PROCEDURE — 80069 RENAL FUNCTION PANEL: CPT

## 2024-12-15 PROCEDURE — A9270 NON-COVERED ITEM OR SERVICE: HCPCS | Performed by: INTERNAL MEDICINE

## 2024-12-15 PROCEDURE — 700102 HCHG RX REV CODE 250 W/ 637 OVERRIDE(OP): Performed by: STUDENT IN AN ORGANIZED HEALTH CARE EDUCATION/TRAINING PROGRAM

## 2024-12-15 PROCEDURE — 700102 HCHG RX REV CODE 250 W/ 637 OVERRIDE(OP): Performed by: HOSPITALIST

## 2024-12-15 PROCEDURE — 83735 ASSAY OF MAGNESIUM: CPT

## 2024-12-15 PROCEDURE — 306565 RIGID MIT RESTRAINT(PAIR): Performed by: INTERNAL MEDICINE

## 2024-12-15 PROCEDURE — 99233 SBSQ HOSP IP/OBS HIGH 50: CPT | Performed by: INTERNAL MEDICINE

## 2024-12-15 PROCEDURE — 84295 ASSAY OF SERUM SODIUM: CPT

## 2024-12-15 PROCEDURE — 85025 COMPLETE CBC W/AUTO DIFF WBC: CPT

## 2024-12-15 PROCEDURE — A9270 NON-COVERED ITEM OR SERVICE: HCPCS | Performed by: STUDENT IN AN ORGANIZED HEALTH CARE EDUCATION/TRAINING PROGRAM

## 2024-12-15 PROCEDURE — 82962 GLUCOSE BLOOD TEST: CPT | Mod: 91

## 2024-12-15 PROCEDURE — 770020 HCHG ROOM/CARE - TELE (206)

## 2024-12-15 PROCEDURE — 700111 HCHG RX REV CODE 636 W/ 250 OVERRIDE (IP): Performed by: INTERNAL MEDICINE

## 2024-12-15 PROCEDURE — A9270 NON-COVERED ITEM OR SERVICE: HCPCS | Performed by: HOSPITALIST

## 2024-12-15 PROCEDURE — 700102 HCHG RX REV CODE 250 W/ 637 OVERRIDE(OP): Performed by: INTERNAL MEDICINE

## 2024-12-15 PROCEDURE — 36415 COLL VENOUS BLD VENIPUNCTURE: CPT

## 2024-12-15 RX ORDER — VALPROIC ACID 250 MG/5ML
250 SOLUTION ORAL 2 TIMES DAILY
Status: DISCONTINUED | OUTPATIENT
Start: 2024-12-16 | End: 2024-12-18

## 2024-12-15 RX ORDER — DEXTROSE MONOHYDRATE 50 MG/ML
INJECTION, SOLUTION INTRAVENOUS CONTINUOUS
Status: DISCONTINUED | OUTPATIENT
Start: 2024-12-15 | End: 2024-12-16

## 2024-12-15 RX ORDER — CLOPIDOGREL BISULFATE 75 MG/1
75 TABLET ORAL DAILY
Status: DISCONTINUED | OUTPATIENT
Start: 2024-12-15 | End: 2024-12-29

## 2024-12-15 RX ADMIN — ATORVASTATIN CALCIUM 80 MG: 80 TABLET, FILM COATED ORAL at 18:00

## 2024-12-15 RX ADMIN — DEXTROSE MONOHYDRATE: 50 INJECTION, SOLUTION INTRAVENOUS at 09:14

## 2024-12-15 RX ADMIN — Medication 5 MG: at 21:12

## 2024-12-15 RX ADMIN — INSULIN LISPRO 1 UNITS: 100 INJECTION, SOLUTION INTRAVENOUS; SUBCUTANEOUS at 21:17

## 2024-12-15 RX ADMIN — LOSARTAN POTASSIUM 12.5 MG: 25 TABLET, FILM COATED ORAL at 17:59

## 2024-12-15 RX ADMIN — BISACODYL 10 MG: 10 SUPPOSITORY RECTAL at 05:09

## 2024-12-15 RX ADMIN — ENOXAPARIN SODIUM 80 MG: 100 INJECTION SUBCUTANEOUS at 05:09

## 2024-12-15 RX ADMIN — CLOPIDOGREL BISULFATE 75 MG: 75 TABLET ORAL at 18:00

## 2024-12-15 RX ADMIN — APIXABAN 5 MG: 5 TABLET, FILM COATED ORAL at 18:00

## 2024-12-15 ASSESSMENT — FIBROSIS 4 INDEX: FIB4 SCORE: 1.92

## 2024-12-15 NOTE — PROGRESS NOTES
Bedside report received from off going RN/tech: Sharlene assumed care of patient.     Fall Risk Score: HIGH RISK  Fall risk interventions in place: Place yellow fall risk ID band on patient, Provide patient/family education based on risk assessment, Educate patient/family to call staff for assistance when getting out of bed, Place fall precaution signage outside patient door, Place patient in room close to nursing station, Utilize bed/chair fall alarm, Tele-sitter, and Bed alarm connected correctly  Bed type: Regular (Ray Score less than 17 interventions in place)  Patient on cardiac monitor: Yes  IVF/IV medications: Infusion per MAR (List Med(s)) 0.45 NS.  Oxygen: How many liters 2L  Bedside sitter: Not Applicable   Isolation: Not applicable      Renewed restraint orders. Pt removed her right groin wound vac by pulling with her feet.

## 2024-12-15 NOTE — CARE PLAN
The patient is Watcher - Medium risk of patient condition declining or worsening    Shift Goals  Clinical Goals: safety, comfort  Patient Goals: morenita  Family Goals: morenita    Progress made toward(s) clinical / shift goals:    Problem: Safety - Medical Restraint  Goal: Remains free of injury from restraints (Restraint for Interference with Medical Device)  Outcome: Progressing  Flowsheets (Taken 12/3/2024 0633 by Geronimo Townsend V [email or web address])  Addressed this shift: Remains free of injury from restraints (restraint for interference with medical device):   Every 2 hours: Monitor safety, psychosocial status, comfort, nutrition and hydration   Inform patient/family regarding the reason for restraint   Evaluate the patient's condition at the time of restraint application   Determine that other, less restrictive measures have been tried or would not be effective before applying the restraint     Problem: Safety  Goal: Will remain free from injury  Outcome: Progressing  Note: Telesitter in place.  Bed alarm in place.     Problem: Skin Integrity  Goal: Risk for impaired skin integrity will decrease  Outcome: Progressing  Note: Frequent repositioning.

## 2024-12-15 NOTE — PROGRESS NOTES
Monitor Summary  Rhythm: Sinus Bradycardia - SR  Rate: 39-62 & junctional beats /w rate of 29  Ectopy: PVCs  .13 / .10 / .36                  12 hr Chart check.

## 2024-12-15 NOTE — WOUND TEAM
Patient removed her wound vac on her R. Groin.  Bedside placed a wet to dry wound team to return tomorrow to replace wound vac.

## 2024-12-15 NOTE — PROGRESS NOTES
Monitor Summary:   Rhythm: SB/SR  Rate: 47-73  Measurement: .12/.11/.44  Ectopy: 7 beats of vtach, 4.25 second arrest, r coup, 7 beats vtach, touched down to 31, 34

## 2024-12-15 NOTE — PROGRESS NOTES
Hospital Medicine Daily Progress Note    Date of Service  12/16/2024    Chief Complaint  Kiara Qureshi is a 59 y.o. female admitted 11/27/2024 with encephalopathy    Hospital Course  60 yo woman HTN, HLD who was at [facility] for anterior STEMI s/p lytics and had LAD PCI and unsuccessful PCI for OM1 complicated by RP bleed, vfib and PEA arrest with cardiogenic shock needing ECMO and imeplla.  ECMO was decannulated 11/4, Impella removed 11/8.  She also had encephalopathy and delirium on valproic acid and Seroquel.  TTE showed EF 25-30%, she did not tolerate GDMT due to hypotension.  She had a CT chest on 11/25 that showed focal aortic mural thrombus versus ulcerated plaque to descending thoracic aorta and CTA AP with rim-enhancing RP hematoma and small thrombus in right common iliac artery, multiple nonenhancing fluid collections and subcu tissue in right lower and right inguinal regions, possible evolving hematomas and left femoral vein.  She was started on Vanco and Zosyn and transferred back to [facility].  She completed linezolid and Zosyn around 12/1.  Cardiology was consulted for medical management of her heart failure. Dr. Conley with vascular surgery was consulted, recommended wound vacs to groin wounds.  On 12/9 she became more altered with respiratory distress and transferred to [facility].  CT head was negative for acute changes.  EEG was negative for seizure activity.  She was high on high dose of Seroquel that was tapered off.  She remains on NG tube for tube feeds.    Interval Problem Update  D5 water was started for hypernatremia  Feeding tube has been replaced  Patient is more alert today, able to follow a couple commands  I spoke with the  bedside, he is waiting for his sister to arrive to discuss further goals of care    I have discussed this patient's plan of care and discharge plan at IDT rounds today with Case Management, Nursing, Nursing leadership, and other members of the IDT  team.    Consultants/Specialty  cardiology and vascular surgery, palliative care    Code Status  DNAR, I OK    Disposition  The patient is not medically cleared for discharge to home or a post-acute facility.  Anticipate discharge to: a long-term acute care hospital    I have placed the appropriate orders for post-discharge needs.    Review of Systems  Review of Systems   Unable to perform ROS: Medical condition        Physical Exam  Temp:  [36.6 °C (97.9 °F)-36.9 °C (98.4 °F)] 36.9 °C (98.4 °F)  Pulse:  [76-81] 76  Resp:  [16-20] 20  BP: ()/() 98/57  SpO2:  [92 %-99 %] 96 %    Physical Exam  Constitutional:       Appearance: She is ill-appearing.      Comments: Opens eyes for exam, inconsistently tracks me   HENT:      Head: Normocephalic.      Mouth/Throat:      Mouth: Mucous membranes are dry.   Eyes:      Comments: Pupils are dilated, equal and reactive to light   Cardiovascular:      Rate and Rhythm: Normal rate and regular rhythm.   Pulmonary:      Breath sounds: Rhonchi present.      Comments: Periodic pauses in breathing, irregular breathing  Abdominal:      General: There is no distension.      Palpations: Abdomen is soft.      Tenderness: There is no abdominal tenderness.   Musculoskeletal:      Right lower leg: No edema.      Left lower leg: No edema.      Comments: Wound vacs bilaterally to groin   Neurological:      Comments: Able to tell me her name.  Able to  her right hand on command, though inconsistent  Spontaneously moves both her upper extremities and lower extremities  Mumbles mostly         Fluids    Intake/Output Summary (Last 24 hours) at 12/16/2024 1506  Last data filed at 12/16/2024 1200  Gross per 24 hour   Intake 639.1 ml   Output 1475 ml   Net -835.9 ml        Laboratory  Recent Labs     12/14/24  0309 12/15/24  0034 12/16/24  0155   WBC 10.1 8.2 8.0   RBC 3.14* 3.17* 3.06*   HEMOGLOBIN 10.6* 10.3* 9.9*   HEMATOCRIT 35.1* 35.1* 34.2*   .8* 110.7* 111.8*   MCH 33.8*  32.5 32.4   MCHC 30.2* 29.3* 28.9*   RDW 83.1* 84.0* 84.6*   PLATELETCT 338 308 310   MPV 11.1 11.1 10.9     Recent Labs     12/14/24  0309 12/15/24  0034 12/15/24  1417 12/15/24  1951 12/16/24  0155 12/16/24  0803   SODIUM 153* 157*   < > 151* 154* 153*   POTASSIUM 4.1 4.5  --   --  3.8  --    CHLORIDE 119* 123*  --   --  122*  --    CO2 23 24  --   --  23  --    GLUCOSE 231* 144*  --   --  204*  --    BUN 46* 44*  --   --  42*  --    CREATININE 0.91 0.96  --   --  1.06  --    CALCIUM 7.9* 7.7*  --   --  8.0*  --     < > = values in this interval not displayed.                   Imaging  DX-ABDOMEN FOR TUBE PLACEMENT   Final Result      The tip of the enteric tube terminates over the antrum of the stomach.      DX-ABDOMEN FOR TUBE PLACEMENT   Final Result         1.  Nonspecific bowel gas pattern in the upper abdomen.   2.  Dobbhoff tube is coiled within the duodenum, the tip terminates overlying the expected location of the gastric body.   3.  Bilateral lower lobe edema and/or infiltrates.      DX-ABDOMEN FOR TUBE PLACEMENT   Final Result      NG tube tip projects over the gastroduodenal junction.      DX-CHEST-LIMITED (1 VIEW)   Final Result      1.  Mild cardiomegaly with evidence of mild to moderate pulmonary edema with minimal worsening since previous exam.      CT-HEAD W/O   Final Result         1.  No acute intracranial abnormality is identified, there are nonspecific white matter changes, commonly associated with small vessel ischemic disease.  Associated mild cerebral atrophy is noted.   2.  Bilateral sinusitis changes   3.  Atherosclerosis.               DX-CHEST-PORTABLE (1 VIEW)   Final Result         1.  Mild pulmonary edema and/or infiltrates.      EC-ECHOCARDIOGRAM COMPLETE W/ CONT   Final Result      DX-ABDOMEN FOR TUBE PLACEMENT   Final Result      Tip of the esophagogastric tube terminates over the pylorus of the stomach.      IR-MIDLINE CATHETER INSERTION WO GUIDANCE > AGE 3   Final Result                   Ultrasound-guided midline placement performed by qualified nursing staff    as above.          IR-US GUIDED PIV   Final Result    Ultrasound-guided PERIPHERAL IV INSERTION performed by    qualified nursing staff as above.      DX-OUTSIDE IMAGES-DX CHEST   Final Result      DX-OUTSIDE IMAGES-DX ABDOMEN   Final Result      MR-OUTSIDE IMAGES-MR BRAIN   Final Result      CT-OUTSIDE IMAGES-CT HEAD   Final Result      CT-OUTSIDE IMAGES-CT ABDOMEN/PELVIS   Final Result      CT-OUTSIDE IMAGES-CT CHEST   Final Result      DX-ABDOMEN FOR TUBE PLACEMENT   Final Result      There is a new small bowel feeding tube which terminates in the small bowel of the right mid abdomen.      DX-CHEST-LIMITED (1 VIEW)   Final Result         Asymmetric pulmonary infiltrates, left worse than right.      IR-US GUIDED PIV    (Results Pending)        Assessment/Plan  * Heart failure with reduced ejection fraction (HCC)- (present on admission)  Assessment & Plan  ICM LVEF 25-30%  S/p revascularization  Soft pressures limiting titration of GDMT.  Farxiga  Holding losartan for low BP  Critical care had recommended holding bisoprolol because of Cheyne-Lo breathing  Dehydrated, holding Lasix    Hypernatremia  Assessment & Plan  Persistent worsening and she lost her NG tube on and off  Started on D5 water infusion with FWF and q6h PRN  Goal correction 8 mLEq in 24h  Improving    Acute hypoxic respiratory failure (HCC)  Assessment & Plan  In setting of heart failure, recent cardiogenic shock  Patient demonstrates cheynne lo breathing pattern which is consistent with her suspected anoxic brain injury  Patient appears to be experiencing aspiration events causing bradycardia  Continue supportive care  Patient is NPO, speech is following    Constipation  Assessment & Plan  + bm  Continue bowel regimen  following    Hypotension  Assessment & Plan  BP low range, holding losartan and bisoprolol    Leukocytosis  Assessment &  Plan  resolved  Could be related to hematoma  normal pro luisito  Trend CBC    Abdominal hematoma  Assessment & Plan  Might be related to cardiac device  Clinically stable and h/h in stable range  Patient was on lovenox due to rigth common iliac art thrombosis and then transitioned to eliquis per [facility]  - plan to continue for 3-4 months with repeat imaging at that time to see if it can be d/c   Following  Cbc in am     ACP (advance care planning)  Assessment & Plan  Dr. Mobley: Discussed plan of care and code status with patient  Bruno ([phone]) and sister-in-law Laina ([phone]). I explained that Kiara has experienced a suspected anoxic brain injury during her current medical course that has required ECMO and impella, CVA, Vfib arrest. I shared my concern that Laina may not make a meaningful recovery from this and that her current and future quality of life appears limited. I explained that in the event of another CP arrest her quality of life would be further impacted in the event she were to survive such an episode. Laina and Bruno made the decision to change Kiara's code status to DNR, yes to intubaiton. Laina will be driving in from [place] in the coming days to have further goals of care conversations. Total of 48 minutes spent in discussion and coordination of advance care planning.    12/16 - my conversation with Laina and Bruno, they will like to continue care and plan for PAMs    Pneumonia  Assessment & Plan  S/p completed course of pip/tazo and vanc  Cultures were negative    Aortic mural thrombus (HCC)  Assessment & Plan  CT at outside facility showing focal aortic mural thrombus along the lateral wall of the descending thoracic aorta and also a tiny filling defect/mural thrombus within the posterior right common iliac artery  apixaban  Monitor H&H    Continue monitoring H&H        Encephalopathy  Assessment & Plan  Hospital course complicated by encephalopathy.  Likely from combination of CVA,  STEMI, hyperglycemia  Removing restraints as tolerated, following  Has a nasojejunal feeding tube placed by interventional radiology from outside facility  Was on Seroquel and Depakene as recommended by neurology/psychiatry at outside facility.    Multifactorial  Continues to be encephalopathic. CTH no acute findings. EEG neg for seizure activity  Neuro evaluated patient at [facility].   Have been titrating off seroquel and pt's mental status has improved as her agitation has diminished  DC seroquel qhs dose  I changed valproic acid TID to BID  Repeat MRI brain if possible, unble to tolerate at this time    CVA (cerebral vascular accident) (HCC)  Assessment & Plan  Patient had MRI on 11/17/24 at [facility] which shwed Punctate acute infarct of the right centrum semiovale. Scattered foci of enhancement in multiple vascular distributions as described may represent recent subacute infarcts. Diffuse stippled susceptibility signal throughout the supratentorial and infratentorial brain. Overall findings may represent sequelae of fat emboli or amyloid related angiopathy.   High dose statin  Apixaban  plavix    Continues to be encephalopathic. CTH no acute findings. EEG neg for seizure activity  Neuro evaluated patient at [facility].   Have been titrating off seroquel and pt's mental status has improved as her agitation has diminished  DC seroquel qhs dose  I changed valproic acid TID to BID  Repeat MRI brain if possible, unble to tolerate at this time      CAD (coronary artery disease)  Assessment & Plan  Patient with recent complicated STEMI with PCI to LAD and mid LAD.  Complicated by V-fib arrest and cardiogenic shock requiring VA ECMO and Impella and retroperitoneal hematomas. At [facility] underwent impella supported PCI to mid LAD just distal to prior stent but unsuccessful with PCI to 100% occluded OM1 on 11/6. Patient was decannulated and had Impella removed.    Discussed with cardiology and asa stopped and replaced with plavix. Cont  statin  Repeat echo shows persistently low EF, 25-30%  Cardiology had recommended bisoprolol but due to cheyne florez critical care recommends we stop this - so will hold   Also holding losartan for low BP and lasix for hypernatremia      Urinary retention  Assessment & Plan  Beckwith catheter in place    Hyperglycemia  Assessment & Plan  Holding long-acting insulin, lost her NG tube  ISS      Metabolic acidemia- (present on admission)  Assessment & Plan  resolved  Bmp in am  Following           VTE prophylaxis:    therapeutic anticoagulation with eliquis 5 mg BID      I have performed a physical exam and reviewed and updated ROS and Plan today (12/16/2024). In review of yesterday's note (12/15/2024), there are no changes except as documented above.

## 2024-12-16 ENCOUNTER — APPOINTMENT (OUTPATIENT)
Dept: RADIOLOGY | Facility: MEDICAL CENTER | Age: 59
End: 2024-12-16
Attending: INTERNAL MEDICINE
Payer: COMMERCIAL

## 2024-12-16 ENCOUNTER — APPOINTMENT (OUTPATIENT)
Dept: RADIOLOGY | Facility: MEDICAL CENTER | Age: 59
End: 2024-12-16
Payer: COMMERCIAL

## 2024-12-16 LAB
ALBUMIN SERPL BCP-MCNC: 2.4 G/DL (ref 3.2–4.9)
ANISOCYTOSIS BLD QL SMEAR: ABNORMAL
BASOPHILS # BLD AUTO: 0.6 % (ref 0–1.8)
BASOPHILS # BLD: 0.05 K/UL (ref 0–0.12)
BUN SERPL-MCNC: 42 MG/DL (ref 8–22)
CALCIUM ALBUM COR SERPL-MCNC: 9.3 MG/DL (ref 8.5–10.5)
CALCIUM SERPL-MCNC: 8 MG/DL (ref 8.5–10.5)
CHLORIDE SERPL-SCNC: 122 MMOL/L (ref 96–112)
CO2 SERPL-SCNC: 23 MMOL/L (ref 20–33)
COMMENT 1642: NORMAL
CREAT SERPL-MCNC: 1.06 MG/DL (ref 0.5–1.4)
CRP SERPL HS-MCNC: 4.07 MG/DL (ref 0–0.75)
EOSINOPHIL # BLD AUTO: 0.18 K/UL (ref 0–0.51)
EOSINOPHIL NFR BLD: 2.2 % (ref 0–6.9)
ERYTHROCYTE [DISTWIDTH] IN BLOOD BY AUTOMATED COUNT: 84.6 FL (ref 35.9–50)
GFR SERPLBLD CREATININE-BSD FMLA CKD-EPI: 60 ML/MIN/1.73 M 2
GLUCOSE BLD STRIP.AUTO-MCNC: 172 MG/DL (ref 65–99)
GLUCOSE BLD STRIP.AUTO-MCNC: 184 MG/DL (ref 65–99)
GLUCOSE BLD STRIP.AUTO-MCNC: 204 MG/DL (ref 65–99)
GLUCOSE BLD STRIP.AUTO-MCNC: 218 MG/DL (ref 65–99)
GLUCOSE SERPL-MCNC: 204 MG/DL (ref 65–99)
HCT VFR BLD AUTO: 34.2 % (ref 37–47)
HGB BLD-MCNC: 9.9 G/DL (ref 12–16)
IMM GRANULOCYTES # BLD AUTO: 0.13 K/UL (ref 0–0.11)
IMM GRANULOCYTES NFR BLD AUTO: 1.6 % (ref 0–0.9)
LYMPHOCYTES # BLD AUTO: 1.29 K/UL (ref 1–4.8)
LYMPHOCYTES NFR BLD: 16.1 % (ref 22–41)
MACROCYTES BLD QL SMEAR: ABNORMAL
MAGNESIUM SERPL-MCNC: 2.5 MG/DL (ref 1.5–2.5)
MCH RBC QN AUTO: 32.4 PG (ref 27–33)
MCHC RBC AUTO-ENTMCNC: 28.9 G/DL (ref 32.2–35.5)
MCV RBC AUTO: 111.8 FL (ref 81.4–97.8)
MONOCYTES # BLD AUTO: 0.49 K/UL (ref 0–0.85)
MONOCYTES NFR BLD AUTO: 6.1 % (ref 0–13.4)
MORPHOLOGY BLD-IMP: NORMAL
NEUTROPHILS # BLD AUTO: 5.88 K/UL (ref 1.82–7.42)
NEUTROPHILS NFR BLD: 73.4 % (ref 44–72)
NRBC # BLD AUTO: 0 K/UL
NRBC BLD-RTO: 0 /100 WBC (ref 0–0.2)
PHOSPHATE SERPL-MCNC: 3 MG/DL (ref 2.5–4.5)
PLATELET # BLD AUTO: 310 K/UL (ref 164–446)
PLATELET BLD QL SMEAR: NORMAL
PMV BLD AUTO: 10.9 FL (ref 9–12.9)
POTASSIUM SERPL-SCNC: 3.8 MMOL/L (ref 3.6–5.5)
PREALB SERPL-MCNC: 13.7 MG/DL (ref 18–38)
RBC # BLD AUTO: 3.06 M/UL (ref 4.2–5.4)
RBC BLD AUTO: PRESENT
SODIUM SERPL-SCNC: 147 MMOL/L (ref 135–145)
SODIUM SERPL-SCNC: 149 MMOL/L (ref 135–145)
SODIUM SERPL-SCNC: 153 MMOL/L (ref 135–145)
SODIUM SERPL-SCNC: 154 MMOL/L (ref 135–145)
WBC # BLD AUTO: 8 K/UL (ref 4.8–10.8)

## 2024-12-16 PROCEDURE — A9270 NON-COVERED ITEM OR SERVICE: HCPCS | Performed by: INTERNAL MEDICINE

## 2024-12-16 PROCEDURE — 700102 HCHG RX REV CODE 250 W/ 637 OVERRIDE(OP): Performed by: STUDENT IN AN ORGANIZED HEALTH CARE EDUCATION/TRAINING PROGRAM

## 2024-12-16 PROCEDURE — 82962 GLUCOSE BLOOD TEST: CPT | Mod: 91

## 2024-12-16 PROCEDURE — 99233 SBSQ HOSP IP/OBS HIGH 50: CPT | Performed by: INTERNAL MEDICINE

## 2024-12-16 PROCEDURE — 86140 C-REACTIVE PROTEIN: CPT | Mod: 91

## 2024-12-16 PROCEDURE — 84134 ASSAY OF PREALBUMIN: CPT

## 2024-12-16 PROCEDURE — 97597 DBRDMT OPN WND 1ST 20 CM/<: CPT

## 2024-12-16 PROCEDURE — 700102 HCHG RX REV CODE 250 W/ 637 OVERRIDE(OP): Performed by: HOSPITALIST

## 2024-12-16 PROCEDURE — 700102 HCHG RX REV CODE 250 W/ 637 OVERRIDE(OP): Performed by: INTERNAL MEDICINE

## 2024-12-16 PROCEDURE — 770020 HCHG ROOM/CARE - TELE (206)

## 2024-12-16 PROCEDURE — 85025 COMPLETE CBC W/AUTO DIFF WBC: CPT

## 2024-12-16 PROCEDURE — 84295 ASSAY OF SERUM SODIUM: CPT

## 2024-12-16 PROCEDURE — 36415 COLL VENOUS BLD VENIPUNCTURE: CPT

## 2024-12-16 PROCEDURE — 700102 HCHG RX REV CODE 250 W/ 637 OVERRIDE(OP)

## 2024-12-16 PROCEDURE — A9270 NON-COVERED ITEM OR SERVICE: HCPCS | Performed by: HOSPITALIST

## 2024-12-16 PROCEDURE — 700105 HCHG RX REV CODE 258: Performed by: INTERNAL MEDICINE

## 2024-12-16 PROCEDURE — 80069 RENAL FUNCTION PANEL: CPT

## 2024-12-16 PROCEDURE — A9270 NON-COVERED ITEM OR SERVICE: HCPCS | Performed by: STUDENT IN AN ORGANIZED HEALTH CARE EDUCATION/TRAINING PROGRAM

## 2024-12-16 PROCEDURE — A9270 NON-COVERED ITEM OR SERVICE: HCPCS

## 2024-12-16 PROCEDURE — 83735 ASSAY OF MAGNESIUM: CPT

## 2024-12-16 PROCEDURE — 97530 THERAPEUTIC ACTIVITIES: CPT

## 2024-12-16 PROCEDURE — 97602 WOUND(S) CARE NON-SELECTIVE: CPT

## 2024-12-16 RX ORDER — ENOXAPARIN SODIUM 100 MG/ML
1 INJECTION SUBCUTANEOUS EVERY 12 HOURS
Status: DISCONTINUED | OUTPATIENT
Start: 2024-12-16 | End: 2024-12-16

## 2024-12-16 RX ORDER — QUETIAPINE FUMARATE 25 MG/1
25 TABLET, FILM COATED ORAL ONCE
Status: COMPLETED | OUTPATIENT
Start: 2024-12-16 | End: 2024-12-16

## 2024-12-16 RX ADMIN — INSULIN LISPRO 1 UNITS: 100 INJECTION, SOLUTION INTRAVENOUS; SUBCUTANEOUS at 06:29

## 2024-12-16 RX ADMIN — QUETIAPINE FUMARATE 25 MG: 25 TABLET ORAL at 22:41

## 2024-12-16 RX ADMIN — CLOPIDOGREL BISULFATE 75 MG: 75 TABLET ORAL at 05:18

## 2024-12-16 RX ADMIN — INSULIN LISPRO 2 UNITS: 100 INJECTION, SOLUTION INTRAVENOUS; SUBCUTANEOUS at 17:20

## 2024-12-16 RX ADMIN — INSULIN LISPRO 1 UNITS: 100 INJECTION, SOLUTION INTRAVENOUS; SUBCUTANEOUS at 21:23

## 2024-12-16 RX ADMIN — APIXABAN 5 MG: 5 TABLET, FILM COATED ORAL at 05:18

## 2024-12-16 RX ADMIN — DAPAGLIFLOZIN 10 MG: 10 TABLET, FILM COATED ORAL at 05:18

## 2024-12-16 RX ADMIN — DEXTROSE MONOHYDRATE 1000 ML: 50 INJECTION, SOLUTION INTRAVENOUS at 03:41

## 2024-12-16 RX ADMIN — LANSOPRAZOLE 30 MG: 30 TABLET, ORALLY DISINTEGRATING ORAL at 05:18

## 2024-12-16 RX ADMIN — APIXABAN 5 MG: 5 TABLET, FILM COATED ORAL at 17:09

## 2024-12-16 RX ADMIN — Medication 5 MG: at 21:19

## 2024-12-16 RX ADMIN — INSULIN LISPRO 2 UNITS: 100 INJECTION, SOLUTION INTRAVENOUS; SUBCUTANEOUS at 12:20

## 2024-12-16 RX ADMIN — BISACODYL 10 MG: 10 SUPPOSITORY RECTAL at 05:18

## 2024-12-16 RX ADMIN — DEXTROSE MONOHYDRATE: 50 INJECTION, SOLUTION INTRAVENOUS at 14:45

## 2024-12-16 RX ADMIN — VALPROIC ACID 250 MG: 250 SOLUTION ORAL at 05:17

## 2024-12-16 RX ADMIN — ATORVASTATIN CALCIUM 80 MG: 80 TABLET, FILM COATED ORAL at 17:09

## 2024-12-16 RX ADMIN — VALPROIC ACID 250 MG: 250 SOLUTION ORAL at 17:09

## 2024-12-16 ASSESSMENT — COGNITIVE AND FUNCTIONAL STATUS - GENERAL
MOVING TO AND FROM BED TO CHAIR: TOTAL
STANDING UP FROM CHAIR USING ARMS: TOTAL
SUGGESTED CMS G CODE MODIFIER MOBILITY: CN
MOBILITY SCORE: 6
CLIMB 3 TO 5 STEPS WITH RAILING: TOTAL
WALKING IN HOSPITAL ROOM: TOTAL
MOVING FROM LYING ON BACK TO SITTING ON SIDE OF FLAT BED: TOTAL
TURNING FROM BACK TO SIDE WHILE IN FLAT BAD: TOTAL

## 2024-12-16 ASSESSMENT — FIBROSIS 4 INDEX: FIB4 SCORE: 1.9

## 2024-12-16 ASSESSMENT — PAIN DESCRIPTION - PAIN TYPE
TYPE: ACUTE PAIN
TYPE: ACUTE PAIN

## 2024-12-16 ASSESSMENT — GAIT ASSESSMENTS: GAIT LEVEL OF ASSIST: UNABLE TO PARTICIPATE

## 2024-12-16 NOTE — PROGRESS NOTES
Bedside report received from off going RN/tech: Kim assumed care of patient.     Fall Risk Score: HIGH RISK  Fall risk interventions in place: Place yellow fall risk ID band on patient, Provide patient/family education based on risk assessment, Educate patient/family to call staff for assistance when getting out of bed, Place fall precaution signage outside patient door, Place patient in room close to nursing station, and Utilize bed/chair fall alarm  Bed type: Low air loss (Ray Score less than 17 interventions in place)  Patient on cardiac monitor: Yes  IVF/IV medications: Infusion per MAR (List Med(s)) Tube feed running at 25ml/hr  Oxygen: How many liters 1L  Bedside sitter: Tele sitter  Isolation: Not applicable

## 2024-12-16 NOTE — WOUND TEAM
[facility]  Inpatient Services  Wound and Skin Care Follow-up    Admission Date: 11/27/2024     Last order of IP CONSULT TO WOUND CARE was found on 12/16/2024 from Hospital Encounter on 11/27/2024     HPI, PMH, SH: Reviewed    Past Surgical History:   Procedure Laterality Date    INSERTION,CANNULA FOR ECMO,ADULT Left 10/30/2024    Procedure: INSERTION, CANNULA, FOR ECMO, ADULT;  Surgeon: Aime Elias D.O.;  Location: SURGERY [facility];  Service: Cardiothoracic     Social History     Tobacco Use    Smoking status: Never    Smokeless tobacco: Never   Substance Use Topics    Alcohol use: No     No chief complaint on file.    Diagnosis: HFrEF (heart failure with reduced ejection fraction) (HCC) [I50.20]  Encephalopathy acute [G93.40]    Unit where seen by Wound Team: T819/00     WOUND FOLLOW UP RELATED TO:  Bilateral groins       WOUND TEAM PLAN OF CARE - Frequency of Follow-up:   Nursing to follow dressing orders written for wound care. Contact wound team if area fails to progress, deteriorates or with any questions/concerns if something comes up before next scheduled follow up (See below as to whether wound is following and frequency of wound follow up)  Dressing changes by wound team:                   NPWT change 3 times weekly - Bilateral groins    WOUND HISTORY:       Pt is a 59yr old female with history of Obesity, and HTN. Pt was initially seen at [facility] for anterior STEMI. Pt underwent LAD PCI but hospital course was complicated by retroperitoneal bleed, V Fib and PEA arrest ultimately requiring ECMO and Impella. Pt was decannulated from ECMO on 11/4 and had left femoral embolectomy. Impella was then removed on 11/8. Pts ECHO on 11/24 revealed EF of 25-30%. Pt has bilateral groin wounds from ECMO and Impella. Wound team was consulted to evaluate.        WOUND ASSESSMENT/LDA  Wound 12/07/24 Full Thickness Wound Groin Left (Active)   Date First Assessed/Time First Assessed: 12/07/24 7566    Present on Original Admission: No  Hand Hygiene Completed: Yes  Primary Wound Type: Full Thickness Wound  Location: Groin  Laterality: Left      Assessments 12/16/2024  2:00 PM   Site Assessment Yellow;Red   Periwound Assessment Clean;Dry;Intact   Margins Unattached edges;Defined edges   Closure Secondary intention   Drainage Amount Moderate   Drainage Description Purulent;Sanguineous   Treatments CSWD - Conservative Sharp Wound Debridement;Cleansed;Irrigation;Nonselective debridement   Wound Cleansing Approved Wound Cleanser   Periwound Protectant Skin Protectant Wipes to Periwound   Dressing Status Clean;Dry;Intact   Dressing Changed Changed   Dressing Cleansing/Solutions 1/4 Strength Dakin's Solution   Dressing Options Wound Vac   Dressing Change/Treatment Frequency Monday, Wednesday, Friday, and As Needed   NEXT Dressing Change/Treatment Date 12/18/24   NEXT Weekly Photo (Inpatient Only) 12/18/24   Wound Team Following 3x Weekly   Non-staged Wound Description Full thickness   Shape oval   Wound Odor Mild   WOUND NURSE ONLY - Time Spent with Patient (mins) 30       Wound 12/07/24 Full Thickness Wound Groin Right (Active)   Date First Assessed/Time First Assessed: 12/07/24 1721   Present on Original Admission: No  Hand Hygiene Completed: Yes  Primary Wound Type: Full Thickness Wound  Location: Groin  Laterality: Right      Assessments 12/16/2024  2:00 PM   Site Assessment Red;Pink   Periwound Assessment Clean;Dry;Intact   Margins Defined edges   Closure Secondary intention   Drainage Amount Small   Drainage Description Serosanguineous   Treatments Cleansed;Irrigation;Nonselective debridement;Site care   Wound Cleansing Approved Wound Cleanser   Periwound Protectant Skin Protectant Wipes to Periwound;Drape;Paste Ring   Dressing Status Clean;Dry;Intact   Dressing Changed Changed   Dressing Cleansing/Solutions 1/4 Strength Dakin's Solution   Dressing Options Wound Vac   Dressing Change/Treatment Frequency Monday,  Wednesday, Friday, and As Needed   NEXT Dressing Change/Treatment Date 12/18/24   NEXT Weekly Photo (Inpatient Only) 12/18/24   Wound Team Following 3x Weekly   Shape Circular   Wound Odor None   WOUND NURSE ONLY - Time Spent with Patient (mins) 30       Negative Pressure Wound Therapy 12/07/24 Groin Left (Active)   Placement Date/Time: 12/07/24 1722   Location: Groin  Laterality: Left      Assessments 12/16/2024  2:00 PM   NPWT Pump Mode / Pressure Setting Ulta;Intermittent;125 mmHg   Dressing Type Small;Black Foam (Veraflo)   Number of Foam Pieces Used 2   NEXT Dressing Change/Treatment Date 12/18/24   VAC VeraFlo Irrigant 1/4 Strength Dakins   VAC VeraFlo Soak Time (mins) 6   VAC VeraFlo Instill Volume (ml) 12   VAC VeraFlo - Therapy Time (hrs) 2   VAC VeraFlo Pressure (mm/Hg) 125 mmHg   WOUND NURSE ONLY - Time Spent with Patient (mins) 30       Negative Pressure Wound Therapy 12/07/24 Groin Right (Active)   Placement Date/Time: 12/07/24 1722   Present on Original Admission: No  Location: Groin  Laterality: Right      Assessments 12/16/2024  2:00 PM   NPWT Pump Mode / Pressure Setting Ulta;Intermittent;125 mmHg   Dressing Type Small;Black Foam (Veraflo)   Number of Foam Pieces Used 1   NEXT Dressing Change/Treatment Date 12/18/24   VAC VeraFlo Irrigant Normal Saline   VAC VeraFlo Soak Time (mins) 6   VAC VeraFlo Instill Volume (ml) 18   VAC VeraFlo - Therapy Time (hrs) 2   VAC VeraFlo Pressure (mm/Hg) 125 mmHg   WOUND NURSE ONLY - Time Spent with Patient (mins) 30        Vascular:    JAGJIT:   No results found.    Lab Values:    Lab Results   Component Value Date/Time    WBC 8.0 12/16/2024 01:55 AM    RBC 3.06 (L) 12/16/2024 01:55 AM    HEMOGLOBIN 9.9 (L) 12/16/2024 01:55 AM    HEMATOCRIT 34.2 (L) 12/16/2024 01:55 AM    CREACTPROT 10.71 (H) 11/29/2024 03:24 AM    SEDRATEWES 140 (H) 11/28/2024 01:35 AM    HBA1C 6.2 (H) 11/01/2024 06:46 PM    HBA1C 6.4 (H) 10/30/2024 03:18 AM         Culture Results show:  No results  found for this or any previous visit (from the past 720 hours).    Pain Level/Medicated:  None, Tolerated without pain medication       INTERVENTIONS BY WOUND TEAM:  Chart and images reviewed. Discussed with bedside RN. All areas of concern (based on picture review, LDA review and discussion with bedside RN) have been thoroughly assessed. Documentation of areas based on significant findings. This RN in to assess patient. Performed standard wound care which includes appropriate positioning, dressing removal and non-selective debridement. Pictures and measurements obtained weekly if/when required.    Wound:  RIGHT GROIN  Preparation for Dressing removal: Removed without difficulty  Cleansed/Non-selectively Debrided with:  Wound cleanser and Gauze  Brianna wound: Cleansed with Wound cleanser and Gauze, Prepped with No Sting  Primary Dressing:  Total 1 VF black foam to the wound bed, secured with drape and TRAC pad.   Secondary (Outer) Dressing: Resumed at 125mmHg, no leak detected.  Sacral offloading foam to offload tubing     Wound:  LEFT GROIN  Preparation for Dressing removal: Removed without difficulty  Cleansed/Non-selectively Debrided with:  Wound cleanser and Gauze  Non-Excisional Conservative Sharp debridement: Slough debrided away using curette < 20cm2 debrided down to viable tissue.  Scant bleeding noted, controlled with manual pressure.  Brianna wound: Cleansed with Wound cleanser and Gauze, Prepped with No Sting and Drape  Primary Dressing:  Total 2 black VF foam to wound bed and bridged lateral. Secured with drape and TRAC pad.   Secondary (Outer) Dressing: Resumed at 125mmHg, no leak detected.  Sacral offloading foam to offload tubing.     Advanced Wound Care Discharge Planning  Number of Clinicians necessary to complete wound care: 2  Is patient requiring IV pain medications for dressing changes:  No   Length of time for dressing change 30 min. (This does not include chart review, pre-medication time, set up,  clean up or time spent charting.)    Interdisciplinary consultation: Patient, Bedside RN (Liz), Bita PORTILLO (Wound RN).  Pressure injury and staging reviewed with N/A.    EVALUATION / RATIONALE FOR TREATMENT:     Date:  12/16/24  Wound Status:  No change in wound     Right groin wound has moist pale pink tissue, continue with dakin's VF NPWT.  Left groin wound with slough and non-viable purulent drainage.  CSWD to remove purulent and slough from wound bed.  Resumed VF NPWT with dakin's instillation.     Date:  12/13/24  Wound Status:  Wound deteriorating    BL Groin wound beds with increased amounts of slough and malodor.  Patient is already on Dakins VF, will continue with this to topically clean wound beds.    Date:  12/10/24  Wound Status:  Wound progressing as expected    Left groin: Groin noted with layer of slough. Wound has small area of tunneling at 6o clock, a small piece of black foam was tucked into this tunnel to assist with closure.   Right Groin: Noted with a layer of slough, VF NPWT was changed from NS to Dakins to assist with chemical debridement.   Prior to vac removal, skin around both groin sites appeared to be moist, and VF instillation amount was reduced.     Date:  12/07/24  Wound Status:  Initial evaluation    Bilateral wound beds with slough present.  VF NPWT applied to assist with mechanical debridement, cleansing the wound bed, increase oxygenation and granulation to the area and healing the wound by secondary intention.        Date:  12/04/24  Wound Status:  Initial evaluation    Bilateral groin sites with necrotic tissue, adipose and tan purulent drainage. Discussed with bedside RN who will speak with MD regarding surgical consult. Pt would benefit from NPWT application but would like surgery to clear pt from any exposed structures prior to vac application. Dakins ordered to chemically debride.          Goals: Steady decrease in wound area and depth weekly.    NURSING PLAN OF CARE  "ORDERS:  No new orders this visit    NUTRITION RECOMMENDATIONS   Wound Team Recommendations:  N/A     DIET ORDERS (From admission to next 24h)       Start     Ordered    12/13/24 1414  Diet NPO Restrict to: With Tube Feed  ALL MEALS        Question:  Diet NPO Restrict to:  Answer:  With Tube Feed    12/13/24 1413    12/10/24 1651  Diet: Diet Tube Feed; Formula: Pivot; Pivot: Pivot 1.5 RTH; Goal Rate (mL/Hour): 40; Duration: 24 HR  ALL MEALS        Question Answer Comment   Diet Diet Tube Feed    Formula: Pivot    Pivot: Pivot 1.5 RTH    Goal Rate (mL/Hour) 40    Route NG    Duration 24 HR       Placed in \"And\" Linked Group    12/10/24 1653    12/02/24 1232  Supplements  ALL MEALS        Question Answer Comment   Which Supplement Glucerna    Glucerna: Glucerna Shake Carton        12/02/24 1231                    PREVENTATIVE INTERVENTIONS:   Q shift Ray - performed per nursing policy  Q shift pressure point assessments - performed per nursing policy    Surface/Positioning  Low Airloss - Currently in Place  Reposition q 2 hours - Currently in Place  TAPs Turning system - Currently in Place    Offloading/Redistribution  Heel offloading dressing (Silicone dressing) - Currently in Place  Float Heels off Bed with Pillows - Currently in Place           Respiratory  Silicone O2 tubing - Currently in Place  Gray Foam Ear protectors - Currently in Place    Containment/Moisture Prevention    Dri-hemanth pad - Currently in Place    Mobilization      Unable to assess     Anticipated discharge plans:  TBD        Vac Discharge Needs:  Vac Discharge plan is purely a recommendation from wound team and not a requirement for discharge unless otherwise stated by physician.  Veraflo NPWT  "

## 2024-12-16 NOTE — PROGRESS NOTES
Iris Placement    Tube Team verified patient name and medical record number prior to tube placement. Iris feeding tube (55 inches, 10 Mauritian) placed at 90cm in left nare. Per Iris picture, tube appears to be in the stomach.    Nursing Instructions: Await KUB to confirm placement before use for medications or feeding. Stylet, may be removed, please place in labeled bag with insufflation bulb and save in patient medication drawer.

## 2024-12-16 NOTE — WOUND TEAM
Assisted Pam MOJICA (Wound RN), with wound care, non-selective debridement performed using wound cleanser/NS and gauze. Please see Pam MOJICA (Wound RN) wound note for further wound care details.

## 2024-12-16 NOTE — CARE PLAN
The patient is Unstable - High likelihood or risk of patient condition declining or worsening    Shift Goals  Clinical Goals: comfort, goals of care with family  Patient Goals: morenita  Family Goals: morenita    Progress made toward(s) clinical / shift goals:    Problem: Safety - Medical Restraint  Goal: Remains free of injury from restraints (Restraint for Interference with Medical Device)  Outcome: Progressing   No signs of injury. Q2 charting complete. Patient preformed range of motion with assistance of staff.   Problem: Skin Integrity  Goal: Risk for impaired skin integrity will decrease  Outcome: Progressing  Note: Following wound protocol. Wound team following for patients wound vacs.      Problem: Care Map:  Day 3 Optimal Outcome for the Heart Failure Patient  Goal: Day 3:  Optimal Care of the heart failure patient  Outcome: Progressing  Note: Unable to educate patient on heart failure booklet due to patient being a n o x 0. Patient can't follow commands. I/O's documented in flowsheets. Edema documented in flowsheets. Labs complete. Q6 sodium in place. Daily weights in place        Patient is not progressing towards the following goals:

## 2024-12-16 NOTE — PROGRESS NOTES
0015 notified Jaime Woods that the patient's SBP was sustaining in the 80s. Rapid Rns at bedside. Jaime Woods gave verbal orders to notify him if the patient become symptomatic, SBP goes into the 70s, or MAP drops below 60.

## 2024-12-16 NOTE — DISCHARGE PLANNING
Complex Case Management    Order received for Complex Case Management. Patient's chart has been reviewed.     Complex case management following? Yes    Yes: Case assigned to Evelina Purcell    NOTE: There may be cases that are NOT assigned to a CCM but will be followed for progression of care by leaders of the Complex Discharge Committee.

## 2024-12-16 NOTE — CARE PLAN
The patient is Watcher - Medium risk of patient condition declining or worsening    Shift Goals  Clinical Goals: comfort, safety  Patient Goals: JAYLIN  Family Goals: JAYLIN    Progress made toward(s) clinical / shift goals:    Problem: Safety - Medical Restraint  Goal: Remains free of injury from restraints (Restraint for Interference with Medical Device)  Outcome: Progressing  Flowsheets (Taken 12/16/2024 0205)  Addressed this shift: Remains free of injury from restraints (restraint for interference with medical device):   Determine that other, less restrictive measures have been tried or would not be effective before applying the restraint   Evaluate the patient's condition at the time of restraint application   Inform patient/family regarding the reason for restraint   Every 2 hours: Monitor safety, psychosocial status, comfort, nutrition and hydration     Problem: Skin Integrity  Goal: Risk for impaired skin integrity will decrease  Outcome: Progressing  Note: Q2H turns, wedges, pillows, mepilex, barrier paste/wipes, and containment device utilized. Wound consult placed. Wound vac reassessment for the AM.      Problem: Hemodynamics  Goal: Patient's hemodynamics, fluid balance and neurologic status will be stable or improve  Note: Jaime Woods aware of the patient's SBPs in the 80s. Patient asymptomatic.

## 2024-12-16 NOTE — PROGRESS NOTES
Notified by tele sitter patient removed NG tube. Notified MD. Per MD replace NG tube.      1200: NG tube replaced in left nare. Pending xray to  placement.     1323: MD confirmed okay to use for feeds. Feeds started again at 25ml/hr

## 2024-12-16 NOTE — THERAPY
Physical Therapy   Daily Treatment     Patient Name: Kiara Qureshi  Age:  59 y.o., Sex:  female  Medical Record #: [id number]  Today's Date: 12/16/2024          Assessment    Rec'd pt in bed, wrist restraints.  Total assist to sit eob and to maintain edge of bed sitting.  Not following any type of commands, verbal, tactile, gestural.  Unable to attempt standing or ambulation 2/2 comfort and lack of pt active participation, despite noted active movement.    Plan    Treatment Plan Status: (P) Continue Current Treatment Plan  Type of Treatment: Bed Mobility, Therapeutic Activities, Self Care / Home Evaluation, Therapeutic Exercise, Neuro Re-Education / Balance, Gait Training  Treatment Frequency: 1 Time per Week (decrease to 1x/week to monitor for improved cogntion and ability to participate.)  Treatment Duration: Until Therapy Goals Met    DC Equipment Recommendations: (P) Unable to determine at this time  Discharge Recommendations: (P) Recommend post-acute placement for additional physical therapy services prior to discharge home         Objective       12/16/24 1235   Balance   Sitting Balance (Static) Dependent   Sitting Balance (Dynamic) Dependent   Skilled Intervention Verbal Cuing;Facilitation;Tactile Cuing   Bed Mobility    Supine to Sit Total Assist   Sit to Supine Total Assist   Skilled Intervention Verbal Cuing;Tactile Cuing;Sequencing;Facilitation   Gait Analysis   Gait Level Of Assist Unable to Participate   Functional Mobility   Sit to Stand Unable to Participate   Short Term Goals    Short Term Goal # 1 pt will move supine<>eob with spv in 6 tx for bed mobility.   Goal Outcome # 1 goal not met   Short Term Goal # 2 pt will complete spt with fww and spv in 6 tx for functional mobility.   Goal Outcome # 2 Goal not met   Short Term Goal # 3 pt will ambulate 150 ft with fww and spv in 6 tx for household distances.   Goal Outcome # 3 Goal not met   Physical Therapy Treatment Plan   Physical Therapy  Treatment Plan Continue Current Treatment Plan   Anticipated Discharge Equipment and Recommendations   DC Equipment Recommendations Unable to determine at this time   Discharge Recommendations Recommend post-acute placement for additional physical therapy services prior to discharge home

## 2024-12-16 NOTE — PROGRESS NOTES
Hospital Medicine Daily Progress Note    Date of Service  12/16/2024    Chief Complaint  Kiara Qureshi is a 59 y.o. female admitted 11/27/2024 with encephalopathy    Hospital Course  60 yo woman HTN, HLD who was at [facility] for anterior STEMI s/p lytics and had LAD PCI and unsuccessful PCI for OM1 complicated by RP bleed, vfib and PEA arrest with cardiogenic shock needing ECMO and imeplla.  ECMO was decannulated 11/4, Impella removed 11/8.  She also had encephalopathy and delirium on valproic acid and Seroquel.  TTE showed EF 25-30%, she did not tolerate GDMT due to hypotension.  She had a CT chest on 11/25 that showed focal aortic mural thrombus versus ulcerated plaque to descending thoracic aorta and CTA AP with rim-enhancing RP hematoma and small thrombus in right common iliac artery, multiple nonenhancing fluid collections and subcu tissue in right lower and right inguinal regions, possible evolving hematomas and left femoral vein.  She was started on Vanco and Zosyn and transferred back to [facility].  She completed linezolid and Zosyn around 12/1.  Cardiology was consulted for medical management of her heart failure. Dr. Conley with vascular surgery was consulted, recommended wound vacs to groin wounds.  On 12/9 she became more altered with respiratory distress and transferred to [facility].  CT head was negative for acute changes.  EEG was negative for seizure activity.  She was high on high dose of Seroquel that was tapered off.  She remains on NG tube for tube feeds.    Interval Problem Update  SBP 80s overnight, had recovered on recheck.  /84  Sodium improved to 153  Patient is able to say few words, able to say her name when asked.  Does not follow commands.  Sleeping on and off.  She removed her feeding tube today, has been replaced  I spoke with her  Bruno and sister Laina.  Laina says since [facility], patient is now able to say few sentences which is improved.  I explained that we will not be able to  "do the MRI brain as patient is unable to lie still and I cautioned against sedation because of her recurring and frequent apnea episodes.  Because of her improvement since [facility], they would like to move forward with LTAC when able.  Per CELY, they can accept her when her \"mentation is improved\"    I have discussed this patient's plan of care and discharge plan at IDT rounds today with Case Management, Nursing, Nursing leadership, and other members of the IDT team.    Consultants/Specialty  cardiology and vascular surgery, palliative care    Code Status  DNAR, I OK    Disposition  Medically Cleared  I have placed the appropriate orders for post-discharge needs.    Review of Systems  Review of Systems   Unable to perform ROS: Medical condition        Physical Exam  Temp:  [36.6 °C (97.9 °F)-36.9 °C (98.4 °F)] 36.9 °C (98.4 °F)  Pulse:  [76-81] 76  Resp:  [16-20] 20  BP: ()/() 98/57  SpO2:  [92 %-99 %] 96 %    Physical Exam  Constitutional:       Appearance: She is ill-appearing.      Comments: Opens eyes for exam, inconsistently tracks me   HENT:      Head: Normocephalic.      Mouth/Throat:      Mouth: Mucous membranes are dry.   Eyes:      Comments: Pupils are dilated, equal and reactive to light   Cardiovascular:      Rate and Rhythm: Normal rate and regular rhythm.   Pulmonary:      Breath sounds: Rhonchi present.      Comments: Periodic pauses in breathing, irregular breathing  Abdominal:      General: There is no distension.      Palpations: Abdomen is soft.      Tenderness: There is no abdominal tenderness.   Musculoskeletal:      Right lower leg: No edema.      Left lower leg: No edema.      Comments: Wound vacs bilaterally to groin   Neurological:      Comments: Able to tell me her name.  Able to  her right hand on command, though inconsistent  Spontaneously moves both her upper extremities and lower extremities  Mumbles mostly         Fluids    Intake/Output Summary (Last 24 hours) at 12/16/2024 " 1549  Last data filed at 12/16/2024 1200  Gross per 24 hour   Intake 639.1 ml   Output 1475 ml   Net -835.9 ml        Laboratory  Recent Labs     12/14/24  0309 12/15/24  0034 12/16/24  0155   WBC 10.1 8.2 8.0   RBC 3.14* 3.17* 3.06*   HEMOGLOBIN 10.6* 10.3* 9.9*   HEMATOCRIT 35.1* 35.1* 34.2*   .8* 110.7* 111.8*   MCH 33.8* 32.5 32.4   MCHC 30.2* 29.3* 28.9*   RDW 83.1* 84.0* 84.6*   PLATELETCT 338 308 310   MPV 11.1 11.1 10.9     Recent Labs     12/14/24  0309 12/15/24  0034 12/15/24  1417 12/16/24  0155 12/16/24  0803 12/16/24  1422   SODIUM 153* 157*   < > 154* 153* 149*   POTASSIUM 4.1 4.5  --  3.8  --   --    CHLORIDE 119* 123*  --  122*  --   --    CO2 23 24  --  23  --   --    GLUCOSE 231* 144*  --  204*  --   --    BUN 46* 44*  --  42*  --   --    CREATININE 0.91 0.96  --  1.06  --   --    CALCIUM 7.9* 7.7*  --  8.0*  --   --     < > = values in this interval not displayed.                   Imaging  DX-ABDOMEN FOR TUBE PLACEMENT   Final Result      The tip of the enteric tube terminates over the antrum of the stomach.      DX-ABDOMEN FOR TUBE PLACEMENT   Final Result         1.  Nonspecific bowel gas pattern in the upper abdomen.   2.  Dobbhoff tube is coiled within the duodenum, the tip terminates overlying the expected location of the gastric body.   3.  Bilateral lower lobe edema and/or infiltrates.      DX-ABDOMEN FOR TUBE PLACEMENT   Final Result      NG tube tip projects over the gastroduodenal junction.      DX-CHEST-LIMITED (1 VIEW)   Final Result      1.  Mild cardiomegaly with evidence of mild to moderate pulmonary edema with minimal worsening since previous exam.      CT-HEAD W/O   Final Result         1.  No acute intracranial abnormality is identified, there are nonspecific white matter changes, commonly associated with small vessel ischemic disease.  Associated mild cerebral atrophy is noted.   2.  Bilateral sinusitis changes   3.  Atherosclerosis.               DX-CHEST-PORTABLE (1  VIEW)   Final Result         1.  Mild pulmonary edema and/or infiltrates.      EC-ECHOCARDIOGRAM COMPLETE W/ CONT   Final Result      DX-ABDOMEN FOR TUBE PLACEMENT   Final Result      Tip of the esophagogastric tube terminates over the pylorus of the stomach.      IR-MIDLINE CATHETER INSERTION WO GUIDANCE > AGE 3   Final Result                  Ultrasound-guided midline placement performed by qualified nursing staff    as above.          IR-US GUIDED PIV   Final Result    Ultrasound-guided PERIPHERAL IV INSERTION performed by    qualified nursing staff as above.      DX-OUTSIDE IMAGES-DX CHEST   Final Result      DX-OUTSIDE IMAGES-DX ABDOMEN   Final Result      MR-OUTSIDE IMAGES-MR BRAIN   Final Result      CT-OUTSIDE IMAGES-CT HEAD   Final Result      CT-OUTSIDE IMAGES-CT ABDOMEN/PELVIS   Final Result      CT-OUTSIDE IMAGES-CT CHEST   Final Result      DX-ABDOMEN FOR TUBE PLACEMENT   Final Result      There is a new small bowel feeding tube which terminates in the small bowel of the right mid abdomen.      DX-CHEST-LIMITED (1 VIEW)   Final Result         Asymmetric pulmonary infiltrates, left worse than right.      IR-US GUIDED PIV    (Results Pending)        Assessment/Plan  * Heart failure with reduced ejection fraction (HCC)- (present on admission)  Assessment & Plan  ICM LVEF 25-30%  S/p revascularization  Soft pressures limiting titration of GDMT.  Farxiga  Holding losartan for low BP  Critical care had recommended holding bisoprolol because of Cheyne-Lo breathing  Dehydrated, holding Lasix    Hypernatremia  Assessment & Plan  Persistent worsening and she lost her NG tube on and off  Started on D5 water infusion with FWF and q6h PRN  Goal correction 8 mLEq in 24h  Improving    Acute hypoxic respiratory failure (HCC)  Assessment & Plan  In setting of heart failure, recent cardiogenic shock  Patient demonstrates cheynne lo breathing pattern which is consistent with her suspected anoxic brain  injury  Patient appears to be experiencing aspiration events causing bradycardia  Continue supportive care  Patient is NPO, speech is following    Constipation  Assessment & Plan  + bm  Continue bowel regimen  following    Hypotension  Assessment & Plan  BP low range, holding losartan and bisoprolol    Leukocytosis  Assessment & Plan  resolved  Could be related to hematoma  normal pro luisito  Trend CBC    Abdominal hematoma  Assessment & Plan  Might be related to cardiac device  Clinically stable and h/h in stable range  Patient was on lovenox due to rigth common iliac art thrombosis and then transitioned to eliquis per [facility]  - plan to continue for 3-4 months with repeat imaging at that time to see if it can be d/c   Following  Cbc in am     ACP (advance care planning)  Assessment & Plan  Dr. Mobley: Discussed plan of care and code status with patient  Bruno ([phone]) and sister-in-law Laina ([phone]). I explained that Kiara has experienced a suspected anoxic brain injury during her current medical course that has required ECMO and impella, CVA, Vfib arrest. I shared my concern that Laina may not make a meaningful recovery from this and that her current and future quality of life appears limited. I explained that in the event of another CP arrest her quality of life would be further impacted in the event she were to survive such an episode. Laina and Bruno made the decision to change Kiara's code status to DNR, yes to intubaiton. Laina will be driving in from [place] in the coming days to have further goals of care conversations. Total of 48 minutes spent in discussion and coordination of advance care planning.    12/16 - my conversation with Laina and Bruno, they will like to continue care and plan for PAMs    Pneumonia  Assessment & Plan  S/p completed course of pip/tazo and vanc  Cultures were negative    Aortic mural thrombus (HCC)  Assessment & Plan  CT at outside facility showing focal aortic  mural thrombus along the lateral wall of the descending thoracic aorta and also a tiny filling defect/mural thrombus within the posterior right common iliac artery  apixaban  Monitor H&H    Continue monitoring H&H        Encephalopathy  Assessment & Plan  Hospital course complicated by encephalopathy.  Likely from combination of CVA, STEMI, hyperglycemia  Removing restraints as tolerated, following  Has a nasojejunal feeding tube placed by interventional radiology from outside facility  Was on Seroquel and Depakene as recommended by neurology/psychiatry at outside facility.    Multifactorial  Continues to be encephalopathic. CTH no acute findings. EEG neg for seizure activity  Neuro evaluated patient at [facility].   Have been titrating off seroquel and pt's mental status has improved as her agitation has diminished  DC seroquel qhs dose  I changed valproic acid TID to BID  Repeat MRI brain if possible, unble to tolerate at this time    CVA (cerebral vascular accident) (HCC)  Assessment & Plan  Patient had MRI on 11/17/24 at [facility] which shwed Punctate acute infarct of the right centrum semiovale. Scattered foci of enhancement in multiple vascular distributions as described may represent recent subacute infarcts. Diffuse stippled susceptibility signal throughout the supratentorial and infratentorial brain. Overall findings may represent sequelae of fat emboli or amyloid related angiopathy.   High dose statin  Apixaban  plavix    Continues to be encephalopathic. CTH no acute findings. EEG neg for seizure activity  Neuro evaluated patient at [facility].   Have been titrating off seroquel and pt's mental status has improved as her agitation has diminished  DC seroquel qhs dose  I changed valproic acid TID to BID  Repeat MRI brain if possible, unble to tolerate at this time      CAD (coronary artery disease)  Assessment & Plan  Patient with recent complicated STEMI with PCI to LAD and mid LAD.  Complicated by V-fib arrest and  cardiogenic shock requiring VA ECMO and Impella and retroperitoneal hematomas. At [facility] underwent impella supported PCI to mid LAD just distal to prior stent but unsuccessful with PCI to 100% occluded OM1 on 11/6. Patient was decannulated and had Impella removed.    Discussed with cardiology and asa stopped and replaced with plavix. Cont statin  Repeat echo shows persistently low EF, 25-30%  Cardiology had recommended bisoprolol but due to cheyne florez critical care recommends we stop this - so will hold   Also holding losartan for low BP and lasix for hypernatremia      Urinary retention  Assessment & Plan  Beckwith catheter in place    Hyperglycemia  Assessment & Plan  Glargine 9 units  ISS      Metabolic acidemia- (present on admission)  Assessment & Plan  resolved  Bmp in am  Following           VTE prophylaxis:    therapeutic anticoagulation with eliquis 5 mg BID      I have performed a physical exam and reviewed and updated ROS and Plan today (12/16/2024). In review of yesterday's note (12/15/2024), there are no changes except as documented above.

## 2024-12-16 NOTE — PROGRESS NOTES
Jaime Woods notified of patient's increased mucus and coarse crackle breath sounds throughout. Dr. Woods notified of patient removing her NG tube multiple times throughout the day and again during the beginning of shift with resistance upon attempting to replace the NG tube. Blood was noted in the nares bilaterally. Verbal orders were given to re-attempt placement later in the shift and hold AM ET meds if resistance occurs with next attempt.

## 2024-12-17 PROBLEM — G93.41 ACUTE METABOLIC ENCEPHALOPATHY: Status: ACTIVE | Noted: 2024-11-27

## 2024-12-17 PROBLEM — D53.9 MACROCYTIC ANEMIA: Status: ACTIVE | Noted: 2024-12-17

## 2024-12-17 PROBLEM — G93.40 ENCEPHALOPATHY ACUTE: Status: RESOLVED | Noted: 2024-12-14 | Resolved: 2024-12-17

## 2024-12-17 LAB
ALBUMIN SERPL BCP-MCNC: 2.5 G/DL (ref 3.2–4.9)
ANION GAP SERPL CALC-SCNC: 10 MMOL/L (ref 7–16)
BASOPHILS # BLD AUTO: 0.3 % (ref 0–1.8)
BASOPHILS # BLD: 0.04 K/UL (ref 0–0.12)
BUN SERPL-MCNC: 33 MG/DL (ref 8–22)
BUN SERPL-MCNC: 36 MG/DL (ref 8–22)
CALCIUM ALBUM COR SERPL-MCNC: 8.8 MG/DL (ref 8.5–10.5)
CALCIUM SERPL-MCNC: 7.6 MG/DL (ref 8.5–10.5)
CALCIUM SERPL-MCNC: 7.7 MG/DL (ref 8.5–10.5)
CHLORIDE SERPL-SCNC: 115 MMOL/L (ref 96–112)
CHLORIDE SERPL-SCNC: 118 MMOL/L (ref 96–112)
CO2 SERPL-SCNC: 22 MMOL/L (ref 20–33)
CO2 SERPL-SCNC: 24 MMOL/L (ref 20–33)
CREAT SERPL-MCNC: 0.74 MG/DL (ref 0.5–1.4)
CREAT SERPL-MCNC: 0.93 MG/DL (ref 0.5–1.4)
EOSINOPHIL # BLD AUTO: 0.3 K/UL (ref 0–0.51)
EOSINOPHIL NFR BLD: 2.5 % (ref 0–6.9)
ERYTHROCYTE [DISTWIDTH] IN BLOOD BY AUTOMATED COUNT: 84.5 FL (ref 35.9–50)
GFR SERPLBLD CREATININE-BSD FMLA CKD-EPI: 71 ML/MIN/1.73 M 2
GFR SERPLBLD CREATININE-BSD FMLA CKD-EPI: 93 ML/MIN/1.73 M 2
GLUCOSE BLD STRIP.AUTO-MCNC: 220 MG/DL (ref 65–99)
GLUCOSE BLD STRIP.AUTO-MCNC: 229 MG/DL (ref 65–99)
GLUCOSE BLD STRIP.AUTO-MCNC: 251 MG/DL (ref 65–99)
GLUCOSE SERPL-MCNC: 200 MG/DL (ref 65–99)
GLUCOSE SERPL-MCNC: 245 MG/DL (ref 65–99)
HCT VFR BLD AUTO: 37.4 % (ref 37–47)
HGB BLD-MCNC: 11.2 G/DL (ref 12–16)
IMM GRANULOCYTES # BLD AUTO: 0.13 K/UL (ref 0–0.11)
IMM GRANULOCYTES NFR BLD AUTO: 1.1 % (ref 0–0.9)
LYMPHOCYTES # BLD AUTO: 1.66 K/UL (ref 1–4.8)
LYMPHOCYTES NFR BLD: 14 % (ref 22–41)
MAGNESIUM SERPL-MCNC: 2.3 MG/DL (ref 1.5–2.5)
MAGNESIUM SERPL-MCNC: 2.4 MG/DL (ref 1.5–2.5)
MCH RBC QN AUTO: 33.1 PG (ref 27–33)
MCHC RBC AUTO-ENTMCNC: 29.9 G/DL (ref 32.2–35.5)
MCV RBC AUTO: 110.7 FL (ref 81.4–97.8)
MONOCYTES # BLD AUTO: 0.66 K/UL (ref 0–0.85)
MONOCYTES NFR BLD AUTO: 5.6 % (ref 0–13.4)
NEUTROPHILS # BLD AUTO: 9.03 K/UL (ref 1.82–7.42)
NEUTROPHILS NFR BLD: 76.5 % (ref 44–72)
NRBC # BLD AUTO: 0.02 K/UL
NRBC BLD-RTO: 0.2 /100 WBC (ref 0–0.2)
PHOSPHATE SERPL-MCNC: 2.7 MG/DL (ref 2.5–4.5)
PLATELET # BLD AUTO: 283 K/UL (ref 164–446)
PMV BLD AUTO: 11 FL (ref 9–12.9)
POTASSIUM SERPL-SCNC: 3.9 MMOL/L (ref 3.6–5.5)
POTASSIUM SERPL-SCNC: 4 MMOL/L (ref 3.6–5.5)
RBC # BLD AUTO: 3.38 M/UL (ref 4.2–5.4)
SODIUM SERPL-SCNC: 147 MMOL/L (ref 135–145)
SODIUM SERPL-SCNC: 147 MMOL/L (ref 135–145)
SODIUM SERPL-SCNC: 148 MMOL/L (ref 135–145)
SODIUM SERPL-SCNC: 149 MMOL/L (ref 135–145)
SODIUM SERPL-SCNC: 150 MMOL/L (ref 135–145)
SODIUM SERPL-SCNC: 150 MMOL/L (ref 135–145)
WBC # BLD AUTO: 11.8 K/UL (ref 4.8–10.8)

## 2024-12-17 PROCEDURE — 700102 HCHG RX REV CODE 250 W/ 637 OVERRIDE(OP): Performed by: STUDENT IN AN ORGANIZED HEALTH CARE EDUCATION/TRAINING PROGRAM

## 2024-12-17 PROCEDURE — 700102 HCHG RX REV CODE 250 W/ 637 OVERRIDE(OP): Performed by: INTERNAL MEDICINE

## 2024-12-17 PROCEDURE — 99233 SBSQ HOSP IP/OBS HIGH 50: CPT

## 2024-12-17 PROCEDURE — 97535 SELF CARE MNGMENT TRAINING: CPT

## 2024-12-17 PROCEDURE — 83735 ASSAY OF MAGNESIUM: CPT

## 2024-12-17 PROCEDURE — 36415 COLL VENOUS BLD VENIPUNCTURE: CPT

## 2024-12-17 PROCEDURE — 80069 RENAL FUNCTION PANEL: CPT

## 2024-12-17 PROCEDURE — 700102 HCHG RX REV CODE 250 W/ 637 OVERRIDE(OP): Performed by: HOSPITALIST

## 2024-12-17 PROCEDURE — 82962 GLUCOSE BLOOD TEST: CPT | Mod: 91

## 2024-12-17 PROCEDURE — 770020 HCHG ROOM/CARE - TELE (206)

## 2024-12-17 PROCEDURE — 85025 COMPLETE CBC W/AUTO DIFF WBC: CPT

## 2024-12-17 PROCEDURE — A9270 NON-COVERED ITEM OR SERVICE: HCPCS | Performed by: HOSPITALIST

## 2024-12-17 PROCEDURE — A9270 NON-COVERED ITEM OR SERVICE: HCPCS | Performed by: STUDENT IN AN ORGANIZED HEALTH CARE EDUCATION/TRAINING PROGRAM

## 2024-12-17 PROCEDURE — A9270 NON-COVERED ITEM OR SERVICE: HCPCS | Performed by: INTERNAL MEDICINE

## 2024-12-17 PROCEDURE — 84295 ASSAY OF SERUM SODIUM: CPT | Mod: 91

## 2024-12-17 PROCEDURE — 700101 HCHG RX REV CODE 250: Performed by: HOSPITALIST

## 2024-12-17 PROCEDURE — 92526 ORAL FUNCTION THERAPY: CPT

## 2024-12-17 PROCEDURE — 93005 ELECTROCARDIOGRAM TRACING: CPT | Mod: TC | Performed by: NURSE PRACTITIONER

## 2024-12-17 PROCEDURE — 80048 BASIC METABOLIC PNL TOTAL CA: CPT

## 2024-12-17 RX ORDER — DEXTROSE MONOHYDRATE 25 G/50ML
25 INJECTION, SOLUTION INTRAVENOUS
Status: DISCONTINUED | OUTPATIENT
Start: 2024-12-17 | End: 2024-12-26

## 2024-12-17 RX ORDER — INSULIN LISPRO 100 [IU]/ML
1-6 INJECTION, SOLUTION INTRAVENOUS; SUBCUTANEOUS EVERY 6 HOURS
Status: DISCONTINUED | OUTPATIENT
Start: 2024-12-17 | End: 2024-12-26

## 2024-12-17 RX ADMIN — APIXABAN 5 MG: 5 TABLET, FILM COATED ORAL at 04:40

## 2024-12-17 RX ADMIN — ATORVASTATIN CALCIUM 80 MG: 80 TABLET, FILM COATED ORAL at 17:25

## 2024-12-17 RX ADMIN — DAKIN'S SOLUTION 0.125% (QUARTER STRENGTH) 473 ML: 0.12 SOLUTION at 06:15

## 2024-12-17 RX ADMIN — VALPROIC ACID 250 MG: 250 SOLUTION ORAL at 17:25

## 2024-12-17 RX ADMIN — DAPAGLIFLOZIN 10 MG: 10 TABLET, FILM COATED ORAL at 04:40

## 2024-12-17 RX ADMIN — INSULIN GLARGINE-YFGN 9 UNITS: 100 INJECTION, SOLUTION SUBCUTANEOUS at 17:31

## 2024-12-17 RX ADMIN — INSULIN LISPRO 2 UNITS: 100 INJECTION, SOLUTION INTRAVENOUS; SUBCUTANEOUS at 17:32

## 2024-12-17 RX ADMIN — INSULIN LISPRO 3 UNITS: 100 INJECTION, SOLUTION INTRAVENOUS; SUBCUTANEOUS at 11:33

## 2024-12-17 RX ADMIN — CLOPIDOGREL BISULFATE 75 MG: 75 TABLET ORAL at 04:40

## 2024-12-17 RX ADMIN — LANSOPRAZOLE 30 MG: 30 TABLET, ORALLY DISINTEGRATING ORAL at 04:40

## 2024-12-17 RX ADMIN — VALPROIC ACID 250 MG: 250 SOLUTION ORAL at 04:43

## 2024-12-17 RX ADMIN — INSULIN LISPRO 2 UNITS: 100 INJECTION, SOLUTION INTRAVENOUS; SUBCUTANEOUS at 07:47

## 2024-12-17 RX ADMIN — APIXABAN 5 MG: 5 TABLET, FILM COATED ORAL at 17:25

## 2024-12-17 ASSESSMENT — COGNITIVE AND FUNCTIONAL STATUS - GENERAL
HELP NEEDED FOR BATHING: TOTAL
EATING MEALS: TOTAL
DRESSING REGULAR UPPER BODY CLOTHING: A LOT
TOILETING: TOTAL
PERSONAL GROOMING: A LOT
DRESSING REGULAR LOWER BODY CLOTHING: TOTAL
SUGGESTED CMS G CODE MODIFIER DAILY ACTIVITY: CM
DAILY ACTIVITIY SCORE: 8

## 2024-12-17 ASSESSMENT — PAIN DESCRIPTION - PAIN TYPE
TYPE: ACUTE PAIN
TYPE: ACUTE PAIN

## 2024-12-17 ASSESSMENT — FIBROSIS 4 INDEX: FIB4 SCORE: 1.9

## 2024-12-17 NOTE — THERAPY
Speech Language Pathology   Daily Treatment     Patient Name: Kiara Qureshi  AGE:  59 y.o., SEX:  female  Medical Record #: [id number]  Date of Service: 12/17/2024      Precautions:  Precautions: Fall Risk, Swallow Precautions, Nasogastric Tube         Subjective  Patient cleared by RN for session. Patient received awake, pleasantly confused. Patient more verbose and following simple commands this date; compared to previous sessions. Patient frequently requesting more ice chips and water.       Assessment  Patient seen this date for dysphagia management. Oral care provided prior to PO presentation. PO trials of ice chips, thins by tsp/cup and liquidized assessed. Adequate oral bolus acceptance/containment. Swallow initiation appeared timely. Wet/gurgly vocal quality appreciated with PO of thin liquids, cleared with cued cough.        Clinical Impressions  Patient presents with clinical signs of and is at elevated risk for oropharyngeal dysphagia. Patient will benefit from diagnostic swallow study to objectively assess swallow function and inform POC; however, is not appropriate to participate at this juncture. Service will continue to follow to maximize swallowing outcomes and complete study as medically appropriate. Please provide frequent, diligent oral care as able.       Recommendations  Treatment Completed: Dysphagia Treatment       Dysphagia Treatment  Diet Consistency: NPO/NGT; pre-feeding trials with SLP only   -Clear for minimal ice chips/hour 1:1 w/RN, when alert, after oral care to reduce xerostomia and mitigate disuse atrophy of swallow musculature  Instrumentation: Instrumental swallow study pending clinical progress  Medication: Non Oral  Oral Care: Q2h                     SLP Treatment Plan  Treatment Plan: Dysphagia Treatment, Patient/Family/Caregiver Training  SLP Frequency: 3x Per Week  Estimated Duration: Until Therapy Goals Met      Anticipated Discharge Needs  Discharge Recommendations:  "Recommend post-acute placement for additional speech therapy services prior to discharge home  Therapy Recommendations Upon DC: Dysphagia Training, Community Re-Integration, Patient / Family / Caregiver Education      Patient / Family Goals  Patient / Family Goal #1: \"I'll eat later when I'm more hungry\"  Goal #1 Outcome: Progressing slower than expected  Short Term Goals  Short Term Goal # 1: Patient will consume a full liquid diet with no overt s/sx of aspiration given 1:1 feeding.  Goal Outcome # 1: Progressing slower than expected  Short Term Goal # 2: Pt will participate in prefeeding trials with SLP without overt s/sx of aspiration  Goal Outcome # 2 : Progressing slower than expected      CHERELLE Hyman  "

## 2024-12-17 NOTE — DISCHARGE PLANNING
Case Management Discharge Planning    Admission Date: 11/27/2024  GMLOS: 4.1  ALOS: 20    6-Clicks ADL Score: 7  6-Clicks Mobility Score: 6      Anticipated Discharge Dispo: Discharge Disposition: Disch to a long term care facility (63)  Discharge Address: [address]   [place] [address]  Discharge Contact Phone Number: [phone]    This RN CM recived report from Angi on T8, patient is pending acceptance wi PAMS related to lack of improvement in mentation. Patinet is documented as A/Ox0. Per SW on T8, family had another goals of care conversation with family about patient not improving. Family is not ready to consider palliative care.     Wound vac x2, NG feeding tube, de jesus, and restraints are in place.     Plan to follow up if discussion for long term nutrition was discussed. Also financial information on income, access to bank account.     This RN CM spoke with Spouse Bruno and sister Aileen. 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 Patient has no assets, they just bought their first house together in [place] 6 months ago . Spouse is 70 years old and receives SS income of $720/month.     Discussed working on getting patient screened for long term payer medicaid with family. As her current monthly income is 0. This RN CM reached out to [facility] at ext [phone] and left voicemail for follow up.     Discussed with family that patient has an NG tube and that she would need a long terms source of nutrition. Family verbalized that they would approve for PEG tube placement. Discussed that we are sending referrals to SNF in [facility] for consideration. Family verbalized understanding that if SNF is not accepted that family will need to provide care to her at home.

## 2024-12-17 NOTE — PROGRESS NOTES
Monitor Summary  Rhythm: SB/SR  Rate: 54-79  low as 36  Ectopy: OPVC, 6 beats of VTACH, FPAC  .12 / .10 / .38

## 2024-12-17 NOTE — CARE PLAN
The patient is Watcher - Medium risk of patient condition declining or worsening    Shift Goals  Clinical Goals: comfort, skin, SBP greater than 90  Patient Goals: JAYLIN  Family Goals: JAYLIN    Progress made toward(s) clinical / shift goals:    Frequently repositioned, SBP remained greater than 90    Problem: Safety - Medical Restraint  Goal: Remains free of injury from restraints (Restraint for Interference with Medical Device)  Outcome: Progressing  Goal: Free from restraint(s) (Restraint for Interference with Medical Device)  Outcome: Progressing     Problem: Safety  Goal: Will remain free from injury  Outcome: Progressing  Goal: Will remain free from falls  Outcome: Progressing     Problem: Skin Integrity  Goal: Risk for impaired skin integrity will decrease  Outcome: Progressing     Problem: Care Map:  Day 3 Optimal Outcome for the Heart Failure Patient  Goal: Day 3:  Optimal Care of the heart failure patient  Outcome: Progressing

## 2024-12-17 NOTE — PROGRESS NOTES
Bedside report received from night shift RN. Assumed care. Pt is oriented to name only., Pt is in bed. Bilateral soft wrist restraints, mitts and 4 side rails in place. Tele sitter at bedside. Pt resting at this time. Bed is in the lowest position and bed alarm is on. Will continue to monitor.

## 2024-12-17 NOTE — THERAPY
Occupational Therapy  Daily Treatment     Patient Name: Kiara Qureshi  Age:  59 y.o., Sex:  female  Medical Record #: [id number]  Today's Date: 12/17/2024     Precautions  Precautions: (P) Fall Risk, Swallow Precautions, Nasogastric Tube  Comments: (P) EF= 25-30%    Assessment    Pt seen for OT tx. Pt continues to require max to totalA for all ADL performance. Pt continues to be mostly limited d/t cognition, inability to follow commands and/or respond with multimodal stimulation and cues provided. Pt unable to state her name or nod appropriately to orientation questions. Pt nodding yes when asked to perform oral care however inconsistent. Pt observed to move all BUE at bed level however continues to pull at lines with B wrist mittens removed. Will continue to benefit from inpt OT.     Plan    Treatment Plan Status: (P) Continue Current Treatment Plan  Type of Treatment: Self Care / Activities of Daily Living, Neuro Re-Education / Balance, Therapeutic Exercises, Therapeutic Activity, Family / Caregiver Training, Adaptive Equipment  Treatment Frequency: 2 Times per Week  Treatment Duration: Until Therapy Goals Met    DC Equipment Recommendations: (P) Unable to determine at this time  Discharge Recommendations: (P) Recommend post-acute placement for additional occupational therapy services prior to discharge home    Subjective    Nodding head yes to oral care     Objective       12/17/24 1336   Precautions   Precautions Fall Risk;Swallow Precautions;Nasogastric Tube   Comments EF= 25-30%   Vitals   Pulse Oximetry 100 %   O2 (LPM) 2   O2 Delivery Device Silicone Nasal Cannula   Pain   Intervention Rest;Repositioned   Pain 0 - 10 Group   Therapist Pain Assessment During Activity;Post Activity Pain Same as Prior to Activity;Nurse Notified  (no indication of pain)   Non Verbal Descriptors   Non Verbal Scale  Calm   Cognition    Cognition / Consciousness X   Speech/ Communication Incomprehensible Words   Orientation  Level Not Oriented to Year;Not Oriented to Month;Not Oriented to Day;Not Oriented to Time;Not Oriented to Place;Not Oriented to Reason   Level of Consciousness Alert   Ability To Follow Commands Unable to Follow 1 Step Commands   Safety Awareness Impaired   New Learning Impaired   Attention Impaired   Sequencing Impaired   Initiation Impaired   Comments incomprehensible speech throughout, not following 1 step commands consistently. improved when asked yes/no questions regarding oral care, able to nod yes when asked however not consistent. multimodal cueing provided   Active ROM Upper Body   Comments observed BUE moving functionally however unsafe to allow BUE freely from restraints as pt pulls at lines   Strength Upper Body   Gross Strength Generalized Weakness, Equal Bilaterally.    Balance   Comments bed level only   Bed Mobility    Rolling Total Assist to Rt.;Total Assist to Lt.   Activities of Daily Living   Eating Total Assist   Grooming Maximal Assist;Seated  (attempted hand over hand for oral care, pt with difficulty grasping oral care supplies)   Upper Body Dressing Maximal Assist   Toileting Total Assist   Skilled Intervention Verbal Cuing;Tactile Cuing;Compensatory Strategies;Facilitation   Comments only briefly would pt engage for oral care   Functional Mobility   Sit to Stand Unable to Participate   Bed, Chair, Wheelchair Transfer Unable to Participate   Toilet Transfers Unable to Participate   Mobility bed level only   Visual Perception   Comments not tracking   Patient / Family Goals   Patient / Family Goal #1 For pt to get into a chair, per pt's    Goal #1 Outcome Progressing slower than expected   Short Term Goals   Short Term Goal # 1 Pt will follow one-step directions 100% of the time during ADLs   Goal Outcome # 1 Goal not met   Short Term Goal # 2 Pt will change gown with modA   Goal Outcome # 2 Goal not met   Short Term Goal # 3 Pt will perform seated g/h with Jun   Goal Outcome # 3 Goal  not met   Short Term Goal # 4 Pt will transfer to [facility] with modA, second person for safety   Goal Outcome # 4 Goal not met   Education Group   Role of Occupational Therapist Patient Response Patient;Explanation;No Learning Evidence   Occupational Therapy Treatment Plan    O.T. Treatment Plan Continue Current Treatment Plan   Anticipated Discharge Equipment and Recommendations   DC Equipment Recommendations Unable to determine at this time   Discharge Recommendations Recommend post-acute placement for additional occupational therapy services prior to discharge home   Interdisciplinary Plan of Care Collaboration   IDT Collaboration with  Nursing   Patient Position at End of Therapy In Bed;Bed Alarm On;Tray Table within Reach;Call Light within Reach;B wrist restraints, B hand mittens   Collaboration Comments RN updated   Session Information   Date / Session Number  12/17, #4 (2/2, 12/17)

## 2024-12-17 NOTE — PROGRESS NOTES
Monitor Summary   SB/SR 36-80s  Frequent PVCs, bigem and trigem PVC, 5 beat run of vtach overnight

## 2024-12-17 NOTE — CARE PLAN
Problem: Safety - Medical Restraint  Goal: Remains free of injury from restraints (Restraint for Interference with Medical Device)  Description: INTERVENTIONS:  1. Determine that other, less restrictive measures have been tried or would not be effective before applying the restraint  2. Evaluate the patient's condition at the time of restraint application  3. Educate patient/family regarding the reason for restraint  4. Q2H: Monitor safety, psychosocial status, comfort, circulation, respiratory status, LOC, nutrition and hydration  Outcome: Progressing     Problem: Respiratory  Goal: Patient will achieve/maintain optimum respiratory ventilation and gas exchange  Description: Target End Date:  Prior to discharge or change in level of care    Document on Assessment flowsheet    1.  Assess and monitor rate, rhythm, depth and effort of respiration  2.  Breath sounds assessed qshift and/or as needed  3.  Assess O2 saturation, administer/titrate oxygen as ordered  4.  Position patient for maximum ventilatory efficiency  5.  Turn, cough, and deep breath with splinting to improve effectiveness  6.  Collaborate with RT to administer medication/treatments per order  7.  Encourage use of incentive spirometer and encourage patient to cough after use and utilize splinting techniques if applicable  8.  Airway suctioning  9.  Monitor sputum production for changes in color, consistency and frequency  10. Perform frequent oral hygiene  11. Alternate physical activity with rest periods  Outcome: Progressing   The patient is Watcher - Medium risk of patient condition declining or worsening    Shift Goals  Clinical Goals: q2 turns, comfort, SBP greater than 90.  Patient Goals: JAYLIN  Family Goals: JAYLIN    Progress made toward(s) clinical / shift goals:  Pt updated on POC    Patient is not progressing towards the following goals:

## 2024-12-17 NOTE — PROGRESS NOTES
Notified Peggy FIGUEREDO, that patient is increasingly restless and agitated. No PRNs for restlessness or agitation on MAR. Received orders

## 2024-12-17 NOTE — PROGRESS NOTES
Hospital Medicine Daily Progress Note    Date of Service  12/17/2024    Chief Complaint  Kiara Qureshi is a 59 y.o. female admitted 11/27/2024 with encephalopathy    Hospital Course  58 yo woman HTN, HLD who was at [facility] for anterior STEMI s/p lytics and had LAD PCI and unsuccessful PCI for OM1 complicated by RP bleed, vfib and PEA arrest with cardiogenic shock needing ECMO and imeplla.  ECMO was decannulated 11/4, Impella removed 11/8.  She also had encephalopathy and delirium on valproic acid and Seroquel.  TTE showed EF 25-30%, she did not tolerate GDMT due to hypotension.  She had a CT chest on 11/25 that showed focal aortic mural thrombus versus ulcerated plaque to descending thoracic aorta and CTA AP with rim-enhancing RP hematoma and small thrombus in right common iliac artery, multiple nonenhancing fluid collections and subcu tissue in right lower and right inguinal regions, possible evolving hematomas and left femoral vein.  She was started on Vanco and Zosyn and transferred back to [facility].  She completed linezolid and Zosyn around 12/1.  Cardiology was consulted for medical management of her heart failure. Dr. Conley with vascular surgery was consulted, recommended wound vacs to groin wounds.  On 12/9 she became more altered with respiratory distress and transferred to [facility].  CT head was negative for acute changes.  EEG was negative for seizure activity.  She was high on high dose of Seroquel that was tapered off.  She remains on NG tube for tube feeds.    Interval Problem Update    12/17  Received a dose of seroquel overnight for agitation and still required restratings. BP at 90/50s.   Sodium improved to 147. Hgb stable.   Pt able to say a few words, opens eyes spontaneously however AOX0 when asked and does not follow commands. Remains on feeding tube.   Defer MRI given agitation,  unable to lie still and wary of sedation   Continue valproic acid BID for now. Will hold off restarting seroquel  and monitor.     I have discussed this patient's plan of care and discharge plan at IDT rounds today with Case Management, Nursing, Nursing leadership, and other members of the IDT team.    Consultants/Specialty  cardiology and vascular surgery, palliative care    Code Status  DNAR, I OK    Disposition  Medically Cleared  I have placed the appropriate orders for post-discharge needs.    Review of Systems  Review of Systems   Unable to perform ROS: Medical condition        Physical Exam  Temp:  [36.5 °C (97.7 °F)-37.2 °C (99 °F)] 37.2 °C (99 °F)  Pulse:  [68-84] 84  Resp:  [16-18] 18  BP: ()/(46-60) 92/54  SpO2:  [95 %-100 %] 99 %    Physical Exam  Constitutional:       Appearance: She is ill-appearing.      Comments: Opens eyes for exam, inconsistently tracks me   HENT:      Head: Normocephalic.      Mouth/Throat:      Mouth: Mucous membranes are dry.   Eyes:      Comments: Pupils are dilated, equal and reactive to light   Cardiovascular:      Rate and Rhythm: Normal rate and regular rhythm.   Pulmonary:      Breath sounds: Rhonchi present.      Comments: Periodic pauses in breathing, irregular breathing  Abdominal:      General: There is no distension.      Palpations: Abdomen is soft.      Tenderness: There is no abdominal tenderness.   Musculoskeletal:      Right lower leg: No edema.      Left lower leg: No edema.      Comments: Wound vacs bilaterally to groin   Neurological:      Comments: Able to tell me her name.  Able to  her right hand on command, though inconsistent  Spontaneously moves both her upper extremities and lower extremities  Mumbles mostly         Fluids    Intake/Output Summary (Last 24 hours) at 12/17/2024 1843  Last data filed at 12/17/2024 1820  Gross per 24 hour   Intake 750 ml   Output 2100 ml   Net -1350 ml        Laboratory  Recent Labs     12/15/24  0034 12/16/24  0155 12/17/24  0401   WBC 8.2 8.0 11.8*   RBC 3.17* 3.06* 3.38*   HEMOGLOBIN 10.3* 9.9* 11.2*   HEMATOCRIT 35.1*  34.2* 37.4   .7* 111.8* 110.7*   MCH 32.5 32.4 33.1*   MCHC 29.3* 28.9* 29.9*   RDW 84.0* 84.6* 84.5*   PLATELETCT 308 310 283   MPV 11.1 10.9 11.0     Recent Labs     12/15/24  0034 12/15/24  1417 12/16/24  0155 12/16/24  0803 12/17/24  0401 12/17/24  0633 12/17/24  1205 12/17/24  1752   SODIUM 157*   < > 154*   < > 147* 147* 148* 149*   POTASSIUM 4.5  --  3.8  --  3.9  --   --   --    CHLORIDE 123*  --  122*  --  115*  --   --   --    CO2 24  --  23  --  24  --   --   --    GLUCOSE 144*  --  204*  --  245*  --   --   --    BUN 44*  --  42*  --  36*  --   --   --    CREATININE 0.96  --  1.06  --  0.93  --   --   --    CALCIUM 7.7*  --  8.0*  --  7.6*  --   --   --     < > = values in this interval not displayed.                   Imaging  DX-ABDOMEN FOR TUBE PLACEMENT   Final Result      The tip of the enteric tube terminates over the antrum of the stomach.      DX-ABDOMEN FOR TUBE PLACEMENT   Final Result         1.  Nonspecific bowel gas pattern in the upper abdomen.   2.  Dobbhoff tube is coiled within the duodenum, the tip terminates overlying the expected location of the gastric body.   3.  Bilateral lower lobe edema and/or infiltrates.      DX-ABDOMEN FOR TUBE PLACEMENT   Final Result      NG tube tip projects over the gastroduodenal junction.      DX-CHEST-LIMITED (1 VIEW)   Final Result      1.  Mild cardiomegaly with evidence of mild to moderate pulmonary edema with minimal worsening since previous exam.      CT-HEAD W/O   Final Result         1.  No acute intracranial abnormality is identified, there are nonspecific white matter changes, commonly associated with small vessel ischemic disease.  Associated mild cerebral atrophy is noted.   2.  Bilateral sinusitis changes   3.  Atherosclerosis.               DX-CHEST-PORTABLE (1 VIEW)   Final Result         1.  Mild pulmonary edema and/or infiltrates.      EC-ECHOCARDIOGRAM COMPLETE W/ CONT   Final Result      DX-ABDOMEN FOR TUBE PLACEMENT   Final  Result      Tip of the esophagogastric tube terminates over the pylorus of the stomach.      IR-MIDLINE CATHETER INSERTION WO GUIDANCE > AGE 3   Final Result                  Ultrasound-guided midline placement performed by qualified nursing staff    as above.          IR-US GUIDED PIV   Final Result    Ultrasound-guided PERIPHERAL IV INSERTION performed by    qualified nursing staff as above.      DX-OUTSIDE IMAGES-DX CHEST   Final Result      DX-OUTSIDE IMAGES-DX ABDOMEN   Final Result      MR-OUTSIDE IMAGES-MR BRAIN   Final Result      CT-OUTSIDE IMAGES-CT HEAD   Final Result      CT-OUTSIDE IMAGES-CT ABDOMEN/PELVIS   Final Result      CT-OUTSIDE IMAGES-CT CHEST   Final Result      DX-ABDOMEN FOR TUBE PLACEMENT   Final Result      There is a new small bowel feeding tube which terminates in the small bowel of the right mid abdomen.      DX-CHEST-LIMITED (1 VIEW)   Final Result         Asymmetric pulmonary infiltrates, left worse than right.      IR-US GUIDED PIV    (Results Pending)        Assessment/Plan  * Heart failure with reduced ejection fraction (HCC)- (present on admission)  Assessment & Plan  ICM LVEF 25-30%  S/p revascularization  Soft pressures limiting titration of GDMT.  Farxiga  Holding losartan for low BP  Critical care had recommended holding bisoprolol because of Cheyne-Lo breathing  Dehydrated, holding Lasix    Hypernatremia  Assessment & Plan  Persistent worsening and she lost her NG tube on and off  Started on D5 water infusion with FWF and q6h PRN  Goal correction 8 mLEq in 24h    12/17  Improving  S/p D5 infusion   Continue FWF 300cc every 4 hours and q12 Na checks    Acute hypoxic respiratory failure (HCC)  Assessment & Plan  In setting of heart failure, recent cardiogenic shock  Patient demonstrates cheynne lo breathing pattern which is consistent with her suspected anoxic brain injury  Patient appears to be experiencing aspiration events causing bradycardia  Continue supportive  care  Patient is NPO, speech is following    Constipation  Assessment & Plan  + bm  Continue bowel regimen  following    Hypotension  Assessment & Plan  BP low range, holding losartan and bisoprolol    Leukocytosis  Assessment & Plan  resolved  Could be related to hematoma  normal pro luisito  Trend CBC    Abdominal hematoma  Assessment & Plan  Might be related to cardiac device  Clinically stable and h/h in stable range  Patient was on lovenox due to rigth common iliac art thrombosis and then transitioned to eliquis per [facility]  - plan to continue for 3-4 months with repeat imaging at that time to see if it can be d/c   CBC check tomorrow    ACP (advance care planning)  Assessment & Plan  Dr. Mobley: Discussed plan of care and code status with patient  Bruno ([phone]) and sister-in-law Laina ([phone]). I explained that Kiara has experienced a suspected anoxic brain injury during her current medical course that has required ECMO and impella, CVA, Vfib arrest. I shared my concern that Laina may not make a meaningful recovery from this and that her current and future quality of life appears limited. I explained that in the event of another CP arrest her quality of life would be further impacted in the event she were to survive such an episode. Laina and Bruno made the decision to change Kiara's code status to DNR, yes to intubaiton. Laina will be driving in from [place] in the coming days to have further goals of care conversations. Total of 48 minutes spent in discussion and coordination of advance care planning.    12/16 - my conversation with Laina and Bruno, they will like to continue care and plan for [facility]s    Pneumonia  Assessment & Plan  S/p completed course of pip/tazo and vanc  Cultures were negative    Aortic mural thrombus (HCC)  Assessment & Plan  CT at outside facility showing focal aortic mural thrombus along the lateral wall of the descending thoracic aorta and also a tiny filling defect/mural  thrombus within the posterior right common iliac artery  apixaban  Continue to monitor CBC        Acute metabolic encephalopathy  Assessment & Plan  Hospital course complicated by encephalopathy.  Likely from combination of CVA, STEMI, hyperglycemia; likely also with anoxic brain injury   Removing restraints as tolerated, following  Has a nasojejunal feeding tube placed by interventional radiology from outside facility  Was on Seroquel and Depakene as recommended by neurology/psychiatry at outside facility.    Multifactorial  Continues to be encephalopathic. CTH no acute findings. EEG neg for seizure activity  Neuro evaluated patient at [facility].   Have been titrating off seroquel and pt's mental status has improved as her agitation has diminished  DC seroquel qhs dose  I changed valproic acid TID to BID  Repeat MRI brain if possible, unble to tolerate at this time    CVA (cerebral vascular accident) (HCC)  Assessment & Plan  Patient had MRI on 11/17/24 at [facility] which shwed Punctate acute infarct of the right centrum semiovale. Scattered foci of enhancement in multiple vascular distributions as described may represent recent subacute infarcts. Diffuse stippled susceptibility signal throughout the supratentorial and infratentorial brain. Overall findings may represent sequelae of fat emboli or amyloid related angiopathy.   High dose statin  Apixaban  plavix    Continues to be encephalopathic. CTH no acute findings. EEG neg for seizure activity  Neuro evaluated patient at [facility].   Have been titrating off seroquel and pt's mental status has improved as her agitation has diminished  DC seroquel qhs dose  I changed valproic acid TID to BID  Repeat MRI brain if possible, unble to tolerate at this time    12/17  See plan for DOUG      CAD (coronary artery disease)  Assessment & Plan  Patient with recent complicated STEMI with PCI to LAD and mid LAD.  Complicated by V-fib arrest and cardiogenic shock requiring VA ECMO and Impella  and retroperitoneal hematomas. At [facility] underwent impella supported PCI to mid LAD just distal to prior stent but unsuccessful with PCI to 100% occluded OM1 on 11/6. Patient was decannulated and had Impella removed.    Discussed with cardiology and asa stopped and replaced with plavix. Cont statin  Repeat echo shows persistently low EF, 25-30%  Cardiology had recommended bisoprolol but due to cheyne florez critical care recommends we stop this - so will hold   Continue to hold losartan for low BP and lasix for hypernatremia      Urinary retention  Assessment & Plan  Beckwith catheter in place    Hyperglycemia  Assessment & Plan  Glargine 9 units  ISS      Metabolic acidemia- (present on admission)  Assessment & Plan  resolved  Bmp in am  Following           VTE prophylaxis:    therapeutic anticoagulation with eliquis 5 mg BID      I have performed a physical exam and reviewed and updated ROS and Plan today (12/17/2024). In review of yesterday's note (12/16/2024), there are no changes except as documented above.      Greater than 53 minutes spent prepping to see patient (e.g. review of tests) obtaining and/or reviewing separately obtained history. Performing a medically appropriate examination and evaluation.  Counseling and educating the patient/family/caregiver.  Ordering medications, tests, or procedures.  Referring and communicating with other health care professionals.  Documenting clinical information in EPIC.  Independently interpreting results and communicating results to patient/family/caregiver.  Care coordination.

## 2024-12-18 ENCOUNTER — APPOINTMENT (OUTPATIENT)
Dept: RADIOLOGY | Facility: MEDICAL CENTER | Age: 59
End: 2024-12-18
Payer: COMMERCIAL

## 2024-12-18 LAB
BASOPHILS # BLD AUTO: 0.6 % (ref 0–1.8)
BASOPHILS # BLD: 0.06 K/UL (ref 0–0.12)
EKG IMPRESSION: NORMAL
EOSINOPHIL # BLD AUTO: 0.21 K/UL (ref 0–0.51)
EOSINOPHIL NFR BLD: 2 % (ref 0–6.9)
ERYTHROCYTE [DISTWIDTH] IN BLOOD BY AUTOMATED COUNT: 81.4 FL (ref 35.9–50)
FERRITIN SERPL-MCNC: 643 NG/ML (ref 10–291)
FOLATE SERPL-MCNC: 20 NG/ML
GLUCOSE BLD STRIP.AUTO-MCNC: 150 MG/DL (ref 65–99)
GLUCOSE BLD STRIP.AUTO-MCNC: 161 MG/DL (ref 65–99)
GLUCOSE BLD STRIP.AUTO-MCNC: 171 MG/DL (ref 65–99)
GLUCOSE BLD STRIP.AUTO-MCNC: 179 MG/DL (ref 65–99)
HCT VFR BLD AUTO: 33.6 % (ref 37–47)
HGB BLD-MCNC: 10.1 G/DL (ref 12–16)
HGB RETIC QN AUTO: 36.3 PG/CELL (ref 29–35)
IMM GRANULOCYTES # BLD AUTO: 0.14 K/UL (ref 0–0.11)
IMM GRANULOCYTES NFR BLD AUTO: 1.3 % (ref 0–0.9)
IMM RETICS NFR: 31.8 % (ref 2.6–16.1)
IRON SATN MFR SERPL: 26 % (ref 15–55)
IRON SERPL-MCNC: 30 UG/DL (ref 40–170)
LYMPHOCYTES # BLD AUTO: 1.39 K/UL (ref 1–4.8)
LYMPHOCYTES NFR BLD: 13.2 % (ref 22–41)
MCH RBC QN AUTO: 32.6 PG (ref 27–33)
MCHC RBC AUTO-ENTMCNC: 30.1 G/DL (ref 32.2–35.5)
MCV RBC AUTO: 108.4 FL (ref 81.4–97.8)
MONOCYTES # BLD AUTO: 0.67 K/UL (ref 0–0.85)
MONOCYTES NFR BLD AUTO: 6.4 % (ref 0–13.4)
NEUTROPHILS # BLD AUTO: 8.04 K/UL (ref 1.82–7.42)
NEUTROPHILS NFR BLD: 76.5 % (ref 44–72)
NRBC # BLD AUTO: 0 K/UL
NRBC BLD-RTO: 0 /100 WBC (ref 0–0.2)
PLATELET # BLD AUTO: 244 K/UL (ref 164–446)
PMV BLD AUTO: 11.3 FL (ref 9–12.9)
RBC # BLD AUTO: 3.1 M/UL (ref 4.2–5.4)
RETICS # AUTO: 0.18 M/UL (ref 0.04–0.12)
RETICS/RBC NFR: 5.8 % (ref 0.8–2.6)
SODIUM SERPL-SCNC: 146 MMOL/L (ref 135–145)
SODIUM SERPL-SCNC: 148 MMOL/L (ref 135–145)
TIBC SERPL-MCNC: 116 UG/DL (ref 250–450)
UIBC SERPL-MCNC: 86 UG/DL (ref 110–370)
VIT B12 SERPL-MCNC: 793 PG/ML (ref 211–911)
WBC # BLD AUTO: 10.5 K/UL (ref 4.8–10.8)

## 2024-12-18 PROCEDURE — 36415 COLL VENOUS BLD VENIPUNCTURE: CPT

## 2024-12-18 PROCEDURE — 700102 HCHG RX REV CODE 250 W/ 637 OVERRIDE(OP): Performed by: INTERNAL MEDICINE

## 2024-12-18 PROCEDURE — 93005 ELECTROCARDIOGRAM TRACING: CPT | Mod: TC

## 2024-12-18 PROCEDURE — A9270 NON-COVERED ITEM OR SERVICE: HCPCS | Performed by: INTERNAL MEDICINE

## 2024-12-18 PROCEDURE — A9270 NON-COVERED ITEM OR SERVICE: HCPCS | Performed by: HOSPITALIST

## 2024-12-18 PROCEDURE — 85025 COMPLETE CBC W/AUTO DIFF WBC: CPT

## 2024-12-18 PROCEDURE — 83540 ASSAY OF IRON: CPT

## 2024-12-18 PROCEDURE — 82746 ASSAY OF FOLIC ACID SERUM: CPT

## 2024-12-18 PROCEDURE — 84295 ASSAY OF SERUM SODIUM: CPT | Mod: 91

## 2024-12-18 PROCEDURE — 99233 SBSQ HOSP IP/OBS HIGH 50: CPT

## 2024-12-18 PROCEDURE — 85046 RETICYTE/HGB CONCENTRATE: CPT

## 2024-12-18 PROCEDURE — 82962 GLUCOSE BLOOD TEST: CPT | Mod: 91

## 2024-12-18 PROCEDURE — 97602 WOUND(S) CARE NON-SELECTIVE: CPT

## 2024-12-18 PROCEDURE — 82607 VITAMIN B-12: CPT

## 2024-12-18 PROCEDURE — 97605 NEG PRS WND THER DME<=50SQCM: CPT

## 2024-12-18 PROCEDURE — 700102 HCHG RX REV CODE 250 W/ 637 OVERRIDE(OP)

## 2024-12-18 PROCEDURE — 700102 HCHG RX REV CODE 250 W/ 637 OVERRIDE(OP): Performed by: HOSPITALIST

## 2024-12-18 PROCEDURE — 700102 HCHG RX REV CODE 250 W/ 637 OVERRIDE(OP): Performed by: STUDENT IN AN ORGANIZED HEALTH CARE EDUCATION/TRAINING PROGRAM

## 2024-12-18 PROCEDURE — 770020 HCHG ROOM/CARE - TELE (206)

## 2024-12-18 PROCEDURE — A9270 NON-COVERED ITEM OR SERVICE: HCPCS

## 2024-12-18 PROCEDURE — A9270 NON-COVERED ITEM OR SERVICE: HCPCS | Performed by: STUDENT IN AN ORGANIZED HEALTH CARE EDUCATION/TRAINING PROGRAM

## 2024-12-18 PROCEDURE — 82728 ASSAY OF FERRITIN: CPT

## 2024-12-18 PROCEDURE — 93010 ELECTROCARDIOGRAM REPORT: CPT | Performed by: INTERNAL MEDICINE

## 2024-12-18 PROCEDURE — 92526 ORAL FUNCTION THERAPY: CPT

## 2024-12-18 PROCEDURE — 83550 IRON BINDING TEST: CPT

## 2024-12-18 RX ORDER — QUETIAPINE FUMARATE 25 MG/1
25 TABLET, FILM COATED ORAL NIGHTLY
Status: DISCONTINUED | OUTPATIENT
Start: 2024-12-18 | End: 2024-12-18

## 2024-12-18 RX ORDER — VALPROIC ACID 250 MG/5ML
250 SOLUTION ORAL DAILY
Status: DISCONTINUED | OUTPATIENT
Start: 2024-12-19 | End: 2024-12-18

## 2024-12-18 RX ORDER — VALPROIC ACID 250 MG/5ML
250 SOLUTION ORAL DAILY
Status: COMPLETED | OUTPATIENT
Start: 2024-12-19 | End: 2024-12-21

## 2024-12-18 RX ADMIN — Medication 10 MG: at 20:23

## 2024-12-18 RX ADMIN — INSULIN LISPRO 1 UNITS: 100 INJECTION, SOLUTION INTRAVENOUS; SUBCUTANEOUS at 06:27

## 2024-12-18 RX ADMIN — INSULIN LISPRO 1 UNITS: 100 INJECTION, SOLUTION INTRAVENOUS; SUBCUTANEOUS at 17:50

## 2024-12-18 RX ADMIN — INSULIN GLARGINE-YFGN 9 UNITS: 100 INJECTION, SOLUTION SUBCUTANEOUS at 17:50

## 2024-12-18 RX ADMIN — VALPROIC ACID 250 MG: 250 SOLUTION ORAL at 06:08

## 2024-12-18 RX ADMIN — VALPROIC ACID 250 MG: 250 SOLUTION ORAL at 17:37

## 2024-12-18 RX ADMIN — LANSOPRAZOLE 30 MG: 30 TABLET, ORALLY DISINTEGRATING ORAL at 06:04

## 2024-12-18 RX ADMIN — INSULIN LISPRO 1 UNITS: 100 INJECTION, SOLUTION INTRAVENOUS; SUBCUTANEOUS at 23:57

## 2024-12-18 RX ADMIN — APIXABAN 5 MG: 5 TABLET, FILM COATED ORAL at 06:03

## 2024-12-18 RX ADMIN — DAPAGLIFLOZIN 10 MG: 10 TABLET, FILM COATED ORAL at 06:04

## 2024-12-18 RX ADMIN — APIXABAN 5 MG: 5 TABLET, FILM COATED ORAL at 17:37

## 2024-12-18 RX ADMIN — INSULIN LISPRO 1 UNITS: 100 INJECTION, SOLUTION INTRAVENOUS; SUBCUTANEOUS at 12:51

## 2024-12-18 RX ADMIN — CLOPIDOGREL BISULFATE 75 MG: 75 TABLET ORAL at 06:03

## 2024-12-18 RX ADMIN — ATORVASTATIN CALCIUM 80 MG: 80 TABLET, FILM COATED ORAL at 17:37

## 2024-12-18 ASSESSMENT — PAIN DESCRIPTION - PAIN TYPE
TYPE: ACUTE PAIN
TYPE: ACUTE PAIN

## 2024-12-18 ASSESSMENT — FIBROSIS 4 INDEX: FIB4 SCORE: 2.42

## 2024-12-18 NOTE — PROGRESS NOTES
Monitor Summary    Rhythm: SB/SR  Rate: 52-80  Ectopy: trigem, bigem, coup, pvc, vtach  Measurements: .14 / .10 / .39

## 2024-12-18 NOTE — PROGRESS NOTES
Monitor Summary    SR 64-90  FPVC/trigem  Scar down to 43  .12/.10/40

## 2024-12-18 NOTE — CARE PLAN
Problem: Safety - Medical Restraint  Goal: Remains free of injury from restraints (Restraint for Interference with Medical Device)  Outcome: Progressing  Goal: Free from restraint(s) (Restraint for Interference with Medical Device)  Outcome: Progressing     Problem: Safety  Goal: Will remain free from injury  Outcome: Progressing  Goal: Will remain free from falls  Outcome: Progressing     Problem: Skin Integrity  Goal: Risk for impaired skin integrity will decrease  Outcome: Progressing     Problem: Psychosocial  Goal: Patient's level of anxiety will decrease  Outcome: Progressing     Problem: Respiratory  Goal: Patient will achieve/maintain optimum respiratory ventilation and gas exchange  Outcome: Progressing     Problem: Nutrition  Goal: Patient's nutritional and fluid intake will be adequate or improve  Outcome: Progressing     Problem: Care Map:  Day Before Discharge Optimal Outcome for the Heart Failure Patient  Goal: Day Before Discharge:  Optimal Care of the heart failure patient  Outcome: Progressing   The patient is Watcher - Medium risk of patient condition declining or worsening    Shift Goals  Clinical Goals: safety, q2 turns, nutrition  Patient Goals: JYALIN  Family Goals: not present    Progress made toward(s) clinical / shift goals:  patient more awake and alert today. Patient able to follow commands.     Patient is not progressing towards the following goals:

## 2024-12-18 NOTE — PROGRESS NOTES
Bedside report received from off going RN/tech: Geronimo, assumed care of patient.     Fall Risk Score: HIGH RISK  Fall risk interventions in place: Place yellow fall risk ID band on patient, Provide patient/family education based on risk assessment, Educate patient/family to call staff for assistance when getting out of bed, Place fall precaution signage outside patient door, Place patient in room close to nursing station, Utilize bed/chair fall alarm, Notify charge of high risk for huddle, Tele-sitter, and Bed alarm connected correctly  Bed type: Low air loss (Ray Score less than 17 interventions in place)  Patient on cardiac monitor: Yes  IVF/IV medications: Not Applicable   Oxygen: How many liters 2L  Bedside sitter: Not Applicable   Isolation: Not applicable

## 2024-12-18 NOTE — DISCHARGE PLANNING
Case Management Discharge Planning    Admission Date: 11/27/2024  GMLOS: 4.1  ALOS: 21    6-Clicks ADL Score: 8  6-Clicks Mobility Score: 6      Anticipated Discharge Dispo: Discharge Disposition: Disch to a long term care facility (63)  Discharge Address: [address] [place] [address]  Discharge Contact Phone Number: [phone]    This RN CM completed chart review, nursing documented showing patient A/Ox1 and patient is able to speak in full sentences. This RN CM received call from [facility] and they plan to follow up with spouse Skip for screening to be completed for medicaid. Speech therapy to re-evaluate tomorrow.    Patient has 2 wound vacs, de jesus, NG tube, bilateral restraints, and bilateral mittens.      This RN CM following up with [facility] to see if they will accepted based on improvements in mentation.     1250 This RN CM called Preethi with [facility] and updated on progression. Preethi plans to come see patient tomorrow at bedside.

## 2024-12-18 NOTE — PROGRESS NOTES
This writer was notified by the monitor room patient had 20 beats of Vtach. Vitals stable. Hospitalist notified. Labs and EKG taken.

## 2024-12-18 NOTE — PROGRESS NOTES
Iris Placement    Tube Team verified patient name and medical record number prior to tube placement. Iris feeding tube (55 inches, 10 Bruneian) placed at 100cm in left nare. Per Iris picture, tube appears to be in the stomach.    Nursing Instructions: Await KUB to confirm placement before use for medications or feeding. Stylet, may be removed, please place in labeled bag with insufflation bulb and save in patient medication drawer.     IRIS OFFLOADED FROM THE NOSE WITH PRIMAPORE TAPE

## 2024-12-18 NOTE — PROGRESS NOTES
Hospital Medicine Daily Progress Note    Date of Service  12/18/2024    Chief Complaint  Kiara Qureshi is a 59 y.o. female admitted 11/27/2024 with encephalopathy    Hospital Course  58 yo woman HTN, HLD who was at [facility] for anterior STEMI s/p lytics and had LAD PCI and unsuccessful PCI for OM1 complicated by RP bleed, vfib and PEA arrest with cardiogenic shock needing ECMO and imeplla.  ECMO was decannulated 11/4, Impella removed 11/8.  She also had encephalopathy and delirium on valproic acid and Seroquel.  TTE showed EF 25-30%, she did not tolerate GDMT due to hypotension.  She had a CT chest on 11/25 that showed focal aortic mural thrombus versus ulcerated plaque to descending thoracic aorta and CTA AP with rim-enhancing RP hematoma and small thrombus in right common iliac artery, multiple nonenhancing fluid collections and subcu tissue in right lower and right inguinal regions, possible evolving hematomas and left femoral vein.  She was started on Vanco and Zosyn and transferred back to [facility].  She completed linezolid and Zosyn around 12/1.  Cardiology was consulted for medical management of her heart failure. Dr. Conley with vascular surgery was consulted, recommended wound vacs to groin wounds.  On 12/9 she became more altered with respiratory distress and transferred to [facility].  CT head was negative for acute changes.  EEG was negative for seizure activity.  She was high on high dose of Seroquel that was tapered off.  She remains on NG tube for tube feeds.    Interval Problem Update    12/17  Received a dose of seroquel overnight for agitation and still required restratings. BP at 90/50s.   Sodium improved to 147. Hgb stable.   Pt able to say a few words, opens eyes spontaneously however AOX0 when asked and does not follow commands. Remains on feeding tube.   Defer MRI given agitation,  unable to lie still and wary of sedation   Continue valproic acid BID for now. Will hold off restarting seroquel  and monitor.     12/18   Pt pulled feeding tube overnight again.   20bt vtach overnight on monitor. Other vitals wnl. Asymptomatic  Iron studies consistent with anemia of chronic disease  Pt more awake today and interactive, though still AOX1-2 (name, [facility]). Cannot recall events leading to and during admission. Denies pain, SOB, nausea/vomiting. Wants a shower and is thirsty.     SLP to see pt today.  Decrease Valproic acid to once daily starting tomorrow and then discontinue. Med has interactions with Eliquis.   Noted EKG yesterday with Qtc >600. Repeat today. Defer restarting seroquel, pt also  more awake. Could be sundowning. Will hold off adding possible alternatives such as geodon, zyprexa, or trazodone at night as this can increase Qtc/risk of arrhythmias, pending new EKG. Increase melatonin to 10mg once daily   Discussed with pharmacy   Discussed with Rosa (friend, at bedside) POC who reports she will let  know     I have discussed this patient's plan of care and discharge plan at IDT rounds today with Case Management, Nursing, Nursing leadership, and other members of the IDT team.    Consultants/Specialty  cardiology and vascular surgery, palliative care    Code Status  DNAR, I OK    Disposition  The patient is not medically cleared for discharge to home or a post-acute facility.      I have placed the appropriate orders for post-discharge needs.    Review of Systems  Review of Systems   Unable to perform ROS: Medical condition        Physical Exam  Temp:  [36.8 °C (98.2 °F)-37.2 °C (99 °F)] 36.9 °C (98.4 °F)  Pulse:  [74-88] 77  Resp:  [17-19] 18  BP: ()/(54-91) 115/71  SpO2:  [93 %-100 %] 96 %    Physical Exam  Constitutional:       Appearance: She is ill-appearing.      Comments: Opens eyes for exam, inconsistently tracks me   HENT:      Head: Normocephalic.      Mouth/Throat:      Mouth: Mucous membranes are dry.   Eyes:      Comments: Pupils are dilated, equal and reactive to light    Cardiovascular:      Rate and Rhythm: Normal rate and regular rhythm.   Pulmonary:      Breath sounds: Rhonchi present.      Comments: Periodic pauses in breathing, irregular breathing  Abdominal:      General: There is no distension.      Palpations: Abdomen is soft.      Tenderness: There is no abdominal tenderness.   Musculoskeletal:      Right lower leg: No edema.      Left lower leg: No edema.      Comments: Wound vacs bilaterally to groin   Neurological:      Comments: Able to tell me her name and that she is in the hospital.  Able to  her right hand on command, though inconsistent  Spontaneously moves both her upper extremities and lower extremities           Fluids    Intake/Output Summary (Last 24 hours) at 12/18/2024 0705  Last data filed at 12/18/2024 0517  Gross per 24 hour   Intake 1100 ml   Output 2200 ml   Net -1100 ml        Laboratory  Recent Labs     12/16/24  0155 12/17/24  0401 12/18/24  0238   WBC 8.0 11.8* 10.5   RBC 3.06* 3.38* 3.10*   HEMOGLOBIN 9.9* 11.2* 10.1*   HEMATOCRIT 34.2* 37.4 33.6*   .8* 110.7* 108.4*   MCH 32.4 33.1* 32.6   MCHC 28.9* 29.9* 30.1*   RDW 84.6* 84.5* 81.4*   PLATELETCT 310 283 244   MPV 10.9 11.0 11.3     Recent Labs     12/16/24  0155 12/16/24  0803 12/17/24  0401 12/17/24  0633 12/17/24  1205 12/17/24  1752 12/17/24  2051   SODIUM 154*   < > 147*   < > 148* 149* 150*  150*   POTASSIUM 3.8  --  3.9  --   --   --  4.0   CHLORIDE 122*  --  115*  --   --   --  118*   CO2 23  --  24  --   --   --  22   GLUCOSE 204*  --  245*  --   --   --  200*   BUN 42*  --  36*  --   --   --  33*   CREATININE 1.06  --  0.93  --   --   --  0.74   CALCIUM 8.0*  --  7.6*  --   --   --  7.7*    < > = values in this interval not displayed.                   Imaging  DX-ABDOMEN FOR TUBE PLACEMENT   Final Result         1.  Nonspecific bowel gas pattern in the upper abdomen.   2.  Dobbhoff tube tip terminates overlying the expected location of the gastric antrum or pylorus.   3.   Pulmonary edema and/or infiltrates, similar to prior study      DX-ABDOMEN FOR TUBE PLACEMENT   Final Result      The tip of the enteric tube terminates over the antrum of the stomach.      DX-ABDOMEN FOR TUBE PLACEMENT   Final Result         1.  Nonspecific bowel gas pattern in the upper abdomen.   2.  Dobbhoff tube is coiled within the duodenum, the tip terminates overlying the expected location of the gastric body.   3.  Bilateral lower lobe edema and/or infiltrates.      DX-ABDOMEN FOR TUBE PLACEMENT   Final Result      NG tube tip projects over the gastroduodenal junction.      DX-CHEST-LIMITED (1 VIEW)   Final Result      1.  Mild cardiomegaly with evidence of mild to moderate pulmonary edema with minimal worsening since previous exam.      CT-HEAD W/O   Final Result         1.  No acute intracranial abnormality is identified, there are nonspecific white matter changes, commonly associated with small vessel ischemic disease.  Associated mild cerebral atrophy is noted.   2.  Bilateral sinusitis changes   3.  Atherosclerosis.               DX-CHEST-PORTABLE (1 VIEW)   Final Result         1.  Mild pulmonary edema and/or infiltrates.      EC-ECHOCARDIOGRAM COMPLETE W/ CONT   Final Result      DX-ABDOMEN FOR TUBE PLACEMENT   Final Result      Tip of the esophagogastric tube terminates over the pylorus of the stomach.      IR-MIDLINE CATHETER INSERTION WO GUIDANCE > AGE 3   Final Result                  Ultrasound-guided midline placement performed by qualified nursing staff    as above.          IR-US GUIDED PIV   Final Result    Ultrasound-guided PERIPHERAL IV INSERTION performed by    qualified nursing staff as above.      DX-OUTSIDE IMAGES-DX CHEST   Final Result      DX-OUTSIDE IMAGES-DX ABDOMEN   Final Result      MR-OUTSIDE IMAGES-MR BRAIN   Final Result      CT-OUTSIDE IMAGES-CT HEAD   Final Result      CT-OUTSIDE IMAGES-CT ABDOMEN/PELVIS   Final Result      CT-OUTSIDE IMAGES-CT CHEST   Final Result       DX-ABDOMEN FOR TUBE PLACEMENT   Final Result      There is a new small bowel feeding tube which terminates in the small bowel of the right mid abdomen.      DX-CHEST-LIMITED (1 VIEW)   Final Result         Asymmetric pulmonary infiltrates, left worse than right.      IR-US GUIDED PIV    (Results Pending)        Assessment/Plan  * Heart failure with reduced ejection fraction (HCC)- (present on admission)  Assessment & Plan  ICM LVEF 25-30%  S/p revascularization  Soft pressures limiting titration of GDMT.  Farxiga  Holding losartan for low BP  Critical care had recommended holding bisoprolol because of Cheyne-Lo breathing  Dehydrated, holding Lasix    Macrocytic anemia  Assessment & Plan  Stable.   Check B12, folate, iron studies      Hypernatremia  Assessment & Plan  Persistent worsening and she lost her NG tube on and off  Started on D5 water infusion with FWF and q6h PRN  Goal correction 8 mLEq in 24h    12/17  Improving  S/p D5 infusion   Continue FWF 300cc every 4 hours and q12 Na checks    Acute hypoxic respiratory failure (HCC)  Assessment & Plan  In setting of heart failure, recent cardiogenic shock  Patient demonstrates cheynne lo breathing pattern which is consistent with her suspected anoxic brain injury  Patient appears to be experiencing aspiration events causing bradycardia  Continue supportive care  Patient is NPO, speech is following    Constipation  Assessment & Plan  + bm  Continue bowel regimen  following    Hypotension  Assessment & Plan  BP low range, holding losartan and bisoprolol    Leukocytosis  Assessment & Plan  resolved  Could be related to hematoma  normal pro luisito  Trend CBC    Abdominal hematoma  Assessment & Plan  Might be related to cardiac device  Clinically stable and h/h in stable range  Patient was on lovenox due to rigth common iliac art thrombosis and then transitioned to eliquis per [facility]  - plan to continue for 3-4 months with repeat imaging at that time to see if it  can be d/c   CBC check tomorrow    ACP (advance care planning)  Assessment & Plan  Dr. Mobley: Discussed plan of care and code status with patient  Bruno ([phone]) and sister-in-law Laina ([phone]). I explained that Kiara has experienced a suspected anoxic brain injury during her current medical course that has required ECMO and impella, CVA, Vfib arrest. I shared my concern that Laina may not make a meaningful recovery from this and that her current and future quality of life appears limited. I explained that in the event of another CP arrest her quality of life would be further impacted in the event she were to survive such an episode. Laina and Bruno made the decision to change Kiara's code status to DNR, yes to intubaiton. Laina will be driving in from [place] in the coming days to have further goals of care conversations. Total of 48 minutes spent in discussion and coordination of advance care planning.    12/16 - my conversation with Laina and Bruno, they will like to continue care and plan for PAMs    Pneumonia  Assessment & Plan  S/p completed course of pip/tazo and vanc  Cultures were negative    Aortic mural thrombus (HCC)  Assessment & Plan  CT at outside facility showing focal aortic mural thrombus along the lateral wall of the descending thoracic aorta and also a tiny filling defect/mural thrombus within the posterior right common iliac artery  apixaban  Continue to monitor CBC        Acute metabolic encephalopathy  Assessment & Plan  Hospital course complicated by encephalopathy.  Likely from combination of CVA, STEMI, hyperglycemia; likely also with anoxic brain injury   Removing restraints as tolerated, following  Has a nasojejunal feeding tube placed by interventional radiology from outside facility  Was on Seroquel and Depakene as recommended by neurology/psychiatry at outside facility.    Multifactorial  Continues to be encephalopathic. CTH no acute findings. EEG neg for seizure  activity  Neuro evaluated patient at [facility].   Have been titrating off seroquel and pt's mental status has improved as her agitation has diminished  DC seroquel qhs dose  I changed valproic acid TID to BID  Repeat MRI brain if possible, unble to tolerate at this time    CVA (cerebral vascular accident) (HCC)  Assessment & Plan  Patient had MRI on 11/17/24 at [facility] which shwed Punctate acute infarct of the right centrum semiovale. Scattered foci of enhancement in multiple vascular distributions as described may represent recent subacute infarcts. Diffuse stippled susceptibility signal throughout the supratentorial and infratentorial brain. Overall findings may represent sequelae of fat emboli or amyloid related angiopathy.   High dose statin  Apixaban  plavix    Continues to be encephalopathic. CTH no acute findings. EEG neg for seizure activity  Neuro evaluated patient at [facility].   Have been titrating off seroquel and pt's mental status has improved as her agitation has diminished  DC seroquel qhs dose  I changed valproic acid TID to BID  Repeat MRI brain if possible, unble to tolerate at this time    12/17  See plan for DOUG      CAD (coronary artery disease)  Assessment & Plan  Patient with recent complicated STEMI with PCI to LAD and mid LAD.  Complicated by V-fib arrest and cardiogenic shock requiring VA ECMO and Impella and retroperitoneal hematomas. At [facility] underwent impella supported PCI to mid LAD just distal to prior stent but unsuccessful with PCI to 100% occluded OM1 on 11/6. Patient was decannulated and had Impella removed.    Discussed with cardiology and asa stopped and replaced with plavix. Cont statin  Repeat echo shows persistently low EF, 25-30%  Cardiology had recommended bisoprolol but due to cheyne florez critical care recommends we stop this - so will hold   Continue to hold losartan for low BP and lasix for hypernatremia      Urinary retention  Assessment & Plan  Beckwith catheter in  place    Hyperglycemia  Assessment & Plan  Glargine 9 units  ISS      Metabolic acidemia- (present on admission)  Assessment & Plan  resolved  Bmp in am  Following           VTE prophylaxis:    therapeutic anticoagulation with eliquis 5 mg BID      I have performed a physical exam and reviewed and updated ROS and Plan today (12/18/2024). In review of yesterday's note (12/17/2024), there are no changes except as documented above.

## 2024-12-18 NOTE — THERAPY
Speech Language Pathology   Daily Treatment     Patient Name: Kiara Qureshi  AGE:  59 y.o., SEX:  female  Medical Record #: [id number]  Date of Service: 12/18/2024      Precautions:  Precautions: Fall Risk, Swallow Precautions, Nasogastric Tube         Subjective  RN cleared patient for session. Patient received awake, pleasantly confused. Patient repeatedly requesting doffing of mitts and asking for her clothes. Patient more verbose this date.       Assessment  Patient seen this date for dysphagia management. Oral care provided prior to PO presentation. PO trials of ice chips, thins by cup/straw and liquidized assessed. Adequate oral bolus acceptance/containment. Swallow initiation appeared timely. Intermittent cough with trials of thins, concerning for airway invasion.       Clinical Impressions  Patient presents with clinical signs of and is at elevated risk for oropharyngeal dysphagia. Patient will benefit from diagnostic swallow study to objectively assess swallow function and inform POC; however, is not appropriate to participate at this juncture. Service will continue to follow to maximize swallowing outcomes and complete study as medically appropriate. Please provide frequent, diligent oral care as able.          Recommendations  Treatment Completed: Dysphagia Treatment       Dysphagia Treatment  Diet Consistency: NPO/NGT; pre-feeding trials with SLP only   -Clear for minimal ice chips/hour 1:1 w/RN, when alert, after oral care to reduce xerostomia and mitigate disuse atrophy of swallow musculature  Instrumentation: Instrumental swallow study pending clinical progress  Medication: Non Oral  Oral Care: Q2h                     SLP Treatment Plan  Treatment Plan: Dysphagia Treatment, Patient/Family/Caregiver Training  SLP Frequency: 3x Per Week  Estimated Duration: Until Therapy Goals Met      Anticipated Discharge Needs  Discharge Recommendations: Recommend post-acute placement for additional speech  "therapy services prior to discharge home  Therapy Recommendations Upon DC: Dysphagia Training, Community Re-Integration, Patient / Family / Caregiver Education      Patient / Family Goals  Patient / Family Goal #1: \"I'll eat later when I'm more hungry\"  Goal #1 Outcome: Progressing slower than expected  Short Term Goals  Short Term Goal # 1: Patient will consume a full liquid diet with no overt s/sx of aspiration given 1:1 feeding.  Goal Outcome # 1: Progressing slower than expected  Short Term Goal # 2: Pt will participate in prefeeding trials with SLP without overt s/sx of aspiration  Goal Outcome # 2 : Progressing slower than expected      CHERELLE Hyman  "

## 2024-12-18 NOTE — PROGRESS NOTES
Patient alert and talking. Patient able to speak in full sentences, but remains axo1 disoriented to time, place and situation. RN called Patient  called and was able to speak to patient over speaker phone. Per speech, patient okay for ice chips. Patient given thorough oral care and provided with ice chips. Patient asking for food and drink, pt educated on purpose of NG tube feeding and will be reassessed by speech therapy tomorrow. Patient resting comfortably in bed watching TV, no needs at this time.

## 2024-12-19 ENCOUNTER — APPOINTMENT (OUTPATIENT)
Dept: RADIOLOGY | Facility: MEDICAL CENTER | Age: 59
End: 2024-12-19
Payer: COMMERCIAL

## 2024-12-19 PROBLEM — D72.829 LEUKOCYTOSIS: Status: RESOLVED | Noted: 2024-12-04 | Resolved: 2024-12-19

## 2024-12-19 PROBLEM — D68.318 CIRCULATING ANTICOAGULANT DISORDER (HCC): Status: ACTIVE | Noted: 2024-12-19

## 2024-12-19 PROBLEM — R53.2 FUNCTIONAL QUADRIPLEGIA (HCC): Status: ACTIVE | Noted: 2024-12-19

## 2024-12-19 LAB
ANION GAP SERPL CALC-SCNC: 9 MMOL/L (ref 7–16)
BASOPHILS # BLD AUTO: 0.4 % (ref 0–1.8)
BASOPHILS # BLD: 0.04 K/UL (ref 0–0.12)
BUN SERPL-MCNC: 35 MG/DL (ref 8–22)
CALCIUM SERPL-MCNC: 7.8 MG/DL (ref 8.5–10.5)
CHLORIDE SERPL-SCNC: 119 MMOL/L (ref 96–112)
CO2 SERPL-SCNC: 21 MMOL/L (ref 20–33)
CREAT SERPL-MCNC: 0.84 MG/DL (ref 0.5–1.4)
CRP SERPL HS-MCNC: 4.2 MG/DL (ref 0–0.75)
EKG IMPRESSION: NORMAL
EOSINOPHIL # BLD AUTO: 0.28 K/UL (ref 0–0.51)
EOSINOPHIL NFR BLD: 3.1 % (ref 0–6.9)
ERYTHROCYTE [DISTWIDTH] IN BLOOD BY AUTOMATED COUNT: 83.3 FL (ref 35.9–50)
GFR SERPLBLD CREATININE-BSD FMLA CKD-EPI: 80 ML/MIN/1.73 M 2
GLUCOSE BLD STRIP.AUTO-MCNC: 134 MG/DL (ref 65–99)
GLUCOSE BLD STRIP.AUTO-MCNC: 170 MG/DL (ref 65–99)
GLUCOSE BLD STRIP.AUTO-MCNC: 200 MG/DL (ref 65–99)
GLUCOSE BLD STRIP.AUTO-MCNC: 216 MG/DL (ref 65–99)
GLUCOSE SERPL-MCNC: 226 MG/DL (ref 65–99)
HCT VFR BLD AUTO: 33.5 % (ref 37–47)
HGB BLD-MCNC: 9.8 G/DL (ref 12–16)
IMM GRANULOCYTES # BLD AUTO: 0.1 K/UL (ref 0–0.11)
IMM GRANULOCYTES NFR BLD AUTO: 1.1 % (ref 0–0.9)
LYMPHOCYTES # BLD AUTO: 1.32 K/UL (ref 1–4.8)
LYMPHOCYTES NFR BLD: 14.4 % (ref 22–41)
MCH RBC QN AUTO: 32.1 PG (ref 27–33)
MCHC RBC AUTO-ENTMCNC: 29.3 G/DL (ref 32.2–35.5)
MCV RBC AUTO: 109.8 FL (ref 81.4–97.8)
MONOCYTES # BLD AUTO: 0.49 K/UL (ref 0–0.85)
MONOCYTES NFR BLD AUTO: 5.4 % (ref 0–13.4)
NEUTROPHILS # BLD AUTO: 6.91 K/UL (ref 1.82–7.42)
NEUTROPHILS NFR BLD: 75.6 % (ref 44–72)
NRBC # BLD AUTO: 0 K/UL
NRBC BLD-RTO: 0 /100 WBC (ref 0–0.2)
PLATELET # BLD AUTO: 234 K/UL (ref 164–446)
PMV BLD AUTO: 11.7 FL (ref 9–12.9)
POTASSIUM SERPL-SCNC: 4 MMOL/L (ref 3.6–5.5)
RBC # BLD AUTO: 3.05 M/UL (ref 4.2–5.4)
SODIUM SERPL-SCNC: 147 MMOL/L (ref 135–145)
SODIUM SERPL-SCNC: 149 MMOL/L (ref 135–145)
WBC # BLD AUTO: 9.1 K/UL (ref 4.8–10.8)

## 2024-12-19 PROCEDURE — 84295 ASSAY OF SERUM SODIUM: CPT

## 2024-12-19 PROCEDURE — 92612 ENDOSCOPY SWALLOW (FEES) VID: CPT

## 2024-12-19 PROCEDURE — 770001 HCHG ROOM/CARE - MED/SURG/GYN PRIV*

## 2024-12-19 PROCEDURE — A9270 NON-COVERED ITEM OR SERVICE: HCPCS | Performed by: INTERNAL MEDICINE

## 2024-12-19 PROCEDURE — 97530 THERAPEUTIC ACTIVITIES: CPT

## 2024-12-19 PROCEDURE — 92526 ORAL FUNCTION THERAPY: CPT

## 2024-12-19 PROCEDURE — 97535 SELF CARE MNGMENT TRAINING: CPT

## 2024-12-19 PROCEDURE — 99222 1ST HOSP IP/OBS MODERATE 55: CPT | Performed by: INTERNAL MEDICINE

## 2024-12-19 PROCEDURE — 700102 HCHG RX REV CODE 250 W/ 637 OVERRIDE(OP): Performed by: INTERNAL MEDICINE

## 2024-12-19 PROCEDURE — 85025 COMPLETE CBC W/AUTO DIFF WBC: CPT

## 2024-12-19 PROCEDURE — 99232 SBSQ HOSP IP/OBS MODERATE 35: CPT

## 2024-12-19 PROCEDURE — 700102 HCHG RX REV CODE 250 W/ 637 OVERRIDE(OP): Performed by: STUDENT IN AN ORGANIZED HEALTH CARE EDUCATION/TRAINING PROGRAM

## 2024-12-19 PROCEDURE — 80048 BASIC METABOLIC PNL TOTAL CA: CPT

## 2024-12-19 PROCEDURE — 700111 HCHG RX REV CODE 636 W/ 250 OVERRIDE (IP): Mod: JZ

## 2024-12-19 PROCEDURE — 36415 COLL VENOUS BLD VENIPUNCTURE: CPT

## 2024-12-19 PROCEDURE — 93010 ELECTROCARDIOGRAM REPORT: CPT | Performed by: INTERNAL MEDICINE

## 2024-12-19 PROCEDURE — A9270 NON-COVERED ITEM OR SERVICE: HCPCS

## 2024-12-19 PROCEDURE — 700102 HCHG RX REV CODE 250 W/ 637 OVERRIDE(OP): Performed by: HOSPITALIST

## 2024-12-19 PROCEDURE — 82962 GLUCOSE BLOOD TEST: CPT | Mod: 91

## 2024-12-19 PROCEDURE — A9270 NON-COVERED ITEM OR SERVICE: HCPCS | Performed by: HOSPITALIST

## 2024-12-19 PROCEDURE — 700102 HCHG RX REV CODE 250 W/ 637 OVERRIDE(OP)

## 2024-12-19 PROCEDURE — A9270 NON-COVERED ITEM OR SERVICE: HCPCS | Performed by: STUDENT IN AN ORGANIZED HEALTH CARE EDUCATION/TRAINING PROGRAM

## 2024-12-19 RX ORDER — HALOPERIDOL 5 MG/ML
1 INJECTION INTRAMUSCULAR
Status: DISCONTINUED | OUTPATIENT
Start: 2024-12-19 | End: 2024-12-22

## 2024-12-19 RX ORDER — LORAZEPAM 2 MG/ML
0.5 INJECTION INTRAMUSCULAR
Status: COMPLETED | OUTPATIENT
Start: 2024-12-19 | End: 2024-12-20

## 2024-12-19 RX ORDER — QUETIAPINE FUMARATE 25 MG/1
25 TABLET, FILM COATED ORAL ONCE
Status: COMPLETED | OUTPATIENT
Start: 2024-12-19 | End: 2024-12-19

## 2024-12-19 RX ADMIN — APIXABAN 5 MG: 5 TABLET, FILM COATED ORAL at 17:37

## 2024-12-19 RX ADMIN — QUETIAPINE FUMARATE 25 MG: 25 TABLET ORAL at 18:50

## 2024-12-19 RX ADMIN — INSULIN GLARGINE-YFGN 9 UNITS: 100 INJECTION, SOLUTION SUBCUTANEOUS at 18:16

## 2024-12-19 RX ADMIN — VALPROIC ACID 250 MG: 250 SOLUTION ORAL at 05:01

## 2024-12-19 RX ADMIN — HALOPERIDOL LACTATE 1 MG: 5 INJECTION, SOLUTION INTRAMUSCULAR at 18:50

## 2024-12-19 RX ADMIN — ATORVASTATIN CALCIUM 80 MG: 80 TABLET, FILM COATED ORAL at 17:37

## 2024-12-19 RX ADMIN — INSULIN LISPRO 1 UNITS: 100 INJECTION, SOLUTION INTRAVENOUS; SUBCUTANEOUS at 17:48

## 2024-12-19 RX ADMIN — INSULIN LISPRO 2 UNITS: 100 INJECTION, SOLUTION INTRAVENOUS; SUBCUTANEOUS at 05:14

## 2024-12-19 SDOH — ECONOMIC STABILITY: TRANSPORTATION INSECURITY
IN THE PAST 12 MONTHS, HAS THE LACK OF TRANSPORTATION KEPT YOU FROM MEDICAL APPOINTMENTS OR FROM GETTING MEDICATIONS?: NO

## 2024-12-19 SDOH — ECONOMIC STABILITY: TRANSPORTATION INSECURITY
IN THE PAST 12 MONTHS, HAS LACK OF RELIABLE TRANSPORTATION KEPT YOU FROM MEDICAL APPOINTMENTS, MEETINGS, WORK OR FROM GETTING THINGS NEEDED FOR DAILY LIVING?: NO

## 2024-12-19 ASSESSMENT — COGNITIVE AND FUNCTIONAL STATUS - GENERAL
DRESSING REGULAR UPPER BODY CLOTHING: A LOT
MOBILITY SCORE: 11
WALKING IN HOSPITAL ROOM: A LOT
SUGGESTED CMS G CODE MODIFIER MOBILITY: CL
PERSONAL GROOMING: A LOT
TOILETING: TOTAL
MOVING FROM LYING ON BACK TO SITTING ON SIDE OF FLAT BED: A LOT
DAILY ACTIVITIY SCORE: 8
CLIMB 3 TO 5 STEPS WITH RAILING: TOTAL
STANDING UP FROM CHAIR USING ARMS: A LOT
DRESSING REGULAR LOWER BODY CLOTHING: TOTAL
EATING MEALS: TOTAL
SUGGESTED CMS G CODE MODIFIER DAILY ACTIVITY: CM
HELP NEEDED FOR BATHING: TOTAL
TURNING FROM BACK TO SIDE WHILE IN FLAT BAD: A LOT
MOVING TO AND FROM BED TO CHAIR: A LOT

## 2024-12-19 ASSESSMENT — PAIN DESCRIPTION - PAIN TYPE: TYPE: ACUTE PAIN

## 2024-12-19 ASSESSMENT — SOCIAL DETERMINANTS OF HEALTH (SDOH)
WITHIN THE PAST 12 MONTHS, THE FOOD YOU BOUGHT JUST DIDN'T LAST AND YOU DIDN'T HAVE MONEY TO GET MORE: NEVER TRUE
WITHIN THE PAST 12 MONTHS, YOU WORRIED THAT YOUR FOOD WOULD RUN OUT BEFORE YOU GOT THE MONEY TO BUY MORE: NEVER TRUE
IN THE PAST 12 MONTHS, HAS THE ELECTRIC, GAS, OIL, OR WATER COMPANY THREATENED TO SHUT OFF SERVICE IN YOUR HOME?: NO

## 2024-12-19 ASSESSMENT — FIBROSIS 4 INDEX: FIB4 SCORE: 2.52

## 2024-12-19 NOTE — CONSULTS
Gastroenterology Initial Consult Note               Author:  Angie Giordano M.D. Date & Time Created: 12/19/2024 12:13 PM       Patient ID:  Name:             Kiara Qureshi  YOB: 1965  Age:                 59 y.o.  female  MRN:               [id number]      Referring Provider:  Jada Santoro MD        Presenting Chief Complaint:  Needs PEG      History of Present Illness:    This is a 59 y.o. female with admission to [facility] October 30 and transfer to [facility] October 31 with history of STEMI who underwent LAD PCI but hospital course complicated by retroperitoneal bleed, ventricular fibrillation, PEA arrest, cardiogenic shock requiring ECMO and Impella.  Course also complicated by encephalopathy and delirium echocardiogram November 24 showed 25 to 30% EF with akinesis multiple areas and hypokinesis in multiple areas.  CT angiogram of the chest on November 25 suggested focal aortic mural thrombus versus ulcerated plaque.  CT angiogram of the abdomen pelvis showed organization of rim-enhancing left retroperitoneal hematomas measuring up to 9 cm.  Filling defect/mural thrombus posterior right common iliac artery.  Course also complicated by stroke and hyperactive delirium requiring psychiatry assistance.    He was transferred back to [facility] on November 27 for ongoing care of heart failure with reduced ejection fraction, pneumonia, aortic mural thrombus, encephalopathy, stroke, recent complicated STEMI.  Patient described to be agitated and has been pulling feeding tubes.  Yesterday her QTc was greater than 600 requiring medications to be held such as Seroquel, Geodon, Zyprexa or trazodone.  Patient to have FEES study today.    Currently on Eliquis and Plavix  WBC 9.1, plt 234  Na 149  12/18 abd xray with increased bowel gas LUQ    Patient awake and confused    Review of Systems:  Review of Systems   Unable to perform ROS: Mental acuity             Past Medical History:  Past Medical History:    Diagnosis Date    Dyslipidemia 7/5/2016    Essential hypertension 7/5/2016    Obesity, morbid, BMI 40.0-49.9 ([facility]) 7/5/2016    Type II diabetes mellitus, well controlled ([facility]) 7/5/2016     Active Hospital Problems    Diagnosis     Macrocytic anemia [D53.9]     Acute hypoxic respiratory failure (HCC) [J96.01]     Hypernatremia [E87.0]     Constipation [K59.00]     Hypotension [I95.9]     Abdominal hematoma [S30.1XXA]     Heart failure with reduced ejection fraction (HCC) [I50.20]     Hyperglycemia [R73.9]     Urinary retention [R33.9]     CAD (coronary artery disease) [I25.10]     CVA (cerebral vascular accident) (HCC) [I63.9]     Acute metabolic encephalopathy [G93.41]     Aortic mural thrombus (HCC) [I74.10]     Pneumonia [J18.9]     ACP (advance care planning) [Z71.89]     Metabolic acidemia [E87.20]          Past Surgical History:  Past Surgical History:   Procedure Laterality Date    INSERTION,CANNULA FOR ECMO,ADULT Left 10/30/2024    Procedure: INSERTION, CANNULA, FOR ECMO, ADULT;  Surgeon: Aime Elias D.O.;  Location: SURGERY [facility];  Service: Cardiothoracic           Hospital Medications:  Current Facility-Administered Medications   Medication Dose Frequency Provider Last Rate Last Admin    melatonin tablet 10 mg  10 mg Nightly Jada Santoro M.D.   10 mg at 12/18/24 2023    valproic acid (Depakene) IR oral solution 250 mg  250 mg DAILY Jada Santoro M.D.   250 mg at 12/19/24 0501    insulin GLARGINE (Lantus,Semglee) injection  9 Units Q EVENING Jada Santoro M.D.   9 Units at 12/18/24 1750    And    insulin lispro (HumaLOG,AdmeLOG) subcutaneous injection  1-6 Units Q6HRS Jada Santoro M.D.   2 Units at 12/19/24 0514    And    dextrose 50% (D50W) injection 25 g  25 g Q15 MIN PRN Jada Santoro M.D.        apixaban (Eliquis) tablet 5 mg  5 mg BID Kristi Moore M.D.   5 mg at 12/18/24 1737    clopidogrel (Plavix) tablet 75 mg  75 mg  DAILY Kristi Moore M.D.   75 mg at 12/18/24 0603    dapagliflozin propanediol (Farxiga) tablet 10 mg  10 mg DAILY Rufus Ahumada D.O.   10 mg at 12/18/24 0604    lansoprazole (Prevacid) solutab 30 mg  30 mg DAILY Alissa Joyner M.D.   30 mg at 12/18/24 0604    dakins 0.125% (1/4 strength) topical soln   PRN Alissa Joyner M.D.   473 mL at 12/17/24 0615    bisacodyl (Dulcolax) suppository 10 mg  10 mg DAILY Alissa Joyner M.D.   10 mg at 12/16/24 0518    [Held by provider] bisoprolol (Zebeta) tablet 2.5 mg  2.5 mg Q DAY Rufus Ahumada, D.O.   2.5 mg at 12/13/24 0544    [Held by provider] losartan (Cozaar) tablet 12.5 mg  12.5 mg Q EVENING Kristi Moore M.D.   12.5 mg at 12/15/24 1759    oxyCODONE immediate-release (Roxicodone) tablet 2.5 mg  2.5 mg Q4HRS PRN Alissa Joyner M.D.   2.5 mg at 12/03/24 1745    Or    oxyCODONE immediate-release (Roxicodone) tablet 5 mg  5 mg Q4HRS PRN Alissa Joyner M.D.   5 mg at 12/13/24 1708    ondansetron (Zofran ODT) dispertab 4 mg  4 mg Q4HRS PRN Jourdan Block M.D.        promethazine (Phenergan) tablet 12.5-25 mg  12.5-25 mg Q4HRS PRN Jourdan Block M.D.        Pharmacy Consult: Enteral tube insertion - review meds/change route/product selection  1 Each PHARMACY TO DOSE Horace Agudelo M.D.        labetalol (Normodyne/Trandate) injection 10 mg  10 mg Q4HRS PRN Jourdan Block M.D.        ondansetron (Zofran) syringe/vial injection 4 mg  4 mg Q4HRS PRN Jourdan Block M.D.        promethazine (Phenergan) suppository 12.5-25 mg  12.5-25 mg Q4HRS PRN Jourdan Block M.D.        prochlorperazine (Compazine) injection 5-10 mg  5-10 mg Q4HRS PRN Jourdan Block M.D.        atorvastatin (Lipitor) tablet 80 mg  80 mg Q EVENING Jourdan Block M.D.   80 mg at 12/18/24 1737    [Held by provider] furosemide (Lasix) tablet 20 mg  20 mg Q DAY Alissa Joyner M.D.   20 mg at 12/13/24 0544   Last reviewed on 11/27/2024 11:21 PM by Lizzie Blackwell R.N.        Current Outpatient Medications:  Medications Prior to Admission   Medication Sig Dispense Refill Last Dose/Taking    acetaminophen (TYLENOL) 500 MG Tab 1,000 mg by Enteral Tube route every 8 hours.   Taking    aspirin 81 MG EC tablet Take 81 mg by mouth every day.   Taking    apixaban (ELIQUIS) 5mg Tab Take 5 mg by mouth 2 times a day.   Taking    atorvastatin (LIPITOR) 10 MG Tab 80 mg by Enteral Tube route every evening.   Taking    vitamin D3 (CHOLECALCIFEROL) 1000 Unit (25 mcg) Tab 1,000 Units by Enteral Tube route every day.   Taking    guanFACINE (TENEX) 1 MG Tab Take 2 mg by mouth every evening.   Taking    insulin aspart (NOVOLOG) 100 UNIT/ML Solution Inject 0-20 Units under the skin 3 times a day before meals.   Taking    insulin aspart (NOVOLOG) 100 UNIT/ML Solution Inject 0-3 Units under the skin at bedtime as needed for High Blood Sugar (BG> 200 mg/dL). Comment from previous med list says 0-3 units in early morning 0200   Taking As Needed    insulin glargine 100 UNIT/ML SC SOLN Inject 23 Units under the skin every evening.   Taking    lansoprazole (PREVACID) 30 MG Tablet Delayed Release Dispersible Take 30 mg by mouth every morning before breakfast.   Taking    Melatonin 10 MG Tab 10 mg by Enteral Tube route every evening.   Taking    midodrine (PROAMATINE) 5 MG Tab Take 10 mg by mouth in the morning, at noon, and at bedtime.   Taking    nystatin (MYCOSTATIN) 231926 UNIT/ML Suspension Take 500,000 Units by mouth 4 times a day.   Taking    QUEtiapine (SEROQUEL) 25 MG Tab 25 mg by Enteral Tube route 2 times a day.   Taking    QUEtiapine (SEROQUEL) 100 MG Tab 150 mg by Enteral Tube route every evening.   Taking    thiamine (VITAMIN B-1) 50 MG Tab Take 100 mg by mouth every day.   Taking    ticagrelor (BRILINTA) 90 MG Tab tablet 90 mg by Enteral Tube route every 12 hours.   Taking    valproate Sodium (DEPAKENE) 250 MG/5ML Solution 1,250 mg by Enteral Tube route every evening.   Taking    valproate  "Sodium (DEPAKENE) 250 MG/5ML Solution 500 mg by Enteral Tube route 2 times a day.   Taking    lisinopril (PRINIVIL) 5 MG Tab Take 5 mg by mouth every day.       metformin (GLUCOPHAGE) 500 MG Tab Take  by mouth 2 times a day, with meals.       simvastatin (ZOCOR) 20 MG Tab Take 20 mg by mouth every evening.       aspirin 81 MG tablet Take 81 mg by mouth every day.            Medication Allergies:  No Known Allergies      Family Medical History:  Family History   Problem Relation Age of Onset    Heart Disease Mother         AF and CHF    Heart Disease Father 70        PCI, also aneurysm but no surgery    Heart Attack Paternal Grandmother 49        MI         Social History:  Social History     Socioeconomic History    Marital status:      Spouse name: Not on file    Number of children: Not on file    Years of education: Not on file    Highest education level: Not on file   Occupational History    Not on file   Tobacco Use    Smoking status: Never    Smokeless tobacco: Never   Substance and Sexual Activity    Alcohol use: No    Drug use: No    Sexual activity: Not on file   Other Topics Concern    Not on file   Social History Narrative    Not on file     Social Drivers of Health     Financial Resource Strain: Not on file   Food Insecurity: Not on file   Transportation Needs: Not on file   Physical Activity: Not on file   Stress: Not on file   Social Connections: Not on file   Intimate Partner Violence: Patient Unable To Answer (11/29/2024)    Humiliation, Afraid, Rape, and Kick questionnaire     Fear of Current or Ex-Partner: Patient unable to answer     Emotionally Abused: Patient unable to answer     Physically Abused: Patient unable to answer     Sexually Abused: Patient unable to answer   Housing Stability: Not on file         Vital signs:  Weight/BMI: Body mass index is 39.22 kg/m².  /61   Pulse 69   Temp 36.8 °C (98.2 °F) (Temporal)   Resp 18   Ht 1.549 m (5' 0.98\")   Wt 94.1 kg (207 lb 7.3 oz)  "  SpO2 94%   Vitals:    12/19/24 0100 12/19/24 0400 12/19/24 0729 12/19/24 1201   BP: 100/70 102/72 126/71 107/61   Pulse:  63 81 69   Resp:  19 18 18   Temp:   36.7 °C (98.1 °F) 36.8 °C (98.2 °F)   TempSrc:  Temporal Temporal Temporal   SpO2: 95% 96% 94% 94%   Weight:  94.1 kg (207 lb 7.3 oz)     Height:         Oxygen Therapy:  Pulse Oximetry: 94 %, O2 (LPM): 0, O2 Delivery Device: None - Room Air    Intake/Output Summary (Last 24 hours) at 12/19/2024 1213  Last data filed at 12/19/2024 0400  Gross per 24 hour   Intake 2200 ml   Output 1600 ml   Net 600 ml         Physical Exam:  Physical Exam  Constitutional:       Appearance: She is obese.   HENT:      Head: Normocephalic and atraumatic.      Nose: Nose normal.      Mouth/Throat:      Mouth: Mucous membranes are moist.   Eyes:      General: No scleral icterus.     Pupils: Pupils are equal, round, and reactive to light.   Cardiovascular:      Rate and Rhythm: Regular rhythm.   Pulmonary:      Effort: Pulmonary effort is normal.      Breath sounds: Normal breath sounds.   Abdominal:      Comments: Obese, BS +, no surgical scars LUQ, no distention   Musculoskeletal:         General: Normal range of motion.      Cervical back: Neck supple.   Skin:     General: Skin is warm and dry.   Neurological:      Mental Status: She is alert. She is disoriented.                 Labs:  Recent Labs     12/17/24  0401 12/17/24  0633 12/17/24  2051 12/18/24  0906 12/18/24  2042 12/19/24  0027 12/19/24  0905   SODIUM 147*   < > 150*  150*   < > 146* 149* 147*   POTASSIUM 3.9  --  4.0  --   --  4.0  --    CHLORIDE 115*  --  118*  --   --  119*  --    CO2 24  --  22  --   --  21  --    BUN 36*  --  33*  --   --  35*  --    CREATININE 0.93  --  0.74  --   --  0.84  --    MAGNESIUM 2.3  --  2.4  --   --   --   --    PHOSPHORUS 2.7  --   --   --   --   --   --    CALCIUM 7.6*  --  7.7*  --   --  7.8*  --     < > = values in this interval not displayed.     Recent Labs     12/16/24  6484  24  0027   PREALBUMIN 13.7*  --   --   --    GLUCOSE  --  245* 200* 226*     Recent Labs     241 24  0027   WBC 11.8* 10.5 9.1   NEUTSPOLYS 76.50* 76.50* 75.60*   LYMPHOCYTES 14.00* 13.20* 14.40*   MONOCYTES 5.60 6.40 5.40   EOSINOPHILS 2.50 2.00 3.10   BASOPHILS 0.30 0.60 0.40     Recent Labs     24  0027   RBC 3.38* 3.10* 3.05*   HEMOGLOBIN 11.2* 10.1* 9.8*   HEMATOCRIT 37.4 33.6* 33.5*   PLATELETCT 283 244 234   IRON  --  30*  --    FERRITIN  --  643.0*  --    TOTIRONBC  --  116*  --      Recent Results (from the past 24 hours)   POCT glucose device results    Collection Time: 24 12:49 PM   Result Value Ref Range    POC Glucose, Blood 179 (H) 65 - 99 mg/dL   POCT glucose device results    Collection Time: 24  5:40 PM   Result Value Ref Range    POC Glucose, Blood 161 (H) 65 - 99 mg/dL   EKG    Collection Time: 24  5:41 PM   Result Value Ref Range    Report       [facility]    Test Date:  2024  Pt Name:    JOURDAN MAYBERRY           Department: Replaced by [facility]  MRN:        [id number]                      Room:       T819  Gender:     Female                       Technician: TAJ  :        1965                   Requested By:ZANDRA BULLARD  Order #:    [id number]                    Reading MD: Pawan Teran MD    Measurements  Intervals                                Axis  Rate:       74                           P:          42  NC:         142                          QRS:        21  QRSD:       110                          T:          190  QT:         562  QTc:        624    Interpretive Statements  Sinus rhythm  Anteroseptal infarct, age indeterminate  Lateral leads are also involved  Prolonged QT interval  Compared to ECG 2024 01:32:14  Myocardial infarct finding now present  Sinus tachycardia no longer present  Ventricular premature complex(es) no longer present  Electronically  Signed On 12- 17:41:42 PST by Pawan Teran MD     SODIUM SERUM (NA)    Collection Time: 12/18/24  8:42 PM   Result Value Ref Range    Sodium 146 (H) 135 - 145 mmol/L   POCT glucose device results    Collection Time: 12/18/24 11:55 PM   Result Value Ref Range    POC Glucose, Blood 200 (H) 65 - 99 mg/dL   Basic Metabolic Panel    Collection Time: 12/19/24 12:27 AM   Result Value Ref Range    Sodium 149 (H) 135 - 145 mmol/L    Potassium 4.0 3.6 - 5.5 mmol/L    Chloride 119 (H) 96 - 112 mmol/L    Co2 21 20 - 33 mmol/L    Glucose 226 (H) 65 - 99 mg/dL    Bun 35 (H) 8 - 22 mg/dL    Creatinine 0.84 0.50 - 1.40 mg/dL    Calcium 7.8 (L) 8.5 - 10.5 mg/dL    Anion Gap 9.0 7.0 - 16.0   CBC WITH DIFFERENTIAL    Collection Time: 12/19/24 12:27 AM   Result Value Ref Range    WBC 9.1 4.8 - 10.8 K/uL    RBC 3.05 (L) 4.20 - 5.40 M/uL    Hemoglobin 9.8 (L) 12.0 - 16.0 g/dL    Hematocrit 33.5 (L) 37.0 - 47.0 %    .8 (H) 81.4 - 97.8 fL    MCH 32.1 27.0 - 33.0 pg    MCHC 29.3 (L) 32.2 - 35.5 g/dL    RDW 83.3 (H) 35.9 - 50.0 fL    Platelet Count 234 164 - 446 K/uL    MPV 11.7 9.0 - 12.9 fL    Neutrophils-Polys 75.60 (H) 44.00 - 72.00 %    Lymphocytes 14.40 (L) 22.00 - 41.00 %    Monocytes 5.40 0.00 - 13.40 %    Eosinophils 3.10 0.00 - 6.90 %    Basophils 0.40 0.00 - 1.80 %    Immature Granulocytes 1.10 (H) 0.00 - 0.90 %    Nucleated RBC 0.00 0.00 - 0.20 /100 WBC    Neutrophils (Absolute) 6.91 1.82 - 7.42 K/uL    Lymphs (Absolute) 1.32 1.00 - 4.80 K/uL    Monos (Absolute) 0.49 0.00 - 0.85 K/uL    Eos (Absolute) 0.28 0.00 - 0.51 K/uL    Baso (Absolute) 0.04 0.00 - 0.12 K/uL    Immature Granulocytes (abs) 0.10 0.00 - 0.11 K/uL    NRBC (Absolute) 0.00 K/uL   ESTIMATED GFR    Collection Time: 12/19/24 12:27 AM   Result Value Ref Range    GFR (CKD-EPI) 80 >60 mL/min/1.73 m 2   POCT glucose device results    Collection Time: 12/19/24  5:13 AM   Result Value Ref Range    POC Glucose, Blood 216 (H) 65 - 99 mg/dL   EKG    Collection  Time: 24  6:31 AM   Result Value Ref Range    Report       [facility]    Test Date:  2024  Pt Name:    JOURDAN MAYBERRY           Department: 183  MRN:        [id number]                      Room:       T819  Gender:     Female                       Technician: ELYSE  :        1965                   Requested By:FAIZA DOTSON  Order #:    [id number]                    Reading MD: Shilo Corbett MD    Measurements  Intervals                                Axis  Rate:       64                           P:          45  GA:         138                          QRS:        26  QRSD:       109                          T:          176  QT:         465  QTc:        480    Interpretive Statements  Sinus rhythm  Ventricular premature complex  Anterior infarct, age indeterminate  Intraventricular conduction delay  Low voltage, precordial leads  Compared to ECG 2024 21:03:00  Prolonged QT interval no longer present    Electronically Signed On 2024 06:31:44 PST by Shilo Corbett MD     SODIUM SERUM (NA)    Collection Time: 24  9:05 AM   Result Value Ref Range    Sodium 147 (H) 135 - 145 mmol/L   POCT glucose device results    Collection Time: 24 11:58 AM   Result Value Ref Range    POC Glucose, Blood 134 (H) 65 - 99 mg/dL         Radiology Review:  DX-ABDOMEN FOR TUBE PLACEMENT   Final Result         1.  Nonspecific bowel gas pattern in the upper abdomen.   2.  Dobbhoff tube tip terminates overlying the expected location of the gastric antrum or pylorus.   3.  Pulmonary edema and/or infiltrates, similar to prior study      DX-ABDOMEN FOR TUBE PLACEMENT   Final Result      The tip of the enteric tube terminates over the antrum of the stomach.      DX-ABDOMEN FOR TUBE PLACEMENT   Final Result         1.  Nonspecific bowel gas pattern in the upper abdomen.   2.  Dobbhoff tube is coiled within the duodenum, the tip terminates overlying the expected location  of the gastric body.   3.  Bilateral lower lobe edema and/or infiltrates.      DX-ABDOMEN FOR TUBE PLACEMENT   Final Result      NG tube tip projects over the gastroduodenal junction.      DX-CHEST-LIMITED (1 VIEW)   Final Result      1.  Mild cardiomegaly with evidence of mild to moderate pulmonary edema with minimal worsening since previous exam.      CT-HEAD W/O   Final Result         1.  No acute intracranial abnormality is identified, there are nonspecific white matter changes, commonly associated with small vessel ischemic disease.  Associated mild cerebral atrophy is noted.   2.  Bilateral sinusitis changes   3.  Atherosclerosis.               DX-CHEST-PORTABLE (1 VIEW)   Final Result         1.  Mild pulmonary edema and/or infiltrates.      EC-ECHOCARDIOGRAM COMPLETE W/ CONT   Final Result      DX-ABDOMEN FOR TUBE PLACEMENT   Final Result      Tip of the esophagogastric tube terminates over the pylorus of the stomach.      IR-MIDLINE CATHETER INSERTION WO GUIDANCE > AGE 3   Final Result                  Ultrasound-guided midline placement performed by qualified nursing staff    as above.          IR-US GUIDED PIV   Final Result    Ultrasound-guided PERIPHERAL IV INSERTION performed by    qualified nursing staff as above.      DX-OUTSIDE IMAGES-DX CHEST   Final Result      DX-OUTSIDE IMAGES-DX ABDOMEN   Final Result      MR-OUTSIDE IMAGES-MR BRAIN   Final Result      CT-OUTSIDE IMAGES-CT HEAD   Final Result      CT-OUTSIDE IMAGES-CT ABDOMEN/PELVIS   Final Result      CT-OUTSIDE IMAGES-CT CHEST   Final Result      DX-ABDOMEN FOR TUBE PLACEMENT   Final Result      There is a new small bowel feeding tube which terminates in the small bowel of the right mid abdomen.      DX-CHEST-LIMITED (1 VIEW)   Final Result         Asymmetric pulmonary infiltrates, left worse than right.      IR-US GUIDED PIV    (Results Pending)         MDM (Data Review):   -Records reviewed and summarized in current documentation  -I  personally reviewed and interpreted the laboratory results  -I personally reviewed the radiology images    Assessment/Recommendations:    --Unable to feed self/ oropharyngeal dysphagia   --Encephalopathy with delirium  --Anoxic brain injury, suspected  --HFrEF 25-30%  --CAD, recent STEMI, s/p PCI to LAD complicated by arrest requiring Impella and transfer to [facility]  --Anticoagulated:  Eliquis and Plavix    RECS:  --Concerned about feeding tube in this patient who can be agitated and pull tubes.  Pulling G tube in first 2 weeks can be a surgical emergency.  She will require mitts on hands 24 hrs/day 1st 2 weeks    --Described to have aspiration with episodes of bradycardia.  This will still be a problem with a PEG if she has her HOB decreased     --Need to hold DOAC x 48 hrs, therefore will hold Sat and Sun with goal of PEG on Monday  --Will write orders tomorrow  --Will need 's consent.      Angie Giordano M.D.          Core Quality Measures   Reviewed items:  Labs, Medications and Radiology reports reviewed

## 2024-12-19 NOTE — THERAPY
"Speech Language Pathology   Daily Treatment     Patient Name: Kiara Qureshi  AGE:  59 y.o., SEX:  female  Medical Record #: [id number]  Date of Service: 12/19/2024      Precautions:  Precautions: Fall Risk, Swallow Precautions, Nasogastric Tube         Subjective  RN cleared patient for session. Patient received awake, pleasantly confused. Patient repeatedly cheesecake.          Assessment  Patient seen this date for dysphagia management with focus on assessing readiness to participate in diagnostic swallow study. PO trials of ice chips, thins by cup/straw and liquidized assessed. Adequate oral bolus acceptance/containment. Swallow initiation appeared timely. Immediate cough with trials of thins, concerning for airway invasion.          Clinical Impressions  Patient presents with clinical signs of and is at elevated risk for oropharyngeal dysphagia. Patient will benefit from diagnostic swallow study to objectively assess swallow function and inform POC. FEES study tentatively scheduled for 10am this date.      Recommendations  Treatment Completed: Dysphagia Treatment       Dysphagia Treatment  Diet Consistency: NPO/NGT; pre-feeding trials with SLP only  Instrumentation: FEES  Medication: As tolerated  Oral Care: Q4h                     SLP Treatment Plan  Treatment Plan: Dysphagia Treatment, Patient/Family/Caregiver Training  SLP Frequency: 3x Per Week  Estimated Duration: Until Therapy Goals Met      Anticipated Discharge Needs  Discharge Recommendations: Recommend post-acute placement for additional speech therapy services prior to discharge home  Therapy Recommendations Upon DC: Dysphagia Training, Community Re-Integration, Patient / Family / Caregiver Education      Patient / Family Goals  Patient / Family Goal #1: \"I'll eat later when I'm more hungry\"  Goal #1 Outcome: Progressing slower than expected  Short Term Goals  Short Term Goal # 1: Patient will consume a full liquid diet with no overt s/sx of " aspiration given 1:1 feeding.  Goal Outcome # 1: Progressing slower than expected  Short Term Goal # 2: Pt will participate in prefeeding trials with SLP without overt s/sx of aspiration  Goal Outcome # 2 : Goal met, new goal aded  Short Term Goal # 2 B : Pt will paricipate in diagnostic swallow study to objectively assess swallow function and inform POC      Elidia Molina, SLP

## 2024-12-19 NOTE — PROGRESS NOTES
Hospital Medicine Daily Progress Note    Date of Service  12/19/2024    Chief Complaint  Kiara Qureshi is a 59 y.o. female admitted 11/27/2024 with encephalopathy    Hospital Course  58 yo woman HTN, HLD who was at [facility] for anterior STEMI s/p lytics and had LAD PCI and unsuccessful PCI for OM1 complicated by RP bleed, vfib and PEA arrest with cardiogenic shock needing ECMO and imeplla.  ECMO was decannulated 11/4, Impella removed 11/8.  She also had encephalopathy and delirium on valproic acid and Seroquel.  TTE showed EF 25-30%, she did not tolerate GDMT due to hypotension.  She had a CT chest on 11/25 that showed focal aortic mural thrombus versus ulcerated plaque to descending thoracic aorta and CTA AP with rim-enhancing RP hematoma and small thrombus in right common iliac artery, multiple nonenhancing fluid collections and subcu tissue in right lower and right inguinal regions, possible evolving hematomas and left femoral vein.  She was started on Vanco and Zosyn and transferred back to [facility].  She completed linezolid and Zosyn around 12/1.  Cardiology was consulted for medical management of her heart failure. Dr. Conley with vascular surgery was consulted, recommended wound vacs to groin wounds.  On 12/9 she became more altered with respiratory distress and transferred to [facility].  CT head was negative for acute changes.  EEG was negative for seizure activity.  She was high on high dose of Seroquel that was tapered off.  She remains on NG tube for tube feeds.    Interval Problem Update    12/17  Received a dose of seroquel overnight for agitation and still required restratings. BP at 90/50s.   Sodium improved to 147. Hgb stable.   Pt able to say a few words, opens eyes spontaneously however AOX0 when asked and does not follow commands. Remains on feeding tube.   Defer MRI given agitation,  unable to lie still and wary of sedation   Continue valproic acid BID for now. Will hold off restarting seroquel  and monitor.     12/18   Pt pulled feeding tube overnight again.   20bt vtach overnight on monitor. Other vitals wnl. Asymptomatic  Iron studies consistent with anemia of chronic disease  Pt more awake today and interactive, though still AOX1-2 (name, [facility]). Cannot recall events leading to and during admission. Denies pain, SOB, nausea/vomiting. Wants a shower and is thirsty.     SLP to see pt today.  Decrease Valproic acid to once daily starting tomorrow and then discontinue in 3 days. Med has interactions with Eliquis.   Noted EKG yesterday with Qtc >600. Repeat today. Defer restarting seroquel, pt also  more awake. Could be sundowning. Will hold off adding possible alternatives such as geodon, zyprexa, or trazodone at night as this can increase Qtc/risk of arrhythmias, pending new EKG. Increase melatonin to 10mg once daily   Discussed with pharmacy   Discussed with Rosa (friend, at bedside) POC who reports she will let  know     12/19  EKG per my read with improved Qtc <500, IVCD, in SR.   No acute events overnight, no episodes of agitation.   No Cheynne Lo breathing observed since yesterday. Awake still today but with non purposeful movements, not following commands nor answering questions appropriately. She did not pass her swallow eval today. Her feeding tube clogged.   Na today at 147. Crea 0.84. Hgb stable.    Increase FWF.   GI consulted for possible PEG tube placement  Continue taper of valproid acid. Continue on current medications  Unable to tolerate MRI, will hold off sedating meds for now  Discussed with GI and bedside RN   Attempted to call  (Bruno) but was unable to reach him. Have left message.    I have discussed this patient's plan of care and discharge plan at IDT rounds today with Case Management, Nursing, Nursing leadership, and other members of the IDT team.    Consultants/Specialty  cardiology and vascular surgery, palliative care    Code Status  DNAR, I  OK    Disposition  The patient is not medically cleared for discharge to home or a post-acute facility.      I have placed the appropriate orders for post-discharge needs.    Review of Systems  Review of Systems   Unable to perform ROS: Medical condition        Physical Exam  Temp:  [36.6 °C (97.9 °F)-36.8 °C (98.2 °F)] 36.8 °C (98.2 °F)  Pulse:  [61-81] 69  Resp:  [17-20] 18  BP: ()/(54-79) 107/61  SpO2:  [93 %-98 %] 94 %    Physical Exam  Constitutional:       Appearance: She is ill-appearing.      Comments: Opens eyes for exam, inconsistently tracks me   HENT:      Head: Normocephalic.      Mouth/Throat:      Mouth: Mucous membranes are dry.   Eyes:      Comments: Pupils are dilated, equal and reactive to light   Cardiovascular:      Rate and Rhythm: Normal rate and regular rhythm.   Pulmonary:      Breath sounds: Rhonchi present.      Comments: No periodic pauses in breathing, irregular breathing observed   Abdominal:      General: There is no distension.      Palpations: Abdomen is soft.      Tenderness: There is no abdominal tenderness.   Musculoskeletal:      Right lower leg: No edema.      Left lower leg: No edema.      Comments: Wound vacs bilaterally to groin   Neurological:      Mental Status: She is disoriented.      Comments: Non purposeful movements, spontaneous eye opening   Awake but unable to answer questions appropriately  Spontaneously moves both her upper extremities and lower extremities           Fluids    Intake/Output Summary (Last 24 hours) at 12/19/2024 1218  Last data filed at 12/19/2024 0400  Gross per 24 hour   Intake 2200 ml   Output 1600 ml   Net 600 ml        Laboratory  Recent Labs     12/17/24  0401 12/18/24  0238 12/19/24  0027   WBC 11.8* 10.5 9.1   RBC 3.38* 3.10* 3.05*   HEMOGLOBIN 11.2* 10.1* 9.8*   HEMATOCRIT 37.4 33.6* 33.5*   .7* 108.4* 109.8*   MCH 33.1* 32.6 32.1   MCHC 29.9* 30.1* 29.3*   RDW 84.5* 81.4* 83.3*   PLATELETCT 283 244 234   MPV 11.0 11.3 11.7      Recent Labs     12/17/24  0401 12/17/24  0633 12/17/24 2051 12/18/24  0906 12/18/24  2042 12/19/24  0027 12/19/24  0905   SODIUM 147*   < > 150*  150*   < > 146* 149* 147*   POTASSIUM 3.9  --  4.0  --   --  4.0  --    CHLORIDE 115*  --  118*  --   --  119*  --    CO2 24  --  22  --   --  21  --    GLUCOSE 245*  --  200*  --   --  226*  --    BUN 36*  --  33*  --   --  35*  --    CREATININE 0.93  --  0.74  --   --  0.84  --    CALCIUM 7.6*  --  7.7*  --   --  7.8*  --     < > = values in this interval not displayed.                   Imaging  DX-ABDOMEN FOR TUBE PLACEMENT   Final Result         1.  Nonspecific bowel gas pattern in the upper abdomen.   2.  Dobbhoff tube tip terminates overlying the expected location of the gastric antrum or pylorus.   3.  Pulmonary edema and/or infiltrates, similar to prior study      DX-ABDOMEN FOR TUBE PLACEMENT   Final Result      The tip of the enteric tube terminates over the antrum of the stomach.      DX-ABDOMEN FOR TUBE PLACEMENT   Final Result         1.  Nonspecific bowel gas pattern in the upper abdomen.   2.  Dobbhoff tube is coiled within the duodenum, the tip terminates overlying the expected location of the gastric body.   3.  Bilateral lower lobe edema and/or infiltrates.      DX-ABDOMEN FOR TUBE PLACEMENT   Final Result      NG tube tip projects over the gastroduodenal junction.      DX-CHEST-LIMITED (1 VIEW)   Final Result      1.  Mild cardiomegaly with evidence of mild to moderate pulmonary edema with minimal worsening since previous exam.      CT-HEAD W/O   Final Result         1.  No acute intracranial abnormality is identified, there are nonspecific white matter changes, commonly associated with small vessel ischemic disease.  Associated mild cerebral atrophy is noted.   2.  Bilateral sinusitis changes   3.  Atherosclerosis.               DX-CHEST-PORTABLE (1 VIEW)   Final Result         1.  Mild pulmonary edema and/or infiltrates.      EC-ECHOCARDIOGRAM  COMPLETE W/ CONT   Final Result      DX-ABDOMEN FOR TUBE PLACEMENT   Final Result      Tip of the esophagogastric tube terminates over the pylorus of the stomach.      IR-MIDLINE CATHETER INSERTION WO GUIDANCE > AGE 3   Final Result                  Ultrasound-guided midline placement performed by qualified nursing staff    as above.          IR-US GUIDED PIV   Final Result    Ultrasound-guided PERIPHERAL IV INSERTION performed by    qualified nursing staff as above.      DX-OUTSIDE IMAGES-DX CHEST   Final Result      DX-OUTSIDE IMAGES-DX ABDOMEN   Final Result      MR-OUTSIDE IMAGES-MR BRAIN   Final Result      CT-OUTSIDE IMAGES-CT HEAD   Final Result      CT-OUTSIDE IMAGES-CT ABDOMEN/PELVIS   Final Result      CT-OUTSIDE IMAGES-CT CHEST   Final Result      DX-ABDOMEN FOR TUBE PLACEMENT   Final Result      There is a new small bowel feeding tube which terminates in the small bowel of the right mid abdomen.      DX-CHEST-LIMITED (1 VIEW)   Final Result         Asymmetric pulmonary infiltrates, left worse than right.      IR-US GUIDED PIV    (Results Pending)        Assessment/Plan  * Heart failure with reduced ejection fraction (HCC)- (present on admission)  Assessment & Plan  ICM LVEF 25-30%  S/p revascularization  Soft pressures limiting titration of GDMT.  Farxiga  Holding losartan for low BP  Critical care had recommended holding bisoprolol because of Cheyne-Lo breathing  Dehydrated, holding Lasix    Acute metabolic encephalopathy  Assessment & Plan  Hospital course complicated by encephalopathy.  Likely from combination of CVA, STEMI, hyperglycemia; likely also with anoxic brain injury   Removing restraints as tolerated, following  Has a nasojejunal feeding tube placed by interventional radiology from outside facility  Was on Seroquel and Depakene as recommended by neurology/psychiatry at outside facility.    12/16-17  Multifactorial  Continues to be encephalopathic. CTH no acute findings. EEG neg for  seizure activity  Neuro evaluated patient at [facility].   Have been titrating off seroquel and pt's mental status has improved as her agitation has diminished  DC seroquel qhs dose  Decrease valproic acid TID to BID    12/18  Decrease Valproic acid to once daily starting tomorrow and then discontinue. Med has interactions with Eliquis.   Noted EKG yesterday with Qtc >600. Repeat today. Defer restarting seroquel. Will hold off adding possible alternatives such as geodon, zyprexa, or trazodone at night as this can increase Qtc/risk of arrhythmias, pending new EKG. Increase melatonin to 10mg once daily   Unable to tolerate MRI    12/19   Continue valproic acid taper. EKG with improved Qtc. Continue on current medications.   Failed swallow screen with SLP. Has been pulling feeding tubes before when more encephalopathic.   GI consulted for possible PEG tube placement  Unable to tolerate MRI.     Circulating anticoagulant disorder (HCC)  Assessment & Plan  On Eliquis    Functional quadriplegia (HCC)  Assessment & Plan  In the setting of AMS, prior PEA    Macrocytic anemia  Assessment & Plan  Stable. No overt bleeding  B12 wnl. Studies more consistent with anemia of chronic disease      Hypernatremia  Assessment & Plan  Persistent worsening and she lost her NG tube on and off  Started on D5 water infusion with FWF and q6h PRN  Goal correction 8 mLEq in 24h    12/17-19  Improving  S/p D5 infusion   Continue FWF 300cc every 4 hours and q12 Na checks    Acute hypoxic respiratory failure (HCC)  Assessment & Plan  In setting of heart failure, recent cardiogenic shock  Patient demonstrates cheynne florez breathing pattern which is consistent with her suspected anoxic brain injury  Patient appears to be experiencing aspiration events causing bradycardia  Continue supportive care  Patient is NPO, speech is following    12/19  Improved    Constipation  Assessment & Plan  + bm  Continue bowel regimen  following    Hypotension  Assessment  & Plan  BP low range, holding losartan and bisoprolol    Abdominal hematoma  Assessment & Plan  Might be related to cardiac device  Clinically stable and h/h in stable range  Patient was on lovenox due to rigth common iliac art thrombosis and then transitioned to eliquis per [facility]  - plan to continue for 3-4 months with repeat imaging at that time to see if it can be d/c       12/17-19  Hgb stable. Continue to monitor    ACP (advance care planning)  Assessment & Plan  Dr. Mobley: Discussed plan of care and code status with patient  Bruno ([phone]) and sister-in-law Laina ([phone]). I explained that Kiara has experienced a suspected anoxic brain injury during her current medical course that has required ECMO and impella, CVA, Vfib arrest. I shared my concern that Laina may not make a meaningful recovery from this and that her current and future quality of life appears limited. I explained that in the event of another CP arrest her quality of life would be further impacted in the event she were to survive such an episode. Laina and Bruno made the decision to change Kiara's code status to DNR, yes to intubaiton. Laina will be driving in from [place] in the coming days to have further goals of care conversations. Total of 48 minutes spent in discussion and coordination of advance care planning.    12/16 - my conversation with Laina and Bruno, they will like to continue care and plan for PAMs    Pneumonia  Assessment & Plan  S/p completed course of pip/tazo and vanc  Cultures were negative    Aortic mural thrombus (HCC)  Assessment & Plan  CT at outside facility showing focal aortic mural thrombus along the lateral wall of the descending thoracic aorta and also a tiny filling defect/mural thrombus within the posterior right common iliac artery  Continue on apixaban  Continue to monitor CBC        CVA (cerebral vascular accident) (HCC)  Assessment & Plan  Patient had MRI on 11/17/24 at [facility] which shwed  Punctate acute infarct of the right centrum semiovale. Scattered foci of enhancement in multiple vascular distributions as described may represent recent subacute infarcts. Diffuse stippled susceptibility signal throughout the supratentorial and infratentorial brain. Overall findings may represent sequelae of fat emboli or amyloid related angiopathy.   High dose statin  Apixaban  plavix    Continues to be encephalopathic. CTH no acute findings. EEG neg for seizure activity  Neuro evaluated patient at [facility].   Have been titrating off seroquel and pt's mental status has improved as her agitation has diminished  DC seroquel qhs dose  I changed valproic acid TID to BID  Repeat MRI brain if possible, unble to tolerate at this time    12/17  See plan for DOUG      CAD (coronary artery disease)  Assessment & Plan  Patient with recent complicated STEMI with PCI to LAD and mid LAD.  Complicated by V-fib arrest and cardiogenic shock requiring VA ECMO and Impella and retroperitoneal hematomas. At [facility] underwent impella supported PCI to mid LAD just distal to prior stent but unsuccessful with PCI to 100% occluded OM1 on 11/6. Patient was decannulated and had Impella removed.    Discussed with cardiology and asa stopped and replaced with plavix. Cont statin  Repeat echo shows persistently low EF, 25-30%  Cardiology had recommended bisoprolol but due to cheyne florez critical care recommends we stop this - so will hold   Continue to hold losartan for low BP and lasix for hypernatremia      Urinary retention  Assessment & Plan  Beckwith catheter in place    Hyperglycemia  Assessment & Plan  Glargine 9 units  ISS      Metabolic acidemia- (present on admission)  Assessment & Plan  Improved           VTE prophylaxis:    therapeutic anticoagulation with eliquis 5 mg BID      I have performed a physical exam and reviewed and updated ROS and Plan today (12/19/2024). In review of yesterday's note (12/18/2024), there are no changes except as  documented above.

## 2024-12-19 NOTE — PROGRESS NOTES
Bedside report received from off going RN Bernice, assumed care of patient.     Fall Risk Score: HIGH RISK  Fall risk interventions in place: Place yellow fall risk ID band on patient, Provide patient/family education based on risk assessment, Educate patient/family to call staff for assistance when getting out of bed, Place fall precaution signage outside patient door, Place patient in room close to nursing station, Utilize bed/chair fall alarm, Tele-sitter, and Bed alarm connected correctly  Bed type: Low air loss (Ray Score less than 17 interventions in place)  Patient on cardiac monitor: Yes  IVF/IV medications: Not Applicable   Oxygen: Room Air  Bedside sitter: Not Applicable   Isolation: Not applicable

## 2024-12-19 NOTE — PROGRESS NOTES
Monitor Summary  Rhythm: SR 71-79      Ectopy: Freq. PVC, PVC coup, Freq. PAC, trigem & triplet PVC, 4beats Vtach    Intervals: .13/.10/.38

## 2024-12-19 NOTE — WOUND TEAM
[facility]  Inpatient Services  Wound and Skin Care Follow-up    Admission Date: 11/27/2024     Last order of IP CONSULT TO WOUND CARE was found on 12/16/2024 from Hospital Encounter on 11/27/2024     HPI, PMH, SH: Reviewed    Past Surgical History:   Procedure Laterality Date    INSERTION,CANNULA FOR ECMO,ADULT Left 10/30/2024    Procedure: INSERTION, CANNULA, FOR ECMO, ADULT;  Surgeon: Aime Elias D.O.;  Location: SURGERY [facility];  Service: Cardiothoracic     Social History     Tobacco Use    Smoking status: Never    Smokeless tobacco: Never   Substance Use Topics    Alcohol use: No     No chief complaint on file.    Diagnosis: HFrEF (heart failure with reduced ejection fraction) (HCC) [I50.20]  Encephalopathy acute [G93.40]    Unit where seen by Wound Team: T819/00     WOUND FOLLOW UP RELATED TO:  BL Groin NPWT change       WOUND TEAM PLAN OF CARE - Frequency of Follow-up:   Nursing to follow dressing orders written for wound care. Contact wound team if area fails to progress, deteriorates or with any questions/concerns if something comes up before next scheduled follow up (See below as to whether wound is following and frequency of wound follow up)  Dressing changes by wound team:                   NPWT change 3 times weekly - BL groin    WOUND HISTORY:       Pt is a 59yr old female with history of Obesity, and HTN. Pt was initially seen at [facility] for anterior STEMI. Pt underwent LAD PCI but hospital course was complicated by retroperitoneal bleed, V Fib and PEA arrest ultimately requiring ECMO and Impella. Pt was decannulated from ECMO on 11/4 and had left femoral embolectomy. Impella was then removed on 11/8. Pts ECHO on 11/24 revealed EF of 25-30%. Pt has bilateral groin wounds from ECMO and Impella. Wound team was consulted to evaluate.        WOUND ASSESSMENT/LDA  Wound 12/07/24 Full Thickness Wound Groin Left (Active)   Date First Assessed/Time First Assessed: 12/07/24 5145    Present on Original Admission: No  Hand Hygiene Completed: Yes  Primary Wound Type: Full Thickness Wound  Location: Groin  Laterality: Left      Assessments 12/18/2024  5:00 PM   Wound Image      Site Assessment Slough;Granulation tissue   Periwound Assessment Clean;Dry;Intact   Margins Defined edges;Unattached edges   Closure Secondary intention   Drainage Amount Scant   Drainage Description Serosanguineous   Treatments Cleansed;Site care   Wound Cleansing Approved Wound Cleanser   Periwound Protectant Skin Protectant Wipes to Periwound;Paste Ring;Drape   Dressing Status Clean;Dry;Intact   Dressing Changed Changed   Dressing Cleansing/Solutions 1/4 Strength Dakin's Solution   Dressing Options Wound Vac   Dressing Change/Treatment Frequency Monday, Wednesday, Friday, and As Needed   NEXT Dressing Change/Treatment Date 12/20/24   NEXT Weekly Photo (Inpatient Only) 12/25/24   Wound Team Following 3x Weekly   Non-staged Wound Description Full thickness   Wound Length (cm) 3 cm   Wound Width (cm) 4.6 cm   Wound Surface Area (cm^2) 13.8 cm^2   Shape Oval   Wound Odor Mild;Foul   WOUND NURSE ONLY - Time Spent with Patient (mins) 45       Wound 12/07/24 Full Thickness Wound Groin Right (Active)   Date First Assessed/Time First Assessed: 12/07/24 1721   Present on Original Admission: No  Hand Hygiene Completed: Yes  Primary Wound Type: Full Thickness Wound  Location: Groin  Laterality: Right      Assessments 12/18/2024  5:00 PM   Wound Image     Site Assessment Red;Slough   Periwound Assessment Satellite lesions;Clean;Dry;Intact   Margins Defined edges;Unattached edges   Closure Secondary intention   Drainage Amount Small   Drainage Description Serosanguineous   Treatments Cleansed;Site care   Wound Cleansing Approved Wound Cleanser   Periwound Protectant Skin Protectant Wipes to Periwound;Paste Ring;Drape   Dressing Status Clean;Dry;Intact   Dressing Changed Changed   Dressing Cleansing/Solutions 1/4 Strength Dakin's  Solution   Dressing Options Wound Vac   Dressing Change/Treatment Frequency Monday, Wednesday, Friday, and As Needed   NEXT Dressing Change/Treatment Date 12/20/24   NEXT Weekly Photo (Inpatient Only) 12/25/24   Wound Team Following 3x Weekly   Non-staged Wound Description Full thickness   Wound Length (cm) 1.9 cm   Wound Width (cm) 2 cm   Wound Depth (cm) 4.8 cm   Wound Surface Area (cm^2) 3.8 cm^2   Wound Volume (cm^3) 18.24 cm^3   Wound Healing % 11   Shape Round   Wound Odor Mild;Foul       Negative Pressure Wound Therapy 12/07/24 Groin Left (Active)   Placement Date/Time: 12/07/24 1722   Location: Groin  Laterality: Left      Assessments 12/18/2024  5:00 PM   Vacuum Serial Number [id number]   NPWT Pump Mode / Pressure Setting Ulta;Intermittent;125 mmHg   Dressing Type Small;Black Foam (Veraflo)   Number of Foam Pieces Used 1   Canister Changed No   NEXT Dressing Change/Treatment Date 12/20/24   VAC VeraFlo Irrigant 1/4 Strength Dakins   VAC VeraFlo Soak Time (mins) 6   VAC VeraFlo Instill Volume (ml) 12   VAC VeraFlo - Therapy Time (hrs) 2   VAC VeraFlo Pressure (mm/Hg) 125 mmHg       Negative Pressure Wound Therapy 12/07/24 Groin Right (Active)   Placement Date/Time: 12/07/24 1722   Present on Original Admission: No  Location: Groin  Laterality: Right      Assessments 12/18/2024  5:00 PM   NPWT Pump Mode / Pressure Setting Ulta;Intermittent;125 mmHg   Dressing Type Small;Black Foam (Veraflo)   Number of Foam Pieces Used 2   Canister Changed No   NEXT Dressing Change/Treatment Date 12/20/24   VAC VeraFlo Irrigant 1/4 Strength Dakins   VAC VeraFlo Soak Time (mins) 6   VAC VeraFlo Instill Volume (ml) 18   VAC VeraFlo - Therapy Time (hrs) 2   VAC VeraFlo Pressure (mm/Hg) 125 mmHg        Vascular:    JAGJIT:   No results found.    Lab Values:    Lab Results   Component Value Date/Time    WBC 10.5 12/18/2024 02:38 AM    RBC 3.10 (L) 12/18/2024 02:38 AM    HEMOGLOBIN 10.1 (L) 12/18/2024 02:38 AM    HEMATOCRIT 33.6 (L)  12/18/2024 02:38 AM    CREACTPROT 4.07 (H) 12/16/2024 02:22 PM    SEDRATEWES 140 (H) 11/28/2024 01:35 AM    HBA1C 6.2 (H) 11/01/2024 06:46 PM    HBA1C 6.4 (H) 10/30/2024 03:18 AM         Culture Results show:  No results found for this or any previous visit (from the past 720 hours).    Pain Level/Medicated:  None, Tolerated without pain medication       INTERVENTIONS BY WOUND TEAM:  Chart and images reviewed. Discussed with bedside RN. All areas of concern (based on picture review, LDA review and discussion with bedside RN) have been thoroughly assessed. Documentation of areas based on significant findings. This RN in to assess patient. Performed standard wound care which includes appropriate positioning, dressing removal and non-selective debridement. Pictures and measurements obtained weekly if/when required.    Wound:  L Groin  Preparation for Dressing removal: Removed without difficulty  Cleansed/Non-selectively Debrided with:  Wound cleanser and Gauze  Brianna wound: Cleansed with Wound cleanser and Gauze, Prepped with No Sting, Paste Rings, and Drape  Primary Dressing:  One piece of foam applied into wound bed and secured with drape and trac pad.  Secondary (Outer) Dressing: Suction resumed at 125mmHg, no leaks detected. Offloading adhesive foam x1 placed beneath Vac tubing.     Wound:  R Groin  Preparation for Dressing removal: Removed without difficulty  Cleansed/Non-selectively Debrided with:  Wound cleanser and Gauze  Brianna wound: Cleansed with Wound cleanser and Gauze, Prepped with No Sting, Paste Rings, Drape, and Hydrocolloid  Primary Dressing:  One piece of foam applied into wound bed and bridged superiorly. This was secured with drape. Button and trac pad then applied.  Secondary (Outer) Dressing: Suction resumed at 125mmHg, no leaks detected. Offloading adhesive foam x1 placed beneath Vac tubing.    Advanced Wound Care Discharge Planning  Number of Clinicians necessary to complete wound care: 1-2 (for  positioning)  Is patient requiring IV pain medications for dressing changes:  No   Length of time for dressing change 30 min. (This does not include chart review, pre-medication time, set up, clean up or time spent charting.)    Interdisciplinary consultation: Patient, Bedside RN (Bernice), Jodi HASTINGS (Wound RN).    EVALUATION / RATIONALE FOR TREATMENT:     Date:  12/18/24  Wound Status:  Wound deteriorating    L Groin wound base still with slough, slightly decreased. Satellite lesion noted below wound bed, does connect beneath skin bridge.  R Groin with increased depth compared to previous measurements. Both wound beds with mildly foul odor, continuing Dakins VF NPWT.  Sacrum pink, intact and blanching, barrier paste in room.    Date:  12/16/24  Wound Status:  No change in wound     Right groin wound has moist pale pink tissue, continue with dakin's VF NPWT.  Left groin wound with slough and non-viable purulent drainage.  CSWD to remove purulent and slough from wound bed.  Resumed VF NPWT with dakin's instillation.     Date:  12/13/24  Wound Status:  Wound deteriorating    BL Groin wound beds with increased amounts of slough and malodor.  Patient is already on Dakins VF, will continue with this to topically clean wound beds.    Date:  12/10/24  Wound Status:  Wound progressing as expected    Left groin: Groin noted with layer of slough. Wound has small area of tunneling at 6o clock, a small piece of black foam was tucked into this tunnel to assist with closure.   Right Groin: Noted with a layer of slough, VF NPWT was changed from NS to Dakins to assist with chemical debridement.   Prior to vac removal, skin around both groin sites appeared to be moist, and VF instillation amount was reduced.     Date:  12/07/24  Wound Status:  Initial evaluation    Bilateral wound beds with slough present.  VF NPWT applied to assist with mechanical debridement, cleansing the wound bed, increase oxygenation and granulation to the area  "and healing the wound by secondary intention.        Date:  12/04/24  Wound Status:  Initial evaluation    Bilateral groin sites with necrotic tissue, adipose and tan purulent drainage. Discussed with bedside RN who will speak with MD regarding surgical consult. Pt would benefit from NPWT application but would like surgery to clear pt from any exposed structures prior to vac application. Dakins ordered to chemically debride.          Goals: Steady decrease in wound area and depth weekly.    NURSING PLAN OF CARE ORDERS:  No new orders this visit    NUTRITION RECOMMENDATIONS   Wound Team Recommendations:  N/A     DIET ORDERS (From admission to next 24h)       Start     Ordered    12/13/24 1414  Diet NPO Restrict to: With Tube Feed  ALL MEALS        Question:  Diet NPO Restrict to:  Answer:  With Tube Feed    12/13/24 1413    12/10/24 1651  Diet: Diet Tube Feed; Formula: Pivot; Pivot: Pivot 1.5 RTH; Goal Rate (mL/Hour): 40; Duration: 24 HR  ALL MEALS        Question Answer Comment   Diet Diet Tube Feed    Formula: Pivot    Pivot: Pivot 1.5 RTH    Goal Rate (mL/Hour) 40    Route NG    Duration 24 HR       Placed in \"And\" Linked Group    12/10/24 1653    12/02/24 1232  Supplements  ALL MEALS        Question Answer Comment   Which Supplement Glucerna    Glucerna: Glucerna Shake Carton        12/02/24 1231                    PREVENTATIVE INTERVENTIONS:   Q shift Ray - performed per nursing policy  Q shift pressure point assessments - performed per nursing policy    Surface/Positioning  Standard/trauma mattress - Currently in Place  Reposition q 2 hours - Currently in Place  TAPs Turning system - Currently in Place    Offloading/Redistribution  Heel offloading dressing (Silicone dressing) - Currently in Place  Float Heels off Bed with Pillows - Currently in Place         Containment/Moisture Prevention    Beckwith Catheter - Currently in Place  Barrier paste - Applied this Visit    Anticipated discharge plans:  TBD    "     Vac Discharge Needs:  Vac Discharge plan is purely a recommendation from wound team and not a requirement for discharge unless otherwise stated by physician.  Veraflo Vac while inpatient, ok to transition to Regular Vac on discharge

## 2024-12-19 NOTE — WOUND TEAM
Assisted Ana DAVIES (Wound RN), with wound care to BL Groin NPWT, non-selective debridement performed using wound cleanser/NS and gauze. Please see Ana DAVIES (Wound RN)  wound note for further wound care details.

## 2024-12-19 NOTE — PROGRESS NOTES
Contacted due to IRIS being clogged. Iris currently at 102cm. Pulled IRIS back to 55cm, tube patent with guidewire. Obtain new abdominal xray for placement, do not use tube until confirmed on XRAY. Bridal adjusted to secure tube.

## 2024-12-19 NOTE — DISCHARGE PLANNING
Case Management Discharge Planning    Admission Date: 11/27/2024  GMLOS: 4.1  ALOS: 22    6-Clicks ADL Score: 8  6-Clicks Mobility Score: 11    Anticipated Discharge Dispo: Discharge Disposition: Disch to a long term care facility (63)  Discharge Address: [address] [place] [address]  Discharge Contact Phone Number: [phone]    This RN CM completed chart review and saw patient at bedside. Patient is not able to follow commands, she is speaking but not able to answer A/O questions. Patient NG tube is clogged nursing team is working on unclogging.  is consulting GI for consideration of PEG tube as patient failed speech eval this morning. Patient is in bilateral restraints and mitts. Patient also has 2 wound vacs in place.    Patient is anticipated to move to a medical floor as she no longer requires telemetry monitoring.  updated that they are weaning down valproic acid for a few more days and will try other medications now that her QTC is improved to help with behaviors of grabbing at medical equipment.     This RN CM followed up with spouse and he has not received a call to screen for medicaid. Discussed that we asked [facility] to follow up with improvements documented by nursing and will follow up if they have seen patient today.     This RN CM followed up with [facility] Liaison and was updated that they are going to decline related to insurance and patient unable to follow commands from nursing team.

## 2024-12-19 NOTE — CARE PLAN
"The patient is Watcher - Medium risk of patient condition declining or worsening    Shift Goals  Clinical Goals: Q2 turns, reorient, tube feedings  Patient Goals: \"want to go home\"  Family Goals: not present    Progress made toward(s) clinical / shift goals:      Patient is not progressing towards the following goals:      Problem: Safety - Medical Restraint  Goal: Remains free of injury from restraints (Restraint for Interference with Medical Device)  Outcome: Progressing  Flowsheets (Taken 12/16/2024 0205 by Kim Steven RCaseN.)  Addressed this shift: Remains free of injury from restraints (restraint for interference with medical device):   Determine that other, less restrictive measures have been tried or would not be effective before applying the restraint   Evaluate the patient's condition at the time of restraint application   Inform patient/family regarding the reason for restraint   Every 2 hours: Monitor safety, psychosocial status, comfort, nutrition and hydration     Problem: Skin Integrity  Goal: Risk for impaired skin integrity will decrease  Outcome: Progressing  Note: Assess skin and monitor for skin breakdown. Alleviate pressure to bony prominences and provide assistance with turning, repositioning, ROM and mobility as appropriate. Use of Q2 turns, low airloss, mepilex in place.  Continue to monitor.        Problem: Hemodynamics  Goal: Patient's hemodynamics, fluid balance and neurologic status will be stable or improve  Outcome: Progressing  Note: Monitor intake and output, VS, pulse oximetry, daily weights. Assessed peripheral status: pulses and capillary refill. Assessed extremities for color and body temperature.        Problem: Respiratory  Goal: Patient will achieve/maintain optimum respiratory ventilation and gas exchange  Outcome: Progressing  Flowsheets  Taken 12/18/2024 2310 by Ofelia Fournier RCaseN.  O2 Delivery Device: None - Room Air  Taken 12/18/2024 0730 by Bernice Garcia, " R.N.  Deep Breathe and Cough: Unable to Perform   Taken 12/17/2024 2200 by Geronimo Townsend V, R.NCase  Incentive Spirometer: Patient Unable to Perform  Note: Assess and monitor pulmonary status. Adjust oxygen as needed to maintain adequate saturation. Turning, coughing and deep breathing.      Problem: Nutrition  Goal: Patient's nutritional and fluid intake will be adequate or improve  Outcome: Progressing  Flowsheets (Taken 12/18/2024 2048)  P.O.: 0 mL  Oral Nutrition: NPO at this Time     Problem: Urinary Elimination  Goal: Establish and maintain regular urinary output  Note:

## 2024-12-19 NOTE — THERAPY
Physical Therapy   Daily Treatment     Patient Name: Kiara Qureshi  Age:  59 y.o., Sex:  female  Medical Record #: [id number]  Today's Date: 12/19/2024     Precautions  Precautions: Fall Risk;Swallow Precautions;Nasogastric Tube    Assessment  Pt seen for PT tx session with mobility detailed down below. Pt still following intermittently for bed mobility and stands at EOB, however she is distracted and fixated throughout on drinking water despite education on why she can't. Pt is generally at Mod-Max A and demonstrates the ability to do more based on her LE strength, however she does not seem to be progressing with her cognition and following. Continue to recommend placement, will follow.     Plan    Treatment Plan Status: Continue Current Treatment Plan  Type of Treatment: Bed Mobility, Therapeutic Activities, Self Care / Home Evaluation, Therapeutic Exercise, Neuro Re-Education / Balance, Gait Training  Treatment Frequency: 1 Time per Week  Treatment Duration: Until Therapy Goals Met    DC Equipment Recommendations: Unable to determine at this time  Discharge Recommendations: Recommend post-acute placement for additional physical therapy services prior to discharge home      Vitals   O2 (LPM) 0   O2 Delivery Device None - Room Air   Pain 0 - 10 Group   Therapist Pain Assessment Post Activity Pain Same as Prior to Activity   Cognition    Cognition / Consciousness X   Level of Consciousness Alert   Ability To Follow Commands 1 Step  (50%)   Safety Awareness Impaired   New Learning Impaired   Attention Impaired   Comments following intermittently with mostly understandable speech   Sitting Lower Body Exercises   Comments pt able to complete some LAQ at EOB, but not consistently   Balance   Sitting Balance (Static) Poor +   Sitting Balance (Dynamic) Poor +   Standing Balance (Static) Poor -   Standing Balance (Dynamic) Poor -   Weight Shift Sitting Poor   Weight Shift Standing Poor   Skilled Intervention Verbal  Cuing   Comments pt needing 2p HHA for balance in standing   Bed Mobility    Supine to Sit Maximal Assist   Sit to Supine Maximal Assist   Scooting Maximal Assist   Skilled Intervention Verbal Cuing   Comments 2p assist due to poor initiation, pt demonstrates strength to do more   Gait Analysis   Comments NT   Functional Mobility   Sit to Stand Moderate Assist   Mobility STS x2 with 2p HHA for safety   Skilled Intervention Verbal Cuing   Comments pt able to stand for ~10-15 seconds each time. pt distractable in standing   6 Clicks Assessment - How much HELP from from another person do you currently need... (If the patient hasn't done an activity recently, how much help from another person do you think he/she would need if he/she tried?)   Turning from your back to your side while in a flat bed without using bedrails? 2   Moving from lying on your back to sitting on the side of a flat bed without using bedrails? 2   Moving to and from a bed to a chair (including a wheelchair)? 2   Standing up from a chair using your arms (e.g., wheelchair, or bedside chair)? 2   Walking in hospital room? 2   Climbing 3-5 steps with a railing? 1   6 clicks Mobility Score 11   Short Term Goals    Short Term Goal # 1 pt will move supine<>eob with spv in 6 tx for bed mobility.   Goal Outcome # 1 Progressing slower than expected   Short Term Goal # 2 pt will complete spt with fww and spv in 6 tx for functional mobility.   Goal Outcome # 2 Progressing slower than expected   Short Term Goal # 3 pt will ambulate 150 ft with fww and spv in 6 tx for household distances.   Goal Outcome # 3 Goal not met   Physical Therapy Treatment Plan   Physical Therapy Treatment Plan Continue Current Treatment Plan   Anticipated Discharge Equipment and Recommendations   DC Equipment Recommendations Unable to determine at this time   Discharge Recommendations Recommend post-acute placement for additional physical therapy services prior to discharge home    Interdisciplinary Plan of Care Collaboration   IDT Collaboration with  Nursing;Therapy Tech   Patient Position at End of Therapy In Bed;Bed Alarm On;Wrist Restraints Applied  (mitts on)   Collaboration Comments RN updated   Session Information   Date / Session Number  12/19- 5 (1/1, 12/24)

## 2024-12-19 NOTE — PROGRESS NOTES
Received report from previous shift RN, assumed care of patient. Patient is A&O3 (confused to situation/ delayed response), vital signs are stable, on room air. Patient denies any pain at this time, no signs of distress or discomfort. Sitting up in bed. Call light and belongings within reach. Bed in lowest/locked position.         Fall Risk Score: HIGH RISK  Fall risk interventions in place: Place yellow fall risk ID band on patient, Provide patient/family education based on risk assessment, Educate patient/family to call staff for assistance when getting out of bed, Place fall precaution signage outside patient door, Place patient in room close to nursing station, Utilize bed/chair fall alarm, Notify charge of high risk for huddle, Tele-sitter, and Bed alarm connected correctly  Bed type: Low air loss (Ray Score less than 17 interventions in place)  Patient on cardiac monitor: Yes  IVF/IV medications: Not Applicable   Oxygen: Room Air  Bedside sitter: tele sitter  Isolation: Not applicable

## 2024-12-19 NOTE — THERAPY
Speech Language Pathology   Flexible Endoscopic Evaluation of Swallowing (FEES)        Patient Name: Kiara Qureshi  AGE:  59 y.o., SEX:  female  Medical Record #: [id number]  Date of Service: 12/19/2024      History of Present Illness  Kiara Qureshi is a 59 y.o. female admitted 11/27/2024 with STEMI, encephalopathy. Patient underwent an LAD PCI but hospital course was complicated by retroperitoneal bleed, ventricular fibrillation and PEA arrest and cardiogenic shock requiring VA ECMO and Impella.  She is now status post ECMO decannulation on November 4 and had left femoral embolectomy, PCI to the LAD, unsuccessful PCI to OM1 and Impella removal on November 8.       PMHx:obesity, hypertension, hyperlipidemia     CT of head 12/9/24:  1.  No acute intracranial abnormality is identified, there are nonspecific white matter changes, commonly associated with small vessel ischemic disease.  Associated mild cerebral atrophy is noted.  2.  Bilateral sinusitis changes  3.  Atherosclerosis.    CXR 12/13/24  1. Mild cardiomegaly with evidence of mild to moderate pulmonary edema with minimal worsening since previous exam.          Pertinent Information  Current Method of Nutrition: NPO until cleared by speech pathology, NGT  Patient Behaviors: Confused, Flat Affect, Restless, Uncooperative   Dentition: Good   Feeding Tube: NGT intact to left nare   Tracheostomy: No         Factor(s) Affecting Performance: Impaired mental status, Impaired command following     Discussed the risks, benefits, and alternatives of the FEES procedure. Patient/family acknowledged and agreed to proceed.      Assessment  Flexible Endoscopic Evaluation of Swallowing (FEES) completed at bedside today. The endoscope was passed transnasally via Right nare to evaluate the anatomy and physiology of swallowing. Pt did not tolerate the procedure.    Anatomic Findings: Edematous arytenoids. [place], diffused, dried yellow/blood tinged secretions.      (Edematous arytenoids)    (Secretions)    (Secretions mixed w/ice)    (Secretions on epiglottis)    (Secretions on PPW)    Vocal Fold Motion: Bilateral movement  Secretion Management: Excess secretions  PO Trials: Ice Chips, Thin Liquid, Mildly Thick Liquid, Liquidised, Pudding, Soft & Bite Sized      Consistency PAS Score Timing Residue Comments   Thin Liquid 8 During swallow (inferred), Post swallow Vallecular Residue: Trace (1%-5%)  Pyriform Sinus Residue: Mild (5%-25%) PAS 8 during w/ last trial of thins by cup, patient would not cough despite cues    PAS 7 post by cup from post-cricoid space  PAS 6 during by straw   PAS 5 during and post by cup  PAS 1 by tsp   Mildly Thick 3 During Swallow(inferred), Post swallow Vallecular Residue: Mild (5%-25%)  Pyriform Sinus Residue: Mild (5%-25%) PAS 3 during of liquid rinse  PAS 3 during by cup  PAS 2 post from post-cricoid space x2  PAS 1 by tsp     Liquidised 1 N/A Vallecular Residue: Moderate (25%-50%)  Pyriform Sinus Residue: Mild (5%-25%)    Pudding 3 Post Swallow Vallecular Residue: Moderate (25%-50%)  Pyriform Sinus Residue: Mild (5%-25%) PAS 3 post mixed with MT2 rinse - did not clear   Soft & Bite Sized 1 N/A Vallecular Residue: Mild (5%-25%)  Pyriform Sinus Residue: Mild (5%-25%) Single peach     Penetration-Aspiration Scale (PAS)  1     No contrast enters airway  2     Contrast enters the airway, remains above the vocal folds, and is ejected from the airway (not seen in the airway at the end of the swallow).  3     Contrast enters the airway, remains above the vocal folds, and is not ejected from the airway (is seen in the airway after the swallow).  4     Contrast enters the airway, contacts the vocal folds, and is ejected from the airway.  5     Contrast enters the airway, contacts the vocal folds, and is not ejected from the airway  6     Contrast enters the airway, crosses the plane of the vocal folds, and is ejected from the airway.  7     Contrast  enters the airway, crosses the plane of the vocal folds, and is not ejected from the airway despite effort.  8     Contrast enters the airway, crosses the plane of the vocal folds, is not ejected from the airway and there is no response to aspiration.      Oral phase: Adequate oral bolus acceptance/containment. Prolonged oral manipulation of bolus, piecemeal deglutition appreciated across all trials. Moderately prolonged mastication of SB6 trial (single peach).       Pharyngeal phase: Pharyngeal phase marked by impairments in reduced BOT retraction, reduced epiglottic inversion, sensory integrity, laryngeal vestibule closure (LVC), and pharyngeal shortening. Resulting in the following:  -Micro silent aspiration of thins by cup during (inferred) the swallow. This was the last PO trial and patient was not sensate to aspiration and did not follow commands to cough.   -Micro aspiration of thins by cup during (inferred) the swallow of thins by straw, patient was sensate and responded with reflexive cough that expectorated aspirate. Aspiration during (inferred) and post (from post-cricoid space and pyriform sinus) swallow of thins by cup, was not able to clear despite reflexive cough.   -Penetration to the vocal folds during (inferred) and post swallow (from post-cricoid space and pyriform sinus) of thins by cup.   -Shallow and flash penetration of mildly thick liquids. Patient inconsistent in following commands to complete cleansing swallow; therefore, penetrate not always cleared.  -Shallow penetration of pudding mixed with mildly thick liquid, could not clear d/t not able to follow commands for cleansing swallow.  -Trace-moderate vallecular residue across all trials.  -Mild pyriform sinus residue across all trials.      Compensatory Strategies:  -Reflexive cough - somewhat effective to clear aspirate  -Cued cleansing swallow - somewhat effective to clear penetrate and residue    Of note: Patient was not able to follow  commands for swallowing strategies    Severity Rating:  Severity Rating: YUDI     YUDI: Moderate      Clinical Impressions  Patient presents with a moderate oropharyngeal dysphagia, likely acute 2/2 prolonged NPO status, AMS. Swallow safety and efficiency are both impaired. Primary deficits include reduced BOT retraction, reduced epiglottic inversion, sensory integrity, laryngeal vestibule closure (LVC), and pharyngeal shortening. Patient AMS and inability to follow commands is negatively impacting swallowing outcomes. Deficits are resistant to diet modification; current mentation impedes efficacy of compensatory strategy usage. Recommend consideration for long-term source of nutrition/hydration/meds; given patient is on day 22 of hospital admission and AMS has not resolved. Patient is a guarded candidate for behavioral and exercise-based swallow rehabilitation. A repeat swallowing diagnostic is likely indicated prior to diet advancement related to silent nature of aspiration events. Consider training the following evidence-based swallowing exercises in therapy based on patient-specific pathophysiology: effortful swallow, chin tuck against resistance (CTAR), and tongue pull back.          Recommendations  Diet Consistency: NPO; consideration for long-term source of nutrition/hydration/meds   -Clear for minimal ice chips/hour 1:1 w/RN, when alert, after oral care to reduce xerostomia and mitigate disuse atrophy of swallow musculature    Medication: Non Oral  Oral Care: Q2h  Additional Instrumentation: Repeat diagnostic study when clinically appropriate         SLP Treatment Plan  Treatment Plan: Dysphagia Treatment, Patient/Family/Caregiver Training  SLP Frequency: 3x Per Week  Estimated Duration: Until Therapy Goals Met      Anticipated Discharge Needs  Discharge Recommendations: Recommend post-acute placement for additional speech therapy services prior to discharge home   Therapy Recommendations Upon DC: Dysphagia  "Training, Patient / Family / Caregiver Education, Community Re-Integration       Patient / Family Goals  Patient / Family Goal #1: \"I'll eat later when I'm more hungry\"  Goal #1 Outcome: Progressing slower than expected  Short Term Goal # 1: 12/19 Pt will consume prfdng trials with SLP only with no overt s/sx of aspiration or decline in pulmonary status  Goal Outcome # 1: Goal not met  Short Term Goal # 2: 12/19 Pt will complete swallow exercises targeting; BOT, LVC, pharyngeal shortening x40  Goal Outcome # 2 : Goal met, new goal aded  Short Term Goal # 2 B : Pt will paricipate in diagnostic swallow study to objectively assess swallow function and inform POC  Goal Outcome  # 2 B: Goal met      CHERELLE Hyman  "

## 2024-12-20 ENCOUNTER — APPOINTMENT (OUTPATIENT)
Dept: RADIOLOGY | Facility: MEDICAL CENTER | Age: 59
End: 2024-12-20
Payer: COMMERCIAL

## 2024-12-20 LAB
ANION GAP SERPL CALC-SCNC: 12 MMOL/L (ref 7–16)
BASOPHILS # BLD AUTO: 0.6 % (ref 0–1.8)
BASOPHILS # BLD: 0.04 K/UL (ref 0–0.12)
BUN SERPL-MCNC: 30 MG/DL (ref 8–22)
CALCIUM SERPL-MCNC: 7.7 MG/DL (ref 8.5–10.5)
CHLORIDE SERPL-SCNC: 116 MMOL/L (ref 96–112)
CO2 SERPL-SCNC: 18 MMOL/L (ref 20–33)
CREAT SERPL-MCNC: 0.73 MG/DL (ref 0.5–1.4)
EOSINOPHIL # BLD AUTO: 0.26 K/UL (ref 0–0.51)
EOSINOPHIL NFR BLD: 3.7 % (ref 0–6.9)
ERYTHROCYTE [DISTWIDTH] IN BLOOD BY AUTOMATED COUNT: 82.8 FL (ref 35.9–50)
GFR SERPLBLD CREATININE-BSD FMLA CKD-EPI: 95 ML/MIN/1.73 M 2
GLUCOSE BLD STRIP.AUTO-MCNC: 132 MG/DL (ref 65–99)
GLUCOSE BLD STRIP.AUTO-MCNC: 134 MG/DL (ref 65–99)
GLUCOSE BLD STRIP.AUTO-MCNC: 161 MG/DL (ref 65–99)
GLUCOSE BLD STRIP.AUTO-MCNC: 164 MG/DL (ref 65–99)
GLUCOSE SERPL-MCNC: 145 MG/DL (ref 65–99)
HCT VFR BLD AUTO: 39.4 % (ref 37–47)
HGB BLD-MCNC: 11.4 G/DL (ref 12–16)
IMM GRANULOCYTES # BLD AUTO: 0.06 K/UL (ref 0–0.11)
IMM GRANULOCYTES NFR BLD AUTO: 0.9 % (ref 0–0.9)
LYMPHOCYTES # BLD AUTO: 1.31 K/UL (ref 1–4.8)
LYMPHOCYTES NFR BLD: 18.7 % (ref 22–41)
MAGNESIUM SERPL-MCNC: 2.2 MG/DL (ref 1.5–2.5)
MCH RBC QN AUTO: 32.4 PG (ref 27–33)
MCHC RBC AUTO-ENTMCNC: 28.9 G/DL (ref 32.2–35.5)
MCV RBC AUTO: 111.9 FL (ref 81.4–97.8)
MONOCYTES # BLD AUTO: 0.48 K/UL (ref 0–0.85)
MONOCYTES NFR BLD AUTO: 6.8 % (ref 0–13.4)
NEUTROPHILS # BLD AUTO: 4.86 K/UL (ref 1.82–7.42)
NEUTROPHILS NFR BLD: 69.3 % (ref 44–72)
NRBC # BLD AUTO: 0 K/UL
NRBC BLD-RTO: 0 /100 WBC (ref 0–0.2)
PHOSPHATE SERPL-MCNC: 2.9 MG/DL (ref 2.5–4.5)
PLATELET # BLD AUTO: 190 K/UL (ref 164–446)
PMV BLD AUTO: 12.1 FL (ref 9–12.9)
POTASSIUM SERPL-SCNC: 4.4 MMOL/L (ref 3.6–5.5)
RBC # BLD AUTO: 3.52 M/UL (ref 4.2–5.4)
SODIUM SERPL-SCNC: 146 MMOL/L (ref 135–145)
WBC # BLD AUTO: 7 K/UL (ref 4.8–10.8)

## 2024-12-20 PROCEDURE — 36415 COLL VENOUS BLD VENIPUNCTURE: CPT

## 2024-12-20 PROCEDURE — 700102 HCHG RX REV CODE 250 W/ 637 OVERRIDE(OP): Performed by: HOSPITALIST

## 2024-12-20 PROCEDURE — 700102 HCHG RX REV CODE 250 W/ 637 OVERRIDE(OP): Performed by: STUDENT IN AN ORGANIZED HEALTH CARE EDUCATION/TRAINING PROGRAM

## 2024-12-20 PROCEDURE — A9270 NON-COVERED ITEM OR SERVICE: HCPCS | Performed by: INTERNAL MEDICINE

## 2024-12-20 PROCEDURE — 770001 HCHG ROOM/CARE - MED/SURG/GYN PRIV*

## 2024-12-20 PROCEDURE — 700111 HCHG RX REV CODE 636 W/ 250 OVERRIDE (IP): Performed by: NURSE PRACTITIONER

## 2024-12-20 PROCEDURE — 82962 GLUCOSE BLOOD TEST: CPT | Mod: 91

## 2024-12-20 PROCEDURE — 99232 SBSQ HOSP IP/OBS MODERATE 35: CPT

## 2024-12-20 PROCEDURE — 700102 HCHG RX REV CODE 250 W/ 637 OVERRIDE(OP)

## 2024-12-20 PROCEDURE — A9270 NON-COVERED ITEM OR SERVICE: HCPCS | Performed by: HOSPITALIST

## 2024-12-20 PROCEDURE — 83735 ASSAY OF MAGNESIUM: CPT

## 2024-12-20 PROCEDURE — 97605 NEG PRS WND THER DME<=50SQCM: CPT

## 2024-12-20 PROCEDURE — 85025 COMPLETE CBC W/AUTO DIFF WBC: CPT

## 2024-12-20 PROCEDURE — A9270 NON-COVERED ITEM OR SERVICE: HCPCS | Performed by: STUDENT IN AN ORGANIZED HEALTH CARE EDUCATION/TRAINING PROGRAM

## 2024-12-20 PROCEDURE — 80048 BASIC METABOLIC PNL TOTAL CA: CPT

## 2024-12-20 PROCEDURE — 84100 ASSAY OF PHOSPHORUS: CPT

## 2024-12-20 PROCEDURE — A9270 NON-COVERED ITEM OR SERVICE: HCPCS

## 2024-12-20 PROCEDURE — 700102 HCHG RX REV CODE 250 W/ 637 OVERRIDE(OP): Performed by: INTERNAL MEDICINE

## 2024-12-20 RX ORDER — QUETIAPINE FUMARATE 25 MG/1
50 TABLET, FILM COATED ORAL NIGHTLY
Status: DISCONTINUED | OUTPATIENT
Start: 2024-12-20 | End: 2024-12-20

## 2024-12-20 RX ORDER — TRAZODONE HYDROCHLORIDE 50 MG/1
50 TABLET, FILM COATED ORAL
Status: DISCONTINUED | OUTPATIENT
Start: 2024-12-20 | End: 2024-12-22

## 2024-12-20 RX ORDER — QUETIAPINE FUMARATE 25 MG/1
50 TABLET, FILM COATED ORAL NIGHTLY
Status: DISCONTINUED | OUTPATIENT
Start: 2024-12-20 | End: 2024-12-22

## 2024-12-20 RX ADMIN — APIXABAN 5 MG: 5 TABLET, FILM COATED ORAL at 06:30

## 2024-12-20 RX ADMIN — Medication 10 MG: at 21:51

## 2024-12-20 RX ADMIN — VALPROIC ACID 250 MG: 250 SOLUTION ORAL at 06:30

## 2024-12-20 RX ADMIN — INSULIN GLARGINE-YFGN 9 UNITS: 100 INJECTION, SOLUTION SUBCUTANEOUS at 18:59

## 2024-12-20 RX ADMIN — QUETIAPINE FUMARATE 50 MG: 25 TABLET ORAL at 21:51

## 2024-12-20 RX ADMIN — LANSOPRAZOLE 30 MG: 30 TABLET, ORALLY DISINTEGRATING ORAL at 06:30

## 2024-12-20 RX ADMIN — DAPAGLIFLOZIN 10 MG: 10 TABLET, FILM COATED ORAL at 06:31

## 2024-12-20 RX ADMIN — INSULIN LISPRO 1 UNITS: 100 INJECTION, SOLUTION INTRAVENOUS; SUBCUTANEOUS at 18:58

## 2024-12-20 RX ADMIN — LORAZEPAM 0.5 MG: 2 INJECTION INTRAMUSCULAR; INTRAVENOUS at 04:40

## 2024-12-20 RX ADMIN — ATORVASTATIN CALCIUM 80 MG: 80 TABLET, FILM COATED ORAL at 18:58

## 2024-12-20 RX ADMIN — CLOPIDOGREL BISULFATE 75 MG: 75 TABLET ORAL at 06:30

## 2024-12-20 RX ADMIN — INSULIN LISPRO 1 UNITS: 100 INJECTION, SOLUTION INTRAVENOUS; SUBCUTANEOUS at 14:20

## 2024-12-20 RX ADMIN — TRAZODONE HYDROCHLORIDE 50 MG: 50 TABLET ORAL at 21:51

## 2024-12-20 ASSESSMENT — PAIN DESCRIPTION - PAIN TYPE: TYPE: ACUTE PAIN

## 2024-12-20 ASSESSMENT — FIBROSIS 4 INDEX: FIB4 SCORE: 3.11

## 2024-12-20 NOTE — PROGRESS NOTES
Iris Placement    Tube Team verified patient name and medical record number prior to tube placement. Iris feeding tube (55 inches, 10 [place] citizen) placed at 63cm in left nare. Per Iris picture, tube appears to be in the stomach.    Nursing Instructions: Await KUB to confirm placement before use for medications or feeding. Stylet, may be removed, please place in labeled bag with insufflation bulb and save in patient medication drawer.

## 2024-12-20 NOTE — CARE PLAN
The patient is Stable - Low risk of patient condition declining or worsening    Shift Goals  Clinical Goals: monitor labs, comfort and new orders  Patient Goals: rest comfortably  Family Goals: not present    Progress made toward(s) clinical / shift goals:    Problem: Safety  Goal: Will remain free from injury  Outcome: Progressing     Problem: Skin Integrity  Goal: Risk for impaired skin integrity will decrease  Outcome: Progressing  Note: Q2 turns and Ray scale documented in flowsheets     Problem: Risk for Aspiration  Goal: Patient's risk for aspiration will be absent or decrease  Outcome: Progressing  Flowsheets (Taken 12/19/2024 1900)  Head of Bed Elevated: Greater than 30 degrees contraindicated  Note: NPO pt receiving enteral feedings       Patient is not progressing towards the following goals:

## 2024-12-20 NOTE — PROGRESS NOTES
Bedside report received from off going RN Bernice, assumed care of patient.     Fall Risk Score: HIGH RISK  Fall risk interventions in place: Place yellow fall risk ID band on patient, Provide patient/family education based on risk assessment, Educate patient/family to call staff for assistance when getting out of bed, Place fall precaution signage outside patient door, Place patient in room close to nursing station, Utilize bed/chair fall alarm, and Bed alarm connected correctly  Bed type: Low air loss (Ray Score less than 17 interventions in place)  Patient on cardiac monitor: Yes  IVF/IV medications: Not Applicable   Oxygen: Room Air  Bedside sitter: Not Applicable   Isolation: Not applicable

## 2024-12-20 NOTE — PROGRESS NOTES
Pt ripped out enteral tube again for the second time tonight. Pt is in bilateral wrist restraints and bilateral mittens with a tele sitter. Escalated to Charge RN for a human sitter since pt has had many occurrences pulling her tube out during her stay. ELIAS Blackwell notified.

## 2024-12-20 NOTE — PROGRESS NOTES
Iris Placement    Tube Team verified patient name and medical record number prior to tube placement. Iris feeding tube (43 inches, 10 [place]) placed at 68cm in left nare. Per Iris picture, tube appears to be in the small bowel.    Nursing Instructions: Await KUB to confirm placement before use for medications or feeding. Stylet, may be removed, please place in labeled bag with insufflation bulb and save in patient medication drawer.

## 2024-12-20 NOTE — PROGRESS NOTES
2130  Pt removed enteral tube, BEA Garrido notified. Reached out to CIC Charge KASIE Malave for insertion of cortrak. Pt in bilateral wrist restraints and bilateral mittens with telesitter. Tube feeding stopped at this time.     2230: New tube placed, orders in for abdominal xray. Will wait on results to restart tube feedings.

## 2024-12-20 NOTE — THERAPY
"Occupational Therapy  Daily Treatment     Patient Name: Kiara Qureshi  Age:  59 y.o., Sex:  female  Medical Record #: [id number]  Today's Date: 12/19/2024     Precautions  Precautions: Fall Risk, Swallow Precautions, Nasogastric Tube  Comments: in bilateral wrist restraints with mits    Assessment    Pt seen for OT treatment focused on ADLs in supine and cognition. Pt required mod A for seated oral care, hand washing, and max A to don/doff gown. Pt followed commands ~25-50% of the time with decreased command following after about 15 minutes. Pt's spouse present. Pt's spouse educated regarding the role of OT and delirium prevention strategies. Pt current functional performance limited by impaired cognition, impaired strength, and impaired activity tolerance. Pt will continue to benefit from skilled OT while admitted to acute care.     Plan    Treatment Plan Status: Continue Current Treatment Plan  Type of Treatment: Self Care / Activities of Daily Living, Adaptive Equipment, Neuro Re-Education / Balance, Therapeutic Exercises, Therapeutic Activity, Cognitive Skill Development  Treatment Frequency: 2 Times per Week  Treatment Duration: Until Therapy Goals Met    DC Equipment Recommendations: Unable to determine at this time  Discharge Recommendations: Recommend post-acute placement for additional occupational therapy services prior to discharge home    Subjective    \"Yeah, she was cold...\" \"I want some cherries. Or maybe some vegetables?\"     Objective     12/19/24 2497   Pain   Pain Scales 0 to 10 Scale    Pain 0 - 10 Group   Therapist Pain Assessment Post Activity Pain Same as Prior to Activity;Nurse Notified  (Not rated, agreeable to session)   Non Verbal Descriptors   Non Verbal Scale  Calm   Cognition    Cognition / Consciousness X   Speech/ Communication Delayed Responses;Incomprehensible Words   Orientation Level Not Oriented to Reason;Not Oriented to Place;Not Oriented to Year;Not Oriented to Month "   Level of Consciousness Alert   Ability To Follow Commands 1 Step  (~25-50% of the time with decreased distractions and simple one step commands, benefitted from multimodal cues, decreased command following towards end of session)   Safety Awareness Impaired;Impulsive   New Learning Impaired   Attention Impaired   Sequencing Impaired   Initiation Impaired   Comments Followed commands intermittently, benefitted from decreased distractions and multimodal cues   Active ROM Upper Body   Active ROM Upper Body  WDL   Comments Moved B UE within functional range   Strength Upper Body   Upper Body Strength  WDL   Comments able to test strength for biceps/triceps on R UE 3+/5 and triceps only on L UE 3+/5   Sensation Upper Body   Upper Extremity Sensation  Not Tested   Sitting Upper Body Exercises   Comments AAROM one at a time for shoulder, wrist and elbow B UE   Balance   Comments supine for ADLs/cognition only   Activities of Daily Living   Grooming Moderate Assist;Seated  (oral care with mouth swabs hand over hand (to prevent grasping for NG tube), washed B hands one at a time)   Upper Body Dressing Maximal Assist  (don/doff gown)   Skilled Intervention Verbal Cuing;Tactile Cuing;Sequencing;Postural Facilitation;Facilitation   Comments followed commands for ADL tasks with ~25-50% accuracy for ~15 min prior to starting to reach for lines/tubes, guarding by therapist with hand over hand to prevent pulling of lines   Functional Mobility   Comments supine for ADLs/cognition only   Activity Tolerance   Comments supine for ADLs/cognition only, followed commands with 25-50% accuracy prior to reaching for/pulling at lines; all lines in tact at end of session   Edema Management   Other Edema Mgmt Treatments Pt's L hand noted to be swollen. L hand elevated and restraint tightness decreased. RN aware and reported that it was swollen from coband placement yesterday and she has been monitoring it.   Patient / Family Goals   Patient /  Family Goal #1 For pt to get into a chair, per pt's    Goal #1 Outcome Progressing slower than expected   Short Term Goals   Short Term Goal # 1 Pt will follow one-step directions 100% of the time during ADLs   Goal Outcome # 1 Progressing as expected   Short Term Goal # 2 Pt will change gown with modA   Goal Outcome # 2 Progressing slower than expected   Short Term Goal # 3 Pt will perform seated g/h with Jun   Goal Outcome # 3 Progressing as expected   Short Term Goal # 4 Pt will transfer to [facility] with modA, second person for safety   Goal Outcome # 4 Goal not met   Education Group   Education Provided Role of Occupational Therapist;Activities of Daily Living   Role of Occupational Therapist Patient Response Patient;Acceptance;Family;Explanation;Verbal Demonstration   ADL Patient Response Patient;Family;Acceptance;Explanation;Demonstration;Verbal Demonstration;Action Demonstration   Additional Comments Discussed methods to decreased delirium with spouse   Occupational Therapy Treatment Plan    O.T. Treatment Plan Continue Current Treatment Plan   Treatment Interventions Self Care / Activities of Daily Living;Adaptive Equipment;Neuro Re-Education / Balance;Therapeutic Exercises;Therapeutic Activity;Cognitive Skill Development   Treatment Frequency 2 Times per Week   Duration Until Therapy Goals Met

## 2024-12-20 NOTE — CARE PLAN
The patient is Watcher - Medium risk of patient condition declining or worsening    Shift Goals  Clinical Goals: monitor labs, VS, Q2 turns  Patient Goals: JAYLIN  Family Goals: JAYLIN    Progress made toward(s) clinical / shift goals:      Patient is not progressing towards the following goals:      Problem: Safety - Medical Restraint  Goal: Remains free of injury from restraints (Restraint for Interference with Medical Device)  Outcome: Progressing  Flowsheets (Taken 12/16/2024 0205 by Kim Steven RBLAKE)  Addressed this shift: Remains free of injury from restraints (restraint for interference with medical device):   Determine that other, less restrictive measures have been tried or would not be effective before applying the restraint   Evaluate the patient's condition at the time of restraint application   Inform patient/family regarding the reason for restraint   Every 2 hours: Monitor safety, psychosocial status, comfort, nutrition and hydration     Problem: Skin Integrity  Goal: Risk for impaired skin integrity will decrease  Outcome: Progressing  Note: Assess skin and monitor for skin breakdown. Alleviate pressure to bony prominences and provide assistance with turning, repositioning, ROM and mobility as appropriate. Q2 turns, heel and elbow mepilex, low airloss bed Continue to monitor.        Problem: Risk for Aspiration  Goal: Patient's risk for aspiration will be absent or decrease  Outcome: Progressing     Problem: Nutrition  Goal: Patient's nutritional and fluid intake will be adequate or improve  Outcome: Progressing  Flowsheets (Taken 12/19/2024 1233 by Rosalinda Conley, C.N.A.)  P.O.: 0 mL  Oral Nutrition: NPO at this Time  Note: Pt has ripped out enteral tube twice on shift, unable to continuously administer tube feed.

## 2024-12-20 NOTE — WOUND TEAM
[facility]  Inpatient Services  Wound and Skin Care Follow-up    Admission Date: 11/27/2024     Last order of IP CONSULT TO WOUND CARE was found on 12/16/2024 from Hospital Encounter on 11/27/2024     HPI, PMH, SH: Reviewed    Past Surgical History:   Procedure Laterality Date    INSERTION,CANNULA FOR ECMO,ADULT Left 10/30/2024    Procedure: INSERTION, CANNULA, FOR ECMO, ADULT;  Surgeon: Aime Elias D.O.;  Location: SURGERY [facility];  Service: Cardiothoracic     Social History     Tobacco Use    Smoking status: Never    Smokeless tobacco: Never   Substance Use Topics    Alcohol use: No     No chief complaint on file.    Diagnosis: HFrEF (heart failure with reduced ejection fraction) (HCC) [I50.20]  Encephalopathy acute [G93.40]    Unit where seen by Wound Team: T819/00     WOUND FOLLOW UP RELATED TO:  BL groin NPWT change       WOUND TEAM PLAN OF CARE - Frequency of Follow-up:   Nursing to follow dressing orders written for wound care. Contact wound team if area fails to progress, deteriorates or with any questions/concerns if something comes up before next scheduled follow up (See below as to whether wound is following and frequency of wound follow up)  Dressing changes by wound team:                   NPWT change 3 times weekly - BL groin    WOUND HISTORY:       Pt is a 59yr old female with history of Obesity, and HTN. Pt was initially seen at [facility] for anterior STEMI. Pt underwent LAD PCI but hospital course was complicated by retroperitoneal bleed, V Fib and PEA arrest ultimately requiring ECMO and Impella. Pt was decannulated from ECMO on 11/4 and had left femoral embolectomy. Impella was then removed on 11/8. Pts ECHO on 11/24 revealed EF of 25-30%. Pt has bilateral groin wounds from ECMO and Impella. Wound team was consulted to evaluate.        WOUND ASSESSMENT/LDA  Wound 12/07/24 Full Thickness Wound Groin Left (Active)   Date First Assessed/Time First Assessed: 12/07/24 7041    Present on Original Admission: No  Hand Hygiene Completed: Yes  Primary Wound Type: Full Thickness Wound  Location: Groin  Laterality: Left      Assessments 12/20/2024  2:00 PM   Site Assessment Red;Slough   Periwound Assessment Clean;Dry;Intact   Margins Defined edges;Unattached edges   Closure Secondary intention   Drainage Amount Small   Drainage Description Serosanguineous   Treatments Cleansed   Wound Cleansing Approved Wound Cleanser   Periwound Protectant Skin Protectant Wipes to Periwound;Paste Ring;Drape   Dressing Status Clean;Dry;Intact   Dressing Changed Changed   Dressing Cleansing/Solutions 1/4 Strength Dakin's Solution   Dressing Options Wound Vac   Dressing Change/Treatment Frequency Monday, Wednesday, Friday, and As Needed   NEXT Dressing Change/Treatment Date 12/23/24   NEXT Weekly Photo (Inpatient Only) 12/25/24   Wound Team Following 3x Weekly   Non-staged Wound Description Full thickness   Shape Oval   Wound Odor Foul;Mild   WOUND NURSE ONLY - Time Spent with Patient (mins) 45       Wound 12/07/24 Full Thickness Wound Groin Right (Active)   Date First Assessed/Time First Assessed: 12/07/24 1721   Present on Original Admission: No  Hand Hygiene Completed: Yes  Primary Wound Type: Full Thickness Wound  Location: Groin  Laterality: Right      Assessments 12/20/2024  2:00 PM   Site Assessment Drainage;Slough;Red   Periwound Assessment Clean;Dry;Intact   Margins Defined edges;Unattached edges   Closure Secondary intention   Drainage Amount Small   Drainage Description Purulent;Serosanguineous   Treatments Cleansed;Irrigation   Wound Cleansing Approved Wound Cleanser   Periwound Protectant Skin Protectant Wipes to Periwound;Paste Ring;Drape   Dressing Status Clean;Dry;Intact   Dressing Changed Changed   Dressing Cleansing/Solutions 1/4 Strength Dakin's Solution   Dressing Options Wound Vac   Dressing Change/Treatment Frequency Monday, Wednesday, Friday, and As Needed   NEXT Dressing Change/Treatment  Date 12/23/24   NEXT Weekly Photo (Inpatient Only) 12/25/24   Wound Team Following 3x Weekly   Non-staged Wound Description Full thickness   Shape Round   Wound Odor Mild;Foul       Negative Pressure Wound Therapy 12/07/24 Groin Left (Active)   Placement Date/Time: 12/07/24 1722   Location: Groin  Laterality: Left      Assessments 12/20/2024  2:00 PM   Wound Image     NPWT Pump Mode / Pressure Setting Ulta;Intermittent;125 mmHg   Dressing Type Small;Black Foam (Veraflo)   Number of Foam Pieces Used 1   Canister Changed No   NEXT Dressing Change/Treatment Date 12/23/24   VAC VeraFlo Irrigant 1/4 Strength Dakins   VAC VeraFlo Soak Time (mins) 8   VAC VeraFlo Instill Volume (ml) 10   VAC VeraFlo - Therapy Time (hrs) 2   VAC VeraFlo Pressure (mm/Hg) Intermittent;125 mmHg       Negative Pressure Wound Therapy 12/07/24 Groin Right (Active)   Placement Date/Time: 12/07/24 1722   Present on Original Admission: No  Location: Groin  Laterality: Right      Assessments 12/20/2024  2:00 PM   Wound Image     NPWT Pump Mode / Pressure Setting Ulta;Intermittent;125 mmHg   Dressing Type Small;Black Foam (Veraflo)   Number of Foam Pieces Used 1   Canister Changed No   NEXT Dressing Change/Treatment Date 12/23/24   VAC VeraFlo Irrigant 1/4 Strength Dakins   VAC VeraFlo Soak Time (mins) 8   VAC VeraFlo Instill Volume (ml) 16   VAC VeraFlo - Therapy Time (hrs) 2   VAC VeraFlo Pressure (mm/Hg) 125 mmHg;Intermittent        Vascular:    JAGJIT:   No results found.    Lab Values:    Lab Results   Component Value Date/Time    WBC 7.0 12/20/2024 01:11 AM    RBC 3.52 (L) 12/20/2024 01:11 AM    HEMOGLOBIN 11.4 (L) 12/20/2024 01:11 AM    HEMATOCRIT 39.4 12/20/2024 01:11 AM    CREACTPROT 4.20 (H) 12/16/2024 02:22 PM    CREACTPROT 4.07 (H) 12/16/2024 02:22 PM    SEDRATEWES 140 (H) 11/28/2024 01:35 AM    HBA1C 6.2 (H) 11/01/2024 06:46 PM    HBA1C 6.4 (H) 10/30/2024 03:18 AM         Culture Results show:  No results found for this or any previous visit  (from the past 720 hours).    Pain Level/Medicated:  None, Tolerated without pain medication       INTERVENTIONS BY WOUND TEAM:  Chart and images reviewed. Discussed with bedside RN. All areas of concern (based on picture review, LDA review and discussion with bedside RN) have been thoroughly assessed. Documentation of areas based on significant findings. This RN in to assess patient. Performed standard wound care which includes appropriate positioning, dressing removal and non-selective debridement. Pictures and measurements obtained weekly if/when required.    Wound:  BL Groin  Preparation for Dressing removal: Removed without difficulty  Cleansed/Non-selectively Debrided with:  Wound cleanser and Gauze  Brianna wound: Cleansed with Wound cleanser and Gauze, Prepped with No Sting, Paste Rings, Drape, and Hydrocolloid  Primary Dressing:  One piece of foam applied into each respective wound bed and bridged superiorly (right groin) or laterally (left groin). Veraflo trac pads applied and remainder of foam secured with drape.  Secondary (Outer) Dressing: Suctions resumed at 125mmHg, no leaks detected. Offloading adhesive foams x2 placed beneath Vac tubings.    Advanced Wound Care Discharge Planning  Number of Clinicians necessary to complete wound care: 1  Is patient requiring IV pain medications for dressing changes:  No   Length of time for dressing change 30 min. (This does not include chart review, pre-medication time, set up, clean up or time spent charting.)    Interdisciplinary consultation: Patient, Bedside RN, Helen (Wound Tech).    EVALUATION / RATIONALE FOR TREATMENT:     Date:  12/20/24  Wound Status:  Wound progressing as expected    L Groin with more granular wound bed, minimal slough here. Skin bridge no longer present.  R Groin noted to have purulent-serosanguinous drainage from wound depth. Veraflo settings adjusted to instill more fluid and have a longer dwell time to better wash out the depth of the  wound bed.    Date:  12/18/24  Wound Status:  Wound deteriorating    L Groin wound base still with slough, slightly decreased. Satellite lesion noted below wound bed, does connect beneath skin bridge.  R Groin with increased depth compared to previous measurements. Both wound beds with mildly foul odor, continuing Dakins VF NPWT.  Sacrum pink, intact and blanching, barrier paste in room.    Date:  12/16/24  Wound Status:  No change in wound     Right groin wound has moist pale pink tissue, continue with dakin's VF NPWT.  Left groin wound with slough and non-viable purulent drainage.  CSWD to remove purulent and slough from wound bed.  Resumed VF NPWT with dakin's instillation.     Date:  12/13/24  Wound Status:  Wound deteriorating    BL Groin wound beds with increased amounts of slough and malodor.  Patient is already on Dakins VF, will continue with this to topically clean wound beds.    Date:  12/10/24  Wound Status:  Wound progressing as expected    Left groin: Groin noted with layer of slough. Wound has small area of tunneling at 6o clock, a small piece of black foam was tucked into this tunnel to assist with closure.   Right Groin: Noted with a layer of slough, VF NPWT was changed from NS to Dakins to assist with chemical debridement.   Prior to vac removal, skin around both groin sites appeared to be moist, and VF instillation amount was reduced.     Date:  12/07/24  Wound Status:  Initial evaluation    Bilateral wound beds with slough present.  VF NPWT applied to assist with mechanical debridement, cleansing the wound bed, increase oxygenation and granulation to the area and healing the wound by secondary intention.        Date:  12/04/24  Wound Status:  Initial evaluation    Bilateral groin sites with necrotic tissue, adipose and tan purulent drainage. Discussed with bedside RN who will speak with MD regarding surgical consult. Pt would benefit from NPWT application but would like surgery to clear pt from  "any exposed structures prior to vac application. Dakins ordered to chemically debride.          Goals: Steady decrease in wound area and depth weekly.    NURSING PLAN OF CARE ORDERS:  No new orders this visit    NUTRITION RECOMMENDATIONS   Wound Team Recommendations:  N/A     DIET ORDERS (From admission to next 24h)       Start     Ordered    12/13/24 1414  Diet NPO Restrict to: With Tube Feed  ALL MEALS        Question:  Diet NPO Restrict to:  Answer:  With Tube Feed    12/13/24 1413    12/10/24 1651  Diet: Diet Tube Feed; Formula: Pivot; Pivot: Pivot 1.5 RTH; Goal Rate (mL/Hour): 40; Duration: 24 HR  ALL MEALS        Question Answer Comment   Diet Diet Tube Feed    Formula: Pivot    Pivot: Pivot 1.5 RTH    Goal Rate (mL/Hour) 40    Route NG    Duration 24 HR       Placed in \"And\" Linked Group    12/10/24 1653    12/02/24 1232  Supplements  ALL MEALS        Question Answer Comment   Which Supplement Glucerna    Glucerna: Glucerna Shake Carton        12/02/24 1231                    PREVENTATIVE INTERVENTIONS:   Q shift Ray - performed per nursing policy  Q shift pressure point assessments - performed per nursing policy    Surface/Positioning  Standard/trauma mattress - Currently in Place  Reposition q 2 hours - Currently in Place  TAPs Turning system - Currently in Place    Offloading/Redistribution  Heel offloading dressing (Silicone dressing) - Currently in Place    Containment/Moisture Prevention    Beckwith Catheter - Currently in Place    Anticipated discharge plans:  TBD        Vac Discharge Needs:  Vac Discharge plan is purely a recommendation from wound team and not a requirement for discharge unless otherwise stated by physician.  Veraflo Vac while inpatient, ok to transition to Regular Vac on discharge   "

## 2024-12-20 NOTE — PROGRESS NOTES
Monitor Summary:   Rhythm: SR  Rate: 63-84  Measurement: .14/.40/.40  Ectopy: o pvc, r coup, 7 beats vtac, trip          ]

## 2024-12-21 LAB
ANION GAP SERPL CALC-SCNC: 8 MMOL/L (ref 7–16)
BUN SERPL-MCNC: 32 MG/DL (ref 8–22)
CALCIUM SERPL-MCNC: 7.9 MG/DL (ref 8.5–10.5)
CHLORIDE SERPL-SCNC: 115 MMOL/L (ref 96–112)
CO2 SERPL-SCNC: 21 MMOL/L (ref 20–33)
CREAT SERPL-MCNC: 0.81 MG/DL (ref 0.5–1.4)
GFR SERPLBLD CREATININE-BSD FMLA CKD-EPI: 83 ML/MIN/1.73 M 2
GLUCOSE BLD STRIP.AUTO-MCNC: 164 MG/DL (ref 65–99)
GLUCOSE BLD STRIP.AUTO-MCNC: 166 MG/DL (ref 65–99)
GLUCOSE BLD STRIP.AUTO-MCNC: 198 MG/DL (ref 65–99)
GLUCOSE BLD STRIP.AUTO-MCNC: 199 MG/DL (ref 65–99)
GLUCOSE SERPL-MCNC: 215 MG/DL (ref 65–99)
POTASSIUM SERPL-SCNC: 4 MMOL/L (ref 3.6–5.5)
SODIUM SERPL-SCNC: 144 MMOL/L (ref 135–145)

## 2024-12-21 PROCEDURE — 700102 HCHG RX REV CODE 250 W/ 637 OVERRIDE(OP): Performed by: STUDENT IN AN ORGANIZED HEALTH CARE EDUCATION/TRAINING PROGRAM

## 2024-12-21 PROCEDURE — 700102 HCHG RX REV CODE 250 W/ 637 OVERRIDE(OP)

## 2024-12-21 PROCEDURE — 700102 HCHG RX REV CODE 250 W/ 637 OVERRIDE(OP): Performed by: HOSPITALIST

## 2024-12-21 PROCEDURE — A9270 NON-COVERED ITEM OR SERVICE: HCPCS | Performed by: STUDENT IN AN ORGANIZED HEALTH CARE EDUCATION/TRAINING PROGRAM

## 2024-12-21 PROCEDURE — 99232 SBSQ HOSP IP/OBS MODERATE 35: CPT

## 2024-12-21 PROCEDURE — 700111 HCHG RX REV CODE 636 W/ 250 OVERRIDE (IP): Mod: JZ

## 2024-12-21 PROCEDURE — A9270 NON-COVERED ITEM OR SERVICE: HCPCS | Performed by: HOSPITALIST

## 2024-12-21 PROCEDURE — A9270 NON-COVERED ITEM OR SERVICE: HCPCS | Performed by: INTERNAL MEDICINE

## 2024-12-21 PROCEDURE — 700102 HCHG RX REV CODE 250 W/ 637 OVERRIDE(OP): Performed by: INTERNAL MEDICINE

## 2024-12-21 PROCEDURE — 80048 BASIC METABOLIC PNL TOTAL CA: CPT

## 2024-12-21 PROCEDURE — 93005 ELECTROCARDIOGRAM TRACING: CPT | Mod: TC

## 2024-12-21 PROCEDURE — 82962 GLUCOSE BLOOD TEST: CPT

## 2024-12-21 PROCEDURE — 36415 COLL VENOUS BLD VENIPUNCTURE: CPT

## 2024-12-21 PROCEDURE — A9270 NON-COVERED ITEM OR SERVICE: HCPCS

## 2024-12-21 PROCEDURE — 700101 HCHG RX REV CODE 250: Performed by: HOSPITALIST

## 2024-12-21 PROCEDURE — 770001 HCHG ROOM/CARE - MED/SURG/GYN PRIV*

## 2024-12-21 RX ADMIN — Medication 10 MG: at 20:24

## 2024-12-21 RX ADMIN — VALPROIC ACID 250 MG: 250 SOLUTION ORAL at 06:22

## 2024-12-21 RX ADMIN — INSULIN GLARGINE-YFGN 9 UNITS: 100 INJECTION, SOLUTION SUBCUTANEOUS at 17:48

## 2024-12-21 RX ADMIN — HALOPERIDOL LACTATE 1 MG: 5 INJECTION, SOLUTION INTRAMUSCULAR at 18:29

## 2024-12-21 RX ADMIN — INSULIN LISPRO 1 UNITS: 100 INJECTION, SOLUTION INTRAVENOUS; SUBCUTANEOUS at 12:25

## 2024-12-21 RX ADMIN — DAKIN'S SOLUTION 0.125% (QUARTER STRENGTH) 473 ML: 0.12 SOLUTION at 01:45

## 2024-12-21 RX ADMIN — CLOPIDOGREL BISULFATE 75 MG: 75 TABLET ORAL at 06:22

## 2024-12-21 RX ADMIN — LANSOPRAZOLE 30 MG: 30 TABLET, ORALLY DISINTEGRATING ORAL at 06:22

## 2024-12-21 RX ADMIN — TRAZODONE HYDROCHLORIDE 50 MG: 50 TABLET ORAL at 20:24

## 2024-12-21 RX ADMIN — INSULIN LISPRO 1 UNITS: 100 INJECTION, SOLUTION INTRAVENOUS; SUBCUTANEOUS at 06:40

## 2024-12-21 RX ADMIN — INSULIN LISPRO 1 UNITS: 100 INJECTION, SOLUTION INTRAVENOUS; SUBCUTANEOUS at 00:46

## 2024-12-21 RX ADMIN — BISACODYL 10 MG: 10 SUPPOSITORY RECTAL at 06:22

## 2024-12-21 RX ADMIN — ATORVASTATIN CALCIUM 80 MG: 80 TABLET, FILM COATED ORAL at 17:50

## 2024-12-21 RX ADMIN — DAPAGLIFLOZIN 10 MG: 10 TABLET, FILM COATED ORAL at 06:22

## 2024-12-21 RX ADMIN — INSULIN LISPRO 1 UNITS: 100 INJECTION, SOLUTION INTRAVENOUS; SUBCUTANEOUS at 17:45

## 2024-12-21 RX ADMIN — QUETIAPINE FUMARATE 50 MG: 25 TABLET ORAL at 20:24

## 2024-12-21 ASSESSMENT — PAIN DESCRIPTION - PAIN TYPE: TYPE: ACUTE PAIN

## 2024-12-21 ASSESSMENT — FIBROSIS 4 INDEX: FIB4 SCORE: 3.11

## 2024-12-21 NOTE — CARE PLAN
The patient is Watcher - Medium risk of patient condition declining or worsening    Shift Goals  Clinical Goals: tube feeding  Patient Goals: comfort, sleep  Family Goals: JAYLIN    Progress made toward(s) clinical / shift goals:        Problem: Safety - Medical Restraint  Goal: Remains free of injury from restraints (Restraint for Interference with Medical Device)  Outcome: Progressing  Goal: Free from restraint(s) (Restraint for Interference with Medical Device)  Outcome: Progressing     Problem: Care Map:  Day 3 Optimal Outcome for the Heart Failure Patient  Goal: Day 3:  Optimal Care of the heart failure patient  Outcome: Progressing  Note: Pt is getting educated on diet  Intervention: Instruct patient to write down weights on symptom tracker daily  Note: Monitoring weight. See flowsheet  Intervention: Assess and document 2 hour post diuretic output  Note: Assessed and documented 2 hour post diuretic output  Intervention: Document intake and output per shift.  Communicate with care team if volume status is improving, stalled or worsening.  Note: Documented intake and output per shift  Intervention: Assess edema every shift (peripheral, sacral, periorbital, perineal/scrotal, and abdominal)  Note: Assessed edema, see flowsheets

## 2024-12-21 NOTE — PROGRESS NOTES
Hospital Medicine Daily Progress Note    Date of Service  12/20/2024    Chief Complaint  Kiara Qureshi is a 59 y.o. female admitted 11/27/2024 with encephalopathy    Hospital Course  60 yo woman HTN, HLD who was at [facility] for anterior STEMI s/p lytics and had LAD PCI and unsuccessful PCI for OM1 complicated by RP bleed, vfib and PEA arrest with cardiogenic shock needing ECMO and imeplla.  ECMO was decannulated 11/4, Impella removed 11/8.  She also had encephalopathy and delirium on valproic acid and Seroquel.  TTE showed EF 25-30%, she did not tolerate GDMT due to hypotension.  She had a CT chest on 11/25 that showed focal aortic mural thrombus versus ulcerated plaque to descending thoracic aorta and CTA AP with rim-enhancing RP hematoma and small thrombus in right common iliac artery, multiple nonenhancing fluid collections and subcu tissue in right lower and right inguinal regions, possible evolving hematomas and left femoral vein.  She was started on Vanco and Zosyn and transferred back to [facility].  She completed linezolid and Zosyn around 12/1.  Cardiology was consulted for medical management of her heart failure. Dr. Conley with vascular surgery was consulted, recommended wound vacs to groin wounds.  On 12/9 she became more altered with respiratory distress and transferred to [facility].  CT head was negative for acute changes.  EEG was negative for seizure activity.  She was high on high dose of Seroquel that was tapered off.  She remains on NG tube for tube feeds.    Interval Problem Update    12/17  Received a dose of seroquel overnight for agitation and still required restratings. BP at 90/50s.   Sodium improved to 147. Hgb stable.   Pt able to say a few words, opens eyes spontaneously however AOX0 when asked and does not follow commands. Remains on feeding tube.   Defer MRI given agitation,  unable to lie still and wary of sedation   Continue valproic acid BID for now. Will hold off restarting seroquel  and monitor.     12/18   Pt pulled feeding tube overnight again.   20bt vtach overnight on monitor. Other vitals wnl. Asymptomatic  Iron studies consistent with anemia of chronic disease  Pt more awake today and interactive, though still AOX1-2 (name, [facility]). Cannot recall events leading to and during admission. Denies pain, SOB, nausea/vomiting. Wants a shower and is thirsty.     SLP to see pt today.  Decrease Valproic acid to once daily starting tomorrow and then discontinue in 3 days. Med has interactions with Eliquis.   Noted EKG yesterday with Qtc >600. Repeat today. Defer restarting seroquel, pt also  more awake. Could be sundowning. Will hold off adding possible alternatives such as geodon, zyprexa, or trazodone at night as this can increase Qtc/risk of arrhythmias, pending new EKG. Increase melatonin to 10mg once daily   Discussed with pharmacy   Discussed with Rosa (friend, at bedside) POC who reports she will let  know     12/19  EKG per my read with improved Qtc <500, IVCD, in SR.   No acute events overnight, no episodes of agitation.   No Cheynne Lo breathing observed since yesterday. Awake still today but with non purposeful movements, not following commands nor answering questions appropriately. She did not pass her swallow eval today. Her feeding tube clogged. Replaced today   Na today at 147. Crea 0.84. Hgb stable.    Increase FWF.   GI consulted for possible PEG tube placement  Continue taper of valproid acid. Continue on current medications  Unable to tolerate MRI, will hold off sedating meds for now  Discussed with GI and bedside RN   Attempted to call  (Bruno) but was unable to reach him. Have left message.    12/20   Na at 146 today.   Pulled out feeding tube again overnight.   Awake but again with non purposeful movements, not following commands or answering questions appropriately  Start seroquel 50mg once daily   Start trazodone 50mg once daily   Continue haldol IV as  needed   Dw GI - plan for possible PEG Monday. DOAC paused over weekend.     I have discussed this patient's plan of care and discharge plan at IDT rounds today with Case Management, Nursing, Nursing leadership, and other members of the IDT team.    Consultants/Specialty  cardiology and vascular surgery, palliative care    Code Status  DNAR, I OK    Disposition  Medically Cleared  I have placed the appropriate orders for post-discharge needs.    Review of Systems  Review of Systems   Unable to perform ROS: Medical condition        Physical Exam  Temp:  [36.3 °C (97.3 °F)-36.7 °C (98.1 °F)] 36.3 °C (97.3 °F)  Pulse:  [62-92] 69  Resp:  [20] 20  BP: (104-137)/() 104/75  SpO2:  [92 %-100 %] 100 %    Physical Exam  Constitutional:       Appearance: She is ill-appearing.      Comments: Opens eyes for exam, inconsistently tracks me   HENT:      Head: Normocephalic.      Mouth/Throat:      Mouth: Mucous membranes are dry.   Eyes:      Comments: Pupils are dilated, equal and reactive to light   Cardiovascular:      Rate and Rhythm: Normal rate and regular rhythm.   Pulmonary:      Breath sounds: Rhonchi present.      Comments: No periodic pauses in breathing, irregular breathing observed   Abdominal:      General: There is no distension.      Palpations: Abdomen is soft.      Tenderness: There is no abdominal tenderness.   Musculoskeletal:      Right lower leg: No edema.      Left lower leg: No edema.      Comments: Wound vacs bilaterally to groin   Neurological:      Mental Status: She is disoriented.      Comments: Non purposeful movements, spontaneous eye opening   Awake but unable to answer questions appropriately  Spontaneously moves both her upper extremities and lower extremities           Fluids    Intake/Output Summary (Last 24 hours) at 12/20/2024 1837  Last data filed at 12/20/2024 1821  Gross per 24 hour   Intake 1120 ml   Output 1825 ml   Net -705 ml        Laboratory  Recent Labs     12/18/24  9464  12/19/24  0027 12/20/24  0111   WBC 10.5 9.1 7.0   RBC 3.10* 3.05* 3.52*   HEMOGLOBIN 10.1* 9.8* 11.4*   HEMATOCRIT 33.6* 33.5* 39.4   .4* 109.8* 111.9*   MCH 32.6 32.1 32.4   MCHC 30.1* 29.3* 28.9*   RDW 81.4* 83.3* 82.8*   PLATELETCT 244 234 190   MPV 11.3 11.7 12.1     Recent Labs     12/17/24 2051 12/18/24  0906 12/19/24  0027 12/19/24  0905 12/20/24  0111   SODIUM 150*  150*   < > 149* 147* 146*   POTASSIUM 4.0  --  4.0  --  4.4   CHLORIDE 118*  --  119*  --  116*   CO2 22  --  21  --  18*   GLUCOSE 200*  --  226*  --  145*   BUN 33*  --  35*  --  30*   CREATININE 0.74  --  0.84  --  0.73   CALCIUM 7.7*  --  7.8*  --  7.7*    < > = values in this interval not displayed.                   Imaging  DX-ABDOMEN FOR TUBE PLACEMENT   Final Result         1.  Nonspecific bowel gas pattern in the upper abdomen.   2.  Dobbhoff tube tip overlying the expected location of the pylorus or first duodenal segment.   3.  Bilateral lower lobe atelectasis and/or infiltrates      DX-ABDOMEN FOR TUBE PLACEMENT   Final Result         1.  Nonspecific bowel gas pattern in the upper abdomen.   2.  Dobbhoff tube tip overlying the expected location of the pylorus or first duodenal segment.   3.  Pulmonary edema and/or infiltrates.   4.  Trace bilateral pleural effusions.      DX-ABDOMEN FOR TUBE PLACEMENT   Final Result      1. Repositioning of the esophagogastric tube, terminating over the fundus of the stomach.   2. Interstitial edema, left retrocardiac opacity and small left pleural effusion.      DX-ABDOMEN FOR TUBE PLACEMENT   Final Result         1.  Nonspecific bowel gas pattern in the upper abdomen.   2.  Dobbhoff tube tip terminates overlying the expected location of the gastric antrum or pylorus.   3.  Pulmonary edema and/or infiltrates, similar to prior study      DX-ABDOMEN FOR TUBE PLACEMENT   Final Result      The tip of the enteric tube terminates over the antrum of the stomach.      DX-ABDOMEN FOR TUBE PLACEMENT    Final Result         1.  Nonspecific bowel gas pattern in the upper abdomen.   2.  Dobbhoff tube is coiled within the duodenum, the tip terminates overlying the expected location of the gastric body.   3.  Bilateral lower lobe edema and/or infiltrates.      DX-ABDOMEN FOR TUBE PLACEMENT   Final Result      NG tube tip projects over the gastroduodenal junction.      DX-CHEST-LIMITED (1 VIEW)   Final Result      1.  Mild cardiomegaly with evidence of mild to moderate pulmonary edema with minimal worsening since previous exam.      CT-HEAD W/O   Final Result         1.  No acute intracranial abnormality is identified, there are nonspecific white matter changes, commonly associated with small vessel ischemic disease.  Associated mild cerebral atrophy is noted.   2.  Bilateral sinusitis changes   3.  Atherosclerosis.               DX-CHEST-PORTABLE (1 VIEW)   Final Result         1.  Mild pulmonary edema and/or infiltrates.      EC-ECHOCARDIOGRAM COMPLETE W/ CONT   Final Result      DX-ABDOMEN FOR TUBE PLACEMENT   Final Result      Tip of the esophagogastric tube terminates over the pylorus of the stomach.      IR-MIDLINE CATHETER INSERTION WO GUIDANCE > AGE 3   Final Result                  Ultrasound-guided midline placement performed by qualified nursing staff    as above.          IR-US GUIDED PIV   Final Result    Ultrasound-guided PERIPHERAL IV INSERTION performed by    qualified nursing staff as above.      DX-OUTSIDE IMAGES-DX CHEST   Final Result      DX-OUTSIDE IMAGES-DX ABDOMEN   Final Result      MR-OUTSIDE IMAGES-MR BRAIN   Final Result      CT-OUTSIDE IMAGES-CT HEAD   Final Result      CT-OUTSIDE IMAGES-CT ABDOMEN/PELVIS   Final Result      CT-OUTSIDE IMAGES-CT CHEST   Final Result      DX-ABDOMEN FOR TUBE PLACEMENT   Final Result      There is a new small bowel feeding tube which terminates in the small bowel of the right mid abdomen.      DX-CHEST-LIMITED (1 VIEW)   Final Result         Asymmetric  pulmonary infiltrates, left worse than right.      IR-US GUIDED PIV    (Results Pending)        Assessment/Plan  * Heart failure with reduced ejection fraction (HCC)- (present on admission)  Assessment & Plan  ICM LVEF 25-30%  S/p revascularization  Soft pressures limiting titration of GDMT.  Farxiga  Holding losartan for low BP  Critical care had recommended holding bisoprolol because of Cheyne-Lo breathing  Dehydrated, holding Lasix    Acute metabolic encephalopathy  Assessment & Plan  Hospital course complicated by encephalopathy.  Likely from combination of CVA, STEMI, hyperglycemia; likely also with anoxic brain injury   Removing restraints as tolerated, following  Has a nasojejunal feeding tube placed by interventional radiology from outside facility  Was on Seroquel and Depakene as recommended by neurology/psychiatry at outside facility.    12/16-17  Multifactorial  Continues to be encephalopathic. CTH no acute findings. EEG neg for seizure activity  Neuro evaluated patient at [facility].   Have been titrating off seroquel and pt's mental status has improved as her agitation has diminished  DC seroquel qhs dose  Decrease valproic acid TID to BID    12/18  Decrease Valproic acid to once daily starting tomorrow and then discontinue. Med has interactions with Eliquis.   Noted EKG yesterday with Qtc >600. Repeat today. Defer restarting seroquel. Will hold off adding possible alternatives such as geodon, zyprexa, or trazodone at night as this can increase Qtc/risk of arrhythmias, pending new EKG. Increase melatonin to 10mg once daily   Unable to tolerate MRI    12/19   Continue valproic acid taper. EKG with improved Qtc. Continue on current medications.   Failed swallow screen with SLP. Has been pulling feeding tubes before when more encephalopathic.   GI consulted for possible PEG tube placement  Unable to tolerate MRI.     12/20  Start seroquel 50mg once daily and trazodone 50mg once daily   Plan for PEG tube  placement Monday     Circulating anticoagulant disorder (HCC)  Assessment & Plan  On Eliquis    Functional quadriplegia (HCC)  Assessment & Plan  In the setting of AMS, prior PEA    Macrocytic anemia  Assessment & Plan  Stable. No overt bleeding  B12 wnl. Studies more consistent with anemia of chronic disease      Hypernatremia  Assessment & Plan  Persistent worsening and she lost her NG tube on and off  Started on D5 water infusion with FWF and q6h PRN  Goal correction 8 mLEq in 24h    12/17-19  Improving  S/p D5 infusion   Continue FWF 300cc every 4 hours and q12 Na checks    Acute hypoxic respiratory failure (HCC)  Assessment & Plan  In setting of heart failure, recent cardiogenic shock  Patient demonstrates cheynne florez breathing pattern which is consistent with her suspected anoxic brain injury  Patient appears to be experiencing aspiration events causing bradycardia  Continue supportive care  Patient is NPO, speech is following    12/19  Improved    Constipation  Assessment & Plan  + bm  Continue bowel regimen  following    Hypotension  Assessment & Plan  BP low range, holding losartan and bisoprolol    Abdominal hematoma  Assessment & Plan  Might be related to cardiac device  Clinically stable and h/h in stable range  Patient was on lovenox due to rigth common iliac art thrombosis and then transitioned to eliquis per [facility]  - plan to continue for 3-4 months with repeat imaging at that time to see if it can be d/c       12/17-19  Hgb stable. Continue to monitor    ACP (advance care planning)  Assessment & Plan  Dr. Mobley: Discussed plan of care and code status with patient  Bruno ([phone]) and sister-in-law Laina ([phone]). I explained that Kiara has experienced a suspected anoxic brain injury during her current medical course that has required ECMO and impella, CVA, Vfib arrest. I shared my concern that Laina may not make a meaningful recovery from this and that her current and future quality  of life appears limited. I explained that in the event of another CP arrest her quality of life would be further impacted in the event she were to survive such an episode. Laina and Bruno made the decision to change Kiara's code status to DNR, yes to intubaiton. aLina will be driving in from [place] in the coming days to have further goals of care conversations. Total of 48 minutes spent in discussion and coordination of advance care planning.    12/16 - my conversation with Laina and Bruno, they will like to continue care and plan for PAMs    Pneumonia  Assessment & Plan  S/p completed course of pip/tazo and vanc  Cultures were negative    Aortic mural thrombus (HCC)  Assessment & Plan  CT at outside facility showing focal aortic mural thrombus along the lateral wall of the descending thoracic aorta and also a tiny filling defect/mural thrombus within the posterior right common iliac artery  Continue on apixaban  Continue to monitor CBC        CVA (cerebral vascular accident) (HCC)  Assessment & Plan  Patient had MRI on 11/17/24 at [facility] which shwed Punctate acute infarct of the right centrum semiovale. Scattered foci of enhancement in multiple vascular distributions as described may represent recent subacute infarcts. Diffuse stippled susceptibility signal throughout the supratentorial and infratentorial brain. Overall findings may represent sequelae of fat emboli or amyloid related angiopathy.   High dose statin  Apixaban  plavix    Continues to be encephalopathic. CTH no acute findings. EEG neg for seizure activity  Neuro evaluated patient at [facility].   Have been titrating off seroquel and pt's mental status has improved as her agitation has diminished  DC seroquel qhs dose  I changed valproic acid TID to BID  Repeat MRI brain if possible, unble to tolerate at this time    12/17  See plan for DOUG      CAD (coronary artery disease)  Assessment & Plan  Patient with recent complicated STEMI with PCI to LAD and mid LAD.   Complicated by V-fib arrest and cardiogenic shock requiring VA ECMO and Impella and retroperitoneal hematomas. At [facility] underwent impella supported PCI to mid LAD just distal to prior stent but unsuccessful with PCI to 100% occluded OM1 on 11/6. Patient was decannulated and had Impella removed.    Discussed with cardiology and asa stopped and replaced with plavix. Cont statin  Repeat echo shows persistently low EF, 25-30%  Cardiology had recommended bisoprolol but due to cheyne florez critical care recommends we stop this - so will hold   Continue to hold losartan for low BP and lasix for hypernatremia      Urinary retention  Assessment & Plan  Beckwith catheter in place    Hyperglycemia  Assessment & Plan  Glargine 9 units  ISS      Metabolic acidemia- (present on admission)  Assessment & Plan  Improved           VTE prophylaxis:    therapeutic anticoagulation with eliquis 5 mg BID      I have performed a physical exam and reviewed and updated ROS and Plan today (12/20/2024). In review of yesterday's note (12/19/2024), there are no changes except as documented above.

## 2024-12-21 NOTE — PROGRESS NOTES
Monitor Summary  Rhythm: SR  Rate: 71-91  Ectopy: (O) PVC  .14 / .09 / .34                28-Nov-2020 15:25

## 2024-12-21 NOTE — PROGRESS NOTES
..Bedside report received from off going RN/tech: Giovany, assumed care of patient.     Fall Risk Score: HIGH RISK  Fall risk interventions in place: Place yellow fall risk ID band on patient, Provide patient/family education based on risk assessment, and Educate patient/family to call staff for assistance when getting out of bed  Bed type: Regular (Ray Score less than 17 interventions in place)  Patient on cardiac monitor: Yes  IVF/IV medications: Not Applicable   Oxygen: Room Air  Bedside sitter: yes  Isolation: Not applicable

## 2024-12-21 NOTE — PROGRESS NOTES
The sitter for this patient contacted this RN via [employer] about pt experiencing visual hallucinations. Patient received night time medications prior to experiencing hallucinations. Reassessed patient, Patient AxO to person only, similar to the start of shift. Provider notified.

## 2024-12-21 NOTE — PROGRESS NOTES
Monitor Summary  Rhythm: SR  Rate: 65-87  Ectopy: O pvc, R pvc,couplet  .12 / .12 / .44

## 2024-12-21 NOTE — PROGRESS NOTES
Hospital Medicine Daily Progress Note    Date of Service  12/21/2024    Chief Complaint  Kiara Qureshi is a 59 y.o. female admitted 11/27/2024 with encephalopathy    Hospital Course  60 yo woman HTN, HLD who was at [facility] for anterior STEMI s/p lytics and had LAD PCI and unsuccessful PCI for OM1 complicated by RP bleed, vfib and PEA arrest with cardiogenic shock needing ECMO and imeplla.  ECMO was decannulated 11/4, Impella removed 11/8.  She also had encephalopathy and delirium on valproic acid and Seroquel.  TTE showed EF 25-30%, she did not tolerate GDMT due to hypotension.  She had a CT chest on 11/25 that showed focal aortic mural thrombus versus ulcerated plaque to descending thoracic aorta and CTA AP with rim-enhancing RP hematoma and small thrombus in right common iliac artery, multiple nonenhancing fluid collections and subcu tissue in right lower and right inguinal regions, possible evolving hematomas and left femoral vein.  She was started on Vanco and Zosyn and transferred back to [facility].  She completed linezolid and Zosyn around 12/1.  Cardiology was consulted for medical management of her heart failure. Dr. Conley with vascular surgery was consulted, recommended wound vacs to groin wounds.  On 12/9 she became more altered with respiratory distress and transferred to [facility].  CT head was negative for acute changes.  EEG was negative for seizure activity.  She was high on high dose of Seroquel that was tapered off.  She remains on NG tube for tube feeds.    Interval Problem Update    12/17  Received a dose of seroquel overnight for agitation and still required restratings. BP at 90/50s.   Sodium improved to 147. Hgb stable.   Pt able to say a few words, opens eyes spontaneously however AOX0 when asked and does not follow commands. Remains on feeding tube.   Defer MRI given agitation,  unable to lie still and wary of sedation   Continue valproic acid BID for now. Will hold off restarting seroquel  and monitor.     12/18   Pt pulled feeding tube overnight again.   20bt vtach overnight on monitor. Other vitals wnl. Asymptomatic  Iron studies consistent with anemia of chronic disease  Pt more awake today and interactive, though still AOX1-2 (name, [facility]). Cannot recall events leading to and during admission. Denies pain, SOB, nausea/vomiting. Wants a shower and is thirsty.     SLP to see pt today.  Decrease Valproic acid to once daily starting tomorrow and then discontinue in 3 days. Med has interactions with Eliquis.   Noted EKG yesterday with Qtc >600. Repeat today. Defer restarting seroquel, pt also  more awake. Could be sundowning. Will hold off adding possible alternatives such as geodon, zyprexa, or trazodone at night as this can increase Qtc/risk of arrhythmias, pending new EKG. Increase melatonin to 10mg once daily   Discussed with pharmacy   Discussed with Rosa (friend, at bedside) POC who reports she will let  know     12/19  EKG per my read with improved Qtc <500, IVCD, in SR.   No acute events overnight, no episodes of agitation.   No Cheynne Lo breathing observed since yesterday. Awake still today but with non purposeful movements, not following commands nor answering questions appropriately. She did not pass her swallow eval today. Her feeding tube clogged. Replaced today   Na today at 147. Crea 0.84. Hgb stable.    Increase FWF.   GI consulted for possible PEG tube placement  Continue taper of valproid acid. Continue on current medications  Unable to tolerate MRI, will hold off sedating meds for now  Discussed with GI and bedside RN   Attempted to call  (Bruno) but was unable to reach him. Have left message.    12/20   Na at 146 today.   Pulled out feeding tube again overnight.   Awake but again with non purposeful movements, not following commands or answering questions appropriately  Start seroquel 50mg once daily   Start trazodone 50mg once daily   Continue haldol IV as  needed   Dw GI - plan for possible PEG Monday. DOAC paused over weekend.     12/21  NAEO.  HDS.  On exam, patient is ANO x 1 to self, on room air.  Did not pull out feeding tube overnight.  Reported some sore throat.  Patient is now off valproic acid.  Chloraseptic spray ordered.  Continue on Seroquel and trazodone.  IV Haldol as needed.  Discussed with bedside RN.    I have discussed this patient's plan of care and discharge plan at IDT rounds today with Case Management, Nursing, Nursing leadership, and other members of the IDT team.    Consultants/Specialty  cardiology and vascular surgery, palliative care    Code Status  DNAR, I OK    Disposition  Medically Cleared  I have placed the appropriate orders for post-discharge needs.    Review of Systems  Review of Systems   Unable to perform ROS: Medical condition        Physical Exam  Temp:  [36.8 °C (98.2 °F)] 36.8 °C (98.2 °F)  Pulse:  [69-80] 78  Resp:  [18-19] 18  BP: (100-113)/(60-73) 106/72  SpO2:  [94 %-100 %] 95 %    Physical Exam  Constitutional:       Appearance: She is ill-appearing.      Comments: Opens eyes for exam, inconsistently tracks me   HENT:      Head: Normocephalic.      Mouth/Throat:      Mouth: Mucous membranes are dry.   Eyes:      Comments: Pupils are dilated, equal and reactive to light   Cardiovascular:      Rate and Rhythm: Normal rate and regular rhythm.   Pulmonary:      Breath sounds: Rhonchi present.      Comments: No periodic pauses in breathing, irregular breathing observed   Abdominal:      General: There is no distension.      Palpations: Abdomen is soft.      Tenderness: There is no abdominal tenderness.   Musculoskeletal:      Right lower leg: No edema.      Left lower leg: No edema.      Comments: Wound vacs bilaterally to groin   Neurological:      Mental Status: She is disoriented.      Comments: Non purposeful movements, spontaneous eye opening   Awake but unable to answer questions appropriately  Spontaneously moves both  her upper extremities and lower extremities           Fluids    Intake/Output Summary (Last 24 hours) at 12/21/2024 1837  Last data filed at 12/21/2024 1300  Gross per 24 hour   Intake 800 ml   Output 1750 ml   Net -950 ml        Laboratory  Recent Labs     12/19/24  0027 12/20/24  0111   WBC 9.1 7.0   RBC 3.05* 3.52*   HEMOGLOBIN 9.8* 11.4*   HEMATOCRIT 33.5* 39.4   .8* 111.9*   MCH 32.1 32.4   MCHC 29.3* 28.9*   RDW 83.3* 82.8*   PLATELETCT 234 190   MPV 11.7 12.1     Recent Labs     12/19/24  0027 12/19/24  0905 12/20/24  0111 12/21/24  0157   SODIUM 149* 147* 146* 144   POTASSIUM 4.0  --  4.4 4.0   CHLORIDE 119*  --  116* 115*   CO2 21  --  18* 21   GLUCOSE 226*  --  145* 215*   BUN 35*  --  30* 32*   CREATININE 0.84  --  0.73 0.81   CALCIUM 7.8*  --  7.7* 7.9*                   Imaging  DX-ABDOMEN FOR TUBE PLACEMENT   Final Result         1.  Nonspecific bowel gas pattern in the upper abdomen.   2.  Dobbhoff tube tip overlying the expected location of the pylorus or first duodenal segment.   3.  Bilateral lower lobe atelectasis and/or infiltrates      DX-ABDOMEN FOR TUBE PLACEMENT   Final Result         1.  Nonspecific bowel gas pattern in the upper abdomen.   2.  Dobbhoff tube tip overlying the expected location of the pylorus or first duodenal segment.   3.  Pulmonary edema and/or infiltrates.   4.  Trace bilateral pleural effusions.      DX-ABDOMEN FOR TUBE PLACEMENT   Final Result      1. Repositioning of the esophagogastric tube, terminating over the fundus of the stomach.   2. Interstitial edema, left retrocardiac opacity and small left pleural effusion.      DX-ABDOMEN FOR TUBE PLACEMENT   Final Result         1.  Nonspecific bowel gas pattern in the upper abdomen.   2.  Dobbhoff tube tip terminates overlying the expected location of the gastric antrum or pylorus.   3.  Pulmonary edema and/or infiltrates, similar to prior study      DX-ABDOMEN FOR TUBE PLACEMENT   Final Result      The tip of the  enteric tube terminates over the antrum of the stomach.      DX-ABDOMEN FOR TUBE PLACEMENT   Final Result         1.  Nonspecific bowel gas pattern in the upper abdomen.   2.  Dobbhoff tube is coiled within the duodenum, the tip terminates overlying the expected location of the gastric body.   3.  Bilateral lower lobe edema and/or infiltrates.      DX-ABDOMEN FOR TUBE PLACEMENT   Final Result      NG tube tip projects over the gastroduodenal junction.      DX-CHEST-LIMITED (1 VIEW)   Final Result      1.  Mild cardiomegaly with evidence of mild to moderate pulmonary edema with minimal worsening since previous exam.      CT-HEAD W/O   Final Result         1.  No acute intracranial abnormality is identified, there are nonspecific white matter changes, commonly associated with small vessel ischemic disease.  Associated mild cerebral atrophy is noted.   2.  Bilateral sinusitis changes   3.  Atherosclerosis.               DX-CHEST-PORTABLE (1 VIEW)   Final Result         1.  Mild pulmonary edema and/or infiltrates.      EC-ECHOCARDIOGRAM COMPLETE W/ CONT   Final Result      DX-ABDOMEN FOR TUBE PLACEMENT   Final Result      Tip of the esophagogastric tube terminates over the pylorus of the stomach.      IR-MIDLINE CATHETER INSERTION WO GUIDANCE > AGE 3   Final Result                  Ultrasound-guided midline placement performed by qualified nursing staff    as above.          IR-US GUIDED PIV   Final Result    Ultrasound-guided PERIPHERAL IV INSERTION performed by    qualified nursing staff as above.      DX-OUTSIDE IMAGES-DX CHEST   Final Result      DX-OUTSIDE IMAGES-DX ABDOMEN   Final Result      MR-OUTSIDE IMAGES-MR BRAIN   Final Result      CT-OUTSIDE IMAGES-CT HEAD   Final Result      CT-OUTSIDE IMAGES-CT ABDOMEN/PELVIS   Final Result      CT-OUTSIDE IMAGES-CT CHEST   Final Result      DX-ABDOMEN FOR TUBE PLACEMENT   Final Result      There is a new small bowel feeding tube which terminates in the small bowel of  the right mid abdomen.      DX-CHEST-LIMITED (1 VIEW)   Final Result         Asymmetric pulmonary infiltrates, left worse than right.      IR-US GUIDED PIV    (Results Pending)        Assessment/Plan  * Heart failure with reduced ejection fraction (HCC)- (present on admission)  Assessment & Plan  ICM LVEF 25-30%  S/p revascularization  Soft pressures limiting titration of GDMT.  Farxiga  Holding losartan for low BP  Critical care had recommended holding bisoprolol because of Cheyne-Lo breathing  Dehydrated, holding Lasix    Acute metabolic encephalopathy  Assessment & Plan  Hospital course complicated by encephalopathy.  Likely from combination of CVA, STEMI, hyperglycemia; likely also with anoxic brain injury   Removing restraints as tolerated, following  Has a nasojejunal feeding tube placed by interventional radiology from outside facility  Was on Seroquel and Depakene as recommended by neurology/psychiatry at outside facility.    12/16-17  Multifactorial  Continues to be encephalopathic. CTH no acute findings. EEG neg for seizure activity  Neuro evaluated patient at [facility].   Have been titrating off seroquel and pt's mental status has improved as her agitation has diminished  DC seroquel qhs dose  Decrease valproic acid TID to BID    12/18  Decrease Valproic acid to once daily starting tomorrow and then discontinue. Med has interactions with Eliquis.   Noted EKG yesterday with Qtc >600. Repeat today. Defer restarting seroquel. Will hold off adding possible alternatives such as geodon, zyprexa, or trazodone at night as this can increase Qtc/risk of arrhythmias, pending new EKG. Increase melatonin to 10mg once daily   Unable to tolerate MRI    12/19   Continue valproic acid taper. EKG with improved Qtc. Continue on current medications.   Failed swallow screen with SLP. Has been pulling feeding tubes before when more encephalopathic.   GI consulted for possible PEG tube placement  Unable to tolerate MRI.      12/20  Start seroquel 50mg once daily and trazodone 50mg once daily   Plan for PEG tube placement Monday 12/22  Continue meds as above.  Off valproic acid already today.    Circulating anticoagulant disorder (HCC)  Assessment & Plan  On Eliquis    Functional quadriplegia (HCC)  Assessment & Plan  In the setting of AMS, prior PEA    Macrocytic anemia  Assessment & Plan  Stable. No overt bleeding  B12 wnl. Studies more consistent with anemia of chronic disease      Hypernatremia  Assessment & Plan  Persistent worsening and she lost her NG tube on and off  Started on D5 water infusion with FWF and q6h PRN  Goal correction 8 mLEq in 24h    12/17-19  Improving  S/p D5 infusion   Continue FWF 300cc every 4 hours and q12 Na checks    Acute hypoxic respiratory failure (HCC)  Assessment & Plan  In setting of heart failure, recent cardiogenic shock  Patient demonstrates cheynne florez breathing pattern which is consistent with her suspected anoxic brain injury  Patient appears to be experiencing aspiration events causing bradycardia  Continue supportive care  Patient is NPO, speech is following    12/19  Improved    Constipation  Assessment & Plan  + bm  Continue bowel regimen  following    Hypotension  Assessment & Plan  BP low range, holding losartan and bisoprolol    Abdominal hematoma  Assessment & Plan  Might be related to cardiac device  Clinically stable and h/h in stable range  Patient was on lovenox due to rigth common iliac art thrombosis and then transitioned to eliquis per [facility]  - plan to continue for 3-4 months with repeat imaging at that time to see if it can be d/c       12/17-19  Hgb stable. Continue to monitor    ACP (advance care planning)  Assessment & Plan  Dr. Mobley: Discussed plan of care and code status with patient  Bruno ([phone]) and sister-in-law Laina ([phone]). I explained that Kiara has experienced a suspected anoxic brain injury during her current medical course that has  required ECMO and impella, CVA, Vfib arrest. I shared my concern that Laina may not make a meaningful recovery from this and that her current and future quality of life appears limited. I explained that in the event of another CP arrest her quality of life would be further impacted in the event she were to survive such an episode. Laina and Bruno made the decision to change Kiara's code status to DNR, yes to intubaiton. Laina will be driving in from [place] in the coming days to have further goals of care conversations. Total of 48 minutes spent in discussion and coordination of advance care planning.    12/16 - my conversation with Laina and Bruno, they will like to continue care and plan for PAMs    Pneumonia  Assessment & Plan  S/p completed course of pip/tazo and vanc  Cultures were negative    Aortic mural thrombus (HCC)  Assessment & Plan  CT at outside facility showing focal aortic mural thrombus along the lateral wall of the descending thoracic aorta and also a tiny filling defect/mural thrombus within the posterior right common iliac artery  Continue on apixaban  Continue to monitor CBC        CVA (cerebral vascular accident) (HCC)  Assessment & Plan  Patient had MRI on 11/17/24 at [facility] which shwed Punctate acute infarct of the right centrum semiovale. Scattered foci of enhancement in multiple vascular distributions as described may represent recent subacute infarcts. Diffuse stippled susceptibility signal throughout the supratentorial and infratentorial brain. Overall findings may represent sequelae of fat emboli or amyloid related angiopathy.   High dose statin  Apixaban  plavix    Continues to be encephalopathic. CTH no acute findings. EEG neg for seizure activity  Neuro evaluated patient at [facility].   Have been titrating off seroquel and pt's mental status has improved as her agitation has diminished  DC seroquel qhs dose  I changed valproic acid TID to BID  Repeat MRI brain if possible, unble to tolerate  at this time    12/17  See plan for DOUG      CAD (coronary artery disease)  Assessment & Plan  Patient with recent complicated STEMI with PCI to LAD and mid LAD.  Complicated by V-fib arrest and cardiogenic shock requiring VA ECMO and Impella and retroperitoneal hematomas. At [facility] underwent impella supported PCI to mid LAD just distal to prior stent but unsuccessful with PCI to 100% occluded OM1 on 11/6. Patient was decannulated and had Impella removed.    Discussed with cardiology and asa stopped and replaced with plavix. Cont statin  Repeat echo shows persistently low EF, 25-30%  Cardiology had recommended bisoprolol but due to cheyne florez critical care recommends we stop this - so will hold   Continue to hold losartan for low BP and lasix for hypernatremia      Urinary retention  Assessment & Plan  Beckwith catheter in place    Hyperglycemia  Assessment & Plan  Glargine 9 units  ISS      Metabolic acidemia- (present on admission)  Assessment & Plan  Improved           VTE prophylaxis:    pharmacologic prophylaxis contraindicated due to Planned procedure      I have performed a physical exam and reviewed and updated ROS and Plan today (12/21/2024). In review of yesterday's note (12/20/2024), there are no changes except as documented above.

## 2024-12-21 NOTE — PROGRESS NOTES
Received report from previous shift RN, assumed care of patient. Patient is A&O 1 (oriented to self), vital signs are stable, on room air. Patient denies gricelda pain at this time, no signs of distress or discomfort. Sitting up in bed for breakfast. Call light and belongings within reach. Bed in lowest/locked position.         Fall Risk Score: HIGH RISK  Fall risk interventions in place: Place yellow fall risk ID band on patient, Provide patient/family education based on risk assessment, Educate patient/family to call staff for assistance when getting out of bed, Place fall precaution signage outside patient door, Place patient in room close to nursing station, Utilize bed/chair fall alarm, Notify charge of high risk for huddle, Bed alarm connected correctly, and Other (comment) restraints and sitter  Bed type: Low air loss (Ray Score less than 17 interventions in place)  Patient on cardiac monitor: Yes  IVF/IV medications: Not Applicable   Oxygen: Room Air  Bedside sitter: Report given to sitter  Isolation: Not applicable

## 2024-12-22 LAB
ANION GAP SERPL CALC-SCNC: 10 MMOL/L (ref 7–16)
BUN SERPL-MCNC: 32 MG/DL (ref 8–22)
CALCIUM SERPL-MCNC: 7.9 MG/DL (ref 8.5–10.5)
CHLORIDE SERPL-SCNC: 112 MMOL/L (ref 96–112)
CO2 SERPL-SCNC: 19 MMOL/L (ref 20–33)
CREAT SERPL-MCNC: 0.88 MG/DL (ref 0.5–1.4)
EKG IMPRESSION: NORMAL
GFR SERPLBLD CREATININE-BSD FMLA CKD-EPI: 76 ML/MIN/1.73 M 2
GLUCOSE BLD STRIP.AUTO-MCNC: 103 MG/DL (ref 65–99)
GLUCOSE BLD STRIP.AUTO-MCNC: 175 MG/DL (ref 65–99)
GLUCOSE BLD STRIP.AUTO-MCNC: 206 MG/DL (ref 65–99)
GLUCOSE BLD STRIP.AUTO-MCNC: 213 MG/DL (ref 65–99)
GLUCOSE BLD STRIP.AUTO-MCNC: 225 MG/DL (ref 65–99)
GLUCOSE SERPL-MCNC: 265 MG/DL (ref 65–99)
MAGNESIUM SERPL-MCNC: 2.1 MG/DL (ref 1.5–2.5)
PHOSPHATE SERPL-MCNC: 2.9 MG/DL (ref 2.5–4.5)
POTASSIUM SERPL-SCNC: 4.4 MMOL/L (ref 3.6–5.5)
SODIUM SERPL-SCNC: 141 MMOL/L (ref 135–145)

## 2024-12-22 PROCEDURE — A9270 NON-COVERED ITEM OR SERVICE: HCPCS | Performed by: STUDENT IN AN ORGANIZED HEALTH CARE EDUCATION/TRAINING PROGRAM

## 2024-12-22 PROCEDURE — A9270 NON-COVERED ITEM OR SERVICE: HCPCS | Performed by: INTERNAL MEDICINE

## 2024-12-22 PROCEDURE — A9270 NON-COVERED ITEM OR SERVICE: HCPCS

## 2024-12-22 PROCEDURE — 770004 HCHG ROOM/CARE - ONCOLOGY PRIVATE *

## 2024-12-22 PROCEDURE — 80048 BASIC METABOLIC PNL TOTAL CA: CPT

## 2024-12-22 PROCEDURE — 36415 COLL VENOUS BLD VENIPUNCTURE: CPT

## 2024-12-22 PROCEDURE — 700102 HCHG RX REV CODE 250 W/ 637 OVERRIDE(OP): Performed by: HOSPITALIST

## 2024-12-22 PROCEDURE — 99233 SBSQ HOSP IP/OBS HIGH 50: CPT | Performed by: SPECIALIST

## 2024-12-22 PROCEDURE — 84100 ASSAY OF PHOSPHORUS: CPT

## 2024-12-22 PROCEDURE — 700102 HCHG RX REV CODE 250 W/ 637 OVERRIDE(OP): Performed by: INTERNAL MEDICINE

## 2024-12-22 PROCEDURE — 700102 HCHG RX REV CODE 250 W/ 637 OVERRIDE(OP): Performed by: STUDENT IN AN ORGANIZED HEALTH CARE EDUCATION/TRAINING PROGRAM

## 2024-12-22 PROCEDURE — 99222 1ST HOSP IP/OBS MODERATE 55: CPT | Performed by: STUDENT IN AN ORGANIZED HEALTH CARE EDUCATION/TRAINING PROGRAM

## 2024-12-22 PROCEDURE — 82962 GLUCOSE BLOOD TEST: CPT | Mod: 91

## 2024-12-22 PROCEDURE — 700102 HCHG RX REV CODE 250 W/ 637 OVERRIDE(OP)

## 2024-12-22 PROCEDURE — 99232 SBSQ HOSP IP/OBS MODERATE 35: CPT

## 2024-12-22 PROCEDURE — 93010 ELECTROCARDIOGRAM REPORT: CPT | Performed by: INTERNAL MEDICINE

## 2024-12-22 PROCEDURE — 83735 ASSAY OF MAGNESIUM: CPT

## 2024-12-22 PROCEDURE — A9270 NON-COVERED ITEM OR SERVICE: HCPCS | Performed by: HOSPITALIST

## 2024-12-22 RX ORDER — HALOPERIDOL 5 MG/ML
1 INJECTION INTRAMUSCULAR EVERY 6 HOURS PRN
Status: DISCONTINUED | OUTPATIENT
Start: 2024-12-22 | End: 2024-12-30 | Stop reason: HOSPADM

## 2024-12-22 RX ORDER — HYDROXYZINE HYDROCHLORIDE 10 MG/1
10 TABLET, FILM COATED ORAL EVERY 4 HOURS PRN
Status: DISCONTINUED | OUTPATIENT
Start: 2024-12-22 | End: 2024-12-29

## 2024-12-22 RX ORDER — ARIPIPRAZOLE 2 MG/1
2 TABLET ORAL DAILY
Status: DISCONTINUED | OUTPATIENT
Start: 2024-12-22 | End: 2024-12-29

## 2024-12-22 RX ORDER — TRAZODONE HYDROCHLORIDE 100 MG/1
50 TABLET ORAL
Status: DISCONTINUED | OUTPATIENT
Start: 2024-12-22 | End: 2024-12-22

## 2024-12-22 RX ORDER — TRAZODONE HYDROCHLORIDE 100 MG/1
100 TABLET ORAL
Status: DISCONTINUED | OUTPATIENT
Start: 2024-12-22 | End: 2024-12-29

## 2024-12-22 RX ORDER — QUETIAPINE FUMARATE 25 MG/1
50 TABLET, FILM COATED ORAL NIGHTLY
Status: DISCONTINUED | OUTPATIENT
Start: 2024-12-22 | End: 2024-12-22

## 2024-12-22 RX ADMIN — INSULIN LISPRO 2 UNITS: 100 INJECTION, SOLUTION INTRAVENOUS; SUBCUTANEOUS at 06:19

## 2024-12-22 RX ADMIN — INSULIN LISPRO 1 UNITS: 100 INJECTION, SOLUTION INTRAVENOUS; SUBCUTANEOUS at 18:23

## 2024-12-22 RX ADMIN — ARIPIPRAZOLE 2 MG: 2 TABLET ORAL at 18:19

## 2024-12-22 RX ADMIN — INSULIN GLARGINE-YFGN 9 UNITS: 100 INJECTION, SOLUTION SUBCUTANEOUS at 18:23

## 2024-12-22 RX ADMIN — INSULIN LISPRO 2 UNITS: 100 INJECTION, SOLUTION INTRAVENOUS; SUBCUTANEOUS at 13:11

## 2024-12-22 RX ADMIN — CLOPIDOGREL BISULFATE 75 MG: 75 TABLET ORAL at 06:05

## 2024-12-22 RX ADMIN — HYDROXYZINE HYDROCHLORIDE 10 MG: 10 TABLET ORAL at 18:20

## 2024-12-22 RX ADMIN — Medication 10 MG: at 21:15

## 2024-12-22 RX ADMIN — DAPAGLIFLOZIN 10 MG: 10 TABLET, FILM COATED ORAL at 06:05

## 2024-12-22 RX ADMIN — INSULIN LISPRO 2 UNITS: 100 INJECTION, SOLUTION INTRAVENOUS; SUBCUTANEOUS at 00:07

## 2024-12-22 RX ADMIN — TRAZODONE HYDROCHLORIDE 100 MG: 100 TABLET ORAL at 21:15

## 2024-12-22 RX ADMIN — ATORVASTATIN CALCIUM 80 MG: 80 TABLET, FILM COATED ORAL at 18:20

## 2024-12-22 RX ADMIN — LANSOPRAZOLE 30 MG: 30 TABLET, ORALLY DISINTEGRATING ORAL at 06:05

## 2024-12-22 ASSESSMENT — FIBROSIS 4 INDEX: FIB4 SCORE: 3.11

## 2024-12-22 NOTE — PROGRESS NOTES
..Bedside report received from off going RN/tech: Giovany, assumed care of patient.     Fall Risk Score: HIGH RISK  Fall risk interventions in place: Place yellow fall risk ID band on patient, Provide patient/family education based on risk assessment, and Educate patient/family to call staff for assistance when getting out of bed  Bed type: Low air loss (Ray Score less than 17 interventions in place)  Patient on cardiac monitor: Yes  IVF/IV medications: Not Applicable   Oxygen: Room Air  Bedside sitter: Report given to sitter  Isolation: Not applicable

## 2024-12-22 NOTE — PROGRESS NOTES
Monitor Summary:    Rhythm: SR-ST w/ BBB  Rate:   Ectopy: (O) PAC, (R) PVC  Measurement : .15/.14/.40

## 2024-12-22 NOTE — PROGRESS NOTES
This Rn was notified by the CNA of the patients blood pressure being 85/64 MAP 64. Went in to check on the patient. Pt is asymptomatic. This RN notified the hospitalist, and was told to check blood pressure again in 1 hour. No new orders art this time.    Repeat blood pressure post 1 hour, 107/64mmHG

## 2024-12-22 NOTE — PROGRESS NOTES
Hospital Medicine Daily Progress Note    Date of Service  12/22/2024    Chief Complaint  Kiaar Qureshi is a 59 y.o. female admitted 11/27/2024 with encephalopathy    Hospital Course  60 yo woman HTN, HLD who was at [facility] for anterior STEMI s/p lytics and had LAD PCI and unsuccessful PCI for OM1 complicated by RP bleed, vfib and PEA arrest with cardiogenic shock needing ECMO and imeplla.  ECMO was decannulated 11/4, Impella removed 11/8.  She also had encephalopathy and delirium on valproic acid and Seroquel.  TTE showed EF 25-30%, she did not tolerate GDMT due to hypotension.  She had a CT chest on 11/25 that showed focal aortic mural thrombus versus ulcerated plaque to descending thoracic aorta and CTA AP with rim-enhancing RP hematoma and small thrombus in right common iliac artery, multiple nonenhancing fluid collections and subcu tissue in right lower and right inguinal regions, possible evolving hematomas and left femoral vein.  She was started on Vanco and Zosyn and transferred back to [facility].  She completed linezolid and Zosyn around 12/1.  Cardiology was consulted for medical management of her heart failure. Dr. Conley with vascular surgery was consulted, recommended wound vacs to groin wounds.  On 12/9 she became more altered with respiratory distress and transferred to [facility].  CT head was negative for acute changes.  EEG was negative for seizure activity.  She was high on high dose of Seroquel that was tapered off.  She remains on NG tube for tube feeds.    Interval Problem Update    12/17  Received a dose of seroquel overnight for agitation and still required restratings. BP at 90/50s.   Sodium improved to 147. Hgb stable.   Pt able to say a few words, opens eyes spontaneously however AOX0 when asked and does not follow commands. Remains on feeding tube.   Defer MRI given agitation,  unable to lie still and wary of sedation   Continue valproic acid BID for now. Will hold off restarting seroquel  and monitor.     12/18   Pt pulled feeding tube overnight again.   20bt vtach overnight on monitor. Other vitals wnl. Asymptomatic  Iron studies consistent with anemia of chronic disease  Pt more awake today and interactive, though still AOX1-2 (name, [facility]). Cannot recall events leading to and during admission. Denies pain, SOB, nausea/vomiting. Wants a shower and is thirsty.     SLP to see pt today.  Decrease Valproic acid to once daily starting tomorrow and then discontinue in 3 days. Med has interactions with Eliquis.   Noted EKG yesterday with Qtc >600. Repeat today. Defer restarting seroquel, pt also  more awake. Could be sundowning. Will hold off adding possible alternatives such as geodon, zyprexa, or trazodone at night as this can increase Qtc/risk of arrhythmias, pending new EKG. Increase melatonin to 10mg once daily   Discussed with pharmacy   Discussed with Rosa (friend, at bedside) POC who reports she will let  know     12/19  EKG per my read with improved Qtc <500, IVCD, in SR.   No acute events overnight, no episodes of agitation.   No Cheynne Lo breathing observed since yesterday. Awake still today but with non purposeful movements, not following commands nor answering questions appropriately. She did not pass her swallow eval today. Her feeding tube clogged. Replaced today   Na today at 147. Crea 0.84. Hgb stable.    Increase FWF.   GI consulted for possible PEG tube placement  Continue taper of valproid acid. Continue on current medications  Unable to tolerate MRI, will hold off sedating meds for now  Discussed with GI and bedside RN   Attempted to call  (Bruno) but was unable to reach him. Have left message.    12/20   Na at 146 today.   Pulled out feeding tube again overnight.   Awake but again with non purposeful movements, not following commands or answering questions appropriately  Start seroquel 50mg once daily   Start trazodone 50mg once daily   Continue haldol IV as  needed   Dw GI - plan for possible PEG Monday. DOAC paused over weekend.     12/21  NAEO.  HDS.  On exam, patient is ANO x 1 to self, on room air.  Did not pull out feeding tube overnight.  Reported some sore throat.  Patient is now off valproic acid.  Chloraseptic spray ordered.  Continue on Seroquel and trazodone.  IV Haldol as needed.  Discussed with bedside RN.    12/22  NAEO. HDS.   Similar neurologic exam as yesterday.  Psychiatry consulted for further recommendations.  Discussed case with Dr. Duran   Discussed with GI -also needs to have Plavix held.  Procedure moved to either Thursday or Friday.  Updated  who was at bedside regarding plan of care.    I have discussed this patient's plan of care and discharge plan at IDT rounds today with Case Management, Nursing, Nursing leadership, and other members of the IDT team.    Consultants/Specialty  cardiology and vascular surgery, palliative care    Code Status  DNAR, I OK    Disposition  The patient is not medically cleared for discharge to home or a post-acute facility.      I have placed the appropriate orders for post-discharge needs.    Review of Systems  Review of Systems   Unable to perform ROS: Medical condition        Physical Exam  Temp:  [36.7 °C (98.1 °F)-37.2 °C (98.9 °F)] 37.2 °C (98.9 °F)  Pulse:  [75-91] 86  Resp:  [18] 18  BP: ()/(54-82) 109/62  SpO2:  [96 %-99 %] 98 %    Physical Exam  Constitutional:       Appearance: She is ill-appearing.      Comments: Opens eyes for exam, inconsistently tracks me   HENT:      Head: Normocephalic.      Mouth/Throat:      Mouth: Mucous membranes are dry.   Eyes:      Comments: Pupils are dilated, equal and reactive to light   Cardiovascular:      Rate and Rhythm: Normal rate and regular rhythm.   Pulmonary:      Breath sounds: Rhonchi present.      Comments: No periodic pauses in breathing, irregular breathing observed   Abdominal:      General: There is no distension.      Palpations: Abdomen is  soft.      Tenderness: There is no abdominal tenderness.   Musculoskeletal:      Right lower leg: No edema.      Left lower leg: No edema.      Comments: Wound vacs bilaterally to groin   Neurological:      Mental Status: She is disoriented.      Comments: Non purposeful movements, spontaneous eye opening   Awake but unable to answer questions appropriately  Spontaneously moves both her upper extremities and lower extremities           Fluids    Intake/Output Summary (Last 24 hours) at 12/22/2024 1846  Last data filed at 12/22/2024 1804  Gross per 24 hour   Intake 2160 ml   Output 3400 ml   Net -1240 ml        Laboratory  Recent Labs     12/20/24  0111   WBC 7.0   RBC 3.52*   HEMOGLOBIN 11.4*   HEMATOCRIT 39.4   .9*   MCH 32.4   MCHC 28.9*   RDW 82.8*   PLATELETCT 190   MPV 12.1     Recent Labs     12/20/24  0111 12/21/24  0157 12/22/24  0010   SODIUM 146* 144 141   POTASSIUM 4.4 4.0 4.4   CHLORIDE 116* 115* 112   CO2 18* 21 19*   GLUCOSE 145* 215* 265*   BUN 30* 32* 32*   CREATININE 0.73 0.81 0.88   CALCIUM 7.7* 7.9* 7.9*                   Imaging  DX-ABDOMEN FOR TUBE PLACEMENT   Final Result         1.  Nonspecific bowel gas pattern in the upper abdomen.   2.  Dobbhoff tube tip overlying the expected location of the pylorus or first duodenal segment.   3.  Bilateral lower lobe atelectasis and/or infiltrates      DX-ABDOMEN FOR TUBE PLACEMENT   Final Result         1.  Nonspecific bowel gas pattern in the upper abdomen.   2.  Dobbhoff tube tip overlying the expected location of the pylorus or first duodenal segment.   3.  Pulmonary edema and/or infiltrates.   4.  Trace bilateral pleural effusions.      DX-ABDOMEN FOR TUBE PLACEMENT   Final Result      1. Repositioning of the esophagogastric tube, terminating over the fundus of the stomach.   2. Interstitial edema, left retrocardiac opacity and small left pleural effusion.      DX-ABDOMEN FOR TUBE PLACEMENT   Final Result         1.  Nonspecific bowel gas  pattern in the upper abdomen.   2.  Dobbhoff tube tip terminates overlying the expected location of the gastric antrum or pylorus.   3.  Pulmonary edema and/or infiltrates, similar to prior study      DX-ABDOMEN FOR TUBE PLACEMENT   Final Result      The tip of the enteric tube terminates over the antrum of the stomach.      DX-ABDOMEN FOR TUBE PLACEMENT   Final Result         1.  Nonspecific bowel gas pattern in the upper abdomen.   2.  Dobbhoff tube is coiled within the duodenum, the tip terminates overlying the expected location of the gastric body.   3.  Bilateral lower lobe edema and/or infiltrates.      DX-ABDOMEN FOR TUBE PLACEMENT   Final Result      NG tube tip projects over the gastroduodenal junction.      DX-CHEST-LIMITED (1 VIEW)   Final Result      1.  Mild cardiomegaly with evidence of mild to moderate pulmonary edema with minimal worsening since previous exam.      CT-HEAD W/O   Final Result         1.  No acute intracranial abnormality is identified, there are nonspecific white matter changes, commonly associated with small vessel ischemic disease.  Associated mild cerebral atrophy is noted.   2.  Bilateral sinusitis changes   3.  Atherosclerosis.               DX-CHEST-PORTABLE (1 VIEW)   Final Result         1.  Mild pulmonary edema and/or infiltrates.      EC-ECHOCARDIOGRAM COMPLETE W/ CONT   Final Result      DX-ABDOMEN FOR TUBE PLACEMENT   Final Result      Tip of the esophagogastric tube terminates over the pylorus of the stomach.      IR-MIDLINE CATHETER INSERTION WO GUIDANCE > AGE 3   Final Result                  Ultrasound-guided midline placement performed by qualified nursing staff    as above.          IR-US GUIDED PIV   Final Result    Ultrasound-guided PERIPHERAL IV INSERTION performed by    qualified nursing staff as above.      DX-OUTSIDE IMAGES-DX CHEST   Final Result      DX-OUTSIDE IMAGES-DX ABDOMEN   Final Result      MR-OUTSIDE IMAGES-MR BRAIN   Final Result      CT-OUTSIDE  IMAGES-CT HEAD   Final Result      CT-OUTSIDE IMAGES-CT ABDOMEN/PELVIS   Final Result      CT-OUTSIDE IMAGES-CT CHEST   Final Result      DX-ABDOMEN FOR TUBE PLACEMENT   Final Result      There is a new small bowel feeding tube which terminates in the small bowel of the right mid abdomen.      DX-CHEST-LIMITED (1 VIEW)   Final Result         Asymmetric pulmonary infiltrates, left worse than right.      IR-US GUIDED PIV    (Results Pending)        Assessment/Plan  * Heart failure with reduced ejection fraction (HCC)- (present on admission)  Assessment & Plan  ICM LVEF 25-30%  S/p revascularization  Soft pressures limiting titration of GDMT.  Farxiga  Holding losartan for low BP  Critical care had recommended holding bisoprolol because of Cheyne-Lo breathing  Dehydrated, holding Lasix    Acute metabolic encephalopathy  Assessment & Plan  Hospital course complicated by encephalopathy.  Likely from combination of CVA, STEMI, hyperglycemia; likely also with anoxic brain injury   Removing restraints as tolerated, following  Has a nasojejunal feeding tube placed by interventional radiology from outside facility  Was on Seroquel and Depakene as recommended by neurology/psychiatry at outside facility.    12/16-17  Multifactorial  Continues to be encephalopathic. CTH no acute findings. EEG neg for seizure activity  Neuro evaluated patient at [facility].   Have been titrating off seroquel and pt's mental status has improved as her agitation has diminished  DC seroquel qhs dose  Decrease valproic acid TID to BID    12/18  Decrease Valproic acid to once daily starting tomorrow and then discontinue. Med has interactions with Eliquis.   Noted EKG yesterday with Qtc >600. Repeat today. Defer restarting seroquel. Will hold off adding possible alternatives such as geodon, zyprexa, or trazodone at night as this can increase Qtc/risk of arrhythmias, pending new EKG. Increase melatonin to 10mg once daily   Unable to tolerate MRI    12/19    Continue valproic acid taper. EKG with improved Qtc. Continue on current medications.   Failed swallow screen with SLP. Has been pulling feeding tubes before when more encephalopathic.   GI consulted for possible PEG tube placement  Unable to tolerate MRI.     12/20  Start seroquel 50mg once daily and trazodone 50mg once daily   Plan for PEG tube placement Monday 12/21  Continue meds as above.  Off valproic acid already today.    12/22  Continue medications.  Psychiatry consulted.  Plan for PEG on either Thursday or Friday.    Circulating anticoagulant disorder (HCC)  Assessment & Plan  On Eliquis    Functional quadriplegia (HCC)  Assessment & Plan  In the setting of AMS, prior PEA    Macrocytic anemia  Assessment & Plan  Stable. No overt bleeding  B12 wnl. Studies more consistent with anemia of chronic disease      Hypernatremia  Assessment & Plan  Persistent worsening and she lost her NG tube on and off  Started on D5 water infusion with FWF and q6h PRN  Goal correction 8 mLEq in 24h    12/17-19  Improving  S/p D5 infusion   Continue FWF 300cc every 4 hours and q12 Na checks    Acute hypoxic respiratory failure (HCC)  Assessment & Plan  In setting of heart failure, recent cardiogenic shock  Patient demonstrates cheynne florez breathing pattern which is consistent with her suspected anoxic brain injury  Patient appears to be experiencing aspiration events causing bradycardia  Continue supportive care  Patient is NPO, speech is following    12/19  Improved    Constipation  Assessment & Plan  + bm  Continue bowel regimen  following    Hypotension  Assessment & Plan  BP low range, holding losartan and bisoprolol    Abdominal hematoma  Assessment & Plan  Might be related to cardiac device  Clinically stable and h/h in stable range  Patient was on lovenox due to rigth common iliac art thrombosis and then transitioned to eliquis per [facility]  - plan to continue for 3-4 months with repeat imaging at that time to see if  it can be d/c       12/17-19  Hgb stable. Continue to monitor    ACP (advance care planning)  Assessment & Plan  Dr. Mobley: Discussed plan of care and code status with patient  Bruno ([phone]) and sister-in-law Laina ([phone]). I explained that Kiara has experienced a suspected anoxic brain injury during her current medical course that has required ECMO and impella, CVA, Vfib arrest. I shared my concern that Laina may not make a meaningful recovery from this and that her current and future quality of life appears limited. I explained that in the event of another CP arrest her quality of life would be further impacted in the event she were to survive such an episode. Laina and Bruno made the decision to change Kiara's code status to DNR, yes to intubaiton. Laina will be driving in from [place] in the coming days to have further goals of care conversations. Total of 48 minutes spent in discussion and coordination of advance care planning.    12/16 - my conversation with Laina and Bruno, they will like to continue care and plan for PAMs    Pneumonia  Assessment & Plan  S/p completed course of pip/tazo and vanc  Cultures were negative    Aortic mural thrombus (HCC)  Assessment & Plan  CT at outside facility showing focal aortic mural thrombus along the lateral wall of the descending thoracic aorta and also a tiny filling defect/mural thrombus within the posterior right common iliac artery  Continue on apixaban  Continue to monitor CBC        CVA (cerebral vascular accident) (HCC)  Assessment & Plan  Patient had MRI on 11/17/24 at [facility] which shwed Punctate acute infarct of the right centrum semiovale. Scattered foci of enhancement in multiple vascular distributions as described may represent recent subacute infarcts. Diffuse stippled susceptibility signal throughout the supratentorial and infratentorial brain. Overall findings may represent sequelae of fat emboli or amyloid related angiopathy.   High dose  statin  Apixaban  plavix    Continues to be encephalopathic. CTH no acute findings. EEG neg for seizure activity  Neuro evaluated patient at [facility].   Have been titrating off seroquel and pt's mental status has improved as her agitation has diminished  DC seroquel qhs dose  I changed valproic acid TID to BID  Repeat MRI brain if possible, unble to tolerate at this time    12/17  See plan for DOUG      CAD (coronary artery disease)  Assessment & Plan  Patient with recent complicated STEMI with PCI to LAD and mid LAD.  Complicated by V-fib arrest and cardiogenic shock requiring VA ECMO and Impella and retroperitoneal hematomas. At [facility] underwent impella supported PCI to mid LAD just distal to prior stent but unsuccessful with PCI to 100% occluded OM1 on 11/6. Patient was decannulated and had Impella removed.    Discussed with cardiology and asa stopped and replaced with plavix. Cont statin  Repeat echo shows persistently low EF, 25-30%  Cardiology had recommended bisoprolol but due to cheyne florez critical care recommends we stop this - so will hold   Continue to hold losartan for low BP and lasix for hypernatremia      Urinary retention  Assessment & Plan  Beckwith catheter in place    Hyperglycemia  Assessment & Plan  Glargine 9 units  ISS      Metabolic acidemia- (present on admission)  Assessment & Plan  Improved           VTE prophylaxis:    therapeutic anticoagulation with eliquis 5 mg BID      I have performed a physical exam and reviewed and updated ROS and Plan today (12/22/2024). In review of yesterday's note (12/21/2024), there are no changes except as documented above.

## 2024-12-22 NOTE — PROGRESS NOTES
Report received from night RN at 0700. PT seen at bedside and pt care assumed. Pt is A&Ox4,  on RA, denies pain. PIV flushed and intact. PT is SR on telemetry. PT is x2 max assist.     Plan of care reviewed with pt, call light, phone, and personal belongings within reach. Bed alarm on, and bed in low locked position. All pt's needs met at this time    Bedside report received from off going RN/tech: Brie/Cata, RN, assumed care of patient.     Fall Risk Score: HIGH RISK  Fall risk interventions in place: Place yellow fall risk ID band on patient, Provide patient/family education based on risk assessment, Educate patient/family to call staff for assistance when getting out of bed, Place fall precaution signage outside patient door, Place patient in room close to nursing station, Utilize bed/chair fall alarm, Notify charge of high risk for huddle, and Bed alarm connected correctly  Bed type: Regular (Ray Score less than 17 interventions in place)  Patient on cardiac monitor: Yes  IVF/IV medications: Not Applicable   Oxygen: Room Air and No oxygen tank in room  Bedside sitter: Not Applicable   Isolation: Not applicable

## 2024-12-22 NOTE — PROGRESS NOTES
Monitor Summary  Rhythm: Sinus Rhythm, Sinus Scar  Rate: 70-90, occasional drop to 58  Ectopy: PVC, COup  .16 / .10 / .40

## 2024-12-22 NOTE — CARE PLAN
The patient is Watcher - Medium risk of patient condition declining or worsening    Shift Goals  Clinical Goals: q2 turns, NG feedings  Patient Goals: comfort, sleep  Family Goals: JAYLIN    Progress made toward(s) clinical / shift goals:        Problem: Safety  Goal: Will remain free from injury  Outcome: Progressing  Note: Pt will be free from injury during the shift     Problem: Care Map:  Day 3 Optimal Outcome for the Heart Failure Patient  Goal: Day 3:  Optimal Care of the heart failure patient  Outcome: Progressing  Intervention: Start Heart Failure education booklet, provide writing utensils and notepad for questions  Note: Pt will be provided the proper materials and education about heart failure   Intervention: Daily weight documented.  Use stand up scale if patient able. Compare to previous weight  Note: Pts weight will be documented each shift  Intervention: Assess edema every shift (peripheral, sacral, periorbital, perineal/scrotal, and abdominal)  Note: Will assess edema each shift.

## 2024-12-23 LAB
ANION GAP SERPL CALC-SCNC: 9 MMOL/L (ref 7–16)
BASOPHILS # BLD AUTO: 0.6 % (ref 0–1.8)
BASOPHILS # BLD: 0.04 K/UL (ref 0–0.12)
BUN SERPL-MCNC: 24 MG/DL (ref 8–22)
CALCIUM SERPL-MCNC: 8.1 MG/DL (ref 8.5–10.5)
CHLORIDE SERPL-SCNC: 110 MMOL/L (ref 96–112)
CO2 SERPL-SCNC: 19 MMOL/L (ref 20–33)
COMMENT 1642: NORMAL
CREAT SERPL-MCNC: 0.77 MG/DL (ref 0.5–1.4)
CRP SERPL HS-MCNC: 6.06 MG/DL (ref 0–0.75)
EOSINOPHIL # BLD AUTO: 0.26 K/UL (ref 0–0.51)
EOSINOPHIL NFR BLD: 3.7 % (ref 0–6.9)
ERYTHROCYTE [DISTWIDTH] IN BLOOD BY AUTOMATED COUNT: 76.2 FL (ref 35.9–50)
GFR SERPLBLD CREATININE-BSD FMLA CKD-EPI: 89 ML/MIN/1.73 M 2
GLUCOSE BLD STRIP.AUTO-MCNC: 104 MG/DL (ref 65–99)
GLUCOSE BLD STRIP.AUTO-MCNC: 128 MG/DL (ref 65–99)
GLUCOSE BLD STRIP.AUTO-MCNC: 86 MG/DL (ref 65–99)
GLUCOSE SERPL-MCNC: 110 MG/DL (ref 65–99)
HCT VFR BLD AUTO: 38.2 % (ref 37–47)
HGB BLD-MCNC: 11.6 G/DL (ref 12–16)
IMM GRANULOCYTES # BLD AUTO: 0.04 K/UL (ref 0–0.11)
IMM GRANULOCYTES NFR BLD AUTO: 0.6 % (ref 0–0.9)
LYMPHOCYTES # BLD AUTO: 1.33 K/UL (ref 1–4.8)
LYMPHOCYTES NFR BLD: 18.7 % (ref 22–41)
MACROCYTES BLD QL SMEAR: ABNORMAL
MAGNESIUM SERPL-MCNC: 2.2 MG/DL (ref 1.5–2.5)
MCH RBC QN AUTO: 32.4 PG (ref 27–33)
MCHC RBC AUTO-ENTMCNC: 30.4 G/DL (ref 32.2–35.5)
MCV RBC AUTO: 106.7 FL (ref 81.4–97.8)
MONOCYTES # BLD AUTO: 0.54 K/UL (ref 0–0.85)
MONOCYTES NFR BLD AUTO: 7.6 % (ref 0–13.4)
MORPHOLOGY BLD-IMP: NORMAL
NEUTROPHILS # BLD AUTO: 4.9 K/UL (ref 1.82–7.42)
NEUTROPHILS NFR BLD: 68.8 % (ref 44–72)
NRBC # BLD AUTO: 0 K/UL
NRBC BLD-RTO: 0 /100 WBC (ref 0–0.2)
OVALOCYTES BLD QL SMEAR: NORMAL
PHOSPHATE SERPL-MCNC: 3.4 MG/DL (ref 2.5–4.5)
PLATELET # BLD AUTO: 209 K/UL (ref 164–446)
PLATELET BLD QL SMEAR: NORMAL
PMV BLD AUTO: 12.4 FL (ref 9–12.9)
POTASSIUM SERPL-SCNC: 4.2 MMOL/L (ref 3.6–5.5)
PREALB SERPL-MCNC: 15.5 MG/DL (ref 18–38)
RBC # BLD AUTO: 3.58 M/UL (ref 4.2–5.4)
RBC BLD AUTO: PRESENT
SODIUM SERPL-SCNC: 138 MMOL/L (ref 135–145)
WBC # BLD AUTO: 7.1 K/UL (ref 4.8–10.8)

## 2024-12-23 PROCEDURE — 80048 BASIC METABOLIC PNL TOTAL CA: CPT

## 2024-12-23 PROCEDURE — 92526 ORAL FUNCTION THERAPY: CPT

## 2024-12-23 PROCEDURE — 82962 GLUCOSE BLOOD TEST: CPT

## 2024-12-23 PROCEDURE — 700102 HCHG RX REV CODE 250 W/ 637 OVERRIDE(OP): Performed by: HOSPITALIST

## 2024-12-23 PROCEDURE — A9270 NON-COVERED ITEM OR SERVICE: HCPCS | Performed by: STUDENT IN AN ORGANIZED HEALTH CARE EDUCATION/TRAINING PROGRAM

## 2024-12-23 PROCEDURE — 97605 NEG PRS WND THER DME<=50SQCM: CPT

## 2024-12-23 PROCEDURE — A9270 NON-COVERED ITEM OR SERVICE: HCPCS | Performed by: HOSPITALIST

## 2024-12-23 PROCEDURE — 84134 ASSAY OF PREALBUMIN: CPT

## 2024-12-23 PROCEDURE — 700102 HCHG RX REV CODE 250 W/ 637 OVERRIDE(OP): Performed by: STUDENT IN AN ORGANIZED HEALTH CARE EDUCATION/TRAINING PROGRAM

## 2024-12-23 PROCEDURE — 86140 C-REACTIVE PROTEIN: CPT

## 2024-12-23 PROCEDURE — 770004 HCHG ROOM/CARE - ONCOLOGY PRIVATE *

## 2024-12-23 PROCEDURE — A9270 NON-COVERED ITEM OR SERVICE: HCPCS

## 2024-12-23 PROCEDURE — 84100 ASSAY OF PHOSPHORUS: CPT

## 2024-12-23 PROCEDURE — 99233 SBSQ HOSP IP/OBS HIGH 50: CPT | Performed by: INTERNAL MEDICINE

## 2024-12-23 PROCEDURE — 700102 HCHG RX REV CODE 250 W/ 637 OVERRIDE(OP)

## 2024-12-23 PROCEDURE — 83735 ASSAY OF MAGNESIUM: CPT

## 2024-12-23 PROCEDURE — 85025 COMPLETE CBC W/AUTO DIFF WBC: CPT

## 2024-12-23 PROCEDURE — 36415 COLL VENOUS BLD VENIPUNCTURE: CPT

## 2024-12-23 RX ADMIN — OXYCODONE 2.5 MG: 5 TABLET ORAL at 22:21

## 2024-12-23 RX ADMIN — ATORVASTATIN CALCIUM 80 MG: 80 TABLET, FILM COATED ORAL at 17:30

## 2024-12-23 RX ADMIN — ARIPIPRAZOLE 2 MG: 2 TABLET ORAL at 06:38

## 2024-12-23 RX ADMIN — LANSOPRAZOLE 30 MG: 30 TABLET, ORALLY DISINTEGRATING ORAL at 06:37

## 2024-12-23 RX ADMIN — INSULIN GLARGINE-YFGN 9 UNITS: 100 INJECTION, SOLUTION SUBCUTANEOUS at 17:31

## 2024-12-23 RX ADMIN — TRAZODONE HYDROCHLORIDE 100 MG: 100 TABLET ORAL at 20:41

## 2024-12-23 RX ADMIN — INSULIN LISPRO 1 UNITS: 100 INJECTION, SOLUTION INTRAVENOUS; SUBCUTANEOUS at 23:59

## 2024-12-23 RX ADMIN — DAPAGLIFLOZIN 10 MG: 10 TABLET, FILM COATED ORAL at 10:50

## 2024-12-23 RX ADMIN — Medication 10 MG: at 20:41

## 2024-12-23 ASSESSMENT — PAIN DESCRIPTION - PAIN TYPE
TYPE: ACUTE PAIN

## 2024-12-23 NOTE — CARE PLAN
The patient is Watcher - Medium risk of patient condition declining or worsening    Shift Goals  Clinical Goals: safety, 1:1, tube feeds  Patient Goals: rest  Family Goals: JAYLIN    Progress made toward(s) clinical / shift goals:  pt meds passed per MAR, no injuries this shift      Problem: Safety  Goal: Will remain free from falls  Outcome: Progressing     Problem: Skin Integrity  Goal: Risk for impaired skin integrity will decrease  Outcome: Progressing     Problem: Respiratory  Goal: Patient will achieve/maintain optimum respiratory ventilation and gas exchange  Outcome: Progressing       Patient is not progressing towards the following goals:      Problem: Safety  Goal: Will remain free from injury  Outcome: Not Met     Problem: Psychosocial  Goal: Patient's level of anxiety will decrease  Outcome: Not Met

## 2024-12-23 NOTE — PROGRESS NOTES
4 Eyes Skin Assessment Completed by Ashlee AREVALO RN and Franca HERNANDEZ RN.    Head WDL  Ears WDL  Nose WDL (Coretrak)  Mouth WDL  Neck WDL  Breast/Chest WDL  Shoulder Blades WDL  Spine WDL  (R) Arm/Elbow/Hand WDL  (L) Arm/Elbow/Hand WDL  Abdomen Bruising  Groin Redness, moist, breakdown  Scrotum/Coccyx/Buttocks Redness and Blanching  (R) Leg WDL  (L) Leg WDL  (R) Heel/Foot/Toe WDL  (L) Heel/Foot/Toe WDL          Devices In Places Wound Vac x2, Beckwith and Cortrak      Interventions In Place TAP System, Pillows, and Heels Loaded W/Pillows    Possible Skin Injury Yes    Pictures Uploaded Into Epic Yes  Wound Consult Placed N/A  RN Wound Prevention Protocol Ordered Yes

## 2024-12-23 NOTE — PROGRESS NOTES
Monitor Summary  Rhythm: SR  Rate: 62-82  Ectopy: F PAC, F PVC, R coup, BBB  .13 / .12 / .39

## 2024-12-23 NOTE — PROGRESS NOTES
Monitor Summary  Rhythm: Normal Sinus Rhythm  Rate: 80-90s  Ectopy: PVCs, Couplets  .17 / .12 / .42    12 hr Chart check.

## 2024-12-23 NOTE — CONSULTS
"PSYCHIATRIC CONSULTATION:  *Reason for admission: STEMI     *Reason for consult:aggression   *Requesting Physician:Dr. Rubio Santoro    Legal status:  not applicable    *Chief Complaint: \"I was supposed to go home\"    *HPI:     Ms. Qureshi is a 59-year-old female seen today for an initial psychiatric evaluation of agitation/aggression.  The patient was admitted to [facility] on 11/27/2024 as a transfer from [facility] and the [facility].  the patient underwent an unsuccessful PCI at [facility].  She went into ventricular fibrillation and was placed on ECMO.  While at [facility] the patient became confused and agitated.  She was started on both quetiapine and Depakote by the neurology service.  Bilateral hematomas were noted in her lower extremities and pelvic region.  She was also noted to have an aortic mural thrombus.  Patient is on Plavix and Eliquis at this time.  Patient was also on vancomycin and Zosyn.  While admitted at [facility] the patient has developed severe hypoxia that did need IMCU admission.  The patient was on quetiapine 150 mg at bedtime as well as Depakote and it was felt that quetiapine could have been contributing to significant respiratory depression and somnolence.  Quetiapine was tapered off this week.  Patient did appear more alert but was becoming aggressive again as quetiapine was tapered off so quetiapine 50 mg at bedtime was started yesterday.  The patient was also tapered off of Depakote due to concerns that Depakote could be lowering the serum concentrations of Eliquis and patient is currently at a high risk for embolism due to her mural thrombus.    The patient was oriented to self and year but otherwise was not oriented to place, situation.  She reports that she has to get downstairs because her mother is waiting.  CAM-ICU screen was attempted and was a positive screen for delirium.  The patient was able to recognize her  at bedside.  She does not recall " "details regarding her hospitalization or the reason for her current hospitalization at [facility].  She does deny any thoughts of wanting to harm herself or others.  She reports feeling safe currently denies any fears that anyone is trying to harm her.  She denies having any hallucinations.    Patient did allow me to speak with her  separately.  The patient's spouse reports that prior to hospitalization at [facility] for NSTEMI the patient was cognitively intact.  She was still working as a  and was managing all ADLs and IADLs independently.  There was no concern for any cognitive decline.  He reports that over the last week he has noted that she appears to have good recollection for some of the events prior to her hospital stay at [facility].  For instance she is able to recognize some family members now and there are dogs at home which she was not previously able to do.  He does not report any improvement or worsening of agitation or aggression in the last week.  He reports\" she will have a good day where she can recall a lot of things and be easy to redirect and then the next day have a bad day where she is very agitated and difficult to redirect.\"  He reports since her hospital stay at [facility] he has not noted the patient to have more than 1 day that is\" a good day with no agitation.\"    Patient's  and staff both note that the patient is not sleeping well at night and hardly sleeps during the day.      *Psychiatric Review of Systems:    ROS:  Mood:\" I am fine, I want to go home.\"  Sleep: Denies problems  Appetite: Denies problems but is unaware that she has an NG tube in and does not know the reason for NG tube  SI/HI: Denies  Hallucinations: Denies  Bipolar: Patient unable to answer, confused by this question,  does not report any history of manic or hypomanic symptoms      *Medical Review of Systems: as reported by pt. All systems reviewed. Only those found to be + " are noted below. All others are negative.   Patient denies pain but otherwise unable to answer any review of systems questions as she becomes distracted and fixated on discharge    *Past Medical Hx:   Patient Active Problem List   Diagnosis    Type II diabetes mellitus, well controlled ([facility])    Dyslipidemia    Obesity, morbid, BMI 40.0-49.9 (HCC)    Essential hypertension    Acute myocardial infarction (HCC)    Cardiogenic shock (HCC)    Acute respiratory failure with hypoxia (HCC)    STEMI (ST elevation myocardial infarction) ([facility])    Severe left ventricular systolic dysfunction    Metabolic acidemia    Elevated troponin    Retroperitoneal bleed    JENNY (acute kidney injury) (HCC)    Transaminitis    Ventricular fibrillation (HCC)    Heart failure with reduced ejection fraction (HCC)    Hyperglycemia    Urinary retention    CAD (coronary artery disease)    CVA (cerebral vascular accident) ([facility])    Acute metabolic encephalopathy    Aortic mural thrombus ([facility])    Pneumonia    ACP (advance care planning)    Abdominal hematoma    Hypotension    Constipation    Acute hypoxic respiratory failure ([facility])    Hypernatremia    Macrocytic anemia    Functional quadriplegia (HCC)    Circulating anticoagulant disorder ([facility])        *Family Medical/Psychiatric Hx:  No known family psychiatric history    *Past Psychiatric Hx: No prior psychiatric diagnoses  IP treatment: None  OP treatment: None   Meds tried: None aside from Depakote and quetiapine started at outside hospital   Suicide attempts: None   Trauma: None    *Social Hx:  Living situation: Patient was living in her own home with her spouse  Education/Employment (hx of IDD?): Patient was working as a  prior to NSTEMI  Relationships (estrangements?): Patient has been  to current spouse for 25 years.  She also has a brother and her mother who live close by.  She does not have children.  Hobbies/Activity level (if amy, include ADLS): Prior to  "hospitalization at [facility] the patient was fully independent of all ADLs and IADLs  Legal Hx: None  Access to guns: None    *Drug/Alcohol/Tobacco Hx:   No history of tobacco, alcohol or illicit drug abuse    *Psychiatric (Physical) Examination: observed phenomenon:  Vitals:    12/22/24 2037   BP: 92/59   Pulse: 78   Resp: 20   Temp: 37.1 °C (98.7 °F)   SpO2: 95%     General: Awake, alert in no acute distress  Patient Appearance: Appropriate, fairly groomed, wearing a hospital gown and lying in bed   behavior: Appropriate Behavior, Calm, Cooperative  Speech.: Spontaneous, Normal rate and rhythm, Answers appropriately, normal  volume  Mood Comments:\" I am doing fine\"  Affect: appears euthymic  Thought Content: Appropriate, No suicidal ideation, No thoughts of self harm,  No homicidal ideation, Contracts for safety, Feels life is worth living  Thought Process: Tangential and disorganized  Psychosis: No delusions, No hallucinations  Insight: Impaired insight  Judgment: Impaired judgment  Cognition: Memory appeared intact in interview., Concentration is intact to conversation and oriented to person and year only  Language: Is able to repeat phrases. and Is able to name objects.  Fund of Knowledge: Below expected for age and level of education  Neuro: no tics, tremors, dyskinesias. no dystonias. Gait not observed       Allergies:   No Known Allergies     Medications (currently prescribed at [facility]):    Current Facility-Administered Medications:     traZODone (Desyrel) tablet 100 mg, 100 mg, Enteral Tube, QHS, Paige Blake, D.O., 100 mg at 12/22/24 2115    ARIPiprazole (Abilify) tablet 2 mg, 2 mg, Enteral Tube, DAILY, Paige Balanden-Eleniler, D.O., 2 mg at 12/22/24 1819    hydrOXYzine HCl (Atarax) tablet 10 mg, 10 mg, Enteral Tube, Q4HRS PRN, Paige Balanden-Plyler, D.O., 10 mg at 12/22/24 1820    sore throat spray (Chloraseptic) 1 Spray, 1 Spray, Mouth/Throat, Q2HRS PRN, Jada Santoro M.D.    " haloperidol lactate (Haldol) injection 1 mg, 1 mg, Intravenous, QDAY PRN, Jada Santoro M.D., 1 mg at 12/21/24 1829    melatonin tablet 10 mg, 10 mg, Enteral Tube, Nightly, Jada Santoro M.D., 10 mg at 12/22/24 2115    insulin GLARGINE (Lantus,Semglee) injection, 9 Units, Subcutaneous, Q EVENING, 9 Units at 12/22/24 1823 **AND** [DISCONTINUED] insulin lispro (HumaLOG,AdmeLOG) subcutaneous injection, 0.2 Units/kg/day, Subcutaneous, TID AC, 6 Units at 12/04/24 0820 **AND** insulin lispro (HumaLOG,AdmeLOG) subcutaneous injection, 1-6 Units, Subcutaneous, Q6HRS, 1 Units at 12/22/24 1823 **AND** POC blood glucose manual result, , , Q AC AND BEDTIME(S) **AND** NOTIFY MD and PharmD, , , Once **AND** Administer 20 grams of glucose (approximately 8 ounces of fruit juice) every 15 minutes PRN FSBG less than 70 mg/dL, , , PRN **AND** dextrose 50% (D50W) injection 25 g, 25 g, Intravenous, Q15 MIN PRN, Jada Santoro M.D.    [Held by provider] apixaban (Eliquis) tablet 5 mg, 5 mg, Enteral Tube, BID, Kristi Moore M.D., 5 mg at 12/20/24 0630    [Held by provider] clopidogrel (Plavix) tablet 75 mg, 75 mg, Enteral Tube, DAILY, Kristi Moore M.D., 75 mg at 12/22/24 0605    dapagliflozin propanediol (Farxiga) tablet 10 mg, 10 mg, Enteral Tube, DAILY, Rufus Ahumada D.O., 10 mg at 12/22/24 0605    lansoprazole (Prevacid) solutab 30 mg, 30 mg, Enteral Tube, DAILY, Alissa Joyner M.D., 30 mg at 12/22/24 0605    dakins 0.125% (1/4 strength) topical soln, , Topical, PRN, Alissa Joyner M.D., 473 mL at 12/21/24 0145    bisacodyl (Dulcolax) suppository 10 mg, 10 mg, Rectal, DAILY, Alissa Joyner M.D., 10 mg at 12/21/24 0622    [Held by provider] bisoprolol (Zebeta) tablet 2.5 mg, 2.5 mg, Enteral Tube, Q DAY, Rufus Ahumada D.O., 2.5 mg at 12/13/24 0511    [Held by provider] losartan (Cozaar) tablet 12.5 mg, 12.5 mg, Enteral Tube, Q EVENING, Kristi Moore M.D., 12.5 mg at 12/15/24 9702     oxyCODONE immediate-release (Roxicodone) tablet 2.5 mg, 2.5 mg, Enteral Tube, Q4HRS PRN, 2.5 mg at 12/03/24 1745 **OR** oxyCODONE immediate-release (Roxicodone) tablet 5 mg, 5 mg, Enteral Tube, Q4HRS PRN, Alissa Joyner M.D., 5 mg at 12/13/24 1708    ondansetron (Zofran ODT) dispertab 4 mg, 4 mg, Enteral Tube, Q4HRS PRN, Jourdan Block M.D.    promethazine (Phenergan) tablet 12.5-25 mg, 12.5-25 mg, Enteral Tube, Q4HRS PRN, Jourdan Block M.D.    Pharmacy Consult: Enteral tube insertion - review meds/change route/product selection, 1 Each, Other, PHARMACY TO DOSE, Horace Agudelo M.D.    labetalol (Normodyne/Trandate) injection 10 mg, 10 mg, Intravenous, Q4HRS PRN, Joudran Block M.D.    ondansetron (Zofran) syringe/vial injection 4 mg, 4 mg, Intravenous, Q4HRS PRN, oJurdan Block M.D.    promethazine (Phenergan) suppository 12.5-25 mg, 12.5-25 mg, Rectal, Q4HRS PRN, Jourdan Block M.D.    prochlorperazine (Compazine) injection 5-10 mg, 5-10 mg, Intravenous, Q4HRS PRN, Jourdan Block M.D.    atorvastatin (Lipitor) tablet 80 mg, 80 mg, Enteral Tube, Q EVENING, Jourdan Block M.D., 80 mg at 12/22/24 1820    [Held by provider] furosemide (Lasix) tablet 20 mg, 20 mg, Enteral Tube, Q DAY, Alissa Joyner M.D., 20 mg at 12/13/24 0544    *Labs:  Lab Results   Component Value Date/Time    SODIUM 141 12/22/2024 12:10 AM    POTASSIUM 4.4 12/22/2024 12:10 AM    CHLORIDE 112 12/22/2024 12:10 AM    CO2 19 (L) 12/22/2024 12:10 AM    GLUCOSE 265 (H) 12/22/2024 12:10 AM    BUN 32 (H) 12/22/2024 12:10 AM    CREATININE 0.88 12/22/2024 12:10 AM      LFTs (12/14/24)-Bili-0.6, AST/ALT-9/30    Lab Results   Component Value Date/Time    WBC 7.0 12/20/2024 01:11 AM    RBC 3.52 (L) 12/20/2024 01:11 AM    HEMOGLOBIN 11.4 (L) 12/20/2024 01:11 AM    HEMATOCRIT 39.4 12/20/2024 01:11 AM    .9 (H) 12/20/2024 01:11 AM    MCH 32.4 12/20/2024 01:11 AM    MCHC 28.9 (L) 12/20/2024 01:11 AM    MPV 12.1 12/20/2024  01:11 AM    NEUTSPOLYS 69.30 12/20/2024 01:11 AM    LYMPHOCYTES 18.70 (L) 12/20/2024 01:11 AM    MONOCYTES 6.80 12/20/2024 01:11 AM    EOSINOPHILS 3.70 12/20/2024 01:11 AM    BASOPHILS 0.60 12/20/2024 01:11 AM    ANISOCYTOSIS 1+ 12/16/2024 01:55 AM   Plt-190,000     EKG 12/22/24  Intervals                                Axis   Rate:       82                           P:          74   NJ:         128                          QRS:        55   QRSD:       117                          T:          253   QT:         358   QTc:        418     Interpretive Statements   Sinus rhythm   Atrial premature complex   Nonspecific intraventricular conduction delay   Anterior infarct, old   Borderline repolarization abnormality     Imaging/EEG  CT head 12/9/24  IMPRESSION:        1.  No acute intracranial abnormality is identified, there are nonspecific white matter changes, commonly associated with small vessel ischemic disease.  Associated mild cerebral atrophy is noted.  2.  Bilateral sinusitis changes  3.  Atherosclerosis.       EEG 12/10/24  Abnormal video EEG recording in the awake and drowsy state(s):  -Mild background slowing suggestive of diffuse/multifocal cerebral dysfunction and consistent with a non-specific encephalopathy. Clinical correlation recommended.   -Frequent bitemporal independent as well as synchronous slowing suggest of nonspecific dysfunction in these regions.   -Epileptiform discharges : No epileptiform discharges were seen.  -No seizures.   -Clinical Events: None      TSH (12/5/24)-6.690                     B12 (12/18/24)-793                       UA 11/28/24 negative nitrite, trace esterase, no bacteria      *ASSESSMENT:   Ms. Qureshi is a 59-year-old female seen today for initial psychiatric evaluation of agitation/aggression in the context of delirium.    The patient has been admitted since 11/27 after she suffered complications during a PCI procedure at [facility].  The patient has been encephalopathic  since this procedure.  She was started on Depakote as well as quetiapine while at [facility] but it does not appear that these medications were completely effective for addressing agitation.  Additionally, the patient is on dual anticoagulation due to a mural thrombus and there were concerns that Depakote could be lowering the serum concentration of her Eliquis.  Thus, Depakote was tapered off.  There was concern during this hospitalization that the patient was overly sedated and went into respiratory depression at 1 point requiring IMCU admission.  After this admission the patient was tapered off of quetiapine.  Quetiapine was restarted at 50 mg at bedtime yesterday due to significant agitation/aggression off quetiapine.  The patient does appear restless and confused today.  She is insistent on leaving the hospital to meet her mother who is downstairs.  She was able to be verbally redirected but does forget responses communicated to her just moments prior so frequent redirection is needed.  Her  does not feel that agitation or restlessness have improved even with medications started at [facility].  He also reports the patient historically does not sleep well which may be contributing to presentation.    Diagnosis:  Delirium (patient had a cardiac arrest and hypoxia, has been confused since this time)  Rule out major neurocognitive disorder  *Plan/Further Workup:   Legal status: not applicable    *Medication Managment:       1.  Start Abilify 2 mg in the evening for agitation and aggression  2.  increase trazodone to 100 mg at bedtime for insomnia.    3. Continue melatonin 10 mg at bedtime for insomnia   For.  Start hydroxyzine 10 mg every 4 hours as needed for severe agitation and aggression.  If ineffective or unable to be given due to aggression may use Haldol 1 mg IM q6H PRN for severe agitation/aggression    *Labs Reviewed  *Imaging Reviewed  *Medical Tests Reviewed  *Ordered Old Records/Obtain Collateral: From  patient's spouse at bedside  *Old [facility] Records Summarized:  *Discussed with another provider: Dr. Rubio Santoro           Will follow  Thank you for the consult.       *Time: (can be used instead of E/M)  -Face to Face: 20  -Content of Counseling/Coordination of Care:20  -More than 50% spent in Counseling/Coordination

## 2024-12-23 NOTE — DISCHARGE PLANNING
Case Management Discharge Planning    Admission Date: 11/27/2024  GMLOS: 4.1  ALOS: 26    6-Clicks ADL Score: 8  6-Clicks Mobility Score: 11      Anticipated Discharge Dispo: Discharge Disposition: Disch to a long term care facility (63)  Discharge Address: [address] [place] [address]  Discharge Contact Phone Number: [phone]    This RN CM completed chart review, patient is anticipated to have PEG tube placed today. Patient has 2 wound vacs, de jesus, 1:1 sitter, bilateral restraints and mitts in place.     Psychiatry is adjusting medications to assist with behaviors.      Patient pending medicaid screen with [facility].

## 2024-12-23 NOTE — THERAPY
"Speech Language Pathology   Daily Treatment     Patient Name: Kiara Qureshi  AGE:  59 y.o., SEX:  female  Medical Record #: [id number]  Date of Service: 12/23/2024      Precautions:  Precautions: Fall Risk, Swallow Precautions, Nasogastric Tube         Subjective  Pt agreeable and cooperative with SLP tx tasks. Reportedly does recall FEES but reports that the only thing she remembers is that it was \"painful.\" During the session, patient requested \"Ice cream.\"      Assessment  Pt seen for dysphagia management. Seated upright in bed, on RA, satting in 90s. NGT in situ, noted to have xerostomia. Care aid at bedside.     Trials of straw sips TN0 and ice chips provided. Pt with single instance of throat clear with ice chips, none with sips of TN0 (per FEES completed, this may not be an accurate assessment of swallow safety). During FEES, patient noted to be limited by poor command following and inability to attempt compensatory strategies. Assessed for following of commands that may be used during FEES including cued cough, cued cleansing swallows, cued small sips. Pt followed these commands given min cues from SLP with 100% accuracy during the session.    Pt agreeable to repeat FEES, although she is likely to need reinforcement. Discussed role of ice chips in dysphagia management with patient, care aid at bedside, RN, and DO.       Clinical Impressions  Pt presents with improved command following and participation this date as compared to time of FEES completed on 12/19. Per GI note - there is some concern for eligibility for PEG (\"Concerned about feeding tube in this patient who can be agitated and pull tubes.  Pulling G tube in first 2 weeks can be a surgical emergency.  She will require mitts on hands 24 hrs/day 1st 2 weeks; Described to have aspiration with episodes of bradycardia.  This will still be a problem with a PEG if she has her HOB decreased\").     Repeat instrumentation is indicated now that patient " "is participating more reliably to assess if patient is still need of non-oral nutrition. In advance of FEES, recommend ice chips after oral care with assistance from trained staff to mitigate the impacts of xerostomia and disuse atrophy as well as to provide comfort and aid with secretion management.       Recommendations  NPO / NGT   - Please provide ~5-10 tsp of ice approx. hourly after oral care under direct spv from trained staff  Instrumentation: FEES  Medication: Non Oral  Oral Care: Q2h      SLP Treatment Plan  Treatment Plan: Dysphagia Treatment, Patient/Family/Caregiver Training  SLP Frequency: 3x Per Week  Estimated Duration: Until Therapy Goals Met      Anticipated Discharge Needs  Discharge Recommendations: Recommend post-acute placement for additional speech therapy services prior to discharge home  Therapy Recommendations Upon DC: Dysphagia Training, Patient / Family / Caregiver Education, Community Re-Integration      Patient / Family Goals  Patient / Family Goal #1: \"I'll eat later when I'm more hungry\"  Goal #1 Outcome: Progressing slower than expected  Short Term Goals  Short Term Goal # 1: 12/19 Pt will consume prfdng trials with SLP only with no overt s/sx of aspiration or decline in pulmonary status  Goal Outcome # 1: Progressing as expected  Short Term Goal # 2: 12/19 Pt will complete swallow exercises targeting; BOT, LVC, pharyngeal shortening x40  Goal Outcome # 2 : Progressing slower than expected  Short Term Goal # 2 B : Pt will paricipate in diagnostic swallow study to objectively assess swallow function and inform POC  Goal Outcome  # 2 B: Progressing as expected      Verona Lorenzana, SLP  "

## 2024-12-23 NOTE — PROGRESS NOTES
Referring Provider:  Jada Santoro MD           Presenting Chief Complaint:  Needs PEG        History of Present Illness:    This is a 59 y.o. female with admission to [facility] October 30 and transfer to [facility] October 31 with history of STEMI who underwent LAD PCI but hospital course complicated by retroperitoneal bleed, ventricular fibrillation, PEA arrest, cardiogenic shock requiring ECMO and Impella.  Course also complicated by encephalopathy and delirium echocardiogram November 24 showed 25 to 30% EF with akinesis multiple areas and hypokinesis in multiple areas.  CT angiogram of the chest on November 25 suggested focal aortic mural thrombus versus ulcerated plaque.  CT angiogram of the abdomen pelvis showed organization of rim-enhancing left retroperitoneal hematomas measuring up to 9 cm.  Filling defect/mural thrombus posterior right common iliac artery.  Course also complicated by stroke and hyperactive delirium requiring psychiatry assistance.     He was transferred back to [facility] on November 27 for ongoing care of heart failure with reduced ejection fraction, pneumonia, aortic mural thrombus, encephalopathy, stroke, recent complicated STEMI.  Patient described to be agitated and has been pulling feeding tubes.  Yesterday her QTc was greater than 600 requiring medications to be held such as Seroquel, Geodon, Zyprexa or trazodone.  Patient to have FEES study today.     Currently on Eliquis  was held but patient got Plavix this morning at 6 am.  WBC 98, plt 190  Na 141  12/18 abd xray with increased bowel gas LUQ     Patient awake and confused     Review of Systems:  Review of Systems   Unable to perform ROS: Mental acuity               Past Medical History:  Past Medical History        Past Medical History:   Diagnosis Date    Dyslipidemia 7/5/2016    Essential hypertension 7/5/2016    Obesity, morbid, BMI 40.0-49.9 ([facility]) 7/5/2016    Type II diabetes mellitus, well controlled ([facility]) 7/5/2016               Active Hospital Problems     Diagnosis      Macrocytic anemia [D53.9]      Acute hypoxic respiratory failure (HCC) [J96.01]      Hypernatremia [E87.0]      Constipation [K59.00]      Hypotension [I95.9]      Abdominal hematoma [S30.1XXA]      Heart failure with reduced ejection fraction (HCC) [I50.20]      Hyperglycemia [R73.9]      Urinary retention [R33.9]      CAD (coronary artery disease) [I25.10]      CVA (cerebral vascular accident) (HCC) [I63.9]      Acute metabolic encephalopathy [G93.41]      Aortic mural thrombus (HCC) [I74.10]      Pneumonia [J18.9]      ACP (advance care planning) [Z71.89]      Metabolic acidemia [E87.20]              Past Surgical History:  Past Surgical History         Past Surgical History:   Procedure Laterality Date    INSERTION,CANNULA FOR ECMO,ADULT Left 10/30/2024     Procedure: INSERTION, CANNULA, FOR ECMO, ADULT;  Surgeon: Aime Elias D.O.;  Location: SURGERY [facility];  Service: Cardiothoracic                  Hospital Medications:           Current Facility-Administered Medications   Medication Dose Frequency Provider Last Rate Last Admin    melatonin tablet 10 mg  10 mg Nightly Jada Santoro M.D.   10 mg at 12/18/24 2023    valproic acid (Depakene) IR oral solution 250 mg  250 mg DAILY Jada Santoro M.D.   250 mg at 12/19/24 0501    insulin GLARGINE (Lantus,Semglee) injection  9 Units Q EVENING Jada Santoro M.D.   9 Units at 12/18/24 1750     And    insulin lispro (HumaLOG,AdmeLOG) subcutaneous injection  1-6 Units Q6HRS Jada Santoro M.D.   2 Units at 12/19/24 0514     And    dextrose 50% (D50W) injection 25 g  25 g Q15 MIN PRN Jada Santoro M.D.        apixaban (Eliquis) tablet 5 mg  5 mg BID Kristi Moore M.D.   5 mg at 12/18/24 1737    clopidogrel (Plavix) tablet 75 mg  75 mg DAILY Kristi Moore M.D.   75 mg at 12/18/24 0603    dapagliflozin propanediol (Farxiga) tablet 10 mg  10 mg DAILY Rufus  Zita D.OCase   10 mg at 12/18/24 0604    lansoprazole (Prevacid) solutab 30 mg  30 mg DAILY Alissa Joyner M.D.   30 mg at 12/18/24 0604    dakins 0.125% (1/4 strength) topical soln   PRN Alissa Joyner M.D.   473 mL at 12/17/24 0615    bisacodyl (Dulcolax) suppository 10 mg  10 mg DAILY Alissa Joyner M.D.   10 mg at 12/16/24 0518    [Held by provider] bisoprolol (Zebeta) tablet 2.5 mg  2.5 mg Q DAY Rufus Ahumada D.OCase   2.5 mg at 12/13/24 0544    [Held by provider] losartan (Cozaar) tablet 12.5 mg  12.5 mg Q EVENING Kristi Moore M.D.   12.5 mg at 12/15/24 1759    oxyCODONE immediate-release (Roxicodone) tablet 2.5 mg  2.5 mg Q4HRS PRN Alissa Joyner M.D.   2.5 mg at 12/03/24 1745     Or    oxyCODONE immediate-release (Roxicodone) tablet 5 mg  5 mg Q4HRS PRN Alissa Joyner M.D.   5 mg at 12/13/24 1708    ondansetron (Zofran ODT) dispertab 4 mg  4 mg Q4HRS PRN Jourdan Block M.D.        promethazine (Phenergan) tablet 12.5-25 mg  12.5-25 mg Q4HRS PRN Jourdan Block M.D.        Pharmacy Consult: Enteral tube insertion - review meds/change route/product selection  1 Each PHARMACY TO DOSE Horace Agudelo M.D.        labetalol (Normodyne/Trandate) injection 10 mg  10 mg Q4HRS PRN Jourdan Block M.D.        ondansetron (Zofran) syringe/vial injection 4 mg  4 mg Q4HRS PRN Jourdan Block M.D.        promethazine (Phenergan) suppository 12.5-25 mg  12.5-25 mg Q4HRS PRN Jourdan Block M.D.        prochlorperazine (Compazine) injection 5-10 mg  5-10 mg Q4HRS PRN Jourdan Block M.D.        atorvastatin (Lipitor) tablet 80 mg  80 mg Q EVENING Jourdan Block M.D.   80 mg at 12/18/24 1737    [Held by provider] furosemide (Lasix) tablet 20 mg  20 mg Q DAY Alissa Joyner M.D.   20 mg at 12/13/24 0544   Last reviewed on 11/27/2024 11:21 PM by Lizzie Blackwell R.N.         Current Outpatient Medications:  Prescriptions Prior to Admission           Medications Prior to Admission   Medication  Sig Dispense Refill Last Dose/Taking    acetaminophen (TYLENOL) 500 MG Tab 1,000 mg by Enteral Tube route every 8 hours.     Taking    aspirin 81 MG EC tablet Take 81 mg by mouth every day.     Taking    apixaban (ELIQUIS) 5mg Tab Take 5 mg by mouth 2 times a day.     Taking    atorvastatin (LIPITOR) 10 MG Tab 80 mg by Enteral Tube route every evening.     Taking    vitamin D3 (CHOLECALCIFEROL) 1000 Unit (25 mcg) Tab 1,000 Units by Enteral Tube route every day.     Taking    guanFACINE (TENEX) 1 MG Tab Take 2 mg by mouth every evening.     Taking    insulin aspart (NOVOLOG) 100 UNIT/ML Solution Inject 0-20 Units under the skin 3 times a day before meals.     Taking    insulin aspart (NOVOLOG) 100 UNIT/ML Solution Inject 0-3 Units under the skin at bedtime as needed for High Blood Sugar (BG> 200 mg/dL). Comment from previous med list says 0-3 units in early morning 0200     Taking As Needed    insulin glargine 100 UNIT/ML SC SOLN Inject 23 Units under the skin every evening.     Taking    lansoprazole (PREVACID) 30 MG Tablet Delayed Release Dispersible Take 30 mg by mouth every morning before breakfast.     Taking    Melatonin 10 MG Tab 10 mg by Enteral Tube route every evening.     Taking    midodrine (PROAMATINE) 5 MG Tab Take 10 mg by mouth in the morning, at noon, and at bedtime.     Taking    nystatin (MYCOSTATIN) 646646 UNIT/ML Suspension Take 500,000 Units by mouth 4 times a day.     Taking    QUEtiapine (SEROQUEL) 25 MG Tab 25 mg by Enteral Tube route 2 times a day.     Taking    QUEtiapine (SEROQUEL) 100 MG Tab 150 mg by Enteral Tube route every evening.     Taking    thiamine (VITAMIN B-1) 50 MG Tab Take 100 mg by mouth every day.     Taking    ticagrelor (BRILINTA) 90 MG Tab tablet 90 mg by Enteral Tube route every 12 hours.     Taking    valproate Sodium (DEPAKENE) 250 MG/5ML Solution 1,250 mg by Enteral Tube route every evening.     Taking    valproate Sodium (DEPAKENE) 250 MG/5ML Solution 500 mg by  Enteral Tube route 2 times a day.     Taking    lisinopril (PRINIVIL) 5 MG Tab Take 5 mg by mouth every day.          metformin (GLUCOPHAGE) 500 MG Tab Take  by mouth 2 times a day, with meals.          simvastatin (ZOCOR) 20 MG Tab Take 20 mg by mouth every evening.          aspirin 81 MG tablet Take 81 mg by mouth every day.                     Medication Allergies:  Allergies   No Known Allergies           Family Medical History:  Family History         Family History   Problem Relation Age of Onset    Heart Disease Mother           AF and CHF    Heart Disease Father 70         PCI, also aneurysm but no surgery    Heart Attack Paternal Grandmother 49         MI               Social History:  Social History               Socioeconomic History    Marital status:        Spouse name: Not on file    Number of children: Not on file    Years of education: Not on file    Highest education level: Not on file   Occupational History    Not on file   Tobacco Use    Smoking status: Never    Smokeless tobacco: Never   Substance and Sexual Activity    Alcohol use: No    Drug use: No    Sexual activity: Not on file   Other Topics Concern    Not on file   Social History Narrative    Not on file      Social Drivers of Health           Financial Resource Strain: Not on file   Food Insecurity: Not on file   Transportation Needs: Not on file   Physical Activity: Not on file   Stress: Not on file   Social Connections: Not on file   Intimate Partner Violence: Patient Unable To Answer (11/29/2024)     Humiliation, Afraid, Rape, and Kick questionnaire      Fear of Current or Ex-Partner: Patient unable to answer      Emotionally Abused: Patient unable to answer      Physically Abused: Patient unable to answer      Sexually Abused: Patient unable to answer   Housing Stability: Not on file               Vital signs:  Weight/BMI: Body mass index is 39.22 kg/m².  /61   Pulse 69   Temp 36.8 °C (98.2 °F) (Temporal)   Resp 18    "Ht 1.549 m (5' 0.98\")   Wt 94.1 kg (207 lb 7.3 oz)   SpO2 94%   Vitals          Vitals:     12/19/24 0100 12/19/24 0400 12/19/24 0729 12/19/24 1201   BP: 100/70 102/72 126/71 107/61   Pulse:   63 81 69   Resp:   19 18 18   Temp:     36.7 °C (98.1 °F) 36.8 °C (98.2 °F)   TempSrc:   Temporal Temporal Temporal   SpO2: 95% 96% 94% 94%   Weight:   94.1 kg (207 lb 7.3 oz)       Height:                 Oxygen Therapy:  Pulse Oximetry: 94 %, O2 (LPM): 0, O2 Delivery Device: None - Room Air     Intake/Output Summary (Last 24 hours) at 12/19/2024 1213  Last data filed at 12/19/2024 0400      Gross per 24 hour   Intake 2200 ml   Output 1600 ml   Net 600 ml            Physical Exam:  Physical Exam  Constitutional:       Appearance: She is obese.   HENT:      Head: Normocephalic and atraumatic.      Nose: Nose normal.      Mouth/Throat:      Mouth: Mucous membranes are moist.   Eyes:      General: No scleral icterus.     Pupils: Pupils are equal, round, and reactive to light.   Cardiovascular:      Rate and Rhythm: Regular rhythm.   Pulmonary:      Effort: Pulmonary effort is normal.      Breath sounds: Normal breath sounds.   Abdominal:      Comments: Obese, BS +, no surgical scars LUQ, no distention   Musculoskeletal:         General: Normal range of motion.      Cervical back: Neck supple.   Skin:     General: Skin is warm and dry.   Neurological:      Mental Status: She is alert. She is disoriented.                        Labs:            Recent Labs     12/17/24  0401 12/17/24  0633 12/17/24  2051 12/18/24  0906 12/18/24  2042 12/19/24  0027 12/19/24  0905   SODIUM 147*   < > 150*  150*   < > 146* 149* 147*   POTASSIUM 3.9  --  4.0  --   --  4.0  --    CHLORIDE 115*  --  118*  --   --  119*  --    CO2 24  --  22  --   --  21  --    BUN 36*  --  33*  --   --  35*  --    CREATININE 0.93  --  0.74  --   --  0.84  --    MAGNESIUM 2.3  --  2.4  --   --   --   --    PHOSPHORUS 2.7  --   --   --   --   --   --    CALCIUM 7.6*  " --  7.7*  --   --  7.8*  --     < > = values in this interval not displayed.             Recent Labs     24  1422 24  0401 24  0027   PREALBUMIN 13.7*  --   --   --    GLUCOSE  --  245* 200* 226*            Recent Labs     24  0401 24  0027   WBC 11.8* 10.5 9.1   NEUTSPOLYS 76.50* 76.50* 75.60*   LYMPHOCYTES 14.00* 13.20* 14.40*   MONOCYTES 5.60 6.40 5.40   EOSINOPHILS 2.50 2.00 3.10   BASOPHILS 0.30 0.60 0.40            Recent Labs     24  0027   RBC 3.38* 3.10* 3.05*   HEMOGLOBIN 11.2* 10.1* 9.8*   HEMATOCRIT 37.4 33.6* 33.5*   PLATELETCT 283 244 234   IRON  --  30*  --    FERRITIN  --  643.0*  --    TOTIRONBC  --  116*  --       Recent Results          Recent Results (from the past 24 hours)   POCT glucose device results     Collection Time: 24 12:49 PM   Result Value Ref Range     POC Glucose, Blood 179 (H) 65 - 99 mg/dL   POCT glucose device results     Collection Time: 24  5:40 PM   Result Value Ref Range     POC Glucose, Blood 161 (H) 65 - 99 mg/dL   EKG     Collection Time: 24  5:41 PM   Result Value Ref Range     Report           [facility]     Test Date:  2024  Pt Name:    JOURDAN MAYBERRY           Department: 183  MRN:        [id number]                      Room:       T819  Gender:     Female                       Technician: TAJ  :        1965                   Requested By:ZANDRA BULLARD  Order #:    [id number]                    Reading MD: Pawan Teran MD     Measurements  Intervals                                Axis  Rate:       74                           P:          42  MO:         142                          QRS:        21  QRSD:       110                          T:          190  QT:         562  QTc:        624     Interpretive Statements  Sinus rhythm  Anteroseptal infarct, age indeterminate  Lateral leads are also involved  Prolonged QT interval  Compared  to ECG 12/09/2024 01:32:14  Myocardial infarct finding now present  Sinus tachycardia no longer present  Ventricular premature complex(es) no longer present  Electronically Signed On 12- 17:41:42 PST by Pawan Teran MD      SODIUM SERUM (NA)     Collection Time: 12/18/24  8:42 PM   Result Value Ref Range     Sodium 146 (H) 135 - 145 mmol/L   POCT glucose device results     Collection Time: 12/18/24 11:55 PM   Result Value Ref Range     POC Glucose, Blood 200 (H) 65 - 99 mg/dL   Basic Metabolic Panel     Collection Time: 12/19/24 12:27 AM   Result Value Ref Range     Sodium 149 (H) 135 - 145 mmol/L     Potassium 4.0 3.6 - 5.5 mmol/L     Chloride 119 (H) 96 - 112 mmol/L     Co2 21 20 - 33 mmol/L     Glucose 226 (H) 65 - 99 mg/dL     Bun 35 (H) 8 - 22 mg/dL     Creatinine 0.84 0.50 - 1.40 mg/dL     Calcium 7.8 (L) 8.5 - 10.5 mg/dL     Anion Gap 9.0 7.0 - 16.0   CBC WITH DIFFERENTIAL     Collection Time: 12/19/24 12:27 AM   Result Value Ref Range     WBC 9.1 4.8 - 10.8 K/uL     RBC 3.05 (L) 4.20 - 5.40 M/uL     Hemoglobin 9.8 (L) 12.0 - 16.0 g/dL     Hematocrit 33.5 (L) 37.0 - 47.0 %     .8 (H) 81.4 - 97.8 fL     MCH 32.1 27.0 - 33.0 pg     MCHC 29.3 (L) 32.2 - 35.5 g/dL     RDW 83.3 (H) 35.9 - 50.0 fL     Platelet Count 234 164 - 446 K/uL     MPV 11.7 9.0 - 12.9 fL     Neutrophils-Polys 75.60 (H) 44.00 - 72.00 %     Lymphocytes 14.40 (L) 22.00 - 41.00 %     Monocytes 5.40 0.00 - 13.40 %     Eosinophils 3.10 0.00 - 6.90 %     Basophils 0.40 0.00 - 1.80 %     Immature Granulocytes 1.10 (H) 0.00 - 0.90 %     Nucleated RBC 0.00 0.00 - 0.20 /100 WBC     Neutrophils (Absolute) 6.91 1.82 - 7.42 K/uL     Lymphs (Absolute) 1.32 1.00 - 4.80 K/uL     Monos (Absolute) 0.49 0.00 - 0.85 K/uL     Eos (Absolute) 0.28 0.00 - 0.51 K/uL     Baso (Absolute) 0.04 0.00 - 0.12 K/uL     Immature Granulocytes (abs) 0.10 0.00 - 0.11 K/uL     NRBC (Absolute) 0.00 K/uL   ESTIMATED GFR     Collection Time: 12/19/24 12:27 AM    Result Value Ref Range     GFR (CKD-EPI) 80 >60 mL/min/1.73 m 2   POCT glucose device results     Collection Time: 24  5:13 AM   Result Value Ref Range     POC Glucose, Blood 216 (H) 65 - 99 mg/dL   EKG     Collection Time: 24  6:31 AM   Result Value Ref Range     Report           [facility]     Test Date:  2024  Pt Name:    JOURDAN MAYBERRY           Department: 183  MRN:        [id number]                      Room:       T819  Gender:     Female                       Technician: ELYSE  :        1965                   Requested By:FAIZA DOTSON  Order #:    [id number]                    Reading MD: Shilo Corbett MD     Measurements  Intervals                                Axis  Rate:       64                           P:          45  CA:         138                          QRS:        26  QRSD:       109                          T:          176  QT:         465  QTc:        480     Interpretive Statements  Sinus rhythm  Ventricular premature complex  Anterior infarct, age indeterminate  Intraventricular conduction delay  Low voltage, precordial leads  Compared to ECG 2024 21:03:00  Prolonged QT interval no longer present     Electronically Signed On 2024 06:31:44 PST by Shilo Corbett MD      SODIUM SERUM (NA)     Collection Time: 24  9:05 AM   Result Value Ref Range     Sodium 147 (H) 135 - 145 mmol/L   POCT glucose device results     Collection Time: 24 11:58 AM   Result Value Ref Range     POC Glucose, Blood 134 (H) 65 - 99 mg/dL               Radiology Review:  DX-ABDOMEN FOR TUBE PLACEMENT   Final Result           1.  Nonspecific bowel gas pattern in the upper abdomen.   2.  Dobbhoff tube tip terminates overlying the expected location of the gastric antrum or pylorus.   3.  Pulmonary edema and/or infiltrates, similar to prior study       DX-ABDOMEN FOR TUBE PLACEMENT   Final Result       The tip of the enteric tube  terminates over the antrum of the stomach.       DX-ABDOMEN FOR TUBE PLACEMENT   Final Result           1.  Nonspecific bowel gas pattern in the upper abdomen.   2.  Dobbhoff tube is coiled within the duodenum, the tip terminates overlying the expected location of the gastric body.   3.  Bilateral lower lobe edema and/or infiltrates.       DX-ABDOMEN FOR TUBE PLACEMENT   Final Result       NG tube tip projects over the gastroduodenal junction.       DX-CHEST-LIMITED (1 VIEW)   Final Result       1.  Mild cardiomegaly with evidence of mild to moderate pulmonary edema with minimal worsening since previous exam.       CT-HEAD W/O   Final Result           1.  No acute intracranial abnormality is identified, there are nonspecific white matter changes, commonly associated with small vessel ischemic disease.  Associated mild cerebral atrophy is noted.   2.  Bilateral sinusitis changes   3.  Atherosclerosis.                   DX-CHEST-PORTABLE (1 VIEW)   Final Result           1.  Mild pulmonary edema and/or infiltrates.       EC-ECHOCARDIOGRAM COMPLETE W/ CONT   Final Result       DX-ABDOMEN FOR TUBE PLACEMENT   Final Result       Tip of the esophagogastric tube terminates over the pylorus of the stomach.       IR-MIDLINE CATHETER INSERTION WO GUIDANCE > AGE 3   Final Result                   Ultrasound-guided midline placement performed by qualified nursing staff    as above.            IR-US GUIDED PIV   Final Result    Ultrasound-guided PERIPHERAL IV INSERTION performed by    qualified nursing staff as above.       DX-OUTSIDE IMAGES-DX CHEST   Final Result       DX-OUTSIDE IMAGES-DX ABDOMEN   Final Result       MR-OUTSIDE IMAGES-MR BRAIN   Final Result       CT-OUTSIDE IMAGES-CT HEAD   Final Result       CT-OUTSIDE IMAGES-CT ABDOMEN/PELVIS   Final Result       CT-OUTSIDE IMAGES-CT CHEST   Final Result       DX-ABDOMEN FOR TUBE PLACEMENT   Final Result       There is a new small bowel feeding tube which terminates in the  small bowel of the right mid abdomen.       DX-CHEST-LIMITED (1 VIEW)   Final Result           Asymmetric pulmonary infiltrates, left worse than right.       IR-US GUIDED PIV    (Results Pending)            MDM (Data Review):   -Records reviewed and summarized in current documentation  -I personally reviewed and interpreted the laboratory results  -I personally reviewed the radiology images     Assessment/Recommendations:     --Unable to feed self/ oropharyngeal dysphagia   --Encephalopathy with delirium  --Anoxic brain injury, suspected  --HFrEF 25-30%  --CAD, recent STEMI, s/p PCI to LAD complicated by arrest requiring Impella and transfer to [facility]  --Anticoagulated:  Eliquis and Plavix. Eliquis was stopped but plavix was given unfortunately due to this we will not be able to do PEG in am. Likely Thursday or Friday     RECS:  --Concerned about feeding tube in this patient who can be agitated and pull tubes.  Pulling G tube in first 2 weeks can be a surgical emergency.  She will require mitts on hands 24 hrs/day 1st 2 weeks     --Described to have aspiration with episodes of bradycardia.  This will still be a problem with a PEG if she has her HOB decreased      ----Will need 's consent.  I did leave long message for him. On the day we do procedure, anesthesia will need consent in the am as well

## 2024-12-24 LAB
ALBUMIN SERPL BCP-MCNC: 2.6 G/DL (ref 3.2–4.9)
ALBUMIN/GLOB SERPL: 0.6 G/DL
ALP SERPL-CCNC: 140 U/L (ref 30–99)
ALT SERPL-CCNC: 21 U/L (ref 2–50)
ANION GAP SERPL CALC-SCNC: 9 MMOL/L (ref 7–16)
AST SERPL-CCNC: 36 U/L (ref 12–45)
BASOPHILS # BLD AUTO: 0.6 % (ref 0–1.8)
BASOPHILS # BLD: 0.05 K/UL (ref 0–0.12)
BILIRUB SERPL-MCNC: 0.6 MG/DL (ref 0.1–1.5)
BUN SERPL-MCNC: 27 MG/DL (ref 8–22)
CALCIUM ALBUM COR SERPL-MCNC: 9.4 MG/DL (ref 8.5–10.5)
CALCIUM SERPL-MCNC: 8.3 MG/DL (ref 8.5–10.5)
CHLORIDE SERPL-SCNC: 107 MMOL/L (ref 96–112)
CO2 SERPL-SCNC: 21 MMOL/L (ref 20–33)
CREAT SERPL-MCNC: 0.81 MG/DL (ref 0.5–1.4)
EOSINOPHIL # BLD AUTO: 0.28 K/UL (ref 0–0.51)
EOSINOPHIL NFR BLD: 3.6 % (ref 0–6.9)
ERYTHROCYTE [DISTWIDTH] IN BLOOD BY AUTOMATED COUNT: 73.9 FL (ref 35.9–50)
GFR SERPLBLD CREATININE-BSD FMLA CKD-EPI: 83 ML/MIN/1.73 M 2
GLOBULIN SER CALC-MCNC: 4.1 G/DL (ref 1.9–3.5)
GLUCOSE BLD STRIP.AUTO-MCNC: 171 MG/DL (ref 65–99)
GLUCOSE BLD STRIP.AUTO-MCNC: 180 MG/DL (ref 65–99)
GLUCOSE BLD STRIP.AUTO-MCNC: 182 MG/DL (ref 65–99)
GLUCOSE BLD STRIP.AUTO-MCNC: 189 MG/DL (ref 65–99)
GLUCOSE BLD STRIP.AUTO-MCNC: 207 MG/DL (ref 65–99)
GLUCOSE BLD STRIP.AUTO-MCNC: 208 MG/DL (ref 65–99)
GLUCOSE SERPL-MCNC: 200 MG/DL (ref 65–99)
HCT VFR BLD AUTO: 38.3 % (ref 37–47)
HGB BLD-MCNC: 11.9 G/DL (ref 12–16)
IMM GRANULOCYTES # BLD AUTO: 0.04 K/UL (ref 0–0.11)
IMM GRANULOCYTES NFR BLD AUTO: 0.5 % (ref 0–0.9)
LYMPHOCYTES # BLD AUTO: 1.21 K/UL (ref 1–4.8)
LYMPHOCYTES NFR BLD: 15.5 % (ref 22–41)
MAGNESIUM SERPL-MCNC: 2.2 MG/DL (ref 1.5–2.5)
MCH RBC QN AUTO: 33.1 PG (ref 27–33)
MCHC RBC AUTO-ENTMCNC: 31.1 G/DL (ref 32.2–35.5)
MCV RBC AUTO: 106.4 FL (ref 81.4–97.8)
MONOCYTES # BLD AUTO: 0.51 K/UL (ref 0–0.85)
MONOCYTES NFR BLD AUTO: 6.5 % (ref 0–13.4)
NEUTROPHILS # BLD AUTO: 5.71 K/UL (ref 1.82–7.42)
NEUTROPHILS NFR BLD: 73.3 % (ref 44–72)
NRBC # BLD AUTO: 0 K/UL
NRBC BLD-RTO: 0 /100 WBC (ref 0–0.2)
PHOSPHATE SERPL-MCNC: 3.1 MG/DL (ref 2.5–4.5)
PLATELET # BLD AUTO: 230 K/UL (ref 164–446)
PMV BLD AUTO: 12.1 FL (ref 9–12.9)
POTASSIUM SERPL-SCNC: 4.5 MMOL/L (ref 3.6–5.5)
PROT SERPL-MCNC: 6.7 G/DL (ref 6–8.2)
RBC # BLD AUTO: 3.6 M/UL (ref 4.2–5.4)
SODIUM SERPL-SCNC: 137 MMOL/L (ref 135–145)
WBC # BLD AUTO: 7.8 K/UL (ref 4.8–10.8)

## 2024-12-24 PROCEDURE — 92526 ORAL FUNCTION THERAPY: CPT

## 2024-12-24 PROCEDURE — 700102 HCHG RX REV CODE 250 W/ 637 OVERRIDE(OP): Performed by: HOSPITALIST

## 2024-12-24 PROCEDURE — 83735 ASSAY OF MAGNESIUM: CPT

## 2024-12-24 PROCEDURE — 85025 COMPLETE CBC W/AUTO DIFF WBC: CPT

## 2024-12-24 PROCEDURE — 82962 GLUCOSE BLOOD TEST: CPT

## 2024-12-24 PROCEDURE — 99233 SBSQ HOSP IP/OBS HIGH 50: CPT | Performed by: STUDENT IN AN ORGANIZED HEALTH CARE EDUCATION/TRAINING PROGRAM

## 2024-12-24 PROCEDURE — A9270 NON-COVERED ITEM OR SERVICE: HCPCS

## 2024-12-24 PROCEDURE — A9270 NON-COVERED ITEM OR SERVICE: HCPCS | Performed by: HOSPITALIST

## 2024-12-24 PROCEDURE — 700102 HCHG RX REV CODE 250 W/ 637 OVERRIDE(OP)

## 2024-12-24 PROCEDURE — 84100 ASSAY OF PHOSPHORUS: CPT

## 2024-12-24 PROCEDURE — 700102 HCHG RX REV CODE 250 W/ 637 OVERRIDE(OP): Performed by: STUDENT IN AN ORGANIZED HEALTH CARE EDUCATION/TRAINING PROGRAM

## 2024-12-24 PROCEDURE — 36415 COLL VENOUS BLD VENIPUNCTURE: CPT

## 2024-12-24 PROCEDURE — 770004 HCHG ROOM/CARE - ONCOLOGY PRIVATE *

## 2024-12-24 PROCEDURE — 92612 ENDOSCOPY SWALLOW (FEES) VID: CPT

## 2024-12-24 PROCEDURE — A9270 NON-COVERED ITEM OR SERVICE: HCPCS | Performed by: STUDENT IN AN ORGANIZED HEALTH CARE EDUCATION/TRAINING PROGRAM

## 2024-12-24 PROCEDURE — 80053 COMPREHEN METABOLIC PANEL: CPT

## 2024-12-24 RX ADMIN — Medication 10 MG: at 20:34

## 2024-12-24 RX ADMIN — LANSOPRAZOLE 30 MG: 30 TABLET, ORALLY DISINTEGRATING ORAL at 06:23

## 2024-12-24 RX ADMIN — TRAZODONE HYDROCHLORIDE 100 MG: 100 TABLET ORAL at 20:34

## 2024-12-24 RX ADMIN — ARIPIPRAZOLE 2 MG: 2 TABLET ORAL at 06:23

## 2024-12-24 RX ADMIN — INSULIN LISPRO 2 UNITS: 100 INJECTION, SOLUTION INTRAVENOUS; SUBCUTANEOUS at 17:49

## 2024-12-24 RX ADMIN — ATORVASTATIN CALCIUM 80 MG: 80 TABLET, FILM COATED ORAL at 17:52

## 2024-12-24 RX ADMIN — BISACODYL 10 MG: 10 SUPPOSITORY RECTAL at 06:23

## 2024-12-24 RX ADMIN — DAPAGLIFLOZIN 10 MG: 10 TABLET, FILM COATED ORAL at 06:23

## 2024-12-24 RX ADMIN — INSULIN LISPRO 1 UNITS: 100 INJECTION, SOLUTION INTRAVENOUS; SUBCUTANEOUS at 12:26

## 2024-12-24 RX ADMIN — INSULIN LISPRO 2 UNITS: 100 INJECTION, SOLUTION INTRAVENOUS; SUBCUTANEOUS at 23:39

## 2024-12-24 RX ADMIN — INSULIN GLARGINE-YFGN 9 UNITS: 100 INJECTION, SOLUTION SUBCUTANEOUS at 17:50

## 2024-12-24 RX ADMIN — INSULIN LISPRO 1 UNITS: 100 INJECTION, SOLUTION INTRAVENOUS; SUBCUTANEOUS at 06:22

## 2024-12-24 ASSESSMENT — PAIN DESCRIPTION - PAIN TYPE
TYPE: ACUTE PAIN
TYPE: ACUTE PAIN

## 2024-12-24 NOTE — PROGRESS NOTES
Monitor summary   .14/.11/.39  SR 62-72   In/ out of BBB  F PVC, R coup   6 beats Vtach

## 2024-12-24 NOTE — PROGRESS NOTES
GI following. Plan for PEG Thursday.    Will round on patient tomorrow, obtain consents, and place orders.    ..Tasia Crawley, DNP,  APRN

## 2024-12-24 NOTE — WOUND TEAM
[facility]  Inpatient Services  Wound and Skin Care Follow-up    Admission Date: 11/27/2024     Last order of IP CONSULT TO WOUND CARE was found on 12/16/2024 from Hospital Encounter on 11/27/2024     HPI, PMH, SH: Reviewed    Past Surgical History:   Procedure Laterality Date    INSERTION,CANNULA FOR ECMO,ADULT Left 10/30/2024    Procedure: INSERTION, CANNULA, FOR ECMO, ADULT;  Surgeon: Aime Elias D.O.;  Location: SURGERY [facility];  Service: Cardiothoracic     Social History     Tobacco Use    Smoking status: Never    Smokeless tobacco: Never   Substance Use Topics    Alcohol use: No     No chief complaint on file.    Diagnosis: HFrEF (heart failure with reduced ejection fraction) (HCC) [I50.20]  Encephalopathy acute [G93.40]    Unit where seen by Wound Team: R305/00     WOUND FOLLOW UP RELATED TO:  NPWT change bilateral groin       WOUND TEAM PLAN OF CARE - Frequency of Follow-up:   Nursing to follow dressing orders written for wound care. Contact wound team if area fails to progress, deteriorates or with any questions/concerns if something comes up before next scheduled follow up (See below as to whether wound is following and frequency of wound follow up)  Dressing changes by wound team:                   NPWT change 3 times weekly - bilateral groin    WOUND HISTORY:       Pt is a 59yr old female with history of Obesity, and HTN. Pt was initially seen at [facility] for anterior STEMI. Pt underwent LAD PCI but hospital course was complicated by retroperitoneal bleed, V Fib and PEA arrest ultimately requiring ECMO and Impella. Pt was decannulated from ECMO on 11/4 and had left femoral embolectomy. Impella was then removed on 11/8. Pts ECHO on 11/24 revealed EF of 25-30%. Pt has bilateral groin wounds from ECMO and Impella. Wound team was consulted to evaluate.        WOUND ASSESSMENT/LDA  Wound 12/07/24 Full Thickness Wound Groin Left (Active)   Date First Assessed/Time First Assessed:  12/07/24 1721   Present on Original Admission: No  Hand Hygiene Completed: Yes  Primary Wound Type: Full Thickness Wound  Location: Groin  Laterality: Left      Assessments 12/23/2024 12:00 PM   Wound Image     Site Assessment Red;Yellow   Periwound Assessment Intact   Margins Defined edges   Closure Secondary intention   Drainage Amount Small   Drainage Description Cloudy/turbid   Treatments Cleansed;Nonselective debridement   Wound Cleansing Dakin's Solution   Periwound Protectant Skin Protectant Wipes to Periwound;Paste Ring;Drape   Dressing Status Intact   Dressing Changed Changed   Dressing Cleansing/Solutions 1/4 Strength Dakin's Solution   Dressing Options Wound Vac   Dressing Change/Treatment Frequency Monday, Wednesday, Friday, and As Needed   NEXT Dressing Change/Treatment Date 12/26/24   NEXT Weekly Photo (Inpatient Only) 12/30/24   Wound Team Following 3x Weekly   Non-staged Wound Description Full thickness   Wound Length (cm) 3 cm   Wound Width (cm) 4.5 cm   Wound Depth (cm) 2 cm   Wound Surface Area (cm^2) 13.5 cm^2   Wound Volume (cm^3) 27 cm^3   Wound Healing % 36   Shape oval   Wound Odor None       Wound 12/07/24 Full Thickness Wound Groin Right (Active)   Date First Assessed/Time First Assessed: 12/07/24 1721   Present on Original Admission: No  Hand Hygiene Completed: Yes  Primary Wound Type: Full Thickness Wound  Location: Groin  Laterality: Right      Assessments 12/23/2024 12:00 PM         Site Assessment Red;Yellow   Periwound Assessment Induration   Margins Defined edges   Closure Secondary intention   Drainage Amount Small   Drainage Description Serosanguineous   Treatments Cleansed;Nonselective debridement   Wound Cleansing Dakin's Solution   Periwound Protectant No-sting Skin Prep;Paste Ring;Drape   Dressing Status Intact   Dressing Changed Changed   Dressing Cleansing/Solutions 1/4 Strength Dakin's Solution   Dressing Options Wound Vac   Dressing Change/Treatment Frequency Monday,  Wednesday, Friday, and As Needed   NEXT Dressing Change/Treatment Date 12/26/24   Wound Team Following 3x Weekly   Non-staged Wound Description Full thickness   Wound Length (cm) 2 cm   Wound Width (cm) 3 cm   Wound Depth (cm) 4.5 cm   Wound Surface Area (cm^2) 6 cm^2   Wound Volume (cm^3) 27 cm^3   Wound Healing % -32   Shape circular   Wound Odor Mild       Negative Pressure Wound Therapy 12/07/24 Groin Left (Active)   Placement Date/Time: 12/07/24 1722   Location: Groin  Laterality: Left      Assessments 12/23/2024 12:00 PM   NPWT Pump Mode / Pressure Setting Ulta;Continuous;125 mmHg   Dressing Type Small;Black Foam (Veraflo)   Number of Foam Pieces Used 1   Canister Changed Yes   NEXT Dressing Change/Treatment Date 12/26/24   VAC VeraFlo Irrigant 1/4 Strength Dakins   VAC VeraFlo Soak Time (mins) 8   VAC VeraFlo Instill Volume (ml) 10   VAC VeraFlo - Therapy Time (hrs) 2   VAC VeraFlo Pressure (mm/Hg) Continuous;125 mmHg       Negative Pressure Wound Therapy 12/07/24 Groin Right (Active)   Placement Date/Time: 12/07/24 1722   Present on Original Admission: No  Location: Groin  Laterality: Right      Assessments 12/23/2024 12:00 PM   NPWT Pump Mode / Pressure Setting Ulta;Continuous   Dressing Type Small;Black Foam (Veraflo)   Number of Foam Pieces Used 2   Canister Changed No   NEXT Dressing Change/Treatment Date 12/26/24   VAC VeraFlo Irrigant 1/4 Strength Dakins   VAC VeraFlo Soak Time (mins) 8   VAC VeraFlo Instill Volume (ml) 16   VAC VeraFlo - Therapy Time (hrs) 2   VAC VeraFlo Pressure (mm/Hg) Continuous;125 mmHg        Vascular:    JAGJIT:   No results found.    Lab Values:    Lab Results   Component Value Date/Time    WBC 7.1 12/23/2024 07:12 AM    RBC 3.58 (L) 12/23/2024 07:12 AM    HEMOGLOBIN 11.6 (L) 12/23/2024 07:12 AM    HEMATOCRIT 38.2 12/23/2024 07:12 AM    CREACTPROT 6.06 (H) 12/23/2024 11:20 AM    SEDRATEWES 140 (H) 11/28/2024 01:35 AM    HBA1C 6.2 (H) 11/01/2024 06:46 PM    HBA1C 6.4 (H)  10/30/2024 03:18 AM         Culture Results show:  No results found for this or any previous visit (from the past 720 hours).    Pain Level/Medicated:  None, Tolerated without pain medication       INTERVENTIONS BY WOUND TEAM:  Chart and images reviewed. Discussed with bedside RN. All areas of concern (based on picture review, LDA review and discussion with bedside RN) have been thoroughly assessed. Documentation of areas based on significant findings. This RN in to assess patient. Performed standard wound care which includes appropriate positioning, dressing removal and non-selective debridement. Pictures and measurements obtained weekly if/when required.    Wound:  R groin  Preparation for Dressing removal: Dressing soaked with Dakins  Cleansed/Non-selectively Debrided with:  Wound cleanser and Gauze  Brianna wound: Cleansed with Wound cleanser and Gauze, Prepped with No Sting, Paste Rings, and Drape  Primary Dressing:  Black foam x one into wound bed sealed. Hole cut and button and TRAC pad applied. NPWT resumed with no leaks.  Secondary (Outer) Dressing: sacral offloading drsg to pad TRAC pad and an  adhesive foam placed over top to protect abdomen     Wound:  L groin  Preparation for Dressing removal: Dressing soaked with Dakins  Cleansed/Non-selectively Debrided with:  Wound cleanser and Gauze  Brianna wound: Cleansed with Wound cleanser and Gauze, Prepped with No Sting, Paste Rings, and Drape  Primary Dressing:  Black foam x 1 piece into wound bed TRAC pad applied and then finished sealing with drape.   Secondary (Outer) Dressing: sacral offloading drsg to pad TRAC pad and an  adhesive foam placed over top to protect abdomen    Advanced Wound Care Discharge Planning  Number of Clinicians necessary to complete wound care: 2  Is patient requiring IV pain medications for dressing changes:  No   Length of time for dressing change 40 min. (This does not include chart review, pre-medication time, set up, clean up or time  spent charting.)    Interdisciplinary consultation: Patient, Bedside RN (Zita), Helen (Wound Tech).  Pressure injury and staging reviewed with N/A.    EVALUATION / RATIONALE FOR TREATMENT:     Date:  12/23/24  Wound Status:  Wound progressing as expected     No odor to L groin.  Decreased odor to R groin. There is sone induration present to suprapubic skin proximal to R groin.  Date:  12/20/24  Wound Status:  Wound progressing as expected    L Groin with more granular wound bed, minimal slough here. Skin bridge no longer present.  R Groin noted to have purulent-serosanguinous drainage from wound depth. Veraflo settings adjusted to instill more fluid and have a longer dwell time to better wash out the depth of the wound bed.    Date:  12/18/24  Wound Status:  Wound deteriorating    L Groin wound base still with slough, slightly decreased. Satellite lesion noted below wound bed, does connect beneath skin bridge.  R Groin with increased depth compared to previous measurements. Both wound beds with mildly foul odor, continuing Dakins VF NPWT.  Sacrum pink, intact and blanching, barrier paste in room.    Date:  12/16/24  Wound Status:  No change in wound     Right groin wound has moist pale pink tissue, continue with dakin's VF NPWT.  Left groin wound with slough and non-viable purulent drainage.  CSWD to remove purulent and slough from wound bed.  Resumed VF NPWT with dakin's instillation.     Date:  12/13/24  Wound Status:  Wound deteriorating    BL Groin wound beds with increased amounts of slough and malodor.  Patient is already on Dakins VF, will continue with this to topically clean wound beds.    Date:  12/10/24  Wound Status:  Wound progressing as expected    Left groin: Groin noted with layer of slough. Wound has small area of tunneling at 6o clock, a small piece of black foam was tucked into this tunnel to assist with closure.   Right Groin: Noted with a layer of slough, VF NPWT was changed from NS to Dakins to  "assist with chemical debridement.   Prior to vac removal, skin around both groin sites appeared to be moist, and VF instillation amount was reduced.     Date:  12/07/24  Wound Status:  Initial evaluation    Bilateral wound beds with slough present.  VF NPWT applied to assist with mechanical debridement, cleansing the wound bed, increase oxygenation and granulation to the area and healing the wound by secondary intention.        Date:  12/04/24  Wound Status:  Initial evaluation    Bilateral groin sites with necrotic tissue, adipose and tan purulent drainage. Discussed with bedside RN who will speak with MD regarding surgical consult. Pt would benefit from NPWT application but would like surgery to clear pt from any exposed structures prior to vac application. Dakins ordered to chemically debride.          Goals: Steady decrease in wound area and depth weekly.    NURSING PLAN OF CARE ORDERS:  No new orders this visit    NUTRITION RECOMMENDATIONS   Wound Team Recommendations:  N/A     DIET ORDERS (From admission to next 24h)       Start     Ordered    12/23/24 0916  Diet NPO Restrict to: With Tube Feed (& with ice chips - please provide ~5-10 tsp of ice chips hourly with oral care)  ALL MEALS        Question:  Diet NPO Restrict to:  Answer:  With Tube Feed  Comment:  & with ice chips - please provide ~5-10 tsp of ice chips hourly with oral care    12/23/24 0917    12/10/24 1651  Diet: Diet Tube Feed; Formula: Pivot; Pivot: Pivot 1.5 RTH; Goal Rate (mL/Hour): 40; Duration: 24 HR  ALL MEALS        Question Answer Comment   Diet Diet Tube Feed    Formula: Pivot    Pivot: Pivot 1.5 RTH    Goal Rate (mL/Hour) 40    Route NG    Duration 24 HR       Placed in \"And\" Linked Group    12/10/24 1653    12/02/24 1232  Supplements  ALL MEALS        Question Answer Comment   Which Supplement Glucerna    Glucerna: Glucerna Shake Carton        12/02/24 1231                    PREVENTATIVE INTERVENTIONS:   Q shift Ray - performed " per nursing policy  Q shift pressure point assessments - performed per nursing policy    Surface/Positioning  Standard/trauma mattress - Currently in Place  Reposition q 2 hours - Currently in Place  TAPs Turning system - Currently in Place    Offloading/Redistribution  Heel offloading dressing (Silicone dressing) - Currently in Place      Respiratory  N/A    Containment/Moisture Prevention    Dri-hemanth pad - Currently in Place  Beckwith Catheter - Currently in Place    Anticipated discharge plans:  TBD        Vac Discharge Needs:  Vac Discharge plan is purely a recommendation from wound team and not a requirement for discharge unless otherwise stated by physician.  Veraflo Vac while inpatient, ok to transition to Regular Vac on discharge

## 2024-12-24 NOTE — THERAPY
Physical Therapy Contact Note    Patient Name: Kiara Qureshi  Age:  59 y.o., Sex:  female  Medical Record #: [id number]  Today's Date: 12/24/2024 12/24/24 0801   Treatment Variance   Reason For Missed Therapy Medical - Other (Please Comment)   Interdisciplinary Plan of Care Collaboration   Collaboration Comments PT session attempted.  Pt is off the floor.  PT will follow up at the next available date.   Session Information   Date / Session Number  12/19 -5 (1/1, 12/24) att 12/24

## 2024-12-24 NOTE — THERAPY
Speech Language Pathology   Flexible Endoscopic Evaluation of Swallowing (FEES)        Patient Name: Kiara Qureshi  AGE:  59 y.o., SEX:  female  Medical Record #: [id number]  Date of Service: 12/24/2024      History of Present Illness  59 y.o. female admitted 11/27/2024 with STEMI, encephalopathy. Patient underwent an LAD PCI but hospital course was complicated by retroperitoneal bleed, ventricular fibrillation and PEA arrest and cardiogenic shock requiring VA ECMO and Impella.  She is now status post ECMO decannulation on November 4 and had left femoral embolectomy, PCI to the LAD, unsuccessful PCI to OM1 and Impella removal on November 8.      FEES Completed 12/19 recommended NPO with consideration for PEG.       Pertinent Information  Current Method of Nutrition: NPO until cleared by speech pathology, NGT  Patient Behaviors: Confused, Anxious, Drowsy   Dentition: Good   Feeding Tube: NGT intact to right nare   Tracheostomy: No   Factor(s) Affecting Performance: Impaired mental status, Impaired endurance     Discussed the risks, benefits, and alternatives of the FEES procedure. Patient/family acknowledged and agreed to proceed.      Assessment  Flexible Endoscopic Evaluation of Swallowing (FEES) completed at bedside today. The endoscope was passed transnasally via Right nare to evaluate the anatomy and physiology of swallowing. Pt tolerated the procedure with no apparent distress.    Anatomic Findings: Edema of the arytenoids and post-cricoid space  Vocal Fold Motion: Bilateral movement  Secretion Management: [place] Secretion Scale 3/7   -Secretions in but not filling pyriform sinuses (20-80%) without effective clearance  PO Trials: Ice Chips, Thin Liquid, Mildly Thick Liquid, Liquidised, Pudding, Mixed      Consistency PAS Score Timing Residue Comments   Thin Liquid 8 During swallow Vallecular Residue: Mild (5%-25%)  Pyriform Sinus Residue: Moderate (25%-50%) Tsp - PAS 1, PAS 8  Cup - PAS 5, PAS 6    Mildly Thick 5 During Swallow Vallecular Residue: Mild (5%-25%)  Pyriform Sinus Residue: Moderate (25%-50%) Tsp, cup x2 - PAS 1  Straw - PAS 1 x3, PAS 5 (trace)   Liquidised 1 N/A Vallecular Residue: Moderate (25%-50%)  Pyriform Sinus Residue: Trace (1%-5%)    Pudding 1 N/A Vallecular Residue: Moderate (25%-50%)  Pyriform Sinus Residue: Mild (5%-25%)    Mixed 3 During Swallow Vallecular Residue: Mild (5%-25%)  Pyriform Sinus Residue: Mild (5%-25%) SB6 peaches - PAS 1 x2; Severe PPW residue  TN0 juice - PAS 1, PAS 3 (trace); mild residue     Penetration-Aspiration Scale (PAS)  1     No contrast enters airway  2     Contrast enters the airway, remains above the vocal folds, and is ejected from the airway (not seen in the airway at the end of the swallow).  3     Contrast enters the airway, remains above the vocal folds, and is not ejected from the airway (is seen in the airway after the swallow).  4     Contrast enters the airway, contacts the vocal folds, and is ejected from the airway.  5     Contrast enters the airway, contacts the vocal folds, and is not ejected from the airway  6     Contrast enters the airway, crosses the plane of the vocal folds, and is ejected from the airway.  7     Contrast enters the airway, crosses the plane of the vocal folds, and is not ejected from the airway despite effort.  8     Contrast enters the airway, crosses the plane of the vocal folds, is not ejected from the airway and there is no response to aspiration.      Oral phase:  Prolonged and incomplete mastication of SB6 peaches during trials of mixed.       Pharyngeal phase:  Pharyngeal phase characterized by impairments in BOT retraction, epiglottic inversion, sensory integrity, laryngeal vestibule closure (LVC), pharyngeal shortening, and pharyngeal constriction, which resulted in the following:  - Aspiration of TN0 during the swallow (inferred) with inconsistent sensory response. Aspirate did clear from the airway using  spontaneous (delayed) cough  - Trace deep penetration (to the vocal folds) with TN0 and MT2 during the swallow (inferred). Penetrate did clear from the airway using cued cough  - Penetration with thin juice from mixed during the swallow (inferred). Penetrate did clear from the airway using ongoing swallows    - Mild to moderate vallecular residue throughout that did not clear spontaneously  - Mild to moderate pyriform sinus residue with TN0, MT2, PU4, and SB6 (mixed) that did not clear spontaneously  - Mild PPW residue with PU4 and severe PPW residue with SB6 (mixed) that did not clear spontaneously      Compensatory Strategies:  Cleansing swallow (cued) - EFFECTIVE to reduce or clear pharyngeal stasis    Cough (cued) - EFFECTIVE to clear deep penetration  Cough (spontaneous) - EFFECTIVE to clear aspirate, although delayed      Severity Rating:  YUDI: Moderate      Clinical Impressions  The pt presents with a moderate oropharyngeal dysphagia, likely acute related to prolonged NPO status, AMS, and complex hospital stay. Swallow safety and efficiency are both impaired. Given improvements in participation and command following, recommend management with modified diet and compensatory strategies.     Recommend minced and moist solids / mildly thick liquids at this time with use of 1:1 spv. Pt will likely benefit repeat diagnostic evaluation prior to upgrading diet given inconsistent sensation to airway invasion. Patient is a fair candidate for behavioral and exercise-based swallow rehabilitation. Consider training the following evidence-based swallowing exercises based on patient-specific pathophysiology and current level of participation: effortful swallow. Dysphagia outcomes can be maximized with use of mobility as pt is able and frequent, thorough oral care.      Recommendations  Diet Consistency: Minced and moist solids / mildly thick liquids  Medication: Whole with puree  Supervision: 1:1 feeding with constant  "supervision  Positioning: Fully upright and midline during oral intake  Strategies: Small bites/sips, Slow rate of intake, Alternate bites and sips, Multiple swallows (~3) per bite/sips, Reduce environmental distractions  Oral Care: Pre- and post-meals  Additional Instrumentation: Repeat diagnostic study when clinically appropriate         SLP Treatment Plan  Treatment Plan: Dysphagia Treatment, Patient/Family/Caregiver Training  SLP Frequency: 4x Per Week  Estimated Duration: Until Therapy Goals Met      Anticipated Discharge Needs  Discharge Recommendations: Recommend post-acute placement for additional speech therapy services prior to discharge home   Therapy Recommendations Upon DC: Dysphagia Training, Community Re-Integration, Patient / Family / Caregiver Education       Patient / Family Goals  Patient / Family Goal #1: \"I'll eat later when I'm more hungry\"  Goal #1 Outcome: Progressing as expected  Short Term Goal # 1: 12/19 Pt will consume prfdng trials with SLP only with no overt s/sx of aspiration or decline in pulmonary status  Goal Outcome # 1: Goal met, new goal added  Short Term Goal # 1 B : Pt will consume diet of MM5/MT2 given 1:1 spv and strategy use.  Short Term Goal # 2: 12/19 Pt will complete swallow exercises targeting; BOT, LVC, pharyngeal shortening, pharyngeal constriction x40  Goal Outcome # 2 : Progressing slower than expected  Short Term Goal # 2 B : Pt will paricipate in diagnostic swallow study to objectively assess swallow function and inform POC  Goal Outcome  # 2 B: Goal met      Verona Lorenzana, CHERELLE  "

## 2024-12-25 LAB
ALBUMIN SERPL BCP-MCNC: 2.4 G/DL (ref 3.2–4.9)
ALBUMIN/GLOB SERPL: 0.6 G/DL
ALP SERPL-CCNC: 131 U/L (ref 30–99)
ALT SERPL-CCNC: 22 U/L (ref 2–50)
ANION GAP SERPL CALC-SCNC: 10 MMOL/L (ref 7–16)
AST SERPL-CCNC: 32 U/L (ref 12–45)
BASOPHILS # BLD AUTO: 0.6 % (ref 0–1.8)
BASOPHILS # BLD: 0.04 K/UL (ref 0–0.12)
BILIRUB SERPL-MCNC: 0.5 MG/DL (ref 0.1–1.5)
BUN SERPL-MCNC: 28 MG/DL (ref 8–22)
CALCIUM ALBUM COR SERPL-MCNC: 9.3 MG/DL (ref 8.5–10.5)
CALCIUM SERPL-MCNC: 8 MG/DL (ref 8.5–10.5)
CHLORIDE SERPL-SCNC: 105 MMOL/L (ref 96–112)
CO2 SERPL-SCNC: 20 MMOL/L (ref 20–33)
CREAT SERPL-MCNC: 0.87 MG/DL (ref 0.5–1.4)
EOSINOPHIL # BLD AUTO: 0.26 K/UL (ref 0–0.51)
EOSINOPHIL NFR BLD: 3.8 % (ref 0–6.9)
ERYTHROCYTE [DISTWIDTH] IN BLOOD BY AUTOMATED COUNT: 70.1 FL (ref 35.9–50)
GFR SERPLBLD CREATININE-BSD FMLA CKD-EPI: 77 ML/MIN/1.73 M 2
GLOBULIN SER CALC-MCNC: 3.8 G/DL (ref 1.9–3.5)
GLUCOSE BLD STRIP.AUTO-MCNC: 178 MG/DL (ref 65–99)
GLUCOSE BLD STRIP.AUTO-MCNC: 201 MG/DL (ref 65–99)
GLUCOSE BLD STRIP.AUTO-MCNC: 203 MG/DL (ref 65–99)
GLUCOSE BLD STRIP.AUTO-MCNC: 208 MG/DL (ref 65–99)
GLUCOSE SERPL-MCNC: 215 MG/DL (ref 65–99)
HCT VFR BLD AUTO: 35.2 % (ref 37–47)
HGB BLD-MCNC: 11.1 G/DL (ref 12–16)
IMM GRANULOCYTES # BLD AUTO: 0.1 K/UL (ref 0–0.11)
IMM GRANULOCYTES NFR BLD AUTO: 1.5 % (ref 0–0.9)
LYMPHOCYTES # BLD AUTO: 1.3 K/UL (ref 1–4.8)
LYMPHOCYTES NFR BLD: 19 % (ref 22–41)
MCH RBC QN AUTO: 32.7 PG (ref 27–33)
MCHC RBC AUTO-ENTMCNC: 31.5 G/DL (ref 32.2–35.5)
MCV RBC AUTO: 103.8 FL (ref 81.4–97.8)
MONOCYTES # BLD AUTO: 0.45 K/UL (ref 0–0.85)
MONOCYTES NFR BLD AUTO: 6.6 % (ref 0–13.4)
NEUTROPHILS # BLD AUTO: 4.69 K/UL (ref 1.82–7.42)
NEUTROPHILS NFR BLD: 68.5 % (ref 44–72)
NRBC # BLD AUTO: 0 K/UL
NRBC BLD-RTO: 0 /100 WBC (ref 0–0.2)
PLATELET # BLD AUTO: 234 K/UL (ref 164–446)
PMV BLD AUTO: 12.4 FL (ref 9–12.9)
POTASSIUM SERPL-SCNC: 4.5 MMOL/L (ref 3.6–5.5)
PROT SERPL-MCNC: 6.2 G/DL (ref 6–8.2)
RBC # BLD AUTO: 3.39 M/UL (ref 4.2–5.4)
SODIUM SERPL-SCNC: 135 MMOL/L (ref 135–145)
WBC # BLD AUTO: 6.8 K/UL (ref 4.8–10.8)

## 2024-12-25 PROCEDURE — 302118 SHAMPOO,NO RINSE: Performed by: STUDENT IN AN ORGANIZED HEALTH CARE EDUCATION/TRAINING PROGRAM

## 2024-12-25 PROCEDURE — 700102 HCHG RX REV CODE 250 W/ 637 OVERRIDE(OP): Performed by: HOSPITALIST

## 2024-12-25 PROCEDURE — 82962 GLUCOSE BLOOD TEST: CPT | Mod: 91

## 2024-12-25 PROCEDURE — A9270 NON-COVERED ITEM OR SERVICE: HCPCS | Performed by: STUDENT IN AN ORGANIZED HEALTH CARE EDUCATION/TRAINING PROGRAM

## 2024-12-25 PROCEDURE — 99233 SBSQ HOSP IP/OBS HIGH 50: CPT | Performed by: STUDENT IN AN ORGANIZED HEALTH CARE EDUCATION/TRAINING PROGRAM

## 2024-12-25 PROCEDURE — 99231 SBSQ HOSP IP/OBS SF/LOW 25: CPT | Performed by: STUDENT IN AN ORGANIZED HEALTH CARE EDUCATION/TRAINING PROGRAM

## 2024-12-25 PROCEDURE — A9270 NON-COVERED ITEM OR SERVICE: HCPCS | Performed by: HOSPITALIST

## 2024-12-25 PROCEDURE — 700102 HCHG RX REV CODE 250 W/ 637 OVERRIDE(OP): Performed by: STUDENT IN AN ORGANIZED HEALTH CARE EDUCATION/TRAINING PROGRAM

## 2024-12-25 PROCEDURE — 80053 COMPREHEN METABOLIC PANEL: CPT

## 2024-12-25 PROCEDURE — 700101 HCHG RX REV CODE 250: Performed by: HOSPITALIST

## 2024-12-25 PROCEDURE — A9270 NON-COVERED ITEM OR SERVICE: HCPCS

## 2024-12-25 PROCEDURE — 85025 COMPLETE CBC W/AUTO DIFF WBC: CPT

## 2024-12-25 PROCEDURE — 770004 HCHG ROOM/CARE - ONCOLOGY PRIVATE *

## 2024-12-25 PROCEDURE — 700102 HCHG RX REV CODE 250 W/ 637 OVERRIDE(OP)

## 2024-12-25 PROCEDURE — 36415 COLL VENOUS BLD VENIPUNCTURE: CPT

## 2024-12-25 RX ADMIN — APIXABAN 5 MG: 5 TABLET, FILM COATED ORAL at 19:43

## 2024-12-25 RX ADMIN — INSULIN LISPRO 2 UNITS: 100 INJECTION, SOLUTION INTRAVENOUS; SUBCUTANEOUS at 06:10

## 2024-12-25 RX ADMIN — Medication 10 MG: at 20:26

## 2024-12-25 RX ADMIN — TRAZODONE HYDROCHLORIDE 100 MG: 100 TABLET ORAL at 20:26

## 2024-12-25 RX ADMIN — INSULIN LISPRO 2 UNITS: 100 INJECTION, SOLUTION INTRAVENOUS; SUBCUTANEOUS at 19:48

## 2024-12-25 RX ADMIN — LANSOPRAZOLE 30 MG: 30 TABLET, ORALLY DISINTEGRATING ORAL at 06:01

## 2024-12-25 RX ADMIN — DAPAGLIFLOZIN 10 MG: 10 TABLET, FILM COATED ORAL at 06:01

## 2024-12-25 RX ADMIN — INSULIN GLARGINE-YFGN 9 UNITS: 100 INJECTION, SOLUTION SUBCUTANEOUS at 19:48

## 2024-12-25 RX ADMIN — ARIPIPRAZOLE 2 MG: 2 TABLET ORAL at 06:01

## 2024-12-25 RX ADMIN — ATORVASTATIN CALCIUM 80 MG: 80 TABLET, FILM COATED ORAL at 19:43

## 2024-12-25 RX ADMIN — INSULIN LISPRO 2 UNITS: 100 INJECTION, SOLUTION INTRAVENOUS; SUBCUTANEOUS at 12:27

## 2024-12-25 RX ADMIN — INSULIN LISPRO 1 UNITS: 100 INJECTION, SOLUTION INTRAVENOUS; SUBCUTANEOUS at 23:23

## 2024-12-25 RX ADMIN — DAKIN'S SOLUTION 0.125% (QUARTER STRENGTH) 473 ML: 0.12 SOLUTION at 18:09

## 2024-12-25 ASSESSMENT — PAIN DESCRIPTION - PAIN TYPE: TYPE: ACUTE PAIN;CHRONIC PAIN

## 2024-12-25 NOTE — PROGRESS NOTES
Chart reviewed and plan of care discussed with Dr. Cohen.    Patient has improved oral intake and under the care of speech therapy for dysphagia treatment. It is ancipitated that her swallowing will continue to improve.    No current indication for PEG tube at this time.    Please call us back should the clinical situation change.    Tasia Crawley, JOSE,  APRN

## 2024-12-25 NOTE — PROGRESS NOTES
Hospital Medicine Daily Progress Note    Date of Service  12/25/2024    Chief Complaint  Kiara Qureshi is a 59 y.o. female admitted 11/27/2024 with encephalopathy    Hospital Course  58 yo woman HTN, HLD who was at [facility] for anterior STEMI s/p lytics and had LAD PCI and unsuccessful PCI for OM1 complicated by RP bleed, vfib and PEA arrest with cardiogenic shock needing ECMO and imeplla.      ECMO was decannulated 11/4, Impella removed 11/8.      She also had encephalopathy and delirium on valproic acid and Seroquel.      TTE showed EF 25-30%, she did not tolerate GDMT due to hypotension.    She had a CT chest on 11/25 that showed focal aortic mural thrombus versus ulcerated plaque to descending thoracic aorta and CTA AP with rim-enhancing RP hematoma and small thrombus in right common iliac artery, multiple nonenhancing fluid collections and subcu tissue in right lower and right inguinal regions, possible evolving hematomas and left femoral vein.  She was started on Vanco and Zosyn and transferred back to [facility].    She completed linezolid and Zosyn around 12/1.  Cardiology was consulted for medical management of her heart failure. Dr. Conley with vascular surgery was consulted, recommended wound vacs to groin wounds.  On 12/9 she became more altered with respiratory distress and transferred to [facility].  CT head was negative for acute changes.  EEG was negative for seizure activity.  She was high on high dose of Seroquel that was tapered off.  She remains on NG tube for tube feeds. Speech therapy saw patient and patient diet was advance and currently tolerating PO intake. Holding on PEG and NG tube feeding       Interval Problem Update  12/25:  Seen and  evaluated patient at the bedside.  She denies any chest pain.  She has NG tube in place. She is also tolerating diet orally. Since patient is improving in oral intake will hold on PEG tube for now.   Speech therapy following patient   Will resume eliquis and  plavix  Hemodynamically stable.  On room air.        I have discussed this patient's plan of care and discharge plan at IDT rounds today with Case Management, Nursing, Nursing leadership, and other members of the IDT team.    Consultants/Specialty  cardiology and vascular surgery, palliative care    Code Status  DNAR, I OK    Disposition  Not Medically Cleared  I have placed the appropriate orders for post-discharge needs.    Review of Systems  Review of Systems   Unable to perform ROS: Medical condition        Physical Exam  Temp:  [36.6 °C (97.8 °F)-37.2 °C (99 °F)] 36.9 °C (98.4 °F)  Pulse:  [61-73] 72  Resp:  [18-20] 18  BP: ()/(50-70) 108/70  SpO2:  [92 %-99 %] 99 %    Physical Exam  Constitutional:       Appearance: She is ill-appearing.   HENT:      Head: Normocephalic.      Mouth/Throat:      Mouth: Mucous membranes are dry.   Eyes:      Comments: Pupils are dilated, equal and reactive to light   Cardiovascular:      Rate and Rhythm: Normal rate and regular rhythm.   Pulmonary:      Breath sounds: Rhonchi present.      Comments: No periodic pauses in breathing, irregular breathing observed   Abdominal:      General: There is no distension.      Palpations: Abdomen is soft.      Tenderness: There is no abdominal tenderness.   Musculoskeletal:      Right lower leg: No edema.      Left lower leg: No edema.      Comments: Wound vacs bilaterally to groin   Neurological:      Mental Status: She is disoriented.      Comments:            Fluids    Intake/Output Summary (Last 24 hours) at 12/25/2024 1350  Last data filed at 12/25/2024 1307  Gross per 24 hour   Intake 1137 ml   Output 3350 ml   Net -2213 ml        Laboratory  Recent Labs     12/23/24  0712 12/24/24  0930 12/25/24  0126   WBC 7.1 7.8 6.8   RBC 3.58* 3.60* 3.39*   HEMOGLOBIN 11.6* 11.9* 11.1*   HEMATOCRIT 38.2 38.3 35.2*   .7* 106.4* 103.8*   MCH 32.4 33.1* 32.7   MCHC 30.4* 31.1* 31.5*   RDW 76.2* 73.9* 70.1*   PLATELETCT 209 230 234   MPV  12.4 12.1 12.4       Recent Labs     12/23/24  0712 12/24/24  0930 12/25/24  0126   SODIUM 138 137 135   POTASSIUM 4.2 4.5 4.5   CHLORIDE 110 107 105   CO2 19* 21 20   GLUCOSE 110* 200* 215*   BUN 24* 27* 28*   CREATININE 0.77 0.81 0.87   CALCIUM 8.1* 8.3* 8.0*                   Imaging  DX-ABDOMEN FOR TUBE PLACEMENT   Final Result         1.  Nonspecific bowel gas pattern in the upper abdomen.   2.  Dobbhoff tube tip overlying the expected location of the pylorus or first duodenal segment.   3.  Bilateral lower lobe atelectasis and/or infiltrates      DX-ABDOMEN FOR TUBE PLACEMENT   Final Result         1.  Nonspecific bowel gas pattern in the upper abdomen.   2.  Dobbhoff tube tip overlying the expected location of the pylorus or first duodenal segment.   3.  Pulmonary edema and/or infiltrates.   4.  Trace bilateral pleural effusions.      DX-ABDOMEN FOR TUBE PLACEMENT   Final Result      1. Repositioning of the esophagogastric tube, terminating over the fundus of the stomach.   2. Interstitial edema, left retrocardiac opacity and small left pleural effusion.      DX-ABDOMEN FOR TUBE PLACEMENT   Final Result         1.  Nonspecific bowel gas pattern in the upper abdomen.   2.  Dobbhoff tube tip terminates overlying the expected location of the gastric antrum or pylorus.   3.  Pulmonary edema and/or infiltrates, similar to prior study      DX-ABDOMEN FOR TUBE PLACEMENT   Final Result      The tip of the enteric tube terminates over the antrum of the stomach.      DX-ABDOMEN FOR TUBE PLACEMENT   Final Result         1.  Nonspecific bowel gas pattern in the upper abdomen.   2.  Dobbhoff tube is coiled within the duodenum, the tip terminates overlying the expected location of the gastric body.   3.  Bilateral lower lobe edema and/or infiltrates.      DX-ABDOMEN FOR TUBE PLACEMENT   Final Result      NG tube tip projects over the gastroduodenal junction.      DX-CHEST-LIMITED (1 VIEW)   Final Result      1.  Mild  cardiomegaly with evidence of mild to moderate pulmonary edema with minimal worsening since previous exam.      CT-HEAD W/O   Final Result         1.  No acute intracranial abnormality is identified, there are nonspecific white matter changes, commonly associated with small vessel ischemic disease.  Associated mild cerebral atrophy is noted.   2.  Bilateral sinusitis changes   3.  Atherosclerosis.               DX-CHEST-PORTABLE (1 VIEW)   Final Result         1.  Mild pulmonary edema and/or infiltrates.      EC-ECHOCARDIOGRAM COMPLETE W/ CONT   Final Result      DX-ABDOMEN FOR TUBE PLACEMENT   Final Result      Tip of the esophagogastric tube terminates over the pylorus of the stomach.      IR-MIDLINE CATHETER INSERTION WO GUIDANCE > AGE 3   Final Result                  Ultrasound-guided midline placement performed by qualified nursing staff    as above.          IR-US GUIDED PIV   Final Result    Ultrasound-guided PERIPHERAL IV INSERTION performed by    qualified nursing staff as above.      DX-OUTSIDE IMAGES-DX CHEST   Final Result      DX-OUTSIDE IMAGES-DX ABDOMEN   Final Result      MR-OUTSIDE IMAGES-MR BRAIN   Final Result      CT-OUTSIDE IMAGES-CT HEAD   Final Result      CT-OUTSIDE IMAGES-CT ABDOMEN/PELVIS   Final Result      CT-OUTSIDE IMAGES-CT CHEST   Final Result      DX-ABDOMEN FOR TUBE PLACEMENT   Final Result      There is a new small bowel feeding tube which terminates in the small bowel of the right mid abdomen.      DX-CHEST-LIMITED (1 VIEW)   Final Result         Asymmetric pulmonary infiltrates, left worse than right.      IR-US GUIDED PIV    (Results Pending)        Assessment/Plan  * Heart failure with reduced ejection fraction (HCC)- (present on admission)  Assessment & Plan  ICM LVEF 25-30%  S/p revascularization  Soft pressures limiting titration of GDMT.  Farxiga  Holding losartan for low BP  Critical care had recommended holding bisoprolol because of Cheyne-Lo breathing  Dehydrated,  holding Lasix    Circulating anticoagulant disorder (HCC)  Assessment & Plan  On Eliquis    Functional quadriplegia (HCC)  Assessment & Plan  In the setting of AMS, prior PEA    Macrocytic anemia  Assessment & Plan  Stable. No overt bleeding  B12 wnl. Studies more consistent with anemia of chronic disease    Hypernatremia  Assessment & Plan  Resolved     Acute hypoxic respiratory failure (HCC)  Assessment & Plan  In setting of heart failure, recent cardiogenic shock  Patient demonstrates cheynne florez breathing pattern which is consistent with her suspected anoxic brain injury  Patient appears to be experiencing aspiration events causing bradycardia  Now on room air   RT protocol     Constipation  Assessment & Plan  Bowel protocol       Hypotension  Assessment & Plan  BP low range, holding losartan and bisoprolol and lasix    Abdominal hematoma  Assessment & Plan  Might be related to cardiac device  Clinically stable and h/h in stable range  Patient was on lovenox due to rigth common iliac art thrombosis and then transitioned to eliquis per [facility]  - plan to continue for 3-4 months with repeat imaging at that time to see if it can be d/c   Hgb stable. Continue to monitor    ACP (advance care planning)  Assessment & Plan  Dr. Mobley: Discussed plan of care and code status with patient  Bruno ([phone]) and sister-in-law Laina ([phone]). I explained that Kiara has experienced a suspected anoxic brain injury during her current medical course that has required ECMO and impella, CVA, Vfib arrest. I shared my concern that Laina may not make a meaningful recovery from this and that her current and future quality of life appears limited. I explained that in the event of another CP arrest her quality of life would be further impacted in the event she were to survive such an episode. Laina and Bruno made the decision to change Kiara's code status to DNR, yes to intubaiton. Laina will be driving in from [place]  in the coming days to have further goals of care conversations. Total of 48 minutes spent in discussion and coordination of advance care planning.    12/16 - my conversation with Laina and Bruno, they will like to continue care and plan for PAMs    Pneumonia  Assessment & Plan  S/p completed course of pip/tazo and vanc  Cultures were negative    Aortic mural thrombus (HCC)  Assessment & Plan  CT at outside facility showing focal aortic mural thrombus along the lateral wall of the descending thoracic aorta and also a tiny filling defect/mural thrombus within the posterior right common iliac artery  Continue on apixaban  Continue to monitor CBC    Acute metabolic encephalopathy  Assessment & Plan  Hospital course complicated by encephalopathy.  Likely from combination of CVA, STEMI, hyperglycemia; likely also with anoxic brain injury   CTH no acute findings. EEG neg for seizure activity  Discontinue valproic acid   On abilify   On trazodone   Psychiatry following appreciate the rec   Mentation improved significantly       CVA (cerebral vascular accident) (HCC)  Assessment & Plan  Patient had MRI on 11/17/24 at [facility] which showed Punctate acute infarct of the right centrum semiovale. Scattered foci of enhancement in multiple vascular distributions as described may represent recent subacute infarcts. Diffuse stippled susceptibility signal throughout the supratentorial and infratentorial brain. Overall findings may represent sequelae of fat emboli or amyloid related angiopathy.   CTH no acute findings. EEG neg for seizure activity  High dose statin  Apixaban  plavix    CAD (coronary artery disease)  Assessment & Plan  Patient with recent complicated STEMI with PCI to LAD and mid LAD.  Complicated by V-fib arrest and cardiogenic shock requiring VA ECMO and Impella and retroperitoneal hematomas. At [facility] underwent impella supported PCI to mid LAD just distal to prior stent but unsuccessful with PCI to 100% occluded OM1 on 11/6.  Patient was decannulated and had Impella removed.    Discussed with cardiology and asa stopped and replaced with plavix. Cont statin  Repeat echo shows persistently low EF, 25-30%  Cardiology had recommended bisoprolol but due to cheyne florez critical care recommends we stop this - so will hold   Continue to hold losartan for low BP and lasix     Urinary retention  Assessment & Plan  Beckwith catheter in place    Hyperglycemia  Assessment & Plan  Glargine 9 units  ISS and hypoglycemia protocol     Metabolic acidemia- (present on admission)  Assessment & Plan  Improved         VTE prophylaxis: eliquis     I have performed a physical exam and reviewed and updated ROS and Plan today (12/25/2024). In review of yesterday's note (12/24/2024), there are no changes except as documented above.    Patient has a high medical complexity, complex decision making and is at high risk of complication, morbidity, and mortality    Greater than 51 minutes spent prepping to see patient (e.g. review of tests) obtaining and/or reviewing separately obtained history. Performing a medically appropriate examination and/ evaluation.  Counseling and educating the patient/family/caregiver.  Ordering medications, tests, or procedures.  Referring and communicating with other health care professionals.  Documenting clinical information in EPIC.  Independently interpreting results and communicating results to patient/family/caregiver.  Care coordination

## 2024-12-25 NOTE — CARE PLAN
The patient is Watcher - Medium risk of patient condition declining or worsening    Shift Goals  Clinical Goals: vss, skin integrity, tube feeds, safety  Patient Goals: rest  Family Goals: morenita- none present    Progress made toward(s) clinical / shift goals:    Problem: Safety  Goal: Will remain free from injury  Outcome: Progressing     Problem: Skin Integrity  Goal: Risk for impaired skin integrity will decrease  Outcome: Progressing     Problem: Hemodynamics  Goal: Patient's hemodynamics, fluid balance and neurologic status will be stable or improve  Outcome: Progressing     Problem: Nutrition  Goal: Patient's nutritional and fluid intake will be adequate or improve  Outcome: Progressing       Patient is not progressing towards the following goals:

## 2024-12-25 NOTE — PROGRESS NOTES
Hospital Medicine Daily Progress Note    Date of Service  12/24/2024    Chief Complaint  Kiara Qureshi is a 59 y.o. female admitted 11/27/2024 with encephalopathy    Hospital Course  58 yo woman HTN, HLD who was at [facility] for anterior STEMI s/p lytics and had LAD PCI and unsuccessful PCI for OM1 complicated by RP bleed, vfib and PEA arrest with cardiogenic shock needing ECMO and imeplla.  ECMO was decannulated 11/4, Impella removed 11/8.  She also had encephalopathy and delirium on valproic acid and Seroquel.  TTE showed EF 25-30%, she did not tolerate GDMT due to hypotension.  She had a CT chest on 11/25 that showed focal aortic mural thrombus versus ulcerated plaque to descending thoracic aorta and CTA AP with rim-enhancing RP hematoma and small thrombus in right common iliac artery, multiple nonenhancing fluid collections and subcu tissue in right lower and right inguinal regions, possible evolving hematomas and left femoral vein.  She was started on Vanco and Zosyn and transferred back to [facility].  She completed linezolid and Zosyn around 12/1.  Cardiology was consulted for medical management of her heart failure. Dr. Conley with vascular surgery was consulted, recommended wound vacs to groin wounds.  On 12/9 she became more altered with respiratory distress and transferred to [facility].  CT head was negative for acute changes.  EEG was negative for seizure activity.  She was high on high dose of Seroquel that was tapered off.  She remains on NG tube for tube feeds.    Interval Problem Update  12/24:  Seen evaluated patient at the bedside.  She denies any chest pain.  She has NG tube in place.  Tolerating the tube feed.  Patient has been evaluated by speech therapy and reports that she showed improvement.  Patient was advanced with minced and moist solid/mildly thick liquid diet.  Plan is to get a PEG tube on Thursday or per speech therapy patient showed improvement.   Hemodynamically stable.  On room  air.          I have discussed this patient's plan of care and discharge plan at IDT rounds today with Case Management, Nursing, Nursing leadership, and other members of the IDT team.    Consultants/Specialty  cardiology and vascular surgery, palliative care    Code Status  DNAR, I OK    Disposition  Not Medically Cleared  I have placed the appropriate orders for post-discharge needs.    Review of Systems  Review of Systems   Unable to perform ROS: Medical condition        Physical Exam  Temp:  [36.1 °C (97 °F)-37 °C (98.6 °F)] 37 °C (98.6 °F)  Pulse:  [61-88] 61  Resp:  [17-18] 18  BP: ()/(42-65) 99/62  SpO2:  [93 %-99 %] 93 %    Physical Exam  Constitutional:       Appearance: She is ill-appearing.   HENT:      Head: Normocephalic.      Mouth/Throat:      Mouth: Mucous membranes are dry.   Eyes:      Comments: Pupils are dilated, equal and reactive to light   Cardiovascular:      Rate and Rhythm: Normal rate and regular rhythm.   Pulmonary:      Breath sounds: Rhonchi present.      Comments: No periodic pauses in breathing, irregular breathing observed   Abdominal:      General: There is no distension.      Palpations: Abdomen is soft.      Tenderness: There is no abdominal tenderness.   Musculoskeletal:      Right lower leg: No edema.      Left lower leg: No edema.      Comments: Wound vacs bilaterally to groin   Neurological:      Mental Status: She is disoriented.      Comments:            Fluids    Intake/Output Summary (Last 24 hours) at 12/24/2024 1726  Last data filed at 12/24/2024 1343  Gross per 24 hour   Intake 900 ml   Output 1175 ml   Net -275 ml        Laboratory  Recent Labs     12/23/24  0712 12/24/24  0930   WBC 7.1 7.8   RBC 3.58* 3.60*   HEMOGLOBIN 11.6* 11.9*   HEMATOCRIT 38.2 38.3   .7* 106.4*   MCH 32.4 33.1*   MCHC 30.4* 31.1*   RDW 76.2* 73.9*   PLATELETCT 209 230   MPV 12.4 12.1       Recent Labs     12/22/24  0010 12/23/24  0712 12/24/24  0930   SODIUM 141 138 137   POTASSIUM  4.4 4.2 4.5   CHLORIDE 112 110 107   CO2 19* 19* 21   GLUCOSE 265* 110* 200*   BUN 32* 24* 27*   CREATININE 0.88 0.77 0.81   CALCIUM 7.9* 8.1* 8.3*                   Imaging  DX-ABDOMEN FOR TUBE PLACEMENT   Final Result         1.  Nonspecific bowel gas pattern in the upper abdomen.   2.  Dobbhoff tube tip overlying the expected location of the pylorus or first duodenal segment.   3.  Bilateral lower lobe atelectasis and/or infiltrates      DX-ABDOMEN FOR TUBE PLACEMENT   Final Result         1.  Nonspecific bowel gas pattern in the upper abdomen.   2.  Dobbhoff tube tip overlying the expected location of the pylorus or first duodenal segment.   3.  Pulmonary edema and/or infiltrates.   4.  Trace bilateral pleural effusions.      DX-ABDOMEN FOR TUBE PLACEMENT   Final Result      1. Repositioning of the esophagogastric tube, terminating over the fundus of the stomach.   2. Interstitial edema, left retrocardiac opacity and small left pleural effusion.      DX-ABDOMEN FOR TUBE PLACEMENT   Final Result         1.  Nonspecific bowel gas pattern in the upper abdomen.   2.  Dobbhoff tube tip terminates overlying the expected location of the gastric antrum or pylorus.   3.  Pulmonary edema and/or infiltrates, similar to prior study      DX-ABDOMEN FOR TUBE PLACEMENT   Final Result      The tip of the enteric tube terminates over the antrum of the stomach.      DX-ABDOMEN FOR TUBE PLACEMENT   Final Result         1.  Nonspecific bowel gas pattern in the upper abdomen.   2.  Dobbhoff tube is coiled within the duodenum, the tip terminates overlying the expected location of the gastric body.   3.  Bilateral lower lobe edema and/or infiltrates.      DX-ABDOMEN FOR TUBE PLACEMENT   Final Result      NG tube tip projects over the gastroduodenal junction.      DX-CHEST-LIMITED (1 VIEW)   Final Result      1.  Mild cardiomegaly with evidence of mild to moderate pulmonary edema with minimal worsening since previous exam.      CT-HEAD  W/O   Final Result         1.  No acute intracranial abnormality is identified, there are nonspecific white matter changes, commonly associated with small vessel ischemic disease.  Associated mild cerebral atrophy is noted.   2.  Bilateral sinusitis changes   3.  Atherosclerosis.               DX-CHEST-PORTABLE (1 VIEW)   Final Result         1.  Mild pulmonary edema and/or infiltrates.      EC-ECHOCARDIOGRAM COMPLETE W/ CONT   Final Result      DX-ABDOMEN FOR TUBE PLACEMENT   Final Result      Tip of the esophagogastric tube terminates over the pylorus of the stomach.      IR-MIDLINE CATHETER INSERTION WO GUIDANCE > AGE 3   Final Result                  Ultrasound-guided midline placement performed by qualified nursing staff    as above.          IR-US GUIDED PIV   Final Result    Ultrasound-guided PERIPHERAL IV INSERTION performed by    qualified nursing staff as above.      DX-OUTSIDE IMAGES-DX CHEST   Final Result      DX-OUTSIDE IMAGES-DX ABDOMEN   Final Result      MR-OUTSIDE IMAGES-MR BRAIN   Final Result      CT-OUTSIDE IMAGES-CT HEAD   Final Result      CT-OUTSIDE IMAGES-CT ABDOMEN/PELVIS   Final Result      CT-OUTSIDE IMAGES-CT CHEST   Final Result      DX-ABDOMEN FOR TUBE PLACEMENT   Final Result      There is a new small bowel feeding tube which terminates in the small bowel of the right mid abdomen.      DX-CHEST-LIMITED (1 VIEW)   Final Result         Asymmetric pulmonary infiltrates, left worse than right.      IR-US GUIDED PIV    (Results Pending)        Assessment/Plan  * Heart failure with reduced ejection fraction (HCC)- (present on admission)  Assessment & Plan  ICM LVEF 25-30%  S/p revascularization  Soft pressures limiting titration of GDMT.  Farxiga  Holding losartan for low BP  Critical care had recommended holding bisoprolol because of Cheyne-Lo breathing  Dehydrated, holding Lasix    Circulating anticoagulant disorder (HCC)  Assessment & Plan  On Eliquis    Functional quadriplegia  (HCC)  Assessment & Plan  In the setting of AMS, prior PEA    Macrocytic anemia  Assessment & Plan  12/23/2024  Stable. No overt bleeding  B12 wnl. Studies more consistent with anemia of chronic disease    Hypernatremia  Assessment & Plan  12/23/2024  Persistent worsening and she lost her NG tube on and off  Started on D5 water infusion with FWF and q6h PRN  Goal correction 8 mLEq in 24h    12/17-19  Improving  S/p D5 infusion   Continue FWF 300cc every 4 hours and q12 Na checks    Acute hypoxic respiratory failure (HCC)  Assessment & Plan  12/23/2024  In setting of heart failure, recent cardiogenic shock  Patient demonstrates cheynne florez breathing pattern which is consistent with her suspected anoxic brain injury  Patient appears to be experiencing aspiration events causing bradycardia  Continue supportive care  Patient is NPO, speech is following    Improved    Constipation  Assessment & Plan  12/23/2024  + bm  Continue bowel regimen  following    Hypotension  Assessment & Plan  12/23/2024  BP low range, holding losartan and bisoprolol    Abdominal hematoma  Assessment & Plan  12/23/2024  Might be related to cardiac device  Clinically stable and h/h in stable range  Patient was on lovenox due to rigth common iliac art thrombosis and then transitioned to eliquis per [facility]  - plan to continue for 3-4 months with repeat imaging at that time to see if it can be d/c     Hgb stable. Continue to monitor    ACP (advance care planning)  Assessment & Plan  Dr. Mobley: Discussed plan of care and code status with patient  Bruno ([phone]) and sister-in-law Laina ([phone]). I explained that Kiara has experienced a suspected anoxic brain injury during her current medical course that has required ECMO and impella, CVA, Vfib arrest. I shared my concern that Laina may not make a meaningful recovery from this and that her current and future quality of life appears limited. I explained that in the event of another CP  arrest her quality of life would be further impacted in the event she were to survive such an episode. Laina and Bruno made the decision to change Kiara's code status to DNR, yes to intubaiton. Laina will be driving in from [place] in the coming days to have further goals of care conversations. Total of 48 minutes spent in discussion and coordination of advance care planning.    12/16 - my conversation with Laina and Bruno, they will like to continue care and plan for PAMs    Pneumonia  Assessment & Plan  S/p completed course of pip/tazo and vanc  Cultures were negative    Aortic mural thrombus (HCC)  Assessment & Plan  CT at outside facility showing focal aortic mural thrombus along the lateral wall of the descending thoracic aorta and also a tiny filling defect/mural thrombus within the posterior right common iliac artery  Continue on apixaban  Continue to monitor CBC    Acute metabolic encephalopathy  Assessment & Plan  12/23/2024  Hospital course complicated by encephalopathy.  Likely from combination of CVA, STEMI, hyperglycemia; likely also with anoxic brain injury   Removing restraints as tolerated, following  Has a nasojejunal feeding tube placed by interventional radiology from outside facility  Was on Seroquel and Depakene as recommended by neurology/psychiatry at outside facility.    Multifactorial  Continues to be encephalopathic. CTH no acute findings. EEG neg for seizure activity  Neuro evaluated patient at [facility].   Have been titrating off seroquel and pt's mental status has improved as her agitation has diminished  DC seroquel qhs dose  Decrease valproic acid TID to BID    12/18  Decrease Valproic acid to once daily starting tomorrow and then discontinue. Med has interactions with Eliquis.   Noted EKG yesterday with Qtc >600. Repeat today. Defer restarting seroquel. Will hold off adding possible alternatives such as geodon, zyprexa, or trazodone at night as this can increase Qtc/risk of arrhythmias,  pending new EKG. Increase melatonin to 10mg once daily   Unable to tolerate MRI    12/19   Continue valproic acid taper. EKG with improved Qtc. Continue on current medications.   Failed swallow screen with SLP. Has been pulling feeding tubes before when more encephalopathic.   GI consulted for possible PEG tube placement  Unable to tolerate MRI.     12/20  Start seroquel 50mg once daily and trazodone 50mg once daily   Plan for PEG tube placement Monday 12/21  Continue meds as above.  Off valproic acid already today.    12/22  Continue medications.  Psychiatry consulted.  Plan for PEG on either Thursday or Friday.    CVA (cerebral vascular accident) (HCC)  Assessment & Plan  Patient had MRI on 11/17/24 at [facility] which showed Punctate acute infarct of the right centrum semiovale. Scattered foci of enhancement in multiple vascular distributions as described may represent recent subacute infarcts. Diffuse stippled susceptibility signal throughout the supratentorial and infratentorial brain. Overall findings may represent sequelae of fat emboli or amyloid related angiopathy.   High dose statin  Apixaban  plavix    Continues to be encephalopathic. CTH no acute findings. EEG neg for seizure activity  Neuro evaluated patient at [facility].   Have been titrating off seroquel and pt's mental status has improved as her agitation has diminished  DC seroquel qhs dose  I changed valproic acid TID to BID  Repeat MRI brain if possible, unble to tolerate at this time    CAD (coronary artery disease)  Assessment & Plan  Patient with recent complicated STEMI with PCI to LAD and mid LAD.  Complicated by V-fib arrest and cardiogenic shock requiring VA ECMO and Impella and retroperitoneal hematomas. At [facility] underwent impella supported PCI to mid LAD just distal to prior stent but unsuccessful with PCI to 100% occluded OM1 on 11/6. Patient was decannulated and had Impella removed.    Discussed with cardiology and asa stopped and replaced with  plavix. Cont statin  Repeat echo shows persistently low EF, 25-30%  Cardiology had recommended bisoprolol but due to cheyne florez critical care recommends we stop this - so will hold   Continue to hold losartan for low BP and lasix for hypernatremia    Urinary retention  Assessment & Plan  Beckwith catheter in place    Hyperglycemia  Assessment & Plan  Glargine 9 units  ISS    Metabolic acidemia- (present on admission)  Assessment & Plan  Improved         VTE prophylaxis: Previously on apixaban.  Being held for possible PEG.    I have performed a physical exam and reviewed and updated ROS and Plan today (12/24/2024). In review of yesterday's note (12/23/2024), there are no changes except as documented above.    Patient has a high medical complexity, complex decision making and is at high risk of complication, morbidity, and mortality

## 2024-12-25 NOTE — THERAPY
"Speech Language Pathology   Daily Treatment     Patient Name: Kiara Qureshi  AGE:  59 y.o., SEX:  female  Medical Record #: [id number]  Date of Service: 12/24/2024      Precautions:  Precautions: Fall Risk, Swallow Precautions, Nasogastric Tube      Subjective  Pt agreeable and cooperative with SLP tx tasks. She reported being in a good mood because \"I ate lunch.\"     Pt noted to have MT2 liquids with ice ; education provided on not putting ice directly into MT2 liquids as this will change the consistency.       Assessment  Pt seen for dysphagia management. Seated upright in bed, NGT in situ. On RA, satting in the 90s. Vocal quality perceptually congested upon SLP arrival. Pt states she thinks this may be because she started eating; cued patient to clear throat and re-swallow, which resolved wetness (per FEES - cough effective to clear airway invasion). Pt educated on this strategy; verbalized agreement and understanding. Education also provided on FEES results and rationale for recommendations.     MT2 water used to facilitate swallowing exercises targeting deficits noted on FEES. Pt completed effortful swallow x30 with good accuracy and effort. Education provided on importance of independent rehearsal of swallowing exercises.      Clinical Impressions  Per FEES completed earlier this date, the patient presents with a moderate oropharyngeal dysphagia. Given improvements in participation and command following, recommend management with modified diet and compensatory strategies. Recommend minced and moist solids / mildly thick liquids at this time with use of 1:1 spv. Pt will likely benefit repeat diagnostic evaluation prior to upgrading diet given inconsistent sensation to airway invasion       Recommendations  Minced and moist solids / mildly thick liquids   - Pt may not need NGT or PEG going forward should she continue to consume PO trays without difficulty  Instrumentation: Instrumental swallow study " "pending clinical progress  Medication: Whole with puree  Supervision: 1:1 feeding with constant supervision  Positioning: Fully upright and midline during oral intake  Risk Management : Small bites/sips, Slow rate of intake, Reduce environmental distractions, Physical mobility, as tolerated  Oral Care: Pre- and post-meals      SLP Treatment Plan  Treatment Plan: Dysphagia Treatment, Patient/Family/Caregiver Training  SLP Frequency: 4x Per Week  Estimated Duration: Until Therapy Goals Met      Anticipated Discharge Needs  Discharge Recommendations: Recommend post-acute placement for additional speech therapy services prior to discharge home  Therapy Recommendations Upon DC: Dysphagia Training, Community Re-Integration, Patient / Family / Caregiver Education      Patient / Family Goals  Patient / Family Goal #1: \"I'll eat later when I'm more hungry\"  Goal #1 Outcome: Progressing as expected  Short Term Goals  Short Term Goal # 1: 12/19 Pt will consume prfdng trials with SLP only with no overt s/sx of aspiration or decline in pulmonary status  Goal Outcome # 1: Goal met, new goal added  Short Term Goal # 1 B : Pt will consume diet of MM5/MT2 given 1:1 spv and strategy use.  Goal Outcome  # 1 B: Progressing as expected  Short Term Goal # 2: 12/19 Pt will complete swallow exercises targeting; BOT, LVC, pharyngeal shortening, pharyngeal constriction x40  Goal Outcome # 2 : Progressing as expected  Short Term Goal # 2 B : Pt will paricipate in diagnostic swallow study to objectively assess swallow function and inform POC  Goal Outcome  # 2 B: Goal met      Verona Lorenzana, SLP  "

## 2024-12-25 NOTE — CONSULTS
"PSYCHIATRIC Followup (established):  *Reason for admission: STEMI     *Reason for consult:aggression        Legal status:  not applicable     *Interval hx:  Patient seen today at bedside with her .  She is calm and cooperative.  She recalls now that she is having problems swallowing and that is part of the reason why she is in the hospital.  She does not recall recent MI.  She states she was able to eat a little bit of her lunch and is focusing on drinking the nutritional shakes.  She denies any thoughts of wanting to harm herself or others.  Currently feels safe in the hospital.  She had no other questions or concerns.  She of note is oriented to person and is aware she is in a hospital but still thinks she is in [place].  Attempted clock drawing today and result was similar (instead of drawing numbers patient zeeshan small circles all around the clock face).  She was able to follow a multistep command today.    Her  reports that the patient's short-term memory is improving.  She has been largely calm and cooperative.  He is not concerned about problems with sleeping.    Mood:\" Good\"  Sleep: denies problems with sleep  Appetite:\" I ate some\"  Energy: Good  Denies SI/HI  No delusions    Reviewed nursing noted and spoke with nursing staff. The patient is compliant with medication. The patient has not been aggressive or agitated. The patient has been cleared for minced/moist and thickened liquid diet.The patient has not required psychiatric PRN medications in the last 48 hours. Not in restraints.  Nursing staff reports that the patient is getting better oriented and has been calm and cooperative.  No behavioral problems reported.         *Medical Review of Systems: as reported by pt. All systems reviewed. Only those found to be + are noted below. All others are negative.      *Psychiatric (Physical) Examination: observed phenomenon:  Vitals:    12/25/24 0808   BP: 105/59   Pulse: 68   Resp: 18   Temp: 36.6 " "°C (97.8 °F)   SpO2: 92%       General: Awake, alert in no acute distress  Patient Appearance: Appropriate, fairly groomed, wearing a hospital gown and lying in bed   behavior: Appropriate Behavior, Calm, Cooperative  Speech.: Spontaneous, Normal rate and rhythm, Answers appropriately, normal  volume  Mood Comments:\" Doing okay\"  Affect: appears euthymic  Thought Content: Appropriate, No suicidal ideation, No thoughts of self harm,  No homicidal ideation, Contracts for safety, Feels life is worth living  Thought Process: Tangential and disorganized  Psychosis: No delusions, No hallucinations  Insight: Impaired insight  Judgment: Impaired judgment  Cognition: Some impairment in recent memory noted., Concentration is intact to conversation and oriented to person, where she is in a hospital, aware now that she is swallowing problems.  Not oriented to time.  Language: Is able to repeat phrases. and Is able to name objects.  Fund of Knowledge: Below expected for age and level of education  Neuro: no tics, tremors, dyskinesias. no dystonias.  No myoclonus.  Gait not observed  Screenings:  Clock drawing 12/22/24: Patient unable to draw numbers in the clock face, only zeeshan small circles around clock face  Clock drawing 12/25/2024: patient unable to draw the numbers on the clock face only zeeshan small circles all around the clock face      Allergies:   No Known Allergies        Medications (currently prescribed at [facility]):    Current Facility-Administered Medications:     traZODone (Desyrel) tablet 100 mg, 100 mg, Enteral Tube, QHS, Paige Blake D.O., 100 mg at 12/24/24 2034    ARIPiprazole (Abilify) tablet 2 mg, 2 mg, Enteral Tube, DAILY, Paige Blake D.O., 2 mg at 12/25/24 0601    hydrOXYzine HCl (Atarax) tablet 10 mg, 10 mg, Enteral Tube, Q4HRS PRN, Paige Blake D.O., 10 mg at 12/22/24 1820    haloperidol lactate (Haldol) injection 1 mg, 1 mg, Intramuscular, Q6HRS PRN, Paige" CANDACE Blake    sore throat spray (Chloraseptic) 1 Spray, 1 Spray, Mouth/Throat, Q2HRS PRN, Jada Santoro M.D.    melatonin tablet 10 mg, 10 mg, Enteral Tube, Nightly, Jada Santoro M.D., 10 mg at 12/24/24 2034    insulin GLARGINE (Lantus,Semglee) injection, 9 Units, Subcutaneous, Q EVENING, 9 Units at 12/24/24 1750 **AND** [DISCONTINUED] insulin lispro (HumaLOG,AdmeLOG) subcutaneous injection, 0.2 Units/kg/day, Subcutaneous, TID AC, 6 Units at 12/04/24 0820 **AND** insulin lispro (HumaLOG,AdmeLOG) subcutaneous injection, 1-6 Units, Subcutaneous, Q6HRS, 2 Units at 12/25/24 0610 **AND** POC blood glucose manual result, , , Q AC AND BEDTIME(S) **AND** NOTIFY MD and PharmD, , , Once **AND** Administer 20 grams of glucose (approximately 8 ounces of fruit juice) every 15 minutes PRN FSBG less than 70 mg/dL, , , PRN **AND** dextrose 50% (D50W) injection 25 g, 25 g, Intravenous, Q15 MIN PRN, Jada Santoro M.D.    [Held by provider] apixaban (Eliquis) tablet 5 mg, 5 mg, Enteral Tube, BID, Kristi Moore M.D., 5 mg at 12/20/24 0630    [Held by provider] clopidogrel (Plavix) tablet 75 mg, 75 mg, Enteral Tube, DAILY, Kristi Moore M.D., 75 mg at 12/22/24 0605    dapagliflozin propanediol (Farxiga) tablet 10 mg, 10 mg, Enteral Tube, DAILY, Rufus Ahumada D.O., 10 mg at 12/25/24 0601    lansoprazole (Prevacid) solutab 30 mg, 30 mg, Enteral Tube, DAILY, Alissa Joyner M.D., 30 mg at 12/25/24 0601    dakins 0.125% (1/4 strength) topical soln, , Topical, PRN, Alissa Joyner M.D., 473 mL at 12/21/24 0145    bisacodyl (Dulcolax) suppository 10 mg, 10 mg, Rectal, DAILY, Alissa Joyner M.D., 10 mg at 12/24/24 0623    [Held by provider] bisoprolol (Zebeta) tablet 2.5 mg, 2.5 mg, Enteral Tube, Q DAY, Rufus Ahumada D.O., 2.5 mg at 12/13/24 0521    [Held by provider] losartan (Cozaar) tablet 12.5 mg, 12.5 mg, Enteral Tube, Q EVENING, Kristi Moore M.D., 12.5 mg at 12/15/24 1108     oxyCODONE immediate-release (Roxicodone) tablet 2.5 mg, 2.5 mg, Enteral Tube, Q4HRS PRN, 2.5 mg at 12/23/24 2221 **OR** oxyCODONE immediate-release (Roxicodone) tablet 5 mg, 5 mg, Enteral Tube, Q4HRS PRN, Alissa Joyner M.D., 5 mg at 12/13/24 1708    ondansetron (Zofran ODT) dispertab 4 mg, 4 mg, Enteral Tube, Q4HRS PRN, Jourdan Block M.D.    promethazine (Phenergan) tablet 12.5-25 mg, 12.5-25 mg, Enteral Tube, Q4HRS PRN, Jourdan Block M.D.    Pharmacy Consult: Enteral tube insertion - review meds/change route/product selection, 1 Each, Other, PHARMACY TO DOSE, Horace Agudelo M.D.    labetalol (Normodyne/Trandate) injection 10 mg, 10 mg, Intravenous, Q4HRS PRN, Jourdan Block M.D.    ondansetron (Zofran) syringe/vial injection 4 mg, 4 mg, Intravenous, Q4HRS PRN, Jourdan Block M.D.    promethazine (Phenergan) suppository 12.5-25 mg, 12.5-25 mg, Rectal, Q4HRS PRN, Jourdan Block M.D.    atorvastatin (Lipitor) tablet 80 mg, 80 mg, Enteral Tube, Q EVENING, Jourdan Block M.D., 80 mg at 12/24/24 1752    [Held by provider] furosemide (Lasix) tablet 20 mg, 20 mg, Enteral Tube, Q DAY, Alissa Joyner M.D., 20 mg at 12/13/24 4410    *Labs:  Lab Results   Component Value Date/Time    SODIUM 135 12/25/2024 01:26 AM    POTASSIUM 4.5 12/25/2024 01:26 AM    CHLORIDE 105 12/25/2024 01:26 AM    CO2 20 12/25/2024 01:26 AM    GLUCOSE 215 (H) 12/25/2024 01:26 AM    BUN 28 (H) 12/25/2024 01:26 AM    CREATININE 0.87 12/25/2024 01:26 AM     Recent Labs     12/24/24  0930 12/25/24  0126   ASTSGOT 36 32   ALTSGPT 21 22   TBILIRUBIN 0.6 0.5   GLOBULIN 4.1* 3.8*     Lab Results   Component Value Date/Time    WBC 6.8 12/25/2024 01:26 AM    RBC 3.39 (L) 12/25/2024 01:26 AM    HEMOGLOBIN 11.1 (L) 12/25/2024 01:26 AM    HEMATOCRIT 35.2 (L) 12/25/2024 01:26 AM    .8 (H) 12/25/2024 01:26 AM    MCH 32.7 12/25/2024 01:26 AM    MCHC 31.5 (L) 12/25/2024 01:26 AM    MPV 12.4 12/25/2024 01:26 AM    NEUTSPOLYS 68.50  12/25/2024 01:26 AM    LYMPHOCYTES 19.00 (L) 12/25/2024 01:26 AM    MONOCYTES 6.60 12/25/2024 01:26 AM    EOSINOPHILS 3.80 12/25/2024 01:26 AM    BASOPHILS 0.60 12/25/2024 01:26 AM    ANISOCYTOSIS 1+ 12/16/2024 01:55 AM   Plt-234,000    EKG 12/22/24  Intervals                                Axis   Rate:       82                           P:          74   GA:         128                          QRS:        55   QRSD:       117                          T:          253   QT:         358   QTc:        418     Interpretive Statements   Sinus rhythm   Atrial premature complex   Nonspecific intraventricular conduction delay   Anterior infarct, old   Borderline repolarization abnormality      Imaging/EEG  CT head 12/9/24  IMPRESSION:        1.  No acute intracranial abnormality is identified, there are nonspecific white matter changes, commonly associated with small vessel ischemic disease.  Associated mild cerebral atrophy is noted.  2.  Bilateral sinusitis changes  3.  Atherosclerosis.        EEG 12/10/24  Abnormal video EEG recording in the awake and drowsy state(s):  -Mild background slowing suggestive of diffuse/multifocal cerebral dysfunction and consistent with a non-specific encephalopathy. Clinical correlation recommended.   -Frequent bitemporal independent as well as synchronous slowing suggest of nonspecific dysfunction in these regions.   -Epileptiform discharges : No epileptiform discharges were seen.  -No seizures.   -Clinical Events: None        TSH (12/5/24)-6.690                     B12 (12/18/24)-793                        UA 11/28/24 negative nitrite, trace esterase, no bacteria        *ASSESSMENT:   Ms. Qureshi is a 59-year-old female seen today for initial psychiatric evaluation of agitation/aggression in the context of delirium.    Patient seen today at bedside.  She is calm and cooperative.  She has better insight into the fact that she is having difficulty swallowing.  She has no concerns today.   Reports mood is good.  Denies problems with sleep.  Her  reports that the patient's memory appears to be improving and she has been calm and cooperative.  No behavioral concerns per nursing staff    Diagnosis:  Delirium (patient had a cardiac arrest and hypoxia, has been confused since this time)  Rule out major neurocognitive disorder  *Plan/Further Workup:   Legal status: not applicable     *Medication Managment:       1.  Continue Abilify 2 mg in the evening for agitation and aggression  2.  Continue trazodone to 100 mg at bedtime for insomnia.    3. Continue melatonin 10 mg at bedtime for insomnia    4.  Continue hydroxyzine 10 mg every 6 hours as needed for severe agitation and aggression.  If ineffective or unable to be given due to aggression may use Haldol 1 mg IM q6H PRN for severe agitation/aggression     *Labs Reviewed  *Imaging Reviewed  *Medical Tests Reviewed  *Ordered Old Records/Obtain Collateral: From patient's spouse at bedside  *Old [facility] Records Summarized:  *Discussed with another provider: Dr. Cohen              Will sign off, please reconsult if the patient is requiring as needed psychiatric medications so I can further reevaluate adjusting Abilify.  If patient does not require any as needed psychiatric medications and memory continues to improve could stop Abilify prior to discharge    Thank you for the consult.        *Time: (can be used instead of E/M)  -Face to Face: 15  -Content of Counseling/Coordination of Care:10  -More than 50% spent in Counseling/Coordination

## 2024-12-26 LAB
ANION GAP SERPL CALC-SCNC: 8 MMOL/L (ref 7–16)
BUN SERPL-MCNC: 28 MG/DL (ref 8–22)
CALCIUM SERPL-MCNC: 8.2 MG/DL (ref 8.5–10.5)
CHLORIDE SERPL-SCNC: 105 MMOL/L (ref 96–112)
CO2 SERPL-SCNC: 21 MMOL/L (ref 20–33)
CREAT SERPL-MCNC: 0.75 MG/DL (ref 0.5–1.4)
ERYTHROCYTE [DISTWIDTH] IN BLOOD BY AUTOMATED COUNT: 69.6 FL (ref 35.9–50)
GFR SERPLBLD CREATININE-BSD FMLA CKD-EPI: 92 ML/MIN/1.73 M 2
GLUCOSE BLD STRIP.AUTO-MCNC: 118 MG/DL (ref 65–99)
GLUCOSE BLD STRIP.AUTO-MCNC: 139 MG/DL (ref 65–99)
GLUCOSE BLD STRIP.AUTO-MCNC: 86 MG/DL (ref 65–99)
GLUCOSE SERPL-MCNC: 236 MG/DL (ref 65–99)
HCT VFR BLD AUTO: 35.8 % (ref 37–47)
HGB BLD-MCNC: 11.4 G/DL (ref 12–16)
MCH RBC QN AUTO: 33.2 PG (ref 27–33)
MCHC RBC AUTO-ENTMCNC: 31.8 G/DL (ref 32.2–35.5)
MCV RBC AUTO: 104.4 FL (ref 81.4–97.8)
PLATELET # BLD AUTO: 219 K/UL (ref 164–446)
PMV BLD AUTO: 12.2 FL (ref 9–12.9)
POTASSIUM SERPL-SCNC: 4.3 MMOL/L (ref 3.6–5.5)
RBC # BLD AUTO: 3.43 M/UL (ref 4.2–5.4)
SODIUM SERPL-SCNC: 134 MMOL/L (ref 135–145)
WBC # BLD AUTO: 5.9 K/UL (ref 4.8–10.8)

## 2024-12-26 PROCEDURE — 700102 HCHG RX REV CODE 250 W/ 637 OVERRIDE(OP): Performed by: HOSPITALIST

## 2024-12-26 PROCEDURE — 99233 SBSQ HOSP IP/OBS HIGH 50: CPT | Performed by: STUDENT IN AN ORGANIZED HEALTH CARE EDUCATION/TRAINING PROGRAM

## 2024-12-26 PROCEDURE — 770004 HCHG ROOM/CARE - ONCOLOGY PRIVATE *

## 2024-12-26 PROCEDURE — 97530 THERAPEUTIC ACTIVITIES: CPT

## 2024-12-26 PROCEDURE — A9270 NON-COVERED ITEM OR SERVICE: HCPCS | Performed by: HOSPITALIST

## 2024-12-26 PROCEDURE — A9270 NON-COVERED ITEM OR SERVICE: HCPCS

## 2024-12-26 PROCEDURE — 306313 ANTI-EMBOLISM STOCKINGS XXXLG REG: Performed by: STUDENT IN AN ORGANIZED HEALTH CARE EDUCATION/TRAINING PROGRAM

## 2024-12-26 PROCEDURE — A9270 NON-COVERED ITEM OR SERVICE: HCPCS | Performed by: STUDENT IN AN ORGANIZED HEALTH CARE EDUCATION/TRAINING PROGRAM

## 2024-12-26 PROCEDURE — 36415 COLL VENOUS BLD VENIPUNCTURE: CPT

## 2024-12-26 PROCEDURE — 80048 BASIC METABOLIC PNL TOTAL CA: CPT

## 2024-12-26 PROCEDURE — 700102 HCHG RX REV CODE 250 W/ 637 OVERRIDE(OP): Performed by: STUDENT IN AN ORGANIZED HEALTH CARE EDUCATION/TRAINING PROGRAM

## 2024-12-26 PROCEDURE — 97605 NEG PRS WND THER DME<=50SQCM: CPT

## 2024-12-26 PROCEDURE — 85027 COMPLETE CBC AUTOMATED: CPT

## 2024-12-26 PROCEDURE — 700102 HCHG RX REV CODE 250 W/ 637 OVERRIDE(OP)

## 2024-12-26 PROCEDURE — 82962 GLUCOSE BLOOD TEST: CPT | Mod: 91

## 2024-12-26 RX ORDER — INSULIN LISPRO 100 [IU]/ML
1-6 INJECTION, SOLUTION INTRAVENOUS; SUBCUTANEOUS
Status: DISCONTINUED | OUTPATIENT
Start: 2024-12-26 | End: 2024-12-30 | Stop reason: HOSPADM

## 2024-12-26 RX ORDER — DEXTROSE MONOHYDRATE 25 G/50ML
25 INJECTION, SOLUTION INTRAVENOUS
Status: DISCONTINUED | OUTPATIENT
Start: 2024-12-26 | End: 2024-12-30 | Stop reason: HOSPADM

## 2024-12-26 RX ORDER — SODIUM HYPOCHLORITE 1.25 MG/ML
SOLUTION TOPICAL PRN
Status: DISCONTINUED | OUTPATIENT
Start: 2024-12-26 | End: 2024-12-30 | Stop reason: HOSPADM

## 2024-12-26 RX ADMIN — INSULIN LISPRO 2 UNITS: 100 INJECTION, SOLUTION INTRAVENOUS; SUBCUTANEOUS at 05:34

## 2024-12-26 RX ADMIN — APIXABAN 5 MG: 5 TABLET, FILM COATED ORAL at 17:16

## 2024-12-26 RX ADMIN — TRAZODONE HYDROCHLORIDE 100 MG: 100 TABLET ORAL at 21:20

## 2024-12-26 RX ADMIN — Medication 10 MG: at 21:20

## 2024-12-26 RX ADMIN — APIXABAN 5 MG: 5 TABLET, FILM COATED ORAL at 05:28

## 2024-12-26 RX ADMIN — ARIPIPRAZOLE 2 MG: 2 TABLET ORAL at 05:27

## 2024-12-26 RX ADMIN — ATORVASTATIN CALCIUM 80 MG: 80 TABLET, FILM COATED ORAL at 17:16

## 2024-12-26 RX ADMIN — DAPAGLIFLOZIN 10 MG: 10 TABLET, FILM COATED ORAL at 05:29

## 2024-12-26 RX ADMIN — BISACODYL 10 MG: 10 SUPPOSITORY RECTAL at 05:27

## 2024-12-26 RX ADMIN — INSULIN GLARGINE-YFGN 9 UNITS: 100 INJECTION, SOLUTION SUBCUTANEOUS at 18:00

## 2024-12-26 RX ADMIN — CLOPIDOGREL BISULFATE 75 MG: 75 TABLET ORAL at 05:26

## 2024-12-26 RX ADMIN — LANSOPRAZOLE 30 MG: 30 TABLET, ORALLY DISINTEGRATING ORAL at 05:27

## 2024-12-26 ASSESSMENT — COGNITIVE AND FUNCTIONAL STATUS - GENERAL
DAILY ACTIVITIY SCORE: 16
WALKING IN HOSPITAL ROOM: TOTAL
SUGGESTED CMS G CODE MODIFIER DAILY ACTIVITY: CK
DRESSING REGULAR UPPER BODY CLOTHING: A LITTLE
MOVING FROM LYING ON BACK TO SITTING ON SIDE OF FLAT BED: A LITTLE
TOILETING: A LOT
CLIMB 3 TO 5 STEPS WITH RAILING: TOTAL
TURNING FROM BACK TO SIDE WHILE IN FLAT BAD: A LITTLE
SUGGESTED CMS G CODE MODIFIER MOBILITY: CL
STANDING UP FROM CHAIR USING ARMS: A LOT
SUGGESTED CMS G CODE MODIFIER MOBILITY: CL
WALKING IN HOSPITAL ROOM: A LOT
HELP NEEDED FOR BATHING: A LOT
MOBILITY SCORE: 13
MOVING FROM LYING ON BACK TO SITTING ON SIDE OF FLAT BED: A LITTLE
STANDING UP FROM CHAIR USING ARMS: A LITTLE
DRESSING REGULAR LOWER BODY CLOTHING: TOTAL
CLIMB 3 TO 5 STEPS WITH RAILING: TOTAL
MOVING TO AND FROM BED TO CHAIR: A LOT
MOBILITY SCORE: 14
MOVING TO AND FROM BED TO CHAIR: A LOT

## 2024-12-26 ASSESSMENT — ENCOUNTER SYMPTOMS
WEAKNESS: 1
COUGH: 1
FEVER: 0
SORE THROAT: 0
HEARTBURN: 0
CHILLS: 0
SHORTNESS OF BREATH: 0
NAUSEA: 0
DIZZINESS: 0
VOMITING: 0

## 2024-12-26 ASSESSMENT — LIFESTYLE VARIABLES
TOTAL SCORE: 0
EVER FELT BAD OR GUILTY ABOUT YOUR DRINKING: NO
HAVE PEOPLE ANNOYED YOU BY CRITICIZING YOUR DRINKING: NO
CONSUMPTION TOTAL: NEGATIVE
TOTAL SCORE: 0
AVERAGE NUMBER OF DAYS PER WEEK YOU HAVE A DRINK CONTAINING ALCOHOL: 0
DOES PATIENT WANT TO STOP DRINKING: NO
ON A TYPICAL DAY WHEN YOU DRINK ALCOHOL HOW MANY DRINKS DO YOU HAVE: 0
HAVE YOU EVER FELT YOU SHOULD CUT DOWN ON YOUR DRINKING: NO
HOW MANY TIMES IN THE PAST YEAR HAVE YOU HAD 5 OR MORE DRINKS IN A DAY: 0
TOTAL SCORE: 0
EVER HAD A DRINK FIRST THING IN THE MORNING TO STEADY YOUR NERVES TO GET RID OF A HANGOVER: NO
ALCOHOL_USE: NO

## 2024-12-26 ASSESSMENT — PATIENT HEALTH QUESTIONNAIRE - PHQ9
SUM OF ALL RESPONSES TO PHQ9 QUESTIONS 1 AND 2: 0
2. FEELING DOWN, DEPRESSED, IRRITABLE, OR HOPELESS: NOT AT ALL
1. LITTLE INTEREST OR PLEASURE IN DOING THINGS: NOT AT ALL

## 2024-12-26 NOTE — DIETARY
"Nutrition Services: Follow-up for TF Weekly Update  Day 29 of admit.  Kiara Qureshi is a 59 y.o. female with admitting DX of HFrEF (heart failure with reduced ejection fraction) ([facility]) [I50.20]  Encephalopathy acute [G93.40].    Tube feeding initiated on 12/10. Current TF via (route)  NGT is Pivot 1.5 @ 40 ml/hr providing 1440 kcals, 90 g protein, and 720 ml free water daily.      Nutrition Assessment:   Height: 154.9 cm (5' 0.98\")  Weight: 96.9 kg (213 lb 10 oz)   Body mass index is 40.39 kg/m²., BMI classification: class III obese.    Weight trend: slightly up-trending- although wt is likely stable because fluctuations are likely scale inaccuracies due to all measures taken as bed scale weights.     Per last SLP assessment on 12/24, pt has improved and is appropriate for minced and moist solids/mildly thick liquids   Skin: full thickness wound groin (right and left), partial thickness wound to pannus + 2 wound vacs on groin  Pertinent Labs: Na low (134), BG high (236- worsening), lytes WNL   Pertinent Meds: dulcolax suppository, insulin, PI  Last BM: 12/24  Current feeding/diet: minced and moist solids with mildly thick liquids     Nutrition Diagnosis: (PES)      Inadequate oral intake related to intubation status as evidenced by need for nutrition support.   Resolved - pt no longer intubated    Nutrition Interventions:   Consider advancing pt to nocturnal feeds to meet 50% of EEN to help stimulate pt's appetite during the day               - EN regimen would be Pivot 1.5 @ 50 ml/hr over 10 hours                  overnight providing 750 kcal, 47 gm protein, and 375 ml FW     2.   Consider ordering a calorie count if pt intake remains <50% at each           meal over the next 2 days.   Weekly weights (standing scale if possible)   Chart all meal ADLs to help facilitate cross-communication across the interdisciplinary team      Nutrition Monitoring and Evaluation:   Monitor nutrition POC  Monitor vital " signs pertinent to nutrition     RD following

## 2024-12-26 NOTE — DISCHARGE PLANNING
Case Management Discharge Planning    Admission Date: 11/27/2024  GMLOS: 4.1  ALOS: 29    6-Clicks ADL Score: 8  6-Clicks Mobility Score: 11      Anticipated Discharge Dispo: Discharge Disposition: Disch to a long term care facility (63)  Discharge Address: [address]   [place] [address]  Discharge Contact Phone Number: [phone]    This RN CM completed chart review, patient is out of restraints, diet has been advanced with 1:1 feed. There is no need for PEG placement. Patient still has 2 wound vacs per wound note okay for regular wound vac on discharge.     This RN CM followed up with [facility] on referral, no answer. Voicemail left for follow up.     This RN CM followed up with [facility] on pending referral. Admissions asked for referral to be resent to him to review. Per Shilo, they are contracted with some [employer] insurance and will have to run it by his insurance team.     This RN CM reached out to DPA to resend referral to [facility].     [facility] is following patient case for consideration.     This RN CM asked for DPA to expand to [facility], [place] all the way from [facility] to [place].

## 2024-12-26 NOTE — DISCHARGE PLANNING
[facility] Transitional Care Coordination    Referral from: Dr. Cohen    Insurance Provider on Facesheet: ANT    Potential Rehab Diagnosis: NTBI    Chart review indicates patient may have on going medical management and may have therapy needs to possibly meet inpatient rehab facility criteria with the goal of returning to community.    D/C support will need to be verified: Spouse    Physiatry consultation pended per protocol.  Following for updated TX evals as well as a permanent source of nutrition.      Thank you for the referral.

## 2024-12-26 NOTE — CARE PLAN
The patient is Stable - Low risk of patient condition declining or worsening    Shift Goals  Clinical Goals: advance diet as tolerated.  Patient Goals: rest  Family Goals: morenita- none present    Progress made toward(s) clinical / shift goals:   Problem: Care Map:  Admission Optimal Outcome for the Heart Failure Patient  Goal: Admission:  Optimal Care of the heart failure patient  Outcome: Progressing  Note: Pt is progressing with treatment plan of advancing diet, heart sounds assessed. Pt able to participate in activity and  is at bedside helping with POC.   towards the following goals:

## 2024-12-26 NOTE — PROGRESS NOTES
Rounded with MD and pt this AM.   Plan to stop TF and increase PO intake. SLP was paged to evaluate cough with food - Per SLP, this is baseline and pt just needs to follow note advice from previous eval. Per SLP, pt is okay to continue a diet and stop TF.   Plan to increase activity now that pt is alert and oriented. PT came to reevaluate her.   Plan to have wound team reassess Wvacs and wounds. The wound team was paged and asked to assess for potential to convert to regular wound vac and to remove de jesus catheter. They responded that they would round.   MD aware of therapy responses.

## 2024-12-26 NOTE — PROGRESS NOTES
"Pt was escorted to chair x 1 with four wheel walker and Physical Therapy. Pt called roughly an hour later to move back to bed. Staff attempted to assist her, but pt became fatigued during the attempt and was assisted to sit on the floor. During her decent, she bumped her head on the bedside table.   Primary RN and charge RN were called to bedside.  Pt was able to stand and was assisted to bed by multiple staff members. Once in bed, the pt able to reposition herself and complete full ROM x 4 extremities. Pt denies pain. No new injuries noted on skin assessment. VS stable and consistent with previous values.   Charge RN updated MD and pharmacy.   MD rounded to bedside to assess. No scans, meds, labs ordered at this time.   Pt declines to update family at this time.   /66   Pulse 72   Temp 36.6 °C (97.8 °F) (Temporal)   Resp 18   Ht 1.549 m (5' 0.98\")   Wt 96.9 kg (213 lb 10 oz)   SpO2 93%   BMI 40.39 kg/m²     "

## 2024-12-26 NOTE — CARE PLAN
The patient is Watcher - Medium risk of patient condition declining or worsening    Shift Goals  Clinical Goals: blood sugars less than 150  Patient Goals: rest  Family Goals: morenita- none present    Progress made toward(s) clinical / shift goals:    Problem: Safety  Goal: Will remain free from injury  Outcome: Progressing     Problem: Skin Integrity  Goal: Risk for impaired skin integrity will decrease  Outcome: Progressing     Problem: Psychosocial  Goal: Patient's level of anxiety will decrease  Outcome: Progressing  Goal: Patient's ability to verbalize feelings about condition will improve  Outcome: Progressing  Goal: Patient's ability to re-evaluate and adapt role responsibilities will improve  Outcome: Progressing  Goal: Patient and family will demonstrate ability to cope with life altering diagnosis and/or procedure  Outcome: Progressing  Goal: Spiritual and cultural needs incorporated into hospitalization  Outcome: Progressing     Problem: Hemodynamics  Goal: Patient's hemodynamics, fluid balance and neurologic status will be stable or improve  Outcome: Progressing     Problem: Risk for Aspiration  Goal: Patient's risk for aspiration will be absent or decrease  Outcome: Progressing     Problem: Urinary - Renal Perfusion  Goal: Ability to achieve and maintain adequate renal perfusion and functioning will improve  Outcome: Progressing     Problem: Venous Thromboembolism (VTE) Prevention  Goal: The patient will remain free from venous thromboembolism (VTE)  Outcome: Progressing     Problem: Nutrition  Goal: Patient's nutritional and fluid intake will be adequate or improve  Outcome: Progressing  Goal: Enteral nutrition will be maintained or improve  Outcome: Progressing     Problem: Urinary Elimination  Goal: Establish and maintain regular urinary output  Outcome: Progressing     Problem: Bowel Elimination  Goal: Establish and maintain regular bowel function  Outcome: Progressing     Problem: Care Map:  Admission  Optimal Outcome for the Heart Failure Patient  Goal: Admission:  Optimal Care of the heart failure patient  Outcome: Progressing     Problem: Care Map:  Day 1 Optimal Outcome for the Heart Failure Patient  Goal: Day 1:  Optimal Care of the heart failure patient  Outcome: Progressing     Problem: Care Map:  Day 2 Optimal Outcome for the Heart Failure Patient  Goal: Day 2:  Optimal Care of the heart failure patient  Outcome: Progressing     Problem: Care Map:  Day 3 Optimal Outcome for the Heart Failure Patient  Goal: Day 3:  Optimal Care of the heart failure patient  Outcome: Progressing     Problem: Care Map:  Day Before Discharge Optimal Outcome for the Heart Failure Patient  Goal: Day Before Discharge:  Optimal Care of the heart failure patient  Outcome: Progressing     Problem: Care Map:  Day of Discharge Optimal Outcome for the Heart Failure Patient  Goal: Day of Discharge:  Optimal Care of the heart failure patient  Outcome: Progressing

## 2024-12-26 NOTE — PROGRESS NOTES
Hospital Medicine Daily Progress Note    Date of Service  12/26/2024    Chief Complaint  Kiara Qureshi is a 59 y.o. female admitted 11/27/2024 with encephalopathy    Hospital Course  58 yo woman HTN, HLD who was at [facility] for anterior STEMI s/p lytics and had LAD PCI and unsuccessful PCI for OM1 complicated by RP bleed, vfib and PEA arrest with cardiogenic shock needing ECMO and imeplla.      ECMO was decannulated 11/4, Impella removed 11/8.      She also had encephalopathy and delirium on valproic acid and Seroquel.      TTE showed EF 25-30%, she did not tolerate GDMT due to hypotension.    She had a CT chest on 11/25 that showed focal aortic mural thrombus versus ulcerated plaque to descending thoracic aorta and CTA AP with rim-enhancing RP hematoma and small thrombus in right common iliac artery, multiple nonenhancing fluid collections and subcu tissue in right lower and right inguinal regions, possible evolving hematomas and left femoral vein.  She was started on Vanco and Zosyn and transferred back to [facility].    She completed linezolid and Zosyn around 12/1.  Cardiology was consulted for medical management of her heart failure. Dr. Conley with vascular surgery was consulted, recommended wound vacs to groin wounds.  On 12/9 she became more altered with respiratory distress and transferred to [facility].  CT head was negative for acute changes.  EEG was negative for seizure activity.  She was high on high dose of Seroquel that was tapered off.  She remains on NG tube for tube feeds. Speech therapy saw patient and patient diet was advance and currently tolerating PO intake. Holding on PEG and NG tube feeding       Interval Problem Update  12/26:  Patient was seen and evaluated at the bedside. Had fall in the morning when standing. No trauma. Currently monitoring. PT is recommending post acute placement. Reports mild cough. No fever or white counts.   has NG tube in place  She is also tolerating diet orally.  Since patient is improving in oral intake will hold on PEG tube for now  Hemodynamically stable.  On room air.  Holding BB and lasix due to soft BP          I have discussed this patient's plan of care and discharge plan at IDT rounds today with Case Management, Nursing, Nursing leadership, and other members of the IDT team.    Consultants/Specialty  cardiology and vascular surgery, palliative care    Code Status  DNAR, I OK    Disposition  Not Medically Cleared  I have placed the appropriate orders for post-discharge needs.    Review of Systems  Review of Systems   Constitutional:  Negative for chills and fever.   HENT:  Negative for congestion and sore throat.    Respiratory:  Positive for cough. Negative for shortness of breath.    Cardiovascular:  Negative for chest pain and leg swelling.   Gastrointestinal:  Negative for heartburn, nausea and vomiting.   Genitourinary:  Negative for dysuria, frequency and urgency.   Neurological:  Positive for weakness. Negative for dizziness.        Physical Exam  Temp:  [36.4 °C (97.6 °F)-36.8 °C (98.3 °F)] 36.6 °C (97.9 °F)  Pulse:  [66-76] 76  Resp:  [18] 18  BP: ()/(52-73) 110/70  SpO2:  [93 %-99 %] 97 %    Physical Exam  Constitutional:       Appearance: She is ill-appearing.   HENT:      Head: Normocephalic.      Mouth/Throat:      Mouth: Mucous membranes are dry.   Eyes:      Extraocular Movements: Extraocular movements intact.      Pupils: Pupils are equal, round, and reactive to light.   Cardiovascular:      Rate and Rhythm: Normal rate and regular rhythm.   Pulmonary:      Breath sounds: Rhonchi present.   Abdominal:      General: There is no distension.      Palpations: Abdomen is soft.      Tenderness: There is no abdominal tenderness.   Musculoskeletal:      Right lower leg: No edema.      Left lower leg: No edema.      Comments: Wound vacs bilaterally to groin   Neurological:      Mental Status: She is oriented to person, place, and time. Mental status is  at baseline.      Comments:            Fluids    Intake/Output Summary (Last 24 hours) at 12/26/2024 1357  Last data filed at 12/26/2024 1325  Gross per 24 hour   Intake 1240 ml   Output 2150 ml   Net -910 ml        Laboratory  Recent Labs     12/24/24  0930 12/25/24  0126 12/26/24  0443   WBC 7.8 6.8 5.9   RBC 3.60* 3.39* 3.43*   HEMOGLOBIN 11.9* 11.1* 11.4*   HEMATOCRIT 38.3 35.2* 35.8*   .4* 103.8* 104.4*   MCH 33.1* 32.7 33.2*   MCHC 31.1* 31.5* 31.8*   RDW 73.9* 70.1* 69.6*   PLATELETCT 230 234 219   MPV 12.1 12.4 12.2       Recent Labs     12/24/24  0930 12/25/24  0126 12/26/24  0443   SODIUM 137 135 134*   POTASSIUM 4.5 4.5 4.3   CHLORIDE 107 105 105   CO2 21 20 21   GLUCOSE 200* 215* 236*   BUN 27* 28* 28*   CREATININE 0.81 0.87 0.75   CALCIUM 8.3* 8.0* 8.2*                   Imaging  DX-ABDOMEN FOR TUBE PLACEMENT   Final Result         1.  Nonspecific bowel gas pattern in the upper abdomen.   2.  Dobbhoff tube tip overlying the expected location of the pylorus or first duodenal segment.   3.  Bilateral lower lobe atelectasis and/or infiltrates      DX-ABDOMEN FOR TUBE PLACEMENT   Final Result         1.  Nonspecific bowel gas pattern in the upper abdomen.   2.  Dobbhoff tube tip overlying the expected location of the pylorus or first duodenal segment.   3.  Pulmonary edema and/or infiltrates.   4.  Trace bilateral pleural effusions.      DX-ABDOMEN FOR TUBE PLACEMENT   Final Result      1. Repositioning of the esophagogastric tube, terminating over the fundus of the stomach.   2. Interstitial edema, left retrocardiac opacity and small left pleural effusion.      DX-ABDOMEN FOR TUBE PLACEMENT   Final Result         1.  Nonspecific bowel gas pattern in the upper abdomen.   2.  Dobbhoff tube tip terminates overlying the expected location of the gastric antrum or pylorus.   3.  Pulmonary edema and/or infiltrates, similar to prior study      DX-ABDOMEN FOR TUBE PLACEMENT   Final Result      The tip of the  enteric tube terminates over the antrum of the stomach.      DX-ABDOMEN FOR TUBE PLACEMENT   Final Result         1.  Nonspecific bowel gas pattern in the upper abdomen.   2.  Dobbhoff tube is coiled within the duodenum, the tip terminates overlying the expected location of the gastric body.   3.  Bilateral lower lobe edema and/or infiltrates.      DX-ABDOMEN FOR TUBE PLACEMENT   Final Result      NG tube tip projects over the gastroduodenal junction.      DX-CHEST-LIMITED (1 VIEW)   Final Result      1.  Mild cardiomegaly with evidence of mild to moderate pulmonary edema with minimal worsening since previous exam.      CT-HEAD W/O   Final Result         1.  No acute intracranial abnormality is identified, there are nonspecific white matter changes, commonly associated with small vessel ischemic disease.  Associated mild cerebral atrophy is noted.   2.  Bilateral sinusitis changes   3.  Atherosclerosis.               DX-CHEST-PORTABLE (1 VIEW)   Final Result         1.  Mild pulmonary edema and/or infiltrates.      EC-ECHOCARDIOGRAM COMPLETE W/ CONT   Final Result      DX-ABDOMEN FOR TUBE PLACEMENT   Final Result      Tip of the esophagogastric tube terminates over the pylorus of the stomach.      IR-MIDLINE CATHETER INSERTION WO GUIDANCE > AGE 3   Final Result                  Ultrasound-guided midline placement performed by qualified nursing staff    as above.          IR-US GUIDED PIV   Final Result    Ultrasound-guided PERIPHERAL IV INSERTION performed by    qualified nursing staff as above.      DX-OUTSIDE IMAGES-DX CHEST   Final Result      DX-OUTSIDE IMAGES-DX ABDOMEN   Final Result      MR-OUTSIDE IMAGES-MR BRAIN   Final Result      CT-OUTSIDE IMAGES-CT HEAD   Final Result      CT-OUTSIDE IMAGES-CT ABDOMEN/PELVIS   Final Result      CT-OUTSIDE IMAGES-CT CHEST   Final Result      DX-ABDOMEN FOR TUBE PLACEMENT   Final Result      There is a new small bowel feeding tube which terminates in the small bowel of  the right mid abdomen.      DX-CHEST-LIMITED (1 VIEW)   Final Result         Asymmetric pulmonary infiltrates, left worse than right.      IR-US GUIDED PIV    (Results Pending)        Assessment/Plan  * Heart failure with reduced ejection fraction (HCC)- (present on admission)  Assessment & Plan  ICM LVEF 25-30%  S/p revascularization  Soft pressures limiting titration of GDMT.  Farxiga  Holding losartan for low BP  Critical care had recommended holding bisoprolol because of Cheyne-Lo breathing  Dehydrated, holding Lasix    Circulating anticoagulant disorder (HCC)  Assessment & Plan  On Eliquis    Functional quadriplegia (HCC)  Assessment & Plan  In the setting of AMS, prior PEA    Macrocytic anemia  Assessment & Plan  Stable. No overt bleeding  B12 wnl. Studies more consistent with anemia of chronic disease    Hypernatremia  Assessment & Plan  Resolved     Acute hypoxic respiratory failure (HCC)  Assessment & Plan  In setting of heart failure, recent cardiogenic shock  Patient demonstrates cheynne lo breathing pattern which is consistent with her suspected anoxic brain injury  Patient appears to be experiencing aspiration events causing bradycardia  Now on room air   RT protocol     Constipation  Assessment & Plan  Bowel protocol       Hypotension  Assessment & Plan  BP low range, holding losartan and bisoprolol and lasix    Abdominal hematoma  Assessment & Plan  Might be related to cardiac device  Clinically stable and h/h in stable range  Patient was on lovenox due to rigth common iliac art thrombosis and then transitioned to eliquis per [facility]  - plan to continue for 3-4 months with repeat imaging at that time to see if it can be d/c   Hgb stable. Continue to monitor    ACP (advance care planning)  Assessment & Plan  Dr. Mobley: Discussed plan of care and code status with patient  Bruno ([phone]) and sister-in-law Laina ([phone]). I explained that Kiara has experienced a suspected anoxic  brain injury during her current medical course that has required ECMO and impella, CVA, Vfib arrest. I shared my concern that Laina may not make a meaningful recovery from this and that her current and future quality of life appears limited. I explained that in the event of another CP arrest her quality of life would be further impacted in the event she were to survive such an episode. Laina and Bruno made the decision to change Kiara's code status to DNR, yes to intubaiton. Laina will be driving in from [place] in the coming days to have further goals of care conversations. Total of 48 minutes spent in discussion and coordination of advance care planning.    12/16 - my conversation with Laina and Bruno, they will like to continue care and plan for PAMs    Pneumonia  Assessment & Plan  S/p completed course of pip/tazo and vanc  Cultures were negative    Aortic mural thrombus (HCC)  Assessment & Plan  CT at outside facility showing focal aortic mural thrombus along the lateral wall of the descending thoracic aorta and also a tiny filling defect/mural thrombus within the posterior right common iliac artery  Continue on apixaban  Continue to monitor CBC    Acute metabolic encephalopathy  Assessment & Plan  Hospital course complicated by encephalopathy.  Likely from combination of CVA, STEMI, hyperglycemia; likely also with anoxic brain injury   CTH no acute findings. EEG neg for seizure activity  Discontinue valproic acid   On abilify   On trazodone   Psychiatry following appreciate the rec   Mentation improved significantly       CVA (cerebral vascular accident) (HCC)  Assessment & Plan  Patient had MRI on 11/17/24 at [facility] which showed Punctate acute infarct of the right centrum semiovale. Scattered foci of enhancement in multiple vascular distributions as described may represent recent subacute infarcts. Diffuse stippled susceptibility signal throughout the supratentorial and infratentorial brain. Overall findings  may represent sequelae of fat emboli or amyloid related angiopathy.   CTH no acute findings. EEG neg for seizure activity  High dose statin  Apixaban  plavix    CAD (coronary artery disease)  Assessment & Plan  Patient with recent complicated STEMI with PCI to LAD and mid LAD.  Complicated by V-fib arrest and cardiogenic shock requiring VA ECMO and Impella and retroperitoneal hematomas. At [facility] underwent impella supported PCI to mid LAD just distal to prior stent but unsuccessful with PCI to 100% occluded OM1 on 11/6. Patient was decannulated and had Impella removed.    Discussed with cardiology and asa stopped and replaced with plavix. Cont statin  Repeat echo shows persistently low EF, 25-30%  Cardiology had recommended bisoprolol but due to cheyne florez critical care recommends we stop this - so will hold   Continue to hold losartan for low BP and lasix     Urinary retention  Assessment & Plan  Beckwith catheter in place    Hyperglycemia  Assessment & Plan  Glargine 9 units  ISS and hypoglycemia protocol     Metabolic acidemia- (present on admission)  Assessment & Plan  Improved         VTE prophylaxis: eliquis     I have performed a physical exam and reviewed and updated ROS and Plan today (12/26/2024). In review of yesterday's note (12/25/2024), there are no changes except as documented above.    Patient has a high medical complexity, complex decision making and is at high risk of complication, morbidity, and mortality    Greater than 51 minutes spent prepping to see patient (e.g. review of tests) obtaining and/or reviewing separately obtained history. Performing a medically appropriate examination and/ evaluation.  Counseling and educating the patient/family/caregiver.  Ordering medications, tests, or procedures.  Referring and communicating with other health care professionals.  Documenting clinical information in EPIC.  Independently interpreting results and communicating results to patient/family/caregiver.   Care coordination

## 2024-12-26 NOTE — CARE PLAN
The patient is Watcher - Medium risk of patient condition declining or worsening    Shift Goals  Clinical Goals: blood sugars less than 150  Patient Goals: rest  Family Goals: morenita- none present    Progress made toward(s) clinical / shift goals:  NA    Patient is not progressing towards the following goals: Blood sugars higher than 150. Insulins given per order. Glucose checks q 6 hours.

## 2024-12-26 NOTE — PROGRESS NOTES
Monitor Summary  Rhythm: Acc. Junctional w/ BBB @ 68bpm converted to Afib w/ BBB @ 2000 rate 73-90bpm    Ectopy: Occ. PVC    Intervals: -/.16/.45

## 2024-12-26 NOTE — THERAPY
"Physical Therapy   Daily Treatment     Patient Name: Kiara Qureshi  Age:  59 y.o., Sex:  female  Medical Record #: [id number]  Today's Date: 12/26/2024     Precautions  Precautions: Fall Risk;Swallow Precautions  Comments: wound VACs x2    Assessment    Pt was agreeable to PT.  Frequency has been increased as she is following commands and assisting with mobility.  She was able to come to the EOB with SBA.  Pt required maxA to get in to the chair d/t the L leg buckling and loss of balance.  Pt required 3 attempts to complete sit > stand and braced her legs against the bed.  She is most limited by weakness.  Pt will continue to follow.  Recommend post acute PT services.    Plan    Treatment Plan Status: (P) Modify Current Treatment Plan  Type of Treatment: (P) Bed Mobility, Equipment, Family / Caregiver Training, Gait Training, Manual Therapy, Neuro Re-Education / Balance, Self Care / Home Evaluation, Therapeutic Activities, Therapeutic Exercise  Treatment Frequency: (P) 4 Times per Week (frequency increased as pt is now participating in therapeutic activity.)  Treatment Duration: (P) Until Therapy Goals Met    DC Equipment Recommendations: (P) Unable to determine at this time  Discharge Recommendations: (P) Recommend post-acute placement for additional physical therapy services prior to discharge home      Subjective    \"I sat on the side of the bed for 35 minutes this morning.\"     Objective       12/26/24 0941   Precautions   Precautions Fall Risk;Swallow Precautions   Comments wound VACs x2   Vitals   O2 Delivery Device None - Room Air   Pain 0 - 10 Group   Therapist Pain Assessment Post Activity Pain Same as Prior to Activity  (no complaints of pain)   Cognition    Level of Consciousness Alert   Balance   Sitting Balance (Static) Fair -   Sitting Balance (Dynamic) Fair -   Standing Balance (Static) Trace +   Standing Balance (Dynamic) Trace   Weight Shift Sitting Fair   Weight Shift Standing Poor "   Comments with FWW   Bed Mobility    Supine to Sit Standby Assist   Scooting Standby Assist   Rolling Standby Assist   Skilled Intervention Verbal Cuing   Comments HOB partially elevated, rail   Gait Analysis   Deviation   (L knee buckled, LOB posteior and L with maxA to recover during pivot)   Comments pivot steps only   Functional Mobility   Sit to Stand Minimal Assist  (VCs hand placement, brace legs against bed, 3 attempts to complete)   Bed, Chair, Wheelchair Transfer Maximal Assist   Transfer Method Stand Step   Mobility with rollator   Skilled Intervention Facilitation;Sequencing;Verbal Cuing   Activity Tolerance   Sitting in Chair post session   Short Term Goals    Short Term Goal # 1 pt will move supine<>eob with spv in 6 tx for bed mobility.   Goal Outcome # 1 goal not met   Short Term Goal # 2 pt will complete spt with fww and spv in 6 tx for functional mobility.   Goal Outcome # 2 Goal not met   Short Term Goal # 3 pt will ambulate 150 ft with fww and spv in 6 tx for household distances.   Goal Outcome # 3 Goal not met   Education Group   Education Provided Transfer Status   Transfer Status Patient Response Patient;Acceptance;Explanation;Action Demonstration;Reinforcement Needed   Physical Therapy Treatment Plan   Physical Therapy Treatment Plan Modify Current Treatment Plan   Treatment Plan  Bed Mobility;Equipment;Family / Caregiver Training;Gait Training;Manual Therapy;Neuro Re-Education / Balance;Self Care / Home Evaluation;Therapeutic Activities;Therapeutic Exercise   Treatment Frequency 4 Times per Week  (frequency increased as pt is now participating in therapeutic activity.)   Duration Until Therapy Goals Met   Anticipated Discharge Equipment and Recommendations   DC Equipment Recommendations Unable to determine at this time   Discharge Recommendations Recommend post-acute placement for additional physical therapy services prior to discharge home   Interdisciplinary Plan of Care Collaboration    IDT Collaboration with  Nursing   Patient Position at End of Therapy Seated;Chair Alarm On;Call Light within Reach;Tray Table within Reach   Collaboration Comments RN updated   Session Information   Date / Session Number  12/26 -6 (1/4, 12/31)

## 2024-12-27 LAB
GLUCOSE BLD STRIP.AUTO-MCNC: 102 MG/DL (ref 65–99)
GLUCOSE BLD STRIP.AUTO-MCNC: 104 MG/DL (ref 65–99)
GLUCOSE BLD STRIP.AUTO-MCNC: 80 MG/DL (ref 65–99)
GLUCOSE BLD STRIP.AUTO-MCNC: 97 MG/DL (ref 65–99)

## 2024-12-27 PROCEDURE — A9270 NON-COVERED ITEM OR SERVICE: HCPCS | Performed by: HOSPITALIST

## 2024-12-27 PROCEDURE — 700102 HCHG RX REV CODE 250 W/ 637 OVERRIDE(OP): Performed by: STUDENT IN AN ORGANIZED HEALTH CARE EDUCATION/TRAINING PROGRAM

## 2024-12-27 PROCEDURE — 770004 HCHG ROOM/CARE - ONCOLOGY PRIVATE *

## 2024-12-27 PROCEDURE — 700101 HCHG RX REV CODE 250: Performed by: STUDENT IN AN ORGANIZED HEALTH CARE EDUCATION/TRAINING PROGRAM

## 2024-12-27 PROCEDURE — 82962 GLUCOSE BLOOD TEST: CPT | Mod: 91

## 2024-12-27 PROCEDURE — 99233 SBSQ HOSP IP/OBS HIGH 50: CPT | Performed by: STUDENT IN AN ORGANIZED HEALTH CARE EDUCATION/TRAINING PROGRAM

## 2024-12-27 PROCEDURE — A9270 NON-COVERED ITEM OR SERVICE: HCPCS | Performed by: STUDENT IN AN ORGANIZED HEALTH CARE EDUCATION/TRAINING PROGRAM

## 2024-12-27 PROCEDURE — A9270 NON-COVERED ITEM OR SERVICE: HCPCS

## 2024-12-27 PROCEDURE — 700102 HCHG RX REV CODE 250 W/ 637 OVERRIDE(OP)

## 2024-12-27 PROCEDURE — 99418 PROLNG IP/OBS E/M EA 15 MIN: CPT | Performed by: STUDENT IN AN ORGANIZED HEALTH CARE EDUCATION/TRAINING PROGRAM

## 2024-12-27 PROCEDURE — 99223 1ST HOSP IP/OBS HIGH 75: CPT | Performed by: STUDENT IN AN ORGANIZED HEALTH CARE EDUCATION/TRAINING PROGRAM

## 2024-12-27 PROCEDURE — 700102 HCHG RX REV CODE 250 W/ 637 OVERRIDE(OP): Performed by: HOSPITALIST

## 2024-12-27 RX ADMIN — ARIPIPRAZOLE 2 MG: 2 TABLET ORAL at 06:22

## 2024-12-27 RX ADMIN — APIXABAN 5 MG: 5 TABLET, FILM COATED ORAL at 06:22

## 2024-12-27 RX ADMIN — INSULIN GLARGINE-YFGN 9 UNITS: 100 INJECTION, SOLUTION SUBCUTANEOUS at 17:57

## 2024-12-27 RX ADMIN — APIXABAN 5 MG: 5 TABLET, FILM COATED ORAL at 18:02

## 2024-12-27 RX ADMIN — BISACODYL 10 MG: 10 SUPPOSITORY RECTAL at 06:22

## 2024-12-27 RX ADMIN — SODIUM HYPOCHLORITE 473 ML: 1.25 SOLUTION TOPICAL at 01:38

## 2024-12-27 RX ADMIN — TRAZODONE HYDROCHLORIDE 100 MG: 100 TABLET ORAL at 20:17

## 2024-12-27 RX ADMIN — LANSOPRAZOLE 30 MG: 30 TABLET, ORALLY DISINTEGRATING ORAL at 06:22

## 2024-12-27 RX ADMIN — CLOPIDOGREL BISULFATE 75 MG: 75 TABLET ORAL at 06:22

## 2024-12-27 RX ADMIN — ATORVASTATIN CALCIUM 80 MG: 80 TABLET, FILM COATED ORAL at 18:01

## 2024-12-27 RX ADMIN — Medication 10 MG: at 20:17

## 2024-12-27 RX ADMIN — DAPAGLIFLOZIN 10 MG: 10 TABLET, FILM COATED ORAL at 06:22

## 2024-12-27 ASSESSMENT — ENCOUNTER SYMPTOMS
VOMITING: 0
CHILLS: 0
HEARTBURN: 0
SHORTNESS OF BREATH: 0
SORE THROAT: 0
WEAKNESS: 1
DIZZINESS: 0
COUGH: 1
NAUSEA: 0
FEVER: 0

## 2024-12-27 ASSESSMENT — PAIN DESCRIPTION - PAIN TYPE: TYPE: ACUTE PAIN

## 2024-12-27 NOTE — CONSULTS
Physical Medicine and Rehabilitation Consultation              Date of initial consultation: 12/27/2024  Requesting provider: Caitlin Cohen M.D.  Consulting provider: Ryan Sterling D.O.  Reason for consultation: assess for acute inpatient rehab appropriateness  LOS: 30 Day(s)    Chief complaint: weakness    HPI: The patient is a 59 y.o.  female with a past medical history of HTN, HLD.  0Initially presented 12/29/24 to [facility] with chest pain. EKG consistent with anterior wall STEMI, was given TNK, aspirin, plavix and heparin drip and transferred to [facility] for higher level of care. She had urgent LHC with PCI to LAD. LHC complicated by brief episode of VF and PEA arrest (<2 min). Due to profound cardiogenic shock, impella was placed and she was transferred back to ICU. She required significant vasopressors, and later required ECMO. Found to have retroperitoneal bleed, and required blood transfusions. She was transferred to [facility] for higher level of care. ECMO decannulation on 11/4/24 and L femoral embolectomy, PCI to the LAD, unsuccessful PCI to OM1, and impella removal on 11/8/24. Complicated by delirium and encephalopathy managed with valproate, quetiapine, and haldol. Noted to have punctate acute infarct of her R centrum semiovale, scattered foci of enhancement likely representing subacute infarcts. She was also found to have pneumonia and started on vanc and zosyn on 11/26. She was transferred back to [facility] on 11/27. She completed linezolid and zosyn on 12/1. Found to have groin wounds, treated with wound vacs. Became more altered with respiratory distress on 12/9, and transferred to [facility]. Patient has improved overall but remains very debilitated. PM&R consulted to evaluate for possible inpatient rehab admission.      Current labs remarkable for hgb 11.4, Na 134, glucose 236. Vitals borderline low BP, but wnl today.     On my evaluation, the patient is complaining of generalized  weakness. She is oriented x 4 and answering questions appropriately. Patient denies fevers, chills, headache, chest pain, shortness of breath, abdominal pain, nausea, vomiting, dysuria.   Rehab options discussed, she is amenable to IRF if that is needed.     Social Hx:  Patient lives in a [facility] with spouse in [place], [place].  Spouse works part time, schedule is 8am-2pm 3 days per week. She also has a brother and mother that live within 10 minutes that can provide help.   1 KEILY  At prior level of function, patient was independent.     Employment: 3-.     THERAPY:  PT: Functional mobility   : unable to ambulate or transfer. Sit to stand Max A, bed mobility Max A.   : not ambulating, sit to stand min A, bed/chair transfer Max A.     OT: ADLs  : bed mobility total A, total A for all ADLs.   : Grooming mod assist, upper body dressing max assist.    SLP:   : minced and moist solids, mildly thick liquids    IMAGIN2024: CXR  IMPRESSION:  Asymmetric pulmonary infiltrates, left worse than right.    2024: Echocardiogram  CONCLUSIONS  Compared to the prior study on 10/30/24, impella has been removed.   No apical thrombus seen. The left ventricle is mildly dilated. Mild   concentric left ventricular hypertrophy. Moderately reduced left   ventricular systolic function. The left ventricular ejection fraction   is visually estimated to be 25-30%. Global hypokinesis with akinesis of   the apex with preserved LV systolic function of the basal wall segments   suggestive of Takotsubo cardiomyopathy with mild dilatation.  Moderately dilated left atrium  Mild mitral regurgitation.  Mild tricuspid regurgitation with normal right sided pressures    2024: CT head  IMPRESSION:  1.  No acute intracranial abnormality is identified, there are nonspecific white matter changes, commonly associated with small vessel ischemic disease.  Associated mild cerebral atrophy is noted.  2.  Bilateral  sinusitis changes  3.  Atherosclerosis.    PROCEDURES:  12/10/2024: Video EEG: Miles Camilo MD  Abnormal video EEG recording in the awake and drowsy state(s):  -Mild background slowing suggestive of diffuse/multifocal cerebral dysfunction and consistent with a non-specific encephalopathy. Clinical correlation recommended.   -Frequent bitemporal independent as well as synchronous slowing suggest of nonspecific dysfunction in these regions.   -Epileptiform discharges : No epileptiform discharges were seen.  -No seizures.   -Clinical Events: None    PMH:  Past Medical History:   Diagnosis Date    Dyslipidemia 7/5/2016    Essential hypertension 7/5/2016    Obesity, morbid, BMI 40.0-49.9 ([facility]) 7/5/2016    Type II diabetes mellitus, well controlled ([facility]) 7/5/2016       PSH:  Past Surgical History:   Procedure Laterality Date    INSERTION,CANNULA FOR ECMO,ADULT Left 10/30/2024    Procedure: INSERTION, CANNULA, FOR ECMO, ADULT;  Surgeon: Aime Elias D.O.;  Location: SURGERY [facility];  Service: Cardiothoracic       FHX:  Family History   Problem Relation Age of Onset    Heart Disease Mother         AF and CHF    Heart Disease Father 70        PCI, also aneurysm but no surgery    Heart Attack Paternal Grandmother 49        MI       Medications:  Current Facility-Administered Medications   Medication Dose    dakins 0.125% (1/4 strength) topical soln      insulin GLARGINE (Lantus,Semglee) injection  9 Units    And    insulin lispro (HumaLOG,AdmeLOG) subcutaneous injection  1-6 Units    And    dextrose 50% (D50W) injection 25 g  25 g    traZODone (Desyrel) tablet 100 mg  100 mg    ARIPiprazole (Abilify) tablet 2 mg  2 mg    hydrOXYzine HCl (Atarax) tablet 10 mg  10 mg    haloperidol lactate (Haldol) injection 1 mg  1 mg    sore throat spray (Chloraseptic) 1 Spray  1 Spray    melatonin tablet 10 mg  10 mg    apixaban (Eliquis) tablet 5 mg  5 mg    clopidogrel (Plavix) tablet 75 mg  75 mg    dapagliflozin propanediol  "(Farxiga) tablet 10 mg  10 mg    lansoprazole (Prevacid) solutab 30 mg  30 mg    bisacodyl (Dulcolax) suppository 10 mg  10 mg    [Held by provider] bisoprolol (Zebeta) tablet 2.5 mg  2.5 mg    [Held by provider] losartan (Cozaar) tablet 12.5 mg  12.5 mg    oxyCODONE immediate-release (Roxicodone) tablet 2.5 mg  2.5 mg    Or    oxyCODONE immediate-release (Roxicodone) tablet 5 mg  5 mg    ondansetron (Zofran ODT) dispertab 4 mg  4 mg    promethazine (Phenergan) tablet 12.5-25 mg  12.5-25 mg    Pharmacy Consult: Enteral tube insertion - review meds/change route/product selection  1 Each    labetalol (Normodyne/Trandate) injection 10 mg  10 mg    ondansetron (Zofran) syringe/vial injection 4 mg  4 mg    promethazine (Phenergan) suppository 12.5-25 mg  12.5-25 mg    atorvastatin (Lipitor) tablet 80 mg  80 mg    [Held by provider] furosemide (Lasix) tablet 20 mg  20 mg       Allergies:  No Known Allergies      Physical Exam:  Vitals: /54   Pulse 65   Temp 36.5 °C (97.7 °F) (Temporal)   Resp 17   Ht 1.549 m (5' 0.98\")   Wt 96.9 kg (213 lb 10 oz)   SpO2 93%   Gen: NAD  Head: Normocephalic, atraumatic  Eyes/ Nose/ Mouth: PERRL, moist mucous membranes  Cardio: good distal perfusion, warm extremities  Pulm: normal respiratory effort, no cyanosis   Abd: Soft, Nontender, Nondistended   Ext: No peripheral edema. No calf tenderness. No clubbing.    Mental status: Alert. Oriented to person, place, month and year.   Speech: fluent, no aphasia or dysarthria. Somewhat delayed responses to questions.     CRANIAL NERVES:  2,3: visual acuity grossly intact, PERRL  3,4,6: EOMI bilaterally, no nystagmus or diplopia  5: sensation intact to light touch bilaterally and symmetric  7: no facial asymmetry  8: hearing grossly intact  9,10: symmetric palate elevation  11: Shoulder shrug present bilaterally  12: tongue protrudes midline    Motor:      Upper Extremity  Myotome R L   Shoulder flexion C5 4+/5 4+/5   Elbow flexion C5 4+/5 " 4+/5   Wrist extension C6 4+/5 4+/5   Elbow extension C7 4+/5 4+/5   Finger flexion C8 4+/5 4+/5   Finger abduction T1 4+/5 4+/5     Lower Extremity Myotome R L   Hip flexion L2 4+/5 4+/5   Knee extension L3 4+/5 4+/5   Ankle dorsiflexion L4 4+/5 4+/5   Toe extension L5 4+/5 4+/5   Ankle plantarflexion S1 4+/5 4+/5     Sensory:   intact to light touch through out arms and legs  impaired proprioception at bilateral great toes, worse on the right    DTRs: 2+ in bilateral  biceps, 2+ in bilateral patellar tendons  No clonus at bilateral ankles  Negative babinski b/l  Negative Bonilla b/l     Tone: no spasticity noted, no cogwheeling noted    Coordination:   impaired finger to nose bilaterally  Impaired fine motor with fingers bilaterally    Labs: Reviewed and significant for   Recent Labs     12/24/24  0930 12/25/24  0126 12/26/24  0443   RBC 3.60* 3.39* 3.43*   HEMOGLOBIN 11.9* 11.1* 11.4*   HEMATOCRIT 38.3 35.2* 35.8*   PLATELETCT 230 234 219     Recent Labs     12/24/24  0930 12/25/24  0126 12/26/24  0443   SODIUM 137 135 134*   POTASSIUM 4.5 4.5 4.3   CHLORIDE 107 105 105   CO2 21 20 21   GLUCOSE 200* 215* 236*   BUN 27* 28* 28*   CREATININE 0.81 0.87 0.75   CALCIUM 8.3* 8.0* 8.2*     Recent Results (from the past 24 hours)   POCT glucose device results    Collection Time: 12/26/24 11:51 AM   Result Value Ref Range    POC Glucose, Blood 139 (H) 65 - 99 mg/dL   POCT glucose device results    Collection Time: 12/26/24  5:00 PM   Result Value Ref Range    POC Glucose, Blood 118 (H) 65 - 99 mg/dL   POCT glucose device results    Collection Time: 12/26/24  9:25 PM   Result Value Ref Range    POC Glucose, Blood 86 65 - 99 mg/dL   POCT glucose device results    Collection Time: 12/27/24  6:20 AM   Result Value Ref Range    POC Glucose, Blood 104 (H) 65 - 99 mg/dL         ASSESSMENT:  Patient is a 59 y.o. female admitted with STEMI with a protracted hospital course requiring ECMO and Impella. Imaging revealed CVA as well.      [facility] Code / Diagnosis to Support: 0001.3 - Stroke: Bilateral Involvement and 0002.1 - Brain Dysfunction: Non-Traumatic  See DISPO details below for recommendations on appropriate level of rehab for this diagnosis.    Barriers to transfer include: Insurance authorization, TCCs to verify disposition, medical clearance and bed availability     Assessment and Plan:    DISPO:  - patient is a good candidate for IRF. PENDING improved tolerance to therapy (will need to be able to take a few steps with therapy). Will need to confirm DC support from spouse and family.   - prior to acceptance to IRF, will need insurance auth    - TCC to assist with insurance auth and DC support    - PMR to follow in the periphery for rehab appropriateness, please reach out with questions or request for medical management     Medical Complexity:    Impaired mobility and ADLs  -multifactorial from STEMI, anoxic brain injury, CVA, encephalopathy, pneumonia and prolonged hospital course.   -PT and OT while in-house     STEMI  -s/p PCI to LAD. Complicated by V-Fib and PEA arrest. Required ECMO and Impella due to cardiogenic shock.   -now off ECMO and Impella  -atrovastatin and plavix    Anoxic brain injury  Metabolic encephalopathy  Delirium  -anoxia from cardiogenic shock  -required seroquel, valproate and haldol to control delirium at OSH.   -mental status seems improved  -Abilify 2mg daily  -melatonin 10mg nightly  -trazodone 100mg nightly    HFrEF  -EF 25-30%  -soft pressure limiting GDMT  -farxiga 10mg  -no losartan due to hypotension  -no bisoprolol due to Chyne-florez breathing  -no lasix due to dehydration    Acute respiratory failure  -Cheyne-florez breathing pattern, thought to be from anoxic brain injury  -concern for aspiration   -now on room air    Pneumonia  -completed linezolid and zosyn 12/1    Dysphagia  -up to minced and moist diet with mildly thick liquids  -continue SLP    CVA  -MRI on 11/17/2024 at OSH: Punctate acute  infarct of the right centrum semiovale. Scattered foci of enhancement in multiple vascular distributions as described may represent recent subacute infarcts. Diffuse stippled susceptibility signal throughout the supratentorial and infratentorial brain. Overall findings may represent sequelae of fat emboli or amyloid related angiopathy.   -Eliquis 5mg BID  -atorvastatin 80mg, plavix    Retroperitoneal bleed  -required several transfusions.   -hgb now stable    Hypotension, History of HTN  -losartan on hold    Hyperglycemia, DM  -last A1c 6.2 on 11/1.   -lantus 9u qhs, SSI    Urinary retention  -de jesus in place    Constipation, neurogenic bowel  -bisacodyl suppository daily    GI PPX: lansoprazole    DVT PPX: Eliqius      Thank you for allowing us to participate in the care of this patient.     Total time spent: 90 minutes.     Ryan Sterling D.O.   Physical Medicine and Rehabilitation     Please note that this dictation was created using voice recognition software. I have made every reasonable attempt to correct obvious errors, but there may be errors of grammar and possibly content that I did not discover before finalizing the note.

## 2024-12-27 NOTE — DOCUMENTATION QUERY
[facility]                                                                       Query Response Note      PATIENT:               JOURDAN MAYBERRY  ACCT #:                  [id number]  MRN:                     [id number]  :                      1965  ADMIT DATE:       2024 8:57 PM  DISCH DATE:          RESPONDING  PROVIDER #:        [id number]           QUERY TEXT:    Labs documented in the Medical Record provide opportunity for clarification with a creatinine of 1.34 on admission, up to 1.5 on day 2, with a return to 0.73 on .      Please clarify the clinical/diagnostic relevance for the documented findings.    Note: If you agree with a diagnosis listed above, please remember to include it in your concurrent daily documentation and onto the Discharge Summary.    The patient's clinical indicators include:  Labs: Creatinine : 1.34, : 1.50, : 1.40, : 0.74, : 0.73            BUN : 41, : 45, : 39, : 36, : 30            GFR : 46, : 40, : 43, : 93, : 95    Risk Factors: Heart failure with reduced ejection fraction, Dehydrated, Functional quadriplegia, Hypernatremia, Acute hypoxic respiratory failure, Hypotension, Pneumonia, Acute metabolic encephalopathy, Urinary retention, Metabolic acidemia    Tx: S/p revascularization, holding Lasix    If you have any questions, please contact:  Jordan Augustine RN CDI [facility]  [email or web address]  Jordan Augustine [address] [employer]  Options provided:   -- Clinically significant and to be documented as Acute Kidney Injury   -- Other explanation, (Please specify the other explanation)   -- Findings of no clinical significance      Query created by: Jordan Augustine on 2024 10:51 AM    RESPONSE TEXT:    Clinically significant and to be documented as Acute Kidney Injury          Electronically signed by:  KARINA LOPEZ MD  12/26/2024 5:20 PM

## 2024-12-27 NOTE — PROGRESS NOTES
Hospital Medicine Daily Progress Note    Date of Service  12/27/2024    Chief Complaint  Kiara Quresih is a 59 y.o. female admitted 11/27/2024 with encephalopathy    Hospital Course  60 yo woman HTN, HLD who was at [facility] for anterior STEMI s/p lytics and had LAD PCI and unsuccessful PCI for OM1 complicated by RP bleed, vfib and PEA arrest with cardiogenic shock needing ECMO and imeplla.      ECMO was decannulated 11/4, Impella removed 11/8.      She also had encephalopathy and delirium on valproic acid and Seroquel.      TTE showed EF 25-30%, she did not tolerate GDMT due to hypotension.    She had a CT chest on 11/25 that showed focal aortic mural thrombus versus ulcerated plaque to descending thoracic aorta and CTA AP with rim-enhancing RP hematoma and small thrombus in right common iliac artery, multiple nonenhancing fluid collections and subcu tissue in right lower and right inguinal regions, possible evolving hematomas and left femoral vein.  She was started on Vanco and Zosyn and transferred back to [facility].    She completed linezolid and Zosyn around 12/1.  Cardiology was consulted for medical management of her heart failure. Dr. Conley with vascular surgery was consulted, recommended wound vacs to groin wounds.  On 12/9 she became more altered with respiratory distress and transferred to [facility].  CT head was negative for acute changes.  EEG was negative for seizure activity.  She was high on high dose of Seroquel that was tapered off.  She remains on NG tube for tube feeds. Speech therapy saw patient and patient diet was advance and currently tolerating PO intake. Holding on PEG and NG tube feeding       Interval Problem Update  12/27:  Patient was seen and evaluated at the bedside.  She denies any cough congestion fever or chills.  She denies any chest pain.  Patient has been evaluated by PMR and is a good candidate for inpatient rehab of facility  PT is recommending post acute placement. Reports mild  cough. No fever or white counts.   has NG tube in place  She is also tolerating diet orally. Since patient is improving in oral intake patient has been eating more than 70% of her meal.  Will try to remove NG tube.  Hemodynamically stable.  On room air.  Holding BB and lasix due to soft BP          I have discussed this patient's plan of care and discharge plan at IDT rounds today with Case Management, Nursing, Nursing leadership, and other members of the IDT team.    Consultants/Specialty  cardiology and vascular surgery, palliative care    Code Status  DNAR, I OK    Disposition  Not Medically Cleared  I have placed the appropriate orders for post-discharge needs.    Review of Systems  Review of Systems   Constitutional:  Negative for chills and fever.   HENT:  Negative for congestion and sore throat.    Respiratory:  Positive for cough. Negative for shortness of breath.    Cardiovascular:  Negative for chest pain and leg swelling.   Gastrointestinal:  Negative for heartburn, nausea and vomiting.   Genitourinary:  Negative for dysuria, frequency and urgency.   Neurological:  Positive for weakness. Negative for dizziness.        Physical Exam  Temp:  [36.5 °C (97.7 °F)-37.4 °C (99.3 °F)] 36.6 °C (97.8 °F)  Pulse:  [64-76] 64  Resp:  [16-22] 18  BP: (100-111)/(54-76) 104/56  SpO2:  [93 %-99 %] 99 %    Physical Exam  Constitutional:       Appearance: She is ill-appearing.   HENT:      Head: Normocephalic.      Mouth/Throat:      Mouth: Mucous membranes are dry.   Eyes:      Extraocular Movements: Extraocular movements intact.      Pupils: Pupils are equal, round, and reactive to light.   Cardiovascular:      Rate and Rhythm: Normal rate and regular rhythm.   Pulmonary:      Breath sounds: Rhonchi present.   Abdominal:      General: There is no distension.      Palpations: Abdomen is soft.      Tenderness: There is no abdominal tenderness.   Musculoskeletal:      Right lower leg: No edema.      Left lower leg: No  edema.      Comments: Wound vacs bilaterally to groin   Neurological:      Mental Status: She is oriented to person, place, and time. Mental status is at baseline.      Comments:            Fluids    Intake/Output Summary (Last 24 hours) at 12/27/2024 1423  Last data filed at 12/27/2024 1400  Gross per 24 hour   Intake 602 ml   Output 1025 ml   Net -423 ml        Laboratory  Recent Labs     12/25/24  0126 12/26/24  0443   WBC 6.8 5.9   RBC 3.39* 3.43*   HEMOGLOBIN 11.1* 11.4*   HEMATOCRIT 35.2* 35.8*   .8* 104.4*   MCH 32.7 33.2*   MCHC 31.5* 31.8*   RDW 70.1* 69.6*   PLATELETCT 234 219   MPV 12.4 12.2       Recent Labs     12/25/24  0126 12/26/24  0443   SODIUM 135 134*   POTASSIUM 4.5 4.3   CHLORIDE 105 105   CO2 20 21   GLUCOSE 215* 236*   BUN 28* 28*   CREATININE 0.87 0.75   CALCIUM 8.0* 8.2*                   Imaging  DX-ABDOMEN FOR TUBE PLACEMENT   Final Result         1.  Nonspecific bowel gas pattern in the upper abdomen.   2.  Dobbhoff tube tip overlying the expected location of the pylorus or first duodenal segment.   3.  Bilateral lower lobe atelectasis and/or infiltrates      DX-ABDOMEN FOR TUBE PLACEMENT   Final Result         1.  Nonspecific bowel gas pattern in the upper abdomen.   2.  Dobbhoff tube tip overlying the expected location of the pylorus or first duodenal segment.   3.  Pulmonary edema and/or infiltrates.   4.  Trace bilateral pleural effusions.      DX-ABDOMEN FOR TUBE PLACEMENT   Final Result      1. Repositioning of the esophagogastric tube, terminating over the fundus of the stomach.   2. Interstitial edema, left retrocardiac opacity and small left pleural effusion.      DX-ABDOMEN FOR TUBE PLACEMENT   Final Result         1.  Nonspecific bowel gas pattern in the upper abdomen.   2.  Dobbhoff tube tip terminates overlying the expected location of the gastric antrum or pylorus.   3.  Pulmonary edema and/or infiltrates, similar to prior study      DX-ABDOMEN FOR TUBE PLACEMENT    Final Result      The tip of the enteric tube terminates over the antrum of the stomach.      DX-ABDOMEN FOR TUBE PLACEMENT   Final Result         1.  Nonspecific bowel gas pattern in the upper abdomen.   2.  Dobbhoff tube is coiled within the duodenum, the tip terminates overlying the expected location of the gastric body.   3.  Bilateral lower lobe edema and/or infiltrates.      DX-ABDOMEN FOR TUBE PLACEMENT   Final Result      NG tube tip projects over the gastroduodenal junction.      DX-CHEST-LIMITED (1 VIEW)   Final Result      1.  Mild cardiomegaly with evidence of mild to moderate pulmonary edema with minimal worsening since previous exam.      CT-HEAD W/O   Final Result         1.  No acute intracranial abnormality is identified, there are nonspecific white matter changes, commonly associated with small vessel ischemic disease.  Associated mild cerebral atrophy is noted.   2.  Bilateral sinusitis changes   3.  Atherosclerosis.               DX-CHEST-PORTABLE (1 VIEW)   Final Result         1.  Mild pulmonary edema and/or infiltrates.      EC-ECHOCARDIOGRAM COMPLETE W/ CONT   Final Result      DX-ABDOMEN FOR TUBE PLACEMENT   Final Result      Tip of the esophagogastric tube terminates over the pylorus of the stomach.      IR-MIDLINE CATHETER INSERTION WO GUIDANCE > AGE 3   Final Result                  Ultrasound-guided midline placement performed by qualified nursing staff    as above.          IR-US GUIDED PIV   Final Result    Ultrasound-guided PERIPHERAL IV INSERTION performed by    qualified nursing staff as above.      DX-OUTSIDE IMAGES-DX CHEST   Final Result      DX-OUTSIDE IMAGES-DX ABDOMEN   Final Result      MR-OUTSIDE IMAGES-MR BRAIN   Final Result      CT-OUTSIDE IMAGES-CT HEAD   Final Result      CT-OUTSIDE IMAGES-CT ABDOMEN/PELVIS   Final Result      CT-OUTSIDE IMAGES-CT CHEST   Final Result      DX-ABDOMEN FOR TUBE PLACEMENT   Final Result      There is a new small bowel feeding tube which  terminates in the small bowel of the right mid abdomen.      DX-CHEST-LIMITED (1 VIEW)   Final Result         Asymmetric pulmonary infiltrates, left worse than right.      IR-US GUIDED PIV    (Results Pending)        Assessment/Plan  * Heart failure with reduced ejection fraction (HCC)- (present on admission)  Assessment & Plan  ICM LVEF 25-30%  S/p revascularization  Soft pressures limiting titration of GDMT.  Farxiga  Holding losartan for low BP  Critical care had recommended holding bisoprolol because of Cheyne-Lo breathing  Dehydrated, holding Lasix    Circulating anticoagulant disorder (HCC)  Assessment & Plan  On Eliquis    Functional quadriplegia (HCC)  Assessment & Plan  In the setting of AMS, prior PEA    Macrocytic anemia  Assessment & Plan  Stable. No overt bleeding  B12 wnl. Studies more consistent with anemia of chronic disease    Hypernatremia  Assessment & Plan  Resolved     Acute hypoxic respiratory failure (HCC)  Assessment & Plan  In setting of heart failure, recent cardiogenic shock  Patient demonstrates cheynne lo breathing pattern which is consistent with her suspected anoxic brain injury  Patient appears to be experiencing aspiration events causing bradycardia  Now on room air   RT protocol     Constipation  Assessment & Plan  Bowel protocol       Hypotension  Assessment & Plan  BP low range, holding losartan and bisoprolol and lasix    Abdominal hematoma  Assessment & Plan  Might be related to cardiac device  Clinically stable and h/h in stable range  Patient was on lovenox due to rigth common iliac art thrombosis and then transitioned to eliquis per [facility]  - plan to continue for 3-4 months with repeat imaging at that time to see if it can be d/c   Hgb stable. Continue to monitor    ACP (advance care planning)  Assessment & Plan  Dr. Mobley: Discussed plan of care and code status with patient  Bruno ([phone]) and sister-in-law Laina ([phone]). I explained that Kiara has  experienced a suspected anoxic brain injury during her current medical course that has required ECMO and impella, CVA, Vfib arrest. I shared my concern that Laina may not make a meaningful recovery from this and that her current and future quality of life appears limited. I explained that in the event of another CP arrest her quality of life would be further impacted in the event she were to survive such an episode. Laina and Bruno made the decision to change Kiara's code status to DNR, yes to intubaiton. Laina will be driving in from [place] in the coming days to have further goals of care conversations. Total of 48 minutes spent in discussion and coordination of advance care planning.    12/16 - my conversation with Laina and Bruno, they will like to continue care and plan for PAMs    Pneumonia  Assessment & Plan  S/p completed course of pip/tazo and vanc  Cultures were negative    Aortic mural thrombus (HCC)  Assessment & Plan  CT at outside facility showing focal aortic mural thrombus along the lateral wall of the descending thoracic aorta and also a tiny filling defect/mural thrombus within the posterior right common iliac artery  Continue on apixaban  Continue to monitor CBC    Acute metabolic encephalopathy  Assessment & Plan  Hospital course complicated by encephalopathy.  Likely from combination of CVA, STEMI, hyperglycemia; likely also with anoxic brain injury   CTH no acute findings. EEG neg for seizure activity  Discontinue valproic acid   On abilify   On trazodone   Psychiatry following appreciate the rec   Mentation improved significantly       CVA (cerebral vascular accident) (HCC)  Assessment & Plan  Patient had MRI on 11/17/24 at [facility] which showed Punctate acute infarct of the right centrum semiovale. Scattered foci of enhancement in multiple vascular distributions as described may represent recent subacute infarcts. Diffuse stippled susceptibility signal throughout the supratentorial and  infratentorial brain. Overall findings may represent sequelae of fat emboli or amyloid related angiopathy.   CTH no acute findings. EEG neg for seizure activity  High dose statin  Apixaban  plavix    CAD (coronary artery disease)  Assessment & Plan  Patient with recent complicated STEMI with PCI to LAD and mid LAD.  Complicated by V-fib arrest and cardiogenic shock requiring [facility] ECMO and Impella and retroperitoneal hematomas. At [facility] underwent impella supported PCI to mid LAD just distal to prior stent but unsuccessful with PCI to 100% occluded OM1 on 11/6. Patient was decannulated and had Impella removed.    Discussed with cardiology and asa stopped and replaced with plavix. Cont statin  Repeat echo shows persistently low EF, 25-30%  Cardiology had recommended bisoprolol but due to cheyne florez critical care recommends we stop this - so will hold   Continue to hold losartan for low BP and lasix     Urinary retention  Assessment & Plan  Beckwith catheter in place    Hyperglycemia  Assessment & Plan  Glargine 9 units  ISS and hypoglycemia protocol     Metabolic acidemia- (present on admission)  Assessment & Plan  Improved         VTE prophylaxis: eliquis     I have performed a physical exam and reviewed and updated ROS and Plan today (12/27/2024). In review of yesterday's note (12/26/2024), there are no changes except as documented above.    Patient has a high medical complexity, complex decision making and is at high risk of complication, morbidity, and mortality    Greater than 51 minutes spent prepping to see patient (e.g. review of tests) obtaining and/or reviewing separately obtained history. Performing a medically appropriate examination and/ evaluation.  Counseling and educating the patient/family/caregiver.  Ordering medications, tests, or procedures.  Referring and communicating with other health care professionals.  Documenting clinical information in EPIC.  Independently interpreting results and communicating  results to patient/family/caregiver.  Care coordination

## 2024-12-27 NOTE — PREADMISSION SCREENING NOTE
Updated Pre-Screen Assessment     Name: Kiara Qureshi  MRN: [id number]  : 1965    Medical Status/ Changes:     Edward Johnson M.D.  Physician  Hospital Medicine  Progress Notes      Signed  Date of Service: 2024 10:25 AM  Hospital Medicine Daily Progress Note     Date of Service  2024     Chief Complaint  Kiara Qureshi is a 59 y.o. female admitted 2024 with encephalopathy     Hospital Course  60 yo woman HTN, HLD who was at [facility] for anterior STEMI s/p lytics and had LAD PCI and unsuccessful PCI for OM1 complicated by RP bleed, vfib and PEA arrest with cardiogenic shock needing ECMO and imeplla.       ECMO was decannulated , Impella removed .       She also had encephalopathy and delirium on valproic acid and Seroquel.       TTE showed EF 25-30%, she did not tolerate GDMT due to hypotension.    She had a CT chest on  that showed focal aortic mural thrombus versus ulcerated plaque to descending thoracic aorta and CTA AP with rim-enhancing RP hematoma and small thrombus in right common iliac artery, multiple nonenhancing fluid collections and subcu tissue in right lower and right inguinal regions, possible evolving hematomas and left femoral vein.  She was started on Vanco and Zosyn and transferred back to [facility].     She completed linezolid and Zosyn around .  Cardiology was consulted for medical management of her heart failure. Dr. Conley with vascular surgery was consulted, recommended wound vacs to groin wounds.  On  she became more altered with respiratory distress and transferred to [facility].  CT head was negative for acute changes.  EEG was negative for seizure activity.  She was high on high dose of Seroquel that was tapered off.    Patient had NG tube placed. Now tolerating the diet after seeing by SLP. NG tube discontinued.   Has been evaluated by PMR and good candidate for IRF. Pending insurance auth and working with physical therapy to show  improvement.     Interval Problem Update  12/28:  Patient was seen and evaluated at the bedside.  She denies any chest pain.  She denies any cough congestion.  She is motivated to work with the physical therapy to show improvement.   She is also tolerating diet orally. Since patient is improving in oral intake patient has been eating more than 70% of her meal.  Will try to remove NG tube.  Hemodynamically stable.  On room air.  Holding BB and lasix due to soft BP     Above per previous hospitalist.     12/29/2024  Patient was seen and examined on the oncology floor.  Vital signs are stable.  Continues to be on room air.  Has Beckwith catheter in place.  Wound vacs in place.  Insurance saturation pending for inpatient rehabilitation hospital.    I have discussed this patient's plan of care and discharge plan at IDT rounds today with Case Management, Nursing, Nursing leadership, and other members of the IDT team.     Consultants/Specialty  cardiology and vascular surgery, palliative care     Code Status  DNAR, I OK     Disposition  The patient is medically cleared for discharge to home or a post-acute facility.  Anticipate discharge to: an inpatient rehabilitation hospital     I have placed the appropriate orders for post-discharge needs.  Patient is good candidate for IRF pending insurace auth and to show improvement with physical therapy      Review of Systems  Review of Systems   Constitutional:  Positive for malaise/fatigue. Negative for chills and fever.   Respiratory:  Negative for cough and shortness of breath.    Cardiovascular:  Negative for chest pain and palpitations.   Gastrointestinal:  Negative for abdominal pain, nausea and vomiting.   Musculoskeletal:  Negative for joint pain and myalgias.   Neurological:  Positive for weakness. Negative for dizziness and headaches.      Physical Exam  Temp:  [36.1 °C (97 °F)-36.7 °C (98.1 °F)] 36.7 °C (98 °F)  Pulse:  [62-72] 67  Resp:  [16-20] 18  BP: ()/(57-68)  102/67  SpO2:  [95 %-98 %] 95 %     Physical Exam  Vitals and nursing note reviewed.   Constitutional:       Appearance: She is normal weight. She is ill-appearing. She is not diaphoretic.   HENT:      Head: Normocephalic and atraumatic.      Mouth/Throat:      Mouth: Mucous membranes are moist.      Pharynx: Oropharynx is clear. No oropharyngeal exudate.   Eyes:      General:         Right eye: No discharge.         Left eye: No discharge.      Conjunctiva/sclera: Conjunctivae normal.      Pupils: Pupils are equal, round, and reactive to light.   Cardiovascular:      Rate and Rhythm: Normal rate and regular rhythm.      Pulses: Normal pulses.      Heart sounds: Normal heart sounds. No murmur heard.  Pulmonary:      Effort: Pulmonary effort is normal. No respiratory distress.      Breath sounds: Normal breath sounds.   Abdominal:      General: Abdomen is flat. Bowel sounds are normal. There is no distension.      Palpations: Abdomen is soft.      Tenderness: There is no abdominal tenderness.   Genitourinary:     Comments: Beckwith catheter in place  Musculoskeletal:      Cervical back: Neck supple. No tenderness.      Right lower leg: No edema.      Left lower leg: No edema.   Skin:     Coloration: Skin is pale.      Comments: Wound vacs in place   Neurological:      Mental Status: She is alert and oriented to person, place, and time.      Motor: Weakness present.      Fluids     Intake/Output Summary (Last 24 hours) at 12/29/2024 1630  Last data filed at 12/29/2024 0600    Gross per 24 hour  Intake --  Output 900 ml  Net -900 ml     Laboratory      Recent Labs    12/28/24  1905  SODIUM 133*  POTASSIUM 4.2  CHLORIDE 103  CO2 19*  GLUCOSE 140*  BUN 22  CREATININE 0.75  CALCIUM 7.9*     Imaging    DX-ABDOMEN FOR TUBE PLACEMENT  Final Result        1.  Nonspecific bowel gas pattern in the upper abdomen.  2.  Dobbhoff tube tip overlying the expected location of the pylorus or first duodenal segment.  3.  Bilateral lower  lobe atelectasis and/or infiltrates     DX-ABDOMEN FOR TUBE PLACEMENT  Final Result        1.  Nonspecific bowel gas pattern in the upper abdomen.  2.  Dobbhoff tube tip overlying the expected location of the pylorus or first duodenal segment.  3.  Pulmonary edema and/or infiltrates.  4.  Trace bilateral pleural effusions.     DX-ABDOMEN FOR TUBE PLACEMENT  Final Result     1. Repositioning of the esophagogastric tube, terminating over the fundus of the stomach.  2. Interstitial edema, left retrocardiac opacity and small left pleural effusion.     DX-ABDOMEN FOR TUBE PLACEMENT  Final Result     1.  Nonspecific bowel gas pattern in the upper abdomen.  2.  Dobbhoff tube tip terminates overlying the expected location of the gastric antrum or pylorus.  3.  Pulmonary edema and/or infiltrates, similar to prior study     DX-ABDOMEN FOR TUBE PLACEMENT  Final Result     The tip of the enteric tube terminates over the antrum of the stomach.     DX-ABDOMEN FOR TUBE PLACEMENT  Final Result     1.  Nonspecific bowel gas pattern in the upper abdomen.  2.  Dobbhoff tube is coiled within the duodenum, the tip terminates overlying the expected location of the gastric body.  3.  Bilateral lower lobe edema and/or infiltrates.     DX-ABDOMEN FOR TUBE PLACEMENT  Final Result     NG tube tip projects over the gastroduodenal junction.     DX-CHEST-LIMITED (1 VIEW)  Final Result     1.  Mild cardiomegaly with evidence of mild to moderate pulmonary edema with minimal worsening since previous exam.     CT-HEAD W/O  Final Result     1.  No acute intracranial abnormality is identified, there are nonspecific white matter changes, commonly associated with small vessel ischemic disease.  Associated mild cerebral atrophy is noted.  2.  Bilateral sinusitis changes  3.  Atherosclerosis.     DX-CHEST-PORTABLE (1 VIEW)  Final Result        1.  Mild pulmonary edema and/or infiltrates.     EC-ECHOCARDIOGRAM COMPLETE W/ CONT  Final Result     DX-ABDOMEN  FOR TUBE PLACEMENT  Final Result     Tip of the esophagogastric tube terminates over the pylorus of the stomach.     IR-MIDLINE CATHETER INSERTION WO GUIDANCE > AGE 3  Final Result            Ultrasound-guided midline placement performed by qualified nursing staff   as above.     IR-US GUIDED PIV  Final Result   Ultrasound-guided PERIPHERAL IV INSERTION performed by   qualified nursing staff as above.     DX-OUTSIDE IMAGES-DX CHEST  Final Result     DX-OUTSIDE IMAGES-DX ABDOMEN  Final Result     MR-OUTSIDE IMAGES-MR BRAIN  Final Result     CT-OUTSIDE IMAGES-CT HEAD  Final Result     CT-OUTSIDE IMAGES-CT ABDOMEN/PELVIS  Final Result     CT-OUTSIDE IMAGES-CT CHEST  Final Result     DX-ABDOMEN FOR TUBE PLACEMENT  Final Result     There is a new small bowel feeding tube which terminates in the small bowel of the right mid abdomen.     DX-CHEST-LIMITED (1 VIEW)  Final Result    Asymmetric pulmonary infiltrates, left worse than right.     IR-US GUIDED PIV    (Results Pending)     Assessment/Plan  * Heart failure with reduced ejection fraction (HCC)- (present on admission)  Assessment & Plan  ICM LVEF 25-30%  S/p revascularization  Soft pressures limiting titration of GDMT.  Farxiga  Holding losartan for low BP  Critical care had recommended holding bisoprolol because of Cheyne-Lo breathing  Dehydrated, holding Lasix     12/29/2024  Appears euvolemic  Continue to hold beta-blocker and furosemide  Monitor fluid status closely  Continue home losartan and atorvastatin and Farxiga     Circulating anticoagulant disorder (HCC)  Assessment & Plan  On Eliquis     Functional quadriplegia (HCC)  Assessment & Plan  In the setting of AMS, prior PEA     Macrocytic anemia  Assessment & Plan  Stable. No overt bleeding  B12 wnl. Studies more consistent with anemia of chronic disease     Hypernatremia  Assessment & Plan  Resolved      Acute hypoxic respiratory failure (HCC)  Assessment & Plan  In setting of heart failure, recent  cardiogenic shock  Patient demonstrates cheynne florez breathing pattern which is consistent with her suspected anoxic brain injury  Patient appears to be experiencing aspiration events causing bradycardia  Now on room air   RT protocol      Constipation  Assessment & Plan  Bowel protocol      Hypotension  Assessment & Plan  BP low range, holding losartan and bisoprolol and lasix     Abdominal hematoma  Assessment & Plan  Might be related to cardiac device  Clinically stable and h/h in stable range  Patient was on lovenox due to rigth common iliac art thrombosis and then transitioned to eliquis per [facility]  - plan to continue for 3-4 months with repeat imaging at that time to see if it can be d/c   Hgb stable. Continue to monitor     ACP (advance care planning)  Assessment & Plan  Dr. Mobley: Discussed plan of care and code status with patient  Bruno ([phone]) and sister-in-law Laina ([phone]). I explained that Kiara has experienced a suspected anoxic brain injury during her current medical course that has required ECMO and impella, CVA, Vfib arrest. I shared my concern that Laina may not make a meaningful recovery from this and that her current and future quality of life appears limited. I explained that in the event of another CP arrest her quality of life would be further impacted in the event she were to survive such an episode. Laina and Bruno made the decision to change Kiara's code status to DNR, yes to intubaiton. Laina will be driving in from [place] in the coming days to have further goals of care conversations. Total of 48 minutes spent in discussion and coordination of advance care planning.     12/16 - my conversation with Laina and Bruno, they will like to continue care and plan for PAMs     Pneumonia  Assessment & Plan  S/p completed course of pip/tazo and vanc  Cultures were negative     Aortic mural thrombus (HCC)  Assessment & Plan  CT at outside facility showing focal aortic mural  thrombus along the lateral wall of the descending thoracic aorta and also a tiny filling defect/mural thrombus within the posterior right common iliac artery  Continue on apixaban  Continue to monitor CBC     12/29/2024  Continue apixaban     Acute metabolic encephalopathy  Assessment & Plan  Hospital course complicated by encephalopathy.  Likely from combination of CVA, STEMI, hyperglycemia; likely also with anoxic brain injury   CTH no acute findings. EEG neg for seizure activity  Discontinue valproic acid   On abilify   On trazodone   Psychiatry following appreciate the rec   Mentation improved significantly      12/29/2024  Resolved.  Patient is alert and orient x 3.        CVA (cerebral vascular accident) (HCC)  Assessment & Plan  Patient had MRI on 11/17/24 at OSH which showed Punctate acute infarct of the right centrum semiovale. Scattered foci of enhancement in multiple vascular distributions as described may represent recent subacute infarcts. Diffuse stippled susceptibility signal throughout the supratentorial and infratentorial brain. Overall findings may represent sequelae of fat emboli or amyloid related angiopathy.   CTH no acute findings. EEG neg for seizure activity  High dose statin  Apixaban  plavix     CAD (coronary artery disease)  Assessment & Plan  Patient with recent complicated STEMI with PCI to LAD and mid LAD.  Complicated by V-fib arrest and cardiogenic shock requiring VA ECMO and Impella and retroperitoneal hematomas. At [facility] underwent impella supported PCI to mid LAD just distal to prior stent but unsuccessful with PCI to 100% occluded OM1 on 11/6. Patient was decannulated and had Impella removed.    Discussed with cardiology and asa stopped and replaced with plavix. Cont statin  Repeat echo shows persistently low EF, 25-30%  Cardiology had recommended bisoprolol but due to cheyne florez critical care recommends we stop this - so will hold   Continue to hold losartan for low BP and lasix       2024  Continue ordered atorvastatin.  Continue clopidogrel and apixaban     Urinary retention  Assessment & Plan  Beckwith catheter in place     2024  Continue Beckwith catheter.  Will need to attempt discontinuation at some point.     Hyperglycemia  Assessment & Plan  Glargine 9 units  ISS and hypoglycemia protocol      Metabolic acidemia- (present on admission)  Assessment & Plan  Improved    VTE prophylaxis:    therapeutic anticoagulation with eliquis 5 mg BID    I have performed a physical exam and reviewed and updated ROS and Plan today (2024). In review of yesterday's note (2024), there are no changes except as documented above.     Patient has a high medical complexity, complex decision making and is at high risk of complication, morbidity, and mortality           Luly Jack R.N.  Registered Nurse  Progress Notes      Signed  Date of Service: 2024  5:36 AM      Monitor Summary  Rhythm: SR  Rate: 66-84  Ectopy: F PVC, R coup, 7 bts rate dependent BBB,   .12 / . / 36       Functional Status/ Changes: Nochanges    Reviewer: Mariela Maldonado R.N.  Date: 2024  Time: 10:23 AM      Pre-Admission Screening Form    Patient Information:   Name: Kiara Qureshi     MRN: [id number]       : 1965      Age: 59 y.o.   Gender: female      Race: White [7]       Marital Status:  [2]  Family Contact: KieraBruno Linda        Relationship: Spouse [17]  Relative [11]  Home Phone:              Cell Phone: [phone]  Advanced Directives: None  Code Status:  DNAR, I OK  Current Attending Provider: Caitlin Cohen M.D.  Referring Physician: Dr. Cohen  Physiatrist Consult: Dr. Sterling   Referral Date: 24  Primary Payor Source:  [facility]  Secondary Payor Source:      Medical Information:   Date of Admission to Acute Care [place]2024  Room Number: R308/00  Rehabilitation Diagnosis: 0001.3 - Stroke: Bilateral Involvement and 0002.1 -  Brain Dysfunction: Non-Traumatic  Immunization History   Administered Date(s) Administered    Pneumococcal Conjugate Vaccine (PCV20) 12/02/2024     No Known Allergies  Past Medical History:   Diagnosis Date    Dyslipidemia 7/5/2016    Essential hypertension 7/5/2016    Obesity, morbid, BMI 40.0-49.9 ([facility]) 7/5/2016    Type II diabetes mellitus, well controlled ([facility]) 7/5/2016     Past Surgical History:   Procedure Laterality Date    INSERTION,CANNULA FOR ECMO,ADULT Left 10/30/2024    Procedure: INSERTION, CANNULA, FOR ECMO, ADULT;  Surgeon: Aime Elias D.O.;  Location: SURGERY [facility];  Service: Cardiothoracic       History Leading to Admission, Conditions that Caused the Need for Rehab (CMS):     Chief Complaint  STEMI     History of Presenting Illness  Kiara Qureshi is a 59 y.o. female who presented 11/27/2024 with STEMI.     Patient has a history of obesity, hypertension, hyperlipidemia.  She was seen at [facility] as a transfer initially for anterior STEMI requiring lytics.  Patient underwent an LAD PCI but hospital course was complicated by retroperitoneal bleed, ventricular fibrillation and PEA arrest and cardiogenic shock requiring VA ECMO and Impella.  She is now status post ECMO decannulation on November 4 and had left femoral embolectomy, PCI to the LAD, unsuccessful PCI to OM1 and Impella removal on November 8.  Hospital course was complicated by patient developing encephalopathy and delirium, to which she was given valproic acid and quetiapine.  Patient has been weaned off her present medications and was found to have hypotension to which she was started on p.o. midodrine 10 mg 3 times daily.  Goal-directed medical therapy was held due to borderline low blood pressure.  Her echocardiogram on November 24 shows 25 to 30% EF with akinesis of the apical, apical anterior wall, apical septum, apical inferior wall, and apical lateral wall of the left ventricle.  Hypokinesis seen of the basal  anterolateral wall, mid anterolateral wall, anterior wall, mid inferolateral wall, and basal inferolateral wall of the left ventricle.     On November 25, patient had CT angiogram of the chest which shows pulmonary edema, small bilateral pleural effusions, small 3 mm filling defect along the lateral wall of the descending thoracic aorta which may represent focal aortic mural thrombus versus ulcerated plaque.  CT angiogram of the abdomen pelvis showing increased organization of the rim-enhancing left retroperitoneal hematomas measuring up to 9.3 x 4.4 cm.  Tiny filling defect/mural thrombus within the posterior right common iliac artery.  Multiple nonenhancing intermediate density fluid collections within the subcutaneous tissues overlying the right lower quadrant and right inguinal regions measuring up to 5.7 x 2.4 cm.  Additional nonenhancing low-density fluid collection closely associated with the left femoral vein measuring 4.7 x 3.9 cm, likely represents evolving hematomas.     Due to patient's uptrending leukocytosis and CAT scan findings of suspected pneumonia, patient was started on vancomycin and Zosyn on November 26.  Patient was then transferred back to [facility] for further management.    Assessment/Plan:  Justification for Admission Status  I anticipate this patient will require at least two midnights for appropriate medical management, necessitating inpatient admission because patient has aortic mural thrombus     Patient will need a Telemetry bed on MEDICAL service .  The need is secondary to aortic mural thrombus.     * Heart failure with reduced ejection fraction (HCC)- (present on admission)  Assessment & Plan  Unable to start goal-directed medical therapy due to hypotension.  Echocardiogram showing EF 25 to 30% EF with akinesis of the apical, apical anterior wall, apical septum, apical inferior wall, and apical lateral wall of the left ventricle.  Hypokinesis seen of the basal anterolateral wall, mid  anterolateral wall, anterior wall, mid inferolateral wall, and basal inferolateral wall of the left ventricle.  Continue midodrine 10 po tid, lipitor 80 mg po qhs   at bedside, appears euvolemic. Start lasix 20 mg po daily, cautious diuresis     ACP (advance care planning)  Assessment & Plan  Patient was full code at outside facility.  Attempted to call  to inquire about CODE STATUS unsuccessful.  Patient will be left as full code for now.     Pneumonia  Assessment & Plan  Patient found to have increasing white blood cell count up to 15,000 at outside facility  CT angio chest showing persistent irregular tree-in-bud nodularity and bilateral bronchial wall thickening on a background of groundglass opacities within the dependent portions of the right middle, right lower, and left lower lobes (right greater than left). Increased bilateral upper lobe patchy perihilar groundglass opacities.   Started on vancomycin and Zosyn on 9/26 at outside facility on 11/26, can continue for now   Send COVID swab     Aortic mural thrombus (HCC)  Assessment & Plan  CT at outside facility showing focal aortic mural thrombus along the lateral wall of the descending thoracic aorta and also a tiny filling defect/mural thrombus within the posterior right common iliac artery  Patient has been on Lovenox outside facility, continue Lovenox IV  Monitor H&H     Encephalopathy  Assessment & Plan  Hospital course complicated by encephalopathy.  Likely from combination of CVA, STEMI, hyperglycemia  Restraints ordered  Has a nasojejunal feeding tube placed by interventional radiology from outside facility  Continue Seroquel and Depakene as recommended by neurology/psychiatry at outside facility.     CVA (cerebral vascular accident) (HCC)  Assessment & Plan  Patient had MRI on 11/17/24 at OSH which shwed Punctate acute infarct of the right centrum semiovale. Scattered foci of enhancement in multiple vascular distributions as described  may represent recent subacute infarcts. Diffuse stippled susceptibility signal throughout the supratentorial and infratentorial brain. Overall findings may represent sequelae of fat emboli or amyloid related angiopathy.   Continue asa, lipitor, lovenox     CAD (coronary artery disease)  Assessment & Plan  Patient with recent complicated STEMI with PCI to LAD and mid LAD.  Complicated by V-fib arrest and cardiogenic shock requiring VA ECMO and Impella and retroperitoneal hematomas. At [facility] underwent impella supported PCI to mid LAD just distal to prior stent but unsuccessful with PCI to 100% occluded OM1 on 11/6. Patient was decannulated and had Impella removed.    Continue Lipitor     Urinary retention  Assessment & Plan  Beckwith catheter in place     Hyperglycemia  Assessment & Plan  Has had uncontrolled sugars outside hospital  Sliding scale           Patient is critically ill.   The patient continues to have: Aortic mural thrombus, hospital-acquired pneumonia, heart failure with ejection fraction  The vital organ system that is affected is the: Cardiovascular system, lungs  If untreated there is a high chance of deterioration into: Pulmonary embolism, sepsis, respiratory failure  And eventually death.   The critical care that I am providing today is: Therapeutic Lovenox, IV vancomycin/IV Zosyn  The critical that has been undertaken is medically complex.   There has been no overlap in critical care time.   Critical Care Time not including procedures: 43           VTE prophylaxis: therapeutic anticoagulation with lovenox    Dr. Burgess (Interventional Cardiology) recomnedtions:  Assessment:  # Ischemic cardiomyopathy, heart failure with reduced ejection fraction  # Severe coronary artery disease, prior anterior STEMI initially treated with fibrinolysis followed by PCI to proximal LAD, staged PCI to mid LAD, unsuccessful PCI to  OM1  # Post-PCI retroperitoneal bleed, ventricular fibrillation, PEA arrest  #  Cardiogenic shock s/p Impella and VA ECMO (with decannulation at [facility])  # Left femoral thrombus s/p surgical embolectomy  # Possible aortic mural thrombus versus penetrating atheroma  # Acute blood loss anemia  # Delirium, suspected ischemic brain injury     Recommendations:  -Discontinue aspirin  -Change to clopidogrel with 300 mg load to reduce bleeding risks  -Duration of anticoagulation per Vascular Surgery recommendations  -Start bisoprolol 2.5 mg daily  -Start losartan 12.5 mg daily  -Continue furosemide 20 mg daily  -Continue to wean midodrine as tolerated  -Patient is not a good candidate for ARNI or SGL2i given unclear prognosis at this time with significant delirium/possible ischemic brain injury    Dr. Conley (Surgery Vascular) recommendations:  IMPRESSION AND PLAN:     Active Hospital Problems    Diagnosis     Hypotension [I95.9]     Leukocytosis [D72.829]     Abdominal hematoma [S30.1XXA]     Heart failure with reduced ejection fraction (HCC) [I50.20]     Hyperglycemia [R73.9]     Urinary retention [R33.9]     CAD (coronary artery disease) [I25.10]     CVA (cerebral vascular accident) (HCC) [I63.9]     Encephalopathy [G93.40]     Aortic mural thrombus (HCC) [I74.10]     Pneumonia [J18.9]     ACP (advance care planning) [Z71.89]       Groin wounds as a result of ECMO cannulation     I do believe that is reasonable to place wound vacs bilaterally.  We will follow-up with the patient in approximately 1 month to assess healing.     Agree with placing wound VAC   follow-up outpatient    Dr. Giordano (Gastroenterology) recommendations:  Assessment/Recommendations:     --Unable to feed self/ oropharyngeal dysphagia   --Encephalopathy with delirium  --Anoxic brain injury, suspected  --HFrEF 25-30%  --CAD, recent STEMI, s/p PCI to LAD complicated by arrest requiring Impella and transfer to [facility]  --Anticoagulated:  Eliquis and Plavix     RECS:  --Concerned about feeding tube in this patient who can be agitated  and pull tubes.  Pulling G tube in first 2 weeks can be a surgical emergency.  She will require mitts on hands 24 hrs/day 1st 2 weeks     --Described to have aspiration with episodes of bradycardia.  This will still be a problem with a PEG if she has her HOB decreased      --Need to hold DOAC x 48 hrs, therefore will hold Sat and Sun with goal of PEG on Monday  --Will write orders tomorrow  --Will need 's consent.      Dr. Sterling (Physiatry) recommendations:  ASSESSMENT:  Patient is a 59 y.o. female admitted with STEMI with a protracted hospital course requiring ECMO and Impella. Imaging revealed CVA as well.      [facility] Code / Diagnosis to Support: 0001.3 - Stroke: Bilateral Involvement and 0002.1 - Brain Dysfunction: Non-Traumatic  See DISPO details below for recommendations on appropriate level of rehab for this diagnosis.     Barriers to transfer include: Insurance authorization, TCCs to verify disposition, medical clearance and bed availability      Assessment and Plan:     DISPO:  - patient is a good candidate for IRF. PENDING improved tolerance to therapy (will need to be able to take a few steps with therapy). Will need to confirm DC support from spouse and family.   - prior to acceptance to IRF, will need insurance auth    - TCC to assist with insurance auth and DC support    - PMR to follow in the periphery for rehab appropriateness, please reach out with questions or request for medical management      Medical Complexity:     Impaired mobility and ADLs  -multifactorial from STEMI, anoxic brain injury, CVA, encephalopathy, pneumonia and prolonged hospital course.   -PT and OT while in-house      STEMI  -s/p PCI to LAD. Complicated by V-Fib and PEA arrest. Required ECMO and Impella due to cardiogenic shock.   -now off ECMO and Impella  -atrovastatin and plavix     Anoxic brain injury  Metabolic encephalopathy  Delirium  -anoxia from cardiogenic shock  -required seroquel, valproate and haldol to control  delirium at OSH.   -mental status seems improved  -Abilify 2mg daily  -melatonin 10mg nightly  -trazodone 100mg nightly     HFrEF  -EF 25-30%  -soft pressure limiting GDMT  -farxiga 10mg  -no losartan due to hypotension  -no bisoprolol due to Chyne-florez breathing  -no lasix due to dehydration     Acute respiratory failure  -Cheyne-florez breathing pattern, thought to be from anoxic brain injury  -concern for aspiration   -now on room air     Pneumonia  -completed linezolid and zosyn 12/1     Dysphagia  -up to minced and moist diet with mildly thick liquids  -continue SLP     CVA  -MRI on 11/17/2024 at OSH: Punctate acute infarct of the right centrum semiovale. Scattered foci of enhancement in multiple vascular distributions as described may represent recent subacute infarcts. Diffuse stippled susceptibility signal throughout the supratentorial and infratentorial brain. Overall findings may represent sequelae of fat emboli or amyloid related angiopathy.   -Eliquis 5mg BID  -atorvastatin 80mg, plavix     Retroperitoneal bleed  -required several transfusions.   -hgb now stable     Hypotension, History of HTN  -losartan on hold     Hyperglycemia, DM  -last A1c 6.2 on 11/1.   -lantus 9u qhs, SSI     Urinary retention  -de jesus in place     Constipation, neurogenic bowel  -bisacodyl suppository daily     GI PPX: lansoprazole     DVT PPX: Eliqius    CVA (cerebral vascular accident) ([facility])  Assessment & Plan  Patient had MRI on 11/17/24 at OSH which showed Punctate acute infarct of the right centrum semiovale. Scattered foci of enhancement in multiple vascular distributions as described may represent recent subacute infarcts. Diffuse stippled susceptibility signal throughout the supratentorial and infratentorial brain. Overall findings may represent sequelae of fat emboli or amyloid related angiopathy.   CTH no acute findings. EEG neg for seizure activity  High dose statin  Apixaban  plavix      Co-morbidities:  See  "[facility]  Potential Risk - Complications: Aphasia, Cognitive Impairment, Contractures, Deep Vein Thrombosis, Dysphagia, Incontinence, Malnutrition, Pain, Paralysis, Perceptual Impairment, Pneumonia, Pressure Ulcer, Seizures, and Urinary Tract Infection  Level of Risk: High    Ongoing Medical Management Needed (Medical/Nursing Needs):   Patient Active Problem List    Diagnosis Date Noted    Functional quadriplegia ([facility]) 12/19/2024    Circulating anticoagulant disorder ([facility]) 12/19/2024    Macrocytic anemia 12/17/2024    Acute hypoxic respiratory failure ([facility]) 12/13/2024    Hypernatremia 12/13/2024    Constipation 12/06/2024    Hypotension 12/05/2024    Abdominal hematoma 11/29/2024    Heart failure with reduced ejection fraction ([facility]) 11/27/2024    Hyperglycemia 11/27/2024    Urinary retention 11/27/2024    CAD (coronary artery disease) 11/27/2024    CVA (cerebral vascular accident) ([facility]) 11/27/2024    Acute metabolic encephalopathy 11/27/2024    Aortic mural thrombus ([facility]) 11/27/2024    Pneumonia 11/27/2024    ACP (advance care planning) 11/27/2024    Retroperitoneal bleed 10/31/2024    JENNY (acute kidney injury) ([facility]) 10/31/2024    Transaminitis 10/31/2024    Ventricular fibrillation ([facility]) 10/31/2024    Acute myocardial infarction ([facility]) 10/30/2024    Cardiogenic shock ([facility]) 10/30/2024    Acute respiratory failure with hypoxia ([facility]) 10/30/2024    STEMI (ST elevation myocardial infarction) ([facility]) 10/30/2024    Severe left ventricular systolic dysfunction 10/30/2024    Metabolic acidemia 10/30/2024    Elevated troponin 10/30/2024    Type II diabetes mellitus, well controlled ([facility]) 07/05/2016    Dyslipidemia 07/05/2016    Obesity, morbid, BMI 40.0-49.9 ([facility]) 07/05/2016    Essential hypertension 07/05/2016     Alert with cognitive impairment.    Current Vital Signs:   Temperature: 36.6 °C (97.8 °F) Pulse: 64 Respiration: 18 Blood Pressure: 104/56  Weight: 96.9 kg (213 lb 10 oz) Height: 154.9 cm (5' 0.98\")  Pulse Oximetry: 99 % " O2 (LPM): 0      Completed Laboratory Reports:  Recent Labs     12/25/24  0126 12/26/24  0443   WBC 6.8 5.9   HEMOGLOBIN 11.1* 11.4*   HEMATOCRIT 35.2* 35.8*   PLATELETCT 234 219   SODIUM 135 134*   POTASSIUM 4.5 4.3   BUN 28* 28*   CREATININE 0.87 0.75   ALBUMIN 2.4*  --    GLUCOSE 215* 236*     Additional Labs: Not Applicable    Prior Living Situation:   Housing / Facility: 1 Story House  Steps Into Home: 1  Steps In Home: 0  Lives with - Patient's Self Care Capacity: Spouse  Equipment Owned: Tub / Shower Seat (built-in)    Prior Level of Function / Living Situation:   Physical Therapy: Prior Services: None, Home-Independent  Housing / Facility: 1 Story House  Steps Into Home: 1  Steps In Home: 0  Bathroom Set up: Walk In Shower, Built-In Shower Chair  Equipment Owned: Tub / Shower Seat (built-in)  Lives with - Patient's Self Care Capacity: Spouse  Bed Mobility: Independent  Transfer Status: Independent  Ambulation: Independent  Assistive Devices Used: None  Stairs: Independent  Current Level of Function:   Gait Level Of Assist: Unable to Participate  Deviation:  (L knee buckled, LOB posteior and L with maxA to recover during pivot)  Supine to Sit: Standby Assist  Sit to Supine: Maximal Assist  Scooting: Standby Assist  Rolling: Standby Assist  Skilled Intervention: Verbal Cuing  Comments: HOB partially elevated, rail  Sit to Stand: Minimal Assist (VCs hand placement, brace legs against bed, 3 attempts to complete)  Bed, Chair, Wheelchair Transfer: Maximal Assist  Toilet Transfers: Unable to Participate  Transfer Method: Stand Step  Skilled Intervention: Facilitation, Sequencing, Verbal Cuing  Sitting in Chair: post session  Sitting Edge of Bed: 11 supported  Standing: <30 seconds  Occupational Therapy:   Self Feeding: Independent  Grooming / Hygiene: Independent  Bathing: Independent  Dressing: Independent  Toileting: Independent  Medication Management: Independent  Laundry: Independent  Kitchen Mobility:  Independent  Finances: Independent  Home Management: Independent  Shopping: Independent  Prior Level Of Mobility: Independent Without Device in Community  Driving / Transportation: Driving Independent  Prior Services: None, Home-Independent  Housing / Facility: [address]  Occupation (Pre-Hospital Vocational): Unable To Determine At This Time  Current Level of Function:   Eating: Total Assist  Upper Body Dressing: Maximal Assist (don/doff gown)  Lower Body Dressing: Total Assist  Toileting: Total Assist  Skilled Intervention: Verbal Cuing, Tactile Cuing, Sequencing, Postural Facilitation, Facilitation  Comments: followed commands for ADL tasks with ~25-50% accuracy for ~15 min prior to starting to reach for lines/tubes, guarding by therapist with hand over hand to prevent pulling of lines  Speech Language Pathology:      Rehabilitation Prognosis/Potential: Good  Estimated Length of Stay: 10-12 days    Nursing:      Tang in Place    Scope/Intensity of Services Recommended:  Physical Therapy: 1 hr / day  5 days / week. Therapeutic Interventions Required: Maximize Endurance, Mobility, Strength, and Safety  Occupational Therapy: 1 hr / day 5 days / week. Therapeutic Interventions Required: Maximize Self Care, ADLs, IADLs, and Energy Conservation  Speech & Language Pathology: 1 hr / day 5 days / week. Therapeutic Interventions Required: Maximize Cognition, Swallowing, and Safety  Rehabilitation Nursin/. Therapeutic Interventions Required: Monitor Pain, Skin, Vital Signs, Intake and Output, Labs, Safety, Aspiration Risk, and Family Training  Rehabilitation Physician: 3 - 5 days / week. Therapeutic Interventions Required: Medical Management  Dietician: Nutritional Assessment/Education. Therapeutic Interventions Required: .    She requires 24-hour rehabilitation nursing to manage bowel and bladder function, skin care, nutrition and fluid intake, pain control, safety, medication management, and patient/family  goals. In addition, rehabilitation nursing will reiterate and reinforce therapy skills and equipment use, including ADLs, as well as provide education to the patient and family. Kiara Qureshi is willing to participate in and is able to tolerate the proposed plan of care.    Rehabilitation Goals and Plan (Expected frequency & duration of treatment in the IRF):   Return to the Community, Modified Independent Level of Care, and Family Able to Provide 24/7 Assistance  Anticipated Date of Rehabilitation Admission: 12-28-24  Patient/Family oriented IRF level of care/facility/plan: Yes  Patient/Family willing to participate in IRF care/facility/plan: Yes  Patient able to tolerate IRF level of care proposed: Yes  Patient has potential to benefit IRF level of care proposed: Yes  Comments: Not Applicable    Special Needs or Precautions - Medical Necessity:  Safety Concerns/Precautions:  Fall Risk / High Risk for Falls, Balance, Cognition, and Bed / Chair Alarm  Pain Management  Special Equipment: NG Tube  Precautions: Fall Risk;Swallow Precautions  Comments: wound VACs x2  IV Site: Peripheral  Current Medications:    Current Facility-Administered Medications Ordered in Epic   Medication Dose Route Frequency Provider Last Rate Last Admin    dakins 0.125% (1/4 strength) topical soln   Topical PRN Caitlin Cohen M.D.   473 mL at 12/27/24 0138    insulin GLARGINE (Lantus,Semglee) injection  9 Units Subcutaneous Q EVENING Caitlin Cohen M.D.   9 Units at 12/26/24 1800    And    insulin lispro (HumaLOG,AdmeLOG) subcutaneous injection  1-6 Units Subcutaneous 4X/DAY ACHS Caitlin Cohen M.D.        And    dextrose 50% (D50W) injection 25 g  25 g Intravenous Q15 MIN PRN Caitlin Cohen M.D.        traZODone (Desyrel) tablet 100 mg  100 mg Enteral Tube QHS Paige Blake, D.O.   100 mg at 12/26/24 2120    ARIPiprazole (Abilify) tablet 2 mg  2 mg Enteral Tube DAILY Paige Blake D.O.   2 mg at 12/27/24  0622    hydrOXYzine HCl (Atarax) tablet 10 mg  10 mg Enteral Tube Q4HRS PRN RONAK HerbertOCase   10 mg at 12/22/24 1820    haloperidol lactate (Haldol) injection 1 mg  1 mg Intramuscular Q6HRS PRN Paige Blake D.O.        sore throat spray (Chloraseptic) 1 Spray  1 Spray Mouth/Throat Q2HRS PRN Jada Santoro M.D.        melatonin tablet 10 mg  10 mg Enteral Tube Nightly Jada Santoro M.D.   10 mg at 12/26/24 2120    apixaban (Eliquis) tablet 5 mg  5 mg Enteral Tube BID Caitlin Cohen M.D.   5 mg at 12/27/24 0622    clopidogrel (Plavix) tablet 75 mg  75 mg Enteral Tube DAILY Caitlin Cohen M.D.   75 mg at 12/27/24 0622    dapagliflozin propanediol (Farxiga) tablet 10 mg  10 mg Enteral Tube DAILY RONAK NelsonOCase   10 mg at 12/27/24 0622    lansoprazole (Prevacid) solutab 30 mg  30 mg Enteral Tube DAILY Alissa Joyner M.D.   30 mg at 12/27/24 0622    bisacodyl (Dulcolax) suppository 10 mg  10 mg Rectal DAILY Alissa Joyner M.D.   10 mg at 12/27/24 0622    [Held by provider] bisoprolol (Zebeta) tablet 2.5 mg  2.5 mg Enteral Tube Q DAY Rufus Ahumada D.OCase   2.5 mg at 12/13/24 0544    [Held by provider] losartan (Cozaar) tablet 12.5 mg  12.5 mg Enteral Tube Q EVENING Kristi Moore M.D.   12.5 mg at 12/15/24 1759    oxyCODONE immediate-release (Roxicodone) tablet 2.5 mg  2.5 mg Enteral Tube Q4HRS PRN Alissa Joyner M.D.   2.5 mg at 12/23/24 2221    Or    oxyCODONE immediate-release (Roxicodone) tablet 5 mg  5 mg Enteral Tube Q4HRS PRN Alissa Joyner M.D.   5 mg at 12/13/24 1708    ondansetron (Zofran ODT) dispertab 4 mg  4 mg Enteral Tube Q4HRS PRN Jourdan Block M.D.        promethazine (Phenergan) tablet 12.5-25 mg  12.5-25 mg Enteral Tube Q4HRS PRN Jourdan Block M.D.        Pharmacy Consult: Enteral tube insertion - review meds/change route/product selection  1 Each Other PHARMACY TO DOSE Horace Agudelo M.D.        labetalol  "(Normodyne/Trandate) injection 10 mg  10 mg Intravenous Q4HRS PRN Jourdan Block M.D.        ondansetron (Zofran) syringe/vial injection 4 mg  4 mg Intravenous Q4HRS PRN Jourdan Block M.D.        promethazine (Phenergan) suppository 12.5-25 mg  12.5-25 mg Rectal Q4HRS PRN Jourdan Block M.D.        atorvastatin (Lipitor) tablet 80 mg  80 mg Enteral Tube Q EVENING Jourdan Block M.D.   80 mg at 12/26/24 1716    [Held by provider] furosemide (Lasix) tablet 20 mg  20 mg Enteral Tube Q DAY Alissa Joyner M.D.   20 mg at 12/13/24 0544     No current Epic-ordered outpatient medications on file.     Diet:   DIET ORDERS (From admission to next 24h)       Start     Ordered    12/24/24 1130  Diet Order Diet: Level 5 - Minced and Moist (meds whole/crushed in puree, as appropriate); Liquid level: Level 2 - Mildly Thick; Second Modifier: (optional): Cardiac; Tray Modifications (optional): SLP - 1:1 Supervision by Nursing, SLP - Deliver t...  ALL MEALS        Question Answer Comment   Diet: Level 5 - Minced and Moist meds whole/crushed in puree, as appropriate   Liquid level Level 2 - Mildly Thick    Second Modifier: (optional) Cardiac    Tray Modifications (optional) SLP - 1:1 Supervision by Nursing    Tray Modifications (optional) SLP - Deliver to Nursing Station        12/24/24 1130    12/10/24 1651  Diet: Diet Tube Feed; Formula: Pivot; Pivot: Pivot 1.5 RTH; Goal Rate (mL/Hour): 40; Duration: 24 HR  ALL MEALS        Question Answer Comment   Diet Diet Tube Feed    Formula: Pivot    Pivot: Pivot 1.5 RTH    Goal Rate (mL/Hour) 40    Route NG    Duration 24 HR       Placed in \"And\" Linked Group    12/10/24 1653    12/02/24 1232  Supplements  ALL MEALS        Question Answer Comment   Which Supplement Glucerna    Glucerna: Glucerna Shake Carton        12/02/24 1231                    Anticipated Discharge Destination / Patient/Family Goal:  Destination: Home with Assistance Support System: Spouse and Family "   Anticipated home health services: OT and PT  Previously used HH service/ provider: Not Applicable  Anticipated DME Needs: [employer]  Outpatient Services: OT and PT  Alternative resources to address additional identified needs:   No future appointments.   Pre-Screen Completed: 12/27/2024 2:09 PM Ketan Fraire L.P.N.

## 2024-12-27 NOTE — DISCHARGE PLANNING
Physiatry to consult.    8760-Spoke with Bruno, spouse regarding [facility] & D/C resources/support.  He is agreeable with an admission.  They live in a 1LV home with 1ST to enter. He will have a ramp placed. Bruno is retired and will provide 24/7 physical assistance.  Her brother lives 10 minutes away and is retired as well and able to assist when needed.  Bruno is looking forward to family training.  Submitted to insurance.

## 2024-12-27 NOTE — WOUND TEAM
[facility]  Inpatient Services  Wound and Skin Care Follow-up    Admission Date: 11/27/2024     Last order of IP CONSULT TO WOUND CARE was found on 12/16/2024 from Hospital Encounter on 11/27/2024     HPI, PMH, SH: Reviewed    Past Surgical History:   Procedure Laterality Date    INSERTION,CANNULA FOR ECMO,ADULT Left 10/30/2024    Procedure: INSERTION, CANNULA, FOR ECMO, ADULT;  Surgeon: Aime Elias D.O.;  Location: SURGERY [facility];  Service: Cardiothoracic     Social History     Tobacco Use    Smoking status: Never    Smokeless tobacco: Never   Substance Use Topics    Alcohol use: No     No chief complaint on file.    Diagnosis: HFrEF (heart failure with reduced ejection fraction) (HCC) [I50.20]  Encephalopathy acute [G93.40]    Unit where seen by Wound Team: R308/00     WOUND FOLLOW UP RELATED TO:  BL Groin NPWT change       WOUND TEAM PLAN OF CARE - Frequency of Follow-up:   Nursing to follow dressing orders written for wound care. Contact wound team if area fails to progress, deteriorates or with any questions/concerns if something comes up before next scheduled follow up (See below as to whether wound is following and frequency of wound follow up)  Dressing changes by wound team:                   NPWT change 2x weekly - BL Groin, next change on Monday    WOUND HISTORY:       Pt is a 59yr old female with history of Obesity, and HTN. Pt was initially seen at [facility] for anterior STEMI. Pt underwent LAD PCI but hospital course was complicated by retroperitoneal bleed, V Fib and PEA arrest ultimately requiring ECMO and Impella. Pt was decannulated from ECMO on 11/4 and had left femoral embolectomy. Impella was then removed on 11/8. Pts ECHO on 11/24 revealed EF of 25-30%. Pt has bilateral groin wounds from ECMO and Impella. Wound team was consulted to evaluate.        WOUND ASSESSMENT/LDA  Wound 12/07/24 Full Thickness Wound Groin Left (Active)   Date First Assessed/Time First Assessed:  12/07/24 1721   Present on Original Admission: No  Hand Hygiene Completed: Yes  Primary Wound Type: Full Thickness Wound  Location: Groin  Laterality: Left      Assessments 12/26/2024  5:00 PM   Site Assessment Granulation tissue;Slough   Periwound Assessment Clean;Dry;Intact   Margins Defined edges;Unattached edges   Closure Secondary intention   Drainage Amount Scant   Drainage Description Serosanguineous   Treatments Cleansed   Wound Cleansing Approved Wound Cleanser   Periwound Protectant Skin Protectant Wipes to Periwound;Paste Ring;Drape   Dressing Status Clean;Dry;Intact   Dressing Changed Changed   Dressing Cleansing/Solutions 1/4 Strength Dakin's Solution   Dressing Options Wound Vac   Dressing Change/Treatment Frequency Twice Weekly   NEXT Dressing Change/Treatment Date 12/30/24   NEXT Weekly Photo (Inpatient Only) 12/30/24   Wound Team Following 3x Weekly   Non-staged Wound Description Full thickness   Shape Oval   Wound Odor None   WOUND NURSE ONLY - Time Spent with Patient (mins) 45       Wound 12/07/24 Full Thickness Wound Groin Right (Active)   Date First Assessed/Time First Assessed: 12/07/24 1721   Present on Original Admission: No  Hand Hygiene Completed: Yes  Primary Wound Type: Full Thickness Wound  Location: Groin  Laterality: Right      Assessments 12/26/2024  5:00 PM   Site Assessment Granulation tissue   Periwound Assessment Clean;Dry;Intact   Margins Defined edges;Unattached edges   Closure Secondary intention   Drainage Amount Small   Drainage Description Serosanguineous   Treatments Cleansed   Wound Cleansing Approved Wound Cleanser   Periwound Protectant Skin Protectant Wipes to Periwound;Paste Ring;Drape   Dressing Status Clean;Dry;Intact   Dressing Changed Changed   Dressing Cleansing/Solutions 1/4 Strength Dakin's Solution   Dressing Options Wound Vac   Dressing Change/Treatment Frequency Twice Weekly   NEXT Dressing Change/Treatment Date 12/30/24   NEXT Weekly Photo (Inpatient Only)  12/30/24   Wound Team Following 3x Weekly   Non-staged Wound Description Full thickness   Shape Round   Wound Odor None       Negative Pressure Wound Therapy 12/07/24 Groin Left (Active)   Placement Date/Time: 12/07/24 1722   Location: Groin  Laterality: Left      Assessments 12/26/2024  5:00 PM   Wound Image     NPWT Pump Mode / Pressure Setting Ulta;125 mmHg   Dressing Type Small;Black Foam (Veraflo)   Number of Foam Pieces Used 1   Canister Changed No   NEXT Dressing Change/Treatment Date 12/30/24   VAC VeraFlo Irrigant 1/4 Strength Dakins   VAC VeraFlo Soak Time (mins) 8   VAC VeraFlo Instill Volume (ml) 8   VAC VeraFlo - Therapy Time (hrs) 2   VAC VeraFlo Pressure (mm/Hg) 125 mmHg       Negative Pressure Wound Therapy 12/07/24 Groin Right (Active)   Placement Date/Time: 12/07/24 1722   Present on Original Admission: No  Location: Groin  Laterality: Right      Assessments 12/26/2024  5:00 PM   NPWT Pump Mode / Pressure Setting Ulta;125 mmHg   Dressing Type Small;Black Foam (Veraflo)   Number of Foam Pieces Used 1   Canister Changed No   NEXT Dressing Change/Treatment Date 12/30/24   VAC VeraFlo Irrigant 1/4 Strength Dakins   VAC VeraFlo Soak Time (mins) 8   VAC VeraFlo Instill Volume (ml) 16   VAC VeraFlo - Therapy Time (hrs) 2   VAC VeraFlo Pressure (mm/Hg) 125 mmHg        Vascular:    JAGJIT:   No results found.    Lab Values:    Lab Results   Component Value Date/Time    WBC 5.9 12/26/2024 04:43 AM    RBC 3.43 (L) 12/26/2024 04:43 AM    HEMOGLOBIN 11.4 (L) 12/26/2024 04:43 AM    HEMATOCRIT 35.8 (L) 12/26/2024 04:43 AM    CREACTPROT 6.06 (H) 12/23/2024 11:20 AM    SEDRATEWES 140 (H) 11/28/2024 01:35 AM    HBA1C 6.2 (H) 11/01/2024 06:46 PM    HBA1C 6.4 (H) 10/30/2024 03:18 AM         Culture Results show:  No results found for this or any previous visit (from the past 720 hours).    Pain Level/Medicated:  None, Tolerated without pain medication       INTERVENTIONS BY WOUND TEAM:  Chart and images reviewed.  Discussed with bedside RN. All areas of concern (based on picture review, LDA review and discussion with bedside RN) have been thoroughly assessed. Documentation of areas based on significant findings. This RN in to assess patient. Performed standard wound care which includes appropriate positioning, dressing removal and non-selective debridement. Pictures and measurements obtained weekly if/when required.    Wound:  BL Groin  Preparation for Dressing removal: Removed without difficulty  Cleansed/Non-selectively Debrided with:  Wound cleanser and Gauze  Brianna wound: Cleansed with Wound cleanser and Gauze, Prepped with No Sting, Paste Rings, Drape, and Hydrocolloid  Primary Dressing:  One piece of foam applied into each wound bed and bridged outwards. Secured with drape and Trac pad  Secondary (Outer) Dressing: Each respective machine was re-started and suction resumed at 125mmHg, no leaks detected.    Advanced Wound Care Discharge Planning  Number of Clinicians necessary to complete wound care: 1  Is patient requiring IV pain medications for dressing changes:  No   Length of time for dressing change 30 min. (This does not include chart review, pre-medication time, set up, clean up or time spent charting.)    Interdisciplinary consultation: Patient, Bedside RN (Shelli)    EVALUATION / RATIONALE FOR TREATMENT:     Date:  12/26/24  Wound Status:  Wound progressing as expected    L Groin wound bed more granular, slightly decreased amount of instillation fluid here.  R Groin still with odor and small amounts of adhered slough, continuing current POC.  Okay to trial removal of de jesus from a wound care perspective so long as urine does not interfere with Vac seals.  For discharge: Okay to transition to regular VACs.    Date:  12/23/24  Wound Status:  Wound progressing as expected     No odor to L groin.  Decreased odor to R groin. There is sone induration present to suprapubic skin proximal to R groin.  Date:  12/20/24  Wound  Status:  Wound progressing as expected    L Groin with more granular wound bed, minimal slough here. Skin bridge no longer present.  R Groin noted to have purulent-serosanguinous drainage from wound depth. Veraflo settings adjusted to instill more fluid and have a longer dwell time to better wash out the depth of the wound bed.    Date:  12/18/24  Wound Status:  Wound deteriorating    L Groin wound base still with slough, slightly decreased. Satellite lesion noted below wound bed, does connect beneath skin bridge.  R Groin with increased depth compared to previous measurements. Both wound beds with mildly foul odor, continuing Dakins VF NPWT.  Sacrum pink, intact and blanching, barrier paste in room.    Date:  12/16/24  Wound Status:  No change in wound     Right groin wound has moist pale pink tissue, continue with dakin's VF NPWT.  Left groin wound with slough and non-viable purulent drainage.  CSWD to remove purulent and slough from wound bed.  Resumed VF NPWT with dakin's instillation.     Date:  12/13/24  Wound Status:  Wound deteriorating    BL Groin wound beds with increased amounts of slough and malodor.  Patient is already on Dakins VF, will continue with this to topically clean wound beds.    Date:  12/10/24  Wound Status:  Wound progressing as expected    Left groin: Groin noted with layer of slough. Wound has small area of tunneling at 6o clock, a small piece of black foam was tucked into this tunnel to assist with closure.   Right Groin: Noted with a layer of slough, VF NPWT was changed from NS to Dakins to assist with chemical debridement.   Prior to vac removal, skin around both groin sites appeared to be moist, and VF instillation amount was reduced.     Date:  12/07/24  Wound Status:  Initial evaluation    Bilateral wound beds with slough present.  VF NPWT applied to assist with mechanical debridement, cleansing the wound bed, increase oxygenation and granulation to the area and healing the wound by  "secondary intention.        Date:  12/04/24  Wound Status:  Initial evaluation    Bilateral groin sites with necrotic tissue, adipose and tan purulent drainage. Discussed with bedside RN who will speak with MD regarding surgical consult. Pt would benefit from NPWT application but would like surgery to clear pt from any exposed structures prior to vac application. Dakins ordered to chemically debride.          Goals: Steady decrease in wound area and depth weekly.    NURSING PLAN OF CARE ORDERS:  No new orders this visit    NUTRITION RECOMMENDATIONS   Wound Team Recommendations:  N/A     DIET ORDERS (From admission to next 24h)       Start     Ordered    12/24/24 1130  Diet Order Diet: Level 5 - Minced and Moist (meds whole/crushed in puree, as appropriate); Liquid level: Level 2 - Mildly Thick; Second Modifier: (optional): Cardiac; Tray Modifications (optional): SLP - 1:1 Supervision by Nursing, SLP - Deliver t...  ALL MEALS        Question Answer Comment   Diet: Level 5 - Minced and Moist meds whole/crushed in puree, as appropriate   Liquid level Level 2 - Mildly Thick    Second Modifier: (optional) Cardiac    Tray Modifications (optional) SLP - 1:1 Supervision by Nursing    Tray Modifications (optional) SLP - Deliver to Nursing Station        12/24/24 1130    12/10/24 1651  Diet: Diet Tube Feed; Formula: Pivot; Pivot: Pivot 1.5 RTH; Goal Rate (mL/Hour): 40; Duration: 24 HR  ALL MEALS        Question Answer Comment   Diet Diet Tube Feed    Formula: Pivot    Pivot: Pivot 1.5 RTH    Goal Rate (mL/Hour) 40    Route NG    Duration 24 HR       Placed in \"And\" Linked Group    12/10/24 1653    12/02/24 1232  Supplements  ALL MEALS        Question Answer Comment   Which Supplement Glucerna    Glucerna: Glucerna Shake Carton        12/02/24 1231                    PREVENTATIVE INTERVENTIONS:   Q shift Ray - performed per nursing policy  Q shift pressure point assessments - performed per nursing policy    Anticipated " discharge plans:  TBD        Vac Discharge Needs:  Vac Discharge plan is purely a recommendation from wound team and not a requirement for discharge unless otherwise stated by physician.  Veraflo Vac while inpatient, ok to transition to Regular Vac on discharge

## 2024-12-27 NOTE — CARE PLAN
The patient is Watcher - Medium risk of patient condition declining or worsening    Shift Goals  Clinical Goals: safety, rest  Patient Goals: rest  Family Goals: JAYLIN    Progress made toward(s) clinical / shift goals:    Problem: Nutrition  Goal: Patient's nutritional and fluid intake will be adequate or improve  Outcome: Progressing  Note: Patient tolerating thickened liquid and minced, moist diet.     Problem: Urinary Elimination  Goal: Establish and maintain regular urinary output  Outcome: Progressing  Note: Patient tolerating urinary catheter.       Patient is not progressing towards the following goals:

## 2024-12-27 NOTE — DISCHARGE PLANNING
Case Management Discharge Planning    Admission Date: 11/27/2024  GMLOS: 4.1  ALOS: 30    6-Clicks ADL Score: 16  6-Clicks Mobility Score: 14      Anticipated Discharge Dispo: Discharge Disposition: D/T to SNF with Medicare cert in anticipation of skilled care (03)  Discharge Address: [address] [place] [address]  Discharge Contact Phone Number: [phone]    This RN CM completed chart review and [facility] is pending acceptance. TCC will need to follow up on support on discharge with family and insurance auth would need to be approved.     This RN CM updated spouse that patient is being considered for [facility].          My acceptable pain level 5

## 2024-12-28 LAB
ANION GAP SERPL CALC-SCNC: 11 MMOL/L (ref 7–16)
BUN SERPL-MCNC: 22 MG/DL (ref 8–22)
CALCIUM SERPL-MCNC: 7.9 MG/DL (ref 8.5–10.5)
CHLORIDE SERPL-SCNC: 103 MMOL/L (ref 96–112)
CO2 SERPL-SCNC: 19 MMOL/L (ref 20–33)
CREAT SERPL-MCNC: 0.75 MG/DL (ref 0.5–1.4)
GFR SERPLBLD CREATININE-BSD FMLA CKD-EPI: 91 ML/MIN/1.73 M 2
GLUCOSE BLD STRIP.AUTO-MCNC: 104 MG/DL (ref 65–99)
GLUCOSE BLD STRIP.AUTO-MCNC: 120 MG/DL (ref 65–99)
GLUCOSE BLD STRIP.AUTO-MCNC: 130 MG/DL (ref 65–99)
GLUCOSE BLD STRIP.AUTO-MCNC: 133 MG/DL (ref 65–99)
GLUCOSE SERPL-MCNC: 140 MG/DL (ref 65–99)
MAGNESIUM SERPL-MCNC: 2 MG/DL (ref 1.5–2.5)
PHOSPHATE SERPL-MCNC: 3.2 MG/DL (ref 2.5–4.5)
POTASSIUM SERPL-SCNC: 4.2 MMOL/L (ref 3.6–5.5)
SODIUM SERPL-SCNC: 133 MMOL/L (ref 135–145)

## 2024-12-28 PROCEDURE — 700102 HCHG RX REV CODE 250 W/ 637 OVERRIDE(OP): Performed by: STUDENT IN AN ORGANIZED HEALTH CARE EDUCATION/TRAINING PROGRAM

## 2024-12-28 PROCEDURE — 83735 ASSAY OF MAGNESIUM: CPT

## 2024-12-28 PROCEDURE — 700102 HCHG RX REV CODE 250 W/ 637 OVERRIDE(OP): Performed by: HOSPITALIST

## 2024-12-28 PROCEDURE — A9270 NON-COVERED ITEM OR SERVICE: HCPCS | Performed by: STUDENT IN AN ORGANIZED HEALTH CARE EDUCATION/TRAINING PROGRAM

## 2024-12-28 PROCEDURE — 80048 BASIC METABOLIC PNL TOTAL CA: CPT

## 2024-12-28 PROCEDURE — 700102 HCHG RX REV CODE 250 W/ 637 OVERRIDE(OP)

## 2024-12-28 PROCEDURE — 84100 ASSAY OF PHOSPHORUS: CPT

## 2024-12-28 PROCEDURE — 770004 HCHG ROOM/CARE - ONCOLOGY PRIVATE *

## 2024-12-28 PROCEDURE — A9270 NON-COVERED ITEM OR SERVICE: HCPCS

## 2024-12-28 PROCEDURE — 99232 SBSQ HOSP IP/OBS MODERATE 35: CPT | Performed by: STUDENT IN AN ORGANIZED HEALTH CARE EDUCATION/TRAINING PROGRAM

## 2024-12-28 PROCEDURE — 36415 COLL VENOUS BLD VENIPUNCTURE: CPT

## 2024-12-28 PROCEDURE — A9270 NON-COVERED ITEM OR SERVICE: HCPCS | Performed by: HOSPITALIST

## 2024-12-28 PROCEDURE — 82962 GLUCOSE BLOOD TEST: CPT | Mod: 91

## 2024-12-28 RX ORDER — MENTHOL AND METHYL SALICYLATE 7.6; 29 G/100G; G/100G
OINTMENT TOPICAL PRN
Status: DISCONTINUED | OUTPATIENT
Start: 2024-12-28 | End: 2024-12-30 | Stop reason: HOSPADM

## 2024-12-28 RX ADMIN — TRAZODONE HYDROCHLORIDE 100 MG: 100 TABLET ORAL at 20:06

## 2024-12-28 RX ADMIN — Medication 10 MG: at 20:07

## 2024-12-28 RX ADMIN — LANSOPRAZOLE 30 MG: 30 TABLET, ORALLY DISINTEGRATING ORAL at 05:34

## 2024-12-28 RX ADMIN — ATORVASTATIN CALCIUM 80 MG: 80 TABLET, FILM COATED ORAL at 17:19

## 2024-12-28 RX ADMIN — ARIPIPRAZOLE 2 MG: 2 TABLET ORAL at 05:34

## 2024-12-28 RX ADMIN — CLOPIDOGREL BISULFATE 75 MG: 75 TABLET ORAL at 05:34

## 2024-12-28 RX ADMIN — INSULIN GLARGINE-YFGN 9 UNITS: 100 INJECTION, SOLUTION SUBCUTANEOUS at 17:20

## 2024-12-28 RX ADMIN — DAPAGLIFLOZIN 10 MG: 10 TABLET, FILM COATED ORAL at 05:34

## 2024-12-28 RX ADMIN — APIXABAN 5 MG: 5 TABLET, FILM COATED ORAL at 17:19

## 2024-12-28 RX ADMIN — APIXABAN 5 MG: 5 TABLET, FILM COATED ORAL at 05:34

## 2024-12-28 ASSESSMENT — ENCOUNTER SYMPTOMS
VOMITING: 0
NAUSEA: 0
CHILLS: 0
SHORTNESS OF BREATH: 0
HEARTBURN: 0
COUGH: 0
DIZZINESS: 0
FEVER: 0
WEAKNESS: 1
SORE THROAT: 0

## 2024-12-28 ASSESSMENT — PAIN DESCRIPTION - PAIN TYPE: TYPE: ACUTE PAIN

## 2024-12-28 NOTE — PROGRESS NOTES
Hospital Medicine Daily Progress Note    Date of Service  12/28/2024    Chief Complaint  Kiara Qureshi is a 59 y.o. female admitted 11/27/2024 with encephalopathy    Hospital Course  60 yo woman HTN, HLD who was at [facility] for anterior STEMI s/p lytics and had LAD PCI and unsuccessful PCI for OM1 complicated by RP bleed, vfib and PEA arrest with cardiogenic shock needing ECMO and imeplla.      ECMO was decannulated 11/4, Impella removed 11/8.      She also had encephalopathy and delirium on valproic acid and Seroquel.      TTE showed EF 25-30%, she did not tolerate GDMT due to hypotension.    She had a CT chest on 11/25 that showed focal aortic mural thrombus versus ulcerated plaque to descending thoracic aorta and CTA AP with rim-enhancing RP hematoma and small thrombus in right common iliac artery, multiple nonenhancing fluid collections and subcu tissue in right lower and right inguinal regions, possible evolving hematomas and left femoral vein.  She was started on Vanco and Zosyn and transferred back to [facility].    She completed linezolid and Zosyn around 12/1.  Cardiology was consulted for medical management of her heart failure. Dr. Conley with vascular surgery was consulted, recommended wound vacs to groin wounds.  On 12/9 she became more altered with respiratory distress and transferred to [facility].  CT head was negative for acute changes.  EEG was negative for seizure activity.  She was high on high dose of Seroquel that was tapered off.    Patient had NG tube placed. Now tolerating the diet after seeing by SLP. NG tube discontinued.   Has been evaluated by PMR and good candidate for IRF. Pending insurance auth and working with physical therapy to show improvement.         Interval Problem Update  12/28:  Patient was seen and evaluated at the bedside.  She denies any chest pain.  She denies any cough congestion.  She is motivated to work with the physical therapy to show improvement.   She is also  tolerating diet orally. Since patient is improving in oral intake patient has been eating more than 70% of her meal.  Will try to remove NG tube.  Hemodynamically stable.  On room air.  Holding BB and lasix due to soft BP          I have discussed this patient's plan of care and discharge plan at IDT rounds today with Case Management, Nursing, Nursing leadership, and other members of the IDT team.    Consultants/Specialty  cardiology and vascular surgery, palliative care    Code Status  DNAR, I OK    Disposition  Medically Cleared  I have placed the appropriate orders for post-discharge needs.  Patient is good candidate for IRF pending insurace auth and to show improvement with physical therapy     Review of Systems  Review of Systems   Constitutional:  Negative for chills and fever.   HENT:  Negative for congestion and sore throat.    Respiratory:  Negative for cough and shortness of breath.    Cardiovascular:  Negative for chest pain and leg swelling.   Gastrointestinal:  Negative for heartburn, nausea and vomiting.   Genitourinary:  Negative for dysuria, frequency and urgency.   Neurological:  Positive for weakness. Negative for dizziness.        Physical Exam  Temp:  [36.2 °C (97.1 °F)-36.7 °C (98 °F)] 36.5 °C (97.7 °F)  Pulse:  [64-81] 69  Resp:  [16-18] 18  BP: ()/(52-73) 106/58  SpO2:  [93 %-99 %] 96 %    Physical Exam  Constitutional:       Appearance: She is ill-appearing.   HENT:      Head: Normocephalic.      Mouth/Throat:      Mouth: Mucous membranes are moist.   Eyes:      Extraocular Movements: Extraocular movements intact.      Pupils: Pupils are equal, round, and reactive to light.   Cardiovascular:      Rate and Rhythm: Normal rate and regular rhythm.   Pulmonary:      Breath sounds: Rhonchi present.   Abdominal:      General: There is no distension.      Palpations: Abdomen is soft.      Tenderness: There is no abdominal tenderness.   Musculoskeletal:      Right lower leg: No edema.      Left  lower leg: No edema.      Comments: Wound vacs bilaterally to groin   Neurological:      Mental Status: She is oriented to person, place, and time. Mental status is at baseline.      Comments:            Fluids    Intake/Output Summary (Last 24 hours) at 12/28/2024 1249  Last data filed at 12/28/2024 0512  Gross per 24 hour   Intake 436 ml   Output 1000 ml   Net -564 ml        Laboratory  Recent Labs     12/26/24  0443   WBC 5.9   RBC 3.43*   HEMOGLOBIN 11.4*   HEMATOCRIT 35.8*   .4*   MCH 33.2*   MCHC 31.8*   RDW 69.6*   PLATELETCT 219   MPV 12.2       Recent Labs     12/26/24  0443   SODIUM 134*   POTASSIUM 4.3   CHLORIDE 105   CO2 21   GLUCOSE 236*   BUN 28*   CREATININE 0.75   CALCIUM 8.2*                   Imaging  DX-ABDOMEN FOR TUBE PLACEMENT   Final Result         1.  Nonspecific bowel gas pattern in the upper abdomen.   2.  Dobbhoff tube tip overlying the expected location of the pylorus or first duodenal segment.   3.  Bilateral lower lobe atelectasis and/or infiltrates      DX-ABDOMEN FOR TUBE PLACEMENT   Final Result         1.  Nonspecific bowel gas pattern in the upper abdomen.   2.  Dobbhoff tube tip overlying the expected location of the pylorus or first duodenal segment.   3.  Pulmonary edema and/or infiltrates.   4.  Trace bilateral pleural effusions.      DX-ABDOMEN FOR TUBE PLACEMENT   Final Result      1. Repositioning of the esophagogastric tube, terminating over the fundus of the stomach.   2. Interstitial edema, left retrocardiac opacity and small left pleural effusion.      DX-ABDOMEN FOR TUBE PLACEMENT   Final Result         1.  Nonspecific bowel gas pattern in the upper abdomen.   2.  Dobbhoff tube tip terminates overlying the expected location of the gastric antrum or pylorus.   3.  Pulmonary edema and/or infiltrates, similar to prior study      DX-ABDOMEN FOR TUBE PLACEMENT   Final Result      The tip of the enteric tube terminates over the antrum of the stomach.      DX-ABDOMEN  FOR TUBE PLACEMENT   Final Result         1.  Nonspecific bowel gas pattern in the upper abdomen.   2.  Dobbhoff tube is coiled within the duodenum, the tip terminates overlying the expected location of the gastric body.   3.  Bilateral lower lobe edema and/or infiltrates.      DX-ABDOMEN FOR TUBE PLACEMENT   Final Result      NG tube tip projects over the gastroduodenal junction.      DX-CHEST-LIMITED (1 VIEW)   Final Result      1.  Mild cardiomegaly with evidence of mild to moderate pulmonary edema with minimal worsening since previous exam.      CT-HEAD W/O   Final Result         1.  No acute intracranial abnormality is identified, there are nonspecific white matter changes, commonly associated with small vessel ischemic disease.  Associated mild cerebral atrophy is noted.   2.  Bilateral sinusitis changes   3.  Atherosclerosis.               DX-CHEST-PORTABLE (1 VIEW)   Final Result         1.  Mild pulmonary edema and/or infiltrates.      EC-ECHOCARDIOGRAM COMPLETE W/ CONT   Final Result      DX-ABDOMEN FOR TUBE PLACEMENT   Final Result      Tip of the esophagogastric tube terminates over the pylorus of the stomach.      IR-MIDLINE CATHETER INSERTION WO GUIDANCE > AGE 3   Final Result                  Ultrasound-guided midline placement performed by qualified nursing staff    as above.          IR-US GUIDED PIV   Final Result    Ultrasound-guided PERIPHERAL IV INSERTION performed by    qualified nursing staff as above.      DX-OUTSIDE IMAGES-DX CHEST   Final Result      DX-OUTSIDE IMAGES-DX ABDOMEN   Final Result      MR-OUTSIDE IMAGES-MR BRAIN   Final Result      CT-OUTSIDE IMAGES-CT HEAD   Final Result      CT-OUTSIDE IMAGES-CT ABDOMEN/PELVIS   Final Result      CT-OUTSIDE IMAGES-CT CHEST   Final Result      DX-ABDOMEN FOR TUBE PLACEMENT   Final Result      There is a new small bowel feeding tube which terminates in the small bowel of the right mid abdomen.      DX-CHEST-LIMITED (1 VIEW)   Final Result          Asymmetric pulmonary infiltrates, left worse than right.      IR-US GUIDED PIV    (Results Pending)        Assessment/Plan  * Heart failure with reduced ejection fraction (HCC)- (present on admission)  Assessment & Plan  ICM LVEF 25-30%  S/p revascularization  Soft pressures limiting titration of GDMT.  Farxiga  Holding losartan for low BP  Critical care had recommended holding bisoprolol because of Cheyne-Lo breathing  Dehydrated, holding Lasix    Circulating anticoagulant disorder (HCC)  Assessment & Plan  On Eliquis    Functional quadriplegia (HCC)  Assessment & Plan  In the setting of AMS, prior PEA    Macrocytic anemia  Assessment & Plan  Stable. No overt bleeding  B12 wnl. Studies more consistent with anemia of chronic disease    Hypernatremia  Assessment & Plan  Resolved     Acute hypoxic respiratory failure (HCC)  Assessment & Plan  In setting of heart failure, recent cardiogenic shock  Patient demonstrates cheynne lo breathing pattern which is consistent with her suspected anoxic brain injury  Patient appears to be experiencing aspiration events causing bradycardia  Now on room air   RT protocol     Constipation  Assessment & Plan  Bowel protocol       Hypotension  Assessment & Plan  BP low range, holding losartan and bisoprolol and lasix    Abdominal hematoma  Assessment & Plan  Might be related to cardiac device  Clinically stable and h/h in stable range  Patient was on lovenox due to rigth common iliac art thrombosis and then transitioned to eliquis per [facility]  - plan to continue for 3-4 months with repeat imaging at that time to see if it can be d/c   Hgb stable. Continue to monitor    ACP (advance care planning)  Assessment & Plan  Dr. Mobley: Discussed plan of care and code status with patient  Bruno ([phone]) and sister-in-law Laina ([phone]). I explained that Kiara has experienced a suspected anoxic brain injury during her current medical course that has required ECMO and  impella, CVA, Vfib arrest. I shared my concern that Laina may not make a meaningful recovery from this and that her current and future quality of life appears limited. I explained that in the event of another CP arrest her quality of life would be further impacted in the event she were to survive such an episode. Laina and Bruno made the decision to change Kiara's code status to DNR, yes to intubaiton. Laina will be driving in from [place] in the coming days to have further goals of care conversations. Total of 48 minutes spent in discussion and coordination of advance care planning.    12/16 - my conversation with Laina and Bruno, they will like to continue care and plan for PAMs    Pneumonia  Assessment & Plan  S/p completed course of pip/tazo and vanc  Cultures were negative    Aortic mural thrombus (HCC)  Assessment & Plan  CT at outside facility showing focal aortic mural thrombus along the lateral wall of the descending thoracic aorta and also a tiny filling defect/mural thrombus within the posterior right common iliac artery  Continue on apixaban  Continue to monitor CBC    Acute metabolic encephalopathy  Assessment & Plan  Hospital course complicated by encephalopathy.  Likely from combination of CVA, STEMI, hyperglycemia; likely also with anoxic brain injury   CTH no acute findings. EEG neg for seizure activity  Discontinue valproic acid   On abilify   On trazodone   Psychiatry following appreciate the rec   Mentation improved significantly       CVA (cerebral vascular accident) (HCC)  Assessment & Plan  Patient had MRI on 11/17/24 at [facility] which showed Punctate acute infarct of the right centrum semiovale. Scattered foci of enhancement in multiple vascular distributions as described may represent recent subacute infarcts. Diffuse stippled susceptibility signal throughout the supratentorial and infratentorial brain. Overall findings may represent sequelae of fat emboli or amyloid related angiopathy.   CTH no  acute findings. EEG neg for seizure activity  High dose statin  Apixaban  plavix    CAD (coronary artery disease)  Assessment & Plan  Patient with recent complicated STEMI with PCI to LAD and mid LAD.  Complicated by V-fib arrest and cardiogenic shock requiring VA ECMO and Impella and retroperitoneal hematomas. At [facility] underwent impella supported PCI to mid LAD just distal to prior stent but unsuccessful with PCI to 100% occluded OM1 on 11/6. Patient was decannulated and had Impella removed.    Discussed with cardiology and asa stopped and replaced with plavix. Cont statin  Repeat echo shows persistently low EF, 25-30%  Cardiology had recommended bisoprolol but due to cheyne florez critical care recommends we stop this - so will hold   Continue to hold losartan for low BP and lasix     Urinary retention  Assessment & Plan  Beckwith catheter in place    Hyperglycemia  Assessment & Plan  Glargine 9 units  ISS and hypoglycemia protocol     Metabolic acidemia- (present on admission)  Assessment & Plan  Improved         VTE prophylaxis: eliquis     I have performed a physical exam and reviewed and updated ROS and Plan today (12/28/2024). In review of yesterday's note (12/27/2024), there are no changes except as documented above.    Patient has a high medical complexity, complex decision making and is at high risk of complication, morbidity, and mortality

## 2024-12-28 NOTE — DISCHARGE PLANNING
Insurance remains pending.  Following for updated TX evals as well as ability to maintain PO diet.

## 2024-12-28 NOTE — CARE PLAN
The patient is Stable - Low risk of patient condition declining or worsening    Shift Goals  Clinical Goals: remove NG tube, safety  Patient Goals: up to chair, remove NG  Family Goals: n/a    Progress made toward(s) clinical / shift goals:    Problem: Safety  Goal: Will remain free from injury  Outcome: Progressing     Problem: Skin Integrity  Goal: Risk for impaired skin integrity will decrease  Outcome: Progressing       Patient is not progressing towards the following goals:

## 2024-12-28 NOTE — CARE PLAN
The patient is Stable - Low risk of patient condition declining or worsening    Shift Goals  Clinical Goals: adequate oral nutritional intake, monitor blood pressure  Patient Goals: rest, remove NG tube  Family Goals: JAYLIN    Progress made toward(s) clinical / shift goals:      Problem: Nutrition  Goal: Patient's nutritional and fluid intake will be adequate or improve  Outcome: Not Progressing  Note: Patient's oral intake has been below goal of 50-75%.     Problem: Safety  Goal: Will remain free from injury  Outcome: Progressing  Note: Patient understands how to use call light for assistance. Has remained free from injury this shift. Patient will continue to use call light as needed.       Patient is not progressing towards the following goals:      Problem: Nutrition  Goal: Patient's nutritional and fluid intake will be adequate or improve  Outcome: Not Progressing  Note: Patient's oral intake has been below goal of 50-75%.

## 2024-12-29 LAB
GLUCOSE BLD STRIP.AUTO-MCNC: 129 MG/DL (ref 65–99)
GLUCOSE BLD STRIP.AUTO-MCNC: 79 MG/DL (ref 65–99)
GLUCOSE BLD STRIP.AUTO-MCNC: 83 MG/DL (ref 65–99)
GLUCOSE BLD STRIP.AUTO-MCNC: 93 MG/DL (ref 65–99)

## 2024-12-29 PROCEDURE — A9270 NON-COVERED ITEM OR SERVICE: HCPCS | Performed by: STUDENT IN AN ORGANIZED HEALTH CARE EDUCATION/TRAINING PROGRAM

## 2024-12-29 PROCEDURE — 700102 HCHG RX REV CODE 250 W/ 637 OVERRIDE(OP): Performed by: STUDENT IN AN ORGANIZED HEALTH CARE EDUCATION/TRAINING PROGRAM

## 2024-12-29 PROCEDURE — A9270 NON-COVERED ITEM OR SERVICE: HCPCS | Performed by: HOSPITALIST

## 2024-12-29 PROCEDURE — 700102 HCHG RX REV CODE 250 W/ 637 OVERRIDE(OP): Performed by: HOSPITALIST

## 2024-12-29 PROCEDURE — 82962 GLUCOSE BLOOD TEST: CPT

## 2024-12-29 PROCEDURE — 99232 SBSQ HOSP IP/OBS MODERATE 35: CPT | Performed by: STUDENT IN AN ORGANIZED HEALTH CARE EDUCATION/TRAINING PROGRAM

## 2024-12-29 PROCEDURE — 700102 HCHG RX REV CODE 250 W/ 637 OVERRIDE(OP)

## 2024-12-29 PROCEDURE — A9270 NON-COVERED ITEM OR SERVICE: HCPCS

## 2024-12-29 PROCEDURE — 770004 HCHG ROOM/CARE - ONCOLOGY PRIVATE *

## 2024-12-29 RX ORDER — PROMETHAZINE HYDROCHLORIDE 25 MG/1
12.5-25 TABLET ORAL EVERY 4 HOURS PRN
Status: DISCONTINUED | OUTPATIENT
Start: 2024-12-29 | End: 2024-12-30 | Stop reason: HOSPADM

## 2024-12-29 RX ORDER — ARIPIPRAZOLE 2 MG/1
2 TABLET ORAL DAILY
Status: DISCONTINUED | OUTPATIENT
Start: 2024-12-30 | End: 2024-12-30 | Stop reason: HOSPADM

## 2024-12-29 RX ORDER — TRAZODONE HYDROCHLORIDE 100 MG/1
100 TABLET ORAL
Status: DISCONTINUED | OUTPATIENT
Start: 2024-12-29 | End: 2024-12-30 | Stop reason: HOSPADM

## 2024-12-29 RX ORDER — HYDROXYZINE HYDROCHLORIDE 10 MG/1
10 TABLET, FILM COATED ORAL EVERY 4 HOURS PRN
Status: DISCONTINUED | OUTPATIENT
Start: 2024-12-29 | End: 2024-12-30 | Stop reason: HOSPADM

## 2024-12-29 RX ORDER — DAPAGLIFLOZIN 10 MG/1
10 TABLET, FILM COATED ORAL DAILY
Status: DISCONTINUED | OUTPATIENT
Start: 2024-12-30 | End: 2024-12-30 | Stop reason: HOSPADM

## 2024-12-29 RX ORDER — ONDANSETRON 4 MG/1
4 TABLET, ORALLY DISINTEGRATING ORAL EVERY 4 HOURS PRN
Status: DISCONTINUED | OUTPATIENT
Start: 2024-12-29 | End: 2024-12-30 | Stop reason: HOSPADM

## 2024-12-29 RX ORDER — CLOPIDOGREL BISULFATE 75 MG/1
75 TABLET ORAL DAILY
Status: DISCONTINUED | OUTPATIENT
Start: 2024-12-30 | End: 2024-12-30 | Stop reason: HOSPADM

## 2024-12-29 RX ORDER — OXYCODONE HYDROCHLORIDE 5 MG/1
5 TABLET ORAL EVERY 4 HOURS PRN
Status: DISCONTINUED | OUTPATIENT
Start: 2024-12-29 | End: 2024-12-30 | Stop reason: HOSPADM

## 2024-12-29 RX ORDER — OXYCODONE HYDROCHLORIDE 5 MG/1
2.5 TABLET ORAL EVERY 4 HOURS PRN
Status: DISCONTINUED | OUTPATIENT
Start: 2024-12-29 | End: 2024-12-30 | Stop reason: HOSPADM

## 2024-12-29 RX ADMIN — ATORVASTATIN CALCIUM 80 MG: 80 TABLET, FILM COATED ORAL at 18:07

## 2024-12-29 RX ADMIN — APIXABAN 5 MG: 5 TABLET, FILM COATED ORAL at 18:07

## 2024-12-29 RX ADMIN — BISACODYL 10 MG: 10 SUPPOSITORY RECTAL at 06:26

## 2024-12-29 RX ADMIN — INSULIN GLARGINE-YFGN 9 UNITS: 100 INJECTION, SOLUTION SUBCUTANEOUS at 18:05

## 2024-12-29 RX ADMIN — DAPAGLIFLOZIN 10 MG: 10 TABLET, FILM COATED ORAL at 06:27

## 2024-12-29 RX ADMIN — LANSOPRAZOLE 30 MG: 30 TABLET, ORALLY DISINTEGRATING ORAL at 06:24

## 2024-12-29 RX ADMIN — APIXABAN 5 MG: 5 TABLET, FILM COATED ORAL at 06:24

## 2024-12-29 RX ADMIN — ARIPIPRAZOLE 2 MG: 2 TABLET ORAL at 06:27

## 2024-12-29 RX ADMIN — CLOPIDOGREL BISULFATE 75 MG: 75 TABLET ORAL at 06:26

## 2024-12-29 RX ADMIN — TRAZODONE HYDROCHLORIDE 100 MG: 100 TABLET ORAL at 20:44

## 2024-12-29 RX ADMIN — Medication 10 MG: at 20:43

## 2024-12-29 ASSESSMENT — PAIN DESCRIPTION - PAIN TYPE
TYPE: ACUTE PAIN
TYPE: ACUTE PAIN

## 2024-12-29 ASSESSMENT — ENCOUNTER SYMPTOMS
HEADACHES: 0
SHORTNESS OF BREATH: 0
VOMITING: 0
DIZZINESS: 0
NAUSEA: 0
FEVER: 0
COUGH: 0
ABDOMINAL PAIN: 0
CHILLS: 0
WEAKNESS: 1
MYALGIAS: 0
PALPITATIONS: 0

## 2024-12-29 NOTE — CARE PLAN
"The patient is Stable - Low risk of patient condition declining or worsening    Shift Goals  Clinical Goals: patient will continue to tolerate minced and moist diet  Patient Goals: \"eat normal food\"  Family Goals: not present    Progress made toward(s) clinical / shift goals:        Problem: Safety  Goal: Will remain free from injury  Outcome: Progressing     Problem: Skin Integrity  Goal: Risk for impaired skin integrity will decrease  Outcome: Progressing     Problem: Risk for Aspiration  Goal: Patient's risk for aspiration will be absent or decrease  Outcome: Progressing   Pt consumed about 25% of breakfast and lunch. Educated on the need to have mildly thickened liquids. This RN reached out to speech about a reevaluation due to the last one being on the 24th. Notified that speech will try to see her tomorrow. Patient updated with POC    "

## 2024-12-29 NOTE — PROGRESS NOTES
Monitor Summary:    SR 60-77  (R) [facility]'s  Coup  BBB    .14/.12/.52

## 2024-12-29 NOTE — PROGRESS NOTES
Monitor Summary  Rhythm: SB, SR  Rate: 52-73  Ectopy: R PVC, 6 bts Vtach, R coup, R PAC, katie down to 44 nonsus  .14 / .10 / .36

## 2024-12-30 ENCOUNTER — HOSPITAL ENCOUNTER (INPATIENT)
Facility: REHABILITATION | Age: 59
End: 2024-12-30
Attending: PHYSICAL MEDICINE & REHABILITATION | Admitting: PHYSICAL MEDICINE & REHABILITATION
Payer: COMMERCIAL

## 2024-12-30 VITALS
RESPIRATION RATE: 20 BRPM | DIASTOLIC BLOOD PRESSURE: 72 MMHG | OXYGEN SATURATION: 97 % | WEIGHT: 213.63 LBS | SYSTOLIC BLOOD PRESSURE: 115 MMHG | HEART RATE: 65 BPM | TEMPERATURE: 97.4 F | BODY MASS INDEX: 40.33 KG/M2 | HEIGHT: 61 IN

## 2024-12-30 DIAGNOSIS — J96.01 ACUTE HYPOXIC RESPIRATORY FAILURE (HCC): ICD-10-CM

## 2024-12-30 DIAGNOSIS — G93.1 ANOXIC BRAIN INJURY (HCC): ICD-10-CM

## 2024-12-30 DIAGNOSIS — G31.84 MILD NEUROCOGNITIVE DISORDER: ICD-10-CM

## 2024-12-30 DIAGNOSIS — I63.9 CEREBROVASCULAR ACCIDENT (CVA), UNSPECIFIED MECHANISM (HCC): ICD-10-CM

## 2024-12-30 PROBLEM — Z95.811: Status: ACTIVE | Noted: 2024-12-30

## 2024-12-30 PROBLEM — Z92.81 PERSONAL HISTORY OF ECMO: Status: ACTIVE | Noted: 2024-12-30

## 2024-12-30 LAB
GLUCOSE BLD STRIP.AUTO-MCNC: 73 MG/DL (ref 65–99)
GLUCOSE BLD STRIP.AUTO-MCNC: 81 MG/DL (ref 65–99)
GLUCOSE BLD STRIP.AUTO-MCNC: 83 MG/DL (ref 65–99)

## 2024-12-30 PROCEDURE — 700102 HCHG RX REV CODE 250 W/ 637 OVERRIDE(OP): Performed by: STUDENT IN AN ORGANIZED HEALTH CARE EDUCATION/TRAINING PROGRAM

## 2024-12-30 PROCEDURE — 770010 HCHG ROOM/CARE - REHAB SEMI PRIVAT*

## 2024-12-30 PROCEDURE — A9270 NON-COVERED ITEM OR SERVICE: HCPCS | Performed by: PHYSICAL MEDICINE & REHABILITATION

## 2024-12-30 PROCEDURE — 700102 HCHG RX REV CODE 250 W/ 637 OVERRIDE(OP)

## 2024-12-30 PROCEDURE — 82962 GLUCOSE BLOOD TEST: CPT | Mod: 91

## 2024-12-30 PROCEDURE — A9270 NON-COVERED ITEM OR SERVICE: HCPCS

## 2024-12-30 PROCEDURE — 99223 1ST HOSP IP/OBS HIGH 75: CPT | Performed by: PHYSICAL MEDICINE & REHABILITATION

## 2024-12-30 PROCEDURE — 99239 HOSP IP/OBS DSCHRG MGMT >30: CPT | Performed by: STUDENT IN AN ORGANIZED HEALTH CARE EDUCATION/TRAINING PROGRAM

## 2024-12-30 PROCEDURE — A9270 NON-COVERED ITEM OR SERVICE: HCPCS | Performed by: STUDENT IN AN ORGANIZED HEALTH CARE EDUCATION/TRAINING PROGRAM

## 2024-12-30 PROCEDURE — 97605 NEG PRS WND THER DME<=50SQCM: CPT

## 2024-12-30 PROCEDURE — 700102 HCHG RX REV CODE 250 W/ 637 OVERRIDE(OP): Performed by: PHYSICAL MEDICINE & REHABILITATION

## 2024-12-30 PROCEDURE — 94760 N-INVAS EAR/PLS OXIMETRY 1: CPT

## 2024-12-30 RX ORDER — OXYCODONE HYDROCHLORIDE 5 MG/1
5 TABLET ORAL EVERY 4 HOURS PRN
Status: CANCELLED | OUTPATIENT
Start: 2024-12-30

## 2024-12-30 RX ORDER — OXYCODONE HYDROCHLORIDE 5 MG/1
5 TABLET ORAL EVERY 4 HOURS PRN
Status: DISCONTINUED | OUTPATIENT
Start: 2024-12-30 | End: 2025-01-08 | Stop reason: HOSPADM

## 2024-12-30 RX ORDER — TRAZODONE HYDROCHLORIDE 100 MG/1
100 TABLET ORAL
Status: DISCONTINUED | OUTPATIENT
Start: 2024-12-30 | End: 2025-01-06

## 2024-12-30 RX ORDER — DAPAGLIFLOZIN 10 MG/1
10 TABLET, FILM COATED ORAL DAILY
Status: DISCONTINUED | OUTPATIENT
Start: 2024-12-31 | End: 2025-01-08 | Stop reason: HOSPADM

## 2024-12-30 RX ORDER — POLYETHYLENE GLYCOL 3350 17 G/17G
1 POWDER, FOR SOLUTION ORAL
Status: DISCONTINUED | OUTPATIENT
Start: 2024-12-30 | End: 2025-01-08 | Stop reason: HOSPADM

## 2024-12-30 RX ORDER — DAPAGLIFLOZIN 10 MG/1
10 TABLET, FILM COATED ORAL DAILY
Status: CANCELLED | OUTPATIENT
Start: 2024-12-31

## 2024-12-30 RX ORDER — ATORVASTATIN CALCIUM 40 MG/1
80 TABLET, FILM COATED ORAL EVERY EVENING
Status: DISCONTINUED | OUTPATIENT
Start: 2024-12-30 | End: 2025-01-08 | Stop reason: HOSPADM

## 2024-12-30 RX ORDER — ONDANSETRON 4 MG/1
4 TABLET, ORALLY DISINTEGRATING ORAL 4 TIMES DAILY PRN
Status: DISCONTINUED | OUTPATIENT
Start: 2024-12-30 | End: 2025-01-08 | Stop reason: HOSPADM

## 2024-12-30 RX ORDER — ARIPIPRAZOLE 2 MG/1
2 TABLET ORAL DAILY
Status: DISCONTINUED | OUTPATIENT
Start: 2024-12-31 | End: 2025-01-02

## 2024-12-30 RX ORDER — ATORVASTATIN CALCIUM 80 MG/1
80 TABLET, FILM COATED ORAL EVERY EVENING
Status: CANCELLED | OUTPATIENT
Start: 2024-12-30

## 2024-12-30 RX ORDER — AMOXICILLIN 250 MG
2 CAPSULE ORAL EVERY EVENING
Status: DISCONTINUED | OUTPATIENT
Start: 2024-12-30 | End: 2025-01-08 | Stop reason: HOSPADM

## 2024-12-30 RX ORDER — TRAZODONE HYDROCHLORIDE 50 MG/1
50 TABLET, FILM COATED ORAL
Status: DISCONTINUED | OUTPATIENT
Start: 2024-12-30 | End: 2025-01-08 | Stop reason: HOSPADM

## 2024-12-30 RX ORDER — OXYCODONE HYDROCHLORIDE 5 MG/1
2.5 TABLET ORAL EVERY 4 HOURS PRN
Status: CANCELLED | OUTPATIENT
Start: 2024-12-30

## 2024-12-30 RX ORDER — DEXTROSE MONOHYDRATE 25 G/50ML
25 INJECTION, SOLUTION INTRAVENOUS
Status: DISCONTINUED | OUTPATIENT
Start: 2024-12-30 | End: 2025-01-08 | Stop reason: HOSPADM

## 2024-12-30 RX ORDER — ARIPIPRAZOLE 2 MG/1
2 TABLET ORAL DAILY
Status: CANCELLED | OUTPATIENT
Start: 2024-12-31

## 2024-12-30 RX ORDER — ONDANSETRON 2 MG/ML
4 INJECTION INTRAMUSCULAR; INTRAVENOUS 4 TIMES DAILY PRN
Status: DISCONTINUED | OUTPATIENT
Start: 2024-12-30 | End: 2025-01-08 | Stop reason: HOSPADM

## 2024-12-30 RX ORDER — ECHINACEA PURPUREA EXTRACT 125 MG
2 TABLET ORAL PRN
Status: DISCONTINUED | OUTPATIENT
Start: 2024-12-30 | End: 2025-01-08 | Stop reason: HOSPADM

## 2024-12-30 RX ORDER — DEXTROSE MONOHYDRATE 25 G/50ML
25 INJECTION, SOLUTION INTRAVENOUS
Status: CANCELLED | OUTPATIENT
Start: 2024-12-30

## 2024-12-30 RX ORDER — CLOPIDOGREL BISULFATE 75 MG/1
75 TABLET ORAL DAILY
Status: CANCELLED | OUTPATIENT
Start: 2024-12-31

## 2024-12-30 RX ORDER — TRAZODONE HYDROCHLORIDE 100 MG/1
100 TABLET ORAL
Status: CANCELLED | OUTPATIENT
Start: 2024-12-30

## 2024-12-30 RX ORDER — INSULIN LISPRO 100 [IU]/ML
1-6 INJECTION, SOLUTION INTRAVENOUS; SUBCUTANEOUS
Status: CANCELLED | OUTPATIENT
Start: 2024-12-30

## 2024-12-30 RX ORDER — INSULIN LISPRO 100 [IU]/ML
1-6 INJECTION, SOLUTION INTRAVENOUS; SUBCUTANEOUS
Status: DISCONTINUED | OUTPATIENT
Start: 2024-12-30 | End: 2025-01-06

## 2024-12-30 RX ORDER — HYDRALAZINE HYDROCHLORIDE 25 MG/1
25 TABLET, FILM COATED ORAL EVERY 8 HOURS PRN
Status: DISCONTINUED | OUTPATIENT
Start: 2024-12-30 | End: 2025-01-08 | Stop reason: HOSPADM

## 2024-12-30 RX ORDER — CLOPIDOGREL BISULFATE 75 MG/1
75 TABLET ORAL DAILY
Status: DISCONTINUED | OUTPATIENT
Start: 2024-12-31 | End: 2025-01-08 | Stop reason: HOSPADM

## 2024-12-30 RX ORDER — OXYCODONE HYDROCHLORIDE 5 MG/1
2.5 TABLET ORAL EVERY 4 HOURS PRN
Status: DISCONTINUED | OUTPATIENT
Start: 2024-12-30 | End: 2025-01-08 | Stop reason: HOSPADM

## 2024-12-30 RX ADMIN — ARIPIPRAZOLE 2 MG: 2 TABLET ORAL at 05:28

## 2024-12-30 RX ADMIN — Medication 10.5 MG: at 21:57

## 2024-12-30 RX ADMIN — APIXABAN 5 MG: 5 TABLET, FILM COATED ORAL at 05:29

## 2024-12-30 RX ADMIN — APIXABAN 5 MG: 5 TABLET, FILM COATED ORAL at 16:40

## 2024-12-30 RX ADMIN — ATORVASTATIN CALCIUM 80 MG: 80 TABLET, FILM COATED ORAL at 16:40

## 2024-12-30 RX ADMIN — SENNOSIDES AND DOCUSATE SODIUM 2 TABLET: 50; 8.6 TABLET ORAL at 21:56

## 2024-12-30 RX ADMIN — ATORVASTATIN CALCIUM 80 MG: 40 TABLET, FILM COATED ORAL at 21:55

## 2024-12-30 RX ADMIN — DAPAGLIFLOZIN 10 MG: 10 TABLET, FILM COATED ORAL at 05:29

## 2024-12-30 RX ADMIN — CLOPIDOGREL BISULFATE 75 MG: 75 TABLET ORAL at 05:28

## 2024-12-30 RX ADMIN — OMEPRAZOLE 20 MG: 20 CAPSULE, DELAYED RELEASE ORAL at 05:28

## 2024-12-30 RX ADMIN — TRAZODONE HYDROCHLORIDE 100 MG: 100 TABLET ORAL at 21:56

## 2024-12-30 ASSESSMENT — LIFESTYLE VARIABLES
EVER HAD A DRINK FIRST THING IN THE MORNING TO STEADY YOUR NERVES TO GET RID OF A HANGOVER: NO
HOW MANY TIMES IN THE PAST YEAR HAVE YOU HAD 5 OR MORE DRINKS IN A DAY: 0
CONSUMPTION TOTAL: NEGATIVE
ALCOHOL_USE: YES
HAVE YOU EVER FELT YOU SHOULD CUT DOWN ON YOUR DRINKING: NO
ON A TYPICAL DAY WHEN YOU DRINK ALCOHOL HOW MANY DRINKS DO YOU HAVE: 1
EVER FELT BAD OR GUILTY ABOUT YOUR DRINKING: NO
HAVE PEOPLE ANNOYED YOU BY CRITICIZING YOUR DRINKING: NO
EVER_SMOKED: NEVER
TOTAL SCORE: 0
DOES PATIENT WANT TO STOP DRINKING: NO
TOTAL SCORE: 0
AVERAGE NUMBER OF DAYS PER WEEK YOU HAVE A DRINK CONTAINING ALCOHOL: 1
TOTAL SCORE: 0

## 2024-12-30 ASSESSMENT — PATIENT HEALTH QUESTIONNAIRE - PHQ9
SUM OF ALL RESPONSES TO PHQ9 QUESTIONS 1 AND 2: 0
2. FEELING DOWN, DEPRESSED, IRRITABLE, OR HOPELESS: NOT AT ALL
SUM OF ALL RESPONSES TO PHQ9 QUESTIONS 1 AND 2: 0
2. FEELING DOWN, DEPRESSED, IRRITABLE, OR HOPELESS: NOT AT ALL
1. LITTLE INTEREST OR PLEASURE IN DOING THINGS: NOT AT ALL
1. LITTLE INTEREST OR PLEASURE IN DOING THINGS: NOT AT ALL

## 2024-12-30 ASSESSMENT — PAIN DESCRIPTION - PAIN TYPE: TYPE: ACUTE PAIN

## 2024-12-30 ASSESSMENT — FIBROSIS 4 INDEX: FIB4 SCORE: 1.84

## 2024-12-30 NOTE — CARE PLAN
The patient is Watcher - Medium risk of patient condition declining or worsening    Shift Goals  Clinical Goals: continue to tolerate diet, monitor outputs  Patient Goals: see speech tomorrow, eat regular food  Family Goals: not present    Progress made toward(s) clinical / shift goals:      Problem: Nutrition  Goal: Patient's nutritional and fluid intake will be adequate or improve  Description: Target End Date:  Prior to discharge or change in level of care  Document on I/O flowsheet    1.  Monitor nutritional intake  2.  Monitor weight per provider order  3.  Assess patient's ability to take oral nutrition  4.  Collaborate with Speech Therapy, Dietitian and interdisciplinary team for appropriate feeding and fluid intake  Outcome: Progressing

## 2024-12-30 NOTE — DISCHARGE PLANNING
[facility] Transitional Care Coordination    Insurance has authorized inpatient rehab.  Volate message to Dr. Johnson seeking status of medical clearance for IPR admission today.      1029 - Per Dr. Johnson, medically cleared for transfer to [facility] today.  PAS to Dr. Cole for review.

## 2024-12-30 NOTE — DISCHARGE PLANNING
[facility] Transitional Care Coordination    [facility] transport to [facility] 1530/1600 today.  Dr. Mario Cole accepting.  Volate update to Dr. Johnson.  Nursing to call report to [phone].  Volate message to bedside RN Pedro Luis.  Volate update to FROILAN Lackey.  Telephone call to spouse Bruno with update.      TCC will follow to assist as needed with transition to [facility].

## 2024-12-30 NOTE — DISCHARGE PLANNING
Case Management Discharge Planning    Admission Date: 11/27/2024  GMLOS: 4.1  ALOS: 33    6-Clicks ADL Score: 16  6-Clicks Mobility Score: 14  PT and/or OT Eval ordered: Yes  Post-acute Referrals Ordered: Yes  Post-acute Choice Obtained: Yes  Has referral(s) been sent to post-acute provider:  Yes      Anticipated Discharge Dispo: Discharge Disposition: Disch to  rehab facility or distinct part unit (62)  Discharge Address: [facility] [address] [place] [address]  Discharge Contact Phone Number: [phone]    DME Needed: No    Action(s) Taken: Patient is medically cleared to discharge to [facility]. Transport is scheduled for [id number] via [facility] [employer]. RNCM handed completed COBRA form to bedside RN.     Escalations Completed: None    Medically Clear: Yes    Next Steps: no other CM needs     Barriers to Discharge: None    Is the patient up for discharge tomorrow: No

## 2024-12-30 NOTE — PROGRESS NOTES
Monitor Summary  Rhythm: SR  Rate: 66-84  Ectopy: F PVC, R coup, 7 bts rate dependent BBB,   .12 / .09 / .36

## 2024-12-30 NOTE — WOUND TEAM
[facility]  Inpatient Services  Wound and Skin Care Follow-up    Admission Date: 11/27/2024     Last order of IP CONSULT TO WOUND CARE was found on 12/16/2024 from Hospital Encounter on 11/27/2024     HPI, PMH, SH: Reviewed    Past Surgical History:   Procedure Laterality Date    INSERTION,CANNULA FOR ECMO,ADULT Left 10/30/2024    Procedure: INSERTION, CANNULA, FOR ECMO, ADULT;  Surgeon: Aime Elias D.O.;  Location: SURGERY [facility];  Service: Cardiothoracic     Social History     Tobacco Use    Smoking status: Never    Smokeless tobacco: Never   Substance Use Topics    Alcohol use: No     No chief complaint on file.    Diagnosis: HFrEF (heart failure with reduced ejection fraction) (HCC) [I50.20]  Encephalopathy acute [G93.40]    Unit where seen by Wound Team: [id number]     WOUND FOLLOW UP RELATED TO:  VAC change BL groin        WOUND TEAM PLAN OF CARE - Frequency of Follow-up:   Nursing to follow dressing orders written for wound care. Contact wound team if area fails to progress, deteriorates or with any questions/concerns if something comes up before next scheduled follow up (See below as to whether wound is following and frequency of wound follow up)  Dressing changes by wound team:                   NPWT change 3 times weekly - VAC change BL groin     WOUND HISTORY:       Pt is a 59yr old female with history of Obesity, and HTN. Pt was initially seen at [facility] for anterior STEMI. Pt underwent LAD PCI but hospital course was complicated by retroperitoneal bleed, V Fib and PEA arrest ultimately requiring ECMO and Impella. Pt was decannulated from ECMO on 11/4 and had left femoral embolectomy. Impella was then removed on 11/8. Pts ECHO on 11/24 revealed EF of 25-30%. Pt has bilateral groin wounds from ECMO and Impella. Wound team was consulted to evaluate.        WOUND ASSESSMENT/LDA    Wound 12/07/24 Full Thickness Wound Groin Left (Active)   Date First Assessed/Time First Assessed:  12/07/24 1721   Present on Original Admission: No  Hand Hygiene Completed: Yes  Primary Wound Type: Full Thickness Wound  Location: Groin  Laterality: Left      Assessments 12/30/2024 10:35 AM   Wound Image     Site Assessment Red;Drainage   Periwound Assessment Intact   Margins Defined edges;Unattached edges   Closure Secondary intention   Drainage Amount Small   Drainage Description Serosanguineous   Treatments Cleansed;Site care   Wound Cleansing Approved Wound Cleanser   Periwound Protectant No-sting Skin Prep;Paste Ring   Dressing Status Clean;Dry;Intact   Dressing Changed Changed   Dressing Cleansing/Solutions Not Applicable   Dressing Options Wound Vac   Dressing Change/Treatment Frequency Monday, Wednesday, Friday, and As Needed   NEXT Dressing Change/Treatment Date 01/01/25   NEXT Weekly Photo (Inpatient Only) 01/06/25   Wound Team Following 3x Weekly   Non-staged Wound Description Full thickness   Shape oval   Wound Odor None   Exposed Structures None   WOUND NURSE ONLY - Time Spent with Patient (mins) 60       Wound 12/07/24 Full Thickness Wound Groin Right (Active)   Date First Assessed/Time First Assessed: 12/07/24 1721   Present on Original Admission: No  Hand Hygiene Completed: Yes  Primary Wound Type: Full Thickness Wound  Location: Groin  Laterality: Right      Assessments 12/30/2024 10:35 AM   Wound Image     Site Assessment Red;Drainage   Periwound Assessment Intact   Margins Defined edges;Unattached edges   Closure Secondary intention   Drainage Amount Moderate   Drainage Description Serosanguineous   Treatments Cleansed;Site care;Offloading   Wound Cleansing Approved Wound Cleanser   Periwound Protectant No-sting Skin Prep;Paste Ring   Dressing Status Clean;Dry;Intact   Dressing Changed Changed   Dressing Cleansing/Solutions Not Applicable   Dressing Options Wound Vac   Dressing Change/Treatment Frequency Monday, Wednesday, Friday, and As Needed   NEXT Dressing Change/Treatment Date 01/01/25    Wound Team Following 3x Weekly   Non-staged Wound Description Full thickness   Shape oval   Wound Odor None   Exposed Structures None   WOUND NURSE ONLY - Time Spent with Patient (mins) 60       Negative Pressure Wound Therapy 12/07/24 Groin Left (Active)   Placement Date/Time: 12/07/24 1722   Location: Groin  Laterality: Left      Assessments 12/30/2024 10:35 AM   NPWT Pump Mode / Pressure Setting Ulta;Continuous;125 mmHg   Dressing Type Small;Black Foam (Veraflo)   Number of Foam Pieces Used 1   Canister Changed No   NEXT Dressing Change/Treatment Date 01/01/25   WOUND NURSE ONLY - Time Spent with Patient (mins) 60       Negative Pressure Wound Therapy 12/07/24 Groin Right (Active)   Placement Date/Time: 12/07/24 1722   Present on Original Admission: No  Location: Groin  Laterality: Right      Assessments 12/30/2024 10:35 AM   NPWT Pump Mode / Pressure Setting Ulta;Continuous;125 mmHg   Dressing Type Small;Black Foam (Veraflo)   Number of Foam Pieces Used 1   Canister Changed No   NEXT Dressing Change/Treatment Date 01/01/25        Vascular:    JAGJIT:   No results found.    Lab Values:    Lab Results   Component Value Date/Time    WBC 5.9 12/26/2024 04:43 AM    RBC 3.43 (L) 12/26/2024 04:43 AM    HEMOGLOBIN 11.4 (L) 12/26/2024 04:43 AM    HEMATOCRIT 35.8 (L) 12/26/2024 04:43 AM    CREACTPROT 6.06 (H) 12/23/2024 11:20 AM    SEDRATEWES 140 (H) 11/28/2024 01:35 AM    HBA1C 6.2 (H) 11/01/2024 06:46 PM    HBA1C 6.4 (H) 10/30/2024 03:18 AM         Culture Results show:  No results found for this or any previous visit (from the past 720 hours).    Pain Level/Medicated:  None, Tolerated without pain medication       INTERVENTIONS BY WOUND TEAM:  Chart and images reviewed. Discussed with bedside RN. All areas of concern (based on picture review, LDA review and discussion with bedside RN) have been thoroughly assessed. Documentation of areas based on significant findings. This RN in to assess patient. Performed standard  wound care which includes appropriate positioning, dressing removal and non-selective debridement. Pictures and measurements obtained weekly if/when required.    Wound:  L groin   Preparation for Dressing removal: Dressing soaked with wound cleanser  Cleansed/Non-selectively Debrided with:  Wound cleanser and Gauze  Brianna wound: Cleansed with Wound cleanser and Gauze, Prepped with No Sting, Paste Rings, and Drape  Primary Dressin pc VF foam tucked onto wound base then bridged along LQ abdomen, secured with drape.   Secondary (Outer) Dressing: Trac pad, offloading     Wound:  R groin   Preparation for Dressing removal: Dressing soaked with wound cleanser  Cleansed/Non-selectively Debrided with:  Wound cleanser and Gauze  Brianna wound: Cleansed with Wound cleanser and Gauze, Prepped with No Sting, Paste Rings, and Drape  Primary Dressin pc VF foam tucked onto wound base then bridged along LQ abdomen, secured with drape.   Secondary (Outer) Dressing: Trac pad, offloading     Advanced Wound Care Discharge Planning  Number of Clinicians necessary to complete wound care: 2  Is patient requiring IV pain medications for dressing changes:  No   Length of time for dressing change 50 min. (This does not include chart review, pre-medication time, set up, clean up or time spent charting.)    Interdisciplinary consultation: Patient, Bedside RN, N/A.  Pressure injury and staging reviewed with N/A.    EVALUATION / RATIONALE FOR TREATMENT:     Date:  24  Wound Status:  Wound progressing as expected    BL groin wounds clean, red, and with serosanguinous drainage. Patient transitioned to regular NPWT. Plan for [facility] today. Patient can be disconnected from BL VF wound vacs and y-connected to 1 [employer] vac upon transfer to facility.     Date:  24  Wound Status:  Wound progressing as expected    L Groin wound bed more granular, slightly decreased amount of instillation fluid here.  R Groin still with odor  and small amounts of adhered slough, continuing current POC.  Okay to trial removal of de jesus from a wound care perspective so long as urine does not interfere with Vac seals.  For discharge: Okay to transition to regular VACs.    Date:  12/23/24  Wound Status:  Wound progressing as expected     No odor to L groin.  Decreased odor to R groin. There is sone induration present to suprapubic skin proximal to R groin.  Date:  12/20/24  Wound Status:  Wound progressing as expected    L Groin with more granular wound bed, minimal slough here. Skin bridge no longer present.  R Groin noted to have purulent-serosanguinous drainage from wound depth. Veraflo settings adjusted to instill more fluid and have a longer dwell time to better wash out the depth of the wound bed.    Date:  12/18/24  Wound Status:  Wound deteriorating    L Groin wound base still with slough, slightly decreased. Satellite lesion noted below wound bed, does connect beneath skin bridge.  R Groin with increased depth compared to previous measurements. Both wound beds with mildly foul odor, continuing Dakins VF NPWT.  Sacrum pink, intact and blanching, barrier paste in room.    Date:  12/16/24  Wound Status:  No change in wound     Right groin wound has moist pale pink tissue, continue with dakin's VF NPWT.  Left groin wound with slough and non-viable purulent drainage.  CSWD to remove purulent and slough from wound bed.  Resumed VF NPWT with dakin's instillation.     Date:  12/13/24  Wound Status:  Wound deteriorating    BL Groin wound beds with increased amounts of slough and malodor.  Patient is already on Dakins VF, will continue with this to topically clean wound beds.    Date:  12/10/24  Wound Status:  Wound progressing as expected    Left groin: Groin noted with layer of slough. Wound has small area of tunneling at 6o clock, a small piece of black foam was tucked into this tunnel to assist with closure.   Right Groin: Noted with a layer of slough, VF  NPWT was changed from NS to Dakins to assist with chemical debridement.   Prior to vac removal, skin around both groin sites appeared to be moist, and VF instillation amount was reduced.     Date:  12/07/24  Wound Status:  Initial evaluation    Bilateral wound beds with slough present.  VF NPWT applied to assist with mechanical debridement, cleansing the wound bed, increase oxygenation and granulation to the area and healing the wound by secondary intention.        Date:  12/04/24  Wound Status:  Initial evaluation    Bilateral groin sites with necrotic tissue, adipose and tan purulent drainage. Discussed with bedside RN who will speak with MD regarding surgical consult. Pt would benefit from NPWT application but would like surgery to clear pt from any exposed structures prior to vac application. Dakins ordered to chemically debride.          Goals: Steady decrease in wound area and depth weekly.    NURSING PLAN OF CARE ORDERS:  No new orders this visit    NUTRITION RECOMMENDATIONS   Wound Team Recommendations:  N/A     DIET ORDERS (From admission to next 24h)       Start     Ordered    12/24/24 1130  Diet Order Diet: Level 5 - Minced and Moist (meds whole/crushed in puree, as appropriate); Liquid level: Level 2 - Mildly Thick; Second Modifier: (optional): Cardiac; Tray Modifications (optional): SLP - 1:1 Supervision by Nursing, SLP - Deliver t...  ALL MEALS        Question Answer Comment   Diet: Level 5 - Minced and Moist meds whole/crushed in puree, as appropriate   Liquid level Level 2 - Mildly Thick    Second Modifier: (optional) Cardiac    Tray Modifications (optional) SLP - 1:1 Supervision by Nursing    Tray Modifications (optional) SLP - Deliver to Nursing Station        12/24/24 1130    12/02/24 1232  Supplements  ALL MEALS        Question Answer Comment   Which Supplement Glucerna    Glucerna: Glucerna Shake Carton        12/02/24 1231                    PREVENTATIVE INTERVENTIONS:   Q shift Ray -  performed per nursing policy  Q shift pressure point assessments - performed per nursing policy    Surface/Positioning  Standard/trauma mattress - Currently in Place  Reposition q 2 hours - Currently in Place    Offloading/Redistribution  Float Heels off Bed with Pillows - Currently in Place         Anticipated discharge plans:  [facility]        Vac Discharge Needs:  Vac Discharge plan is purely a recommendation from wound team and not a requirement for discharge unless otherwise stated by physician.  Regular Vac in use and continued at discharge

## 2024-12-30 NOTE — PROGRESS NOTES
Hospital Medicine Daily Progress Note    Date of Service  12/29/2024    Chief Complaint  Kiara Qureshi is a 59 y.o. female admitted 11/27/2024 with encephalopathy    Hospital Course  58 yo woman HTN, HLD who was at [facility] for anterior STEMI s/p lytics and had LAD PCI and unsuccessful PCI for OM1 complicated by RP bleed, vfib and PEA arrest with cardiogenic shock needing ECMO and imeplla.      ECMO was decannulated 11/4, Impella removed 11/8.      She also had encephalopathy and delirium on valproic acid and Seroquel.      TTE showed EF 25-30%, she did not tolerate GDMT due to hypotension.    She had a CT chest on 11/25 that showed focal aortic mural thrombus versus ulcerated plaque to descending thoracic aorta and CTA AP with rim-enhancing RP hematoma and small thrombus in right common iliac artery, multiple nonenhancing fluid collections and subcu tissue in right lower and right inguinal regions, possible evolving hematomas and left femoral vein.  She was started on Vanco and Zosyn and transferred back to [facility].    She completed linezolid and Zosyn around 12/1.  Cardiology was consulted for medical management of her heart failure. Dr. Conley with vascular surgery was consulted, recommended wound vacs to groin wounds.  On 12/9 she became more altered with respiratory distress and transferred to [facility].  CT head was negative for acute changes.  EEG was negative for seizure activity.  She was high on high dose of Seroquel that was tapered off.    Patient had NG tube placed. Now tolerating the diet after seeing by SLP. NG tube discontinued.   Has been evaluated by PMR and good candidate for IRF. Pending insurance auth and working with physical therapy to show improvement.         Interval Problem Update  12/28:  Patient was seen and evaluated at the bedside.  She denies any chest pain.  She denies any cough congestion.  She is motivated to work with the physical therapy to show improvement.   She is also  tolerating diet orally. Since patient is improving in oral intake patient has been eating more than 70% of her meal.  Will try to remove NG tube.  Hemodynamically stable.  On room air.  Holding BB and lasix due to soft BP    Above per previous hospitalist.    12/29/2024  Patient was seen and examined on the oncology floor.  Vital signs are stable.  Continues to be on room air.  Has Beckwith catheter in place.  Wound vacs in place.  Insurance saturation pending for inpatient rehabilitation hospital.        I have discussed this patient's plan of care and discharge plan at IDT rounds today with Case Management, Nursing, Nursing leadership, and other members of the IDT team.    Consultants/Specialty  cardiology and vascular surgery, palliative care    Code Status  DNAR, I OK    Disposition  The patient is medically cleared for discharge to home or a post-acute facility.  Anticipate discharge to: an inpatient rehabilitation hospital    I have placed the appropriate orders for post-discharge needs.  Patient is good candidate for IRF pending insurace auth and to show improvement with physical therapy     Review of Systems  Review of Systems   Constitutional:  Positive for malaise/fatigue. Negative for chills and fever.   Respiratory:  Negative for cough and shortness of breath.    Cardiovascular:  Negative for chest pain and palpitations.   Gastrointestinal:  Negative for abdominal pain, nausea and vomiting.   Musculoskeletal:  Negative for joint pain and myalgias.   Neurological:  Positive for weakness. Negative for dizziness and headaches.        Physical Exam  Temp:  [36.1 °C (97 °F)-36.7 °C (98.1 °F)] 36.7 °C (98 °F)  Pulse:  [62-72] 67  Resp:  [16-20] 18  BP: ()/(57-68) 102/67  SpO2:  [95 %-98 %] 95 %    Physical Exam  Vitals and nursing note reviewed.   Constitutional:       Appearance: She is normal weight. She is ill-appearing. She is not diaphoretic.   HENT:      Head: Normocephalic and atraumatic.       Mouth/Throat:      Mouth: Mucous membranes are moist.      Pharynx: Oropharynx is clear. No oropharyngeal exudate.   Eyes:      General:         Right eye: No discharge.         Left eye: No discharge.      Conjunctiva/sclera: Conjunctivae normal.      Pupils: Pupils are equal, round, and reactive to light.   Cardiovascular:      Rate and Rhythm: Normal rate and regular rhythm.      Pulses: Normal pulses.      Heart sounds: Normal heart sounds. No murmur heard.  Pulmonary:      Effort: Pulmonary effort is normal. No respiratory distress.      Breath sounds: Normal breath sounds.   Abdominal:      General: Abdomen is flat. Bowel sounds are normal. There is no distension.      Palpations: Abdomen is soft.      Tenderness: There is no abdominal tenderness.   Genitourinary:     Comments: Beckwith catheter in place  Musculoskeletal:      Cervical back: Neck supple. No tenderness.      Right lower leg: No edema.      Left lower leg: No edema.   Skin:     Coloration: Skin is pale.      Comments: Wound vacs in place   Neurological:      Mental Status: She is alert and oriented to person, place, and time.      Motor: Weakness present.         Fluids    Intake/Output Summary (Last 24 hours) at 12/29/2024 1630  Last data filed at 12/29/2024 0600  Gross per 24 hour   Intake --   Output 900 ml   Net -900 ml        Laboratory          Recent Labs     12/28/24  1905   SODIUM 133*   POTASSIUM 4.2   CHLORIDE 103   CO2 19*   GLUCOSE 140*   BUN 22   CREATININE 0.75   CALCIUM 7.9*                   Imaging  DX-ABDOMEN FOR TUBE PLACEMENT   Final Result         1.  Nonspecific bowel gas pattern in the upper abdomen.   2.  Dobbhoff tube tip overlying the expected location of the pylorus or first duodenal segment.   3.  Bilateral lower lobe atelectasis and/or infiltrates      DX-ABDOMEN FOR TUBE PLACEMENT   Final Result         1.  Nonspecific bowel gas pattern in the upper abdomen.   2.  Dobbhoff tube tip overlying the expected location of  the pylorus or first duodenal segment.   3.  Pulmonary edema and/or infiltrates.   4.  Trace bilateral pleural effusions.      DX-ABDOMEN FOR TUBE PLACEMENT   Final Result      1. Repositioning of the esophagogastric tube, terminating over the fundus of the stomach.   2. Interstitial edema, left retrocardiac opacity and small left pleural effusion.      DX-ABDOMEN FOR TUBE PLACEMENT   Final Result         1.  Nonspecific bowel gas pattern in the upper abdomen.   2.  Dobbhoff tube tip terminates overlying the expected location of the gastric antrum or pylorus.   3.  Pulmonary edema and/or infiltrates, similar to prior study      DX-ABDOMEN FOR TUBE PLACEMENT   Final Result      The tip of the enteric tube terminates over the antrum of the stomach.      DX-ABDOMEN FOR TUBE PLACEMENT   Final Result         1.  Nonspecific bowel gas pattern in the upper abdomen.   2.  Dobbhoff tube is coiled within the duodenum, the tip terminates overlying the expected location of the gastric body.   3.  Bilateral lower lobe edema and/or infiltrates.      DX-ABDOMEN FOR TUBE PLACEMENT   Final Result      NG tube tip projects over the gastroduodenal junction.      DX-CHEST-LIMITED (1 VIEW)   Final Result      1.  Mild cardiomegaly with evidence of mild to moderate pulmonary edema with minimal worsening since previous exam.      CT-HEAD W/O   Final Result         1.  No acute intracranial abnormality is identified, there are nonspecific white matter changes, commonly associated with small vessel ischemic disease.  Associated mild cerebral atrophy is noted.   2.  Bilateral sinusitis changes   3.  Atherosclerosis.               DX-CHEST-PORTABLE (1 VIEW)   Final Result         1.  Mild pulmonary edema and/or infiltrates.      EC-ECHOCARDIOGRAM COMPLETE W/ CONT   Final Result      DX-ABDOMEN FOR TUBE PLACEMENT   Final Result      Tip of the esophagogastric tube terminates over the pylorus of the stomach.      IR-MIDLINE CATHETER INSERTION  WO GUIDANCE > AGE 3   Final Result                  Ultrasound-guided midline placement performed by qualified nursing staff    as above.          IR-US GUIDED PIV   Final Result    Ultrasound-guided PERIPHERAL IV INSERTION performed by    qualified nursing staff as above.      DX-OUTSIDE IMAGES-DX CHEST   Final Result      DX-OUTSIDE IMAGES-DX ABDOMEN   Final Result      MR-OUTSIDE IMAGES-MR BRAIN   Final Result      CT-OUTSIDE IMAGES-CT HEAD   Final Result      CT-OUTSIDE IMAGES-CT ABDOMEN/PELVIS   Final Result      CT-OUTSIDE IMAGES-CT CHEST   Final Result      DX-ABDOMEN FOR TUBE PLACEMENT   Final Result      There is a new small bowel feeding tube which terminates in the small bowel of the right mid abdomen.      DX-CHEST-LIMITED (1 VIEW)   Final Result         Asymmetric pulmonary infiltrates, left worse than right.      IR-US GUIDED PIV    (Results Pending)        Assessment/Plan  * Heart failure with reduced ejection fraction (HCC)- (present on admission)  Assessment & Plan  ICM LVEF 25-30%  S/p revascularization  Soft pressures limiting titration of GDMT.  Farxiga  Holding losartan for low BP  Critical care had recommended holding bisoprolol because of Cheyne-Ol breathing  Dehydrated, holding Lasix    12/29/2024  Appears euvolemic  Continue to hold beta-blocker and furosemide  Monitor fluid status closely  Continue home losartan and atorvastatin and Farxiga    Circulating anticoagulant disorder (HCC)  Assessment & Plan  On Eliquis    Functional quadriplegia (HCC)  Assessment & Plan  In the setting of AMS, prior PEA    Macrocytic anemia  Assessment & Plan  Stable. No overt bleeding  B12 wnl. Studies more consistent with anemia of chronic disease    Hypernatremia  Assessment & Plan  Resolved     Acute hypoxic respiratory failure (HCC)  Assessment & Plan  In setting of heart failure, recent cardiogenic shock  Patient demonstrates cheynne lo breathing pattern which is consistent with her suspected  anoxic brain injury  Patient appears to be experiencing aspiration events causing bradycardia  Now on room air   RT protocol     Constipation  Assessment & Plan  Bowel protocol       Hypotension  Assessment & Plan  BP low range, holding losartan and bisoprolol and lasix    Abdominal hematoma  Assessment & Plan  Might be related to cardiac device  Clinically stable and h/h in stable range  Patient was on lovenox due to rigth common iliac art thrombosis and then transitioned to eliquis per [facility]  - plan to continue for 3-4 months with repeat imaging at that time to see if it can be d/c   Hgb stable. Continue to monitor    ACP (advance care planning)  Assessment & Plan  Dr. Mobley: Discussed plan of care and code status with patient  Bruno ([phone]) and sister-in-law Laina ([phone]). I explained that Kiara has experienced a suspected anoxic brain injury during her current medical course that has required ECMO and impella, CVA, Vfib arrest. I shared my concern that Laina may not make a meaningful recovery from this and that her current and future quality of life appears limited. I explained that in the event of another CP arrest her quality of life would be further impacted in the event she were to survive such an episode. Laina and Bruno made the decision to change Kiara's code status to DNR, yes to intubaiton. Laina will be driving in from [place] in the coming days to have further goals of care conversations. Total of 48 minutes spent in discussion and coordination of advance care planning.    12/16 - my conversation with Laina and Bruno, they will like to continue care and plan for PAMs    Pneumonia  Assessment & Plan  S/p completed course of pip/tazo and vanc  Cultures were negative    Aortic mural thrombus (HCC)  Assessment & Plan  CT at outside facility showing focal aortic mural thrombus along the lateral wall of the descending thoracic aorta and also a tiny filling defect/mural thrombus within  the posterior right common iliac artery  Continue on apixaban  Continue to monitor CBC    12/29/2024  Continue apixaban    Acute metabolic encephalopathy  Assessment & Plan  Hospital course complicated by encephalopathy.  Likely from combination of CVA, STEMI, hyperglycemia; likely also with anoxic brain injury   CTH no acute findings. EEG neg for seizure activity  Discontinue valproic acid   On abilify   On trazodone   Psychiatry following appreciate the rec   Mentation improved significantly     12/29/2024  Resolved.  Patient is alert and orient x 3.      CVA (cerebral vascular accident) (HCC)  Assessment & Plan  Patient had MRI on 11/17/24 at [facility] which showed Punctate acute infarct of the right centrum semiovale. Scattered foci of enhancement in multiple vascular distributions as described may represent recent subacute infarcts. Diffuse stippled susceptibility signal throughout the supratentorial and infratentorial brain. Overall findings may represent sequelae of fat emboli or amyloid related angiopathy.   CTH no acute findings. EEG neg for seizure activity  High dose statin  Apixaban  plavix    CAD (coronary artery disease)  Assessment & Plan  Patient with recent complicated STEMI with PCI to LAD and mid LAD.  Complicated by V-fib arrest and cardiogenic shock requiring VA ECMO and Impella and retroperitoneal hematomas. At [facility] underwent impella supported PCI to mid LAD just distal to prior stent but unsuccessful with PCI to 100% occluded OM1 on 11/6. Patient was decannulated and had Impella removed.    Discussed with cardiology and asa stopped and replaced with plavix. Cont statin  Repeat echo shows persistently low EF, 25-30%  Cardiology had recommended bisoprolol but due to cheyne florez critical care recommends we stop this - so will hold   Continue to hold losartan for low BP and lasix     12/29/2024  Continue ordered atorvastatin.  Continue clopidogrel and apixaban    Urinary retention  Assessment &  Plan  Beckwith catheter in place    12/29/2024  Continue Beckwith catheter.  Will need to attempt discontinuation at some point.    Hyperglycemia  Assessment & Plan  Glargine 9 units  ISS and hypoglycemia protocol     Metabolic acidemia- (present on admission)  Assessment & Plan  Improved         VTE prophylaxis:    therapeutic anticoagulation with eliquis 5 mg BID         I have performed a physical exam and reviewed and updated ROS and Plan today (12/29/2024). In review of yesterday's note (12/28/2024), there are no changes except as documented above.    Patient has a high medical complexity, complex decision making and is at high risk of complication, morbidity, and mortality

## 2024-12-31 ENCOUNTER — APPOINTMENT (OUTPATIENT)
Dept: SPEECH THERAPY | Facility: REHABILITATION | Age: 59
DRG: 092 | End: 2024-12-31
Attending: PHYSICAL MEDICINE & REHABILITATION
Payer: COMMERCIAL

## 2024-12-31 ENCOUNTER — APPOINTMENT (OUTPATIENT)
Dept: PHYSICAL THERAPY | Facility: REHABILITATION | Age: 59
DRG: 092 | End: 2024-12-31
Attending: PHYSICAL MEDICINE & REHABILITATION
Payer: COMMERCIAL

## 2024-12-31 ENCOUNTER — APPOINTMENT (OUTPATIENT)
Dept: OCCUPATIONAL THERAPY | Facility: REHABILITATION | Age: 59
DRG: 092 | End: 2024-12-31
Attending: PHYSICAL MEDICINE & REHABILITATION
Payer: COMMERCIAL

## 2024-12-31 VITALS
OXYGEN SATURATION: 100 % | BODY MASS INDEX: 34.41 KG/M2 | HEIGHT: 60 IN | DIASTOLIC BLOOD PRESSURE: 68 MMHG | SYSTOLIC BLOOD PRESSURE: 99 MMHG | TEMPERATURE: 98.4 F | RESPIRATION RATE: 18 BRPM | WEIGHT: 175.27 LBS | HEART RATE: 67 BPM

## 2024-12-31 LAB
BASOPHILS # BLD AUTO: 0.8 % (ref 0–1.8)
BASOPHILS # BLD: 0.03 K/UL (ref 0–0.12)
EOSINOPHIL # BLD AUTO: 0.13 K/UL (ref 0–0.51)
EOSINOPHIL NFR BLD: 3.4 % (ref 0–6.9)
ERYTHROCYTE [DISTWIDTH] IN BLOOD BY AUTOMATED COUNT: 62.6 FL (ref 35.9–50)
GLUCOSE BLD STRIP.AUTO-MCNC: 144 MG/DL (ref 65–99)
GLUCOSE BLD STRIP.AUTO-MCNC: 197 MG/DL (ref 65–99)
GLUCOSE BLD STRIP.AUTO-MCNC: 39 MG/DL (ref 65–99)
GLUCOSE BLD STRIP.AUTO-MCNC: 44 MG/DL (ref 65–99)
GLUCOSE BLD STRIP.AUTO-MCNC: 72 MG/DL (ref 65–99)
GLUCOSE BLD STRIP.AUTO-MCNC: 77 MG/DL (ref 65–99)
HCT VFR BLD AUTO: 37.6 % (ref 37–47)
HGB BLD-MCNC: 12.2 G/DL (ref 12–16)
IMM GRANULOCYTES # BLD AUTO: 0.03 K/UL (ref 0–0.11)
IMM GRANULOCYTES NFR BLD AUTO: 0.8 % (ref 0–0.9)
LYMPHOCYTES # BLD AUTO: 1.19 K/UL (ref 1–4.8)
LYMPHOCYTES NFR BLD: 30.7 % (ref 22–41)
MCH RBC QN AUTO: 32.4 PG (ref 27–33)
MCHC RBC AUTO-ENTMCNC: 32.4 G/DL (ref 32.2–35.5)
MCV RBC AUTO: 99.7 FL (ref 81.4–97.8)
MONOCYTES # BLD AUTO: 0.37 K/UL (ref 0–0.85)
MONOCYTES NFR BLD AUTO: 9.5 % (ref 0–13.4)
NEUTROPHILS # BLD AUTO: 2.13 K/UL (ref 1.82–7.42)
NEUTROPHILS NFR BLD: 54.8 % (ref 44–72)
NRBC # BLD AUTO: 0 K/UL
NRBC BLD-RTO: 0 /100 WBC (ref 0–0.2)
PLATELET # BLD AUTO: 173 K/UL (ref 164–446)
PMV BLD AUTO: 11.5 FL (ref 9–12.9)
RBC # BLD AUTO: 3.77 M/UL (ref 4.2–5.4)
WBC # BLD AUTO: 3.9 K/UL (ref 4.8–10.8)

## 2024-12-31 PROCEDURE — 97162 PT EVAL MOD COMPLEX 30 MIN: CPT

## 2024-12-31 PROCEDURE — A9270 NON-COVERED ITEM OR SERVICE: HCPCS | Performed by: PHYSICAL MEDICINE & REHABILITATION

## 2024-12-31 PROCEDURE — 97535 SELF CARE MNGMENT TRAINING: CPT

## 2024-12-31 PROCEDURE — 92610 EVALUATE SWALLOWING FUNCTION: CPT

## 2024-12-31 PROCEDURE — 82962 GLUCOSE BLOOD TEST: CPT | Mod: 91

## 2024-12-31 PROCEDURE — 97530 THERAPEUTIC ACTIVITIES: CPT

## 2024-12-31 PROCEDURE — 36415 COLL VENOUS BLD VENIPUNCTURE: CPT

## 2024-12-31 PROCEDURE — 97166 OT EVAL MOD COMPLEX 45 MIN: CPT

## 2024-12-31 PROCEDURE — 85025 COMPLETE CBC W/AUTO DIFF WBC: CPT

## 2024-12-31 PROCEDURE — 700102 HCHG RX REV CODE 250 W/ 637 OVERRIDE(OP): Performed by: PHYSICAL MEDICINE & REHABILITATION

## 2024-12-31 PROCEDURE — 99232 SBSQ HOSP IP/OBS MODERATE 35: CPT | Performed by: PHYSICAL MEDICINE & REHABILITATION

## 2024-12-31 PROCEDURE — 92523 SPEECH SOUND LANG COMPREHEN: CPT

## 2024-12-31 PROCEDURE — 770010 HCHG ROOM/CARE - REHAB SEMI PRIVAT*

## 2024-12-31 RX ADMIN — OMEPRAZOLE 20 MG: 20 CAPSULE, DELAYED RELEASE ORAL at 10:57

## 2024-12-31 RX ADMIN — ARIPIPRAZOLE 2 MG: 2 TABLET ORAL at 11:00

## 2024-12-31 RX ADMIN — APIXABAN 5 MG: 5 TABLET, FILM COATED ORAL at 10:57

## 2024-12-31 RX ADMIN — ATORVASTATIN CALCIUM 80 MG: 40 TABLET, FILM COATED ORAL at 20:42

## 2024-12-31 RX ADMIN — CLOPIDOGREL BISULFATE 75 MG: 75 TABLET ORAL at 10:56

## 2024-12-31 RX ADMIN — Medication 10.5 MG: at 20:43

## 2024-12-31 RX ADMIN — DAPAGLIFLOZIN 10 MG: 10 TABLET, FILM COATED ORAL at 10:56

## 2024-12-31 RX ADMIN — SENNOSIDES AND DOCUSATE SODIUM 2 TABLET: 50; 8.6 TABLET ORAL at 20:42

## 2024-12-31 RX ADMIN — APIXABAN 5 MG: 5 TABLET, FILM COATED ORAL at 20:43

## 2024-12-31 RX ADMIN — TRAZODONE HYDROCHLORIDE 100 MG: 100 TABLET ORAL at 20:43

## 2024-12-31 ASSESSMENT — BRIEF INTERVIEW FOR MENTAL STATUS (BIMS)
ASKED TO RECALL SOCK: YES, NO CUE REQUIRED
ASKED TO RECALL BED: YES, AFTER CUEING (A PIECE OF FURNITURE")"
ASKED TO RECALL BLUE: YES, NO CUE REQUIRED
WHAT YEAR IS IT: CORRECT
INITIAL REPETITION OF BED BLUE SOCK - FIRST ATTEMPT: 3
INITIAL REPETITION OF BED BLUE SOCK - FIRST ATTEMPT: 3
ASKED TO RECALL BED: YES, AFTER CUEING (A PIECE OF FURNITURE")"
WHAT MONTH IS IT: ACCURATE WITHIN 5 DAYS
WHAT YEAR IS IT: CORRECT
WHAT DAY OF THE WEEK IS IT: CORRECT
WHAT DAY OF THE WEEK IS IT: CORRECT
BIMS SUMMARY SCORE: 14
ASKED TO RECALL BLUE: YES, NO CUE REQUIRED
WHAT MONTH IS IT: ACCURATE WITHIN 5 DAYS
ASKED TO RECALL SOCK: YES, NO CUE REQUIRED
BIMS SUMMARY SCORE: 14

## 2024-12-31 ASSESSMENT — GAIT ASSESSMENTS: GAIT LEVEL OF ASSIST: UNABLE TO PARTICIPATE

## 2024-12-31 ASSESSMENT — ACTIVITIES OF DAILY LIVING (ADL)
TOILETING_LEVEL_OF_ASSIST_DESCRIPTION: GRAB BAR;SET-UP OF EQUIPMENT;SUPERVISION FOR SAFETY;VERBAL CUEING
TOILETING: INDEPENDENT
BED_CHAIR_WHEELCHAIR_TRANSFER_DESCRIPTION: INCREASED TIME;INITIAL PREPARATION FOR TASK;SET-UP OF EQUIPMENT;SUPERVISION FOR SAFETY;VERBAL CUEING;SQUAT PIVOT TRANSFER TO WHEELCHAIR
TOILET_TRANSFER_DESCRIPTION: GRAB BAR;REQUIRES LIFT;SET-UP OF EQUIPMENT;SUPERVISION FOR SAFETY;VERBAL CUEING
BED_CHAIR_WHEELCHAIR_TRANSFER_DESCRIPTION: SET-UP OF EQUIPMENT;SQUAT PIVOT TRANSFER TO WHEELCHAIR;SUPERVISION FOR SAFETY;VERBAL CUEING

## 2024-12-31 NOTE — CARE PLAN
The patient is Unstable - High likelihood or risk of patient condition declining or worsening    Shift Goals  Clinical Goals: Safety  Patient Goals: Safety, Rest  Family Goals: safety    Progress made toward(s) clinical / shift goals:      Patient is not progressing towards the following goals:

## 2024-12-31 NOTE — PROGRESS NOTES
4 Eyes Skin Assessment Completed by KASIE Peres and KASIE Thapa.    Head WDL  Ears WDL  Nose WDL  Mouth WDL  Neck WDL  Breast/Chest WDL  Shoulder Blades WDL  Spine WDL  (R) Arm/Elbow/Hand WDL  (L) Arm/Elbow/Hand WDL  Abdomen R/L wound vac   Groin Redness w/ de jesus  Scrotum/Coccyx/Buttocks Redness blanching, breakdown  (R) Leg WDL  (L) Leg WDL  (R) Heel/Foot/Toe Redness and Blanching  (L) Heel/Foot/Toe Redness and Blanching          Devices In Places wound vac x2, de jesus catheter      Interventions In Place Waffle Overlay    Possible Skin Injury Yes    Pictures Uploaded Into Epic Yes  Wound Consult Placed Yes  RN Wound Prevention Protocol Ordered Yes

## 2024-12-31 NOTE — PROGRESS NOTES
NURSING DAILY NOTE    Name: Kiara Qureshi   Date of Admission: 12/30/2024   Admitting Diagnosis: Anoxic brain injury (HCC)  Attending Physician: CHANTAL GREY M.D.  Allergies: Patient has no known allergies.    Safety  Patient Assist     Patient Precautions     Precaution Comments     Bed Transfer Status     Toilet Transfer Status      Assistive Devices     Oxygen  Room air w/o2 available  Diet/Therapeutic Dining  Current Diet Order   Procedures    Diet Order Diet: Level 5 - Minced and Moist (meds whole/crushed in puree, as appropriate); Liquid level: Level 2 - Mildly Thick; Second Modifier: (optional): Cardiac; Tray Modifications (optional): SLP - 1:1 Supervision by Nursing, SLP - Deliver t...     Pill Administration  crushed  Agitated Behavioral Scale     ABS Level of Severity       Fall Risk  Has the patient had a fall this admission?   No  Josefa Parra Fall Risk Scoring  11, MODERATE RISK  Fall Risk Safety Measures  bed alarm, chair alarm, poor balance, and low vision/ hearing    Vitals  Temperature: 36.6 °C (97.9 °F)  Temp src: Oral  Pulse: 69  Respiration: 16  Blood Pressure: 115/70  Blood Pressure MAP (Calculated): 85 MM HG  BP Location: Left, Upper Arm  Patient BP Position: Supine     Oxygen  Pulse Oximetry: 96 %  O2 (LPM): 0  O2 Delivery Device: Room air w/o2 available    Bowel and Bladder  Last Bowel Movement  12/29/24  Stool Type     Bowel Device     Continent  Bladder: Did not void (Beckwith in place)   Bowel: No movement  Bladder Function     Genitourinary Assessment        Skin  Ray Score   17  Sensory Interventions   Bed Types: Standard/Trauma Mattress with Overlay  Skin Preventative Measures: Waffle Overlay  Moisture Interventions  Moisturizers/Barriers: Moisturizer       Pain  Pain Rating Scale  0 - No Pain  Pain Location     Pain Location Orientation     Pain Interventions   Declines    ADLs    Bathing      Linen Change       Personal Hygiene     Chlorhexidine Bath      Oral Care     Teeth/Dentures     Shave     Nutrition Percentage Eaten     Environmental Precautions     Patient Turns/Positioning  Patient turns self independently side to side without assistance, to offload sacral area  Patient Turns Assistance/Tolerance     Bed Positions     Head of Bed Elevated         Psychosocial/Neurologic Assessment  Psychosocial Assessment  Psychosocial (WDL):  Within Defined Limits  Neurologic Assessment  Level of Consciousness: Alert       Cardio/Pulmonary Assessment  Edema      Respiratory Breath Sounds  RUL Breath Sounds: Clear  RML Breath Sounds: Clear  RLL Breath Sounds: Diminished  SHOSHANA Breath Sounds: Clear  LLL Breath Sounds: Diminished  Cardiac Assessment

## 2024-12-31 NOTE — PROGRESS NOTES
Reconnected [facility] wound vac on patient. Vac running at 125mmhg with no leaks noted. Patient tolerated it well. Educated patient to call staff if vac sounds off or beeps.

## 2024-12-31 NOTE — PROGRESS NOTES
Physical Medicine & Rehabilitation Progress Note    Encounter Date: 12/31/2024    Chief Complaint: Weakness    Interval Events (Subjective):  Seen in bed. Tolerating therapy. No new concerns. Sleeping well. Evening blood sugar 39. Po po intake. Hold farxiga    Objective:  VITAL SIGNS: BP 99/67   Pulse 67   Temp 36.7 °C (98.1 °F) (Temporal)   Resp 16   Ht 1.524 m (5')   Wt 79.5 kg (175 lb 4.3 oz)   SpO2 99%   BMI 34.23 kg/m²   Gen: No acute distress, well developed well nourished adult  HEENT: Normal Cephalic Atraumatic, Normal conjunctiva.   CV: warm extremities, well perfused, no edema  Resp: symmetric chest rise, breathing comfortably on room air  Abd: Soft, Non distended  Extremities: normal bulk, no atrophy  Skin: no visible rashes or lesions.   Neuro: alert, awake  Psych: Mood and affect appropriate and congruent    Laboratory Values:  Recent Results (from the past 72 hours)   POCT glucose device results    Collection Time: 12/28/24  5:18 PM   Result Value Ref Range    POC Glucose, Blood 120 (H) 65 - 99 mg/dL   Basic Metabolic Panel    Collection Time: 12/28/24  7:05 PM   Result Value Ref Range    Sodium 133 (L) 135 - 145 mmol/L    Potassium 4.2 3.6 - 5.5 mmol/L    Chloride 103 96 - 112 mmol/L    Co2 19 (L) 20 - 33 mmol/L    Glucose 140 (H) 65 - 99 mg/dL    Bun 22 8 - 22 mg/dL    Creatinine 0.75 0.50 - 1.40 mg/dL    Calcium 7.9 (L) 8.5 - 10.5 mg/dL    Anion Gap 11.0 7.0 - 16.0   MAGNESIUM    Collection Time: 12/28/24  7:05 PM   Result Value Ref Range    Magnesium 2.0 1.5 - 2.5 mg/dL   PHOSPHORUS    Collection Time: 12/28/24  7:05 PM   Result Value Ref Range    Phosphorus 3.2 2.5 - 4.5 mg/dL   ESTIMATED GFR    Collection Time: 12/28/24  7:05 PM   Result Value Ref Range    GFR (CKD-EPI) 91 >60 mL/min/1.73 m 2   POCT glucose device results    Collection Time: 12/28/24  8:16 PM   Result Value Ref Range    POC Glucose, Blood 133 (H) 65 - 99 mg/dL   POCT glucose device results    Collection Time: 12/29/24   6:27 AM   Result Value Ref Range    POC Glucose, Blood 83 65 - 99 mg/dL   POCT glucose device results    Collection Time: 12/29/24 11:30 AM   Result Value Ref Range    POC Glucose, Blood 79 65 - 99 mg/dL   POCT glucose device results    Collection Time: 12/29/24  6:02 PM   Result Value Ref Range    POC Glucose, Blood 129 (H) 65 - 99 mg/dL   POCT glucose device results    Collection Time: 12/29/24  8:48 PM   Result Value Ref Range    POC Glucose, Blood 93 65 - 99 mg/dL   POCT glucose device results    Collection Time: 12/30/24  5:34 AM   Result Value Ref Range    POC Glucose, Blood 83 65 - 99 mg/dL   POCT glucose device results    Collection Time: 12/30/24 11:36 AM   Result Value Ref Range    POC Glucose, Blood 73 65 - 99 mg/dL   POCT glucose device results    Collection Time: 12/30/24  9:48 PM   Result Value Ref Range    POC Glucose, Blood 81 65 - 99 mg/dL   CBC with Differential    Collection Time: 12/31/24  6:00 AM   Result Value Ref Range    WBC 3.9 (L) 4.8 - 10.8 K/uL    RBC 3.77 (L) 4.20 - 5.40 M/uL    Hemoglobin 12.2 12.0 - 16.0 g/dL    Hematocrit 37.6 37.0 - 47.0 %    MCV 99.7 (H) 81.4 - 97.8 fL    MCH 32.4 27.0 - 33.0 pg    MCHC 32.4 32.2 - 35.5 g/dL    RDW 62.6 (H) 35.9 - 50.0 fL    Platelet Count 173 164 - 446 K/uL    MPV 11.5 9.0 - 12.9 fL    Neutrophils-Polys 54.80 44.00 - 72.00 %    Lymphocytes 30.70 22.00 - 41.00 %    Monocytes 9.50 0.00 - 13.40 %    Eosinophils 3.40 0.00 - 6.90 %    Basophils 0.80 0.00 - 1.80 %    Immature Granulocytes 0.80 0.00 - 0.90 %    Nucleated RBC 0.00 0.00 - 0.20 /100 WBC    Neutrophils (Absolute) 2.13 1.82 - 7.42 K/uL    Lymphs (Absolute) 1.19 1.00 - 4.80 K/uL    Monos (Absolute) 0.37 0.00 - 0.85 K/uL    Eos (Absolute) 0.13 0.00 - 0.51 K/uL    Baso (Absolute) 0.03 0.00 - 0.12 K/uL    Immature Granulocytes (abs) 0.03 0.00 - 0.11 K/uL    NRBC (Absolute) 0.00 K/uL   POCT glucose device results    Collection Time: 12/31/24  7:35 AM   Result Value Ref Range    POC Glucose, Blood  77 65 - 99 mg/dL   POCT glucose device results    Collection Time: 12/31/24 11:34 AM   Result Value Ref Range    POC Glucose, Blood 72 65 - 99 mg/dL       Medications:  Scheduled Medications   Medication Dose Frequency    Pharmacy Consult Request  1 Each PHARMACY TO DOSE    senna-docusate  2 Tablet Q EVENING    omeprazole  20 mg DAILY    apixaban  5 mg BID    ARIPiprazole  2 mg DAILY    atorvastatin  80 mg Q EVENING    clopidogrel  75 mg DAILY    dapagliflozin propanediol  10 mg DAILY    insulin lispro  1-6 Units 4X/DAY ACHS    melatonin  10.5 mg Nightly    traZODone  100 mg QHS     PRN medications: Respiratory Therapy Consult, hydrALAZINE, senna-docusate **AND** polyethylene glycol/lytes, ondansetron **OR** ondansetron, traZODone, sodium chloride, [DISCONTINUED] insulin GLARGINE **AND** insulin lispro **AND** POC blood glucose manual result **AND** NOTIFY MD and PharmD **AND** Administer 20 grams of glucose (approximately 8 ounces of fruit juice) every 15 minutes PRN FSBG less than 70 mg/dL **AND** dextrose bolus, oxyCODONE immediate-release **OR** oxyCODONE immediate-release    Diet:  Current Diet Order   Procedures    Diet Order Diet: Level 5 - Minced and Moist (meds whole/crushed in puree, as appropriate); Liquid level: Level 2 - Mildly Thick; Second Modifier: (optional): Cardiac; Tray Modifications (optional): SLP - 1:1 Supervision by Nursing, SLP - Deliver t...       Medical Decision Making and Plan:  Anoxic brain injury  Metabolic encephalopathy  Delirium  -anoxia from cardiogenic shock  -2 min in hospital PEA arrest  -required seroquel, valproate and haldol to control delirium at OSH.   -mental status seems improved  -Abilify 2mg daily  -melatonin 10mg nightly  -trazodone 100mg nightly     STEMI  -s/p PCI to LAD. Complicated by V-Fib and PEA arrest. Required ECMO and Impella due to cardiogenic shock.   -now off ECMO and Impella  -atrovastatin and plavix     HFrEF  -EF 25-30%  -soft pressure limiting  GDMT  -farxiga 10mg  -no losartan due to hypotension  -no bisoprolol due to Chyne-florez breathing  -no lasix due to dehydration     Acute respiratory failure  -Cheyne-florez breathing pattern, thought to be from anoxic brain injury  -concern for aspiration   -now on room air     Pneumonia- resolved  -completed linezolid and zosyn 12/1     CVA  -MRI on 11/17/2024 at OSH: Punctate acute infarct of the right centrum semiovale. Scattered foci of enhancement in multiple vascular distributions as described may represent recent subacute infarcts. Diffuse stippled susceptibility signal throughout the supratentorial and infratentorial brain. Overall findings may represent sequelae of fat emboli or amyloid related angiopathy.   -Eliquis 5mg BID  -atorvastatin 80mg, plavix     Retroperitoneal bleed  -required several transfusions.   -hgb now stable     Hypotension, History of HTN  -losartan on hold     Hyperglycemia, DM  -last A1c 6.2 on 11/1.   -lantus 9u qhs, SSI  -12/31- low bs, evening bs 39. Poor intake. Dc lantus, dc farsiga. Monitor closely     Cognitive Communicative deficits:  - Patient with significant cognitive deficits due to anoxic brain injury  - Environmental modifications to decrease excessive stimulation including turning off the lights  - Consider starting neurostimulants such Nuvigil, amantadine or methylphenidate  - SLP to evaluate and treat cognitive deficits.   -Neuropsych will follow and perform testing if/when appropriate.     Dysphagia/Nutrition:  - Patient currently on dysphagia DIET.  - Continue therapies with SLP. SLP to perform swallow evaluation and provide oral motor exercises if necessary.     Neurogenic bladder:  - Timed voids with PVR q4H x3. If PVR > 400mL or if patient is unable to void, straight cath patient.  -12/31- remove de jesus     Neurogenic bowel:  -  Colace, Senna BID on admission  - Goal of 1BM/day.  -      Circadian Rhythm disorder:   -Trazadone 50mg PRN for restlessness after  10pm  -maltatonin and trazadone sheduled  Recommend lights on during the day/off at night, minimize nighttime interruptions as able.     Mood  - at risk of adjustment disorder, depression, and anxiety due to functional decline     ID:  - at risk for Urinary tract infection     Skin/Wounds:  Bilateral groin  -wound vac  - Pressure relief q2h while in bed. Close monitoring for signs of breakdown     Pain:  - Neuroceptic - continue tylenol prn     DVT prophylaxis:  continue eliquis     GI prophylaxis:  On Prilosec 20mg daily         -Follow-up Cardiology, PCP, neurology    ____________________________________    Mario Cole MD  Physical Medicine & Rehabilitation   Brain Injury Medicine   ____________________________________

## 2024-12-31 NOTE — THERAPY
"Speech Language Pathology   Initial Assessment     Patient Name: Kiara Qureshi  AGE:  59 y.o., SEX:  female  Medical Record #: [id number]  Today's Date: 12/31/2024     Subjective    Patient participated in cognitive linguistic and clinical swallow evaluations. Patient lives with spouse. IPLOF including driving, working f/t, and independently managing all IADL's. Patient reports initial cognitive changes, however states her brain has \"woken up\" and has no concerns related to cognition.      Objective       12/31/24 1032   Evaluation Charges   Charges Yes   SLP Speech Language Evaluation Speech Sound Language Comprehension   SLP Oral Pharyngeal Evaluation Oral Pharyngeal Evaluation   SLP Total Time Spent   SLP Individual Total Time Spent (Mins) 90   Precautions   Precautions Swallow Precautions   Prior Living Situation   Prior Services Home-Independent   Lives with - Patient's Self Care Capacity Spouse   Prior Level Of Function   Communication Within Functional Limits   Swallow Within Functional Limits   Dentition Intact   Dentures None   Hearing Impaired Both Ears   Hearing Aid Right;Left   Vision Wears Corrective Lenses;Reading    Patient's Primary Language English   Occupation (Pre-Hospital Vocational) Employed Full Time  (teacher)   Cognitive Pattern Assessment   Cognitive Pattern Assessment Used BIMS   Brief Interview for Mental Status (BIMS)   Repetition of Three Words (First Attempt) 3   Temporal Orientation: Year Correct   Temporal Orientation: Month Accurate within 5 days   Temporal Orientation: Day Correct   Recall: \"Sock\" Yes, no cue required   Recall: \"Blue\" Yes, no cue required   Recall: \"Bed\" Yes, after cueing (\"a piece of furniture\")   BIMS Summary Score 14   Confusion Assessment Method (CAM)   Is there evidence of an acute change in mental status from the patient's baseline? No   Inattention Behavior present, fluctuates (comes and goes, changes in severity)   Disorganized thinking Behavior not " present   Altered level of consciousness Behavior not present   Speech Mechanisms / Voice Production   Voice Quality Hoarse;Gurgly   Speaks Fluently Within Functional Limits (6-7)   Articulatory Precision Within Functional Limits (6-7)   Labial Function   Labial Function (WDL) WDL   Lingual Function   Lingual Function (WDL) WDL   Laryngeal Function   Voice Quality Minimal   Swallowing   Ice Chips Minimal   Thick Nectar / Honey / Liquid Minimal   Pureed (4) Minimal   Thin Liquid Minimal   Masticated Foods Minimal   Dysphagia Strategies / Recommendations   Compensatory Strategies Monitor During Meals;Cough / Clear Wet Vocal Quality Post Swallow;No Straws;Liquids Via Cup;Reduce Bolus Size to 1 Teaspoon;Multiple Swallows;Single Sips / Bites;No Talking During Eating / Drinking   Diet / Liquid Recommendation Minced & Moist (5) - (Dysphagia II);Mildly Thick (2) - (Nectar Thick)   Medication Administration  Float Whole with Puree   Therapy Interventions Dysphagia Therapy By Speech Language Pathologist;Therapeutic Dining For Meals;MBSS Evaluation;Pharyngeal / Laryngeal Exercises   Barriers To Oral Feeding   Barriers To Oral Feeding Generalized Weakness   Cervical Ausculatation Not Tested   Pre-Feeding Oral Stimulation Trial Intact   Eating   Assistance Needed Verbal cues;Set-up / clean-up;Supervision   Physical Assistance Level No physical assistance   CARE Score - Eating 4   Functional Level of Assist   Eating Supervision   Eating Description Increased time;Modified diet;Supervision for safety;Verbal cueing   Comprehension Modified Independent   Comprehension Description Hearing aids/amplifiers;Glasses   Expression Independent   Social Interaction Independent   Problem Solving Modified Independent   Problem Solving Description Bed/chair alarm   Memory Modified Independent   Memory Description Increased time;Therapy schedule;Verbal cueing   Outcome Measures   Outcome Measures Utilized SCCAN   SCCAN (Scales of Cognitive and  Communicative Ability for Neurorehabilitation)   Oral Expression - Raw Score 19   Oral Expression - Scale Performance Score 100   Orientation - Raw Score 12   Orientation - Scale Performance Score 100   Memory - Raw Score 14   Memory - Scale Performance Score 74   Speech Comprehension - Raw Score 12   Speech Comprehension - Scale Performance Score 92   Reading Comprehension - Raw Score 12   Reading Comprehension - Scale Performance Score 100   Writing - Raw Score 7   Writing - Scale Performance Score 100   Attention - Raw Score 14   Attention - Scale Performance Score 88   Problem Solving - Raw Score 22   Problem Solving - Scale Performance Score 96   SCCAN Total Raw Score 87   SCCAN Degree of Severity Typical Functioning   Problem List   Problem List Dysphagia   Current Discharge Plan   Current Discharge Plan Return to Prior Living Situation   Benefit   Therapy Benefit Patient would benefit from Inpatient Rehab Speech-Language Pathology to address above identified deficits.   Interdisciplinary Plan of Care Collaboration   IDT Collaboration with  Physician   Collaboration Comments TRACY MCCLAIN   Speech Language Pathologist Assigned   Assigned SLP / Treatment Time / Comments EA 30 tdine, swallow only       Assessment    Patient is 59 y.o. female with a diagnosis of anoxic brain injury.  Additional factors influencing patient status/progress (ie: cognitive factors, co-morbidities, social support, etc): IPLOF, supportive spouse, motivation.    Cognitive linguistic evaluation was completed. Patient participated in SCCAN with a final raw score of 87 indicating cognition WFL. Mild difficulties with memory tasks, however, patient reports some baseline memory decline. Reviewed memory strategies. Patient verbalized understanding of strategies. ST is not recommended for cognition at this time.     Clinical swallow evaluation was also completed. Isolated oral mech exam was unremarkable. Hoarse vocal quality noted which patient  reports as improving. Patient was assessed with trials of soft solids, purees, MTL, thin liquids, and ice chips. Intermittent changes in vocal quality noted with all textures assessed. Patient required cues to self monitor and clear changes in vocal quality. Poor insight to swallow difficulties despite ongoing education and discussion of silent aspiration noted on recent FEES. Discussed recommendations for continuing MM/05 diet with MTL until MBSS can be completed. Patient was in agreement, however required reinforcement.   Ok for single ice chips after oral care is completed.     Plan  Recommend Speech Therapy 30-60 minutes per day 5-6 days per week for 1-2 weeks for the following treatments:  SLP Speech Language Treatment, SLP Swallowing Dysfunction Treatment, SLP Oral Pharyngeal Evaluation, SLP Video Swallow Evaluation, SLP Self Care / ADL Training , and SLP Group Treatment.    Passport items to be completed:  Express basic needs, understand food/liquid recommendations, consistently follow swallow precautions, manage finances, manage medications, arrive to therapy appointments on time, complete daily memory log entries, solve problems related to safety situations, review education related to hospitalization, complete caregiver training     Goals:  Long term and short term goals have been discussed with patient and they are in agreement.    Speech Therapy Problems (Active)       Problem: Speech/Swallowing LTGs       Dates: Start:  12/31/24         Goal: LTG-By discharge, patient will safely swallow regular diet with thin liquids without overt s/sx of aspiration        Dates: Start:  12/31/24               Problem: Swallowing STGs       Dates: Start:  12/31/24         Goal: STG-Within one week, patient will safely swallow MM/0 diet with MTL without overt s/sx of aspiration        Dates: Start:  12/31/24            Goal: STG-Within one week, patient will participate in MBSS to further assess swallow function and r/o  silent aspiration        Dates: Start:  12/31/24            Goal: STG-Within one week, patient will demonstrate use of safe swallow strategies 90% of the time given min verbal cues.        Dates: Start:  12/31/24

## 2024-12-31 NOTE — PROGRESS NOTES
1700 Pt arrived at [facility] from [facility] ([place] via transport. Dr. Cole to follow. Initial assessment initiated. Pt oriented to room and facility routine and safety measures; pt education binder provided and discussed. Pt A/O x 4, continent of bowel w/ de jesus catheter present. Min assist for transfers. All wounds photographed and documented; photos uploaded to . Pt denies pain or discomfort at this time. Pt positioned for comfort in bed. Call light within reach, safety measures in place.

## 2024-12-31 NOTE — CARE PLAN
Problem: Knowledge Deficit - Standard  Goal: Patient and family/care givers will demonstrate understanding of plan of care, disease process/condition, diagnostic tests and medications  Outcome: Progressing     Problem: Skin Integrity  Goal: Skin integrity is maintained or improved  Outcome: Progressing     Problem: Fall Risk - Rehab  Goal: Patient will remain free from falls  Outcome: Progressing     The patient is Stable - Low risk of patient condition declining or worsening    Shift Goals  Clinical Goals: safety  Patient Goals: safety ; rest  Family Goals: safety

## 2024-12-31 NOTE — THERAPY
Occupational Therapy   Initial Evaluation     Patient Name: Kiara Qureshi  Age:  59 y.o., Sex:  female  Medical Record #: [id number]  Today's Date: 12/31/2024     Subjective    Patient was awake and in bed upon OT arrival to room.  She was agreeable to OT evaluation.  She reported she fell during a transfer at the main hospital and has some anxiety about moving around.     Objective       12/31/24 0831   OT Charge Group   Charges Yes   OT Self Care / ADL (Units) 1   OT Evaluation OT Evaluation Mod   OT Total Time Spent   OT Individual Total Time Spent (Mins) 60   Prior Living Situation   Prior Services Home-Independent   Housing / Facility 1 Story House   Steps Into Home 1   Steps In Home 0   Bathroom Set up Walk In Shower;Grab Bars;Built-In Shower Chair   Equipment Owned Grab Bar(s) In Tub / Shower  (built in bench)   Lives with - Patient's Self Care Capacity Spouse  (spouse works part-time)   Prior Level of ADL Function   Self Feeding Independent   Grooming / Hygiene Independent   Bathing Independent   Dressing Independent   Toileting Independent   Prior Level of IADL Function   Medication Management Independent   Laundry Independent   Kitchen Mobility Independent   Finances Independent   Home Management Independent   Shopping Independent   Prior Level Of Mobility Independent Without Device in Community   Driving / Transportation Driving Independent   Occupation (Pre-Hospital Vocational) Employed Full Time  (3/4 )   Leisure Interests Unable To Determine At This Time   Prior Functioning: Everyday Activities   Self Care Independent   Indoor Mobility (Ambulation) Independent   Stairs Independent   Functional Cognition Independent   Prior Device Use None of the given options   Vitals   Patient BP Position Sitting   Blood Pressure 104/70   Pulse Oximetry 99 %   Cognition    Orientation Level Oriented x 4   Level of Consciousness Alert   ABS (Agitated Behavior Scale)   Agitated Behavior Scale  "Performed No   Cognitive Pattern Assessment   Cognitive Pattern Assessment Used BIMS   Brief Interview for Mental Status (BIMS)   Repetition of Three Words (First Attempt) 3   Temporal Orientation: Year Correct   Temporal Orientation: Month Accurate within 5 days   Temporal Orientation: Day Correct   Recall: \"Sock\" Yes, no cue required   Recall: \"Blue\" Yes, no cue required   Recall: \"Bed\" Yes, after cueing (\"a piece of furniture\")   BIMS Summary Score 14   Confusion Assessment Method (CAM)   Is there evidence of an acute change in mental status from the patient's baseline? No   Inattention Behavior present, fluctuates (comes and goes, changes in severity)   Disorganized thinking Behavior not present   Altered level of consciousness Behavior not present   Vision Screen   Vision Not tested   Passive ROM Upper Body   Passive ROM Upper Body WDL   Active ROM Upper Body   Active ROM Upper Body  WDL   Strength Upper Body   Upper Body Strength  X   Gross Strength Generalized Weakness, Equal Bilaterally.    Sensation Upper Body   Upper Extremity Sensation  Not Tested   Upper Body Muscle Tone   Upper Body Muscle Tone  Not Tested   Balance Assessment   Sitting Balance (Static) Good   Sitting Balance (Dynamic) Fair +   Standing Balance (Static) Poor   Standing Balance (Dynamic) Poor -   Weight Shift Sitting Good   Weight Shift Standing Fair   Bed Mobility    Supine to Sit Standby Assist   Sit to Stand Minimal Assist   Scooting Contact Guard Assist   Coordination Upper Body   Coordination Not Tested   Hearing, Speech, and Vision   Ability to Hear Adequate   Ability to See in Adequate Light Adequate   Expression of Ideas and Wants Without difficulty   Understanding Verbal and Non-Verbal Content Understands   Functional Level of Assist   Grooming Supervision;Seated   Bathing   (n/t at eval due to borderline low BP)   Upper Body Dressing Supervision   Upper Body Dressing Description Set-up of equipment   Lower Body Dressing Minimal " Assist   Lower Body Dressing Description Application of orthotic or brace;Set-up of equipment;Supervision for safety;Verbal cueing  (assist w/ 2/8 tasks to don TEDs, socks, hospital pants)   Toileting Moderate Assist   Toileting Description Grab bar;Set-up of equipment;Supervision for safety;Verbal cueing   Bed, Chair, Wheelchair Transfer Contact Guard Assist   Bed Chair Wheelchair Transfer Description Set-up of equipment;Squat pivot transfer to wheelchair;Supervision for safety;Verbal cueing  (CGA squat pivot bed to w/c)   Toilet Transfers Minimal Assist   Toilet Transfer Description Grab bar;Requires lift;Set-up of equipment;Supervision for safety;Verbal cueing  (min A SPT w/c <> toilet with grab bar)   Tub / Shower Transfers   (n/t)   Oral Hygiene   Assistance Needed Set-up / clean-up   CARE Score - Oral Hygiene 5   Shower/Bathe Self   Reason if not Attempted Medical concerns   CARE Score - Shower/Bathe Self 88   Upper Body Dressing   Assistance Needed Set-up / clean-up   CARE Score - Upper Body Dressing 5   Lower Body Dressing   Assistance Needed Set-up / clean-up;Verbal cues   CARE Score - Lower Body Dressing 4   Putting On/Taking Off Footwear   Assistance Needed Physical assistance   Physical Assistance Level 26%-50%   CARE Score - Putting On/Taking Off Footwear 3   Toileting Hygiene   Assistance Needed Physical assistance   Physical Assistance Level 26%-50%   CARE Score - Toileting Hygiene 3   Toilet Transfer   Assistance Needed Physical assistance   Physical Assistance Level 25% or less   CARE Score - Toilet Transfer 3   Problem List   Problem List Decreased Active Daily Living Skills;Decreased Homemaking Skills;Decreased Upper Extremity Strength Right;Decreased Upper Extremity Strength Left;Decreased Functional Mobility;Decreased Activity Tolerance;Impaired Postural Control / Balance   Precautions   Precautions Fall Risk;Swallow Precautions   Comments B lower abdominal wound VACs   Current Discharge Plan    Current Discharge Plan Return to Prior Living Situation   Benefit    Therapy Benefit Patient Would Benefit from Inpatient Rehab Occupational Therapy to Maximize [place] with ADLs, IADLs and Functional Mobility.   Interdisciplinary Plan of Care Collaboration   IDT Collaboration with  Nursing   Patient Position at End of Therapy Seated;Chair Alarm On;Self Releasing Lap Belt Applied;Call Light within Reach;Phone within Reach;Tray Table within Reach   Strengths & Barriers   Strengths Able to follow instructions;Alert and oriented;Effective communication skills;Independent prior level of function;Manages pain appropriately;Motivated for self care and independence;Pleasant and cooperative;Supportive family;Willingly participates in therapeutic activities   Barriers Decreased endurance;Generalized weakness;Hypotension;Impaired balance;Limited mobility   Occupational Therapist Assigned   Assigned OT / Treatment Time / Comments 60 minute sessions       Assessment  Patient is 59 y.o. female with a diagnosis of Anoxic BI, R CVA and retroperitoneal bleed.  Additional factors influencing patient status / progress (ie: cognitive factors, co-morbidities, social support, etc): lives with spouse, independent with PLOF.      Plan  Recommend Occupational Therapy  minutes per day 5-7 days per week for 2 weeks for the following treatments:  OT Group Therapy, OT Self Care/ADL, OT Community Reintegration, OT Manual Ther Technique, OT Neuro Re-Ed/Balance, OT Therapeutic Activity, OT Evaluation, and OT Therapeutic Exercise.    Passport items to be completed:  Perform bathroom transfers, complete dressing, complete feeding, get ready for the day, prepare a simple meal, participate in household tasks, adapt home for safety needs, demonstrate home exercise program, complete caregiver training     Goals:  Long term and short term goals have been discussed with patient and they are in agreement.    Occupational Therapy Goals  (Active)       Problem: Dressing       Dates: Start:  12/31/24         Goal: STG-Within one week, patient will dress LB with CGA (from w/c)       Dates: Start:  12/31/24               Problem: Functional Transfers       Dates: Start:  12/31/24         Goal: STG-Within one week, patient will transfer to toilet with CGA       Dates: Start:  12/31/24               Problem: OT Long Term Goals       Dates: Start:  12/31/24         Goal: LTG-By discharge, patient will complete basic self care tasks with supervision/mod I       Dates: Start:  12/31/24            Goal: LTG-By discharge, patient will perform bathroom transfers with supervision/mod I       Dates: Start:  12/31/24            Goal: LTG-By discharge, patient will complete basic home management with supervision/mod I       Dates: Start:  12/31/24               Problem: Toileting       Dates: Start:  12/31/24         Goal: STG-Within one week, patient will complete toileting tasks with min A       Dates: Start:  12/31/24

## 2024-12-31 NOTE — THERAPY
Physical Therapy   Initial Evaluation     Patient Name: Kiara Qureshi  Age:  59 y.o., Sex:  female  Medical Record #: [id number]  Today's Date: 12/31/2024     Subjective    Patient pleasant and motivated to participate.      Objective       12/31/24 1301   PT Charge Group   PT Therapeutic Activities (Units) 1   PT Evaluation PT Evaluation Mod   PT Total Time Spent   PT Individual Total Time Spent (Mins) 60   Prior Living Situation   Prior Services Home-Independent   Housing / Facility 1 Story House   Steps Into Home 2   Rail Both Rail (Steps into Home)   Lives with - Patient's Self Care Capacity Spouse   Prior Level of Functional Mobility   Bed Mobility Independent   Transfer Status Independent   Ambulation Independent   Distance Ambulation (Feet)   (unlimited community ambulation)   Assistive Devices Used None   Stairs Independent   Prior Functioning: Everyday Activities   Self Care Independent   Indoor Mobility (Ambulation) Independent   Stairs Independent   Functional Cognition Independent   Prior Device Use None of the given options   Balance Assessment   Sitting Balance (Static) Good   Sitting Balance (Dynamic) Fair   Standing Balance (Static) Poor   Standing Balance (Dynamic) Poor -   Weight Shift Sitting Good   Weight Shift Standing Poor   Bed Mobility    Supine to Sit Minimal Assist   Sit to Supine Minimal Assist   Sit to Stand Minimal Assist   Rolling Contact Guard Assist   Roll Left and Right   Assistance Needed Incidental touching   CARE Score - Roll Left and Right 4   Sit to Lying   Physical Assistance Level 25% or less   CARE Score - Sit to Lying 3   Lying to Sitting on Side of Bed   Physical Assistance Level 25% or less   CARE Score - Lying to Sitting on Side of Bed 3   Sit to Stand   Physical Assistance Level 25% or less   CARE Score - Sit to Stand 3   Chair/Bed-to-Chair Transfer   Physical Assistance Level 25% or less   CARE Score - Chair/Bed-to-Chair Transfer 3   Car Transfer   Reason if not  Attempted Safety concerns   CARE Score - Car Transfer 88   Walk 10 Feet   Reason if not Attempted Safety concerns   CARE Score - Walk 10 Feet 88   Walk 50 Feet with Two Turns   Reason if not Attempted Safety concerns   CARE Score - Walk 50 Feet with Two Turns 88   Walk 150 Feet   Reason if not Attempted Safety concerns   CARE Score - Walk 150 Feet 88   Walking 10 Feet on Uneven Surfaces   Reason if not Attempted Safety concerns   CARE Score - Walking 10 Feet on Uneven Surfaces 88   1 Step (Curb)   Reason if not Attempted Safety concerns   CARE Score - 1 Step (Curb) 88   4 Steps   Reason if not Attempted Safety concerns   CARE Score - 4 Steps 88   12 Steps   Reason if not Attempted Safety concerns   CARE Score - 12 Steps 88   Picking Up Object   Reason if not Attempted Safety concerns   CARE Score - Picking Up Object 88   Gait Functional Level of Assist    Gait Level Of Assist Unable to Participate   Transfer Functional Level of Assist   Bed, Chair, Wheelchair Transfer Minimal Assist   Bed Chair Wheelchair Transfer Description Increased time;Initial preparation for task;Set-up of equipment;Supervision for safety;Verbal cueing;Squat pivot transfer to wheelchair   Problem List    Problems Impaired Transfers;Impaired Ambulation;Functional Strength Deficit;Impaired Balance;Impaired Coordination;Decreased Activity Tolerance   Current Discharge Plan   Current Discharge Plan Return to Prior Living Situation   Interdisciplinary Plan of Care Collaboration   IDT Collaboration with  Family / Caregiver   Patient Position at End of Therapy In Bed;Bed Alarm On;Call Light within Reach;Tray Table within Reach;Phone within Reach;Family / Friend in Room   Collaboration Comments Family friend present at end of session, very supportive   Benefit   Therapy Benefit Patient Would Benefit from Inpatient Rehabilitation Physical Therapy to Maximize Functional [place] with ADLs, IADLs and Mobility.   Strengths & Barriers   Strengths Alert  and oriented;Independent prior level of function;Motivated for self care and independence;Pleasant and cooperative;Supportive family;Willingly participates in therapeutic activities   Barriers Decreased endurance;Fatigue;Generalized weakness;Impaired activity tolerance;Impaired balance;Impaired insight/denial of deficits     Sit <> stand x 5 in // bars with CGA  -standing tolerance x 5-10 seconds with each stand; unable to stand long enough to attempt stepping    Assessment  Patient is 59 y.o. female with a diagnosis of anoxic brain injury (s/p STEMI, 2 episodes of Vfib and PEA arrest; patient required transfer to [facility] for higher level of care, placed on ECMO), retroperitoneal bleed, delirium and encephalopathy, acute infarct of R centrum semiovale, pneumonia and groin wounds (with wound vacs). 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 She lives with her  in a single story home with 2 steps to enter (with B rails, per patient report).     Patient presents to inpatient rehab with deficits in all functional mobility due to weakness (R LE > L LE), impaired activity tolerance/endurance and impaired balance. She was able to initiate standing in // bars this afternoon but for very short period of time (5-10 seconds) and was unable to take any steps. Patient motivated to participate.     Plan  Recommend Physical Therapy  minutes per day 5-7 days per week for 2-3 weeks for the following treatments:  PT Gait Training, PT Therapeutic Exercises, PT TENS Application, PT Neuro Re-Ed/Balance, and PT Therapeutic Activity.    Passport items to be completed:  Get in/out of bed safely, in/out of a vehicle, safely use mobility device, walk or wheel around home/community, navigate up and down stairs, show how to get up/down from the ground, ensure home is accessible, demonstrate HEP, complete caregiver training    Goals:  Long term and short term goals have been discussed with patient and they are in  agreement.    Physical Therapy Problems (Active)       Problem: Balance       Dates: Start:  12/31/24         Goal: STG-Within one week, patient will maintain static standing for at least 1 minute, with no LOB, and CGA.        Dates: Start:  12/31/24               Problem: Mobility       Dates: Start:  12/31/24         Goal: STG-Within one week, patient will ambulate at least 25 feet with LRAD and min A.        Dates: Start:  12/31/24            Goal: STG-Within one week, patient will ascend and descend four to six stairs at least rails and min A.        Dates: Start:  12/31/24               Problem: Mobility Transfers       Dates: Start:  12/31/24         Goal: STG-Within one week, patient will transfer bed to chair with LRAD and CGA.        Dates: Start:  12/31/24               Problem: PT-Long Term Goals       Dates: Start:  12/31/24         Goal: LTG-By discharge, patient will ambulate at least 150 feet with LRAD and supervision.        Dates: Start:  12/31/24            Goal: LTG-By discharge, patient will transfer one surface to another with LRAD and supervision-mod I.        Dates: Start:  12/31/24            Goal: LTG-By discharge, patient will ambulate up/down flight of stairs with rails and supervision.        Dates: Start:  12/31/24            Goal: LTG-By discharge, patient will transfer in/out of a car with LRAD and supervision.        Dates: Start:  12/31/24

## 2024-12-31 NOTE — DISCHARGE SUMMARY
Discharge Summary    CHIEF COMPLAINT ON ADMISSION  Transfer back from [facility] but following STEMI, PEA cardiac arrest, and cardiogenic shock requiring ECMO and Impella      Reason for Admission  Cardiogenic Shock following ST elevation myocardial infarction    Admission Date  11/27/2024     CODE STATUS  DNAR, I OK    HPI & HOSPITAL COURSE  Kiara Qureshi ia a 59-year-old female with a history of obesity, hypertension, hyperlipidemia.  She was seen at [facility] as a transfer initially for anterior STEMI requiring lytics.  Patient underwent an LAD PCI but hospital course was complicated by retroperitoneal bleed, ventricular fibrillation and PEA arrest and cardiogenic shock requiring VA ECMO and Impella.  She is now status post ECMO decannulation on November 4 and had left femoral embolectomy, PCI to the LAD, unsuccessful PCI to OM1 and Impella removal on November 8.  Hospital course was complicated by patient developing encephalopathy and delirium, to which she was given valproic acid and quetiapine.  Patient has been weaned off her present medications and was found to have hypotension to which she was started on p.o. midodrine 10 mg 3 times daily.  Goal-directed medical therapy was held due to borderline low blood pressure.  Her echocardiogram on November 24 shows 25 to 30% EF with akinesis of the apical, apical anterior wall, apical septum, apical inferior wall, and apical lateral wall of the left ventricle.  Hypokinesis seen of the basal anterolateral wall, mid anterolateral wall, anterior wall, mid inferolateral wall, and basal inferolateral wall of the left ventricle.   She did not tolerate GDMT due to hypotension.     On November 25, patient had CT angiogram of the chest which shows pulmonary edema, small bilateral pleural effusions, small 3 mm filling defect along the lateral wall of the descending thoracic aorta which may represent focal aortic mural thrombus versus ulcerated plaque.  CT angiogram of the  abdomen pelvis showing increased organization of the rim-enhancing left retroperitoneal hematomas measuring up to 9.3 x 4.4 cm.  Tiny filling defect/mural thrombus within the posterior right common iliac artery.  Multiple nonenhancing intermediate density fluid collections within the subcutaneous tissues overlying the right lower quadrant and right inguinal regions measuring up to 5.7 x 2.4 cm.  Additional nonenhancing low-density fluid collection closely associated with the left femoral vein measuring 4.7 x 3.9 cm, likely represents evolving hematomas.     Due to patient's uptrending leukocytosis and CAT scan findings of suspected pneumonia, patient was started on vancomycin and Zosyn on November 26.  Patient was then transferred back to [facility] for further management.  Patient finished antibiotics 12/1/2024.  Cardiology was consulted for medical management of her heart failure.  Vascular surgery was consulted for her aortic mural thrombus versus ulcerated plaque and hematomas.  Decision was made to continue patient on Eliquis.    Patient's admission has been complicated with acute metabolic encephalopathy complicated by hypernatremia from poor oral intake.  There is also suspected anoxic brain injury component in the setting of the cardiac arrest, however patient has been unable to get an MRI because she was persistently agitated.  Sedation was deferred given that she was witnessed to have Cheyne-Lo breathing.  She had escalating doses of Seroquel and also Depakote in the setting of persistent agitation.  On 12/9 she beame more altered and and subsequently lethargic and was transferred to [facility].  It was felt that quetiapine could have been contributing as well as Depakote to her significant respiratory depression and somnolence. Seroquel and valproate acid were slowly tapered off.  However, once these medications were titrated off patient then became more agitated again.  She was on feeding tube and she pulled  it multiple times.  Seroquel on a lower dose and trazodone was started given that at this point patient's  and the rest of her family have elected to pursue LTAC, GI was consulted for possible PEG tube placement.  As pulling the PEG tube in the first 2 weeks would result in a surgical emergency, psychiatry was consulted for comanagement of her agitation/aggression.    Vascular surgery was consulted and recommended wound vacs to the groin wounds.  She had an EEG for her acute encephalopathy, which was negative for seizures.  She was tapered off of her high dose of quetiapine.  Patient's nasogastric tube was eventually discontinued.    Patient was evaluated by occupational and physical therapy, who recommended postacute placement.  Physical medicine and rehabilitation was consulted and the patient was deemed to be a good candidate for inpatient rehabilitation.  She was accepted to [facility].    Therefore, she is discharged in good and stable condition to an inpatient rehabilitation hospital.    The patient met 2-midnight criteria for an inpatient stay at the time of discharge.    Discharge Date  12/30/2024     FOLLOW UP ITEMS POST DISCHARGE  -As per [facility].  Reintroduce GDMT as tolerated/  -Follow-up with primary care provider upon discharge from rehabilitation Hospital.    DISCHARGE DIAGNOSES  Principal Problem:    Heart failure with reduced ejection fraction (HCC) (POA: Yes)  Active Problems:    STEMI (ST elevation myocardial infarction) (HCC) (POA: Yes)    Metabolic acidemia (POA: Yes)    Hyperglycemia (POA: Unknown)    Urinary retention (POA: Unknown)    CAD (coronary artery disease) (POA: Unknown)    CVA (cerebral vascular accident) (HCC) (POA: Unknown)    Acute metabolic encephalopathy (POA: Unknown)    Aortic mural thrombus (HCC) (POA: Unknown)    Pneumonia (POA: Unknown)    ACP (advance care planning) (POA: Unknown)    Abdominal hematoma (POA: Unknown)     Hypotension (POA: Unknown)    Constipation (POA: Unknown)    Acute hypoxic respiratory failure (HCC) (POA: Unknown)    Hypernatremia (POA: Unknown)    Macrocytic anemia (POA: Unknown)    Functional quadriplegia (HCC) (POA: Unknown)    Circulating anticoagulant disorder (HCC) (POA: Unknown)    Personal history of ECMO (POA: Yes)    History of left ventricular assist device (LVAD) (HCC) (POA: Yes)  Resolved Problems:    Leukocytosis (POA: Unknown)    Encephalopathy acute (POA: Yes)      FOLLOW UP  Future Appointments   Date Time Provider Department Center   12/31/2024  8:30 AM Shaun Weeks, OT/L [facility] None   12/31/2024 10:30 AM Dulce Mistry, MS,[facility] None   12/31/2024  1:00 PM Maral Montenegro, PT [facility] None     [address] [address]  [phone]  Follow up      Lamin Camacho M.D.  [address] [facility] [address]  [phone]    Follow up        MEDICATIONS ON DISCHARGE     Medication List        ASK your doctor about these medications        Instructions   acetaminophen 500 MG Tabs  Commonly known as: Tylenol   1,000 mg by Enteral Tube route every 8 hours.  Dose: 1,000 mg     * aspirin 81 MG tablet   Take 81 mg by mouth every day.  Dose: 81 mg     * aspirin 81 MG EC tablet   Take 81 mg by mouth every day.  Dose: 81 mg     atorvastatin 10 MG Tabs  Commonly known as: Lipitor   80 mg by Enteral Tube route every evening.  Dose: 80 mg     Eliquis 5mg Tabs  Generic drug: apixaban   Take 5 mg by mouth 2 times a day.  Dose: 5 mg     guanFACINE 1 MG Tabs  Commonly known as: Tenex   Take 2 mg by mouth every evening.  Dose: 2 mg     * insulin aspart 100 UNIT/ML Soln  Commonly known as: NovoLOG   Inject 0-20 Units under the skin 3 times a day before meals.  Dose: 0-20 Units     * insulin aspart 100 UNIT/ML Soln  Commonly known as: NovoLOG   Inject 0-3 Units under the skin at bedtime as needed for High Blood Sugar (BG> 200 mg/dL). Comment from previous med  list says 0-3 units in early morning 0200  Dose: 0-3 Units     insulin glargine 100 UNIT/ML Soln  Commonly known as: Lantus   Inject 23 Units under the skin every evening.  Dose: 23 Units     lansoprazole 30 MG Tbdd  Commonly known as: Prevacid   Take 30 mg by mouth every morning before breakfast.  Dose: 30 mg     lisinopril 5 MG Tabs  Commonly known as: Prinivil   Take 5 mg by mouth every day.  Dose: 5 mg     Melatonin 10 MG Tabs   10 mg by Enteral Tube route every evening.  Dose: 10 mg     metFORMIN 500 MG Tabs  Commonly known as: Glucophage   Take  by mouth 2 times a day, with meals.     midodrine 5 MG Tabs  Commonly known as: Proamatine   Take 10 mg by mouth in the morning, at noon, and at bedtime.  Dose: 10 mg     nystatin [id number] UNIT/ML Susp  Commonly known as: Mycostatin   Take 500,000 Units by mouth 4 times a day.  Dose: 500,000 Units     * QUEtiapine 25 MG Tabs  Commonly known as: SEROquel   25 mg by Enteral Tube route 2 times a day.  Dose: 25 mg     * QUEtiapine 100 MG Tabs  Commonly known as: SEROquel   150 mg by Enteral Tube route every evening.  Dose: 150 mg     simvastatin 20 MG Tabs  Commonly known as: Zocor   Take 20 mg by mouth every evening.  Dose: 20 mg     thiamine 50 MG Tabs  Commonly known as: Vitamin B-1   Take 100 mg by mouth every day.  Dose: 100 mg     ticagrelor 90 MG Tabs tablet  Commonly known as: Brilinta   90 mg by Enteral Tube route every 12 hours.  Dose: 90 mg     * valproate Sodium 250 MG/5ML Soln  Commonly known as: Depakene   1,250 mg by Enteral Tube route every evening.  Dose: 1,250 mg     * valproate Sodium 250 MG/5ML Soln  Commonly known as: Depakene   500 mg by Enteral Tube route 2 times a day.  Dose: 500 mg     vitamin D3 1000 Unit (25 mcg) Tabs  Commonly known as: Cholecalciferol   1,000 Units by Enteral Tube route every day.  Dose: 1,000 Units           * This list has 8 medication(s) that are the same as other medications prescribed for you. Read the directions carefully,  and ask your doctor or other care provider to review them with you.                  Allergies  No Known Allergies    DIET  Cardiac diet      ACTIVITY  As tolerated.  Weight bearing as tolerated    LINES, DRAINS, AND WOUNDS  This is an automated list. Peripheral IVs will be removed prior to discharge.     Urethral Catheter 16 Fr. (Active)   Site Assessment Clean;Skin intact 12/30/24 0548   Collection Container Standard drainage bag 12/30/24 0548   Urinary Catheter Bundle Tamper Evident Seal in Place;Drainage Tube Extended;Drainage Bag Not Overfilled;Drainage Tubing Properly Secured;Drainage Bag Below Bladder Level and Not on Floor 12/30/24 0548   Securement Method Securing device (Describe) 12/30/24 0548   Output (mL) 200 mL 12/30/24 1553      Wound 12/07/24 Full Thickness Wound Groin Left (Active)   Wound Image   12/30/24 1035   Site Assessment Red;Drainage 12/30/24 1035   Periwound Assessment Intact 12/30/24 1035   Margins Defined edges;Unattached edges 12/30/24 1035   Closure Secondary intention 12/30/24 1035   Drainage Amount Small 12/30/24 1035   Drainage Description Serosanguineous 12/30/24 1035   Treatments Cleansed;Site care 12/30/24 1035   Wound Cleansing Approved Wound Cleanser 12/30/24 1035   Periwound Protectant No-sting Skin Prep;Paste Ring 12/30/24 1035   Dressing Status Clean;Dry;Intact 12/30/24 1708   Dressing Changed Changed 12/30/24 1035   Dressing Cleansing/Solutions Not Applicable 12/30/24 1035   Dressing Options Wound Vac 12/30/24 1708   Dressing Change/Treatment Frequency Monday, Wednesday, Friday, and As Needed 12/30/24 1035   NEXT Dressing Change/Treatment Date 01/01/25 12/30/24 1035   NEXT Weekly Photo (Inpatient Only) 01/06/25 12/30/24 1035   Wound Team Following 3x Weekly 12/30/24 1035   Non-staged Wound Description Full thickness 12/30/24 1035   Wound Length (cm) 3 cm 12/23/24 1200   Wound Width (cm) 4.5 cm 12/23/24 1200   Wound Depth (cm) 2 cm 12/23/24 1200   Wound Surface Area (cm^2)  13.5 cm^2 12/23/24 1200   Wound Volume (cm^3) 27 cm^3 12/23/24 1200   Wound Healing % 36 12/23/24 1200   Tunneling Clock Position of Wound 6 12/10/24 1100   Shape oval 12/30/24 1035   Wound Odor None 12/30/24 1035   Exposed Structures None 12/30/24 1035   WOUND NURSE ONLY - Time Spent with Patient (mins) 60 12/30/24 1035       Wound 12/07/24 Full Thickness Wound Groin Right (Active)   Wound Image   12/30/24 1035   Site Assessment JAYILN 12/30/24 1708   Periwound Assessment JAYLIN 12/30/24 1708   Margins Defined edges;Unattached edges 12/30/24 1035   Closure Secondary intention 12/30/24 1035   Drainage Amount Moderate 12/30/24 1035   Drainage Description Serosanguineous 12/30/24 1035   Treatments Site care 12/30/24 1708   Wound Cleansing Approved Wound Cleanser 12/30/24 1035   Periwound Protectant No-sting Skin Prep;Paste Ring 12/30/24 1035   Dressing Status Clean;Dry;Intact 12/30/24 1708   Dressing Changed Observed 12/30/24 1708   Dressing Cleansing/Solutions Not Applicable 12/30/24 1035   Dressing Options Wound Vac 12/30/24 1708   Dressing Change/Treatment Frequency Monday, Wednesday, Friday, and As Needed 12/30/24 1035   NEXT Dressing Change/Treatment Date 01/01/25 12/30/24 1035   NEXT Weekly Photo (Inpatient Only) 12/30/24 12/26/24 2130   Wound Team Following 3x Weekly 12/30/24 1035   Non-staged Wound Description Full thickness 12/30/24 1035   Wound Length (cm) 2 cm 12/23/24 1200   Wound Width (cm) 3 cm 12/23/24 1200   Wound Depth (cm) 4.5 cm 12/23/24 1200   Wound Surface Area (cm^2) 6 cm^2 12/23/24 1200   Wound Volume (cm^3) 27 cm^3 12/23/24 1200   Wound Healing % -32 12/23/24 1200   Shape oval 12/30/24 1035   Wound Odor None 12/30/24 1035   Exposed Structures None 12/30/24 1035   WOUND NURSE ONLY - Time Spent with Patient (mins) 60 12/30/24 1035       Wound 12/22/24 Partial Thickness Wound Pannus Anterior Bilateral (Active)   Wound Image   12/22/24 1600   Site Assessment Colp 12/28/24 0823   Periwound Assessment Colp  12/28/24 0823   Margins Defined edges 12/24/24 2030   Closure Secondary intention 12/25/24 2039   Drainage Amount None 12/28/24 0823   Treatments Cleansed 12/24/24 2030   Dressing Status Open to Air 12/28/24 2000       Wound 12/22/24 Partial Thickness Wound Buttocks;Coccyx Mid Bilateral (Active)   Wound Image   12/22/24 1600   Site Assessment Clean;Dry;[place] 12/30/24 1708   Periwound Assessment Clean;Dry;Intact 12/30/24 1708   Margins Attached edges 12/26/24 2130   Closure Open to air 12/28/24 0823   Drainage Amount None 12/30/24 1708   Treatments Cleansed;Site care 12/30/24 1708   Offloading/DME Other (comment) 12/26/24 2130   Periwound Protectant Barrier Paste 12/28/24 0823   Dressing Status Open to Air 12/28/24 0823   Dressing Options Open to Air 12/30/24 1708              Urethral Catheter 16 Fr. (Active)   Site Assessment Clean;Skin intact 12/30/24 0548   Collection Container Standard drainage bag 12/30/24 0548   Urinary Catheter Bundle Tamper Evident Seal in Place;Drainage Tube Extended;Drainage Bag Not Overfilled;Drainage Tubing Properly Secured;Drainage Bag Below Bladder Level and Not on Floor 12/30/24 0548   Securement Method Securing device (Describe) 12/30/24 0548   Output (mL) 200 mL 12/30/24 1553        MENTAL STATUS ON TRANSFER  Alert and orient x 3.  Appropriately conversational.          CONSULTATIONS  Palliative medicine  Cardiology  Vascular surgery  Gastroenterology  Psychiatry  Physical medicine and rehabilitation    PROCEDURES  Midline placement on 12/5/2024  Video electroencephalogram on 12/10/2024    LABORATORY  Lab Results   Component Value Date    SODIUM 133 (L) 12/28/2024    POTASSIUM 4.2 12/28/2024    CHLORIDE 103 12/28/2024    CO2 19 (L) 12/28/2024    GLUCOSE 140 (H) 12/28/2024    BUN 22 12/28/2024    CREATININE 0.75 12/28/2024        Lab Results   Component Value Date    WBC 5.9 12/26/2024    HEMOGLOBIN 11.4 (L) 12/26/2024    HEMATOCRIT 35.8 (L) 12/26/2024    PLATELETCT 219 12/26/2024         Total time of the discharge process 36 minutes.

## 2024-12-31 NOTE — H&P
Physical Medicine & Rehabilitation  History and Physical (H&P)  &     Post Admission Physician Evaluation (KEN)       Date of Admission: 12/30/24  Date of Service: 12/30/2024   Kiara Qureshi    [facility] Code to Support Admission: 0002.1 - Brain Dysfunction: Non-Traumatic  Etiologic Diagnosis: Anoxic brain injury (HCC)    _______________________________________________    Chief Complaint: Weakness    History of Present Illness:  Adapted from the PM&R Consult by Dr. Sterling:    Kiara Qureshi is a 59 y.o. female who presented 11/27/2024 with STEMI.     Patient has a history of obesity, hypertension, hyperlipidemia.  She was seen at [facility] as a transfer initially for anterior STEMI requiring lytics.  Patient underwent an LAD PCI but hospital course was complicated by retroperitoneal bleed, ventricular fibrillation and PEA arrest and cardiogenic shock requiring VA ECMO and Impella.  She is now status post ECMO decannulation on November 4 and had left femoral embolectomy, PCI to the LAD, unsuccessful PCI to OM1 and Impella removal on November 8.  Hospital course was complicated by patient developing encephalopathy and delirium, to which she was given valproic acid and quetiapine.  Patient has been weaned off her present medications and was found to have hypotension to which she was started on p.o. midodrine 10 mg 3 times daily.  Goal-directed medical therapy was held due to borderline low blood pressure.  Her echocardiogram on November 24 shows 25 to 30% EF with akinesis of the apical, apical anterior wall, apical septum, apical inferior wall, and apical lateral wall of the left ventricle.  Hypokinesis seen of the basal anterolateral wall, mid anterolateral wall, anterior wall, mid inferolateral wall, and basal inferolateral wall of the left ventricle.     On November 25, patient had CT angiogram of the chest which shows pulmonary edema, small bilateral pleural effusions, small 3 mm filling defect along the  lateral wall of the descending thoracic aorta which may represent focal aortic mural thrombus versus ulcerated plaque.  CT angiogram of the abdomen pelvis showing increased organization of the rim-enhancing left retroperitoneal hematomas measuring up to 9.3 x 4.4 cm.  Tiny filling defect/mural thrombus within the posterior right common iliac artery.  Multiple nonenhancing intermediate density fluid collections within the subcutaneous tissues overlying the right lower quadrant and right inguinal regions measuring up to 5.7 x 2.4 cm.  Additional nonenhancing low-density fluid collection closely associated with the left femoral vein measuring 4.7 x 3.9 cm, likely represents evolving hematomas.     Due to patient's uptrending leukocytosis and CAT scan findings of suspected pneumonia, patient was started on vancomycin and Zosyn on November 26.  Patient was then transferred back to [facility] for further managementt     EKG consistent with anterior wall STEMI, was given TNK, aspirin, plavix and heparin drip and transferred to [facility] for higher level of care. She had urgent LHC with PCI to LAD. LHC complicated by brief episode of VF and PEA arrest (<2 min). Due to profound cardiogenic shock, impella was placed and she was transferred back to ICU. She required significant vasopressors, and later required ECMO. Found to have retroperitoneal bleed, and required blood transfusions. She was transferred to [facility] for higher level of care. ECMO decannulation on 11/4/24 and L femoral embolectomy, PCI to the LAD, unsuccessful PCI to OM1, and impella removal on 11/8/24. Complicated by delirium and encephalopathy managed with valproate, quetiapine, and haldol. Noted to have punctate acute infarct of her R centrum semiovale, scattered foci of enhancement likely representing subacute infarcts. She was also found to have pneumonia and started on vanc and zosyn on 11/26. She was transferred back to [facility] on 11/27. She completed linezolid and  zosyn on 12/1. Found to have groin wounds, treated with wound vacs.     Today she is oriented and conversant, wound vacs in place.    Review of Systems:     Comprehensive 14 point ROS was reviewed and all were negative except as noted elsewhere in this document.     Past Medical History:  Past Medical History:   Diagnosis Date    Dyslipidemia 7/5/2016    Essential hypertension 7/5/2016    Obesity, morbid, BMI 40.0-49.9 ([facility]) 7/5/2016    Type II diabetes mellitus, well controlled ([facility]) 7/5/2016       Past Surgical History:  Past Surgical History:   Procedure Laterality Date    INSERTION,CANNULA FOR ECMO,ADULT Left 10/30/2024    Procedure: INSERTION, CANNULA, FOR ECMO, ADULT;  Surgeon: Aime Elias D.O.;  Location: SURGERY [facility];  Service: Cardiothoracic       Family History:  Family History   Problem Relation Age of Onset    Heart Disease Mother         AF and CHF    Heart Disease Father 70        PCI, also aneurysm but no surgery    Heart Attack Paternal Grandmother 49        MI       Medications:  Current Facility-Administered Medications   Medication Dose    Respiratory Therapy Consult      Pharmacy Consult Request ...Pain Management Review 1 Each  1 Each    hydrALAZINE (Apresoline) tablet 25 mg  25 mg    senna-docusate (Pericolace Or Senokot S) 8.6-50 MG per tablet 2 Tablet  2 Tablet    And    polyethylene glycol/lytes (Miralax) Packet 1 Packet  1 Packet    [START ON 12/31/2024] omeprazole (PriLOSEC) capsule 20 mg  20 mg    ondansetron (Zofran ODT) dispertab 4 mg  4 mg    Or    ondansetron (Zofran) syringe/vial injection 4 mg  4 mg    traZODone (Desyrel) tablet 50 mg  50 mg    sodium chloride (Ocean) 0.65 % nasal spray 2 Spray  2 Spray    [START ON 12/31/2024] apixaban (Eliquis) tablet 5 mg  5 mg    [START ON 12/31/2024] ARIPiprazole (Abilify) tablet 2 mg  2 mg    atorvastatin (Lipitor) tablet 80 mg  80 mg    [START ON 12/31/2024] clopidogrel (Plavix) tablet 75 mg  75 mg    [START ON 12/31/2024]  dapagliflozin propanediol (Farxiga) tablet 10 mg  10 mg    insulin GLARGINE (Lantus,Semglee) injection  9 Units    And    insulin lispro (HumaLOG,AdmeLOG) subcutaneous injection  1-6 Units    And    dextrose 50% (D50W) injection 25 g  25 g    melatonin tablet 10.5 mg  10.5 mg    traZODone (Desyrel) tablet 100 mg  100 mg    oxyCODONE immediate-release (Roxicodone) tablet 2.5 mg  2.5 mg    Or    oxyCODONE immediate-release (Roxicodone) tablet 5 mg  5 mg       Allergies:  Patient has no known allergies.    Psychosocial History:  Prior Living Situation:   Housing / Facility: 1 Story House  Steps Into Home: 1  Steps In Home: 0  Lives with - Patient's Self Care Capacity: Spouse  Equipment Owned: Tub / Shower Seat (built-in)     Prior Level of Function / Living Situation:   Physical Therapy: Prior Services: None, Home-Independent  Housing / Facility: 1 Story House  Steps Into Home: 1  Steps In Home: 0  Bathroom Set up: Walk In Shower, Built-In Shower Chair  Equipment Owned: Tub / Shower Seat (built-in)  Lives with - Patient's Self Care Capacity: Spouse  Bed Mobility: Independent  Transfer Status: Independent  Ambulation: Independent  Assistive Devices Used: None  Stairs: Independent  Current Level of Function:   Gait Level Of Assist: Unable to Participate  Deviation:  (L knee buckled, LOB posteior and L with maxA to recover during pivot)  Supine to Sit: Standby Assist  Sit to Supine: Maximal Assist  Scooting: Standby Assist  Rolling: Standby Assist  Skilled Intervention: Verbal Cuing  Comments: HOB partially elevated, rail  Sit to Stand: Minimal Assist (VCs hand placement, brace legs against bed, 3 attempts to complete)  Bed, Chair, Wheelchair Transfer: Maximal Assist  Toilet Transfers: Unable to Participate  Transfer Method: Stand Step  Skilled Intervention: Facilitation, Sequencing, Verbal Cuing  Sitting in Chair: post session  Sitting Edge of Bed: 11 supported  Standing: <30 seconds  Occupational Therapy:   Self  Feeding: Independent  Grooming / Hygiene: Independent  Bathing: Independent  Dressing: Independent  Toileting: Independent  Medication Management: Independent  Laundry: Independent  Kitchen Mobility: Independent  Finances: Independent  Home Management: Independent  Shopping: Independent  Prior Level Of Mobility: Independent Without Device in Community  Driving / Transportation: Driving Independent  Prior Services: None, Home-Independent  Housing / Facility: [address]  Occupation (Pre-Hospital Vocational): Unable To Determine At This Time  Current Level of Function:   Eating: Total Assist  Upper Body Dressing: Maximal Assist (don/doff gown)  Lower Body Dressing: Total Assist  Toileting: Total Assist  Skilled Intervention: Verbal Cuing, Tactile Cuing, Sequencing, Postural Facilitation, Facilitation  Comments: followed commands for ADL tasks with ~25-50% accuracy for ~15 min prior to starting to reach for lines/tubes, guarding by therapist with hand over hand to prevent pulling of lines    CURRENT LEVEL OF FUNCTION:   Same as level of function prior to admission to [facility]    Physical Examination:     VITAL SIGNS:   vitals were not taken for this visit.   General: Well developed, Well nourished, No Acute Distress  HEENT: Normocephalic, atraumatic. Anicteric sclera  Cardiac: Physiologic rate and regular rhythm, no murmurs  Pulmonary: Lungs are clear to auscultation bilaterally, comfortable on room air  Abdomen: Soft, non-tender, non-distended. Bowel sounds normoactive.   Extremities: Warm and well perfused, no clubbing, cyanosis. No edema.   Skin: No visible rashes or lesions.   Psych: calm, no agitation, congruent mood/affect    NEUROLOGIC EXAM:  Gen:  Alert and oriented to person, place, date, and circumstance. Appropriately interactive  Speech/Language/Comprehension: Fluent speech w/o paraphasic errors, follows simple and complex commands with L/R confusion .?  Attention: Attentive to  conversation. Names MOYB correctly.?? ??  Memory: Recalls her primary reason for being in the hospital  Judgment: Demonstrates appropriate use of call light       Cranial Nerves:   II:   Visual fields full to confrontation. Pupils equally round & reactive to light. ??  III, IV, VI:  EOMI w/o nystagmus or diplopia. No ptosis.????  V:  Sensation intact to light touch. ??  VII:  Face symmetric without weakness.????  VIII:  Hears functionally.????  IX, X:  Voice normal. Palate elevates symmetrically.????  XI:  Trapezii full.????  XII:  Tongue protrudes midline.????    Motor:?  ?? Delt???? Bi???? Tri???? Wrist ??  Ext?? DIP flex ??  (FDP)?? Finger ABd?? IP???? Quad???? Hamst???? TibAnt???? EHL???? Plantar  flexion   ???? C5???? C6???? C7???? ?C6?? C8/T1?? C8/T1???? L2???? L3???? L4-S1???? L4???? L5???? S1   L???? 4/5 4/5 4/5 4/5 4/5 4/5 4/5 4/5 4/5 4/5 4/5 4/5   R???? 4/5 4/5 4/5 4/5 4/5 4/5 4/5 4/5 4/5 4/5 4/5 4/5       Sensation: Intact to light touch in the major dermatomes of the upper and lower extremities.     Coordination: Normal finger-to-nose testing without dysmetria. Normal rapid alternating movements.     Reflexes: Reflexes are 2+ at the biceps, triceps, brachioradialis, patella, and achilles        Laboratory Values:  Recent Labs     12/28/24  1905   SODIUM 133*   POTASSIUM 4.2   CHLORIDE 103   CO2 19*   GLUCOSE 140*   BUN 22   CREATININE 0.75   CALCIUM 7.9*               Primary Rehabilitation Diagnosis:    This patient is a 59 y.o. female admitted for acute inpatient rehabilitation with Anoxic brain injury (HCC).    Impairments:   Cognitive  ADLs/IADLs  Mobility  Speech  Swallow    Secondary Diagnosis/Medical Co-morbidities Affecting Function  HTN    Relevant Changes Since Preadmission Evaluation:    Status unchanged    The patient's rehabilitation potential is Good  The patient's medical prognosis is good    Rehabilitation Plan:   Discussion and Recommendations:   1. The patient requires an acute  inpatient rehabilitation program with a coordinated program of care at an intensity and frequency not available at a lower level of care. This recommendation is substantiated by the patient's medical physicians who recommend that the patient's intervention and assessment of medical issues needs to be done at an acute level of care for patient's safety and maximum outcome.   2. A coordinated program of care will be supplied by an interdisciplinary team of physical therapy, occupational therapy, rehab physician, rehab nursing, and, if needed, speech therapy and rehab psychology. Rehab team presents a patient-specific rehabilitation and education program concentrating on prevention of future problems related to accessibility, mobility, skin, bowel, bladder, sexuality, and psychosocial and medical/surgical problems.   3. Need for Rehabilitation Physician: The rehab physician will be evaluating the patient on a multi-weekly basis to help coordinate the program of care. The rehab physician communicates between medical physicians, therapists, and nurses to maximize the patient's potential outcome. Specific areas in which the rehab physician will be providing daily assessment include the following:   A. Assessing the patient's heart rate and blood pressure response (vitals monitoring) to activity and making adjustments in medications or conservative measures as needed.   B. The rehab physician will be assessing the frequency at which the program can be increased to allow the patient to reach optimal functional outcome.   C. The rehab physician will also provide assessments in daily skin care, especially in light of patient's impairments in mobility.   D. The rehab physician will provide special expertise in understanding how to work with functional impairment and recommend appropriate interventions, compensatory techniques, and education that will facilitate the patient's outcome.   4. Rehab RCaseN.   The rehab RN will be  working with patient to carry over in room mobility and activities of daily living when the patient is not in 3 hours of skilled therapy. Rehab nursing will be working in conjunction with rehab physician to address all the medical issues above and continue to assess laboratory work and discuss abnormalities with the treating physicians, assess vitals, and response to activity, and discuss and report abnormalities with the rehab physician. Rehab RN will also continue daily skin care, supervise bladder/bowel program, instruct in medication administration, and ensure patient safety.   5. Rehab Therapy: Therapies to treat at intensity and frequency of (may change after completion of evaluation by all therapeutic disciplines):       PT:  Physical therapy to address mobility, transfer, gait training and evaluation for adaptive equipment needs 1 hour/day at least 5 days/week for the duration of the ELOS (see below)       OT:  Occupational therapy to address ADLs, self-care, home management training, functional mobility/transfers and assistive device evaluation, and community re-integration 1  hour/day at least 5 days/week for the duration of the ELOS (see below).        ST/Dysphagia:  Speech therapy to address speech, language, and cognitive deficits as well as swallowing difficulties with retraining/dysphagia management and community re-integration with comprehension, expression, cognitive training 1  hour/day at least 5 days/week for the duration of the ELOS (see below).     Medical management / Rehabilitation Issues/ Adverse Potential as part of rehabilitation plan     Rehabilitation Issues/Adverse Potential  1.  Anoxic brain injury Patient demonstrates functional deficits in strength, balance, coordination, and ADL's. Patient is admitted to [facility] for comprehensive rehabilitation therapy as described below.   Rehabilitation nursing monitors bowel and bladder control, educates on medication  administration, co-morbidities and monitors patient safety.    2.  Neurostimulants: None at this time but continue to assess daily for need to initiate should status change.    3.  DVT prophylaxis:  Patient is on eliquis for anticoagulation upon transfer. Encourage OOB. Monitor daily for signs and symptoms of DVT including but not limited to swelling and pain to prevent the development of DVT that may interfere with therapies.    4.  GI prophylaxis:  On prilosec to prevent gastritis/dyspepsia which may interfere with therapies.    5.  Pain: No issues with pain currently / Controlled with tylenol    6.  Nutrition/Dysphagia: Dietician monitors nutrient intake, recommend supplements prn and provide nutrition education to pt/family to promote optimal nutrition for wound healing/recovery.     7.  Bladder/bowel:  Start bowel and bladder program as described below, to prevent constipation, urinary retention (which may lead to UTI), and urinary incontinence (which will impact upon pt's functional independence).   - TV Q3h while awake with post void bladder scans, I&O cath for PVRs >400  - up to commode after meal     8.  Skin/dermal ulcer prophylaxis: Monitor for new skin conditions with q.2 h. turns as required to prevent the development of skin breakdown.     9.  Cognition/Behavior: As needed psychologist provides adjustment counseling to illness and psychosocial barriers that may be potential barriers to rehabilitation.     10. Respiratory therapy: RT performs O2 management prn, breathing retraining, pulmonary hygiene and bronchospasm management prn to optimize participation in therapies.     Medical Co-Morbidities/Adverse Potential Affecting Function:  Anoxic brain injury  Metabolic encephalopathy  Delirium  -anoxia from cardiogenic shock  -required seroquel, valproate and haldol to control delirium at OSH.   -mental status seems improved  -Abilify 2mg daily  -melatonin 10mg nightly  -trazodone 100mg  nightly    STEMI  -s/p PCI to LAD. Complicated by V-Fib and PEA arrest. Required ECMO and Impella due to cardiogenic shock.   -now off ECMO and Impella  -atrovastatin and plavix     HFrEF  -EF 25-30%  -soft pressure limiting GDMT  -farxiga 10mg  -no losartan due to hypotension  -no bisoprolol due to Chyne-florez breathing  -no lasix due to dehydration     Acute respiratory failure  -Cheyne-florez breathing pattern, thought to be from anoxic brain injury  -concern for aspiration   -now on room air     Pneumonia- resolved  -completed linezolid and zosyn 12/1    CVA  -MRI on 11/17/2024 at OSH: Punctate acute infarct of the right centrum semiovale. Scattered foci of enhancement in multiple vascular distributions as described may represent recent subacute infarcts. Diffuse stippled susceptibility signal throughout the supratentorial and infratentorial brain. Overall findings may represent sequelae of fat emboli or amyloid related angiopathy.   -Eliquis 5mg BID  -atorvastatin 80mg, plavix     Retroperitoneal bleed  -required several transfusions.   -hgb now stable     Hypotension, History of HTN  -losartan on hold     Hyperglycemia, DM  -last A1c 6.2 on 11/1.   -lantus 9u qhs, SSI     Cognitive Communicative deficits:  - Patient with significant cognitive deficits due to anoxic brain injury  - Environmental modifications to decrease excessive stimulation including turning off the lights  - Consider starting neurostimulants such Nuvigil, amantadine or methylphenidate  - SLP to evaluate and treat cognitive deficits.   -Neuropsych will follow and perform testing if/when appropriate.    Dysphagia/Nutrition:  - Patient currently on dysphagia DIET.  - Continue therapies with SLP. SLP to perform swallow evaluation and provide oral motor exercises if necessary.    Neurogenic bladder:  - Timed voids with PVR q4H x3. If PVR > 400mL or if patient is unable to void, straight cath patient.    Neurogenic bowel:  -  Colace, Senna BID on  admission  - Goal of 1BM/day.  -      Circadian Rhythm disorder:   -Trazadone 50mg PRN for restlessness after 10pm  -maltatonin and trazadone sheduled  Recommend lights on during the day/off at night, minimize nighttime interruptions as able.     Mood  - at risk of adjustment disorder, depression, and anxiety due to functional decline    ID:  - at risk for Urinary tract infection    Skin/Wounds:  Bilateral groin  -wound vac  - Pressure relief q2h while in bed. Close monitoring for signs of breakdown    Pain:  - Neuroceptic - On Tylenol prn    DVT prophylaxis:  Patient is on eliquis.     GI prophylaxis:  On Prilosec 20mg daily       -Follow-up Cardiology, PCP, neurology    I personally performed a complete drug regimen review and no potential clinically significant medication issues were identified.     Goals/Expected Level of Function Based on Current Medical and Functional Status:  (may change based on patient's medical status and rate of impairment recovery)  Transfers:   Supervision  Mobility/Gait:   Supervision  ADL's:   Supervision  Cognition:  min cues  Swallowing:  least restrictive diet    DISPOSITION: Discharge to pre-morbid independent living setting with the supportive care of patient's family.    ELOS: 10-14 days  ____________________________________    Mario Cole MD  Physical Medicine & Rehabilitation   Brain Injury Medicine   ____________________________________    Pt was seen today for 79 min, and entire time spent in face-to-face contact was >50% in counseling and coordination of care as detailed in A/P above.

## 2024-12-31 NOTE — FLOWSHEET NOTE
12/30/24 1708   Events/Summary/Plan   Events/Summary/Plan RT Consult   Vital Signs   Pulse 69   Respiration 16   Pulse Oximetry 96 %   $ Pulse Oximetry (Spot Check) Yes   Respiratory Assessment   Level of Consciousness Alert   Chest Exam   Work Of Breathing / Effort Within Normal Limits   Breath Sounds   RUL Breath Sounds Clear   RML Breath Sounds Clear   RLL Breath Sounds Diminished   SHOSHANA Breath Sounds Clear   LLL Breath Sounds Diminished   Oxygen   O2 Delivery Device Room air w/o2 available   Smoking History   Have you ever smoked Never

## 2024-12-31 NOTE — DISCHARGE PLANNING
CASE MANAGEMENT INITIAL ASSESSMENT    Admit Date:  12/30/2024     Chart reviewed and CM to meet with patient to discuss role of case management / discharge planning / team conference.   Patient is a  59 y.o. female transferred from outside hospital.      Diagnosis: Anoxic brain injury (HCC) [G93.1]    Co-morbidities:   Patient Active Problem List    Diagnosis Date Noted    Anoxic brain injury (HCC) 12/30/2024    Personal history of ECMO 12/30/2024    History of left ventricular assist device (LVAD) ([facility]) 12/30/2024    Functional quadriplegia ([facility]) 12/19/2024    Circulating anticoagulant disorder ([facility]) 12/19/2024    Macrocytic anemia 12/17/2024    Acute hypoxic respiratory failure ([facility]) 12/13/2024    Hypernatremia 12/13/2024    Constipation 12/06/2024    Hypotension 12/05/2024    Abdominal hematoma 11/29/2024    Heart failure with reduced ejection fraction ([facility]) 11/27/2024    Hyperglycemia 11/27/2024    Urinary retention 11/27/2024    CAD (coronary artery disease) 11/27/2024    CVA (cerebral vascular accident) ([facility]) 11/27/2024    Acute metabolic encephalopathy 11/27/2024    Aortic mural thrombus ([facility]) 11/27/2024    Pneumonia 11/27/2024    ACP (advance care planning) 11/27/2024    Retroperitoneal bleed 10/31/2024    JENNY (acute kidney injury) ([facility]) 10/31/2024    Transaminitis 10/31/2024    Ventricular fibrillation ([facility]) 10/31/2024    Acute myocardial infarction ([facility]) 10/30/2024    Cardiogenic shock ([facility]) 10/30/2024    Acute respiratory failure with hypoxia ([facility]) 10/30/2024    STEMI (ST elevation myocardial infarction) ([facility]) 10/30/2024    Severe left ventricular systolic dysfunction 10/30/2024    Metabolic acidemia 10/30/2024    Elevated troponin 10/30/2024    Type II diabetes mellitus, well controlled ([facility]) 07/05/2016    Dyslipidemia 07/05/2016    Obesity, morbid, BMI 40.0-49.9 ([facility]) 07/05/2016    Essential hypertension 07/05/2016     Prior Living Situation:  Housing / Facility: 1 Story House  Lives with - Patient's  Self Care Capacity: Spouse    Prior Level of Function:  Medication Management: Independent  Finances: Independent  Home Management: Independent  Shopping: Independent  Prior Level Of Mobility: Independent Without Device in Community  Driving / Transportation: Driving Independent    Support Systems:  Primary : Bruno-Spouse: [phone].        Previous Services Utilized:   Equipment Owned: Grab Bar(s) In Tub / Shower (built in bench)  Prior Services: Home-Independent    Other Information:  Occupation (Pre-Hospital Vocational): Employed Full Time (teacher)     Primary Payor Source: Other (Comments) ([place])  Primary Care Practitioner : Lamin Camacho MD    Patient / Family Goal:  Patient / Family Goal: Return home    Plan:  1. Continue to follow patient through hospitalization and provide discharge planning in collaboration with patient, family, physicians and ancillary services.     2. Utilize community resources to ensure a safe discharge.

## 2025-01-01 ENCOUNTER — APPOINTMENT (OUTPATIENT)
Dept: PHYSICAL THERAPY | Facility: REHABILITATION | Age: 60
DRG: 092 | End: 2025-01-01
Attending: PHYSICAL MEDICINE & REHABILITATION
Payer: COMMERCIAL

## 2025-01-01 ENCOUNTER — APPOINTMENT (OUTPATIENT)
Dept: INPATIENT REHAB | Facility: REHABILITATION | Age: 60
DRG: 092 | End: 2025-01-01
Attending: PHYSICAL MEDICINE & REHABILITATION
Payer: COMMERCIAL

## 2025-01-01 ENCOUNTER — APPOINTMENT (OUTPATIENT)
Dept: RADIOLOGY | Facility: REHABILITATION | Age: 60
DRG: 092 | End: 2025-01-01
Attending: PHYSICAL MEDICINE & REHABILITATION
Payer: COMMERCIAL

## 2025-01-01 ENCOUNTER — APPOINTMENT (OUTPATIENT)
Dept: SPEECH THERAPY | Facility: REHABILITATION | Age: 60
DRG: 092 | End: 2025-01-01
Attending: PHYSICAL MEDICINE & REHABILITATION
Payer: COMMERCIAL

## 2025-01-01 ENCOUNTER — APPOINTMENT (OUTPATIENT)
Dept: OCCUPATIONAL THERAPY | Facility: REHABILITATION | Age: 60
DRG: 092 | End: 2025-01-01
Attending: PHYSICAL MEDICINE & REHABILITATION
Payer: COMMERCIAL

## 2025-01-01 LAB
ANION GAP SERPL CALC-SCNC: 11 MMOL/L (ref 7–16)
BUN SERPL-MCNC: 17 MG/DL (ref 8–22)
CALCIUM SERPL-MCNC: 8.3 MG/DL (ref 8.5–10.5)
CHLORIDE SERPL-SCNC: 104 MMOL/L (ref 96–112)
CO2 SERPL-SCNC: 21 MMOL/L (ref 20–33)
CREAT SERPL-MCNC: 0.84 MG/DL (ref 0.5–1.4)
GFR SERPLBLD CREATININE-BSD FMLA CKD-EPI: 80 ML/MIN/1.73 M 2
GLUCOSE BLD STRIP.AUTO-MCNC: 106 MG/DL (ref 65–99)
GLUCOSE BLD STRIP.AUTO-MCNC: 119 MG/DL (ref 65–99)
GLUCOSE BLD STRIP.AUTO-MCNC: 131 MG/DL (ref 65–99)
GLUCOSE BLD STRIP.AUTO-MCNC: 137 MG/DL (ref 65–99)
GLUCOSE SERPL-MCNC: 102 MG/DL (ref 65–99)
POTASSIUM SERPL-SCNC: 3.9 MMOL/L (ref 3.6–5.5)
SODIUM SERPL-SCNC: 136 MMOL/L (ref 135–145)

## 2025-01-01 PROCEDURE — 700102 HCHG RX REV CODE 250 W/ 637 OVERRIDE(OP): Performed by: PHYSICAL MEDICINE & REHABILITATION

## 2025-01-01 PROCEDURE — 99232 SBSQ HOSP IP/OBS MODERATE 35: CPT | Performed by: PHYSICAL MEDICINE & REHABILITATION

## 2025-01-01 PROCEDURE — A9270 NON-COVERED ITEM OR SERVICE: HCPCS | Performed by: PHYSICAL MEDICINE & REHABILITATION

## 2025-01-01 PROCEDURE — 97602 WOUND(S) CARE NON-SELECTIVE: CPT

## 2025-01-01 PROCEDURE — 97530 THERAPEUTIC ACTIVITIES: CPT

## 2025-01-01 PROCEDURE — 770010 HCHG ROOM/CARE - REHAB SEMI PRIVAT*

## 2025-01-01 PROCEDURE — 92611 MOTION FLUOROSCOPY/SWALLOW: CPT

## 2025-01-01 PROCEDURE — 82962 GLUCOSE BLOOD TEST: CPT | Mod: 91

## 2025-01-01 PROCEDURE — 97535 SELF CARE MNGMENT TRAINING: CPT

## 2025-01-01 PROCEDURE — 36415 COLL VENOUS BLD VENIPUNCTURE: CPT

## 2025-01-01 PROCEDURE — 74230 X-RAY XM SWLNG FUNCJ C+: CPT

## 2025-01-01 PROCEDURE — 97116 GAIT TRAINING THERAPY: CPT | Mod: CQ

## 2025-01-01 PROCEDURE — 80048 BASIC METABOLIC PNL TOTAL CA: CPT

## 2025-01-01 PROCEDURE — 97110 THERAPEUTIC EXERCISES: CPT | Mod: CQ

## 2025-01-01 RX ADMIN — CLOPIDOGREL BISULFATE 75 MG: 75 TABLET ORAL at 11:00

## 2025-01-01 RX ADMIN — POLYETHYLENE GLYCOL 3350 1 PACKET: 17 POWDER, FOR SOLUTION ORAL at 06:38

## 2025-01-01 RX ADMIN — APIXABAN 5 MG: 5 TABLET, FILM COATED ORAL at 11:00

## 2025-01-01 RX ADMIN — APIXABAN 5 MG: 5 TABLET, FILM COATED ORAL at 21:15

## 2025-01-01 RX ADMIN — TRAZODONE HYDROCHLORIDE 100 MG: 100 TABLET ORAL at 21:00

## 2025-01-01 RX ADMIN — ATORVASTATIN CALCIUM 80 MG: 40 TABLET, FILM COATED ORAL at 21:15

## 2025-01-01 RX ADMIN — SENNOSIDES AND DOCUSATE SODIUM 2 TABLET: 50; 8.6 TABLET ORAL at 21:16

## 2025-01-01 RX ADMIN — OMEPRAZOLE 20 MG: 20 CAPSULE, DELAYED RELEASE ORAL at 11:00

## 2025-01-01 RX ADMIN — ARIPIPRAZOLE 2 MG: 2 TABLET ORAL at 11:00

## 2025-01-01 RX ADMIN — Medication 10.5 MG: at 21:15

## 2025-01-01 ASSESSMENT — PATIENT HEALTH QUESTIONNAIRE - PHQ9
1. LITTLE INTEREST OR PLEASURE IN DOING THINGS: NOT AT ALL
2. FEELING DOWN, DEPRESSED, IRRITABLE, OR HOPELESS: NOT AT ALL
SUM OF ALL RESPONSES TO PHQ9 QUESTIONS 1 AND 2: 0

## 2025-01-01 ASSESSMENT — GAIT ASSESSMENTS
DEVIATION: DECREASED BASE OF SUPPORT;BRADYKINETIC;SHUFFLED GAIT
ASSISTIVE DEVICE: PARALLEL BARS;FRONT WHEEL WALKER
ASSISTIVE DEVICE: FRONT WHEEL WALKER
GAIT LEVEL OF ASSIST: MINIMAL ASSIST
GAIT LEVEL OF ASSIST: TOTAL ASSIST

## 2025-01-01 ASSESSMENT — ACTIVITIES OF DAILY LIVING (ADL)
TOILET_TRANSFER_DESCRIPTION: GRAB BAR;INCREASED TIME;INITIAL PREPARATION FOR TASK;SUPERVISION FOR SAFETY;SET-UP OF EQUIPMENT;VERBAL CUEING
TUB_SHOWER_TRANSFER_DESCRIPTION: GRAB BAR;SHOWER BENCH;SET-UP OF EQUIPMENT;SUPERVISION FOR SAFETY;VERBAL CUEING
BED_CHAIR_WHEELCHAIR_TRANSFER_DESCRIPTION: SQUAT PIVOT TRANSFER TO WHEELCHAIR;VERBAL CUEING
BED_CHAIR_WHEELCHAIR_TRANSFER_DESCRIPTION: INCREASED TIME;INITIAL PREPARATION FOR TASK;SET-UP OF EQUIPMENT;SUPERVISION FOR SAFETY;VERBAL CUEING

## 2025-01-01 ASSESSMENT — PAIN DESCRIPTION - PAIN TYPE: TYPE: ACUTE PAIN

## 2025-01-01 NOTE — PROGRESS NOTES
Physical Medicine & Rehabilitation Progress Note    Encounter Date: 1/1/2025    Chief Complaint: weakness    Interval Events (Subjective):  Seen in bed. Tolerating therapy. Sugars better. Advancing diet    Objective:  VITAL SIGNS: /78   Pulse 74   Temp 36.4 °C (97.5 °F) (Temporal)   Resp 18   Ht 1.524 m (5')   Wt 79.5 kg (175 lb 4.3 oz)   SpO2 98%   BMI 34.23 kg/m²   Gen: No acute distress, well developed well nourished adult  HEENT: Normal Cephalic Atraumatic, Normal conjunctiva.   CV: warm extremities, well perfused, no edema  Resp: symmetric chest rise, breathing comfortably on room air  Abd: Soft, Non distended  Extremities: normal bulk, no atrophy  Skin: no visible rashes or lesions.   Neuro: alert, awake  Psych: Mood and affect appropriate and congruent    Laboratory Values:  Recent Results (from the past 72 hours)   POCT glucose device results    Collection Time: 12/29/24  6:02 PM   Result Value Ref Range    POC Glucose, Blood 129 (H) 65 - 99 mg/dL   POCT glucose device results    Collection Time: 12/29/24  8:48 PM   Result Value Ref Range    POC Glucose, Blood 93 65 - 99 mg/dL   POCT glucose device results    Collection Time: 12/30/24  5:34 AM   Result Value Ref Range    POC Glucose, Blood 83 65 - 99 mg/dL   POCT glucose device results    Collection Time: 12/30/24 11:36 AM   Result Value Ref Range    POC Glucose, Blood 73 65 - 99 mg/dL   POCT glucose device results    Collection Time: 12/30/24  9:48 PM   Result Value Ref Range    POC Glucose, Blood 81 65 - 99 mg/dL   CBC with Differential    Collection Time: 12/31/24  6:00 AM   Result Value Ref Range    WBC 3.9 (L) 4.8 - 10.8 K/uL    RBC 3.77 (L) 4.20 - 5.40 M/uL    Hemoglobin 12.2 12.0 - 16.0 g/dL    Hematocrit 37.6 37.0 - 47.0 %    MCV 99.7 (H) 81.4 - 97.8 fL    MCH 32.4 27.0 - 33.0 pg    MCHC 32.4 32.2 - 35.5 g/dL    RDW 62.6 (H) 35.9 - 50.0 fL    Platelet Count 173 164 - 446 K/uL    MPV 11.5 9.0 - 12.9 fL    Neutrophils-Polys 54.80 44.00 -  72.00 %    Lymphocytes 30.70 22.00 - 41.00 %    Monocytes 9.50 0.00 - 13.40 %    Eosinophils 3.40 0.00 - 6.90 %    Basophils 0.80 0.00 - 1.80 %    Immature Granulocytes 0.80 0.00 - 0.90 %    Nucleated RBC 0.00 0.00 - 0.20 /100 WBC    Neutrophils (Absolute) 2.13 1.82 - 7.42 K/uL    Lymphs (Absolute) 1.19 1.00 - 4.80 K/uL    Monos (Absolute) 0.37 0.00 - 0.85 K/uL    Eos (Absolute) 0.13 0.00 - 0.51 K/uL    Baso (Absolute) 0.03 0.00 - 0.12 K/uL    Immature Granulocytes (abs) 0.03 0.00 - 0.11 K/uL    NRBC (Absolute) 0.00 K/uL   POCT glucose device results    Collection Time: 12/31/24  7:35 AM   Result Value Ref Range    POC Glucose, Blood 77 65 - 99 mg/dL   POCT glucose device results    Collection Time: 12/31/24 11:34 AM   Result Value Ref Range    POC Glucose, Blood 72 65 - 99 mg/dL   POCT glucose device results    Collection Time: 12/31/24  4:54 PM   Result Value Ref Range    POC Glucose, Blood 39 (LL) 65 - 99 mg/dL   POCT glucose device results    Collection Time: 12/31/24  5:11 PM   Result Value Ref Range    POC Glucose, Blood 44 (L) 65 - 99 mg/dL   POCT glucose device results    Collection Time: 12/31/24  6:33 PM   Result Value Ref Range    POC Glucose, Blood 144 (H) 65 - 99 mg/dL   POCT glucose device results    Collection Time: 12/31/24  8:47 PM   Result Value Ref Range    POC Glucose, Blood 197 (H) 65 - 99 mg/dL   Basic Metabolic Panel    Collection Time: 01/01/25  5:55 AM   Result Value Ref Range    Sodium 136 135 - 145 mmol/L    Potassium 3.9 3.6 - 5.5 mmol/L    Chloride 104 96 - 112 mmol/L    Co2 21 20 - 33 mmol/L    Glucose 102 (H) 65 - 99 mg/dL    Bun 17 8 - 22 mg/dL    Creatinine 0.84 0.50 - 1.40 mg/dL    Calcium 8.3 (L) 8.5 - 10.5 mg/dL    Anion Gap 11.0 7.0 - 16.0   ESTIMATED GFR    Collection Time: 01/01/25  5:55 AM   Result Value Ref Range    GFR (CKD-EPI) 80 >60 mL/min/1.73 m 2   POCT glucose device results    Collection Time: 01/01/25  7:38 AM   Result Value Ref Range    POC Glucose, Blood 106 (H) 65  - 99 mg/dL   POCT glucose device results    Collection Time: 01/01/25 10:54 AM   Result Value Ref Range    POC Glucose, Blood 137 (H) 65 - 99 mg/dL       Medications:  Scheduled Medications   Medication Dose Frequency    Pharmacy Consult Request  1 Each PHARMACY TO DOSE    senna-docusate  2 Tablet Q EVENING    omeprazole  20 mg DAILY    apixaban  5 mg BID    ARIPiprazole  2 mg DAILY    atorvastatin  80 mg Q EVENING    clopidogrel  75 mg DAILY    [Held by provider] dapagliflozin propanediol  10 mg DAILY    insulin lispro  1-6 Units 4X/DAY ACHS    melatonin  10.5 mg Nightly    traZODone  100 mg QHS     PRN medications: Respiratory Therapy Consult, hydrALAZINE, senna-docusate **AND** polyethylene glycol/lytes, ondansetron **OR** ondansetron, traZODone, sodium chloride, [DISCONTINUED] insulin GLARGINE **AND** insulin lispro **AND** POC blood glucose manual result **AND** NOTIFY MD and PharmD **AND** Administer 20 grams of glucose (approximately 8 ounces of fruit juice) every 15 minutes PRN FSBG less than 70 mg/dL **AND** dextrose bolus, oxyCODONE immediate-release **OR** oxyCODONE immediate-release    Diet:  Current Diet Order   Procedures    Diet Order Diet: Consistent CHO (Diabetic) (meds whole with thins, straws ok)       Medical Decision Making and Plan:  Anoxic brain injury  Metabolic encephalopathy  Delirium  -anoxia from cardiogenic shock  -2 min in hospital PEA arrest  -required seroquel, valproate and haldol to control delirium at OSH.   -mental status seems improved  -Abilify 2mg daily  -melatonin 10mg nightly  -trazodone 100mg nightly     STEMI  -s/p PCI to LAD. Complicated by V-Fib and PEA arrest. Required ECMO and Impella due to cardiogenic shock.   -now off ECMO and Impella  -atrovastatin and plavix     HFrEF  -EF 25-30%  -soft pressure limiting GDMT  -farxiga 10mg  -no losartan due to hypotension  -no bisoprolol due to Chyne-florez breathing  -no lasix due to dehydration     Acute respiratory  failure  -Cheyne-florez breathing pattern, thought to be from anoxic brain injury  -concern for aspiration   -now on room air     Pneumonia- resolved  -completed linezolid and zosyn 12/1     CVA  -MRI on 11/17/2024 at OSH: Punctate acute infarct of the right centrum semiovale. Scattered foci of enhancement in multiple vascular distributions as described may represent recent subacute infarcts. Diffuse stippled susceptibility signal throughout the supratentorial and infratentorial brain. Overall findings may represent sequelae of fat emboli or amyloid related angiopathy.   -Eliquis 5mg BID  -atorvastatin 80mg, plavix     Retroperitoneal bleed  -required several transfusions.   -hgb now stable     Hypotension, History of HTN  -losartan on hold     Hyperglycemia, DM  -last A1c 6.2 on 11/1.   -lantus 9u qhs, SSI  -12/31- low bs, evening bs 39. Poor intake. Dc lantus, dc farsiga. Monitor closely  -1/1- sugars improved 2/2 increase intake po     Cognitive Communicative deficits:  - Patient with significant cognitive deficits due to anoxic brain injury  - Environmental modifications to decrease excessive stimulation including turning off the lights  - Consider starting neurostimulants such Nuvigil, amantadine or methylphenidate  - SLP to evaluate and treat cognitive deficits.   -Neuropsych will follow and perform testing if/when appropriate.     Dysphagia/Nutrition:  - Patient currently on dysphagia DIET.  - Continue therapies with SLP. SLP to perform swallow evaluation and provide oral motor exercises if necessary.     Neurogenic bladder:  - Timed voids with PVR q4H x3. If PVR > 400mL or if patient is unable to void, straight cath patient.  -12/31- remove de jesus     Neurogenic bowel:  -  Colace, Senna BID on admission  - Goal of 1BM/day.  -      Circadian Rhythm disorder:   -Trazadone 50mg PRN for restlessness after 10pm  -maltatonin and trazadone sheduled  Recommend lights on during the day/off at night, minimize nighttime  interruptions as able.     Mood  - at risk of adjustment disorder, depression, and anxiety due to functional decline     ID:  - at risk for Urinary tract infection     Skin/Wounds:  Bilateral groin  -wound vac  - Pressure relief q2h while in bed. Close monitoring for signs of breakdown     Pain:  - Neuroceptic - continue tylenol     DVT prophylaxis:  continue eliquis     GI prophylaxis:  On Prilosec 20mg daily         -Follow-up Cardiology, PCP, neurology    ____________________________________    Mario Cole MD  Physical Medicine & Rehabilitation   Brain Injury Medicine   ____________________________________

## 2025-01-01 NOTE — THERAPY
"Speech Language Pathology  Video Swallow     Patient Name:  Kiara Qureshi  AGE:  59 y.o., SEX:  female  Medical Record #:  [id number]  Today's Date: 1/1/2025     Objective       01/01/25 1104   Charge Group   SLP Video Swallow / FEES Videofluoroscopic Evaluation   SLP Total Time Spent   SLP Individual Total Time Spent (Mins) 30   History / Background Information   Prior Level of Function for Eating / Swallowing regular textures/thin liquids   Diagnosis anoxic brain injury   Onset Date Of Dysphagia 11/27/24   Dysphagia Symptoms Warranting Video Swallow wet vocal quality with intake, hx of suspected aspiration on FEES at [facility]   General Anatomy / Physiology slight prtrusions of PPW at level of C5-6, defer to radiologist for further assessment, does not impact swallow   \"Dry\" / Saliva Swallow Observations not tested   Dentition Intact   Procedure   Patient Seated in  w/c   Seated at (Degrees) 90   Views Completed Lateral   Fluoroscopy Time 122.1 seconds   Consistency   Consistency Thin Liquid;Pudding;Soft & Bite Sized;Regular Solid;Barium Tablet   Thin Liquid   PAS Score 2   Timing Post swallow   Vallecular Residue Mild (5%-25%)   Pyriform Sinus Residue Mild (5%-25%)   Pudding   PAS Score 1   Timing N/A   Vallecular Residue Mild (5%-25%)   Pyriform Sinus Residue Mild (5%-25%)   Soft & Bite Sized   PAS Score 1   Timing N/A   Vallecular Residue Trace (1%-5%)   Pyriform Sinus Residue Trace (1%-5%)   Regular Solid   PAS Score 1   Timing N/A   Vallecular Residue Trace (1%-5%)   Pyriform Sinus Residue None (0%)   Barium Tablet   PAS Score 1   Timing N/A   Vallecular Residue None (0%)   Pyriform Sinus Residue None (0%)   Oral Phase   Oral Phase X   Thin (0) Oral Residue After the Swallow   Pureed (4) Oral Residue After the Swallow   Soft & Bite-Sized (6) - (Dysphagia III) Premature Spillage Into Valleculae;Oral Residue After the Swallow   Regular (7) Oral Residue After the Swallow   Pharyngeal Phase   Pharyngeal Phase X "   Thin (0) Penetration After Swallow;Residue In Valleculae;Residue In Pyriform Sinus   Pureed (4) Residue In Valleculae;Residue On Posterior Pharyngeal Wall;Residue In Pyriform Sinus;Reduced Tongue Base Retraction    Soft & Bite-Sized (6) - (Dysphagia III) Reduced Tongue Base Retraction;Residue In Valleculae   Regular (7) Reduced Tongue Base Retraction;Residue In Valleculae   Esophageal Phase   Esophageal Phase WDL   Compensatory Strategies Attempted   Compensatory Strategies Attempted Yes   Multiple Swallows effective to clear mild oral and pharyngeal residue   Liquid Wash After Swallow effective to clear mild oral and pharyngeal residue   Impression   Dysphagia Present No   Prognosis   Prognosis for Improvement Excellent   Barriers to Improvement none   Positive Indicators for Improvement pt is able to follow commands, completes serial swallows independently majority of the time   Recommendations   Diet / Liquid Recommendation Thin (0);Regular (7)   Medication Administration  Whole with Liquid Wash   Strategies / Precautions Small Bites;Small Sips;No Talking while Eating;Multiple Swallows;Alternate Solids / Liquids   Oral Care BID   Interventions None   SLP Contact Information (Name / Extension) Iraida Delgadillo MS, CCC-SLP, extension [phone]   Interdisciplinary Plan of Care Collaboration   IDT Collaboration with  Family / Caregiver;Physician   Patient Position at End of Therapy Seated;Family / Friend in Room;Chair Alarm On   Collaboration Comments CLOF, results of MBSS and POC         Discussed the risks, benefits, and alternatives of the VFSS procedure. Patient/family acknowledged and agreed to proceed.    Assessment    Oral phase: adequate acceptance and containment of all boluses, no anterior LOB. Swallow initiation timely as bolus reached level of ramus of mandible with the exception of soft solids which pt did demonstrate premature spillage to the valleculae. Mild oral residue noted across all consistencies, pt  able to clear with cleansing swallows.     Pharyngeal phase: trace to mild vallecular, pyriform and PPW residue, again pt able to clear with cleansing swallows. 1 instance of penetration after the swallow to the underside of the epiglottis on thins by straw sip. Pt able to eject with cleansing swallow.     Clinical impressions: Pt demonstrates swallow function considered typically functioning, independently completing cleansing swallows or able to follow directions to complete from SLP during the study to clear trace to mild residue. Adequate epiglottic inversion, hyolaryngeal elevation and laryngeal vestibular closure to protect airway.       Consistency PAS Score Timing Residue Comments   Thin Liquid (P) 2 (P) Post swallow Vallecular Residue: (P) Mild (5%-25%)  Pyriform Sinus Residue: (P) Mild (5%-25%) Penetration to underside of epiglottis after the swallow with thins   Mildly Thick             Liquidised             Pudding (P) 1 (P) N/A Vallecular Residue: (P) Mild (5%-25%)  Pyriform Sinus Residue: (P) Mild (5%-25%)    Soft & Bite Sized (P) 1 (P) N/A Vallecular Residue: (P) Trace (1%-5%)  Pyriform Sinus Residue: (P) Trace (1%-5%)    Regular Solid (P) 1 (P) N/A Vallecular Residue: (P) Trace (1%-5%)  Pyriform Sinus Residue: (P) None (0%)    Mixed             Barium Tablet (P) 1 (P) N/A Vallecular Residue: (P) None (0%)  Pyriform Sinus Residue: (P) None (0%)      Penetration-Aspiration Scale (PAS)  1     No contrast enters airway  2     Contrast enters the airway, remains above the vocal folds, and is ejected from the airway (not seen in the airway at the end of the swallow).  3     Contrast enters the airway, remains above the vocal folds, and is not ejected from the airway (is seen in the airway after the swallow).  4     Contrast enters the airway, contacts the vocal folds, and is ejected from the airway.  5     Contrast enters the airway, contacts the vocal folds, and is not ejected from the airway  6      Contrast enters the airway, crosses the plane of the vocal folds, and is ejected from the airway.  7     Contrast enters the airway, crosses the plane of the vocal folds, and is not ejected from the airway despite effort.  8     Contrast enters the airway, crosses the plane of the vocal folds, is not ejected from the airway and there is no response to aspiration.     Plan    Recommend advance to regular textures/thin liquids  Medications whole with thins liquids  Straws ok  Complete 2 swallows for each bite or sip to clear residue  Sit upright at 90 degrees for all intake, preferably in a chair  No talking with PO  D/c of ST services at this time, should change in status occur, please consult SLP

## 2025-01-01 NOTE — WOUND TEAM
[facility]  Inpatient Services  Initial Wound and Skin Care Evaluation    Admission Date: 12/30/2024     Last order of IP CONSULT TO WOUND CARE was found on 12/31/2024 from Hospital Encounter on 12/30/2024     HPI, PMH, SH: Reviewed    Past Surgical History:   Procedure Laterality Date    INSERTION,CANNULA FOR ECMO,ADULT Left 10/30/2024    Procedure: INSERTION, CANNULA, FOR ECMO, ADULT;  Surgeon: Aime Elias D.O.;  Location: SURGERY [facility];  Service: Cardiothoracic     Social History     Tobacco Use    Smoking status: Never    Smokeless tobacco: Never   Substance Use Topics    Alcohol use: No     No chief complaint on file.    Diagnosis: Anoxic brain injury (HCC) [G93.1]    Unit where seen by Wound Team: [facility]15/02     WOUND CONSULT RELATED TO:  wound vac dressing change    WOUND TEAM PLAN OF CARE - Frequency of Follow-up:   Nursing to follow dressing orders written for wound care. Contact wound team if area fails to progress, deteriorates or with any questions/concerns if something comes up before next scheduled follow up (See below as to whether wound is following and frequency of wound follow up)   NPWT change 3 times weekly - bilateral groin    WOUND HISTORY:   Wounds are present on admission. Pt originally had a STEMI on 11/27/2024. Patient has a history of obesity, hypertension, hyperlipidemia. She was seen at [facility] as a transfer initially for anterior STEMI requiring lytics. Patient underwent an LAD PCI but hospital course was complicated by retroperitoneal bleed, ventricular fibrillation and PEA arrest and cardiogenic shock requiring VA ECMO and Impella. She is now status post ECMO decannulation on November 4 and had left femoral embolectomy, PCI to the LAD, unsuccessful PCI to OM1 and Impella removal on November 8. Hospital course was complicated by patient developing encephalopathy and delirium, to which she was given valproic acid and quetiapine.          WOUND  ASSESSMENT/LDA  Wound 12/07/24 Full Thickness Wound Groin Left (Active)   Date First Assessed/Time First Assessed: 12/07/24 1721   Present on Original Admission: No  Hand Hygiene Completed: Yes  Primary Wound Type: Full Thickness Wound  Location: Groin  Laterality: Left      Assessments 1/1/2025  9:30 AM   Wound Image     Site Assessment Clean;Fully granulated   Periwound Assessment Clean;Dry;Intact   Margins Defined edges   Drainage Amount Small   Drainage Description Serosanguineous   Treatments Cleansed;Nonselective debridement;Site care   Wound Cleansing Approved Wound Cleanser   Periwound Protectant No-sting Skin Prep   Dressing Status Clean;Dry;Intact   Dressing Changed Changed   Dressing Options Wound Vac   Dressing Change/Treatment Frequency Monday, Wednesday, Friday, and As Needed   NEXT Dressing Change/Treatment Date 01/03/25   NEXT Weekly Photo (Inpatient Only) 01/06/25   Wound Team Following 3x Weekly   Non-staged Wound Description Full thickness   Wound Length (cm) 2.5 cm   Wound Width (cm) 3.5 cm   Wound Depth (cm) 1 cm   Wound Surface Area (cm^2) 8.75 cm^2   Wound Volume (cm^3) 8.75 cm^3   Wound Healing % 79   Wound Bed Granulation (%) 100 %   Wound Odor None   Exposed Structures None   WOUND NURSE ONLY - Time Spent with Patient (mins) 60       Wound 12/07/24 Full Thickness Wound Groin Right (Active)   Date First Assessed/Time First Assessed: 12/07/24 1721   Present on Original Admission: No  Hand Hygiene Completed: Yes  Primary Wound Type: Full Thickness Wound  Location: Groin  Laterality: Right      Assessments 1/1/2025  9:30 AM   Wound Image     Site Assessment Clean;Fully granulated   Periwound Assessment Clean;Dry;Intact;Denuded   Margins Defined edges   Drainage Amount Small   Drainage Description Serosanguineous   Treatments Cleansed;Nonselective debridement;Site care   Wound Cleansing Approved Wound Cleanser   Periwound Protectant No-sting Skin Prep   Dressing Status Clean;Dry;Intact    Dressing Changed Changed   Dressing Options Wound Vac   Dressing Change/Treatment Frequency Monday, Wednesday, Friday, and As Needed   NEXT Dressing Change/Treatment Date 01/03/25   NEXT Weekly Photo (Inpatient Only) 01/06/25   Wound Team Following 3x Weekly   Non-staged Wound Description Full thickness   Wound Length (cm) 1.2 cm   Wound Width (cm) 1.5 cm   Wound Depth (cm) 4.5 cm   Wound Surface Area (cm^2) 1.8 cm^2   Wound Volume (cm^3) 8.1 cm^3   Wound Healing % 60   Wound Odor None   Exposed Structures JAYLIN   WOUND NURSE ONLY - Time Spent with Patient (mins) 60        Vascular:    JAGJIT:   No results found.    Lab Values:    Lab Results   Component Value Date/Time    WBC 3.9 (L) 12/31/2024 06:00 AM    RBC 3.77 (L) 12/31/2024 06:00 AM    HEMOGLOBIN 12.2 12/31/2024 06:00 AM    HEMATOCRIT 37.6 12/31/2024 06:00 AM    CREACTPROT 6.06 (H) 12/23/2024 11:20 AM    SEDRATEWES 140 (H) 11/28/2024 01:35 AM    HBA1C 6.2 (H) 11/01/2024 06:46 PM    HBA1C 6.4 (H) 10/30/2024 03:18 AM         Culture Results show:  No results found for this or any previous visit (from the past 720 hours).    Pain Level/Medicated:  None, Tolerated without pain medication       INTERVENTIONS BY WOUND TEAM:  Chart and images reviewed. Discussed with bedside RN. All areas of concern (based on picture review, LDA review and discussion with bedside RN) have been thoroughly assessed. Documentation of areas based on significant findings. This RN in to assess patient. Performed standard wound care which includes appropriate positioning, dressing removal and non-selective debridement. Pictures and measurements obtained weekly if/when required.    Wound:  right groin-full thickness wound  Preparation for Dressing removal: Removed without difficulty and Dressing soaked with wound cleanser  Cleansed/Non-selectively Debrided with:  Wound cleanser and Gauze  Brianna wound: Cleansed with Wound cleanser and Gauze, Prepped with No Sting  Primary Dressing:  black  foam  Secondary (Outer) Dressing: clear drape     Wound:  left groin-full thickness wound  Preparation for Dressing removal: Removed without difficulty and Dressing soaked with wound cleanser  Cleansed/Non-selectively Debrided with:  Wound cleanser and Gauze  Brianna wound: Cleansed with Wound cleanser and Gauze, Prepped with No Sting  Primary Dressing:  black foam  Secondary (Outer) Dressing: clear drape    Advanced Wound Care Discharge Planning  Number of Clinicians necessary to complete wound care: 2  Is patient requiring IV pain medications for dressing changes:  No   Length of time for dressing change 30 min. (This does not include chart review, pre-medication time, set up, clean up or time spent charting.)    Interdisciplinary consultation: Patient, Bedside RN (Radha), N/A.  Pressure injury and staging reviewed with N/A.    EVALUATION / RATIONALE FOR TREATMENT:     Date:  01/01/25  Wound Status:  Wound improving    Bilateral groin sites: the measurements today compared to 12/23/24 are smaller. The depth of the right groin is the same (4.5cm). No underlying structures are seen. Fully granulated tissue.   Skin prep to protect the skin, wound vac to expedite wound healing.           Goals: Steady decrease in wound area and depth weekly.    NURSING PLAN OF CARE ORDERS:  Dressing changes: See Dressing Care orders    NUTRITION RECOMMENDATIONS   Wound Team Recommendations:  N/A    DIET ORDERS (From admission to next 24h)       Start     Ordered    01/01/25 0753  Dietary Comment  ALL MEALS        Comments: MBSS tray to be picked up at 1000 today, 1/1/25, thank you    01/01/25 0752    12/30/24 1703  Diet Order Diet: Level 5 - Minced and Moist (meds whole/crushed in puree, as appropriate); Liquid level: Level 2 - Mildly Thick; Second Modifier: (optional): Cardiac; Tray Modifications (optional): SLP - 1:1 Supervision by Nursing, SLP - Deliver t...  ALL MEALS        Question Answer Comment   Diet: Level 5 - Minced and Moist  meds whole/crushed in puree, as appropriate   Liquid level Level 2 - Mildly Thick    Second Modifier: (optional) Cardiac    Tray Modifications (optional) SLP - 1:1 Supervision by Nursing    Tray Modifications (optional) SLP - Deliver to Nursing Station        12/30/24 4409                    PREVENTATIVE INTERVENTIONS:    Q shift Ray - performed per nursing policy  Q shift pressure point assessments - performed per nursing policy    Surface/Positioning  Standard/trauma mattress - Currently in Place  Reposition q 2 hours - Currently in Place  Waffle overlay  - Currently in Place    Offloading/Redistribution  Heels floated with waffle overlay - Currently in Place      Containment/Moisture Prevention    Dri-hemanth pad - Currently in Place  Brief with mobilization - Currently in Place  Barrier paste - Currently in Place    Mobilization      Working with therapies      Anticipated discharge plans:  Self/Family Care, Home Health Care, and Outpatient Wound Center        Vac Discharge Needs:  Vac Discharge plan is purely a recommendation from wound team and not a requirement for discharge unless otherwise stated by physician.  Regular Vac in use and continued at discharge- At next dressing change (Friday), will be able to better assess how long she will need the wound vac machine. At this point, ceciliapte still needing it after d/c

## 2025-01-01 NOTE — THERAPY
Occupational Therapy  Daily Treatment     Patient Name: Kiara Qureshi  Age:  59 y.o., Sex:  female  Medical Record #: [id number]  Today's Date: 1/1/2025     Precautions  Precautions: (P) Fall Risk, Swallow Precautions  Comments: B lower abdominal wound VACs         Subjective    Patient was seated in w/c in t dine after breakfast and she was agreeable to shower.       Objective       01/01/25 0831   OT Charge Group   OT Self Care / ADL (Units) 4   OT Total Time Spent   OT Individual Total Time Spent (Mins) 60   Precautions   Precautions Fall Risk;Swallow Precautions   Functional Level of Assist   Grooming Supervision;Seated   Grooming Description Set-up of equipment   Bathing Contact Guard Assist   Bathing Description Grab bar;Hand held shower;Tub bench;Set-up of equipment;Supervision for safety  (CGA for standing)   Upper Body Dressing Supervision   Upper Body Dressing Description Set-up of equipment   Lower Body Dressing Contact Guard Assist   Lower Body Dressing Description Grab bar;Set-up of equipment;Supervision for safety;Verbal cueing   Tub / Shower Transfers Contact Guard Assist   Tub Shower Transfer Description Grab bar;Shower bench;Set-up of equipment;Supervision for safety;Verbal cueing   Interdisciplinary Plan of Care Collaboration   IDT Collaboration with  Family / Caregiver;Other (See Comments)  (wound care nurse)   Patient Position at End of Therapy Seated;Chair Alarm On;Self Releasing Lap Belt Applied;Call Light within Reach;Tray Table within Reach;Phone within Reach;Family / Friend in Room   Collaboration Comments spouse present and observed patient shower/get dressed as part of family training   Shower/Bathe Self   Assistance Needed Physical assistance   Physical Assistance Level 25% or less   CARE Score - Shower/Bathe Self 3         Assessment    Patient completed adl routine with CGA/SBA.  Her LE's become weak when standing more than a minute and would benefit from working on standing  tolerance and balance.  Strengths: Able to follow instructions, Alert and oriented, Effective communication skills, Independent prior level of function, Manages pain appropriately, Motivated for self care and independence, Pleasant and cooperative, Supportive family, Willingly participates in therapeutic activities  Barriers: Decreased endurance, Generalized weakness, Hypotension, Impaired balance, Limited mobility    Plan    ADLs, IADLs, transfers, functional mobility, standing tolerance/balance, strength/endurance    Occupational Therapy Goals (Active)       Problem: Dressing       Dates: Start:  12/31/24         Goal: STG-Within one week, patient will dress LB with CGA (from w/c)       Dates: Start:  12/31/24               Problem: Functional Transfers       Dates: Start:  12/31/24         Goal: STG-Within one week, patient will transfer to toilet with CGA       Dates: Start:  12/31/24               Problem: OT Long Term Goals       Dates: Start:  12/31/24         Goal: LTG-By discharge, patient will complete basic self care tasks with supervision/mod I       Dates: Start:  12/31/24            Goal: LTG-By discharge, patient will perform bathroom transfers with supervision/mod I       Dates: Start:  12/31/24            Goal: LTG-By discharge, patient will complete basic home management with supervision/mod I       Dates: Start:  12/31/24               Problem: Toileting       Dates: Start:  12/31/24         Goal: STG-Within one week, patient will complete toileting tasks with min A       Dates: Start:  12/31/24

## 2025-01-01 NOTE — PROGRESS NOTES
NURSING DAILY NOTE    Name: Kiara Qureshi   Date of Admission: 12/30/2024   Admitting Diagnosis: Anoxic brain injury (HCC)  Attending Physician: CHANTAL GREY M.D.  Allergies: Patient has no known allergies.    Safety  Patient Assist     Patient Precautions  Swallow Precautions  Precaution Comments  B lower abdominal wound VACs  Bed Transfer Status  Minimal Assist  Toilet Transfer Status   Minimal Assist  Assistive Devices     Oxygen  None - Room Air  Diet/Therapeutic Dining  Current Diet Order   Procedures    Diet Order Diet: Level 5 - Minced and Moist (meds whole/crushed in puree, as appropriate); Liquid level: Level 2 - Mildly Thick; Second Modifier: (optional): Cardiac; Tray Modifications (optional): SLP - 1:1 Supervision by Nursing, SLP - Deliver t...     Pill Administration  whole, floated, and one at a time   Agitated Behavioral Scale     ABS Level of Severity       Fall Risk  Has the patient had a fall this admission?   No  Josefa Parra Fall Risk Scoring  11, MODERATE RISK  Fall Risk Safety Measures  bed alarm, chair alarm, and poor balance    Vitals  Temperature: 37.5 °C (99.5 °F)  Temp src: Temporal  Pulse: 67  Respiration: 18  Blood Pressure: 99/68  Blood Pressure MAP (Calculated): 78 MM HG  BP Location: Right, Upper Arm  Patient BP Position: Supine     Oxygen  Pulse Oximetry: 100 %  O2 (LPM): 0  O2 Delivery Device: None - Room Air    Bowel and Bladder  Last Bowel Movement  12/29/24  Stool Type     Bowel Device     Continent  Bladder: Did not void (Beckwith in place)   Bowel: No movement  Bladder Function     Genitourinary Assessment   Bladder Assessment (WDL):  WDL Except  Beckwith Care: Given with Soap and Water    Skin  Ray Score   17  Sensory Interventions   Bed Types: Standard/Trauma Mattress with Overlay  Skin Preventative Measures: Waffle Overlay  Moisture Interventions  Moisturizers/Barriers: Moisturizer       Pain  Pain Rating  Scale  0 - No Pain  Pain Location     Pain Location Orientation     Pain Interventions   Declines    ADLs    Bathing      Linen Change      Personal Hygiene  Perineal Care, Moist Brianna Wipes  Chlorhexidine Bath      Oral Care  Brushed Teeth  Teeth/Dentures     Shave     Nutrition Percentage Eaten  *  * Meal *  *, Lunch, Less than 25% Consumed  Environmental Precautions     Patient Turns/Positioning  Patient turns self independently side to side without assistance, to offload sacral area  Patient Turns Assistance/Tolerance     Bed Positions     Head of Bed Elevated         Psychosocial/Neurologic Assessment  Psychosocial Assessment  Psychosocial (WDL):  Within Defined Limits  Neurologic Assessment  Level of Consciousness: Alert       Cardio/Pulmonary Assessment  Edema      Respiratory Breath Sounds  RUL Breath Sounds: Clear  RML Breath Sounds: Clear  RLL Breath Sounds: Diminished  SHOSHANA Breath Sounds: Clear  LLL Breath Sounds: Diminished  Cardiac Assessment

## 2025-01-01 NOTE — CARE PLAN
"The patient is Watcher - Medium risk of patient condition declining or worsening    Shift Goals  Clinical Goals: safety, normal bg, remove de jesus in am  Patient Goals: rest, talk to doctor about bg's in am  Family Goals: safety    Progress made toward(s) clinical / shift goals:    Problem: Fall Risk - Rehab  Goal: Patient will remain free from falls  Outcome: Progressing. Patient bed in lowest locked position with bed alarm on and call light within reach. Patient calls appropriately with call light for needs and has not attempted to self transfer over shift. Patient has remained free from falls.        Patient is not progressing towards the following goals:      Problem: Diabetes Management  Goal: Patient's ability to maintain appropriate glucose levels will be maintained or improve  Outcome: Not Progressing. Patient BG at 2100 was 197, refused SSI at this time, requested to speak with doctor later today regarding how she feels her \"blood sugars are getting messed up\".      "

## 2025-01-01 NOTE — THERAPY
Recreational Therapy   Initial Evaluation     Patient Name: Kiara Qureshi  Age:  59 y.o., Sex:  female  Medical Record #: [id number]  Today's Date: 1/1/2025     Subjective    Patient was willing to participate in the assessment interview.     Objective       01/01/25 1331   Procedural Tracking   Procedural Tracking Community Re-Integration;Leisure Skills Awareness   Treatment Time   Total Time Spent (mins) 30   Leisure History   Leisure Interests Crafts;Family;Gardening;Television;Travel   Pre-Morbid Leisure Lifestyle Sedentary;Individual   Prior Living Arrangements   Lives with - Patient's Self Care Capacity Spouse   Steps Into Home 2   Steps In Home 0   Ambulation Independent   Assistive Devices Used None   Driving / Transportation Driving Independent   Functional Ability Status - Physical   Endurance Low   Functional Ability Status - Cognitive   Attention Span Remains on Task   Judgment Able to Make Independent Decisions   Functional Ability Status - Emotional    Affect Appropriate   Mood Appropriate   Behavior Cooperative;Appropriate   Leisure Competence Measure   Leisure Awareness Independent   Leisure Attitude Independent   Leisure Skills Independent   Cultural / Social Behaviors Independent   Interpersonal Skills Independent   Community Integration Skills Independent   Social Contact Independent   Community Participation Independent   Clinical Impression   Clinical Impression Impaired Gross Motor Leisure Functioning;Impaired Endurance   Current Discharge Plan   Current Discharge Plan Return to Prior Living Situation   Interdisciplinary Plan of Care Collaboration   IDT Collaboration with  Family / Caregiver;Physician   Patient Position at End of Therapy Seated;Family / Friend in Room   Strengths & Barriers   Strengths Able to follow instructions;Independent prior level of function;Willingly participates in therapeutic activities;Pleasant and cooperative;Alert and oriented   Barriers Generalized  weakness;Limited mobility;Impaired balance         Assessment  Patient is 59 y.o. female with a diagnosis of Brain Dysfunction: Non-Traumatic .  According to the H&P patient has a history of obesity, hypertension, hyperlipidemia.  She was seen at [facility] as a transfer initially for anterior STEMI requiring lytics.  Patient underwent an LAD PCI but hospital course was complicated by retroperitoneal bleed, ventricular fibrillation and PEA arrest and cardiogenic shock requiring VA ECMO and Impella.  She is now status post ECMO decannulation on November 4 and had left femoral embolectomy, PCI to the LAD, unsuccessful PCI to OM1 and Impella removal on November 8.  Hospital course was complicated by patient developing encephalopathy and delirium, to which she was given valproic acid and quetiapine.  Patient has been weaned off her present medications and was found to have hypotension to which she was started on p.o. midodrine 10 mg 3 times daily.  Goal-directed medical therapy was held due to borderline low blood pressure.  Her echocardiogram on November 24 shows 25 to 30% EF with akinesis of the apical, apical anterior wall, apical septum, apical inferior wall, and apical lateral wall of the left ventricle.  Hypokinesis seen of the basal anterolateral wall, mid anterolateral wall, anterior wall, mid inferolateral wall, and basal inferolateral wall of the left ventricle.     On November 25, patient had CT angiogram of the chest which shows pulmonary edema, small bilateral pleural effusions, small 3 mm filling defect along the lateral wall of the descending thoracic aorta which may represent focal aortic mural thrombus versus ulcerated plaque.  CT angiogram of the abdomen pelvis showing increased organization of the rim-enhancing left retroperitoneal hematomas measuring up to 9.3 x 4.4 cm.  Tiny filling defect/mural thrombus within the posterior right common iliac artery.  Multiple nonenhancing intermediate density fluid  collections within the subcutaneous tissues overlying the right lower quadrant and right inguinal regions measuring up to 5.7 x 2.4 cm.  Additional nonenhancing low-density fluid collection closely associated with the left femoral vein measuring 4.7 x 3.9 cm, likely represents evolving hematomas.     Due to patient's uptrending leukocytosis and CAT scan findings of suspected pneumonia, patient was started on vancomycin and Zosyn on November 26.  Patient was then transferred back to [facility] for further managementt      EKG consistent with anterior wall STEMI, was given TNK, aspirin, plavix and heparin drip and transferred to [facility] for higher level of care. She had urgent LHC with PCI to LAD. LHC complicated by brief episode of VF and PEA arrest (<2 min). Due to profound cardiogenic shock, impella was placed and she was transferred back to ICU. She required significant vasopressors, and later required ECMO. Found to have retroperitoneal bleed, and required blood transfusions. She was transferred to [email or web address] for higher level of care. ECMO decannulation on 11/4/24 and L femoral embolectomy, PCI to the LAD, unsuccessful PCI to OM1, and impella removal on 11/8/24. Complicated by delirium and encephalopathy managed with valproate, quetiapine, and haldol. Noted to have punctate acute infarct of her R centrum semiovale, scattered foci of enhancement likely representing subacute infarcts. She was also found to have pneumonia and started on vanc and zosyn on 11/26. She was transferred back to [facility] on 11/27. She completed linezolid and zosyn on 12/1. Found to have groin wounds, treated with wound vacs.      Patient reports that she enjoys cross stitch, patt, word puzzles, jigsaw puzzles, reading, etc. She reports that she spends most of her time with her  and they like to go on trips to the beach and attend Religion. 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 When asked what she does to cope,  she said she gets angry, then isolates and then with time feels better. She also admits that crafts are a helpful distraction. Patient was agreeable to work on standing tolerance.       Plan  Recommend Recreational Therapy 30-60 minutes per day  2-3  days per week for 2 weeks for the following treatments:  Gross Motor Functional Leisure Skills    Passport items to be completed:  Verbalize two positive leisure activities, discuss returning to work, hobbies, community groups or volunteer activities, explore community resources     Goals:  Long term and short term goals have been discussed with patient and they are in agreement.    Recreation Therapy Problems (Active)       Problem: Recreation Therapy       Dates: Start:  01/01/25         Goal: STG-Within one week, patient will demonstrate a standing tolerance of 15 minutes, during a leisure activity of her choice, with two breaks or less.        Dates: Start:  01/01/25            Goal: LTG-By discharge, patient will demonstrate a standing tolerance 20 minutes, during a leisure activity of her choice, with one beak or less.        Dates: Start:  01/01/25

## 2025-01-01 NOTE — PROGRESS NOTES
Hypoglycemia Intervention    Hypoglycemia protocol intervention:  Blood glucose 37 at 1650.  Intervention: 8 oz of fruit juice   Repeat blood glucose 44 at 1710.  Intervention: Carb/Protein snack given, recheck blood glucose in 1 hour    Additional interventions needed: dinner given with 4 OZ soda  Dr. Cole notified of the above at 1710.

## 2025-01-01 NOTE — PROGRESS NOTES
NURSING DAILY NOTE    Name: Kiara Qureshi  Date of Admission: 12/30/2024  Admitting Diagnosis: Anoxic brain injury (HCC)  Attending Physician: Mario Cole M.d.  Allergies: Patient has no known allergies.    Safety  Patient Assist  o   Patient Precautions  oSwallow Precautions  Precaution Comments  oB lower abdominal wound VACs  Bed Transfer Status  oMinimal Assist  Toilet Transfer Status  oMinimal Assist  Assistive Devices  o   Oxygen  oNone - Room Air  Diet/Therapeutic Dining  o  Current Diet Order   Procedures    Diet Order Diet: Level 5 - Minced and Moist (meds whole/crushed in puree, as appropriate); Liquid level: Level 2 - Mildly Thick; Second Modifier: (optional): Cardiac; Tray Modifications (optional): SLP - 1:1 Supervision by Nursing, SLP - Deliver t...     Pill Administration  ocrushed, whole, and floated  Agitated Behavioral Scale  o   ABS Level of Severity  o     Fall Risk  Has the patient had a fall this admission?  Nilson  Josefa Parra Fall Risk Scoring  o15, HIGH RISK  Fall Risk Safety Measures  dwain alarm and chair alarm    Vitals  Temperature: 36.7 °C (98.1 °F)  Temp src: Temporal  Pulse: 79  Respiration: 18  Blood Pressure: 128/68  Blood Pressure MAP (Calculated): 88 MM HG  BP Location: Left, Upper Arm  Patient BP Position: Supine    Oxygen  Pulse Oximetry: 97 %  O2 (LPM): 0  O2 Delivery Device: None - Room Air    Bowel and Bladder  Last Bowel Movement  o12/29/24 (took scheduled senna, will offer MOM in am)  Stool Type  o   Bowel Device  o   Continent  oBladder: Did not void (Beckwith in place)  oBowel: No movement  Bladder Function  oUrine Color: Yellow (A little cloudy)  Genitourinary Assessment  oBladder Assessment (WDL):  WDL Except  Beckwith Care: Given with Soap and Water  Urinary Symptoms: Catheter (Document on LDA)  Urine Color: Yellow (A little cloudy)  Bladder Device: Indwelling Catheter  Bladder Medications: No    Skin  Ray Score  o 17  Sensory Interventions  o Bed Types:  Standard/Trauma Mattress with Overlay  Skin Preventative Measures: Waffle Overlay, Pillows in Use for Support / Positioning  Moisture Interventions  oMoisturizers/Barriers: Barrier Wipes, Moisturizer , Containment Devices  Containment Devices: Indwelling Catheter      Pain  Pain Rating Scale  o0 - No Pain  Pain Location  o   Pain Location Orientation  o   Pain Interventions  oDeclines    ADLs    Bathing  o   Linen Change  o   Personal Hygiene  oPerineal Care, Moist Brianna Wipes  Chlorhexidine Bath  o   Oral Care  oBrushed Teeth  Teeth/Dentures  o   Shave  o   Nutrition Percentage Eaten  o*  * Meal *  *, Lunch, Less than 25% Consumed  Environmental Precautions  o   Patient Turns/Positioning  oPatient turns self independently side to side without assistance, to offload sacral area  Patient Turns Assistance/Tolerance  o   Bed Positions  o   Head of Bed Elevated  o       Psychosocial/Neurologic Assessment  Psychosocial Assessment  oPsychosocial (WDL):  Within Defined Limits  Neurologic Assessment  oNeuro (WDL): Exceptions to WDL  Level of Consciousness: Alert  Orientation Level: Oriented X4  Cognition: Follows commands  Speech: Clear  Motor Function/Sensation Assessment: Motor strength  Muscle Strength Right Arm: Good Strength Against Gravity and Moderate Resistance  Muscle Strength Left Arm: Good Strength Against Gravity and Moderate Resistance  Muscle Strength Right Leg: Fair Strength against Gravity but No Resistance  Muscle Strength Left Leg: Fair Strength against Gravity but No Resistance  oEENT (WDL):  WDL Except    Cardio/Pulmonary Assessment  Edema  o   Respiratory Breath Sounds  oRUL Breath Sounds: Clear  RML Breath Sounds: Clear  RLL Breath Sounds: Diminished  SHOSHANA Breath Sounds: Clear  LLL Breath Sounds: Diminished  Cardiac Assessment  oCardiac (WDL):  WDL Except

## 2025-01-01 NOTE — CARE PLAN
Problem: Skin Integrity  Goal: Skin integrity is maintained or improved  Outcome: Progressing  Wound care changed wound vac. See note

## 2025-01-02 ENCOUNTER — APPOINTMENT (OUTPATIENT)
Dept: PHYSICAL THERAPY | Facility: REHABILITATION | Age: 60
DRG: 092 | End: 2025-01-02
Attending: PHYSICAL MEDICINE & REHABILITATION
Payer: COMMERCIAL

## 2025-01-02 ENCOUNTER — APPOINTMENT (OUTPATIENT)
Dept: OCCUPATIONAL THERAPY | Facility: REHABILITATION | Age: 60
DRG: 092 | End: 2025-01-02
Attending: PHYSICAL MEDICINE & REHABILITATION
Payer: COMMERCIAL

## 2025-01-02 LAB
GLUCOSE BLD STRIP.AUTO-MCNC: 112 MG/DL (ref 65–99)
GLUCOSE BLD STRIP.AUTO-MCNC: 112 MG/DL (ref 65–99)
GLUCOSE BLD STRIP.AUTO-MCNC: 94 MG/DL (ref 65–99)

## 2025-01-02 PROCEDURE — 97110 THERAPEUTIC EXERCISES: CPT

## 2025-01-02 PROCEDURE — 97530 THERAPEUTIC ACTIVITIES: CPT

## 2025-01-02 PROCEDURE — 770010 HCHG ROOM/CARE - REHAB SEMI PRIVAT*

## 2025-01-02 PROCEDURE — 99233 SBSQ HOSP IP/OBS HIGH 50: CPT | Performed by: PHYSICAL MEDICINE & REHABILITATION

## 2025-01-02 PROCEDURE — 82962 GLUCOSE BLOOD TEST: CPT | Mod: 91

## 2025-01-02 PROCEDURE — 97116 GAIT TRAINING THERAPY: CPT

## 2025-01-02 PROCEDURE — 97535 SELF CARE MNGMENT TRAINING: CPT | Mod: CO

## 2025-01-02 PROCEDURE — 700102 HCHG RX REV CODE 250 W/ 637 OVERRIDE(OP): Performed by: PHYSICAL MEDICINE & REHABILITATION

## 2025-01-02 PROCEDURE — 97112 NEUROMUSCULAR REEDUCATION: CPT

## 2025-01-02 PROCEDURE — A9270 NON-COVERED ITEM OR SERVICE: HCPCS | Performed by: PHYSICAL MEDICINE & REHABILITATION

## 2025-01-02 RX ADMIN — ATORVASTATIN CALCIUM 80 MG: 40 TABLET, FILM COATED ORAL at 22:44

## 2025-01-02 RX ADMIN — OMEPRAZOLE 20 MG: 20 CAPSULE, DELAYED RELEASE ORAL at 08:26

## 2025-01-02 RX ADMIN — APIXABAN 5 MG: 5 TABLET, FILM COATED ORAL at 22:43

## 2025-01-02 RX ADMIN — SENNOSIDES AND DOCUSATE SODIUM 2 TABLET: 50; 8.6 TABLET ORAL at 22:44

## 2025-01-02 RX ADMIN — APIXABAN 5 MG: 5 TABLET, FILM COATED ORAL at 08:26

## 2025-01-02 RX ADMIN — ARIPIPRAZOLE 2 MG: 2 TABLET ORAL at 08:26

## 2025-01-02 RX ADMIN — TRAZODONE HYDROCHLORIDE 100 MG: 100 TABLET ORAL at 22:44

## 2025-01-02 RX ADMIN — CLOPIDOGREL BISULFATE 75 MG: 75 TABLET ORAL at 08:26

## 2025-01-02 RX ADMIN — Medication 9 MG: at 22:44

## 2025-01-02 ASSESSMENT — GAIT ASSESSMENTS
GAIT LEVEL OF ASSIST: CONTACT GUARD ASSIST
ASSISTIVE DEVICE: FRONT WHEEL WALKER;4 WHEEL WALKER
DISTANCE (FEET): 45
DEVIATION: BRADYKINETIC;DECREASED HEEL STRIKE;DECREASED TOE OFF;INCREASED BASE OF SUPPORT

## 2025-01-02 ASSESSMENT — ACTIVITIES OF DAILY LIVING (ADL)
TOILET_TRANSFER_DESCRIPTION: GRAB BAR;INCREASED TIME;SET-UP OF EQUIPMENT;SUPERVISION FOR SAFETY
BED_CHAIR_WHEELCHAIR_TRANSFER_DESCRIPTION: INITIAL PREPARATION FOR TASK;INCREASED TIME;SET-UP OF EQUIPMENT;SUPERVISION FOR SAFETY;VERBAL CUEING
TOILETING_LEVEL_OF_ASSIST_DESCRIPTION: ASSIST FOR STANDING BALANCE;GRAB BAR;INCREASED TIME;SUPERVISION FOR SAFETY
BED_CHAIR_WHEELCHAIR_TRANSFER_DESCRIPTION: INCREASED TIME;INITIAL PREPARATION FOR TASK;SUPERVISION FOR SAFETY
BED_CHAIR_WHEELCHAIR_TRANSFER_DESCRIPTION: INCREASED TIME;SET-UP OF EQUIPMENT;SQUAT PIVOT TRANSFER TO WHEELCHAIR

## 2025-01-02 ASSESSMENT — PAIN DESCRIPTION - PAIN TYPE: TYPE: ACUTE PAIN

## 2025-01-02 NOTE — THERAPY
Physical Therapy   Daily Treatment     Patient Name: Kiara Qureshi  Age:  59 y.o., Sex:  female  Medical Record #: [id number]  Today's Date: 1/1/2025     Precautions  Precautions: Fall Risk, Swallow Precautions  Comments: B lower abdominal wound VACs    Subjective    Resting in bed, pt was agreeable to PT with encouragement     Objective       01/01/25 1545   PT Charge Group   PT Gait Training (Units) 1   PT Therapeutic Exercise (Units) 1   Supervising Physical Therapist Jimbo Oswald   PT Total Time Spent   PT Individual Total Time Spent (Mins) 30   Gait Functional Level of Assist    Gait Level Of Assist Minimal Assist  (around mat table)   Assistive Device Front Wheel Walker   Distance (Feet)   (7' x 1, 24' x 1)   # of Times Distance was Traveled 2   Deviation Decreased Heel Strike;Bradykinetic;Shuffled Gait   Transfer Functional Level of Assist   Bed, Chair, Wheelchair Transfer Minimal Assist   Bed Chair Wheelchair Transfer Description Squat pivot transfer to wheelchair;Verbal cueing   Sitting Lower Body Exercises   Sitting Lower Body Exercises Yes   Nustep Resistance Level 2;Resistance Level 3;Time (See Comments)  (9' B LE/UE see note for details)   Interdisciplinary Plan of Care Collaboration   IDT Collaboration with  Physical Therapist;Family / Caregiver   Patient Position at End of Therapy In Bed;Call Light within Reach;Family / Friend in Room;Bed Alarm On   Collaboration Comments PT CLOF, SO present throughout     Interval training:  Nustep B LE/UE  L2 x 4' at base/comfortable pace(55 spm)  L3 x 2' at push pace(70 spm)  L2 x 2' base pace(55-65 spm)  L3 x 1' at fast pace(85 spm)    Assessment    Pt attempted to sit on the mat table without notice during first bout of amb. Recommend cont wc follow due to pt's FOF  Strengths: Alert and oriented, Independent prior level of function, Motivated for self care and independence, Pleasant and cooperative, Supportive family, Willingly participates in therapeutic  activities  Barriers: Decreased endurance, Fatigue, Generalized weakness, Impaired activity tolerance, Impaired balance, Impaired insight/denial of deficits    Plan    CV endurance  LE strengthening   Gait training FWW  Confidence with balance and mobility    DME       Passport items to be completed:  Get in/out of bed safely, in/out of a vehicle, safely use mobility device, walk or wheel around home/community, navigate up and down stairs, show how to get up/down from the ground, ensure home is accessible, demonstrate HEP, complete caregiver training    Physical Therapy Problems (Active)       Problem: Balance       Dates: Start:  12/31/24         Goal: STG-Within one week, patient will maintain static standing for at least 1 minute, with no LOB, and CGA.        Dates: Start:  12/31/24               Problem: Mobility       Dates: Start:  12/31/24         Goal: STG-Within one week, patient will ambulate at least 25 feet with LRAD and min A.        Dates: Start:  12/31/24            Goal: STG-Within one week, patient will ascend and descend four to six stairs at least rails and min A.        Dates: Start:  12/31/24               Problem: Mobility Transfers       Dates: Start:  12/31/24         Goal: STG-Within one week, patient will transfer bed to chair with LRAD and CGA.        Dates: Start:  12/31/24               Problem: PT-Long Term Goals       Dates: Start:  12/31/24         Goal: LTG-By discharge, patient will ambulate at least 150 feet with LRAD and supervision.        Dates: Start:  12/31/24            Goal: LTG-By discharge, patient will transfer one surface to another with LRAD and supervision-mod I.        Dates: Start:  12/31/24            Goal: LTG-By discharge, patient will ambulate up/down flight of stairs with rails and supervision.        Dates: Start:  12/31/24            Goal: LTG-By discharge, patient will transfer in/out of a car with LRAD and supervision.        Dates: Start:  12/31/24

## 2025-01-02 NOTE — PROGRESS NOTES
Physical Medicine & Rehabilitation Progress Note  _____________________________________  Interdisciplinary Team Conference   Most recent IDT on 1/2/2025    IMario M.D., was present and led the interdisciplinary team conference on 1/2/2025.  I led the IDT conference and agree with the IDT conference documentation and plan of care as noted below.     Nursing:  Diet Current Diet Order   Procedures    Diet Order Diet: Consistent CHO (Diabetic) (meds whole with thins, straws ok)       Eating ADL Supervision  Increased time, Modified diet, Supervision for safety, Verbal cueing   % of Last Meal  Oral Nutrition: Breakfast, Between % Consumed   Sleep    Bowel Last BM: 01/02/25   Bladder    Barriers to Discharge Home: weakness      Physical Therapy:  Bed Mobility    Transfers Contact Guard Assist  Increased time, Initial preparation for task, Supervision for safety (stand pivot transfer w/c>bed)   Mobility Minimal Assist (around mat table)   Stairs    Barriers to Discharge Home: weakness      Occupational Therapy:  Grooming Supervision, Seated   Bathing Contact Guard Assist   UB Dressing Supervision   LB Dressing Contact Guard Assist   Toileting Contact Guard Assist   Shower & Transfer    Barriers to Discharge Home: weakness      Speech-Language Pathology:  Comprehension:  Modified Independent  Comprehension Description:  Hearing aids/amplifiers, Glasses  Expression:  Independent  Expression Description:     Social Interaction:  Independent  Social Interaction Description:     Problem Solving:  Modified Independent  Problem Solving Description:  Bed/chair alarm  Memory:  Modified Independent  Memory Description:  Increased time, Therapy schedule, Verbal cueing  Barriers to Discharge Home: mild cog impairment    Respiratory Therapy:  O2 (LPM): 0  O2 Delivery Device: None - Room Air    Case Management:  Continues to work on disposition and DME needs.      Discharge  Date/Disposition:  1/10/24  _____________________________________   Encounter Date: 1/2/2025    Chief Complaint: Weakness    Interval Events (Subjective):  Seen in therapy. Tolerating therapy. No new concerns.     Objective:  VITAL SIGNS: /68   Pulse 70   Temp 37 °C (98.6 °F) (Oral)   Resp 16   Ht 1.524 m (5')   Wt 79.5 kg (175 lb 4.3 oz)   SpO2 98%   BMI 34.23 kg/m²   Gen: No acute distress, well developed well nourished adult  HEENT: Normal Cephalic Atraumatic, Normal conjunctiva.   CV: warm extremities, well perfused, no edema  Resp: symmetric chest rise, breathing comfortably on room air  Abd: Soft, Non distended  Extremities: normal bulk, no atrophy  Skin: no visible rashes or lesions.   Neuro: alert, awake  Psych: Mood and affect appropriate and congruent    Laboratory Values:  Recent Results (from the past 72 hours)   POCT glucose device results    Collection Time: 12/30/24  9:48 PM   Result Value Ref Range    POC Glucose, Blood 81 65 - 99 mg/dL   CBC with Differential    Collection Time: 12/31/24  6:00 AM   Result Value Ref Range    WBC 3.9 (L) 4.8 - 10.8 K/uL    RBC 3.77 (L) 4.20 - 5.40 M/uL    Hemoglobin 12.2 12.0 - 16.0 g/dL    Hematocrit 37.6 37.0 - 47.0 %    MCV 99.7 (H) 81.4 - 97.8 fL    MCH 32.4 27.0 - 33.0 pg    MCHC 32.4 32.2 - 35.5 g/dL    RDW 62.6 (H) 35.9 - 50.0 fL    Platelet Count 173 164 - 446 K/uL    MPV 11.5 9.0 - 12.9 fL    Neutrophils-Polys 54.80 44.00 - 72.00 %    Lymphocytes 30.70 22.00 - 41.00 %    Monocytes 9.50 0.00 - 13.40 %    Eosinophils 3.40 0.00 - 6.90 %    Basophils 0.80 0.00 - 1.80 %    Immature Granulocytes 0.80 0.00 - 0.90 %    Nucleated RBC 0.00 0.00 - 0.20 /100 WBC    Neutrophils (Absolute) 2.13 1.82 - 7.42 K/uL    Lymphs (Absolute) 1.19 1.00 - 4.80 K/uL    Monos (Absolute) 0.37 0.00 - 0.85 K/uL    Eos (Absolute) 0.13 0.00 - 0.51 K/uL    Baso (Absolute) 0.03 0.00 - 0.12 K/uL    Immature Granulocytes (abs) 0.03 0.00 - 0.11 K/uL    NRBC (Absolute) 0.00 K/uL    POCT glucose device results    Collection Time: 12/31/24  7:35 AM   Result Value Ref Range    POC Glucose, Blood 77 65 - 99 mg/dL   POCT glucose device results    Collection Time: 12/31/24 11:34 AM   Result Value Ref Range    POC Glucose, Blood 72 65 - 99 mg/dL   POCT glucose device results    Collection Time: 12/31/24  4:54 PM   Result Value Ref Range    POC Glucose, Blood 39 (LL) 65 - 99 mg/dL   POCT glucose device results    Collection Time: 12/31/24  5:11 PM   Result Value Ref Range    POC Glucose, Blood 44 (L) 65 - 99 mg/dL   POCT glucose device results    Collection Time: 12/31/24  6:33 PM   Result Value Ref Range    POC Glucose, Blood 144 (H) 65 - 99 mg/dL   POCT glucose device results    Collection Time: 12/31/24  8:47 PM   Result Value Ref Range    POC Glucose, Blood 197 (H) 65 - 99 mg/dL   Basic Metabolic Panel    Collection Time: 01/01/25  5:55 AM   Result Value Ref Range    Sodium 136 135 - 145 mmol/L    Potassium 3.9 3.6 - 5.5 mmol/L    Chloride 104 96 - 112 mmol/L    Co2 21 20 - 33 mmol/L    Glucose 102 (H) 65 - 99 mg/dL    Bun 17 8 - 22 mg/dL    Creatinine 0.84 0.50 - 1.40 mg/dL    Calcium 8.3 (L) 8.5 - 10.5 mg/dL    Anion Gap 11.0 7.0 - 16.0   ESTIMATED GFR    Collection Time: 01/01/25  5:55 AM   Result Value Ref Range    GFR (CKD-EPI) 80 >60 mL/min/1.73 m 2   POCT glucose device results    Collection Time: 01/01/25  7:38 AM   Result Value Ref Range    POC Glucose, Blood 106 (H) 65 - 99 mg/dL   POCT glucose device results    Collection Time: 01/01/25 10:54 AM   Result Value Ref Range    POC Glucose, Blood 137 (H) 65 - 99 mg/dL   POCT glucose device results    Collection Time: 01/01/25  5:02 PM   Result Value Ref Range    POC Glucose, Blood 131 (H) 65 - 99 mg/dL   POCT glucose device results    Collection Time: 01/01/25  9:24 PM   Result Value Ref Range    POC Glucose, Blood 119 (H) 65 - 99 mg/dL   POCT glucose device results    Collection Time: 01/02/25  7:25 AM   Result Value Ref Range    POC  Glucose, Blood 94 65 - 99 mg/dL       Medications:  Scheduled Medications   Medication Dose Frequency    melatonin  9 mg Nightly    Pharmacy Consult Request  1 Each PHARMACY TO DOSE    senna-docusate  2 Tablet Q EVENING    omeprazole  20 mg DAILY    apixaban  5 mg BID    atorvastatin  80 mg Q EVENING    clopidogrel  75 mg DAILY    [Held by provider] dapagliflozin propanediol  10 mg DAILY    insulin lispro  1-6 Units 4X/DAY ACHS    traZODone  100 mg QHS     PRN medications: Respiratory Therapy Consult, hydrALAZINE, senna-docusate **AND** polyethylene glycol/lytes, ondansetron **OR** ondansetron, traZODone, sodium chloride, [DISCONTINUED] insulin GLARGINE **AND** insulin lispro **AND** POC blood glucose manual result **AND** NOTIFY MD and PharmD **AND** Administer 20 grams of glucose (approximately 8 ounces of fruit juice) every 15 minutes PRN FSBG less than 70 mg/dL **AND** dextrose bolus, oxyCODONE immediate-release **OR** oxyCODONE immediate-release    Diet:  Current Diet Order   Procedures    Diet Order Diet: Consistent CHO (Diabetic) (meds whole with thins, straws ok)       Medical Decision Making and Plan:  Anoxic brain injury  Metabolic encephalopathy  Delirium  -anoxia from cardiogenic shock  -2 min in hospital PEA arrest  -required seroquel, valproate and haldol to control delirium at OSH.   -mental status seems improved  -Abilify 2mg daily  -melatonin 10mg nightly  -trazodone 100mg nightly  -1/2- dc abilify     STEMI  -s/p PCI to LAD. Complicated by V-Fib and PEA arrest. Required ECMO and Impella due to cardiogenic shock.   -now off ECMO and Impella  -atrovastatin and plavix     HFrEF  -EF 25-30%  -soft pressure limiting GDMT  -farxiga 10mg  -no losartan due to hypotension  -no bisoprolol due to Chyne-florez breathing  -no lasix due to dehydration     Acute respiratory failure  -Cheyne-florez breathing pattern, thought to be from anoxic brain injury  -concern for aspiration   -now on room air     Pneumonia-  resolved  -completed linezolid and zosyn 12/1     CVA  -MRI on 11/17/2024 at OSH: Punctate acute infarct of the right centrum semiovale. Scattered foci of enhancement in multiple vascular distributions as described may represent recent subacute infarcts. Diffuse stippled susceptibility signal throughout the supratentorial and infratentorial brain. Overall findings may represent sequelae of fat emboli or amyloid related angiopathy.   -Eliquis 5mg BID  -atorvastatin 80mg, plavix     Retroperitoneal bleed  -required several transfusions.   -hgb now stable     Hypotension, History of HTN  -losartan on hold     Hyperglycemia, DM  -last A1c 6.2 on 11/1.   -lantus 9u qhs, SSI  -12/31- low bs, evening bs 39. Poor intake. Dc lantus, dc farsiga. Monitor closely  -1/1- sugars improved 2/2 increase intake po     Cognitive Communicative deficits:  - Patient with significant cognitive deficits due to anoxic brain injury  - Environmental modifications to decrease excessive stimulation including turning off the lights  - Consider starting neurostimulants such Nuvigil, amantadine or methylphenidate  - SLP to evaluate and treat cognitive deficits.   -Neuropsych will follow and perform testing if/when appropriate.     Dysphagia/Nutrition:  - Patient currently on dysphagia DIET.  - Continue therapies with SLP. SLP to perform swallow evaluation and provide oral motor exercises if necessary.     Neurogenic bladder:  - Timed voids with PVR q4H x3. If PVR > 400mL or if patient is unable to void, straight cath patient.  -12/31- remove de jesus     Neurogenic bowel:  -  Colace, Senna BID on admission  - Goal of 1BM/day.  -      Circadian Rhythm disorder:   -Trazadone 50mg PRN for restlessness after 10pm  -maltatonin and trazadone sheduled  Recommend lights on during the day/off at night, minimize nighttime interruptions as able.     Mood  - at risk of adjustment disorder, depression, and anxiety due to functional decline     ID:  - at risk for  Urinary tract infection     Skin/Wounds:  Bilateral groin  -wound vac  - Pressure relief q2h while in bed. Close monitoring for signs of breakdown     Pain:  - Neuroceptic - continue tylenol     DVT prophylaxis:  continue eliquis     GI prophylaxis:  On Prilosec 20mg daily         -Follow-up Cardiology, PCP, neurology    ____________________________________    Mario Cole MD  Physical Medicine & Rehabilitation   Brain Injury Medicine   ____________________________________    Total time:  60 minutes. Time spent included pre-rounding, review of vitals and tests, unit/floor time, face-to-face time with the patient including physical examination, care coordination, counseling of patient and/or family, ordering medications/procedures/tests, discussion with CM, PT, OT, SLP and/or other healthcare providers, and documentation in the electronic medical record. Topics discussed included dispostion.

## 2025-01-02 NOTE — CARE PLAN
Problem: Toileting  Goal: STG-Within one week, patient will complete toileting tasks with min A  Outcome: Not Met     Problem: Functional Transfers  Goal: STG-Within one week, patient will transfer to toilet with CGA  Outcome: Not Met     Problem: Dressing  Goal: STG-Within one week, patient will dress LB with CGA (from w/c)  Outcome: Met

## 2025-01-02 NOTE — CARE PLAN
The patient is Stable - Low risk of patient condition declining or worsening    Shift Goals  Clinical Goals: pain control  Patient Goals: pain control  Family Goals: safety    Progress made toward(s) clinical / shift goals:  pain control is well controlled with scheduled meds and prn meds available as needed. Bilateral groin wound vac in place. Call light and belongings within reach. Will continue with hourly rounds.   Problem: Fall Risk - Rehab  Goal: Patient will remain free from falls  Outcome: Progressing     Problem: Bowel Elimination  Goal: Patient will participate in bowel management program  Outcome: Progressing

## 2025-01-02 NOTE — THERAPY
Physical Therapy   Daily Treatment     Patient Name: Kiara Qureshi  Age:  59 y.o., Sex:  female  Medical Record #: [id number]  Today's Date: 1/2/2025     Precautions  Precautions: Fall Risk, Swallow Precautions  Comments: B lower abdominal wound VACs    Subjective    Pt eager to DC home ASAP. Receptive to goal-setting to ensure safe, independent mobility at home     Objective       01/02/25 1430   PT Charge Group   PT Gait Training (Units) 2   PT Therapeutic Exercise (Units) 1   PT Therapeutic Activities (Units) 1   PT Total Time Spent   PT Individual Total Time Spent (Mins) 60   Gait Functional Level of Assist    Gait Level Of Assist Contact Guard Assist   Assistive Device Front Wheel Walker;4 Wheel Walker   Distance (Feet) 45   # of Times Distance was Traveled 1  (as well as 20-30ft x4)   Deviation Bradykinetic;Decreased Heel Strike;Decreased Toe Off;Increased Base Of Support   Stairs Functional Level of Assist   Level of Assist with Stairs Minimal Assist   # of Stairs Climbed 2  (4inch step // bars)   Transfer Functional Level of Assist   Bed, Chair, Wheelchair Transfer Contact Guard Assist   Bed Chair Wheelchair Transfer Description Initial preparation for task;Increased time;Set-up of equipment;Supervision for safety;Verbal cueing  (stand pivot vs stand step)   Sitting Lower Body Exercises   Nustep Resistance Level 3  (8 min no rest breaks)   Bed Mobility    Sit to Supine Modified Independent   Sit to Stand Standby Assist   Neuro-Muscular Treatments   Comments gait training and safety with 4WW brakes   Interdisciplinary Plan of Care Collaboration   IDT Collaboration with  Physician   Patient Position at End of Therapy In Bed;Bed Alarm On;Call Light within Reach;Tray Table within Reach;Phone within Reach   Collaboration Comments POC         Assessment    Kiara improved her endurance with ambulation as she was able to walk 45ft before needing a seated rest break. She demonstrated good stability and use  of brakes while walking with 4WW, though she has not yet attempted sitting to device.  Strengths: Alert and oriented, Independent prior level of function, Motivated for self care and independence, Pleasant and cooperative, Supportive family, Willingly participates in therapeutic activities  Barriers: Decreased endurance, Fatigue, Generalized weakness, Impaired activity tolerance, Impaired balance, Impaired insight/denial of deficits    Plan    Sit<>stands 4WW with wound vac management  CV endurance   LE therex    DME       Passport items to be completed:  Get in/out of bed safely, in/out of a vehicle, safely use mobility device, walk or wheel around home/community, navigate up and down stairs, show how to get up/down from the ground, ensure home is accessible, demonstrate HEP, complete caregiver training    Physical Therapy Problems (Active)       Problem: Mobility       Dates: Start:  12/31/24         Goal: STG-Within one week, patient will ambulate at least 25 feet with LRAD and min A.        Dates: Start:  12/31/24         Goal Note filed on 01/02/25 0823 by Jimbo Oswald, PT       15-24ft Jun with FWW              Goal: STG-Within one week, patient will ascend and descend four to six stairs at least rails and min A.        Dates: Start:  12/31/24         Goal Note filed on 01/02/25 0823 by Jimbo Oswald, PT       Has not been assessed due to low standing activity tolerance                 Problem: Mobility Transfers       Dates: Start:  12/31/24         Goal: STG-Within one week, patient will transfer bed to chair with LRAD and CGA.        Dates: Start:  12/31/24         Goal Note filed on 01/02/25 0823 by Jimbo Oswald, PT       Jun                 Problem: PT-Long Term Goals       Dates: Start:  12/31/24         Goal: LTG-By discharge, patient will ambulate at least 150 feet with LRAD and supervision.        Dates: Start:  12/31/24            Goal: LTG-By discharge, patient will transfer one surface to  another with LRAD and supervision-mod I.        Dates: Start:  12/31/24            Goal: LTG-By discharge, patient will ambulate up/down flight of stairs with rails and supervision.        Dates: Start:  12/31/24            Goal: LTG-By discharge, patient will transfer in/out of a car with LRAD and supervision.        Dates: Start:  12/31/24

## 2025-01-02 NOTE — CARE PLAN
Problem: Mobility  Goal: STG-Within one week, patient will ambulate at least 25 feet with LRAD and min A.   Outcome: Progressing  Note: 15-24ft Jun with FWW  Goal: STG-Within one week, patient will ascend and descend four to six stairs at least rails and min A.   Outcome: Progressing  Note: Has not been assessed due to low standing activity tolerance     Problem: Mobility Transfers  Goal: STG-Within one week, patient will transfer bed to chair with LRAD and CGA.   Outcome: Progressing  Note: Jun     Problem: Balance  Goal: STG-Within one week, patient will maintain static standing for at least 1 minute, with no LOB, and CGA.   Outcome: Met

## 2025-01-02 NOTE — PROGRESS NOTES
NURSING DAILY NOTE    Name: Kiara Qureshi  Date of Admission: 12/30/2024  Admitting Diagnosis: Anoxic brain injury (HCC)  Attending Physician: Mario Cole M.d.  Allergies: Patient has no known allergies.    Safety  Patient Assist  o   Patient Precautions  oFall Risk, Swallow Precautions  Precaution Comments  oB lower abdominal wound VACs  Bed Transfer Status  oMinimal Assist  Toilet Transfer Status  oMinimal Assist  Assistive Devices  oRails, Wheelchair  Oxygen  oNone - Room Air  Diet/Therapeutic Dining  o  Current Diet Order   Procedures    Diet Order Diet: Consistent CHO (Diabetic) (meds whole with thins, straws ok)     Pill Administration  owhole and one at a time   Agitated Behavioral Scale  o   ABS Level of Severity  o     Fall Risk  Has the patient had a fall this admission?  Nilson  Josefa Parra Fall Risk Scoring  o15, HIGH RISK  Fall Risk Safety Measures  dwain alarm and chair alarm    Vitals  Temperature: 36.3 °C (97.4 °F)  Temp src: Temporal  Pulse: 67  Respiration: 18  Blood Pressure: 107/72  Blood Pressure MAP (Calculated): 84 MM HG  BP Location: Right, Upper Arm  Patient BP Position: Supine    Oxygen  Pulse Oximetry: 99 %  O2 (LPM): 0  O2 Delivery Device: None - Room Air    Bowel and Bladder  Last Bowel Movement  o12/29/24 (took scheduled senna and was offered prn miralax this am)  Stool Type  o   Bowel Device  o   Continent  oBladder: Did not void (Beckwith in place)  oBowel: No movement  Bladder Function  oUrine Void (mL):  (Moderate)  Urine Color: Yellow  Urine Clarity: Cloudy (Unable to see Through)  Genitourinary Assessment  oBladder Assessment (WDL):  WDL Except  Beckwith Catheter: Not Applicable  Beckwith Care: Given with Soap and Water  Urinary Symptoms: Catheter (Document on LDA)  Urine Color: Yellow  Urine Clarity: Cloudy (Unable to see Through)  Bladder Device: Bathroom  Bladder Scan: Post Void  $ Bladder Scan Results (mL): 1  Bladder Medications: No    Skin  Ray Score  o 17  Sensory  Interventions  o Bed Types: Standard/Trauma Mattress with Overlay  Skin Preventative Measures: Waffle Overlay, Pillows in Use for Support / Positioning  Moisture Interventions  oMoisturizers/Barriers: Barrier Wipes  Containment Devices: Indwelling Catheter      Pain  Pain Rating Scale  o0 - No Pain  Pain Location  o   Pain Location Orientation  o   Pain Interventions  oDeclines, Rest    ADLs    Bathing  o   Linen Change  o   Personal Hygiene  oPerineal Care, Moist Brianna Wipes  Chlorhexidine Bath  o   Oral Care  oBrushed Teeth  Teeth/Dentures  o   Shave  o   Nutrition Percentage Eaten  oBreakfast, Between % Consumed  Environmental Precautions  o   Patient Turns/Positioning  oPatient turns self independently side to side without assistance, to offload sacral area  Patient Turns Assistance/Tolerance  o   Bed Positions  o   Head of Bed Elevated  o       Psychosocial/Neurologic Assessment  Psychosocial Assessment  oPsychosocial (WDL):  Within Defined Limits  Neurologic Assessment  oNeuro (WDL): Exceptions to WDL  Level of Consciousness: Alert  Orientation Level: Oriented X4  Cognition: Follows commands  Speech: Clear  Motor Function/Sensation Assessment: Motor strength  Muscle Strength Right Arm: Good Strength Against Gravity and Moderate Resistance  Muscle Strength Left Arm: Good Strength Against Gravity and Moderate Resistance  Muscle Strength Right Leg: Fair Strength against Gravity but No Resistance  Muscle Strength Left Leg: Fair Strength against Gravity but No Resistance  oEENT (WDL):  WDL Except    Cardio/Pulmonary Assessment  Edema  o   Respiratory Breath Sounds  oRUL Breath Sounds: Clear  RML Breath Sounds: Clear  RLL Breath Sounds: Diminished  SHOSHANA Breath Sounds: Clear  LLL Breath Sounds: Diminished  Cardiac Assessment  oCardiac (WDL):  WDL Except

## 2025-01-02 NOTE — THERAPY
Occupational Therapy  Daily Treatment     Patient Name: Kiara Qureshi  Age:  59 y.o., Sex:  female  Medical Record #: [id number]  Today's Date: 1/2/2025     Precautions  Precautions: Fall Risk, Swallow Precautions  Comments: B lower abdominal wound VACs         Subjective    Pt was in babb position awake upon arrival and agreeable for OT session.      Objective       01/02/25 0831   OT Charge Group   OT Self Care / ADL (Units) 2   OT Total Time Spent   OT Individual Total Time Spent (Mins) 60   Precautions   Precautions Fall Risk;Swallow Precautions   Comments B lower abdominal wound VACs   Functional Level of Assist   Grooming Supervision;Seated   Grooming Description Increased time;Supervision for safety   Upper Body Dressing Supervision   Upper Body Dressing Description Increased time;Supervision for safety  (doffing/donning t-shirt and bra while seated)   Lower Body Dressing Contact Guard Assist   Lower Body Dressing Description Grab bar;Increased time;Supervision for safety;Other (comment)  (doff/james briefs, pants, and socks while seated at SBA. CGA for standing balance while pt pulled underwear and pants over hips.)   Toileting Contact Guard Assist   Toileting Description Assist for standing balance;Grab bar;Increased time;Supervision for safety  (Pt completed sunshine care while seated)   Bed, Chair, Wheelchair Transfer Contact Guard Assist   Bed Chair Wheelchair Transfer Description Increased time;Set-up of equipment;Squat pivot transfer to wheelchair   Toilet Transfers Contact Guard Assist   Toilet Transfer Description Grab bar;Increased time;Set-up of equipment;Supervision for safety   Bed Mobility    Supine to Sit Contact Guard Assist   Scooting Standby Assist   Interdisciplinary Plan of Care Collaboration   IDT Collaboration with  Family / Caregiver   Patient Position at End of Therapy Seated;Chair Alarm On;Call Light within Reach;Tray Table within Reach;Phone within Reach;Family / Friend in Room    Collaboration Comments  present throughout session and given CLOF         Assessment    Pt tolerated session well w/ focus on ADLs while seated. Pt was able to place LE in figure four position to doff/james socks w/ no c/o pain or discomfort.   Strengths: Able to follow instructions, Alert and oriented, Effective communication skills, Independent prior level of function, Manages pain appropriately, Motivated for self care and independence, Pleasant and cooperative, Supportive family, Willingly participates in therapeutic activities  Barriers: Decreased endurance, Generalized weakness, Hypotension, Impaired balance, Limited mobility    Plan    ADLs, IADLs, transfers, functional mobility, standing tolerance/balance, strength/endurance     DME       Passport items to be completed:  Perform bathroom transfers, complete dressing, complete feeding, get ready for the day, prepare a simple meal, participate in household tasks, adapt home for safety needs, demonstrate home exercise program, complete caregiver training     Occupational Therapy Goals (Active)       Problem: Dressing       Dates: Start:  01/02/25         Goal: STG-Within one week, patient will dress LB with SBA        Dates: Start:  01/02/25               Problem: Functional Transfers       Dates: Start:  12/31/24         Goal: STG-Within one week, patient will transfer to toilet with CGA       Dates: Start:  12/31/24               Problem: OT Long Term Goals       Dates: Start:  12/31/24         Goal: LTG-By discharge, patient will complete basic self care tasks with supervision/mod I       Dates: Start:  12/31/24            Goal: LTG-By discharge, patient will perform bathroom transfers with supervision/mod I       Dates: Start:  12/31/24            Goal: LTG-By discharge, patient will complete basic home management with supervision/mod I       Dates: Start:  12/31/24               Problem: Toileting       Dates: Start:  12/31/24         Goal:  STG-Within one week, patient will complete toileting tasks with min A       Dates: Start:  12/31/24

## 2025-01-02 NOTE — PROGRESS NOTES
NURSING DAILY NOTE    Name: Kiara Qureshi   Date of Admission: 12/30/2024   Admitting Diagnosis: Anoxic brain injury (HCC)  Attending Physician: CHANTAL GREY M.D.  Allergies: Patient has no known allergies.    Safety  Patient Assist     Patient Precautions  Fall Risk, Swallow Precautions  Precaution Comments  B lower abdominal wound VACs  Bed Transfer Status  Minimal Assist  Toilet Transfer Status   Minimal Assist  Assistive Devices     Oxygen  None - Room Air  Diet/Therapeutic Dining  Current Diet Order   Procedures    Diet Order Diet: Consistent CHO (Diabetic) (meds whole with thins, straws ok)     Pill Administration  whole  Agitated Behavioral Scale     ABS Level of Severity       Fall Risk  Has the patient had a fall this admission?   No  Josefa Parra Fall Risk Scoring  15, HIGH RISK  Fall Risk Safety Measures  bed alarm, chair alarm, and poor balance    Vitals  Temperature: 36.4 °C (97.5 °F)  Temp src: Temporal  Pulse: 74  Respiration: 18  Blood Pressure: 114/78  Blood Pressure MAP (Calculated): 90 MM HG  BP Location: Left, Upper Arm  Patient BP Position: Supine     Oxygen  Pulse Oximetry: 98 %  O2 (LPM): 0  O2 Delivery Device: None - Room Air    Bowel and Bladder  Last Bowel Movement  12/29/24 (took scheduled senna, will offer MOM in am)  Stool Type     Bowel Device     Continent  Bladder: Did not void (Beckwith in place)   Bowel: No movement  Bladder Function  Urine Void (mL): 300 ml  Urine Color: Christelle  Urine Clarity: Cloudy (Unable to see Through)  Genitourinary Assessment   Bladder Assessment (WDL):  WDL Except  Beckwith Care: Given with Soap and Water  Urinary Symptoms: Catheter (Document on LDA)  Urine Color: Christelle  Urine Clarity: Cloudy (Unable to see Through)  Bladder Device: Bathroom  Bladder Scan: Post Void  $ Bladder Scan Results (mL): 5  Bladder Medications: No    Skin  Ray Score   17  Sensory Interventions   Bed Types:  Standard/Trauma Mattress with Overlay  Skin Preventative Measures: Waffle Overlay, Pillows in Use for Support / Positioning  Moisture Interventions  Moisturizers/Barriers: Barrier Wipes, Moisturizer , Containment Devices  Containment Devices: Indwelling Catheter      Pain  Pain Rating Scale  0 - No Pain  Pain Location     Pain Location Orientation     Pain Interventions   Declines    ADLs    Bathing      Linen Change      Personal Hygiene  Perineal Care, Moist Brianna Wipes  Chlorhexidine Bath      Oral Care  Brushed Teeth  Teeth/Dentures     Shave     Nutrition Percentage Eaten  Breakfast, Between % Consumed  Environmental Precautions     Patient Turns/Positioning  Patient turns self independently side to side without assistance, to offload sacral area  Patient Turns Assistance/Tolerance     Bed Positions     Head of Bed Elevated         Psychosocial/Neurologic Assessment  Psychosocial Assessment  Psychosocial (WDL):  Within Defined Limits  Neurologic Assessment  Neuro (WDL): Exceptions to WDL  Level of Consciousness: Alert  Orientation Level: Oriented X4  Cognition: Follows commands  Speech: Clear  Motor Function/Sensation Assessment: Motor strength  Muscle Strength Right Arm: Good Strength Against Gravity and Moderate Resistance  Muscle Strength Left Arm: Good Strength Against Gravity and Moderate Resistance  Muscle Strength Right Leg: Fair Strength against Gravity but No Resistance  Muscle Strength Left Leg: Fair Strength against Gravity but No Resistance  EENT (WDL):  WDL Except    Cardio/Pulmonary Assessment  Edema      Respiratory Breath Sounds  RUL Breath Sounds: Clear  RML Breath Sounds: Clear  RLL Breath Sounds: Diminished  SHOSHANA Breath Sounds: Clear  LLL Breath Sounds: Diminished  Cardiac Assessment   Cardiac (WDL):  WDL Except

## 2025-01-02 NOTE — THERAPY
Physical Therapy   Daily Treatment     Patient Name: Kiara Qureshi  Age:  59 y.o., Sex:  female  Medical Record #: [id number]  Today's Date: 1/1/2025     Precautions  Precautions: Fall Risk, Swallow Precautions  Comments: B lower abdominal wound VACs    Subjective    Pt reporting fear of falling due to fall at previous facility which is potentially due to WC brakes not being locked. Fatigues quickly with light activity     Objective       01/01/25 1401   PT Charge Group   PT Gait Training (Units) 1   PT Therapeutic Activities (Units) 3   PT Total Time Spent   PT Individual Total Time Spent (Mins) 60   Vitals   Pulse 81   Patient BP Position Standing 1 minute   Blood Pressure 104/69   Vitals Comments sitting pre stand: 85/62 87bpm   Gait Functional Level of Assist    Gait Level Of Assist Total Assist  (Jun with WC follow for safety/pt fear of falling)   Assistive Device Parallel Bars;Front Wheel Walker   Distance (Feet)   (15 ft FWW, 5ft // bars)   # of Times Distance was Traveled 2   Deviation Decreased Base Of Support;Bradykinetic;Shuffled Gait   Transfer Functional Level of Assist   Bed, Chair, Wheelchair Transfer Minimal Assist   Bed Chair Wheelchair Transfer Description Increased time;Initial preparation for task;Set-up of equipment;Supervision for safety;Verbal cueing   Toilet Transfers Minimal Assist   Toilet Transfer Description Grab bar;Increased time;Initial preparation for task;Supervision for safety;Set-up of equipment;Verbal cueing   Bed Mobility    Supine to Sit Minimal Assist   Sit to Stand Minimal Assist   Interdisciplinary Plan of Care Collaboration   IDT Collaboration with  Family / Caregiver;Nursing   Patient Position at End of Therapy Seated;Chair Alarm On;Self Releasing Lap Belt Applied;Call Light within Reach;Tray Table within Reach;Phone within Reach;Family / Friend in Room   Collaboration Comments spouse, Bruno, present during tx; RN requesting output measurement   Walk 10 Feet    Assistance Needed Physical assistance   Physical Assistance Level Total assistance   Comment WC follow for safety: pt reports hx of falls and very anxious regarding ambulation   CARE Score - Walk 10 Feet 1     Assisted with toileting at end of tx, Jun for transfer, modA pants management, and set up for sunshine care    Assessment    Pt was able to progress to short distance ambulation with WC follow primarily due to fear of falling. She fatigues quickly and has limited activity tolerance. Pt has good potential to progress toward SPV-Miki as strength and endurance improve.  Strengths: Alert and oriented, Independent prior level of function, Motivated for self care and independence, Pleasant and cooperative, Supportive family, Willingly participates in therapeutic activities  Barriers: Decreased endurance, Fatigue, Generalized weakness, Impaired activity tolerance, Impaired balance, Impaired insight/denial of deficits    Plan    CV endurance  LE strengthening   Gait training FWW  Confidence with balance and mobility    DME       Passport items to be completed:  Get in/out of bed safely, in/out of a vehicle, safely use mobility device, walk or wheel around home/community, navigate up and down stairs, show how to get up/down from the ground, ensure home is accessible, demonstrate HEP, complete caregiver training    Physical Therapy Problems (Active)       Problem: Balance       Dates: Start:  12/31/24         Goal: STG-Within one week, patient will maintain static standing for at least 1 minute, with no LOB, and CGA.        Dates: Start:  12/31/24               Problem: Mobility       Dates: Start:  12/31/24         Goal: STG-Within one week, patient will ambulate at least 25 feet with LRAD and min A.        Dates: Start:  12/31/24            Goal: STG-Within one week, patient will ascend and descend four to six stairs at least rails and min A.        Dates: Start:  12/31/24               Problem: Mobility Transfers        Dates: Start:  12/31/24         Goal: STG-Within one week, patient will transfer bed to chair with LRAD and CGA.        Dates: Start:  12/31/24               Problem: PT-Long Term Goals       Dates: Start:  12/31/24         Goal: LTG-By discharge, patient will ambulate at least 150 feet with LRAD and supervision.        Dates: Start:  12/31/24            Goal: LTG-By discharge, patient will transfer one surface to another with LRAD and supervision-mod I.        Dates: Start:  12/31/24            Goal: LTG-By discharge, patient will ambulate up/down flight of stairs with rails and supervision.        Dates: Start:  12/31/24            Goal: LTG-By discharge, patient will transfer in/out of a car with LRAD and supervision.        Dates: Start:  12/31/24

## 2025-01-02 NOTE — CARE PLAN
The patient is Stable - Low risk of patient condition declining or worsening    Shift Goals  Clinical Goals: safety  Patient Goals: sleep well  Family Goals: safety    Progress made toward(s) clinical / shift goals:    Problem: Fall Risk - Rehab  Goal: Patient will remain free from falls  Outcome: Progressing. Patient bed in lowest locked position with bed alarm on and call light within reach. Patient calls appropriately with call light for needs and has not attempted to self transfer over shift.      Problem: Bladder / Voiding  Goal: Patient will establish and maintain regular urinary output  Outcome: Progressing. Patient remains continent of bladder, uses toilet with staff assist. Patient pvrs completed after de jesus pulled yesterday morning. Patient scans 5mL and 1 mL.

## 2025-01-02 NOTE — THERAPY
"Physical Therapy   Daily Treatment     Patient Name: Kiara Qureshi  Age:  59 y.o., Sex:  female  Medical Record #: [id number]  Today's Date: 1/2/2025     Precautions  Precautions: (P) Fall Risk, Swallow Precautions  Comments: (P) B lower abdominal wound VACs    Subjective    Patient seated in w/c and agreeable to therapy with encouragement.    \"I just worked on standing my legs are really tired.\"     Objective       01/02/25 1031   PT Charge Group   PT Therapeutic Activities (Units) 2   PT Total Time Spent   PT Individual Total Time Spent (Mins) 30   Precautions   Precautions Fall Risk;Swallow Precautions   Comments B lower abdominal wound VACs   Transfer Functional Level of Assist   Bed, Chair, Wheelchair Transfer Contact Guard Assist   Bed Chair Wheelchair Transfer Description Increased time;Initial preparation for task;Supervision for safety  (stand pivot transfer w/c>bed)   Bed Mobility    Sit to Supine Standby Assist   Sit to Stand Contact Guard Assist   Scooting Standby Assist   Interdisciplinary Plan of Care Collaboration   IDT Collaboration with  Family / Caregiver;Physical Therapist   Patient Position at End of Therapy In Bed;Bed Alarm On;Call Light within Reach;Tray Table within Reach;Phone within Reach;Family / Friend in Room   Collaboration Comments  present for session; CLOF/POC with primary PT   Physical Therapist Assigned   Assigned PT / Treatment Time / Comments Jimbo     Standing tolerance with SBA in parallel bars:   - Lateral and anterior toe taps to targets with BUE support on bars, 4x2 each direction, each leg.   - Standing performing BUE scap squeezes with orange theraband 2x10 and no UE support.    Assessment    Patient tolerated session well, benefiting from encouragement and education on improving endurance and standing confidence. Patient self-initiating seated rest breaks as needed throughout session. Very fearful of falling.    Strengths: Alert and oriented, Independent " prior level of function, Motivated for self care and independence, Pleasant and cooperative, Supportive family, Willingly participates in therapeutic activities  Barriers: Decreased endurance, Fatigue, Generalized weakness, Impaired activity tolerance, Impaired balance, Impaired insight/denial of deficits    Plan    CV endurance  LE strengthening   Gait training FWW  Confidence with balance and mobility     DME        Passport items to be completed:  Get in/out of bed safely, in/out of a vehicle, safely use mobility device, walk or wheel around home/community, navigate up and down stairs, show how to get up/down from the ground, ensure home is accessible, demonstrate HEP, complete caregiver training    Physical Therapy Problems (Active)       Problem: Mobility       Dates: Start:  12/31/24         Goal: STG-Within one week, patient will ambulate at least 25 feet with LRAD and min A.        Dates: Start:  12/31/24         Goal Note filed on 01/02/25 0823 by Jimbo Oswald, PT       15-24ft Jun with FWW              Goal: STG-Within one week, patient will ascend and descend four to six stairs at least rails and min A.        Dates: Start:  12/31/24         Goal Note filed on 01/02/25 0823 by Jimbo Oswald, PT       Has not been assessed due to low standing activity tolerance                 Problem: Mobility Transfers       Dates: Start:  12/31/24         Goal: STG-Within one week, patient will transfer bed to chair with LRAD and CGA.        Dates: Start:  12/31/24         Goal Note filed on 01/02/25 0823 by Jimbo Oswald, PT       Jun                 Problem: PT-Long Term Goals       Dates: Start:  12/31/24         Goal: LTG-By discharge, patient will ambulate at least 150 feet with LRAD and supervision.        Dates: Start:  12/31/24            Goal: LTG-By discharge, patient will transfer one surface to another with LRAD and supervision-mod I.        Dates: Start:  12/31/24            Goal: LTG-By discharge,  patient will ambulate up/down flight of stairs with rails and supervision.        Dates: Start:  12/31/24            Goal: LTG-By discharge, patient will transfer in/out of a car with LRAD and supervision.        Dates: Start:  12/31/24

## 2025-01-02 NOTE — PROGRESS NOTES
Received bedside shift report regarding patient and assumed care. Patient is sleeping and easily arousable, calm and stable, currently positioned in bed for comfort and safety; call light within reach. Denies any pain or discomfort at this time.

## 2025-01-03 ENCOUNTER — APPOINTMENT (OUTPATIENT)
Dept: PHYSICAL MEDICINE AND REHAB | Facility: REHABILITATION | Age: 60
DRG: 092 | End: 2025-01-03
Attending: PHYSICAL MEDICINE & REHABILITATION
Payer: COMMERCIAL

## 2025-01-03 ENCOUNTER — APPOINTMENT (OUTPATIENT)
Dept: OCCUPATIONAL THERAPY | Facility: REHABILITATION | Age: 60
DRG: 092 | End: 2025-01-03
Attending: PHYSICAL MEDICINE & REHABILITATION
Payer: COMMERCIAL

## 2025-01-03 ENCOUNTER — APPOINTMENT (OUTPATIENT)
Dept: PHYSICAL THERAPY | Facility: REHABILITATION | Age: 60
DRG: 092 | End: 2025-01-03
Attending: PHYSICAL MEDICINE & REHABILITATION
Payer: COMMERCIAL

## 2025-01-03 ENCOUNTER — APPOINTMENT (OUTPATIENT)
Dept: INPATIENT REHAB | Facility: REHABILITATION | Age: 60
DRG: 092 | End: 2025-01-03
Attending: PHYSICAL MEDICINE & REHABILITATION
Payer: COMMERCIAL

## 2025-01-03 LAB
GLUCOSE BLD STRIP.AUTO-MCNC: 77 MG/DL (ref 65–99)
GLUCOSE BLD STRIP.AUTO-MCNC: 82 MG/DL (ref 65–99)
GLUCOSE BLD STRIP.AUTO-MCNC: 88 MG/DL (ref 65–99)
GLUCOSE BLD STRIP.AUTO-MCNC: 94 MG/DL (ref 65–99)

## 2025-01-03 PROCEDURE — 97530 THERAPEUTIC ACTIVITIES: CPT

## 2025-01-03 PROCEDURE — 99232 SBSQ HOSP IP/OBS MODERATE 35: CPT | Performed by: PHYSICAL MEDICINE & REHABILITATION

## 2025-01-03 PROCEDURE — 97535 SELF CARE MNGMENT TRAINING: CPT

## 2025-01-03 PROCEDURE — 97112 NEUROMUSCULAR REEDUCATION: CPT

## 2025-01-03 PROCEDURE — A9270 NON-COVERED ITEM OR SERVICE: HCPCS | Performed by: PHYSICAL MEDICINE & REHABILITATION

## 2025-01-03 PROCEDURE — 700102 HCHG RX REV CODE 250 W/ 637 OVERRIDE(OP): Performed by: PHYSICAL MEDICINE & REHABILITATION

## 2025-01-03 PROCEDURE — 90791 PSYCH DIAGNOSTIC EVALUATION: CPT | Performed by: STUDENT IN AN ORGANIZED HEALTH CARE EDUCATION/TRAINING PROGRAM

## 2025-01-03 PROCEDURE — 97116 GAIT TRAINING THERAPY: CPT | Mod: CQ

## 2025-01-03 PROCEDURE — 770010 HCHG ROOM/CARE - REHAB SEMI PRIVAT*

## 2025-01-03 PROCEDURE — 82962 GLUCOSE BLOOD TEST: CPT | Mod: 91

## 2025-01-03 RX ADMIN — TRAZODONE HYDROCHLORIDE 100 MG: 100 TABLET ORAL at 20:37

## 2025-01-03 RX ADMIN — SENNOSIDES AND DOCUSATE SODIUM 2 TABLET: 50; 8.6 TABLET ORAL at 20:39

## 2025-01-03 RX ADMIN — Medication 9 MG: at 20:38

## 2025-01-03 RX ADMIN — CLOPIDOGREL BISULFATE 75 MG: 75 TABLET ORAL at 09:39

## 2025-01-03 RX ADMIN — APIXABAN 5 MG: 5 TABLET, FILM COATED ORAL at 09:39

## 2025-01-03 RX ADMIN — OMEPRAZOLE 20 MG: 20 CAPSULE, DELAYED RELEASE ORAL at 09:39

## 2025-01-03 RX ADMIN — OXYCODONE 5 MG: 5 TABLET ORAL at 09:39

## 2025-01-03 RX ADMIN — ATORVASTATIN CALCIUM 80 MG: 40 TABLET, FILM COATED ORAL at 20:37

## 2025-01-03 RX ADMIN — APIXABAN 5 MG: 5 TABLET, FILM COATED ORAL at 20:37

## 2025-01-03 ASSESSMENT — GAIT ASSESSMENTS
ASSISTIVE DEVICE: 4 WHEEL WALKER
GAIT LEVEL OF ASSIST: CONTACT GUARD ASSIST
DEVIATION: BRADYKINETIC;DECREASED HEEL STRIKE;DECREASED TOE OFF;INCREASED BASE OF SUPPORT
GAIT LEVEL OF ASSIST: CONTACT GUARD ASSIST
ASSISTIVE DEVICE: 4 WHEEL WALKER
DEVIATION: BRADYKINETIC;DECREASED HEEL STRIKE;DECREASED TOE OFF;INCREASED BASE OF SUPPORT
DISTANCE (FEET): 120

## 2025-01-03 ASSESSMENT — ACTIVITIES OF DAILY LIVING (ADL)
TUB_SHOWER_TRANSFER_DESCRIPTION: GRAB BAR;SHOWER BENCH;INCREASED TIME;SET-UP OF EQUIPMENT
BED_CHAIR_WHEELCHAIR_TRANSFER_DESCRIPTION: INCREASED TIME;SET-UP OF EQUIPMENT;SQUAT PIVOT TRANSFER TO WHEELCHAIR;SUPERVISION FOR SAFETY

## 2025-01-03 ASSESSMENT — PAIN DESCRIPTION - PAIN TYPE: TYPE: ACUTE PAIN

## 2025-01-03 ASSESSMENT — PATIENT HEALTH QUESTIONNAIRE - PHQ9
SUM OF ALL RESPONSES TO PHQ9 QUESTIONS 1 AND 2: 0
1. LITTLE INTEREST OR PLEASURE IN DOING THINGS: NOT AT ALL
2. FEELING DOWN, DEPRESSED, IRRITABLE, OR HOPELESS: NOT AT ALL

## 2025-01-03 NOTE — WOUND TEAM
[facility]  Inpatient Services  Wound and Skin Care Follow-up    Admission Date: 12/30/2024     Last order of IP CONSULT TO WOUND CARE was found on 12/31/2024 from Hospital Encounter on 12/30/2024     HPI, PMH, SH: Reviewed    Past Surgical History:   Procedure Laterality Date    INSERTION,CANNULA FOR ECMO,ADULT Left 10/30/2024    Procedure: INSERTION, CANNULA, FOR ECMO, ADULT;  Surgeon: Aime Elias D.O.;  Location: SURGERY [facility];  Service: Cardiothoracic     Social History     Tobacco Use    Smoking status: Never    Smokeless tobacco: Never   Substance Use Topics    Alcohol use: No     No chief complaint on file.    Diagnosis: Anoxic brain injury (HCC) [G93.1]    Unit where seen by Wound Team: [facility]15/02     WOUND FOLLOW UP RELATED TO:  bi-lateral groin wounds       WOUND TEAM PLAN OF CARE - Frequency of Follow-up:   Nursing to follow dressing orders written for wound care. Contact wound team if area fails to progress, deteriorates or with any questions/concerns if something comes up before next scheduled follow up (See below as to whether wound is following and frequency of wound follow up)  Dressing changes by wound team:                   NPWT change 3 times weekly - [facility]    WOUND HISTORY:       Wounds are present on admission. Pt originally had a STEMI on 11/27/2024. Patient has a history of obesity, hypertension, hyperlipidemia. She was seen at [facility] as a transfer initially for anterior STEMI requiring lytics. Patient underwent an LAD PCI but hospital course was complicated by retroperitoneal bleed, ventricular fibrillation and PEA arrest and cardiogenic shock requiring VA ECMO and Impella. She is now status post ECMO decannulation on November 4 and had left femoral embolectomy, PCI to the LAD, unsuccessful PCI to OM1 and Impella removal on November 8. Hospital course was complicated by patient developing encephalopathy and delirium, to which she was given valproic acid and  quetiapine.          WOUND ASSESSMENT/LDA  Wound 12/07/24 Full Thickness Wound Groin Left (Active)   Date First Assessed/Time First Assessed: 12/07/24 1721   Present on Original Admission: No  Hand Hygiene Completed: Yes  Primary Wound Type: Full Thickness Wound  Location: Groin  Laterality: Left      Assessments 1/3/2025 10:00 AM   Site Assessment Pink;Red;Fully granulated   Periwound Assessment Clean;Dry;Intact   Margins Defined edges   Drainage Amount Scant   Drainage Description Serosanguineous   Treatments Cleansed;Nonselective debridement   Wound Cleansing Approved Wound Cleanser   Periwound Protectant No-sting Skin Prep   Dressing Status Clean;Dry;Intact   Dressing Changed Changed   Dressing Options Wound Vac   Dressing Change/Treatment Frequency Monday, Wednesday, Friday, and As Needed   NEXT Dressing Change/Treatment Date 01/06/25   NEXT Weekly Photo (Inpatient Only) 01/06/25   Wound Team Following 3x Weekly   Non-staged Wound Description Full thickness   Wound Length (cm) 4 cm   Wound Width (cm) 1.5 cm   Wound Depth (cm) 1 cm   Wound Surface Area (cm^2) 6 cm^2   Wound Volume (cm^3) 6 cm^3   Wound Healing % 86   Wound Bed Granulation (%) 100 %   WOUND NURSE ONLY - Time Spent with Patient (mins) 60       Wound 12/07/24 Full Thickness Wound Groin Right (Active)   Date First Assessed/Time First Assessed: 12/07/24 1721   Present on Original Admission: No  Hand Hygiene Completed: Yes  Primary Wound Type: Full Thickness Wound  Location: Groin  Laterality: Right      Assessments 1/3/2025 10:00 AM   Site Assessment Clean;Dry;Intact;Fully granulated   Periwound Assessment Intact;Dry;Clean   Margins Defined edges   Drainage Amount Small   Drainage Description Serosanguineous   Treatments Cleansed;Nonselective debridement   Wound Cleansing Approved Wound Cleanser   Dressing Status Clean;Dry;Intact   Dressing Changed Changed   Dressing Options Collagen Dressing;Wound Vac   Dressing Change/Treatment Frequency Monday,  Wednesday, Friday, and As Needed   NEXT Dressing Change/Treatment Date 01/06/25   NEXT Weekly Photo (Inpatient Only) 01/06/25   Wound Team Following 3x Weekly   Non-staged Wound Description Full thickness   Wound Length (cm) 1.2 cm   Wound Width (cm) 1.2 cm   Wound Depth (cm) 4.5 cm   Wound Surface Area (cm^2) 1.44 cm^2   Wound Volume (cm^3) 6.48 cm^3   Wound Healing % 68   Wound Odor None   WOUND NURSE ONLY - Time Spent with Patient (mins) 60        Vascular:    JAGJIT:   No results found.    Lab Values:    Lab Results   Component Value Date/Time    WBC 3.9 (L) 12/31/2024 06:00 AM    RBC 3.77 (L) 12/31/2024 06:00 AM    HEMOGLOBIN 12.2 12/31/2024 06:00 AM    HEMATOCRIT 37.6 12/31/2024 06:00 AM    CREACTPROT 6.06 (H) 12/23/2024 11:20 AM    SEDRATEWES 140 (H) 11/28/2024 01:35 AM    HBA1C 6.2 (H) 11/01/2024 06:46 PM    HBA1C 6.4 (H) 10/30/2024 03:18 AM         Culture Results show:  No results found for this or any previous visit (from the past 720 hours).    Pain Level/Medicated:  PO pain medications administered by bedside RN 20 minutes prior       INTERVENTIONS BY WOUND TEAM:  Chart and images reviewed. Discussed with bedside RN. All areas of concern (based on picture review, LDA review and discussion with bedside RN) have been thoroughly assessed. Documentation of areas based on significant findings. This RN in to assess patient. Performed standard wound care which includes appropriate positioning, dressing removal and non-selective debridement. Pictures and measurements obtained weekly if/when required.    Wound:  right groin  Cleansed/Non-selectively Debrided with:  Wound cleanser and Gauze  Brianna wound: Cleansed with Wound cleanser and Gauze, Prepped with No Sting  Primary Dressing:  drape and 1 piece of black foam, paste ring used in groin fold to help seal.     Wound:  left groin  Cleansed/Non-selectively Debrided with:  Wound cleanser and Gauze  Brianna wound: Cleansed with Wound cleanser and Gauze, Prepped with No  Sting  Primary Dressing:  josey, and 1 piece of black foam  Secondary (Outer) Dressing: drape    Advanced Wound Care Discharge Planning  Number of Clinicians necessary to complete wound care: 2  Is patient requiring IV pain medications for dressing changes:  No   Length of time for dressing change 60 min. (This does not include chart review, pre-medication time, set up, clean up or time spent charting.)    Interdisciplinary consultation: Patient, Bedside RN ,  Corazon .  Pressure injury and staging reviewed with Provider Dr. Cole .    EVALUATION / RATIONALE FOR TREATMENT:     Date:  01/03/25  Wound Status:  Wound improving    Bi-lateral groin incisions with NPWT changed. Left side 100% granular tissue scant drainage, healing depth wise LxW remains the same. 1 piece of black foam used to pack and attempt to bring the edges together.   Right side narrow hole depth is 4.5 remains the same. Scant drainage full granulation. Packed wound bed with josey collagen to promote healing from areas of depth loosly pack black foam and connected to vac.  Date:  01/01/25  Wound Status:  Wound improving    Bilateral groin sites: the measurements today compared to 12/23/24 are smaller. The depth of the right groin is the same (4.5cm). No underlying structures are seen. Fully granulated tissue.   Skin prep to protect the skin, wound vac to expedite wound healing.           Goals: Steady decrease in wound area and depth weekly.    NURSING PLAN OF CARE ORDERS:  No new orders this visit    NUTRITION RECOMMENDATIONS   Wound Team Recommendations:  N/A     DIET ORDERS (From admission to next 24h)       Start     Ordered    01/01/25 1126  Diet Order Diet: Consistent CHO (Diabetic) (meds whole with thins, straws ok)  ALL MEALS        Question:  Diet:  Answer:  Consistent CHO (Diabetic)  Comment:  meds whole with thins, straws ok    01/01/25 1126    01/01/25 1126  Dietary Comment  ALL MEALS        Comments: Please prepare cheeseburger  for lunch today 1/1/25. Thank you!    01/01/25 1126    01/01/25 0753  Dietary Comment  ALL MEALS        Comments: MBSS tray to be picked up at 1000 today, 1/1/25, thank you    01/01/25 0758                    PREVENTATIVE INTERVENTIONS:   Q shift Ray - performed per nursing policy  Q shift pressure point assessments - performed per nursing policy    Surface/Positioning  Waffle overlay  - Currently in Place    Anticipated discharge plans:  Self/Family Care        Vac Discharge Needs:  Vac Discharge plan is purely a recommendation from wound team and not a requirement for discharge unless otherwise stated by physician.  Regular Vac in use and continued at discharge, will evaluate this on Monday may be able to d/c without vac

## 2025-01-03 NOTE — CARE PLAN
The patient is Watcher - Medium risk of patient condition declining or worsening    Shift Goals  Clinical Goals: Safety, pain management, wound vac  Patient Goals: Pain management  Family Goals: safety    Problem: Respiratory  Goal: Patient will understand use and administration of respiratory medications to improve respiratory function  Outcome: Progressing    Patient's oxygen saturation is WNL on RA.  No signs of dyspnea noted.     Problem: Risk for Aspiration  Goal: Patient's risk for aspiration will be absent or decrease  Outcome: Progressing     Patient is able to swallow whole pills with thin liquids without difficulty.  No s/s of aspiration noted.

## 2025-01-03 NOTE — THERAPY
Recreational Therapy  Daily Treatment     Patient Name: Kiara Qureshi  AGE:  59 y.o., SEX:  female  Medical Record #: [id number]  Today's Date: 1/3/2025       Subjective    Patient was willing to participate     Objective       01/03/25 0931   Procedural Tracking   Procedural Tracking Gross Motor Functional Leisure Skills   Treatment Time   Total Time Spent (mins) 20   Total Time Missed 10   Reasons for Time Missed Non-Medical-Other (Please Comment)  (patient will other staff)   Functional Ability Status - Cognitive   Attention Span Remains on Task   Comprehension Follows Three Step Commands   Judgment Able to Make Independent Decisions   Functional Ability Status - Emotional    Affect Appropriate   Mood Appropriate   Behavior Appropriate;Cooperative   Skilled Intervention    Skilled Intervention Gross Motor Leisure   Skilled Intervention Comments Standing tolerance   Interdisciplinary Plan of Care Collaboration   IDT Collaboration with  Other (See Comments);Nursing  (Psychologist)   Patient Position at End of Therapy Seated   Collaboration Comments Psycholgist went over and then RN needed to give meds   Strengths & Barriers   Strengths Able to follow instructions;Willingly participates in therapeutic activities;Independent prior level of function;Alert and oriented   Barriers Generalized weakness;Decreased endurance;Impaired balance     Patient stood for 5 minutes before needing a break, but was able to complete a simple craft project in that time. Completed part of her passport.     Assessment    Patient was surprised that it had only lakia 5 minutes that she was standing.     Strengths: (P) Able to follow instructions, Willingly participates in therapeutic activities, Independent prior level of function, Alert and oriented  Barriers: (P) Generalized weakness, Decreased endurance, Impaired balance    Plan    Continue to work on standing tolerance.     Passport items to be completed:  Verbalize two positive  leisure activities.

## 2025-01-03 NOTE — THERAPY
Occupational Therapy  Daily Treatment     Patient Name: Kiara Qureshi  Age:  59 y.o., Sex:  female  Medical Record #: [id number]  Today's Date: 1/3/2025     Precautions  Precautions: Fall Risk  Comments: B lower abdominal wound VACs         Subjective  Pt pleasant and agreeable to OT session. Reported that she heard this writer tell her to call her  to pick her up because she is ready to go. This conversation did not happen. She was receptive to being told that this did not happen. She cites that she needs to work on her endurance more before leaving anyway.      Objective     01/03/25 0831   OT Charge Group   OT Self Care / ADL (Units) 1   OT Neuromuscular Re-education / Balance (Units) 1   OT Total Time Spent   OT Individual Total Time Spent (Mins) 30   Vitals   O2 Delivery Device None - Room Air   Functional Level of Assist   Bed, Chair, Wheelchair Transfer Contact Guard Assist   Bed Chair Wheelchair Transfer Description Increased time;Squat pivot transfer to wheelchair;Verbal cueing;Initial preparation for task  (squat pivot performed d/t no AD present)   Bed Mobility    Supine to Sit Supervised   Sit to Supine Modified Independent   Interdisciplinary Plan of Care Collaboration   IDT Collaboration with  Nursing   Patient Position at End of Therapy In Bed   Collaboration Comments txfr of care to RN, collab with wound care and floor nurse for wound vac leak     Pt tossed ball and weighted ball from EOM for warm up with endurance work with good performance. Shortly after, her wound vac began to leak. Notified RN and she requested return to bed for assessment.     Assessment  Pt had fair tolerance to short and light session, interrupted by issues with her wound vac. She demonstrated good performance with w/c <> EOM and w/c <> EOB transfers.   Strengths: Able to follow instructions, Alert and oriented, Effective communication skills, Independent prior level of function, Manages pain appropriately,  Motivated for self care and independence, Pleasant and cooperative, Supportive family, Willingly participates in therapeutic activities  Barriers: Decreased endurance, Generalized weakness, Hypotension, Impaired balance, Limited mobility    Plan  ADLs, IADLs, transfers, functional mobility, standing tolerance/balance, strength/endurance     DME       Passport items to be completed:  Perform bathroom transfers, complete dressing, complete feeding, get ready for the day, prepare a simple meal, participate in household tasks, adapt home for safety needs, demonstrate home exercise program, complete caregiver training     Occupational Therapy Goals (Active)       Problem: Dressing       Dates: Start:  01/02/25         Goal: STG-Within one week, patient will dress LB with SBA        Dates: Start:  01/02/25               Problem: Functional Transfers       Dates: Start:  12/31/24         Goal: STG-Within one week, patient will transfer to toilet with CGA       Dates: Start:  12/31/24               Problem: OT Long Term Goals       Dates: Start:  12/31/24         Goal: LTG-By discharge, patient will complete basic self care tasks with supervision/mod I       Dates: Start:  12/31/24            Goal: LTG-By discharge, patient will perform bathroom transfers with supervision/mod I       Dates: Start:  12/31/24            Goal: LTG-By discharge, patient will complete basic home management with supervision/mod I       Dates: Start:  12/31/24               Problem: Toileting       Dates: Start:  12/31/24         Goal: STG-Within one week, patient will complete toileting tasks with min A       Dates: Start:  12/31/24

## 2025-01-03 NOTE — PROGRESS NOTES
Physical Medicine & Rehabilitation Progress Note    Encounter Date: 1/3/2025    Chief Complaint: Weakness    Interval Events (Subjective):  Seen in chair. Tolerating therapy. No new concerns.    Objective:  VITAL SIGNS: BP 97/62   Pulse 85   Temp 36.9 °C (98.5 °F) (Oral)   Resp 18   Ht 1.524 m (5')   Wt 79.5 kg (175 lb 4.3 oz)   SpO2 100%   BMI 34.23 kg/m²   Gen: No acute distress, well developed well nourished adult  HEENT: Normal Cephalic Atraumatic, Normal conjunctiva.   CV: warm extremities, well perfused, no edema  Resp: symmetric chest rise, breathing comfortably on room air  Abd: Soft, Non distended  Extremities: normal bulk, no atrophy  Skin: no visible rashes or lesions.   Neuro: alert, awake  Psych: Mood and affect appropriate and congruent    Laboratory Values:  Recent Results (from the past 72 hours)   POCT glucose device results    Collection Time: 12/31/24 11:34 AM   Result Value Ref Range    POC Glucose, Blood 72 65 - 99 mg/dL   POCT glucose device results    Collection Time: 12/31/24  4:54 PM   Result Value Ref Range    POC Glucose, Blood 39 (LL) 65 - 99 mg/dL   POCT glucose device results    Collection Time: 12/31/24  5:11 PM   Result Value Ref Range    POC Glucose, Blood 44 (L) 65 - 99 mg/dL   POCT glucose device results    Collection Time: 12/31/24  6:33 PM   Result Value Ref Range    POC Glucose, Blood 144 (H) 65 - 99 mg/dL   POCT glucose device results    Collection Time: 12/31/24  8:47 PM   Result Value Ref Range    POC Glucose, Blood 197 (H) 65 - 99 mg/dL   Basic Metabolic Panel    Collection Time: 01/01/25  5:55 AM   Result Value Ref Range    Sodium 136 135 - 145 mmol/L    Potassium 3.9 3.6 - 5.5 mmol/L    Chloride 104 96 - 112 mmol/L    Co2 21 20 - 33 mmol/L    Glucose 102 (H) 65 - 99 mg/dL    Bun 17 8 - 22 mg/dL    Creatinine 0.84 0.50 - 1.40 mg/dL    Calcium 8.3 (L) 8.5 - 10.5 mg/dL    Anion Gap 11.0 7.0 - 16.0   ESTIMATED GFR    Collection Time: 01/01/25  5:55 AM   Result Value  Ref Range    GFR (CKD-EPI) 80 >60 mL/min/1.73 m 2   POCT glucose device results    Collection Time: 01/01/25  7:38 AM   Result Value Ref Range    POC Glucose, Blood 106 (H) 65 - 99 mg/dL   POCT glucose device results    Collection Time: 01/01/25 10:54 AM   Result Value Ref Range    POC Glucose, Blood 137 (H) 65 - 99 mg/dL   POCT glucose device results    Collection Time: 01/01/25  5:02 PM   Result Value Ref Range    POC Glucose, Blood 131 (H) 65 - 99 mg/dL   POCT glucose device results    Collection Time: 01/01/25  9:24 PM   Result Value Ref Range    POC Glucose, Blood 119 (H) 65 - 99 mg/dL   POCT glucose device results    Collection Time: 01/02/25  7:25 AM   Result Value Ref Range    POC Glucose, Blood 94 65 - 99 mg/dL   POCT glucose device results    Collection Time: 01/02/25  5:24 PM   Result Value Ref Range    POC Glucose, Blood 112 (H) 65 - 99 mg/dL   POCT glucose device results    Collection Time: 01/02/25 10:41 PM   Result Value Ref Range    POC Glucose, Blood 112 (H) 65 - 99 mg/dL   POCT glucose device results    Collection Time: 01/03/25  7:59 AM   Result Value Ref Range    POC Glucose, Blood 82 65 - 99 mg/dL       Medications:  Scheduled Medications   Medication Dose Frequency    melatonin  9 mg Nightly    Pharmacy Consult Request  1 Each PHARMACY TO DOSE    senna-docusate  2 Tablet Q EVENING    omeprazole  20 mg DAILY    apixaban  5 mg BID    atorvastatin  80 mg Q EVENING    clopidogrel  75 mg DAILY    [Held by provider] dapagliflozin propanediol  10 mg DAILY    insulin lispro  1-6 Units 4X/DAY ACHS    traZODone  100 mg QHS     PRN medications: Respiratory Therapy Consult, hydrALAZINE, senna-docusate **AND** polyethylene glycol/lytes, ondansetron **OR** ondansetron, traZODone, sodium chloride, [DISCONTINUED] insulin GLARGINE **AND** insulin lispro **AND** POC blood glucose manual result **AND** NOTIFY MD and PharmD **AND** Administer 20 grams of glucose (approximately 8 ounces of fruit juice) every 15  minutes PRN FSBG less than 70 mg/dL **AND** dextrose bolus, oxyCODONE immediate-release **OR** oxyCODONE immediate-release    Diet:  Current Diet Order   Procedures    Diet Order Diet: Consistent CHO (Diabetic) (meds whole with thins, straws ok)       Medical Decision Making and Plan:  Anoxic brain injury  Metabolic encephalopathy  Delirium  -anoxia from cardiogenic shock  -2 min in hospital PEA arrest  -required seroquel, valproate and haldol to control delirium at OSH.   -mental status seems improved  -melatonin 10mg nightly  -trazodone 100mg nightly  -1/2- dc abilify     STEMI  -s/p PCI to LAD. Complicated by V-Fib and PEA arrest. Required ECMO and Impella due to cardiogenic shock.   -now off ECMO and Impella  -atrovastatin and plavix     HFrEF  -EF 25-30%  -soft pressure limiting GDMT  -farxiga 10mg  -no losartan due to hypotension  -no bisoprolol due to Chyne-florez breathing  -no lasix due to dehydration     Acute respiratory failure  -Cheyne-florez breathing pattern, thought to be from anoxic brain injury  -concern for aspiration   -now on room air     Pneumonia- resolved  -completed linezolid and zosyn 12/1     CVA  -MRI on 11/17/2024 at OSH: Punctate acute infarct of the right centrum semiovale. Scattered foci of enhancement in multiple vascular distributions as described may represent recent subacute infarcts. Diffuse stippled susceptibility signal throughout the supratentorial and infratentorial brain. Overall findings may represent sequelae of fat emboli or amyloid related angiopathy.   -Eliquis 5mg BID  -atorvastatin 80mg, plavix     Retroperitoneal bleed  -required several transfusions.   -hgb now stable     Hypotension, History of HTN  -losartan on hold     Hyperglycemia, DM  -last A1c 6.2 on 11/1.   -lantus 9u qhs, SSI  -12/31- low bs, evening bs 39. Poor intake. Dc lantus, dc farsiga. Monitor closely  -1/1- sugars improved 2/2 increase intake po   -1/3- continue off all meds    Cognitive  Communicative deficits:  - Patient with significant cognitive deficits due to anoxic brain injury  - Environmental modifications to decrease excessive stimulation including turning off the lights  - Consider starting neurostimulants such Nuvigil, amantadine or methylphenidate  - SLP to evaluate and treat cognitive deficits.   -Neuropsych will follow and perform testing if/when appropriate.     Dysphagia/Nutrition:  - Patient currently on dysphagia DIET.  - Continue therapies with SLP. SLP to perform swallow evaluation and provide oral motor exercises if necessary.     Neurogenic bladder:  - Timed voids with PVR q4H x3. If PVR > 400mL or if patient is unable to void, straight cath patient.  -12/31- remove de jesus     Neurogenic bowel:  -  Colace, Senna BID on admission  - Goal of 1BM/day.  -      Circadian Rhythm disorder:   -Trazadone 50mg PRN for restlessness after 10pm  -maltatonin and trazadone sheduled  Recommend lights on during the day/off at night, minimize nighttime interruptions as able.     Mood  - at risk of adjustment disorder, depression, and anxiety due to functional decline     ID:  - at risk for Urinary tract infection     Skin/Wounds:  Bilateral groin  -wound vac, discussing with wound care  - Pressure relief q2h while in bed. Close monitoring for signs of breakdown     Pain:  - Neuroceptic - continue tylenol       DVT prophylaxis:  continue eliquis     GI prophylaxis:  On Prilosec 20mg daily         -Follow-up Cardiology, PCP, neurology    ____________________________________    Mario Cole MD  Physical Medicine & Rehabilitation   Brain Injury Medicine   ____________________________________

## 2025-01-03 NOTE — PROGRESS NOTES
NURSING DAILY NOTE    Name: Kiara Qureshi   Date of Admission: 12/30/2024   Admitting Diagnosis: Anoxic brain injury (HCC)  Attending Physician: CHANTAL GREY M.D.  Allergies: Patient has no known allergies.    Safety  Patient Assist     Patient Precautions  Fall Risk, Swallow Precautions  Precaution Comments  B lower abdominal wound VACs  Bed Transfer Status  Contact Guard Assist  Toilet Transfer Status   Contact Guard Assist  Assistive Devices  Rails, Wheelchair  Oxygen  None - Room Air  Diet/Therapeutic Dining  Current Diet Order   Procedures    Diet Order Diet: Consistent CHO (Diabetic) (meds whole with thins, straws ok)     Pill Administration  whole  Agitated Behavioral Scale     ABS Level of Severity       Fall Risk  Has the patient had a fall this admission?   No  Josefa Parra Fall Risk Scoring  15, HIGH RISK  Fall Risk Safety Measures  bed alarm and chair alarm    Vitals  Temperature: 37.2 °C (99 °F)  Temp src: Oral  Pulse: 77  Respiration: 17  Blood Pressure: 98/66  Blood Pressure MAP (Calculated): 77 MM HG  BP Location: Right, Upper Arm  Patient BP Position: Staley's Position     Oxygen  Pulse Oximetry: 100 %  O2 (LPM): 0  O2 Delivery Device: None - Room Air    Bowel and Bladder  Last Bowel Movement  01/02/25  Stool Type  Type 4: Like a sausage or snake, smooth and soft, Type 5: Soft blob with clear cut edges (passed easily)  Bowel Device  Bathroom  Continent  Bladder: Did not void (Beckwith in place)   Bowel: No movement  Bladder Function  Urine Void (mL):  (Moderate)  Number of Times Voided: 1  Urine Color: Yellow  Urine Clarity: Cloudy (Unable to see Through)  Genitourinary Assessment   Bladder Assessment (WDL):  WDL Except  Beckwith Catheter: Not Applicable  Beckwith Care: Given with Soap and Water  Urinary Symptoms: Catheter (Document on LDA)  Urine Color: Yellow  Urine Clarity: Cloudy (Unable to see Through)  Bladder Device:  Bathroom  Bladder Scan: Post Void  $ Bladder Scan Results (mL): 1  Bladder Medications: No    Skin  Ray Score   17  Sensory Interventions   Bed Types: Standard/Trauma Mattress with Overlay  Skin Preventative Measures: Pillows in Use to Float Heels, Pillows in Use for Support / Positioning  Moisture Interventions  Moisturizers/Barriers: Barrier Wipes  Containment Devices: Indwelling Catheter      Pain  Pain Rating Scale  6 - Hard to ignore, avoid usual activities  Pain Location  Leg  Pain Location Orientation  Right, Left  Pain Interventions   Declines    ADLs    Bathing      Linen Change      Personal Hygiene  Perineal Care, Moist Brianna Wipes  Chlorhexidine Bath      Oral Care  Brushed Teeth  Teeth/Dentures     Shave     Nutrition Percentage Eaten  Breakfast, Between % Consumed  Environmental Precautions     Patient Turns/Positioning  Patient turns self independently side to side without assistance, to offload sacral area  Patient Turns Assistance/Tolerance     Bed Positions     Head of Bed Elevated         Psychosocial/Neurologic Assessment  Psychosocial Assessment  Psychosocial (WDL):  Within Defined Limits  Neurologic Assessment  Neuro (WDL): Exceptions to WDL  Level of Consciousness: Alert  Orientation Level: Oriented X4  Cognition: Follows commands  Speech: Clear  Pupil Assesment: No  Motor Function/Sensation Assessment: Motor strength  Muscle Strength Right Arm: Good Strength Against Gravity and Moderate Resistance  Muscle Strength Left Arm: Good Strength Against Gravity and Moderate Resistance  Muscle Strength Right Leg: Fair Strength against Gravity but No Resistance  Muscle Strength Left Leg: Fair Strength against Gravity but No Resistance  EENT (WDL):  WDL Except    Cardio/Pulmonary Assessment  Edema      Respiratory Breath Sounds  RUL Breath Sounds: Clear  RML Breath Sounds: Clear  RLL Breath Sounds: Diminished  SHOSHANA Breath Sounds: Clear  LLL Breath Sounds: Diminished  Cardiac Assessment   Cardiac  (WDL):  WDL Except

## 2025-01-03 NOTE — THERAPY
Physical Therapy   Daily Treatment     Patient Name: Kiara Qureshi  Age:  59 y.o., Sex:  female  Medical Record #: [id number]  Today's Date: 1/3/2025     Precautions  Precautions: Fall Risk  Comments: B lower abdominal wound VACs    Subjective    Pt in room and agreeable to therapy.      Objective       01/03/25 1401   PT Charge Group   PT Gait Training (Units) 2   PT Therapeutic Activities (Units) 2   PT Total Time Spent   PT Individual Total Time Spent (Mins) 60   Precautions   Precautions Fall Risk   Comments B lower abdominal wound VACs   Vitals   Pulse 70   Patient BP Position Sitting   Blood Pressure 107/68   Gait Functional Level of Assist    Gait Level Of Assist Contact Guard Assist   Assistive Device 4 Wheel Walker   Distance (Feet) 120   # of Times Distance was Traveled 2   Deviation Bradykinetic;Decreased Heel Strike;Decreased Toe Off;Increased Base Of Support   Sitting Lower Body Exercises   Nustep Resistance Level 1  (7 minutes no RB,)   Bed Mobility    Supine to Sit Supervised   Sit to Supine Modified Independent   Sit to Stand Standby Assist   Scooting Supervised   Neuro-Muscular Treatments   Neuro-Muscular Treatments Sequencing   Comments sequencing for turning to sit into 4WW   Interdisciplinary Plan of Care Collaboration   IDT Collaboration with  Physical Therapist   Patient Position at End of Therapy In Bed;Bed Alarm On;Call Light within Reach;Phone within Reach;Tray Table within Reach   Collaboration Comments POC         Assessment    Pt with good brake management and functional mobility using 4WW. One self corrected lateral loss of balance with turning to sit on 4WW. Educated on slowing down during turning and not getting distracted.     Strengths: Alert and oriented, Independent prior level of function, Motivated for self care and independence, Pleasant and cooperative, Supportive family, Willingly participates in therapeutic activities  Barriers: Decreased endurance, Fatigue,  Generalized weakness, Impaired activity tolerance, Impaired balance, Impaired insight/denial of deficits    Plan    Sit<>stands 4WW with wound vac management  CV endurance   LE therex    DME  PT DME Recommendations  Assistive Device: 4-Wheel Walker    Passport items to be completed:  Get in/out of bed safely, in/out of a vehicle, safely use mobility device, walk or wheel around home/community, navigate up and down stairs, show how to get up/down from the ground, ensure home is accessible, demonstrate HEP, complete caregiver training    Physical Therapy Problems (Active)       Problem: Mobility       Dates: Start:  12/31/24         Goal: STG-Within one week, patient will ambulate at least 25 feet with LRAD and min A.        Dates: Start:  12/31/24         Goal Note filed on 01/02/25 0823 by Jimbo Oswald, PT       15-24ft Jun with FWW              Goal: STG-Within one week, patient will ascend and descend four to six stairs at least rails and min A.        Dates: Start:  12/31/24         Goal Note filed on 01/02/25 0823 by Jimbo Oswald, PT       Has not been assessed due to low standing activity tolerance                 Problem: Mobility Transfers       Dates: Start:  12/31/24         Goal: STG-Within one week, patient will transfer bed to chair with LRAD and CGA.        Dates: Start:  12/31/24         Goal Note filed on 01/02/25 0823 by Jimbo Oswald, PT       Jun                 Problem: PT-Long Term Goals       Dates: Start:  12/31/24         Goal: LTG-By discharge, patient will ambulate at least 150 feet with LRAD and supervision.        Dates: Start:  12/31/24            Goal: LTG-By discharge, patient will transfer one surface to another with LRAD and supervision-mod I.        Dates: Start:  12/31/24            Goal: LTG-By discharge, patient will ambulate up/down flight of stairs with rails and supervision.        Dates: Start:  12/31/24            Goal: LTG-By discharge, patient will transfer in/out of a  car with LRAD and supervision.        Dates: Start:  12/31/24

## 2025-01-03 NOTE — DISCHARGE PLANNING
CM faxed referral to  for wound vac order.  Spoke with Shobha at  to let her know.  CM will continue to monitor for DC needs.      1/7/25: FROILAN spoke with Shobha at  to check status of wound vac.  She will call this CM back later this am.

## 2025-01-03 NOTE — CARE PLAN
"  Problem: Fall Risk - Rehab  Goal: Patient will remain free from falls  Outcome: Progressing  Note: Josefa Parra Fall risk Assessment Score: 15    High fall risk Interventions   - Bed and strip alarm   - Yellow sign by the door   - Yellow wrist band \"Fall risk\"  - Room near to the nurse station  - Do not leave patient unattended in the bathroom  - Fall risk education provided      Problem: Skin Integrity  Goal: Skin integrity is maintained or improved  Outcome: Progressing  Note: Bilateral groin with wound vac with 125 mm/hg suction. Cont monitored.     Problem: Diabetes Management  Goal: Patient's ability to maintain appropriate glucose levels will be maintained or improve  Outcome: Progressing  Note: F/S AT hs- 112, no coverage needed. , no s/s of hypoglycemia noted.   The patient is Stable - Low risk of patient condition declining or worsening    Shift Goals  Clinical Goals: pain control  Patient Goals: pain control  Family Goals: safety    Progress made toward(s) clinical / shift goals:  Pt free from fall and injury.    "

## 2025-01-03 NOTE — THERAPY
Occupational Therapy  Daily Treatment     Patient Name: Kiara Qureshi  Age:  59 y.o., Sex:  female  Medical Record #: [id number]  Today's Date: 1/3/2025     Precautions  Precautions: (P) Fall Risk  Comments: (P) B lower abdominal wound VACs         Subjective    Pt was sleeping upon arrival, however was easily awoken and agreeable for OT session.      Objective       01/03/25 0701   OT Charge Group   OT Self Care / ADL (Units) 4   OT Total Time Spent   OT Individual Total Time Spent (Mins) 60   Precautions   Precautions Fall Risk   Comments B lower abdominal wound VACs   Functional Level of Assist   Grooming Modified Independent;Seated   Grooming Description Increased time;Supervision for safety  (brush hair and teeth while seated in w/c)   Bathing Standby Assist   Bathing Description Grab bar;Hand held shower;Tub bench;Increased time;Set-up of equipment;Supervision for safety  (Pt completed all bathing tasks seated w/ weight shifting to wash sunshine area. total a for wound prtection on wound vac)   Upper Body Dressing Modified Independent   Upper Body Dressing Description Increased time;Supervision for safety  (doff night gown and james sports bra and t-shirt while seated in w/c)   Lower Body Dressing Standby Assist   Lower Body Dressing Description Grab bar;Increased time;Set-up of equipment;Supervision for safety  (doff underwear and james underwear, pants, socks and shoes. Pt was able to complete LB figure four to james socks)   Bed, Chair, Wheelchair Transfer Contact Guard Assist   Bed Chair Wheelchair Transfer Description Increased time;Set-up of equipment;Squat pivot transfer to wheelchair;Supervision for safety   Tub / Shower Transfers Standby Assist   Tub Shower Transfer Description Grab bar;Shower bench;Increased time;Set-up of equipment   Bed Mobility    Supine to Sit Standby Assist  (HOB slightly elevated)   Scooting Supervised   Interdisciplinary Plan of Care Collaboration   Patient Position at End of  Therapy Seated;Chair Alarm On;Call Light within Reach;Tray Table within Reach;Phone within Reach         Assessment    Pt tolerated session well w/ focus on ADLs. Pt was edu on energy conservation to during dressing tasks post shower d/t slightly rushing and fatiguing    Strengths: Able to follow instructions, Alert and oriented, Effective communication skills, Independent prior level of function, Manages pain appropriately, Motivated for self care and independence, Pleasant and cooperative, Supportive family, Willingly participates in therapeutic activities  Barriers: Decreased endurance, Generalized weakness, Hypotension, Impaired balance, Limited mobility    Plan    ADLs, IADLs, transfers, functional mobility, standing tolerance/balance, strength/endurance     DME       Passport items to be completed:  Perform bathroom transfers, complete dressing, complete feeding, get ready for the day, prepare a simple meal, participate in household tasks, adapt home for safety needs, demonstrate home exercise program, complete caregiver training     Occupational Therapy Goals (Active)       Problem: Dressing       Dates: Start:  01/02/25         Goal: STG-Within one week, patient will dress LB with SBA        Dates: Start:  01/02/25               Problem: Functional Transfers       Dates: Start:  12/31/24         Goal: STG-Within one week, patient will transfer to toilet with CGA       Dates: Start:  12/31/24               Problem: OT Long Term Goals       Dates: Start:  12/31/24         Goal: LTG-By discharge, patient will complete basic self care tasks with supervision/mod I       Dates: Start:  12/31/24            Goal: LTG-By discharge, patient will perform bathroom transfers with supervision/mod I       Dates: Start:  12/31/24            Goal: LTG-By discharge, patient will complete basic home management with supervision/mod I       Dates: Start:  12/31/24               Problem: Toileting       Dates: Start:  12/31/24          Goal: STG-Within one week, patient will complete toileting tasks with min A       Dates: Start:  12/31/24

## 2025-01-03 NOTE — CONSULTS
PSYCHOLOGY INITIAL EVALUATION    Psychological Evaluation Time (Face to Face): 1210-2954 in pt's room   Service Procedures: [id number]    Requesting Physician: Douglas  Reason for Request:anoxic brain injury    RELEVANT HISTORY    The following history was taken from available medical records unless otherwise indicated. Kiara Qureshi is a 59 y.o., female, right-handed,  admitted on 12/30/2024 to [facility] for her continued acute medical management, nursing cares, and comprehensive therapies. Briefly, pt admitted on 10/30 to [facility] as transfer from [facility] where she presented on 10/29 with anterior STEMI. On 10/31 she was transferred to [facility] for HLOC after she underwent LAD PCI with complications of retroperitoneal bleed, VF, PEA arrest, and cardiogenic shock requiring VA-ECMO & Impella placement. She was decannulated on 11/4 and underwent L femoral embolectomy, PCI to the LAD, and Impella removal on 11/8. Hospitalization was complicated by delirium/encephalopathy managed by valproate and quetiapine and she was transferred to floor status on 11/24. On 11/26 she developed suspected PNA and was treated with abx completed on 12/1. On 11/27 she was transferred back to [facility]. There was concern for anoxic brain injury in setting of cardia arrest; MRI on 11/17. She had acute metabolic encephalopathy complicated by hypernatremia requiring Seroquel & Deptakote. Due to worsening mental status and lethargy she was transferred to the IMCU on 12/9. She was slowly tapered off Seroquel & Depakote, as it was felt that Quetiapine could have been contributing to significant respiratory depression and somnolence,  but had worsening agitation afterwards. Psychiatry was consulted and recommended initiation of Abilify 2mg (now dc'ed), Trazodone 100mg, Melatonin 10mg, & Hydroxyzine q4PRN. EEG was negative for seizure activity. Rehabilitation psychology was consulted to complete evaluation and  testing for emotional and/or cognitive barriers interfering with Rehab and medical care.     Imaging/Testing Results:  MRI 11/17: Punctate acute infarct of the right centrum semiovale. Scattered foci of enhancement in multiple vascular distributions as described may represent recent subacute infarcts. Diffuse stippled susceptibility signal throughout the supratentorial and infratentorial brain. Overall findings may represent sequelae of fat emboli or amyloid related angiopathy.   CT Head 12/9: No acute intracranial abnormality is identified, there are nonspecific white matter changes, commonly associated with small vessel ischemic disease. Associated mild cerebral atrophy is noted. Bilateral sinusitis changes. Atherosclerosis.     Psychotropics:  Trazodone 100mg QHS  Melatonin 9mg nightly  Trazodone 50mg QHS PRN    Pain Medications:  Roxicodone 5mg q4PRN  Roxicodone 2.5mg q4PRN    PMH/PSH:  Past Medical History:   Diagnosis Date    Dyslipidemia 7/5/2016    Essential hypertension 7/5/2016    Obesity, morbid, BMI 40.0-49.9 ([facility]) 7/5/2016    Type II diabetes mellitus, well controlled ([facility]) 7/5/2016     Past Surgical History:   Procedure Laterality Date    INSERTION,CANNULA FOR ECMO,ADULT Left 10/30/2024    Procedure: INSERTION, CANNULA, FOR ECMO, ADULT;  Surgeon: Aime Elias D.O.;  Location: SURGERY [facility];  Service: Cardiothoracic       PSYCHOSOCIAL HISTORY    Social/Vocational History:   Kiara Qureshi is  and lives in a single level house in [place]  with her , Bruno, and their 2 dogs and 2 turtles. Pt indicated normal childhood development and reported 18 years as highest level of education. 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 Pt is not a  and she was driving PTA; Prior Level of Functioning (PLOF) was Independent. Social supports include spouse, siblings, mother, [place] community, and friends.     Mental Health/Substance Use  "History:  No significant mental health history was endorsed to include psychotherapy, psychotropics, prior diagnosis, suicide attempt, SPED, LD, or mental health hospitalization. Nor did pt endorse hx of ETOH, tobacco, or illicit substance use.    SUBJECTIVE    Mood: Pt did not endorse pervasive negative mood, suicidal ideation, or thoughts of harm toward self or others. Pt expressed some concern related to returning to work with concern for managing multiple responsibilities, health, & psychosocial stressors, which were normalized. Pt reported high stress in her workplace with responsibilities of teaching students, lesson planning/organization, mentoring commitments, leadership positions in committees, etc. Pt expressed goals for engaging in stress management with ideas such as journaling, boundary setting, attending to physical health, etc. Being discussed. Pt endorsed fear of failing or letting others down & described herself as a \"perfectionist.\"    Sleep: Sleep quality was rated as good reported history of sleep difficulty at home.    Pain:  Pt did not report being in pain on evaluation, but endorsed some leg soreness. Pt reported medications are effective and they have discussed their concerns with pain management to their physician.    Neuropsych Review of Systems: Neuropsych review of systems was unremarkable. Pt reported improved cognition s/p AMS while acute.    Goals for Rehab: \"endurance\"  Personally Identified Strengths: family oriented   Hobbies: crocheting, movies, puzzles, cross stitching, being with support system  Coping Strategies: creating a to-do list, organization, crafting       ASSESSMENT    Behavioral Observations:  Pt appeared alert and was oriented to person, place, & situation. Pt appeared to be adequate historian. Receptive language was WNL & pt wears hearing aids and glasses. Expressive speech was WNL. Thought processes were connected, logical, goal oriented, and negative for " AH/VH/SI/HI/delusions. Non-verbal behaviors were appropriate to context and eye contact was within social norms. Mood and affect were congruent and euthymic.    Cognitive Assessment:  [facility] Cognitive Assessment-8.1 (MoCA 8.1): 28/30; Performance Range: WNL;  Errors:backward digit span and sentence repetition     Mood Rating:  Patient Health Questionnaire- 9-Item (PHQ-9) = 0/27; no significant depressive symptomology   Items Endorsed: none  Generalized Anxiety Disorder-7 Item (BOLIVAR-7)= 1/27; no significant anxious symptomology   Items Endorsed: nervousness    IMPRESSION    Kiara Qureshi was seen in context of recent IRF hospitalization s/p STEMI with prolonged hospitalization requiring ECMO & Impella placement + removal. Additionally, she experienced complications including pulseless electric activity (PEA), encephalopathy with concern for anoxic injury, PNA, & acute infarct of the right centrum semiovale and subacute infarcts. Mental health history is grossly unremarkable and PTA pt was independently managing iADLs and working full-time with goals to return to [employer]. Cognitively, she is not endorsing carol cognitive changes and was WNL on bedside screening (MOCA). Given complexity of hospital course, findings on imaging, and discharge goals, comprehensive eval is recommended to assess for changes in cognition. In cardiac arrest populations cognitive declines in areas such as learning and memory, executive functioning, cognitive flexibility, reasoning and decision making, complex attention, and processing speed can occur and therefore comprehensive evaluation is advised.    From a mood perspective, pt is not endorsing carol symptoms; however, pt is endorsing stress a/w return to work, increasing stress management, & separation from her family/home for the past two months. Given risk for cardiac depression, ongoing screening at different time points in her recovery is recommended. Pt not endorsing pain on  evaluation, & identified some muscle soreness with increased activities in rehab therapies. Pt appears to be highly motivated to participate in rehab therapies and it is expected that cognition will be adequate to meet the demands of IPR.     DIAGNOSIS  Z65.8 Other specified problem related to psychosocial circumstances  R/o cognitive impairment      RECOMMENDATIONS     1. Pt was provided w/ feedback of eval results and psychoeducation regarding Psychology service; coping; cognition; stress management; cardiac-related mood changes; adjustment reactions and possible emotional, cognitive, and behavioral changes. Intervention centered on: supportive/processing therapy; relaxation and coping skills training (e.g., mindfulness, 3 good things exercise); and psychoeducation. Pt will be followed by this service for cognitive/emotional assessment, coping skills, emotional support, education, and supportive therapy.  2. Please contact if co-treatment would be beneficial.   3. Will plan to complete comprehensive cog eval on 1/6 with report to follow.  4. Non pharmacological interventions to optimize sleep such as keeping pt's door closed at night to lessen external distractions/noises, limiting overnight interruptions, maintaining regular sleep and wake time, providing/encouraging time to relax before bedtime,& limiting caffeine later in the day are encouraged.      Pt acknowledged information and willingness to try skills as needed or as prompted by staff.  The intent and utility of testing was relayed, and pt agreed to participate as needed. The purpose and method of the Psychology service, as well as the limits of confidentiality and billing and fees, were described to the pt. The pt verbalized understanding and agreement to participate in the evaluation.    Pt demonstrated ability and willingness to work towards goals, receptivity to psychological support and plan of care.       Therapy goals/plans were reviewed and  discussed with patient and she was in agreement.         Thank you for this consultation.    Rosamaria Alvarado, PhD, ABPP-Rp  Licensed Psychologist

## 2025-01-03 NOTE — DISCHARGE PLANNING
CM met with patient to update on IDT and DC date of 1/10/25.  She said she'd like to leave sooner.  Her sister who is an RN will be coming to stay with her for a few weeks.  CM discussed home health and DME.  Answered questions.  CM will continue to monitor for DC needs.

## 2025-01-04 ENCOUNTER — APPOINTMENT (OUTPATIENT)
Dept: PHYSICAL THERAPY | Facility: REHABILITATION | Age: 60
DRG: 092 | End: 2025-01-04
Attending: PHYSICAL MEDICINE & REHABILITATION
Payer: COMMERCIAL

## 2025-01-04 ENCOUNTER — APPOINTMENT (OUTPATIENT)
Dept: OCCUPATIONAL THERAPY | Facility: REHABILITATION | Age: 60
DRG: 092 | End: 2025-01-04
Attending: PHYSICAL MEDICINE & REHABILITATION
Payer: COMMERCIAL

## 2025-01-04 LAB
ERYTHROCYTE [DISTWIDTH] IN BLOOD BY AUTOMATED COUNT: 63.6 FL (ref 35.9–50)
GLUCOSE BLD STRIP.AUTO-MCNC: 103 MG/DL (ref 65–99)
GLUCOSE BLD STRIP.AUTO-MCNC: 108 MG/DL (ref 65–99)
GLUCOSE BLD STRIP.AUTO-MCNC: 118 MG/DL (ref 65–99)
GLUCOSE BLD STRIP.AUTO-MCNC: 199 MG/DL (ref 65–99)
GLUCOSE BLD STRIP.AUTO-MCNC: 86 MG/DL (ref 65–99)
HCT VFR BLD AUTO: 37.1 % (ref 37–47)
HGB BLD-MCNC: 11.7 G/DL (ref 12–16)
MCH RBC QN AUTO: 32.4 PG (ref 27–33)
MCHC RBC AUTO-ENTMCNC: 31.5 G/DL (ref 32.2–35.5)
MCV RBC AUTO: 102.8 FL (ref 81.4–97.8)
PLATELET # BLD AUTO: 194 K/UL (ref 164–446)
PMV BLD AUTO: 10.9 FL (ref 9–12.9)
RBC # BLD AUTO: 3.61 M/UL (ref 4.2–5.4)
WBC # BLD AUTO: 4.9 K/UL (ref 4.8–10.8)

## 2025-01-04 PROCEDURE — 97530 THERAPEUTIC ACTIVITIES: CPT

## 2025-01-04 PROCEDURE — A9270 NON-COVERED ITEM OR SERVICE: HCPCS | Performed by: PHYSICAL MEDICINE & REHABILITATION

## 2025-01-04 PROCEDURE — 85027 COMPLETE CBC AUTOMATED: CPT

## 2025-01-04 PROCEDURE — 700102 HCHG RX REV CODE 250 W/ 637 OVERRIDE(OP): Performed by: PHYSICAL MEDICINE & REHABILITATION

## 2025-01-04 PROCEDURE — 82962 GLUCOSE BLOOD TEST: CPT

## 2025-01-04 PROCEDURE — 97116 GAIT TRAINING THERAPY: CPT

## 2025-01-04 PROCEDURE — 770010 HCHG ROOM/CARE - REHAB SEMI PRIVAT*

## 2025-01-04 PROCEDURE — 97110 THERAPEUTIC EXERCISES: CPT

## 2025-01-04 PROCEDURE — 99232 SBSQ HOSP IP/OBS MODERATE 35: CPT | Performed by: PHYSICAL MEDICINE & REHABILITATION

## 2025-01-04 PROCEDURE — 36415 COLL VENOUS BLD VENIPUNCTURE: CPT

## 2025-01-04 RX ADMIN — INSULIN LISPRO 1 UNITS: 100 INJECTION, SOLUTION INTRAVENOUS; SUBCUTANEOUS at 17:39

## 2025-01-04 RX ADMIN — APIXABAN 5 MG: 5 TABLET, FILM COATED ORAL at 08:48

## 2025-01-04 RX ADMIN — TRAZODONE HYDROCHLORIDE 100 MG: 100 TABLET ORAL at 20:52

## 2025-01-04 RX ADMIN — OMEPRAZOLE 20 MG: 20 CAPSULE, DELAYED RELEASE ORAL at 08:48

## 2025-01-04 RX ADMIN — SENNOSIDES AND DOCUSATE SODIUM 2 TABLET: 50; 8.6 TABLET ORAL at 20:53

## 2025-01-04 RX ADMIN — Medication 9 MG: at 20:52

## 2025-01-04 RX ADMIN — CLOPIDOGREL BISULFATE 75 MG: 75 TABLET ORAL at 08:49

## 2025-01-04 RX ADMIN — APIXABAN 5 MG: 5 TABLET, FILM COATED ORAL at 20:52

## 2025-01-04 RX ADMIN — ATORVASTATIN CALCIUM 80 MG: 40 TABLET, FILM COATED ORAL at 20:51

## 2025-01-04 ASSESSMENT — GAIT ASSESSMENTS
DISTANCE (FEET): 150
GAIT LEVEL OF ASSIST: CONTACT GUARD ASSIST
DEVIATION: INCREASED BASE OF SUPPORT;BRADYKINETIC
ASSISTIVE DEVICE: 4 WHEEL WALKER
ASSISTIVE DEVICE: OTHER (COMMENTS)
DISTANCE (FEET): 10
DEVIATION: BRADYKINETIC;SHUFFLED GAIT
GAIT LEVEL OF ASSIST: CONTACT GUARD ASSIST

## 2025-01-04 ASSESSMENT — ACTIVITIES OF DAILY LIVING (ADL)
BED_CHAIR_WHEELCHAIR_TRANSFER_DESCRIPTION: SET-UP OF EQUIPMENT
TOILET_TRANSFER_DESCRIPTION: GRAB BAR;SET-UP OF EQUIPMENT;VERBAL CUEING;SUPERVISION FOR SAFETY
BED_CHAIR_WHEELCHAIR_TRANSFER_DESCRIPTION: SET-UP OF EQUIPMENT;VERBAL CUEING

## 2025-01-04 NOTE — PROGRESS NOTES
Patient c/o abdominal discomfort and she had an episode of emesis. Refused Maalox or Zofran. Dr. Cole notified.

## 2025-01-04 NOTE — PROGRESS NOTES
NURSING DAILY NOTE    Name: Kiara Qureshi   Date of Admission: 12/30/2024   Admitting Diagnosis: Anoxic brain injury (HCC)  Attending Physician: CHANTAL GREY M.D.  Allergies: Patient has no known allergies.    Safety  Patient Assist     Patient Precautions  Fall Risk  Precaution Comments  B lower abdominal wound VACs  Bed Transfer Status  Contact Guard Assist  Toilet Transfer Status   Contact Guard Assist  Assistive Devices  Rails, Wheelchair  Oxygen  None - Room Air  Diet/Therapeutic Dining  Current Diet Order   Procedures    Diet Order Diet: Consistent CHO (Diabetic) (meds whole with thins, straws ok)     Pill Administration  whole  Agitated Behavioral Scale     ABS Level of Severity       Fall Risk  Has the patient had a fall this admission?   No  Josefa Parra Fall Risk Scoring  15, HIGH RISK  Fall Risk Safety Measures  bed alarm and chair alarm    Vitals  Temperature: 36.2 °C (97.2 °F)  Temp src: Temporal  Pulse: 70  Respiration: 18  Blood Pressure: 94/68  Blood Pressure MAP (Calculated): 77 MM HG  BP Location: Right, Upper Arm  Patient BP Position: Supine     Oxygen  Pulse Oximetry: 98 %  O2 (LPM): 0  O2 Delivery Device: None - Room Air    Bowel and Bladder  Last Bowel Movement  01/02/25  Stool Type  Type 4: Like a sausage or snake, smooth and soft, Type 5: Soft blob with clear cut edges (passed easily)  Bowel Device  Bathroom  Continent  Bladder: Did not void (Beckwith in place)   Bowel: No movement  Bladder Function  Urine Void (mL):  (Moderate)  Number of Times Voided: 1  Urine Color: Yellow  Urine Clarity: Cloudy (Unable to see Through)  Genitourinary Assessment   Bladder Assessment (WDL):  WDL Except  Beckwith Catheter: Not Applicable  Beckwith Care: Given with Soap and Water  Urinary Symptoms: Catheter (Document on LDA)  Urine Color: Yellow  Urine Clarity: Cloudy (Unable to see Through)  Bladder Device: Bathroom  Bladder Scan: Post Void  $  Bladder Scan Results (mL): 1  Bladder Medications: No    Skin  Ray Score   17  Sensory Interventions   Bed Types: Standard/Trauma Mattress  Skin Preventative Measures: Pillows in Use for Support / Positioning  Moisture Interventions  Moisturizers/Barriers: Barrier Wipes  Containment Devices: Indwelling Catheter      Pain  Pain Rating Scale  7 - Focus of attention, prevents doing daily activities  Pain Location  Groin  Pain Location Orientation  Right, Left  Pain Interventions   Medication (see MAR)    ADLs    Bathing      Linen Change      Personal Hygiene  Perineal Care, Moist Brianna Wipes  Chlorhexidine Bath      Oral Care  Brushed Teeth  Teeth/Dentures     Shave     Nutrition Percentage Eaten  Dinner, Between 50-75% Consumed  Environmental Precautions     Patient Turns/Positioning  Patient turns self independently side to side without assistance, to offload sacral area  Patient Turns Assistance/Tolerance     Bed Positions  Bed Controls On, Bed Locked  Head of Bed Elevated  Self regulated      Psychosocial/Neurologic Assessment  Psychosocial Assessment  Psychosocial (WDL):  Within Defined Limits  Neurologic Assessment  Neuro (WDL): Exceptions to WDL  Level of Consciousness: Alert  Orientation Level: Oriented X4  Cognition: Follows commands  Speech: Clear  Pupil Assesment: No  Motor Function/Sensation Assessment: Motor strength  Muscle Strength Right Arm: Good Strength Against Gravity and Moderate Resistance  Muscle Strength Left Arm: Good Strength Against Gravity and Moderate Resistance  Muscle Strength Right Leg: Fair Strength against Gravity but No Resistance  Muscle Strength Left Leg: Fair Strength against Gravity but No Resistance  EENT (WDL):  WDL Except    Cardio/Pulmonary Assessment  Edema      Respiratory Breath Sounds  RUL Breath Sounds: Clear  RML Breath Sounds: Clear  RLL Breath Sounds: Diminished  SHOSHANA Breath Sounds: Clear  LLL Breath Sounds: Diminished  Cardiac Assessment   Cardiac (WDL):  WDL  Except

## 2025-01-04 NOTE — PROGRESS NOTES
Physical Medicine & Rehabilitation Progress Note    Encounter Date: 1/4/2025    Chief Complaint: Weakess    Interval Events (Subjective):  Seen in bed. Complained of nausea with vomiting x1 this am. Feels back to normal 5 minutes after vomiting. No chest pain, shortness of breath. Associated with meds/food this am.    Objective:  VITAL SIGNS: /66   Pulse 75   Temp 36.4 °C (97.6 °F) (Temporal)   Resp 17   Ht 1.524 m (5')   Wt 79.5 kg (175 lb 4.3 oz)   SpO2 95%   BMI 34.23 kg/m²   Gen: No acute distress, well developed well nourished adult  HEENT: Normal Cephalic Atraumatic, Normal conjunctiva.   CV: warm extremities, well perfused, no edema  Resp: symmetric chest rise, breathing comfortably on room air  Abd: Soft, Non distended  Extremities: normal bulk, no atrophy  Skin: no visible rashes or lesions.   Neuro: alert, awake  Psych: Mood and affect appropriate and congruent    Laboratory Values:  Recent Results (from the past 72 hours)   POCT glucose device results    Collection Time: 01/01/25  5:02 PM   Result Value Ref Range    POC Glucose, Blood 131 (H) 65 - 99 mg/dL   POCT glucose device results    Collection Time: 01/01/25  9:24 PM   Result Value Ref Range    POC Glucose, Blood 119 (H) 65 - 99 mg/dL   POCT glucose device results    Collection Time: 01/02/25  7:25 AM   Result Value Ref Range    POC Glucose, Blood 94 65 - 99 mg/dL   POCT glucose device results    Collection Time: 01/02/25  5:24 PM   Result Value Ref Range    POC Glucose, Blood 112 (H) 65 - 99 mg/dL   POCT glucose device results    Collection Time: 01/02/25 10:41 PM   Result Value Ref Range    POC Glucose, Blood 112 (H) 65 - 99 mg/dL   POCT glucose device results    Collection Time: 01/03/25  7:59 AM   Result Value Ref Range    POC Glucose, Blood 82 65 - 99 mg/dL   POCT glucose device results    Collection Time: 01/03/25 11:29 AM   Result Value Ref Range    POC Glucose, Blood 94 65 - 99 mg/dL   POCT glucose device results    Collection  Time: 01/03/25  4:58 PM   Result Value Ref Range    POC Glucose, Blood 77 65 - 99 mg/dL   POCT glucose device results    Collection Time: 01/03/25  8:43 PM   Result Value Ref Range    POC Glucose, Blood 88 65 - 99 mg/dL   CBC WITHOUT DIFFERENTIAL    Collection Time: 01/04/25  6:13 AM   Result Value Ref Range    WBC 4.9 4.8 - 10.8 K/uL    RBC 3.61 (L) 4.20 - 5.40 M/uL    Hemoglobin 11.7 (L) 12.0 - 16.0 g/dL    Hematocrit 37.1 37.0 - 47.0 %    .8 (H) 81.4 - 97.8 fL    MCH 32.4 27.0 - 33.0 pg    MCHC 31.5 (L) 32.2 - 35.5 g/dL    RDW 63.6 (H) 35.9 - 50.0 fL    Platelet Count 194 164 - 446 K/uL    MPV 10.9 9.0 - 12.9 fL   POCT glucose device results    Collection Time: 01/04/25  7:32 AM   Result Value Ref Range    POC Glucose, Blood 86 65 - 99 mg/dL   POCT glucose device results    Collection Time: 01/04/25 10:06 AM   Result Value Ref Range    POC Glucose, Blood 108 (H) 65 - 99 mg/dL       Medications:  Scheduled Medications   Medication Dose Frequency    melatonin  9 mg Nightly    Pharmacy Consult Request  1 Each PHARMACY TO DOSE    senna-docusate  2 Tablet Q EVENING    omeprazole  20 mg DAILY    apixaban  5 mg BID    atorvastatin  80 mg Q EVENING    clopidogrel  75 mg DAILY    [Held by provider] dapagliflozin propanediol  10 mg DAILY    insulin lispro  1-6 Units 4X/DAY ACHS    traZODone  100 mg QHS     PRN medications: Respiratory Therapy Consult, hydrALAZINE, senna-docusate **AND** polyethylene glycol/lytes, ondansetron **OR** ondansetron, traZODone, sodium chloride, [DISCONTINUED] insulin GLARGINE **AND** insulin lispro **AND** POC blood glucose manual result **AND** NOTIFY MD and PharmD **AND** Administer 20 grams of glucose (approximately 8 ounces of fruit juice) every 15 minutes PRN FSBG less than 70 mg/dL **AND** dextrose bolus, oxyCODONE immediate-release **OR** oxyCODONE immediate-release    Diet:  Current Diet Order   Procedures    Diet Order Diet: Consistent CHO (Diabetic) (meds whole with thins,  denita sanchez)       Medical Decision Making and Plan:  Anoxic brain injury  Metabolic encephalopathy  Delirium  -anoxia from cardiogenic shock  -2 min in hospital PEA arrest  -required seroquel, valproate and haldol to control delirium at OSH.   -mental status seems improved  -melatonin 10mg nightly  -trazodone 100mg nightly  -1/2- dc abilify     STEMI  -s/p PCI to LAD. Complicated by V-Fib and PEA arrest. Required ECMO and Impella due to cardiogenic shock.   -now off ECMO and Impella  -atrovastatin and plavix  -1/4- vomiting x1 episode. Bp, bs stable. No chest pain. Back to normal. monitor     HFrEF  -EF 25-30%  -soft pressure limiting GDMT  -farxiga 10mg  -no losartan due to hypotension  -no bisoprolol due to Chyne-florez breathing  -no lasix due to dehydration     Acute respiratory failure  -Cheyne-florez breathing pattern, thought to be from anoxic brain injury  -concern for aspiration   -now on room air     Pneumonia- resolved  -completed linezolid and zosyn 12/1     CVA  -MRI on 11/17/2024 at OSH: Punctate acute infarct of the right centrum semiovale. Scattered foci of enhancement in multiple vascular distributions as described may represent recent subacute infarcts. Diffuse stippled susceptibility signal throughout the supratentorial and infratentorial brain. Overall findings may represent sequelae of fat emboli or amyloid related angiopathy.   -Eliquis 5mg BID  -atorvastatin 80mg, plavix     Retroperitoneal bleed  -required several transfusions.   -hgb now stable     Hypotension, History of HTN  -losartan on hold     Hyperglycemia, DM  -last A1c 6.2 on 11/1.   -lantus 9u qhs, SSI  -12/31- low bs, evening bs 39. Poor intake. Dc lantus, dc farsiga. Monitor closely  -1/1- sugars improved 2/2 increase intake po   -1/3- continue off all meds     Cognitive Communicative deficits:  - Patient with significant cognitive deficits due to anoxic brain injury  - Environmental modifications to decrease excessive stimulation  including turning off the lights  - Consider starting neurostimulants such Nuvigil, amantadine or methylphenidate  - SLP to evaluate and treat cognitive deficits.   -Neuropsych will follow and perform testing if/when appropriate.     Dysphagia/Nutrition:  - Patient currently on dysphagia DIET.  - Continue therapies with SLP. SLP to perform swallow evaluation and provide oral motor exercises if necessary.     Neurogenic bladder:  - Timed voids with PVR q4H x3. If PVR > 400mL or if patient is unable to void, straight cath patient.  -12/31- remove de jesus     Neurogenic bowel:  -  Colace, Senna BID on admission  - Goal of 1BM/day.  -      Circadian Rhythm disorder:   -Trazadone 50mg PRN for restlessness after 10pm  -maltatonin and trazadone sheduled  Recommend lights on during the day/off at night, minimize nighttime interruptions as able.     Mood  - at risk of adjustment disorder, depression, and anxiety due to functional decline     ID:  - at risk for Urinary tract infection     Skin/Wounds:  Bilateral groin  -wound vac, discussing with wound care  - Pressure relief q2h while in bed. Close monitoring for signs of breakdown     Pain:  - Neuroceptic - continue tylenol prn        DVT prophylaxis:  continue eliquis     GI prophylaxis:  On Prilosec 20mg daily         -Follow-up Cardiology, PCP, neurology       ____________________________________    Mario Cole MD  Physical Medicine & Rehabilitation   Brain Injury Medicine   ____________________________________

## 2025-01-04 NOTE — CARE PLAN
"  Problem: Fall Risk - Rehab  Goal: Patient will remain free from falls  Note: Josefa Parra Fall risk Assessment Score: 15    High fall risk Interventions   - Bed and strip alarm   - Yellow sign by the door   - Yellow wrist band \"Fall risk\"  - Room near to the nurse station  - Do not leave patient unattended in the bathroom  - Fall risk education provided     The patient is Watcher - Medium risk of patient condition declining or worsening    Shift Goals  Clinical Goals: Safety  Patient Goals: Pain management  Family Goals: safety  "

## 2025-01-04 NOTE — CARE PLAN
"  Problem: Fall Risk - Rehab  Goal: Patient will remain free from falls  Outcome: Progressing  Note: Josefa Parra Fall risk Assessment Score: 15    High fall risk Interventions   - Bed and strip alarm   - Yellow sign by the door   - Yellow wrist band \"Fall risk\"  - Room near to the nurse station  - Do not leave patient unattended in the bathroom  - Fall risk education       Problem: Diabetes Management  Goal: Patient's ability to maintain appropriate glucose levels will be maintained or improve  Outcome: Progressing  Note: Finger stick 88 , snacks provided, no s/s of hypoglycemia noted.    The patient is Stable - Low risk of patient condition declining or worsening    Shift Goals  Clinical Goals: Safety, pain management, wound vac  Patient Goals: Pain management  Family Goals: safety    Progress made toward(s) clinical / shift goals:  Pt free from fall and injury.      "

## 2025-01-04 NOTE — PROGRESS NOTES
NURSING DAILY NOTE    Name: Kiara Qureshi   Date of Admission: 12/30/2024   Admitting Diagnosis: Anoxic brain injury (HCC)  Attending Physician: CHANTAL GREY M.D.  Allergies: Patient has no known allergies.    Safety  Patient Assist     Patient Precautions  Fall Risk  Precaution Comments  B lower abdominal wound VACs  Bed Transfer Status  Standby Assist  Toilet Transfer Status   Standby Assist  Assistive Devices  Wheelchair  Oxygen  None - Room Air  Diet/Therapeutic Dining  Current Diet Order   Procedures    Diet Order Diet: Consistent CHO (Diabetic) (meds whole with thins, straws ok)     Pill Administration  whole  Agitated Behavioral Scale     ABS Level of Severity       Fall Risk  Has the patient had a fall this admission?   No  Josefa Parra Fall Risk Scoring  15, HIGH RISK  Fall Risk Safety Measures  bed alarm and chair alarm    Vitals  Temperature: 36.6 °C (97.8 °F)  Temp src: Temporal  Pulse: 68  Respiration: 18  Blood Pressure: 97/59  Blood Pressure MAP (Calculated): 72 MM HG  BP Location: Right, Upper Arm  Patient BP Position: Supine     Oxygen  Pulse Oximetry: 90 %  O2 (LPM): 0  O2 Delivery Device: None - Room Air    Bowel and Bladder  Last Bowel Movement  01/04/25  Stool Type  Type 4: Like a sausage or snake, smooth and soft, Type 5: Soft blob with clear cut edges (passed easily)  Bowel Device  Bathroom  Continent  Bladder: Did not void (Beckwith in place)   Bowel: No movement  Bladder Function  Urine Void (mL):  (moderate)  Number of Times Voided: 1  Urine Color: Yellow  Urine Clarity: Cloudy (Unable to see Through)  Genitourinary Assessment   Bladder Assessment (WDL):  WDL Except  Beckwith Catheter: Not Applicable  Beckwith Care: Given with Soap and Water  Urinary Symptoms: Catheter (Document on LDA)  Urine Color: Yellow  Urine Clarity: Cloudy (Unable to see Through)  Bladder Device: Bathroom  Bladder Scan: Post Void  $ Bladder Scan Results  (mL): 1  Bladder Medications: No    Skin  Ray Score   17  Sensory Interventions   Bed Types: Standard/Trauma Mattress  Skin Preventative Measures: Pillows in Use for Support / Positioning  Moisture Interventions  Moisturizers/Barriers: Barrier Paste  Containment Devices: Indwelling Catheter      Pain  Pain Rating Scale  0 - No Pain  Pain Location  Groin  Pain Location Orientation  Right, Left  Pain Interventions   Declines    ADLs    Bathing      Linen Change      Personal Hygiene  Perineal Care, Moist Brianna Wipes  Chlorhexidine Bath      Oral Care  Brushed Teeth  Teeth/Dentures     Shave     Nutrition Percentage Eaten  Snack (Ice Cream)  Environmental Precautions     Patient Turns/Positioning  Patient turns self independently side to side without assistance, to offload sacral area  Patient Turns Assistance/Tolerance     Bed Positions  Bed Controls On, Bed Locked  Head of Bed Elevated  Self regulated      Psychosocial/Neurologic Assessment  Psychosocial Assessment  Psychosocial (WDL):  Within Defined Limits  Neurologic Assessment  Neuro (WDL): Exceptions to WDL  Level of Consciousness: Alert  Orientation Level: Oriented X4  Cognition: Follows commands  Speech: Clear  Pupil Assesment: No  Motor Function/Sensation Assessment: Motor strength  Muscle Strength Right Arm: Good Strength Against Gravity and Moderate Resistance  Muscle Strength Left Arm: Good Strength Against Gravity and Moderate Resistance  Muscle Strength Right Leg: Fair Strength against Gravity but No Resistance  Muscle Strength Left Leg: Fair Strength against Gravity but No Resistance  EENT (WDL):  WDL Except    Cardio/Pulmonary Assessment  Edema      Respiratory Breath Sounds  RUL Breath Sounds: Clear  RML Breath Sounds: Clear  RLL Breath Sounds: Diminished  SHOSHANA Breath Sounds: Clear  LLL Breath Sounds: Diminished  Cardiac Assessment   Cardiac (WDL):  WDL Except

## 2025-01-04 NOTE — THERAPY
Physical Therapy   Daily Treatment     Patient Name: Kiara Qureshi  Age:  59 y.o., Sex:  female  Medical Record #: [id number]  Today's Date: 1/3/2025     Precautions  Precautions: Fall Risk  Comments: B lower abdominal wound VACs    Subjective    Pt was in bed upon arrival, agreeable to session but required encouragement to increase ambulation distance.     Objective       01/03/25 1231   PT Charge Group   PT Gait Training (Units) 2   Supervising Physical Therapist Jimbo Oswald   PT Total Time Spent   PT Individual Total Time Spent (Mins) 30   Cognition    Level of Consciousness Alert   Gait Functional Level of Assist    Gait Level Of Assist Contact Guard Assist   Assistive Device 4 Wheel Walker   Distance (Feet)   (65x2 +50x2)   # of Times Distance was Traveled 4   Deviation Bradykinetic;Decreased Heel Strike;Decreased Toe Off;Increased Base Of Support   Bed Mobility    Supine to Sit Modified Independent   Sit to Supine Modified Independent   Sit to Stand Standby Assist   Scooting Supervised   Interdisciplinary Plan of Care Collaboration   IDT Collaboration with  Physical Therapist   Patient Position at End of Therapy In Bed;Call Light within Reach;Tray Table within Reach;Phone within Reach   Collaboration Comments POC     STS from 4WW during seated RB, 1 LOB during turning while sitting down on 4WW, required Jun to recover.    Assessment    Pt was able to carry wound vac and increase ambulation distance during session. She was happy that d/c date got moved up 2 days.      Strengths: Alert and oriented, Independent prior level of function, Motivated for self care and independence, Pleasant and cooperative, Supportive family, Willingly participates in therapeutic activities  Barriers: Decreased endurance, Fatigue, Generalized weakness, Impaired activity tolerance, Impaired balance, Impaired insight/denial of deficits    Plan    Sit<>stands 4WW with wound vac management  CV endurance   LE therex     DME  PT  DME Recommendations  Assistive Device: 4-Wheel Walker     Passport items to be completed:  Get in/out of bed safely, in/out of a vehicle, safely use mobility device, walk or wheel around home/community, navigate up and down stairs, show how to get up/down from the ground, ensure home is accessible, demonstrate HEP, complete caregiver training    Physical Therapy Problems (Active)       Problem: Mobility       Dates: Start:  12/31/24         Goal: STG-Within one week, patient will ambulate at least 25 feet with LRAD and min A.        Dates: Start:  12/31/24         Goal Note filed on 01/02/25 0823 by Jimbo Oswald, PT       15-24ft Jun with FWW              Goal: STG-Within one week, patient will ascend and descend four to six stairs at least rails and min A.        Dates: Start:  12/31/24         Goal Note filed on 01/02/25 0823 by Jimbo Oswald, PT       Has not been assessed due to low standing activity tolerance                 Problem: Mobility Transfers       Dates: Start:  12/31/24         Goal: STG-Within one week, patient will transfer bed to chair with LRAD and CGA.        Dates: Start:  12/31/24         Goal Note filed on 01/02/25 0823 by Jimbo Oswald, PT       Jun                 Problem: PT-Long Term Goals       Dates: Start:  12/31/24         Goal: LTG-By discharge, patient will ambulate at least 150 feet with LRAD and supervision.        Dates: Start:  12/31/24            Goal: LTG-By discharge, patient will transfer one surface to another with LRAD and supervision-mod I.        Dates: Start:  12/31/24            Goal: LTG-By discharge, patient will ambulate up/down flight of stairs with rails and supervision.        Dates: Start:  12/31/24            Goal: LTG-By discharge, patient will transfer in/out of a car with LRAD and supervision.        Dates: Start:  12/31/24

## 2025-01-04 NOTE — PROGRESS NOTES
NURSING DAILY NOTE    Name: Kiara Qureshi   Date of Admission: 12/30/2024   Admitting Diagnosis: Anoxic brain injury (HCC)  Attending Physician: CHANTAL GREY M.D.  Allergies: Patient has no known allergies.    Safety  Patient Assist     Patient Precautions  Fall Risk  Precaution Comments  B lower abdominal wound VACs  Bed Transfer Status  Contact Guard Assist  Toilet Transfer Status   Contact Guard Assist  Assistive Devices  Wheelchair  Oxygen  None - Room Air  Diet/Therapeutic Dining  Current Diet Order   Procedures    Diet Order Diet: Consistent CHO (Diabetic) (meds whole with thins, straws ok)     Pill Administration  whole and one at a time   Agitated Behavioral Scale     ABS Level of Severity       Fall Risk  Has the patient had a fall this admission?   No  Josefa Parra Fall Risk Scoring  15, HIGH RISK  Fall Risk Safety Measures  bed alarm, chair alarm, and poor balance    Vitals  Temperature: 36.2 °C (97.2 °F)  Temp src: Temporal  Pulse: 70  Respiration: 18  Blood Pressure: 94/68  Blood Pressure MAP (Calculated): 77 MM HG  BP Location: Right, Upper Arm  Patient BP Position: Supine     Oxygen  Pulse Oximetry: 98 %  O2 (LPM): 0  O2 Delivery Device: None - Room Air    Bowel and Bladder  Last Bowel Movement  01/02/25  Stool Type  Type 4: Like a sausage or snake, smooth and soft, Type 5: Soft blob with clear cut edges (passed easily)  Bowel Device  Bathroom  Continent  Bladder: Did not void (Beckwith in place)   Bowel: No movement  Bladder Function  Urine Void (mL):  (moderate)  Number of Times Voided: 1  Urine Color: Yellow  Urine Clarity: Cloudy (Unable to see Through)  Genitourinary Assessment   Bladder Assessment (WDL):  WDL Except  Beckwith Catheter: Not Applicable  Beckwith Care: Given with Soap and Water  Urinary Symptoms: Catheter (Document on LDA)  Urine Color: Yellow  Urine Clarity: Cloudy (Unable to see Through)  Bladder Device:  Bathroom  Bladder Scan: Post Void  $ Bladder Scan Results (mL): 1  Bladder Medications: No    Skin  Ray Score   17  Sensory Interventions   Bed Types: Standard/Trauma Mattress  Skin Preventative Measures: Pillows in Use for Support / Positioning  Moisture Interventions  Moisturizers/Barriers: Barrier Wipes  Containment Devices: Indwelling Catheter      Pain  Pain Rating Scale  0 - No Pain  Pain Location  Groin  Pain Location Orientation  Right, Left  Pain Interventions   Declines    ADLs    Bathing      Linen Change      Personal Hygiene  Perineal Care, Moist Brianna Wipes  Chlorhexidine Bath      Oral Care  Brushed Teeth  Teeth/Dentures     Shave     Nutrition Percentage Eaten  Snack (Ice Cream)  Environmental Precautions     Patient Turns/Positioning  Patient turns self independently side to side without assistance, to offload sacral area  Patient Turns Assistance/Tolerance     Bed Positions  Bed Controls On, Bed Locked  Head of Bed Elevated  Self regulated      Psychosocial/Neurologic Assessment  Psychosocial Assessment  Psychosocial (WDL):  Within Defined Limits  Neurologic Assessment  Neuro (WDL): Exceptions to WDL  Level of Consciousness: Alert  Orientation Level: Oriented X4  Cognition: Follows commands  Speech: Clear  Pupil Assesment: No  Motor Function/Sensation Assessment: Motor strength  Muscle Strength Right Arm: Good Strength Against Gravity and Moderate Resistance  Muscle Strength Left Arm: Good Strength Against Gravity and Moderate Resistance  Muscle Strength Right Leg: Fair Strength against Gravity but No Resistance  Muscle Strength Left Leg: Fair Strength against Gravity but No Resistance  EENT (WDL):  WDL Except    Cardio/Pulmonary Assessment  Edema      Respiratory Breath Sounds  RUL Breath Sounds: Clear  RML Breath Sounds: Clear  RLL Breath Sounds: Diminished  SHOSHANA Breath Sounds: Clear  LLL Breath Sounds: Diminished  Cardiac Assessment   Cardiac (WDL):  WDL Except

## 2025-01-04 NOTE — THERAPY
Physical Therapy   Daily Treatment     Patient Name: Kiara Qureshi  Age:  59 y.o., Sex:  female  Medical Record #: [id number]  Today's Date: 1/4/2025     Precautions  Precautions: Fall Risk  Comments: B lower abdominal wound VACs    Subjective    AM: Pt awaiting breakfast tray. Spouse present and requesting to initiate family training once pt completed breakfast    PM: Pt reports feeling better. Had a bout of emesis this morning     Objective       01/04/25 0830   Therapy Missed   Missed Therapy (Minutes) 30   Reason For Missed Therapy Non-Medical - Other (Please Comment)  (received incorrect breakfast tray; correct tray arrived at 8:40. still on track for minutes for the week due to extra therapy on 12/31 and 1/2)   PT Charge Group   PT Gait Training (Units) 1   PT Therapeutic Activities (Units) 1   PT Total Time Spent   PT Individual Total Time Spent (Mins) 30   Gait Functional Level of Assist    Gait Level Of Assist Contact Guard Assist   Assistive Device 4 Wheel Walker   Distance (Feet) 150   # of Times Distance was Traveled 1  (as well as 80ftx1 and 70ft x1)   Deviation Bradykinetic;Shuffled Gait   Transfer Functional Level of Assist   Bed, Chair, Wheelchair Transfer Standby Assist   Bed Chair Wheelchair Transfer Description Set-up of equipment;Verbal cueing   Toilet Transfers Standby Assist   Toilet Transfer Description Grab bar;Set-up of equipment;Verbal cueing;Supervision for safety   Bed Mobility    Supine to Sit Modified Independent   Sit to Supine Modified Independent   Sit to Stand   (set-up)   Scooting Independent   Rolling Independent   Interdisciplinary Plan of Care Collaboration   IDT Collaboration with  Family / Caregiver;Occupational Therapist;Nursing   Patient Position at End of Therapy Seated;Self Releasing Lap Belt Applied;Family / Friend in Room;Call Light within Reach   Collaboration Comments FT with spouse, Bruno; cleared Bruno to assist with ambulation in room using 4WW and toileting        Completed family training with pt's spouse, Bruno, for ambulation within room using 4WW and gait belt. Bruno demonstrated competency. Pt and therapist demonstrated toilet transfers at  level and discussed the option of using 4WW as well. Bruno is cleared to assist pt with mobility in the room.         01/04/25 1301   PT Charge Group   PT Gait Training (Units) 1   PT Therapeutic Activities (Units) 1   PT Total Time Spent   PT Individual Total Time Spent (Mins) 30   Gait Functional Level of Assist    Gait Level Of Assist Contact Guard Assist  (as well as SBA 4ww 50ft)   Assistive Device Other (Comments)  (hands hovering over // bars)   Distance (Feet) 10   # of Times Distance was Traveled 2   Deviation Increased Base Of Support;Bradykinetic  (incr lateral sway)   Stairs Functional Level of Assist   Level of Assist with Stairs Contact Guard Assist   # of Stairs Climbed 1  (retro approach using FWW to step simulating bed transfer for home)   Transfer Functional Level of Assist   Bed, Chair, Wheelchair Transfer Standby Assist   Bed Chair Wheelchair Transfer Description Set-up of equipment   Bed Mobility    Supine to Sit Independent   Sit to Supine Independent   Sit to Stand Modified Independent   Interdisciplinary Plan of Care Collaboration   Patient Position at End of Therapy In Bed;Call Light within Reach;Tray Table within Reach;Phone within Reach       Discussed progression of ambulation with LRAD to no AD. Recommend continued use of 4WW at this time      Assessment    Kiara is making steady progress with her endurance and stability with standing mobility. Her  has been cleared to assist by providing CGA with use of gait belt for ambulation using 4WW within the room.  Strengths: Alert and oriented, Independent prior level of function, Motivated for self care and independence, Pleasant and cooperative, Supportive family, Willingly participates in therapeutic activities  Barriers: Decreased endurance,  Fatigue, Generalized weakness, Impaired activity tolerance, Impaired balance, Impaired insight/denial of deficits    Plan    CV endurance   LE therex  Progress independence with 4WW    DME  PT DME Recommendations  Assistive Device: 4-Wheel Walker    Passport items to be completed:  Get in/out of bed safely, in/out of a vehicle, safely use mobility device, walk or wheel around home/community, navigate up and down stairs, show how to get up/down from the ground, ensure home is accessible, demonstrate HEP, complete caregiver training    Physical Therapy Problems (Active)       Problem: Mobility       Dates: Start:  12/31/24         Goal: STG-Within one week, patient will ambulate at least 25 feet with LRAD and min A.        Dates: Start:  12/31/24         Goal Note filed on 01/02/25 0823 by Jimbo Oswald, PT       15-24ft Jun with FWW              Goal: STG-Within one week, patient will ascend and descend four to six stairs at least rails and min A.        Dates: Start:  12/31/24         Goal Note filed on 01/02/25 0823 by Jimbo Oswald, PT       Has not been assessed due to low standing activity tolerance                 Problem: Mobility Transfers       Dates: Start:  12/31/24         Goal: STG-Within one week, patient will transfer bed to chair with LRAD and CGA.        Dates: Start:  12/31/24         Goal Note filed on 01/02/25 0823 by Jimbo Oswald, PT       Jun                 Problem: PT-Long Term Goals       Dates: Start:  12/31/24         Goal: LTG-By discharge, patient will ambulate at least 150 feet with LRAD and supervision.        Dates: Start:  12/31/24            Goal: LTG-By discharge, patient will transfer one surface to another with LRAD and supervision-mod I.        Dates: Start:  12/31/24            Goal: LTG-By discharge, patient will ambulate up/down flight of stairs with rails and supervision.        Dates: Start:  12/31/24            Goal: LTG-By discharge, patient will transfer in/out of a  car with LRAD and supervision.        Dates: Start:  12/31/24

## 2025-01-05 ENCOUNTER — APPOINTMENT (OUTPATIENT)
Dept: OCCUPATIONAL THERAPY | Facility: REHABILITATION | Age: 60
DRG: 092 | End: 2025-01-05
Attending: PHYSICAL MEDICINE & REHABILITATION
Payer: COMMERCIAL

## 2025-01-05 LAB
GLUCOSE BLD STRIP.AUTO-MCNC: 120 MG/DL (ref 65–99)
GLUCOSE BLD STRIP.AUTO-MCNC: 147 MG/DL (ref 65–99)
GLUCOSE BLD STRIP.AUTO-MCNC: 72 MG/DL (ref 65–99)
GLUCOSE BLD STRIP.AUTO-MCNC: 94 MG/DL (ref 65–99)

## 2025-01-05 PROCEDURE — 770010 HCHG ROOM/CARE - REHAB SEMI PRIVAT*

## 2025-01-05 PROCEDURE — 82962 GLUCOSE BLOOD TEST: CPT

## 2025-01-05 PROCEDURE — A9270 NON-COVERED ITEM OR SERVICE: HCPCS | Performed by: PHYSICAL MEDICINE & REHABILITATION

## 2025-01-05 PROCEDURE — 97110 THERAPEUTIC EXERCISES: CPT

## 2025-01-05 PROCEDURE — 700102 HCHG RX REV CODE 250 W/ 637 OVERRIDE(OP): Performed by: PHYSICAL MEDICINE & REHABILITATION

## 2025-01-05 RX ADMIN — Medication 9 MG: at 21:52

## 2025-01-05 RX ADMIN — APIXABAN 5 MG: 5 TABLET, FILM COATED ORAL at 08:19

## 2025-01-05 RX ADMIN — OMEPRAZOLE 20 MG: 20 CAPSULE, DELAYED RELEASE ORAL at 08:19

## 2025-01-05 RX ADMIN — CLOPIDOGREL BISULFATE 75 MG: 75 TABLET ORAL at 08:19

## 2025-01-05 RX ADMIN — APIXABAN 5 MG: 5 TABLET, FILM COATED ORAL at 21:53

## 2025-01-05 RX ADMIN — TRAZODONE HYDROCHLORIDE 100 MG: 100 TABLET ORAL at 21:52

## 2025-01-05 RX ADMIN — ATORVASTATIN CALCIUM 80 MG: 40 TABLET, FILM COATED ORAL at 21:52

## 2025-01-05 ASSESSMENT — ACTIVITIES OF DAILY LIVING (ADL): BED_CHAIR_WHEELCHAIR_TRANSFER_DESCRIPTION: INCREASED TIME;INITIAL PREPARATION FOR TASK;SET-UP OF EQUIPMENT

## 2025-01-05 ASSESSMENT — PAIN DESCRIPTION - PAIN TYPE: TYPE: ACUTE PAIN

## 2025-01-05 NOTE — CARE PLAN
"  Problem: Fall Risk - Rehab  Goal: Patient will remain free from falls  Outcome: Progressing  Note: Josefa Parra Fall risk Assessment Score: 15    High fall risk Interventions   - Bed and strip alarm   - Yellow sign by the door   - Yellow wrist band \"Fall risk\"  - Room near to the nurse station  - Do not leave patient unattended in the bathroom  - Fall risk education provided     Problem: Diabetes Management  Goal: Patient's ability to maintain appropriate glucose levels will be maintained or improve  Outcome: Progressing  Note: F/S monitored , HS- 118, no coverage needed. Snacks provided , no s/s of hypoglycemia noted.   The patient is Stable - Low risk of patient condition declining or worsening    Shift Goals  Clinical Goals: Safety  Patient Goals: Pain management  Family Goals: safety    Progress made toward(s) clinical / shift goals:  Pt free from fall and injury.      "

## 2025-01-05 NOTE — PROGRESS NOTES
NURSING DAILY NOTE    Name: Kiara Qureshi   Date of Admission: 12/30/2024   Admitting Diagnosis: Anoxic brain injury (HCC)  Attending Physician: CHANTAL GREY M.D.  Allergies: Patient has no known allergies.    Safety  Patient Assist     Patient Precautions  Fall Risk  Precaution Comments  B lower abdominal wound VACs  Bed Transfer Status  Standby Assist  Toilet Transfer Status   Standby Assist  Assistive Devices  Rails, Wheelchair  Oxygen  None - Room Air  Diet/Therapeutic Dining  Current Diet Order   Procedures    Diet Order Diet: Consistent CHO (Diabetic) (meds whole with thins, straws ok)     Pill Administration  whole and one at a time   Agitated Behavioral Scale     ABS Level of Severity       Fall Risk  Has the patient had a fall this admission?   No  Josefa Parra Fall Risk Scoring  15, HIGH RISK  Fall Risk Safety Measures  bed alarm and chair alarm    Vitals  Temperature: 36.7 °C (98.1 °F)  Temp src: Temporal  Pulse: 71  Respiration: 18  Blood Pressure: 107/65  Blood Pressure MAP (Calculated): 79 MM HG  BP Location: Right, Upper Arm  Patient BP Position: Staley's Position     Oxygen  Pulse Oximetry: 98 %  O2 (LPM): 0  O2 Delivery Device: None - Room Air    Bowel and Bladder  Last Bowel Movement  01/04/25  Stool Type  Type 4: Like a sausage or snake, smooth and soft, Type 5: Soft blob with clear cut edges (passed easily)  Bowel Device  Bathroom  Continent  Bladder: Did not void (Beckwith in place)   Bowel: No movement  Bladder Function  Urine Void (mL):  (MODERATE)  Number of Times Voided: 1  Urine Color: Yellow  Urine Clarity: Cloudy (Unable to see Through)  Genitourinary Assessment   Bladder Assessment (WDL):  WDL Except  Beckwith Catheter: Not Applicable  Beckwith Care: Given with Soap and Water  Urinary Symptoms: Catheter (Document on LDA)  Urine Color: Yellow  Urine Clarity: Cloudy (Unable to see Through)  Bladder Device: Bathroom  Bladder Scan:  Post Void  $ Bladder Scan Results (mL): 404  Bladder Medications: No    Skin  Ray Score   17  Sensory Interventions   Bed Types: Standard/Trauma Mattress  Skin Preventative Measures: Pillows in Use for Support / Positioning  Moisture Interventions  Moisturizers/Barriers: Barrier Paste  Containment Devices: Indwelling Catheter      Pain  Pain Rating Scale  0 - No Pain  Pain Location  Groin  Pain Location Orientation  Right, Left  Pain Interventions   [place], Declines    ADLs    Bathing      Linen Change      Personal Hygiene  Perineal Care, Moist Brianna Wipes  Chlorhexidine Bath      Oral Care  Brushed Teeth  Teeth/Dentures     Shave     Nutrition Percentage Eaten  Snack (Ice Cream)  Environmental Precautions  Treaded Slipper Socks on Patient, Personal Belongings, Wastebasket, Call Bell etc. in Easy Reach, Bed in Low Position  Patient Turns/Positioning  Patient turns self independently side to side without assistance, to offload sacral area  Patient Turns Assistance/Tolerance     Bed Positions  Bed Controls On, Bed Locked  Head of Bed Elevated  Self regulated      Psychosocial/Neurologic Assessment  Psychosocial Assessment  Psychosocial (WDL):  Within Defined Limits  Neurologic Assessment  Neuro (WDL): Exceptions to WDL  Level of Consciousness: Alert  Orientation Level: Oriented X4  Cognition: Follows commands  Speech: Clear  Pupil Assesment: No  Motor Function/Sensation Assessment: Motor strength  Muscle Strength Right Arm: Good Strength Against Gravity and Moderate Resistance  Muscle Strength Left Arm: Good Strength Against Gravity and Moderate Resistance  Muscle Strength Right Leg: Fair Strength against Gravity but No Resistance  Muscle Strength Left Leg: Fair Strength against Gravity but No Resistance  EENT (WDL):  WDL Except    Cardio/Pulmonary Assessment  Edema      Respiratory Breath Sounds  RUL Breath Sounds: Clear  RML Breath Sounds: Clear  RLL Breath Sounds: Diminished  SHOSHANA Breath Sounds: Clear  LLL  Breath Sounds: Diminished  Cardiac Assessment   Cardiac (WDL):  WDL Except

## 2025-01-05 NOTE — PROGRESS NOTES
Patient care assumed. Report received from [facility] ELEUTERIO Rao. Patient is alert and calm, resting in bed. Call light and bedside table within reach. Will continue to monitor.

## 2025-01-05 NOTE — THERAPY
"Occupational Therapy  Daily Treatment     Patient Name: Kiara Qureshi  Age:  59 y.o., Sex:  female  Medical Record #: [id number]  Today's Date: 1/5/2025     Precautions  Precautions: Fall Risk  Comments: B lower abdominal wound VACs         Subjective    \"My  will make sure I do these at home.\"      Objective       01/05/25 1101   OT Charge Group   OT Therapeutic Exercise (Units) 2   OT Total Time Spent   OT Individual Total Time Spent (Mins) 30   Functional Level of Assist   Bed, Chair, Wheelchair Transfer Standby Assist   Bed Chair Wheelchair Transfer Description Increased time;Initial preparation for task;Set-up of equipment   Sitting Upper Body Exercises   Chest Press 1 set of 10;Bilateral;Weight (See Comments for lbs)   Front Arm Raise 1 set of 10;Bilateral;Weight (See Comments for lbs)   Side Arm Raise 1 set of 10;Bilateral;Weight (See Comments for lbs)   Shoulder Press 1 set of 10;Bilateral;Weight (See Comments for lbs)   Shoulder Extension 1 set of 10;Bilateral;Weight (See Comments for lbs)   Internal Shoulder Rotation 1 set of 10;Bilateral;Weight (See Comments for lbs)   External Shoulder Rotation 1 set of 10;Bilateral;Weight (See Comments for lbs)   Bilateral Row 2 sets of 10;Bilateral;Weight (See Comments for lbs)  (forward/backward)   Bicep Curls 1 set of 10;Bilateral;Weight (See Comments for lbs)   Pronation / Supination 1 set of 10;Bilateral;Weight (See Comments for lbs)   Wrist Flexion / Extension 1 set of 10;Bilateral;Weight (See Comments for lbs)   Comments 3 lb weight used   Bed Mobility    Supine to Sit Independent   Scooting Independent   Interdisciplinary Plan of Care Collaboration   Patient Position at End of Therapy Seated;Chair Alarm On;Self Releasing Lap Belt Applied;Tray Table within Reach;Call Light within Reach;Phone within Reach         Assessment    Pt tolerated session well. Increased time taken to go over HEP for BUE strengthening using 3 lb weight. Pt able to follow " handout with min cues for proper form. Notes left on handout for further reference for increased carry over with exercises.     Strengths: Able to follow instructions, Alert and oriented, Effective communication skills, Independent prior level of function, Manages pain appropriately, Motivated for self care and independence, Pleasant and cooperative, Supportive family, Willingly participates in therapeutic activities  Barriers: Decreased endurance, Generalized weakness, Hypotension, Impaired balance, Limited mobility    Plan      ADLs, IADLs, transfers, functional mobility, standing tolerance/balance, strength/endurance     Occupational Therapy Goals (Active)       Problem: Dressing       Dates: Start:  01/02/25         Goal: STG-Within one week, patient will dress LB with SBA        Dates: Start:  01/02/25               Problem: Functional Transfers       Dates: Start:  12/31/24         Goal: STG-Within one week, patient will transfer to toilet with CGA       Dates: Start:  12/31/24               Problem: OT Long Term Goals       Dates: Start:  12/31/24         Goal: LTG-By discharge, patient will complete basic self care tasks with supervision/mod I       Dates: Start:  12/31/24            Goal: LTG-By discharge, patient will perform bathroom transfers with supervision/mod I       Dates: Start:  12/31/24            Goal: LTG-By discharge, patient will complete basic home management with supervision/mod I       Dates: Start:  12/31/24               Problem: Toileting       Dates: Start:  12/31/24         Goal: STG-Within one week, patient will complete toileting tasks with min A       Dates: Start:  12/31/24

## 2025-01-05 NOTE — CARE PLAN
The patient is Stable - Low risk of patient condition declining or worsening    Shift Goals  Clinical Goals: safety  Patient Goals: safety  Family Goals: safety    Problem: Fall Risk - Rehab  Goal: Patient will remain free from falls  Outcome: Progressing Pt uses call light consistently and appropriately. Waits for assistance does not attempt self transfer this shift. Able to verbalize needs.     Problem: Pain - Standard  Goal: Alleviation of pain or a reduction in pain to the patient’s comfort goal  Outcome: Progressing Patient able to verbalize pain level and verbalize an acceptable level of pain.

## 2025-01-05 NOTE — THERAPY
"Occupational Therapy  Daily Treatment     Patient Name: Kiara Qureshi  Age:  59 y.o., Sex:  female  Medical Record #: [id number]  Today's Date: 1/4/2025     Precautions  Precautions: Fall Risk  Comments: B lower abdominal wound VACs         Subjective    \"My stomach isn't feeling well.\" Pt became nauseous during session     Objective       01/04/25 0931   OT Charge Group   OT Therapy Activity (Units) 3   OT Therapeutic Exercise (Units) 1   OT Total Time Spent   OT Individual Total Time Spent (Mins) 60   Functional Level of Assist   Bed, Chair, Wheelchair Transfer Standby Assist   Bed Mobility    Sit to Supine Modified Independent   Interdisciplinary Plan of Care Collaboration   IDT Collaboration with  Family / Caregiver   Patient Position at End of Therapy In Bed;Call Light within Reach;Tray Table within Reach;Phone within Reach;Family / Friend in Room   Collaboration Comments  present throughout session     Increased time taken to go over pt's questions/concerns for d/c:   - pt concerned about getting out of recliner chair- educated pt on putting pillows behind back so that she does not sit back too far or moving heavier furniture/chair next to chair to use to pull up with to stand and having  close by to assist as needed if pt is unable to get up out of chair   - making sure to have cell phone near her/charged if  leaves and pt is home alone  - making sure to have  with her during bathing tasks initially for safety and while cooking hot meals   - educated on energy conservation techniques and fall prevention within the home   - educated pt on use of 4WW and sitting as needed and resting and making sure to always lock brakes prior to sitting/standing and pushing up against wall if able prior to sitting/standing     Assessment    Pt tolerated session fair. Pt receptive to education and going over safety/fall prevention and energy conservation techniques for d/c next week. Pt's "  present throughout session. Pt reported having upset stomach during session- nurse notified. Pt became nauseous and threw up breakfast. Pt reported feeling better after- assisted pt back to bed. Educated pt on HEP for BUE strengthening using 2-3 lb weights. Further education needed for carry over prior to d/c.     Strengths: Able to follow instructions, Alert and oriented, Effective communication skills, Independent prior level of function, Manages pain appropriately, Motivated for self care and independence, Pleasant and cooperative, Supportive family, Willingly participates in therapeutic activities  Barriers: Decreased endurance, Generalized weakness, Hypotension, Impaired balance, Limited mobility    Plan    ADLs, IADLs, transfers, functional mobility, standing tolerance/balance, strength/endurance     Occupational Therapy Goals (Active)       Problem: Dressing       Dates: Start:  01/02/25         Goal: STG-Within one week, patient will dress LB with SBA        Dates: Start:  01/02/25               Problem: Functional Transfers       Dates: Start:  12/31/24         Goal: STG-Within one week, patient will transfer to toilet with CGA       Dates: Start:  12/31/24               Problem: OT Long Term Goals       Dates: Start:  12/31/24         Goal: LTG-By discharge, patient will complete basic self care tasks with supervision/mod I       Dates: Start:  12/31/24            Goal: LTG-By discharge, patient will perform bathroom transfers with supervision/mod I       Dates: Start:  12/31/24            Goal: LTG-By discharge, patient will complete basic home management with supervision/mod I       Dates: Start:  12/31/24               Problem: Toileting       Dates: Start:  12/31/24         Goal: STG-Within one week, patient will complete toileting tasks with min A       Dates: Start:  12/31/24

## 2025-01-06 ENCOUNTER — APPOINTMENT (OUTPATIENT)
Dept: OCCUPATIONAL THERAPY | Facility: REHABILITATION | Age: 60
DRG: 092 | End: 2025-01-06
Attending: PHYSICAL MEDICINE & REHABILITATION
Payer: COMMERCIAL

## 2025-01-06 ENCOUNTER — APPOINTMENT (OUTPATIENT)
Dept: INPATIENT REHAB | Facility: REHABILITATION | Age: 60
DRG: 092 | End: 2025-01-06
Attending: PHYSICAL MEDICINE & REHABILITATION
Payer: COMMERCIAL

## 2025-01-06 ENCOUNTER — APPOINTMENT (OUTPATIENT)
Dept: PHYSICAL THERAPY | Facility: REHABILITATION | Age: 60
DRG: 092 | End: 2025-01-06
Attending: PHYSICAL MEDICINE & REHABILITATION
Payer: COMMERCIAL

## 2025-01-06 LAB
GLUCOSE BLD STRIP.AUTO-MCNC: 161 MG/DL (ref 65–99)
GLUCOSE BLD STRIP.AUTO-MCNC: 166 MG/DL (ref 65–99)
GLUCOSE BLD STRIP.AUTO-MCNC: 69 MG/DL (ref 65–99)
GLUCOSE BLD STRIP.AUTO-MCNC: 94 MG/DL (ref 65–99)

## 2025-01-06 PROCEDURE — A9270 NON-COVERED ITEM OR SERVICE: HCPCS | Performed by: PHYSICAL MEDICINE & REHABILITATION

## 2025-01-06 PROCEDURE — 97602 WOUND(S) CARE NON-SELECTIVE: CPT

## 2025-01-06 PROCEDURE — 99232 SBSQ HOSP IP/OBS MODERATE 35: CPT | Performed by: PHYSICAL MEDICINE & REHABILITATION

## 2025-01-06 PROCEDURE — 82962 GLUCOSE BLOOD TEST: CPT | Mod: 91

## 2025-01-06 PROCEDURE — 700102 HCHG RX REV CODE 250 W/ 637 OVERRIDE(OP): Performed by: PHYSICAL MEDICINE & REHABILITATION

## 2025-01-06 PROCEDURE — 96131 PSYCL TST EVAL PHYS/QHP EA: CPT | Performed by: STUDENT IN AN ORGANIZED HEALTH CARE EDUCATION/TRAINING PROGRAM

## 2025-01-06 PROCEDURE — 97110 THERAPEUTIC EXERCISES: CPT

## 2025-01-06 PROCEDURE — 96130 PSYCL TST EVAL PHYS/QHP 1ST: CPT | Performed by: STUDENT IN AN ORGANIZED HEALTH CARE EDUCATION/TRAINING PROGRAM

## 2025-01-06 PROCEDURE — 770010 HCHG ROOM/CARE - REHAB SEMI PRIVAT*

## 2025-01-06 PROCEDURE — 97112 NEUROMUSCULAR REEDUCATION: CPT

## 2025-01-06 PROCEDURE — 97530 THERAPEUTIC ACTIVITIES: CPT

## 2025-01-06 PROCEDURE — 97535 SELF CARE MNGMENT TRAINING: CPT

## 2025-01-06 PROCEDURE — 97116 GAIT TRAINING THERAPY: CPT | Mod: CQ

## 2025-01-06 RX ORDER — TRAZODONE HYDROCHLORIDE 50 MG/1
50 TABLET, FILM COATED ORAL
Status: DISCONTINUED | OUTPATIENT
Start: 2025-01-06 | End: 2025-01-08 | Stop reason: HOSPADM

## 2025-01-06 RX ADMIN — CLOPIDOGREL BISULFATE 75 MG: 75 TABLET ORAL at 08:18

## 2025-01-06 RX ADMIN — ATORVASTATIN CALCIUM 80 MG: 40 TABLET, FILM COATED ORAL at 21:49

## 2025-01-06 RX ADMIN — INSULIN LISPRO 1 UNITS: 100 INJECTION, SOLUTION INTRAVENOUS; SUBCUTANEOUS at 11:25

## 2025-01-06 RX ADMIN — OMEPRAZOLE 20 MG: 20 CAPSULE, DELAYED RELEASE ORAL at 08:18

## 2025-01-06 RX ADMIN — APIXABAN 5 MG: 5 TABLET, FILM COATED ORAL at 21:48

## 2025-01-06 RX ADMIN — TRAZODONE HYDROCHLORIDE 50 MG: 50 TABLET ORAL at 21:49

## 2025-01-06 RX ADMIN — OXYCODONE 5 MG: 5 TABLET ORAL at 21:49

## 2025-01-06 RX ADMIN — Medication 9 MG: at 21:48

## 2025-01-06 RX ADMIN — OXYCODONE 5 MG: 5 TABLET ORAL at 11:36

## 2025-01-06 RX ADMIN — APIXABAN 5 MG: 5 TABLET, FILM COATED ORAL at 08:18

## 2025-01-06 ASSESSMENT — GAIT ASSESSMENTS
GAIT LEVEL OF ASSIST: STANDBY ASSIST
ASSISTIVE DEVICE: 4 WHEEL WALKER
DISTANCE (FEET): 60
GAIT LEVEL OF ASSIST: STANDBY ASSIST
DEVIATION: DECREASED BASE OF SUPPORT;DECREASED HEEL STRIKE;DECREASED TOE OFF
ASSISTIVE DEVICE: 4 WHEEL WALKER
DEVIATION: INCREASED BASE OF SUPPORT

## 2025-01-06 ASSESSMENT — ACTIVITIES OF DAILY LIVING (ADL): BED_CHAIR_WHEELCHAIR_TRANSFER_DESCRIPTION: ADAPTIVE EQUIPMENT;VERBAL CUEING;INCREASED TIME

## 2025-01-06 ASSESSMENT — PAIN DESCRIPTION - PAIN TYPE
TYPE: ACUTE PAIN
TYPE: CHRONIC PAIN
TYPE: ACUTE PAIN

## 2025-01-06 NOTE — PROGRESS NOTES
Physical Medicine & Rehabilitation Progress Note    Encounter Date: 1/6/2025    Chief Complaint: Weakness    Interval Events (Subjective):  Seen in bed. Tolerating therapy. No new concerns. Wound care to check wound today. Will go home with wound vac    Objective:  VITAL SIGNS: /62   Pulse 77   Temp 36.5 °C (97.7 °F) (Oral)   Resp 18   Ht 1.524 m (5')   Wt 79.5 kg (175 lb 4.3 oz)   SpO2 96%   BMI 34.23 kg/m²   Gen: No acute distress, well developed well nourished adult  HEENT: Normal Cephalic Atraumatic, Normal conjunctiva.   CV: warm extremities, well perfused, no edema  Resp: symmetric chest rise, breathing comfortably on room air  Abd: Soft, Non distended  Extremities: normal bulk, no atrophy  Skin: no visible rashes or lesions.   Neuro: alert, awake  Psych: Mood and affect appropriate and congruent    Laboratory Values:  Recent Results (from the past 72 hours)   POCT glucose device results    Collection Time: 01/03/25  4:58 PM   Result Value Ref Range    POC Glucose, Blood 77 65 - 99 mg/dL   POCT glucose device results    Collection Time: 01/03/25  8:43 PM   Result Value Ref Range    POC Glucose, Blood 88 65 - 99 mg/dL   CBC WITHOUT DIFFERENTIAL    Collection Time: 01/04/25  6:13 AM   Result Value Ref Range    WBC 4.9 4.8 - 10.8 K/uL    RBC 3.61 (L) 4.20 - 5.40 M/uL    Hemoglobin 11.7 (L) 12.0 - 16.0 g/dL    Hematocrit 37.1 37.0 - 47.0 %    .8 (H) 81.4 - 97.8 fL    MCH 32.4 27.0 - 33.0 pg    MCHC 31.5 (L) 32.2 - 35.5 g/dL    RDW 63.6 (H) 35.9 - 50.0 fL    Platelet Count 194 164 - 446 K/uL    MPV 10.9 9.0 - 12.9 fL   POCT glucose device results    Collection Time: 01/04/25  7:32 AM   Result Value Ref Range    POC Glucose, Blood 86 65 - 99 mg/dL   POCT glucose device results    Collection Time: 01/04/25 10:06 AM   Result Value Ref Range    POC Glucose, Blood 108 (H) 65 - 99 mg/dL   POCT glucose device results    Collection Time: 01/04/25 11:35 AM   Result Value Ref Range    POC Glucose, Blood  103 (H) 65 - 99 mg/dL   POCT glucose device results    Collection Time: 01/04/25  5:33 PM   Result Value Ref Range    POC Glucose, Blood 199 (H) 65 - 99 mg/dL   POCT glucose device results    Collection Time: 01/04/25  8:32 PM   Result Value Ref Range    POC Glucose, Blood 118 (H) 65 - 99 mg/dL   POCT glucose device results    Collection Time: 01/05/25  7:06 AM   Result Value Ref Range    POC Glucose, Blood 72 65 - 99 mg/dL   POCT glucose device results    Collection Time: 01/05/25 11:02 AM   Result Value Ref Range    POC Glucose, Blood 94 65 - 99 mg/dL   POCT glucose device results    Collection Time: 01/05/25  4:54 PM   Result Value Ref Range    POC Glucose, Blood 120 (H) 65 - 99 mg/dL   POCT glucose device results    Collection Time: 01/05/25  9:50 PM   Result Value Ref Range    POC Glucose, Blood 147 (H) 65 - 99 mg/dL   POCT glucose device results    Collection Time: 01/06/25  7:08 AM   Result Value Ref Range    POC Glucose, Blood 69 65 - 99 mg/dL   POCT glucose device results    Collection Time: 01/06/25 11:24 AM   Result Value Ref Range    POC Glucose, Blood 166 (H) 65 - 99 mg/dL       Medications:  Scheduled Medications   Medication Dose Frequency    traZODone  50 mg QHS    melatonin  9 mg Nightly    Pharmacy Consult Request  1 Each PHARMACY TO DOSE    senna-docusate  2 Tablet Q EVENING    omeprazole  20 mg DAILY    apixaban  5 mg BID    atorvastatin  80 mg Q EVENING    clopidogrel  75 mg DAILY    [Held by provider] dapagliflozin propanediol  10 mg DAILY    insulin lispro  1-6 Units 4X/DAY ACHS     PRN medications: Respiratory Therapy Consult, hydrALAZINE, senna-docusate **AND** polyethylene glycol/lytes, ondansetron **OR** ondansetron, traZODone, sodium chloride, [DISCONTINUED] insulin GLARGINE **AND** insulin lispro **AND** POC blood glucose manual result **AND** NOTIFY MD and PharmD **AND** Administer 20 grams of glucose (approximately 8 ounces of fruit juice) every 15 minutes PRN FSBG less than 70 mg/dL  **AND** dextrose bolus, oxyCODONE immediate-release **OR** oxyCODONE immediate-release    Diet:  Current Diet Order   Procedures    Diet Order Diet: Consistent CHO (Diabetic) (meds whole with thins, straws ok)       Medical Decision Making and Plan:  Anoxic brain injury  Metabolic encephalopathy  Delirium  -anoxia from cardiogenic shock  -2 min in hospital PEA arrest  -required seroquel, valproate and haldol to control delirium at OSH.   -mental status seems improved  -melatonin 10mg nightly  -trazodone 100mg nightly  -1/2- dc abilify  -1/6- decrease trazadone to 50mg QHS scheduled     STEMI  -s/p PCI to LAD. Complicated by V-Fib and PEA arrest. Required ECMO and Impella due to cardiogenic shock.   -now off ECMO and Impella  -atrovastatin and plavix  -1/4- vomiting x1 episode. Bp, bs stable. No chest pain. Back to normal. monitor     HFrEF  -EF 25-30%  -soft pressure limiting GDMT  -farxiga 10mg  -no losartan due to hypotension  -no bisoprolol due to Chyne-florez breathing  -no lasix due to dehydration     Acute respiratory failure  -Cheyne-florez breathing pattern, thought to be from anoxic brain injury  -concern for aspiration   -now on room air     Pneumonia- resolved  -completed linezolid and zosyn 12/1     CVA  -MRI on 11/17/2024 at OSH: Punctate acute infarct of the right centrum semiovale. Scattered foci of enhancement in multiple vascular distributions as described may represent recent subacute infarcts. Diffuse stippled susceptibility signal throughout the supratentorial and infratentorial brain. Overall findings may represent sequelae of fat emboli or amyloid related angiopathy.   -Eliquis 5mg BID  -atorvastatin 80mg, plavix     Retroperitoneal bleed  -required several transfusions.   -hgb now stable     Hypotension, History of HTN  -losartan on hold     Hyperglycemia, DM  -last A1c 6.2 on 11/1.   -lantus 9u qhs, SSI  -12/31- low bs, evening bs 39. Poor intake. Dc lantus, dc farsiga. Monitor closely  -1/1-  sugars improved 2/2 increase intake po   -1/3- continue off all meds  -1/6- DC sliding scale     Cognitive Communicative deficits:  - Patient with significant cognitive deficits due to anoxic brain injury  - Environmental modifications to decrease excessive stimulation including turning off the lights  - Consider starting neurostimulants such Nuvigil, amantadine or methylphenidate  - SLP to evaluate and treat cognitive deficits.   -Neuropsych will follow and perform testing if/when appropriate.     Dysphagia/Nutrition:  - Patient currently on dysphagia DIET.  - Continue therapies with SLP. SLP to perform swallow evaluation and provide oral motor exercises if necessary.     Neurogenic bladder:  - Timed voids with PVR q4H x3. If PVR > 400mL or if patient is unable to void, straight cath patient.  -12/31- remove de jesus     Neurogenic bowel:  -  Colace, Senna BID on admission  - Goal of 1BM/day.  -      Circadian Rhythm disorder:   -Trazadone 50mg PRN for restlessness after 10pm  -maltatonin and trazadone sheduled  Recommend lights on during the day/off at night, minimize nighttime interruptions as able.     Mood  - at risk of adjustment disorder, depression, and anxiety due to functional decline     ID:  - at risk for Urinary tract infection     Skin/Wounds:  Bilateral groin  -wound vac, discussing with wound care  - Pressure relief q2h while in bed. Close monitoring for signs of breakdown     Pain:  - Neuroceptic - continue tylenol prn        DVT prophylaxis:  continue eliquis     GI prophylaxis:  On Prilosec 20mg daily         -Follow-up Cardiology, PCP, neurology       ____________________________________    Mario Cole MD  Physical Medicine & Rehabilitation   Brain Injury Medicine   ____________________________________

## 2025-01-06 NOTE — PROGRESS NOTES
NEUROCOGNITIVE EVALUATION    Date/Time of Testin2025 6306-5156   Date/Time of Scorin2025 3073-5226, 4690-0931, [phone]  Date/Time of Feedback to Pt & Family: 25 6533-6276  Date/Time of Testing Evaluation Services: 2025 3858-6777, 0282-9667;  2365-2694, 8130-3392, 8358-7155  Total Treatment Time: 419 Minutes  Treatment Type: 96130x1, 96131x2, 96136x1, 96137x7    BACKGROUND    Kiara Qureshi  is a 58 yo, RH,  with 18 years education seen for neurocognitive evaluation in context IRF hospitalization due to s/p STEMI with prolonged hospitalization complicated by PEA (cardiac arrest), PNA, encephalopathy, and acute infarct of the R centrum semiovale with subacute infarcts. A comprehensive background and interview is documented in consultation note by this evaluator that reader is referred to.     SUBJECTIVE    Pt reported adequate sleep the night prior to testing. She did not report pain as interfering.     BEHAVIORAL OBSERVATIONS    Kiara Qureshi was alert and cooperative with no factors affecting LOC.  Language and verbal communication functions were intact with normal rate and rhythm of expressive speech. TP appeared appropriate and logical with no obvious delusions, SI, HI, AH, or VH. Eye contact was appropriate and gaze was appropriate; during testing glasses & hearing aids were worn. Mood and affect were congruent and appropriate to testing. Towards the end of testing, pt reported increasing fatigue & frequently asked how much testing was left noting increased fatigue. She also commented about task difficulty with some test administration and surprise with performance.     ASSESSMENT    Assessments Administered:   Wechsler Adult Intelligence Scale-IV (WAIS-IV), [employer] (ACS) Test of Premorbid Functioning (TOPF), Wechsler Memory Scale-IV (WMS-IV) Logical Memory I & II, [place] Verbal Learning Test-3rd Edition (CVLT-3), Brief Visuospatial  "Memory Test-Revised (BVMT-R), Juanita-Ashford Executive Functioning System (D-KEFS) Trail Making Conditions 1-5, Digit Vigilance Test (DVT), [place] Card Sorting Test-64 (WCST-64), [place] Naming Test (BNT)    Effort/Motivation:  Regarding effort and motivation, pt passed embedded validity indices, and subjectively rated putting forth best effort. Cognitive fatigue was rated as \"70/100\" approximately FCI through testing session with increase in fatigue reported at the conclusion. It is felt that fatigue likely influenced performance as testing continued. In consideration of behavioral observations, subjective report, and passing of a performance validity indicator, results are felt to be interpretable and indicator of current neurocognitive function.    Findings:  Unless otherwise indicated, findings are considered in the context of others of a similar demographic background.    General Intellectual Functioning:   As reported on 1/3, performance was WNL on cognitive screening, per the MoCA 8.1. Premorbid cognitive functioning was estimated to be average; however, performance on the TOPF was significantly lower than predicted (TOPF Actual-108, TOPF Predicted-115).     Pt performed within the average range of full-scale IQ (WAIS-IV WYDR=528); however, when scores were demographically-adjusted (considering peers of a similar age, education background, sex, and ethnicity) she is performing in the low average range, which is felt to serve as a better estimate of current abilities. She exhibited significant discrepancies between her perceptual reasoning abilities and cognitive proficiency (I.e., processing speed and working memory), with perceptual reasoning abilities being of strength to her.      Learning and Memory:  Her performance on memory-based tasks was variable, with average to above average performance on tasks of verbal list learning and recall and visuospatial learning and recall. With verbal list learning, " she also employed semantic clustering techniques in later learning trials independently to aid in encoding and learning processes. Comparatively, when tasked to immediately repeat back a story, her performance was below average and when asked to repeat a story after a longer delay, her performance was low average.     Language:  Confrontational naming was high average. Her fund of verbal knowledge and understanding of word meanings was average compared to age-matched population, but was low average compared to demographically-adjusted population, as was her verbal abstract reasoning and concept formation abilities. General fund of information was average.     Visuospatial Reasoning:   Pt's non-verbal reasoning skills were average to above average on tests requiring inductive reasoning, building a 3-dimensional design from a 2-dimensional model, perceptual organization, and fluid reasoning.     Attention and Working Memory:   Performance on attention-based tasks was variable. On a sustained visual attention-based tasks requiring vigilance & rapid visual tracking, her performance was low average. Comparatively, on a shorter visual scanning task, her performance was average. Verbal working memory skills fluctuated with average to above average performance on digit repetition and sequencing tasks, but were low average when asked to repeat a string of digits backwards. When compared to demographically-adjusted population her working memory abilities were low average.     Processing Speed:   Motor processing speed was average on tasks of letter sequencing, pure motor speed, and letter-number sequencing. On a motor based digit-symbol substitution test, her performance was average whereas her performance on a task requiring working memory of visual stimuli and vigilance her performance was low average.     Executive Function:  Variability emerged on tasks of executive function. Her performance was exceptionally low on a  task that required cognitive flexibility, abstract reasoning, problem solving, strategic planning, and set-shifting. Additionally, when provided with feedback regarding performance she exhibited a perseverative response pattern with challenge of incorporating feedback. On speeded motor tasks, she did not exhibit a significant pattern of errors in performance of omission or commission.     WALLY Qureshi completed a neurocognitive assessment. Mrs. 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 Current findings are suggestive of a decline in cognitive abilities with cardiac arrest, recent CVA, and complex hospitalization. She is exhibiting decline in executive functioning that is likely exacerbated by factors such as fatigue and frustration tolerance. Executive functioning skills that support planning, monitoring, and regulation of behavior may influence working memory and processing speed weaknesses as well.    At this time, return to work is not recommended. I am recommending that Mrs. Qureshi complete neuropsychological evaluation in 6 months after period of recovery to provide interval update & guide recommendations or [id number] planning.  may benefit from engaging in cognitively stimulating activities and pacing of cognitive and physical activities (outlined below). To lessen impact of stress, she may benefit from developing routine or schedule to engage in health behaviors during her cardiac recovery.     DIAGNOSIS  Mild neurocognitive disorder    RECOMMENDATIONS    Pt and spouse were provided feedback and the opportunity to discuss results, recommendations, and ask questions    Pt will benefit from participation in cardiac rehabilitation program to develop behaviors & skills to support self-mgmt of health conditions, stress, etc. Prioritizing this during her early recovery may increase  self-confidence and self-efficacy in self mgmt of her medical conditions.     From a cognitive perspective, she may benefit from the development of pacing strategies to reduce the potential for increased cognitive fatigue. For instance, engaging in a cognitively stimulating task of increased complexity for a period of 15-20 minutes followed by a 5-10 minute break to promote a time-based pacing strategy v. Pacing based on how she is feeling.    Identifying activities that may be considered difficult (red light), moderate (yellow light), and easy (green light) is recommended to help develop a pacing plan and track activities that are more challenging. By dispersing green light activities with red light activities throughout the day, this may help with pacing.    To promote cognitive rehabilitation, I encouraged Mrs. Qureshi to incorporate functional and meaningful tasks into her recovery program. For instance, using pacing strategies while taking a continuing education course & self-monitoring progress throughout the course that assesses her fatigue level, learning of new information, organization of new information, etc. I also encouraged her to engage in tasks in graded ways to reduce feelings of overwhelm or stress that can occur. Incorporating systemized ways to problem solving may also be helpful, like developing a plan for breaking larger tasks into smaller steps, monitoring progress on each steps, etc.    Pt may benefit from taking notes, repetition of information to support encoding of novel material, utilizing handouts, etc. To support learning of new information, as she benefited from learning trials.      Monitoring cognitive fatigue & taking breaks when fatigue appears is advised. Cognitive fatigue can look like: increased tiredness, decreasing task performance, frustration, forgetfulness, distractibility.     Referral to outpatient neuropsych for~6 months sent on 1/7/25.     Rosamaria Alvarado, PhD,  ABPP-Rp  Licensed Psychologist

## 2025-01-06 NOTE — PROGRESS NOTES
Hypoglycemia Intervention    Hypoglycemia protocol intervention:  Blood glucose 69 at 0730.  Intervention: fig hector x 1 bar  Intervention: breakfast    Dr. Cole notified of the above at 1300. Insulin was removed from MAR, fingersticks are still ordered.

## 2025-01-06 NOTE — PROGRESS NOTES
Received bedside shift report from Mita KAPADIA RN regarding patient and assumed care. Patient awake, calm and stable, currently positioned in bed for comfort and safety; call light within reach. Denies pain or discomfort at this time. Will continue to monitor.

## 2025-01-06 NOTE — PROGRESS NOTES
NURSING DAILY NOTE    Name: Kiara Qureshi   Date of Admission: 12/30/2024   Admitting Diagnosis: Anoxic brain injury (HCC)  Attending Physician: CHANTAL GREY M.D.  Allergies: Patient has no known allergies.    Safety  Patient Assist  Min Assist  Patient Precautions  Fall Risk  Precaution Comments  B lower abdominal wound VACs  Bed Transfer Status  Standby Assist  Toilet Transfer Status   Standby Assist  Assistive Devices  Rails, Wheelchair  Oxygen  None - Room Air  Diet/Therapeutic Dining  Current Diet Order   Procedures    Diet Order Diet: Consistent CHO (Diabetic) (meds whole with thins, straws ok)     Pill Administration  whole  Agitated Behavioral Scale     ABS Level of Severity       Fall Risk  Has the patient had a fall this admission?    (No)  Josefa Parra Fall Risk Scoring  14, MODERATE RISK  Fall Risk Safety Measures  Bed strip alarm    Vitals  Temperature: 36.7 °C (98.1 °F)  Temp src: Temporal  Pulse: 65  Respiration: 17  Blood Pressure: 94/58  Blood Pressure MAP (Calculated): 70 MM HG  BP Location: Left, Upper Arm  Patient BP Position: Supine     Oxygen  Pulse Oximetry: 97 %  O2 (LPM): 0  O2 Delivery Device: None - Room Air    Bowel and Bladder  Last Bowel Movement  01/05/25 (Per pt.)  Stool Type  Type 4: Like a sausage or snake, smooth and soft, Type 5: Soft blob with clear cut edges (passed easily)  Bowel Device  Bathroom, Other (Comment) (Bowel Meds)  Continent  Bladder: Did not void (Beckwith in place)   Bowel: No movement  Bladder Function  Urine Void (mL):  (Moderate)  Number of Times Voided: 1  Urine Color: Yellow  Urine Clarity: Cloudy (Unable to see Through)  Genitourinary Assessment   Bladder Assessment (WDL):  WDL Except  Beckwith Catheter: Not Applicable  Beckwith Care: Given with Soap and Water  Urinary Symptoms: Catheter (Document on LDA)  Urine Color: Yellow  Urine Clarity: Cloudy (Unable to see Through)  Bladder Device:  Bathroom  Bladder Scan: Post Void  $ Bladder Scan Results (mL): 1  Bladder Medications: No    Skin  Ray Score   17  Sensory Interventions   Bed Types: Standard/Trauma Mattress with Overlay  Skin Preventative Measures: Pillows in Use for Support / Positioning, Waffle Overlay  Moisture Interventions  Moisturizers/Barriers: Barrier Wipes  Containment Devices: Indwelling Catheter      Pain  Pain Rating Scale  0 - No Pain  Pain Location  Groin  Pain Location Orientation  Right, Left  Pain Interventions   Declines    ADLs    Bathing      Linen Change      Personal Hygiene  Perineal Care, Moist Brianna Wipes  Chlorhexidine Bath      Oral Care  Brushed Teeth  Teeth/Dentures     Shave     Nutrition Percentage Eaten  Breakfast, Between % Consumed  Environmental Precautions  Treaded Slipper Socks on Patient, Bed in Low Position  Patient Turns/Positioning  Patient turns self independently side to side without assistance, to offload sacral area  Patient Turns Assistance/Tolerance     Bed Positions  Bed Controls On, Bed Locked  Head of Bed Elevated  Self regulated      Psychosocial/Neurologic Assessment  Psychosocial Assessment  Psychosocial (WDL):  Within Defined Limits  Neurologic Assessment  Neuro (WDL): Exceptions to WDL  Level of Consciousness: Alert  Orientation Level: Oriented X4  Cognition: Follows commands  Speech: Clear  Pupil Assesment: No  Motor Function/Sensation Assessment: Motor strength  Muscle Strength Right Arm: Good Strength Against Gravity and Moderate Resistance  Muscle Strength Left Arm: Good Strength Against Gravity and Moderate Resistance  Muscle Strength Right Leg: Fair Strength against Gravity but No Resistance  Muscle Strength Left Leg: Fair Strength against Gravity but No Resistance  EENT (WDL):  WDL Except    Cardio/Pulmonary Assessment  Edema      Respiratory Breath Sounds  RUL Breath Sounds: Clear  RML Breath Sounds: Clear  RLL Breath Sounds: Clear, Diminished  SHOSHANA Breath Sounds: Clear  LLL  Breath Sounds: Clear, Diminished  Cardiac Assessment   Cardiac (WDL):  WDL Except (H/O HTN.)

## 2025-01-06 NOTE — WOUND TEAM
[facility]  Inpatient Services  Wound and Skin Care Follow-up    Admission Date: 12/30/2024     Last order of IP CONSULT TO WOUND CARE was found on 12/31/2024 from Hospital Encounter on 12/30/2024     HPI, PMH, SH: Reviewed    Past Surgical History:   Procedure Laterality Date    INSERTION,CANNULA FOR ECMO,ADULT Left 10/30/2024    Procedure: INSERTION, CANNULA, FOR ECMO, ADULT;  Surgeon: Aime Elias D.O.;  Location: SURGERY [facility];  Service: Cardiothoracic     Social History     Tobacco Use    Smoking status: Never    Smokeless tobacco: Never   Substance Use Topics    Alcohol use: No     No chief complaint on file.    Diagnosis: Anoxic brain injury (HCC) [G93.1]    Unit where seen by Wound Team: [facility]15/02     WOUND FOLLOW UP RELATED TO:  bilateral groin wounds       WOUND TEAM PLAN OF CARE - Frequency of Follow-up:   Nursing to follow dressing orders written for wound care. Contact wound team if area fails to progress, deteriorates or with any questions/concerns if something comes up before next scheduled follow up (See below as to whether wound is following and frequency of wound follow up)  Dressing changes by wound team:                   3 times weekly - bilateral groin-wound vac currently    WOUND HISTORY:       Wounds are present on admission. Pt originally had a STEMI on 11/27/2024. Patient has a history of obesity, hypertension, hyperlipidemia. She was seen at [facility] as a transfer initially for anterior STEMI requiring lytics. Patient underwent an LAD PCI but hospital course was complicated by retroperitoneal bleed, ventricular fibrillation and PEA arrest and cardiogenic shock requiring VA ECMO and Impella. She is now status post ECMO decannulation on November 4 and had left femoral embolectomy, PCI to the LAD, unsuccessful PCI to OM1 and Impella removal on November 8. Hospital course was complicated by patient developing encephalopathy and delirium, to which she was given valproic  acid and quetiapine.          WOUND ASSESSMENT/LDA  Wound 12/07/24 Full Thickness Wound Groin Left (Active)   Date First Assessed/Time First Assessed: 12/07/24 1721   Present on Original Admission: No  Hand Hygiene Completed: Yes  Primary Wound Type: Full Thickness Wound  Location: Groin  Laterality: Left      Assessments 1/6/2025 12:15 PM   Site Assessment Clean;Granulation tissue   Periwound Assessment Clean;Dry;Intact;Denuded   Margins Defined edges   Drainage Amount Small   Drainage Description Serosanguineous   Treatments Cleansed;Nonselective debridement;Site care   Wound Cleansing Approved Wound Cleanser   Periwound Protectant No-sting Skin Prep   Dressing Status Clean;Dry;Intact   Dressing Changed Changed   Dressing Options Wound Vac   Dressing Change/Treatment Frequency Monday, Wednesday, Friday, and As Needed   NEXT Dressing Change/Treatment Date 01/08/25   NEXT Weekly Photo (Inpatient Only) 01/08/25   Wound Team Following 3x Weekly   Non-staged Wound Description Full thickness   Wound Length (cm) 1.5 cm   Wound Width (cm) 3.5 cm   Wound Depth (cm) 1 cm   Wound Surface Area (cm^2) 5.25 cm^2   Wound Volume (cm^3) 5.25 cm^3   Wound Healing % 88   WOUND NURSE ONLY - Time Spent with Patient (mins) 60       Wound 12/07/24 Full Thickness Wound Groin Right (Active)   Date First Assessed/Time First Assessed: 12/07/24 1721   Present on Original Admission: No  Hand Hygiene Completed: Yes  Primary Wound Type: Full Thickness Wound  Location: Groin  Laterality: Right      Assessments 1/6/2025 12:15 PM   Site Assessment Clean;Fully granulated   Periwound Assessment Clean;Dry;Intact   Margins Defined edges   Drainage Amount Small   Drainage Description Serosanguineous   Treatments Cleansed;Nonselective debridement;Site care   Wound Cleansing Approved Wound Cleanser   Periwound Protectant No-sting Skin Prep   Dressing Status Clean;Dry;Intact   Dressing Changed Changed   Dressing Options Wound Vac   Dressing  Change/Treatment Frequency Monday, Wednesday, Friday, and As Needed   NEXT Dressing Change/Treatment Date 01/08/25   Wound Team Following 3x Weekly   Non-staged Wound Description Full thickness   Wound Length (cm) 1.2 cm   Wound Width (cm) 1 cm   Wound Depth (cm) 3.5 cm   Wound Surface Area (cm^2) 1.2 cm^2   Wound Volume (cm^3) 4.2 cm^3   Wound Healing % 79   WOUND NURSE ONLY - Time Spent with Patient (mins) 60        Vascular:    JAGJIT:   No results found.    Lab Values:    Lab Results   Component Value Date/Time    WBC 4.9 01/04/2025 06:13 AM    RBC 3.61 (L) 01/04/2025 06:13 AM    HEMOGLOBIN 11.7 (L) 01/04/2025 06:13 AM    HEMATOCRIT 37.1 01/04/2025 06:13 AM    CREACTPROT 6.06 (H) 12/23/2024 11:20 AM    SEDRATEWES 140 (H) 11/28/2024 01:35 AM    HBA1C 6.2 (H) 11/01/2024 06:46 PM    HBA1C 6.4 (H) 10/30/2024 03:18 AM         Culture Results show:  No results found for this or any previous visit (from the past 720 hours).    Pain Level/Medicated:  PO pain medications administered by bedside RN Mita prior       INTERVENTIONS BY WOUND TEAM:  Chart and images reviewed. Discussed with bedside RN. All areas of concern (based on picture review, LDA review and discussion with bedside RN) have been thoroughly assessed. Documentation of areas based on significant findings. This RN in to assess patient. Performed standard wound care which includes appropriate positioning, dressing removal and non-selective debridement. Pictures and measurements obtained weekly if/when required.    Wound:  right groin  Preparation for Dressing removal: Removed without difficulty and Dressing soaked with adhesive remover wipes  Cleansed/Non-selectively Debrided with:  Wound cleanser and Gauze  Brianna wound: Cleansed with Wound cleanser and Gauze, Prepped with No Sting  Primary Dressing:  black foam and wound vac     Wound:  left groin  Preparation for Dressing removal: Removed without difficulty and Dressing soaked with adhesive remover  wipes  Cleansed/Non-selectively Debrided with:  Wound cleanser and Gauze  Brianna wound: Cleansed with Wound cleanser and Gauze, Prepped with No Sting  Primary Dressing:  black foam and wound vac    Advanced Wound Care Discharge Planning  Number of Clinicians necessary to complete wound care: 2  Is patient requiring IV pain medications for dressing changes:  No   Length of time for dressing change 30 min. (This does not include chart review, pre-medication time, set up, clean up or time spent charting.)    Interdisciplinary consultation: Patient, Bedside RN (Mita), Corazon. (Wound RN).   Provider Dr Cole .    EVALUATION / RATIONALE FOR TREATMENT:     Date:  01/06/25  Wound Status:  Wound progressing as expected    Bilateral groin wounds -NPWT used to expedite healing and prevent infection.     Date:  01/03/25  Wound Status:  Wound improving    Bi-lateral groin incisions with NPWT changed. Left side 100% granular tissue scant drainage, healing depth wise LxW remains the same. 1 piece of black foam used to pack and attempt to bring the edges together.   Right side narrow hole depth is 4.5 remains the same. Scant drainage full granulation. Packed wound bed with josey collagen to promote healing from areas of depth loosly pack black foam and connected to vac.  Date:  01/01/25  Wound Status:  Wound improving    Bilateral groin sites: the measurements today compared to 12/23/24 are smaller. The depth of the right groin is the same (4.5cm). No underlying structures are seen. Fully granulated tissue.   Skin prep to protect the skin, wound vac to expedite wound healing.           Goals: Steady decrease in wound area and depth weekly.    NURSING PLAN OF CARE ORDERS:  No new orders this visit    NUTRITION RECOMMENDATIONS   Wound Team Recommendations:  N/A     DIET ORDERS (From admission to next 24h)       Start     Ordered    01/01/25 1126  Diet Order Diet: Consistent CHO (Diabetic) (meds whole with thins, straws ok)  ALL  MEALS        Question:  Diet:  Answer:  Consistent CHO (Diabetic)  Comment:  meds whole with thins, straws ok    01/01/25 1126    01/01/25 1126  Dietary Comment  ALL MEALS        Comments: Please prepare cheeseburger for lunch today 1/1/25. Thank you!    01/01/25 1126    01/01/25 0753  Dietary Comment  ALL MEALS        Comments: MBSS tray to be picked up at 1000 today, 1/1/25, thank you    01/01/25 0752                    PREVENTATIVE INTERVENTIONS:   Q shift Ray - performed per nursing policy  Q shift pressure point assessments - performed per nursing policy    Surface/Positioning  Standard/trauma mattress - Currently in Place  Reposition q 2 hours - Currently in Place  Waffle overlay  - Currently in Place    Offloading/Redistribution  Heels floated with waffle overlay - Currently in Place  Float Heels off Bed with Pillows - Currently in Place           Containment/Moisture Prevention    Dri-hemanth pad - Currently in Place  Brief with mobilization - Currently in Place  Barrier paste - Currently in Place    Mobilization      Up to chair     Anticipated discharge plans:  Self/Family Care and Home Health Care        Vac Discharge Needs:  Vac Discharge plan is purely a recommendation from wound team and not a requirement for discharge unless otherwise stated by physician.  Regular Vac in use and continued at discharge

## 2025-01-06 NOTE — PROGRESS NOTES
NURSING DAILY NOTE    Name: Kiara Qureshi   Date of Admission: 12/30/2024   Admitting Diagnosis: Anoxic brain injury (HCC)  Attending Physician: CHANTAL GREY M.D.  Allergies: Patient has no known allergies.    Safety  Patient Assist  Min assist  Patient Precautions  Fall Risk  Precaution Comments  B lower abdominal wound VACs  Bed Transfer Status  Standby Assist  Toilet Transfer Status   Standby Assist  Assistive Devices  Rails, Wheelchair  Oxygen  None - Room Air  Diet/Therapeutic Dining  Current Diet Order   Procedures    Diet Order Diet: Consistent CHO (Diabetic) (meds whole with thins, straws ok)     Pill Administration  whole  Agitated Behavioral Scale     ABS Level of Severity       Fall Risk  Has the patient had a fall this admission?   No  Josefa Parra Fall Risk Scoring  14, MODERATE RISK  Fall Risk Safety Measures  bed alarm and chair alarm    Vitals  Temperature: 36.6 °C (97.8 °F)  Temp src: Temporal  Pulse: 72  Respiration: 18  Blood Pressure: 105/61  Blood Pressure MAP (Calculated): 76 MM HG  BP Location: Left, Upper Arm  Patient BP Position: Staley's Position     Oxygen  Pulse Oximetry: 99 %  O2 (LPM): 0  O2 Delivery Device: None - Room Air    Bowel and Bladder  Last Bowel Movement  01/04/25  Stool Type  Type 4: Like a sausage or snake, smooth and soft, Type 5: Soft blob with clear cut edges (passed easily)  Bowel Device  Bathroom  Continent  Bladder: Did not void (Beckwith in place)   Bowel: No movement  Bladder Function  Urine Void (mL):  (MODERATE)  Number of Times Voided: 1  Urine Color: Yellow  Urine Clarity: Cloudy (Unable to see Through)  Genitourinary Assessment   Bladder Assessment (WDL):  WDL Except  Beckwith Catheter: Not Applicable  Beckwith Care: Given with Soap and Water  Urinary Symptoms: Catheter (Document on LDA)  Urine Color: Yellow  Urine Clarity: Cloudy (Unable to see Through)  Bladder Device: Bathroom  Bladder Scan: Post  Void  $ Bladder Scan Results (mL): 404  Bladder Medications: No    Skin  Ray Score   17  Sensory Interventions   Bed Types: Standard/Trauma Mattress  Skin Preventative Measures: Pillows in Use for Support / Positioning  Moisture Interventions  Moisturizers/Barriers: Barrier Paste  Containment Devices: Indwelling Catheter      Pain  Pain Rating Scale  1 - Hardly Notice Pain  Pain Location  Groin  Pain Location Orientation  Right, Left  Pain Interventions   Declines    ADLs    Bathing      Linen Change      Personal Hygiene  Perineal Care, Moist Brianna Wipes  Chlorhexidine Bath      Oral Care  Brushed Teeth  Teeth/Dentures     Shave     Nutrition Percentage Eaten  Breakfast, Between % Consumed  Environmental Precautions  Treaded Slipper Socks on Patient, Personal Belongings, Wastebasket, Call Bell etc. in Easy Reach, Bed in Low Position  Patient Turns/Positioning  Patient turns self independently side to side without assistance, to offload sacral area  Patient Turns Assistance/Tolerance     Bed Positions  Bed Controls On, Bed Locked  Head of Bed Elevated  Self regulated      Psychosocial/Neurologic Assessment  Psychosocial Assessment  Psychosocial (WDL):  Within Defined Limits  Neurologic Assessment  Neuro (WDL): Exceptions to WDL  Level of Consciousness: Alert  Orientation Level: Oriented X4  Cognition: Follows commands  Speech: Clear  Pupil Assesment: No  Motor Function/Sensation Assessment: Motor strength  Muscle Strength Right Arm: Good Strength Against Gravity and Moderate Resistance  Muscle Strength Left Arm: Good Strength Against Gravity and Moderate Resistance  Muscle Strength Right Leg: Fair Strength against Gravity but No Resistance  Muscle Strength Left Leg: Fair Strength against Gravity but No Resistance  EENT (WDL):  WDL Except    Cardio/Pulmonary Assessment  Edema      Respiratory Breath Sounds  RUL Breath Sounds: Clear  RML Breath Sounds: Clear  RLL Breath Sounds: Diminished  SHOSHANA Breath Sounds:  Clear  LLL Breath Sounds: Diminished  Cardiac Assessment   Cardiac (WDL):  WDL Except

## 2025-01-06 NOTE — CARE PLAN
"  Problem: Knowledge Deficit - Standard  Goal: Patient and family/care givers will demonstrate understanding of plan of care, disease process/condition, diagnostic tests and medications  Outcome: Progressing  Note: Pt agrees with plan of care tonight regarding medications and safety.  Will continue to monitor patient.     Problem: Fall Risk - Rehab  Goal: Patient will remain free from falls  Outcome: Progressing  Note: Josefa Parra Fall risk Assessment Score: 14    Moderate fall risk Interventions  - Bed and strip alarm   - Yellow sign by the door   - Yellow wrist band \"Fall risk\"  - Room near to the nurse station  - Do not leave patient unattended in the bathroom  - Fall risk education provided        The patient is Stable - Low risk of patient condition declining or worsening    Shift Goals  Clinical Goals: Safety  Patient Goals: Sleep well  Family Goals: safety    Progress made toward(s) clinical / shift goals:  progressing        "

## 2025-01-06 NOTE — THERAPY
"Physical Therapy   Daily Treatment     Patient Name: Kiara Qureshi  Age:  59 y.o., Sex:  female  Medical Record #: [id number]  Today's Date: 1/6/2025     Precautions  Precautions: Fall Risk  Comments: B lower abdominal wound VACs    Subjective    Pt sitting up in bed, RN at bed side. Checked pt's blood glucose noted to be a bit low. RN retrieved snack for the patient to eat before getting OOB     Objective       01/06/25 0701   PT Charge Group   PT Gait Training (Units) 1   PT Therapeutic Exercise (Units) 1   PT Therapeutic Activities (Units) 2   Supervising Physical Therapist Brie Vick   PT Total Time Spent   PT Individual Total Time Spent (Mins) 60   Vitals   Patient BP Position Sitting   Blood Pressure 99/59   Vitals Comments pot amb   Gait Functional Level of Assist    Gait Level Of Assist Standby Assist   Assistive Device 4 Wheel Walker   Distance (Feet)   (90', 60')   # of Times Distance was Traveled 2   Deviation Increased Base Of Support   Stairs Functional Level of Assist   Level of Assist with Stairs Contact Guard Assist   # of Stairs Climbed 2   Stairs Description Assist device/equipment;Requires incidental assist;Limited by fatigue;Extra time;Verbal cueing;Walker  (6\" + 2\" threshold, platform style steps with 4WW)   Transfer Functional Level of Assist   Bed, Chair, Wheelchair Transfer Standby Assist   Bed Chair Wheelchair Transfer Description Adaptive equipment;Verbal cueing;Increased time   Sitting Lower Body Exercises   Sitting Lower Body Exercises Yes   Ankle Pumps 2 sets of 10   Long Arc Quad 2 sets of 10   Marching 2 sets of 10   Comments #2.5 2nd set with iso hold   Bed Mobility    Supine to Sit Independent   Sit to Stand Modified Independent   Neuro-Muscular Treatments   Neuro-Muscular Treatments Sequencing   Comments STS transfer training cues for hand placement, safety with 4WW/turing and chair approaches, sequencing for platform valente/curb training   Interdisciplinary Plan of Care " Collaboration   IDT Collaboration with  Nursing   Patient Position at End of Therapy Seated;Self Releasing Lap Belt Applied;Call Light within Reach;Tray Table within Reach   Collaboration Comments checked bs, low at 59   Roll Left and Right   Assistance Needed Independent   CARE Score - Roll Left and Right 6   Sit to Lying   Assistance Needed Independent   CARE Score - Sit to Lying 6   Lying to Sitting on Side of Bed   Assistance Needed Independent   CARE Score - Lying to Sitting on Side of Bed 6   Sit to Stand   Assistance Needed Independent   CARE Score - Sit to Stand 6   Chair/Bed-to-Chair Transfer   Assistance Needed Verbal cues   CARE Score - Chair/Bed-to-Chair Transfer 4   Walk 10 Feet   Assistance Needed Incidental touching   CARE Score - Walk 10 Feet 4   Walk 50 Feet with Two Turns   Assistance Needed Incidental touching   CARE Score - Walk 50 Feet with Two Turns 4   1 Step (Curb)   Assistance Needed Incidental touching   CARE Score - 1 Step (Curb) 4   Picking Up Object   Assistance Needed Supervision   CARE Score - Picking Up Object 4     Dynamic standing task including retrieval of socks/shoes from standing position using 4WW for single UE support  Pt donned socks/shoes seated EOB    Review of home entry, pt to use garage with 2STE(1 step plus doorway threshold)      Assessment    The patient tolerated session well, she was in good spirits this am and looking forward to upcoming DC. Pt has made progress toward therapy goals, demonstrated improved confidence with FWW and increased stability as compared to last week. Pt met 2/3 STG, needs to complete staris  Strengths: Alert and oriented, Independent prior level of function, Motivated for self care and independence, Pleasant and cooperative, Supportive family, Willingly participates in therapeutic activities  Barriers: Decreased endurance, Fatigue, Generalized weakness, Impaired activity tolerance, Impaired balance, Impaired insight/denial of  deficits    Plan    CV endurance   LE therex  Progress independence with 4WW  Review home entry with SO in preparation for DC    DME  PT DME Recommendations  Assistive Device: 4-Wheel Walker    Passport items to be completed:  Get in/out of bed safely, in/out of a vehicle, safely use mobility device, walk or wheel around home/community, navigate up and down stairs, show how to get up/down from the ground, ensure home is accessible, demonstrate HEP, complete caregiver training    Physical Therapy Problems (Active)       Problem: Mobility       Dates: Start:  12/31/24         Goal: STG-Within one week, patient will ambulate at least 25 feet with LRAD and min A.        Dates: Start:  12/31/24         Goal Note filed on 01/02/25 0823 by Jimbo Oswald, PT       15-24ft Jun with FWW              Goal: STG-Within one week, patient will ascend and descend four to six stairs at least rails and min A.        Dates: Start:  12/31/24         Goal Note filed on 01/02/25 0823 by Jimbo Oswald, PT       Has not been assessed due to low standing activity tolerance                 Problem: Mobility Transfers       Dates: Start:  12/31/24         Goal: STG-Within one week, patient will transfer bed to chair with LRAD and CGA.        Dates: Start:  12/31/24         Goal Note filed on 01/02/25 0823 by Jimbo Oswald, PT       Jun                 Problem: PT-Long Term Goals       Dates: Start:  12/31/24         Goal: LTG-By discharge, patient will ambulate at least 150 feet with LRAD and supervision.        Dates: Start:  12/31/24            Goal: LTG-By discharge, patient will transfer one surface to another with LRAD and supervision-mod I.        Dates: Start:  12/31/24            Goal: LTG-By discharge, patient will ambulate up/down flight of stairs with rails and supervision.        Dates: Start:  12/31/24            Goal: LTG-By discharge, patient will transfer in/out of a car with LRAD and supervision.        Dates: Start:   12/31/24

## 2025-01-06 NOTE — PROGRESS NOTES
Patient care assumed. Report received from [facility] ELEUTERIO Terrell. Patient is alert and calm, resting in bed. Call light and bedside table within reach. Will continue to monitor.

## 2025-01-06 NOTE — THERAPY
Occupational Therapy  Daily Treatment     Patient Name: Kiara Qureshi  Age:  59 y.o., Sex:  female  Medical Record #: [id number]  Today's Date: 1/6/2025     Precautions  Precautions: (P) Fall Risk  Comments: (P) B lower abdominal wound VACs    Subjective    Pt seated in w/c upon arrival, agreeable to participate in OT.      Objective     01/06/25 1401   OT Charge Group   OT Self Care / ADL (Units) 1   OT Neuromuscular Re-education / Balance (Units) 1   OT Therapeutic Exercise (Units) 2   OT Total Time Spent   OT Individual Total Time Spent (Mins) 60   Precautions   Precautions Fall Risk   Comments B lower abdominal wound VACs   Vitals   O2 Delivery Device None - Room Air   Sleep/Wake Cycle   Sleep & Rest Awake   Functional Level of Assist   Tub / Shower Transfers Standby Assist  (Dry WIS txfr (w/ use of grab bar and shower chair))   IADL Treatments   IADL Treatments Home management   Home Management Pt retrieved clothing from dryer and folded items (towels/scrub pants) in standing x 3 mins w/ SBA   Sitting Lower Body Exercises   Nustep Resistance Level 6  (B UE/LEs x 10 mins)   Interdisciplinary Plan of Care Collaboration   Patient Position at End of Therapy Seated;Self Releasing Lap Belt Applied;Call Light within Reach;Tray Table within Reach     IADLs (kitchen mobility); pt retrieved cones x 6 from various areas/heights in kitchen (ie upper cabinet, microwave, drawer, , refrigerator and freezer) @ CGA to SBA level (w/ use of counter for UE support/balance)     Standing UE therex w/ light resistance theraband; pt completed 1 set x 10 R/L of the following - shoulder extension, rows, external and internal shoulder rotation     Assessment    Pt tolerated OT session well overall and demo's good progress toward functional goals. Standing endurance limited by fatigue however able to complete laundry task, kitchen mobility (w/ use of counter) and UE therex w/ no overt LOB (w/ standing bouts limited to ~ 3  minute increments).   Strengths: Able to follow instructions, Alert and oriented, Effective communication skills, Independent prior level of function, Manages pain appropriately, Motivated for self care and independence, Pleasant and cooperative, Supportive family, Willingly participates in therapeutic activities  Barriers: Decreased endurance, Generalized weakness, Hypotension, Impaired balance, Limited mobility    Plan    DC IRF [place] and BIMS tomorrow in anticipation of DC home Wednesday     DME    Passport items to be completed:  Perform bathroom transfers, complete dressing, complete feeding, get ready for the day, prepare a simple meal, participate in household tasks, adapt home for safety needs, demonstrate home exercise program, complete caregiver training     Occupational Therapy Goals (Active)       Problem: Dressing       Dates: Start:  01/02/25         Goal: STG-Within one week, patient will dress LB with SBA        Dates: Start:  01/02/25               Problem: Functional Transfers       Dates: Start:  12/31/24         Goal: STG-Within one week, patient will transfer to toilet with CGA       Dates: Start:  12/31/24               Problem: OT Long Term Goals       Dates: Start:  12/31/24         Goal: LTG-By discharge, patient will complete basic self care tasks with supervision/mod I       Dates: Start:  12/31/24            Goal: LTG-By discharge, patient will perform bathroom transfers with supervision/mod I       Dates: Start:  12/31/24            Goal: LTG-By discharge, patient will complete basic home management with supervision/mod I       Dates: Start:  12/31/24               Problem: Toileting       Dates: Start:  12/31/24         Goal: STG-Within one week, patient will complete toileting tasks with min A       Dates: Start:  12/31/24

## 2025-01-07 ENCOUNTER — APPOINTMENT (OUTPATIENT)
Dept: PHYSICAL THERAPY | Facility: REHABILITATION | Age: 60
DRG: 092 | End: 2025-01-07
Attending: PHYSICAL MEDICINE & REHABILITATION
Payer: COMMERCIAL

## 2025-01-07 ENCOUNTER — APPOINTMENT (OUTPATIENT)
Dept: PHYSICAL MEDICINE AND REHAB | Facility: REHABILITATION | Age: 60
DRG: 092 | End: 2025-01-07
Attending: PHYSICAL MEDICINE & REHABILITATION
Payer: COMMERCIAL

## 2025-01-07 ENCOUNTER — APPOINTMENT (OUTPATIENT)
Dept: OCCUPATIONAL THERAPY | Facility: REHABILITATION | Age: 60
DRG: 092 | End: 2025-01-07
Attending: PHYSICAL MEDICINE & REHABILITATION
Payer: COMMERCIAL

## 2025-01-07 LAB
ALBUMIN SERPL BCP-MCNC: 2.6 G/DL (ref 3.2–4.9)
ALBUMIN/GLOB SERPL: 0.9 G/DL
ALP SERPL-CCNC: 146 U/L (ref 30–99)
ALT SERPL-CCNC: 26 U/L (ref 2–50)
ANION GAP SERPL CALC-SCNC: 10 MMOL/L (ref 7–16)
AST SERPL-CCNC: 29 U/L (ref 12–45)
BILIRUB SERPL-MCNC: 0.4 MG/DL (ref 0.1–1.5)
BUN SERPL-MCNC: 9 MG/DL (ref 8–22)
CALCIUM ALBUM COR SERPL-MCNC: 9.5 MG/DL (ref 8.5–10.5)
CALCIUM SERPL-MCNC: 8.4 MG/DL (ref 8.5–10.5)
CHLORIDE SERPL-SCNC: 106 MMOL/L (ref 96–112)
CO2 SERPL-SCNC: 21 MMOL/L (ref 20–33)
CREAT SERPL-MCNC: 0.6 MG/DL (ref 0.5–1.4)
ERYTHROCYTE [DISTWIDTH] IN BLOOD BY AUTOMATED COUNT: 59.9 FL (ref 35.9–50)
GFR SERPLBLD CREATININE-BSD FMLA CKD-EPI: 103 ML/MIN/1.73 M 2
GLOBULIN SER CALC-MCNC: 2.9 G/DL (ref 1.9–3.5)
GLUCOSE BLD STRIP.AUTO-MCNC: 104 MG/DL (ref 65–99)
GLUCOSE BLD STRIP.AUTO-MCNC: 112 MG/DL (ref 65–99)
GLUCOSE BLD STRIP.AUTO-MCNC: 85 MG/DL (ref 65–99)
GLUCOSE BLD STRIP.AUTO-MCNC: 94 MG/DL (ref 65–99)
GLUCOSE SERPL-MCNC: 100 MG/DL (ref 65–99)
HCT VFR BLD AUTO: 38 % (ref 37–47)
HGB BLD-MCNC: 11.8 G/DL (ref 12–16)
MCH RBC QN AUTO: 31.5 PG (ref 27–33)
MCHC RBC AUTO-ENTMCNC: 31.1 G/DL (ref 32.2–35.5)
MCV RBC AUTO: 101.3 FL (ref 81.4–97.8)
PLATELET # BLD AUTO: 196 K/UL (ref 164–446)
PMV BLD AUTO: 11.1 FL (ref 9–12.9)
POTASSIUM SERPL-SCNC: 3.6 MMOL/L (ref 3.6–5.5)
PROT SERPL-MCNC: 5.5 G/DL (ref 6–8.2)
RBC # BLD AUTO: 3.75 M/UL (ref 4.2–5.4)
SODIUM SERPL-SCNC: 137 MMOL/L (ref 135–145)
WBC # BLD AUTO: 4.5 K/UL (ref 4.8–10.8)

## 2025-01-07 PROCEDURE — 97530 THERAPEUTIC ACTIVITIES: CPT

## 2025-01-07 PROCEDURE — 97535 SELF CARE MNGMENT TRAINING: CPT

## 2025-01-07 PROCEDURE — 97110 THERAPEUTIC EXERCISES: CPT

## 2025-01-07 PROCEDURE — 82962 GLUCOSE BLOOD TEST: CPT | Mod: 91

## 2025-01-07 PROCEDURE — 96137 PSYCL/NRPSYC TST PHY/QHP EA: CPT | Performed by: STUDENT IN AN ORGANIZED HEALTH CARE EDUCATION/TRAINING PROGRAM

## 2025-01-07 PROCEDURE — 36415 COLL VENOUS BLD VENIPUNCTURE: CPT

## 2025-01-07 PROCEDURE — 85027 COMPLETE CBC AUTOMATED: CPT

## 2025-01-07 PROCEDURE — 700102 HCHG RX REV CODE 250 W/ 637 OVERRIDE(OP): Performed by: PHYSICAL MEDICINE & REHABILITATION

## 2025-01-07 PROCEDURE — 97116 GAIT TRAINING THERAPY: CPT

## 2025-01-07 PROCEDURE — RXMED WILLOW AMBULATORY MEDICATION CHARGE: Performed by: PHYSICAL MEDICINE & REHABILITATION

## 2025-01-07 PROCEDURE — A9270 NON-COVERED ITEM OR SERVICE: HCPCS | Performed by: PHYSICAL MEDICINE & REHABILITATION

## 2025-01-07 PROCEDURE — 80053 COMPREHEN METABOLIC PANEL: CPT

## 2025-01-07 PROCEDURE — 99233 SBSQ HOSP IP/OBS HIGH 50: CPT | Performed by: PHYSICAL MEDICINE & REHABILITATION

## 2025-01-07 PROCEDURE — 96136 PSYCL/NRPSYC TST PHY/QHP 1ST: CPT | Performed by: STUDENT IN AN ORGANIZED HEALTH CARE EDUCATION/TRAINING PROGRAM

## 2025-01-07 PROCEDURE — 97112 NEUROMUSCULAR REEDUCATION: CPT

## 2025-01-07 PROCEDURE — 770010 HCHG ROOM/CARE - REHAB SEMI PRIVAT*

## 2025-01-07 RX ORDER — ATORVASTATIN CALCIUM 80 MG/1
80 TABLET, FILM COATED ORAL EVERY EVENING
Qty: 30 TABLET | Refills: 2 | Status: SHIPPED | OUTPATIENT
Start: 2025-01-07

## 2025-01-07 RX ORDER — CLOPIDOGREL BISULFATE 75 MG/1
75 TABLET ORAL DAILY
Qty: 30 TABLET | Refills: 2 | Status: SHIPPED | OUTPATIENT
Start: 2025-01-08

## 2025-01-07 RX ORDER — TRAZODONE HYDROCHLORIDE 50 MG/1
50 TABLET, FILM COATED ORAL
Qty: 30 TABLET | Refills: 0 | Status: SHIPPED | OUTPATIENT
Start: 2025-01-07

## 2025-01-07 RX ADMIN — ATORVASTATIN CALCIUM 80 MG: 40 TABLET, FILM COATED ORAL at 22:02

## 2025-01-07 RX ADMIN — APIXABAN 5 MG: 5 TABLET, FILM COATED ORAL at 08:25

## 2025-01-07 RX ADMIN — APIXABAN 5 MG: 5 TABLET, FILM COATED ORAL at 22:04

## 2025-01-07 RX ADMIN — OMEPRAZOLE 20 MG: 20 CAPSULE, DELAYED RELEASE ORAL at 08:25

## 2025-01-07 RX ADMIN — TRAZODONE HYDROCHLORIDE 50 MG: 50 TABLET ORAL at 22:03

## 2025-01-07 RX ADMIN — Medication 9 MG: at 22:02

## 2025-01-07 RX ADMIN — OXYCODONE 5 MG: 5 TABLET ORAL at 22:03

## 2025-01-07 RX ADMIN — CLOPIDOGREL BISULFATE 75 MG: 75 TABLET ORAL at 08:25

## 2025-01-07 ASSESSMENT — PAIN DESCRIPTION - PAIN TYPE
TYPE: ACUTE PAIN
TYPE: ACUTE PAIN

## 2025-01-07 ASSESSMENT — BRIEF INTERVIEW FOR MENTAL STATUS (BIMS)
ASKED TO RECALL SOCK: YES, NO CUE REQUIRED
BIMS SUMMARY SCORE: 15
ASKED TO RECALL BLUE: YES, NO CUE REQUIRED
WHAT MONTH IS IT: ACCURATE WITHIN 5 DAYS
ASKED TO RECALL BED: YES, NO CUE REQUIRED
WHAT DAY OF THE WEEK IS IT: CORRECT
WHAT YEAR IS IT: CORRECT
INITIAL REPETITION OF BED BLUE SOCK - FIRST ATTEMPT: 3

## 2025-01-07 ASSESSMENT — GAIT ASSESSMENTS
ASSISTIVE DEVICE: 4 WHEEL WALKER
DEVIATION: DECREASED BASE OF SUPPORT
GAIT LEVEL OF ASSIST: MODIFIED INDEPENDENT
DISTANCE (FEET): 150

## 2025-01-07 ASSESSMENT — ACTIVITIES OF DAILY LIVING (ADL)
TOILETING_LEVEL_OF_ASSIST: REQUIRES SUPERVISION WITH TOILETING
SHOWER_TRANSFER_LEVEL_OF_ASSIST: REQUIRES SUPERVISION WITH SHOWER TRANSFER
TOILET_TRANSFER_LEVEL_OF_ASSIST: REQUIRES SUPERVISION WITH TOILET TRANSFER

## 2025-01-07 NOTE — CARE PLAN
Problem: Toileting  Goal: STG-Within one week, patient will complete toileting tasks with min A  Outcome: Met     Problem: Functional Transfers  Goal: STG-Within one week, patient will transfer to toilet with CGA  Outcome: Met     Problem: OT Long Term Goals  Goal: LTG-By discharge, patient will complete basic self care tasks with supervision/mod I  Outcome: Met  Goal: LTG-By discharge, patient will perform bathroom transfers with supervision/mod I  Outcome: Met  Goal: LTG-By discharge, patient will complete basic home management with supervision/mod I  Outcome: Met     Problem: Dressing  Goal: STG-Within one week, patient will dress LB with SBA   Outcome: Met

## 2025-01-07 NOTE — PROGRESS NOTES
NURSING DAILY NOTE    Name: Kiara Qureshi   Date of Admission: 12/30/2024   Admitting Diagnosis: Anoxic brain injury (HCC)  Attending Physician: CHANTAL GREY M.D.  Allergies: Patient has no known allergies.    Safety  Patient Assist  min  Patient Precautions  Fall Risk  Precaution Comments  B lower abdominal wound VACs  Bed Transfer Status  Standby Assist  Toilet Transfer Status   Standby Assist  Assistive Devices  Rails, Wheelchair  Oxygen  None - Room Air  Diet/Therapeutic Dining  Current Diet Order   Procedures    Diet Order Diet: Consistent CHO (Diabetic) (meds whole with thins, straws ok)     Pill Administration  whole  Agitated Behavioral Scale     ABS Level of Severity       Fall Risk  Has the patient had a fall this admission?    (No)  Josefa Parra Fall Risk Scoring  14, MODERATE RISK  Fall Risk Safety Measures  bed alarm and chair alarm    Vitals  Temperature: 36.6 °C (97.8 °F)  Temp src: Oral  Pulse: 78  Respiration: 18  Blood Pressure: 104/63  Blood Pressure MAP (Calculated): 77 MM HG  BP Location: Right, Upper Arm  Patient BP Position: Sitting     Oxygen  Pulse Oximetry: 99 %  O2 (LPM): 0  O2 Delivery Device: None - Room Air    Bowel and Bladder  Last Bowel Movement  01/05/25 (Per pt.)  Stool Type  Type 4: Like a sausage or snake, smooth and soft, Type 5: Soft blob with clear cut edges (passed easily)  Bowel Device  Bathroom, Other (Comment) (Bowel Meds)  Continent  Bladder: Did not void (Beckwith in place)   Bowel: No movement  Bladder Function  Urine Void (mL):  (Moderate)  Number of Times Voided: 1  Urinary Options: Yes  Urine Color: Yellow  Urine Clarity: Cloudy (Unable to see Through)  Genitourinary Assessment   Bladder Assessment (WDL):  Within Defined Limits  Beckwith Catheter: Not Applicable  Beckwith Care: Given with Soap and Water  Urinary Symptoms: Catheter (Document on LDA)  Urine Color: Yellow  Urine Clarity: Cloudy (Unable to see  Through)  Bladder Device: Bathroom  Bladder Scan: Post Void  $ Bladder Scan Results (mL): 1  Bladder Medications: No    Skin  Ray Score   17  Sensory Interventions   Bed Types: Standard/Trauma Mattress  Skin Preventative Measures: Pillows in Use for Support / Positioning, Waffle Overlay  Moisture Interventions  Moisturizers/Barriers: Barrier Paste  Containment Devices: Indwelling Catheter      Pain  Pain Rating Scale  0 - No Pain  Pain Location  Groin, Leg  Pain Location Orientation  Left, Right  Pain Interventions   Rest    ADLs    Bathing   Patient Refused Bathing (She has shower yesterday)  Linen Change      Personal Hygiene  Perineal Care, Moist Brianna Wipes  Chlorhexidine Bath      Oral Care  Brushed Teeth  Teeth/Dentures     Shave     Nutrition Percentage Eaten  Lunch, Between % Consumed  Environmental Precautions  Treaded Slipper Socks on Patient, Bed in Low Position  Patient Turns/Positioning  Patient turns self independently side to side without assistance, to offload sacral area  Patient Turns Assistance/Tolerance     Bed Positions  Bed Locked, Bed Controls On  Head of Bed Elevated  Self regulated      Psychosocial/Neurologic Assessment  Psychosocial Assessment  Psychosocial (WDL):  Within Defined Limits  Neurologic Assessment  Neuro (WDL): Exceptions to WDL  Level of Consciousness: Alert  Orientation Level: Oriented X4  Cognition: Follows commands  Speech: Clear  Pupil Assesment: No  Motor Function/Sensation Assessment: Motor strength  Muscle Strength Right Arm: Good Strength Against Gravity and Moderate Resistance  Muscle Strength Left Arm: Good Strength Against Gravity and Moderate Resistance  Muscle Strength Right Leg: Fair Strength against Gravity but No Resistance  Muscle Strength Left Leg: Fair Strength against Gravity but No Resistance  EENT (WDL):  WDL Except    Cardio/Pulmonary Assessment  Edema      Respiratory Breath Sounds  RUL Breath Sounds: Clear  RML Breath Sounds: Clear  RLL Breath  Sounds: Clear, Diminished  SHOSHANA Breath Sounds: Clear  LLL Breath Sounds: Clear, Diminished  Cardiac Assessment   Cardiac (WDL):  WDL Except (H/O HTN.)

## 2025-01-07 NOTE — PROGRESS NOTES
NURSING DAILY NOTE    Name: Kiara Qureshi   Date of Admission: 12/30/2024   Admitting Diagnosis: Anoxic brain injury (HCC)  Attending Physician: CHANTAL GREY M.D.  Allergies: Patient has no known allergies.    Safety  Patient Assist  Min assist  Patient Precautions  Fall Risk  Precaution Comments  B lower abdominal wound VACs  Bed Transfer Status  Standby Assist  Toilet Transfer Status   Standby Assist  Assistive Devices  Rails, Wheelchair  Oxygen  None - Room Air  Diet/Therapeutic Dining  Current Diet Order   Procedures    Diet Order Diet: Consistent CHO (Diabetic) (meds whole with thins, straws ok)     Pill Administration  whole  Agitated Behavioral Scale     ABS Level of Severity       Fall Risk  Has the patient had a fall this admission?    (No)  Josefa Parra Fall Risk Scoring  14, MODERATE RISK  Fall Risk Safety Measures  bed alarm and chair alarm    Vitals  Temperature: 36.4 °C (97.6 °F)  Temp src: Oral  Pulse: 74  Respiration: 18  Blood Pressure: 110/58  Blood Pressure MAP (Calculated): 75 MM HG  BP Location: Right, Upper Arm  Patient BP Position: Supine     Oxygen  Pulse Oximetry: 96 %  O2 (LPM): 0  O2 Delivery Device: None - Room Air    Bowel and Bladder  Last Bowel Movement  01/05/25 (Per pt.)  Stool Type  Type 4: Like a sausage or snake, smooth and soft, Type 5: Soft blob with clear cut edges (passed easily)  Bowel Device  Bathroom, Other (Comment) (Bowel Meds)  Continent  Bladder: Did not void (Beckwith in place)   Bowel: No movement  Bladder Function  Urine Void (mL):  (Moderate)  Number of Times Voided: 1  Urine Color: Yellow  Urine Clarity: Cloudy (Unable to see Through)  Genitourinary Assessment   Bladder Assessment (WDL):  WDL Except  Beckwith Catheter: Not Applicable  Beckwith Care: Given with Soap and Water  Urinary Symptoms: Catheter (Document on LDA)  Urine Color: Yellow  Urine Clarity: Cloudy (Unable to see Through)  Bladder Device:  Bathroom  Bladder Scan: Post Void  $ Bladder Scan Results (mL): 1  Bladder Medications: No    Skin  Ray Score   17  Sensory Interventions   Bed Types: Standard/Trauma Mattress  Skin Preventative Measures: Pillows in Use for Support / Positioning  Moisture Interventions  Moisturizers/Barriers: Barrier Wipes  Containment Devices: Indwelling Catheter      Pain  Pain Rating Scale  4 - Distracts me, can do usual activities  Pain Location  Groin  Pain Location Orientation  Left, Right  Pain Interventions   Medication (see MAR)    ADLs    Bathing   Patient Refused Bathing (She has shower yesterday)  Linen Change      Personal Hygiene  Perineal Care, Moist Brianna Wipes  Chlorhexidine Bath      Oral Care  Brushed Teeth  Teeth/Dentures     Shave     Nutrition Percentage Eaten  Lunch, Between % Consumed  Environmental Precautions  Treaded Slipper Socks on Patient, Bed in Low Position  Patient Turns/Positioning  Patient turns self independently side to side without assistance, to offload sacral area  Patient Turns Assistance/Tolerance     Bed Positions  Bed Controls On, Bed Locked  Head of Bed Elevated  Self regulated      Psychosocial/Neurologic Assessment  Psychosocial Assessment  Psychosocial (WDL):  Within Defined Limits  Neurologic Assessment  Neuro (WDL): Exceptions to WDL  Level of Consciousness: Alert  Orientation Level: Oriented X4  Cognition: Follows commands  Speech: Clear  Pupil Assesment: No  Motor Function/Sensation Assessment: Motor strength  Muscle Strength Right Arm: Good Strength Against Gravity and Moderate Resistance  Muscle Strength Left Arm: Good Strength Against Gravity and Moderate Resistance  Muscle Strength Right Leg: Fair Strength against Gravity but No Resistance  Muscle Strength Left Leg: Fair Strength against Gravity but No Resistance  EENT (WDL):  WDL Except    Cardio/Pulmonary Assessment  Edema      Respiratory Breath Sounds  RUL Breath Sounds: Clear  RML Breath Sounds: Clear  RLL Breath  Sounds: Clear, Diminished  SHOSHANA Breath Sounds: Clear  LLL Breath Sounds: Clear, Diminished  Cardiac Assessment   Cardiac (WDL):  WDL Except (HTN)

## 2025-01-07 NOTE — THERAPY
"Occupational Therapy   Discharge Summary     Patient Name: Kiara Qureshi  Age:  59 y.o., Sex:  female  Medical Record #: [id number]  Today's Date: 1/7/2025     Precautions  Precautions: Fall Risk  Comments: B lower abdominal wound VACs    Subjective    Pt in bed asleep upon arrival, easily arousable and agreeable to participate in OT.      Objective     01/07/25 0701   OT Charge Group   OT Self Care / ADL (Units) 4   OT Total Time Spent   OT Individual Total Time Spent (Mins) 60   Precautions   Precautions Fall Risk   Comments B lower abdominal wound VACs   Vitals   O2 Delivery Device None - Room Air   Sleep/Wake Cycle   Sleep & Rest Awake   Eating   Assistance Needed Independent   CARE Score - Eating 6   Oral Hygiene   Assistance Needed Independent   CARE Score - Oral Hygiene 6   Toileting Hygiene   Assistance Needed Independent   CARE Score - Toileting Hygiene 6   Shower/Bathe Self   Assistance Needed Set-up / clean-up   CARE Score - Shower/Bathe Self 5   Upper Body Dressing   Assistance Needed Independent   CARE Score - Upper Body Dressing 6   Lower Body Dressing   Assistance Needed Independent   CARE Score - Lower Body Dressing 6   Putting On/Taking Off Footwear   Assistance Needed Independent   CARE Score - Putting On/Taking Off Footwear 6   Toilet Transfer   Assistance Needed Independent   CARE Score - Toilet Transfer 6   Cognitive Pattern Assessment   Cognitive Pattern Assessment Used BIMS   Brief Interview for Mental Status (BIMS)   Repetition of Three Words (First Attempt) 3   Temporal Orientation: Year Correct   Temporal Orientation: Month Accurate within 5 days   Temporal Orientation: Day Correct   Recall: \"Sock\" Yes, no cue required   Recall: \"Blue\" Yes, no cue required   Recall: \"Bed\" Yes, no cue required   BIMS Summary Score 15   Confusion Assessment Method (CAM)   Is there evidence of an acute change in mental status from the patient's baseline? No   Inattention Behavior not present "   Disorganized thinking Behavior not present   Altered level of consciousness Behavior not present   Discharge Summary    Discharge Location  Home  ([place])   Patient Discharging with Assist of Spouse / Significant Other  (Bruno)   Level of Supervision Required Intermittent Supervision   Recommended Equipment for Discharge 4-Wheeled Walker;Shower Chair;Grab Bars in Tub / Shower;Hand Held Shower Head  (pt reports grab bars already in place and shower chair has been ordered)   Recommended Services Upon Discharge Home Health Occupational Therapy   Long Term Goals Met 3   Long Term Goals Not Met 0   Criteria for Termination of Services Maximum Function Achieved for Inpatient Rehabilitation   Discharge Instructions to Patient   Level of Assist Required for Eating Able to Complete Eating without Assist   Level of Assist Required for Grooming Able to Complete Grooming without Assist   Level of Assist Required for Dressing Requires Supervision with Dressing   Level of Assist Required for Toileting Requires Supervision with Toileting   Level of Assist Required for Toilet Transfer Requires Supervision with Toilet Transfer   Level of Assist Required for Bathing Requires Supervision with Bathing   Level of Assist Required for Shower Transfer Requires Supervision with Shower Transfer   Level of Assist Required for Home Mgmt Requires Supervision with Home Management   Level of Assist Required for Meal Prep Requires Supervision with Meal Preparation   Driving Please Contact Physician Prior to Driving       Assessment    Pt tolerated OT session well w/ focus on DC IRF [place]. Pt demo's ability to complete ADL routine @ setup to Mod I level including mod I for 4WW mobility w/ consistent use of brakes. Pt encouraged by ability to manage socks and shoes independently without AE.   Strengths: Able to follow instructions, Alert and oriented, Effective communication skills, Independent prior level of function, Manages pain appropriately,  Motivated for self care and independence, Pleasant and cooperative, Supportive family, Willingly participates in therapeutic activities  Barriers: Decreased endurance, Generalized weakness, Hypotension, Impaired balance, Limited mobility    Plan    Caregiver training w/ spouse this afternoon in anticipation of DC home tomorrow ([place]), pt reports grab bars in place and shower chair has been ordered.     Occupational Therapy Goals (Active)       There are no active problems.

## 2025-01-07 NOTE — THERAPY
"Physical Therapy   Daily Treatment     Patient Name: Kiara Qureshi  Age:  59 y.o., Sex:  female  Medical Record #: [id number]  Today's Date: 1/7/2025     Precautions  Precautions: (P) Fall Risk  Comments: (P) B lower abdominal wound VACs    Subjective    Pt declined consecutive stair practice this session, but was agreeable to standing HEP and steps in // bars.      Objective       01/07/25 0901   PT Charge Group   PT Therapeutic Exercise (Units) 1   PT Neuromuscular Re-Education / Balance (Units) 1   PT Total Time Spent   PT Individual Total Time Spent (Mins) 30   Precautions   Precautions Fall Risk   Comments B lower abdominal wound VACs   Stairs Functional Level of Assist   Level of Assist with Stairs Contact Guard Assist   # of Stairs Climbed 2   Stairs Description Hand rails;Limited by fatigue   Sitting Lower Body Exercises   Sit to Stand 1 set of 10   Standing Lower Body Exercises   Hamstring Curl 1 set of 10;Bilateral    Hip Extension 1 set of 10;Bilateral    Hip Abduction 1 set of 10;Bilateral   Marching 1 set of 10   Heel Rise 1 set of 10;Bilateral   Step Up 1 set of 10;Bilateral  (4\")   Comments Handouts reviewed/ provided.   Bed Mobility    Sit to Stand Modified Independent   Neuro-Muscular Treatments   Neuro-Muscular Treatments Weight Shift Left;Weight Shift Right;Verbal Cuing;Sequencing         Assessment    Pt participated in standing HEP for functional strengthening, and activity tolerance. Pt remains limited by dec endurance, and balance. Pt demo's a readiness for D/C tomorrow.      Strengths: Alert and oriented, Independent prior level of function, Motivated for self care and independence, Pleasant and cooperative, Supportive family, Willingly participates in therapeutic activities  Barriers: Decreased endurance, Fatigue, Generalized weakness, Impaired activity tolerance, Impaired balance, Impaired insight/denial of deficits    Plan    D/C data collection, ongoing D/C recommendations, " complete Passport, gong celebration.     DME  PT DME Recommendations  Assistive Device: 4-Wheel Walker    Passport items to be completed:   navigate up and down stairs,   Physical Therapy Problems (Active)       Problem: Mobility       Dates: Start:  12/31/24         Goal: STG-Within one week, patient will ambulate at least 25 feet with LRAD and min A.        Dates: Start:  12/31/24         Goal Note filed on 01/02/25 0823 by Jimbo Oswald, PT       15-24ft Jun with FWW              Goal: STG-Within one week, patient will ascend and descend four to six stairs at least rails and min A.        Dates: Start:  12/31/24         Goal Note filed on 01/02/25 0823 by Jimbo Oswald, PT       Has not been assessed due to low standing activity tolerance                 Problem: Mobility Transfers       Dates: Start:  12/31/24         Goal: STG-Within one week, patient will transfer bed to chair with LRAD and CGA.        Dates: Start:  12/31/24         Goal Note filed on 01/02/25 0823 by Jimbo Oswald, PT       Jun                 Problem: PT-Long Term Goals       Dates: Start:  12/31/24         Goal: LTG-By discharge, patient will ambulate at least 150 feet with LRAD and supervision.        Dates: Start:  12/31/24            Goal: LTG-By discharge, patient will transfer one surface to another with LRAD and supervision-mod I.        Dates: Start:  12/31/24            Goal: LTG-By discharge, patient will ambulate up/down flight of stairs with rails and supervision.        Dates: Start:  12/31/24            Goal: LTG-By discharge, patient will transfer in/out of a car with LRAD and supervision.        Dates: Start:  12/31/24

## 2025-01-07 NOTE — THERAPY
Recreational Therapy  Daily Treatment     Patient Name: Kiara Qureshi  AGE:  59 y.o., SEX:  female  Medical Record #: [id number]  Today's Date: 1/7/2025       Subjective    Patient was willing to participate in the session and reported that she is going home tomorrow.      Objective       01/07/25 1001   Procedural Tracking   Procedural Tracking Gross Motor Functional Leisure Skills   Treatment Time   Total Time Spent (mins) 30   Functional Ability Status - Cognitive   Attention Span Remains on Task   Comprehension Follows Three Step Commands   Judgment Able to Make Independent Decisions   Functional Ability Status - Emotional    Affect Appropriate   Mood Appropriate   Behavior Appropriate   Skilled Intervention    Skilled Intervention Gross Motor Leisure   Interdisciplinary Plan of Care Collaboration   Patient Position at End of Therapy Seated;Call Light within Reach   Strengths & Barriers   Strengths Able to follow instructions;Willingly participates in therapeutic activities;Pleasant and cooperative   Barriers Generalized weakness;Decreased endurance     Patient stood for 10 minutes but needed two breaks, standing between 3 and 4 minutes each time; and each time stating that she was surprised that was all she stood. While standing she played bowling on the [employer] and tried the [employer] balance board.     Assessment    Patient did not meet her goal of standing for 15 minutes but was able to stay pretty balanced on the balance board.     Strengths: (P) Able to follow instructions, Willingly participates in therapeutic activities, Pleasant and cooperative  Barriers: (P) Generalized weakness, Decreased endurance    Plan    Patient is expected to discharge tomorrow.

## 2025-01-07 NOTE — THERAPY
Occupational Therapy  Daily Treatment     Patient Name: Kiara Qureshi  Age:  59 y.o., Sex:  female  Medical Record #: [id number]  Today's Date: 1/7/2025     Precautions  Precautions: (P) Fall Risk  Comments: (P) B lower abdominal wound VACs    Subjective    Pt in bed upon arrival and spouse (Bruno) present for caregiver training. Agreeable to participate.      Objective     01/07/25 1401   OT Charge Group   OT Self Care / ADL (Units) 2   OT Total Time Spent   OT Individual Total Time Spent (Mins) 30   Precautions   Precautions Fall Risk   Comments B lower abdominal wound VACs   Vitals   O2 Delivery Device None - Room Air   Interdisciplinary Plan of Care Collaboration   Patient Position at End of Therapy In Bed;Call Light within Reach;Family / Friend in Room     Caregiver training completed w/ spouse (Bruno). Reviewed safety considerations and adaptive techniques for ADL routine including DME, fall prevention strategies, covering wound vac as precaution for showers and adaptive dressing strategies. Emphasized that use of gait belt is highly advised for first several showers. spouse  verbalized  understanding.      Assessment    OT session focused on caregiver training w/ spouse (Bruno), who verbalized understanding re: all recommendations to maximize pt's functional independence and safety @ home.   Strengths: Able to follow instructions, Alert and oriented, Effective communication skills, Independent prior level of function, Manages pain appropriately, Motivated for self care and independence, Pleasant and cooperative, Supportive family, Willingly participates in therapeutic activities  Barriers: Decreased endurance, Generalized weakness, Hypotension, Impaired balance, Limited mobility    Plan    DC home tomorrow w/ spouse support.     DME  Grab bars in place and shower chair has been ordered    Occupational Therapy Goals (Active)       There are no active problems.

## 2025-01-07 NOTE — PROGRESS NOTES
NURSING DAILY NOTE    Name: Kiara Qureshi   Date of Admission: 12/30/2024   Admitting Diagnosis: Anoxic brain injury (HCC)  Attending Physician: CHANTAL GREY M.D.  Allergies: Patient has no known allergies.    Safety  Patient Assist  min  Patient Precautions  Fall Risk  Precaution Comments  B lower abdominal wound VACs  Bed Transfer Status  Standby Assist  Toilet Transfer Status   Standby Assist  Assistive Devices  Rails, Wheelchair  Oxygen  None - Room Air  Diet/Therapeutic Dining  Current Diet Order   Procedures    Diet Order Diet: Consistent CHO (Diabetic) (meds whole with thins, straws ok)     Pill Administration  whole  Agitated Behavioral Scale     ABS Level of Severity       Fall Risk  Has the patient had a fall this admission?    (No)  Josefa Parra Fall Risk Scoring  14, MODERATE RISK  Fall Risk Safety Measures  Bed strip alarm    Vitals  Temperature: 36.3 °C (97.4 °F)  Temp src: Oral  Pulse: 70  Respiration: 18  Blood Pressure: 101/69  Blood Pressure MAP (Calculated): 80 MM HG  BP Location: Right, Upper Arm  Patient BP Position: Supine     Oxygen  Pulse Oximetry: 98 %  O2 (LPM): 0  O2 Delivery Device: None - Room Air    Bowel and Bladder  Last Bowel Movement  01/05/25 (Per pt.)  Stool Type  Type 4: Like a sausage or snake, smooth and soft, Type 5: Soft blob with clear cut edges (passed easily)  Bowel Device  Bathroom, Other (Comment) (Bowel Meds)  Continent  Bladder: Did not void (Beckwith in place)   Bowel: No movement  Bladder Function  Urine Void (mL):  (Moderate)  Number of Times Voided: 1  Urinary Options: Yes  Urine Color: Yellow  Urine Clarity: Cloudy (Unable to see Through)  Genitourinary Assessment   Bladder Assessment (WDL):  Within Defined Limits  Beckwith Catheter: Not Applicable  Beckwith Care: Given with Soap and Water  Urinary Symptoms: Catheter (Document on LDA)  Urine Color: Yellow  Urine Clarity: Cloudy (Unable to see  Through)  Bladder Device: Bathroom  Bladder Scan: Post Void  $ Bladder Scan Results (mL): 1  Bladder Medications: No    Skin  Ray Score   17  Sensory Interventions   Bed Types: Standard/Trauma Mattress with Overlay  Skin Preventative Measures: Pillows in Use for Support / Positioning, Waffle Overlay  Moisture Interventions  Moisturizers/Barriers: Barrier Wipes  Containment Devices: Indwelling Catheter      Pain  Pain Rating Scale  7 - Focus of attention, prevents doing daily activities  Pain Location  Groin, Leg  Pain Location Orientation  Left, Right  Pain Interventions   Rest    ADLs    Bathing   Patient Refused Bathing (She has shower yesterday)  Linen Change      Personal Hygiene  Perineal Care, Moist Brianna Wipes  Chlorhexidine Bath      Oral Care  Brushed Teeth  Teeth/Dentures     Shave     Nutrition Percentage Eaten  Lunch, Between % Consumed  Environmental Precautions  Treaded Slipper Socks on Patient, Bed in Low Position  Patient Turns/Positioning  Patient turns self independently side to side without assistance, to offload sacral area  Patient Turns Assistance/Tolerance     Bed Positions  Bed Locked, Bed Controls On  Head of Bed Elevated  Self regulated      Psychosocial/Neurologic Assessment  Psychosocial Assessment  Psychosocial (WDL):  Within Defined Limits  Neurologic Assessment  Neuro (WDL): Exceptions to WDL  Level of Consciousness: Alert  Orientation Level: Oriented X4  Cognition: Follows commands  Speech: Clear  Pupil Assesment: No  Motor Function/Sensation Assessment: Motor strength  Muscle Strength Right Arm: Good Strength Against Gravity and Moderate Resistance  Muscle Strength Left Arm: Good Strength Against Gravity and Moderate Resistance  Muscle Strength Right Leg: Fair Strength against Gravity but No Resistance  Muscle Strength Left Leg: Fair Strength against Gravity but No Resistance  EENT (WDL):  WDL Except    Cardio/Pulmonary Assessment  Edema      Respiratory Breath Sounds  RUL  Breath Sounds: Clear  RML Breath Sounds: Clear  RLL Breath Sounds: Clear, Diminished  SHOSHANA Breath Sounds: Clear  LLL Breath Sounds: Clear, Diminished  Cardiac Assessment   Cardiac (WDL):  WDL Except (H/O HTN.)

## 2025-01-07 NOTE — THERAPY
Physical Therapy   Daily Treatment     Patient Name: Kiara Qureshi  Age:  59 y.o., Sex:  female  Medical Record #: [id number]  Today's Date: 1/6/2025     Precautions  Precautions: Fall Risk  Comments: B lower abdominal wound VACs    Subjective    Pt reported an eagerness and readiness for upcoming D/C.     Objective       01/06/25 1301   PT Charge Group   PT Therapeutic Exercise (Units) 1   PT Neuromuscular Re-Education / Balance (Units) 1   PT Therapeutic Activities (Units) 2   PT Total Time Spent   PT Individual Total Time Spent (Mins) 60   Precautions   Precautions Fall Risk   Comments B lower abdominal wound VACs   Gait Functional Level of Assist    Gait Level Of Assist Standby Assist   Assistive Device 4 Wheel Walker   Distance (Feet) 60   # of Times Distance was Traveled 2   Deviation Decreased Base Of Support;Decreased Heel Strike;Decreased Toe Off  (limited by fatigue)   Transfer Functional Level of Assist   Bed, Chair, Wheelchair Transfer Standby Assist   Bed Chair Wheelchair Transfer Description Adaptive equipment;Set-up of equipment;Non-hospital bed  (Of note: Pt required CGA to simulate home enviornment with a step and retro amb to enter bed)   Bed Mobility    Supine to Sit Independent   Sit to Supine Independent   Sit to Stand Supervised  (intermittent cues for brake management/ sequencing)   Scooting Independent   Rolling Independent   Neuro-Muscular Treatments   Neuro-Muscular Treatments Weight Shift Left;Weight Shift Right;Sequencing;Verbal Cuing   Comments Car transfer SPT CGA d/t height, otherwise SBA, SPT 4WW. Review of Passport items. Home safety/ fall pevention handouts reviewed/ provided' pt declined floor recovery practice. D/C recommendations reviewed/   Roll Left and Right   Assistance Needed Independent   CARE Score - Roll Left and Right 6   Sit to Lying   Assistance Needed Independent   CARE Score - Sit to Lying 6   Lying to Sitting on Side of Bed   Assistance Needed Independent    CARE Score - Lying to Sitting on Side of Bed 6   Car Transfer   Assistance Needed Set-up / clean-up   CARE Score - Car Transfer 5         Assessment    Pt demo'ed recall of previous training, requiring only intermittent min A for sequencing 4WW breaks. Pt was able to simulate standard bed transfer with step, per home environment, using 4WW. Pt able to enter/ exit practice SUV with CGA/SBA SPT with 4WW. Pt continues to have limited ambulation tolerance, but does feel properly prepared for D/C. Pt was receptive to home safety/ fall prevention education.      Strengths: Alert and oriented, Independent prior level of function, Motivated for self care and independence, Pleasant and cooperative, Supportive family, Willingly participates in therapeutic activities  Barriers: Decreased endurance, Fatigue, Generalized weakness, Impaired activity tolerance, Impaired balance, Impaired insight/denial of deficits    Plan    Prepare for D/C, review HEP, review KEILY, complete Passport.     DME  PT DME Recommendations  Assistive Device: 4-Wheel Walker    Passport items to be completed:  , walk or wheel around home/community, navigate up and down stairs, , demonstrate HEP,   Physical Therapy Problems (Active)       Problem: Mobility       Dates: Start:  12/31/24         Goal: STG-Within one week, patient will ambulate at least 25 feet with LRAD and min A.        Dates: Start:  12/31/24         Goal Note filed on 01/02/25 0823 by Jimbo Oswald, PT       15-24ft Jun with FWW              Goal: STG-Within one week, patient will ascend and descend four to six stairs at least rails and min A.        Dates: Start:  12/31/24         Goal Note filed on 01/02/25 0823 by Jimbo Oswald, PT       Has not been assessed due to low standing activity tolerance                 Problem: Mobility Transfers       Dates: Start:  12/31/24         Goal: STG-Within one week, patient will transfer bed to chair with LRAD and CGA.        Dates: Start:   12/31/24         Goal Note filed on 01/02/25 0823 by Jimbo Oswald, PT       Jun                 Problem: PT-Long Term Goals       Dates: Start:  12/31/24         Goal: LTG-By discharge, patient will ambulate at least 150 feet with LRAD and supervision.        Dates: Start:  12/31/24            Goal: LTG-By discharge, patient will transfer one surface to another with LRAD and supervision-mod I.        Dates: Start:  12/31/24            Goal: LTG-By discharge, patient will ambulate up/down flight of stairs with rails and supervision.        Dates: Start:  12/31/24            Goal: LTG-By discharge, patient will transfer in/out of a car with LRAD and supervision.        Dates: Start:  12/31/24

## 2025-01-07 NOTE — CARE PLAN
"  Problem: Fall Risk - Rehab  Goal: Patient will remain free from falls  Note: Josefa Parra Fall risk Assessment Score: 14    Moderate fall risk Interventions  - Bed and strip alarm   - Yellow sign by the door   - Yellow wrist band \"Fall risk\"  - Room near to the nurse station  - Do not leave patient unattended in the bathroom  - Fall risk education provided     The patient is Watcher - Medium risk of patient condition declining or worsening    Shift Goals  Clinical Goals: Safety  Patient Goals: Sleep well  Family Goals: safety  "

## 2025-01-07 NOTE — DISCHARGE PLANNING
CM//Discharge :    The following has been ordered:   Home health:  [facility]                    Disciplines ordered: RN, PT, OT, Wound vac and wound care  Status: Accepted    DME:      [facility]                     Following equipment has been ordered: 4WW  Status:Sent

## 2025-01-08 ENCOUNTER — PHARMACY VISIT (OUTPATIENT)
Dept: PHARMACY | Facility: MEDICAL CENTER | Age: 60
End: 2025-01-08
Payer: COMMERCIAL

## 2025-01-08 ENCOUNTER — APPOINTMENT (OUTPATIENT)
Dept: PHYSICAL THERAPY | Facility: REHABILITATION | Age: 60
End: 2025-01-08
Attending: PHYSICAL MEDICINE & REHABILITATION
Payer: COMMERCIAL

## 2025-01-08 VITALS
HEART RATE: 80 BPM | BODY MASS INDEX: 34.41 KG/M2 | OXYGEN SATURATION: 98 % | TEMPERATURE: 98.1 F | DIASTOLIC BLOOD PRESSURE: 54 MMHG | SYSTOLIC BLOOD PRESSURE: 111 MMHG | RESPIRATION RATE: 18 BRPM | HEIGHT: 60 IN | WEIGHT: 175.27 LBS

## 2025-01-08 LAB — GLUCOSE BLD STRIP.AUTO-MCNC: 97 MG/DL (ref 65–99)

## 2025-01-08 PROCEDURE — A9270 NON-COVERED ITEM OR SERVICE: HCPCS | Performed by: PHYSICAL MEDICINE & REHABILITATION

## 2025-01-08 PROCEDURE — 700102 HCHG RX REV CODE 250 W/ 637 OVERRIDE(OP): Performed by: PHYSICAL MEDICINE & REHABILITATION

## 2025-01-08 PROCEDURE — 97602 WOUND(S) CARE NON-SELECTIVE: CPT

## 2025-01-08 PROCEDURE — 82962 GLUCOSE BLOOD TEST: CPT

## 2025-01-08 PROCEDURE — 97607 NEG PRS WND THR NDME<=50SQCM: CPT

## 2025-01-08 PROCEDURE — 99239 HOSP IP/OBS DSCHRG MGMT >30: CPT | Performed by: PHYSICAL MEDICINE & REHABILITATION

## 2025-01-08 RX ADMIN — OXYCODONE 5 MG: 5 TABLET ORAL at 08:42

## 2025-01-08 RX ADMIN — CLOPIDOGREL BISULFATE 75 MG: 75 TABLET ORAL at 08:44

## 2025-01-08 RX ADMIN — OMEPRAZOLE 20 MG: 20 CAPSULE, DELAYED RELEASE ORAL at 08:44

## 2025-01-08 RX ADMIN — APIXABAN 5 MG: 5 TABLET, FILM COATED ORAL at 08:44

## 2025-01-08 ASSESSMENT — PATIENT HEALTH QUESTIONNAIRE - PHQ9
2. FEELING DOWN, DEPRESSED, IRRITABLE, OR HOPELESS: NOT AT ALL
SUM OF ALL RESPONSES TO PHQ9 QUESTIONS 1 AND 2: 0
1. LITTLE INTEREST OR PLEASURE IN DOING THINGS: NOT AT ALL

## 2025-01-08 ASSESSMENT — PAIN DESCRIPTION - PAIN TYPE: TYPE: ACUTE PAIN

## 2025-01-08 NOTE — DISCHARGE PLANNING
Case Management Discharge Instructions              Follow-up Information:     [facility].  They will call to schedule appointment.    [phone].     Lamin Camacho M.D.  [address] [place] [address]  [phone]     [facility]  They will call you to schedule appointment.    [phone].     [facility]  [address]  [facility] [id number] [phone]  For questions about your walker.     [facility]  [address]  [facility] [id number] [phone]  They will call to schedule home visits.     [facility]  [address]  [facility] [id number] [phone]    [facility]  For questions about your wound vac  [phone]    Pt will benefit from participation in cardiac rehabilitation program to develop behaviors & skills to support self-mgmt of health conditions, stress, etc. Prioritizing this during her early recovery may increase self-confidence and self-efficacy in self mgmt of her medical conditions.    From a cognitive perspective, she may benefit from the development of pacing strategies to reduce the potential for increased cognitive fatigue. For instance, engaging in a cognitively stimulating task of increased complexity for a period of 15-20 minutes followed by a 5-10 minute break to promote a time-based pacing strategy v. Pacing based on how she is feeling.    Identifying activities that may be considered difficult (red light), moderate (yellow light), and easy (green light) is recommended to help develop a pacing plan and track activities that are more challenging. By dispersing green light activities with red light activities throughout the day, this may help with pacing.    To promote cognitive rehabilitation, I encouraged Mrs. Qureshi to incorporate functional and meaningful tasks into her recovery program. For instance, using pacing strategies while taking a continuing education course &  self-monitoring progress throughout the course that assesses her fatigue level, learning of new information, organization of new information, etc. I also encouraged her to engage in tasks in graded ways to reduce feelings of overwhelm or stress that can occur. Incorporating systemized ways to problem solving may also be helpful, like developing a plan for breaking larger tasks into smaller steps, monitoring progress on each steps, etc.    Pt may benefit from taking notes, repetition of information to support encoding of novel material, utilizing handouts, etc. To support learning of new information, as she benefited from learning trials.      Monitoring cognitive fatigue & taking breaks when fatigue appears is advised. Cognitive fatigue can look like: increased tiredness, decreasing task performance, frustration, forgetfulness, distractibility.    Referral to outpatient neuropsych for~6 months sent on 1/7/25.

## 2025-01-08 NOTE — DISCHARGE SUMMARY
Physical Medicine & Rehabilitation Discharge Summary    Admission Date: 12/30/2024    Discharge Date: 1/8/2025    Attending Provider: Mario Cole MD    Admission Diagnosis:   Active Hospital Problems    Diagnosis     *Anoxic brain injury (HCC)        Discharge Diagnosis:  Active Hospital Problems    Diagnosis     *Anoxic brain injury (HCC)        HPI per Admission History & Physical:  Kiara Qureshi is a 59 y.o. female who presented 11/27/2024 with STEMI.     Patient has a history of obesity, hypertension, hyperlipidemia.  She was seen at [facility] as a transfer initially for anterior STEMI requiring lytics.  Patient underwent an LAD PCI but hospital course was complicated by retroperitoneal bleed, ventricular fibrillation and PEA arrest and cardiogenic shock requiring VA ECMO and Impella.  She is now status post ECMO decannulation on November 4 and had left femoral embolectomy, PCI to the LAD, unsuccessful PCI to OM1 and Impella removal on November 8.  Hospital course was complicated by patient developing encephalopathy and delirium, to which she was given valproic acid and quetiapine.  Patient has been weaned off her present medications and was found to have hypotension to which she was started on p.o. midodrine 10 mg 3 times daily.  Goal-directed medical therapy was held due to borderline low blood pressure.  Her echocardiogram on November 24 shows 25 to 30% EF with akinesis of the apical, apical anterior wall, apical septum, apical inferior wall, and apical lateral wall of the left ventricle.  Hypokinesis seen of the basal anterolateral wall, mid anterolateral wall, anterior wall, mid inferolateral wall, and basal inferolateral wall of the left ventricle.     On November 25, patient had CT angiogram of the chest which shows pulmonary edema, small bilateral pleural effusions, small 3 mm filling defect along the lateral wall of the descending thoracic aorta which may represent focal aortic mural thrombus  versus ulcerated plaque.  CT angiogram of the abdomen pelvis showing increased organization of the rim-enhancing left retroperitoneal hematomas measuring up to 9.3 x 4.4 cm.  Tiny filling defect/mural thrombus within the posterior right common iliac artery.  Multiple nonenhancing intermediate density fluid collections within the subcutaneous tissues overlying the right lower quadrant and right inguinal regions measuring up to 5.7 x 2.4 cm.  Additional nonenhancing low-density fluid collection closely associated with the left femoral vein measuring 4.7 x 3.9 cm, likely represents evolving hematomas.     Due to patient's uptrending leukocytosis and CAT scan findings of suspected pneumonia, patient was started on vancomycin and Zosyn on November 26.  Patient was then transferred back to [facility] for further managementt      EKG consistent with anterior wall STEMI, was given TNK, aspirin, plavix and heparin drip and transferred to [facility] for higher level of care. She had urgent LHC with PCI to LAD. LHC complicated by brief episode of VF and PEA arrest (<2 min). Due to profound cardiogenic shock, impella was placed and she was transferred back to ICU. She required significant vasopressors, and later required ECMO. Found to have retroperitoneal bleed, and required blood transfusions. She was transferred to [facility] for higher level of care. ECMO decannulation on 11/4/24 and L femoral embolectomy, PCI to the LAD, unsuccessful PCI to OM1, and impella removal on 11/8/24. Complicated by delirium and encephalopathy managed with valproate, quetiapine, and haldol. Noted to have punctate acute infarct of her R centrum semiovale, scattered foci of enhancement likely representing subacute infarcts. She was also found to have pneumonia and started on vanc and zosyn on 11/26. She was transferred back to [facility] on 11/27. She completed linezolid and zosyn on 12/1. Found to have groin wounds, treated with wound vacs.        Patient was  admitted to [facility] on 12/30/2024.     Hospital Course by Problem List:  Anoxic brain injury  Metabolic encephalopathy  Delirium  -anoxia from cardiogenic shock  -2 min in hospital PEA arrest  -required seroquel, valproate and haldol to control delirium at OSH. Now off all antipsychotics  -mental status seems improved  -melatonin 10mg nightly  -1/2- dc abilify  -1/6- decrease trazadone to 50mg QHS scheduled    CVA  -MRI on 11/17/2024 at OSH: Punctate acute infarct of the right centrum semiovale. Scattered foci of enhancement in multiple vascular distributions as described may represent recent subacute infarcts. Diffuse stippled susceptibility signal throughout the supratentorial and infratentorial brain. Overall findings may represent sequelae of fat emboli or amyloid related angiopathy.   -Eliquis 5mg BID  -atorvastatin 80mg, plavix    Cognitive Communicative deficits:  - Patient with significant cognitive deficits due to anoxic brain injury  - Environmental modifications to decrease excessive stimulation including turning off the lights  - SLP to evaluate and treat cognitive deficits.   Pt will benefit from participation in cardiac rehabilitation program to develop behaviors & skills to support self-mgmt of health conditions, stress, etc. Prioritizing this during her early recovery may increase self-confidence and self-efficacy in self mgmt of her medical conditions.    From a cognitive perspective, she may benefit from the development of pacing strategies to reduce the potential for increased cognitive fatigue. For instance, engaging in a cognitively stimulating task of increased complexity for a period of 15-20 minutes followed by a 5-10 minute break to promote a time-based pacing strategy v. Pacing based on how she is feeling.   Identifying activities that may be considered difficult (red light), moderate (yellow light), and easy (green light) is recommended to help develop a pacing plan  and track activities that are more challenging. By dispersing green light activities with red light activities throughout the day, this may help with pacing.   To promote cognitive rehabilitation, I encouraged Mrs. Qureshi to incorporate functional and meaningful tasks into her recovery program. For instance, using pacing strategies while taking a continuing education course & self-monitoring progress throughout the course that assesses her fatigue level, learning of new information, organization of new information, etc. I also encouraged her to engage in tasks in graded ways to reduce feelings of overwhelm or stress that can occur. Incorporating systemized ways to problem solving may also be helpful, like developing a plan for breaking larger tasks into smaller steps, monitoring progress on each steps, etc.   Pt may benefit from taking notes, repetition of information to support encoding of novel material, utilizing handouts, etc. To support learning of new information, as she benefited from learning trials.     Monitoring cognitive fatigue & taking breaks when fatigue appears is advised. Cognitive fatigue can look like: increased tiredness, decreasing task performance, frustration, forgetfulness, distractibility.    Referral to outpatient neuropsych for~6 months sent on 1/7/25.   Patient has significant cognitive deficits now but has potential to improve with intensive therapy and has the potential to improve and return to work     STEMI  -s/p PCI to LAD. Complicated by V-Fib and PEA arrest. Required ECMO and Impella due to cardiogenic shock.   -now off ECMO and Impella  -atrovastatin and plavix  -follow up with Cardiology     HFrEF  -EF 25-30%  -soft pressure limiting GDMT  -farxiga 10mg  -no losartan due to hypotension  -no bisoprolol due to Chyne-florez breathing  -no lasix due to dehydration    Hypoglycemia, DM  -last A1c 6.2 on 11/1.   -lantus 9u qhs, SSI  -12/31- low bs, evening bs 39. Poor intake. Dc  lantus, dc farsiga. Monitor closely  -DC all DM medications for now. Patient diet controlled  -BS checks at home at least BID, follow up with PCp  -    Acute respiratory failure- improved  -Cheyne-florez breathing pattern, thought to be from anoxic brain injury  -now on room air     Pneumonia- resolved  -completed linezolid and zosyn 12/1     Retroperitoneal bleed-- stable  -required several transfusions.   -hgb now stable     Hypotension, History of HTN  -losartan on hold    Skin/Wounds:  Bilateral groin wounds  -wound vac  -home health.     Pain:  - Neuroceptic - continue tylenol prn        DVT prophylaxis:  continue eliquis          -Follow-up Cardiology, PCP, neurology       Functional Status at Discharge  Eating:  Supervision  Eating Description:  Increased time, Modified diet, Supervision for safety, Verbal cueing  Grooming:  Modified Independent, Seated  Grooming Description:  Increased time, Supervision for safety (brush hair and teeth while seated in w/c)  Bathing:  Standby Assist  Bathing Description:  Grab bar, Hand held shower, Tub bench, Increased time, Set-up of equipment, Supervision for safety (Pt completed all bathing tasks seated w/ weight shifting to wash sunshine area. total a for wound prtection on wound vac)  Upper Body Dressing:  Modified Independent  Upper Body Dressing Description:  Increased time, Supervision for safety (doff night gown and james sports bra and t-shirt while seated in w/c)  Lower Body Dressing:  Standby Assist  Lower Body Dressing Description:  Grab bar, Increased time, Set-up of equipment, Supervision for safety (doff underwear and james underwear, pants, socks and shoes. Pt was able to complete LB figure four to james socks)  Discharge Location : Home ([place])  Patient Discharging with Assist of: Spouse / Significant Other (Bruno)  Level of Supervision Required: Intermittent Supervision  Recommended Equipment for Discharge: 4-Wheeled Walker;Shower Chair;Grab Bars in Tub /  Shower;Hand Held Shower Head (pt reports grab bars already in place and shower chair has been ordered)  Recommended Services Upon Discharge: Home Health Occupational Therapy  Long Term Goals Met: 3  Long Term Goals Not Met: 0  Criteria for Termination of Services: Maximum Function Achieved for Inpatient Rehabilitation  Walk:  Modified Independent  Distance Walked:  150  Number of Times Distance Was Traveled:  3  Assistive Device:  4 Wheel Walker  Gait Deviation:  Decreased Base Of Support  Wheelchair:     Distance Propelled:      Wheelchair Description:     Stairs Contact Guard Assist  Stairs Description Hand rails, Limited by fatigue  Discharge Location: Home  Patient Discharging with Assist of: Spouse / Significant Other  Level of Supervision Required Upon Discharge: Intermittent Supervision  Recommended Equipment for Discharge: 4-Wheeled Walker  Recommeded Services Upon Discharge: Home Health Physical Therapy  Long Term Goals Met: 4  Long Term Goals Not Met: 0  Criteria for Termination of Services: Maximum Function Achieved for Inpatient Rehabilitation  Comprehension:  Modified Independent  Comprehension Description:  Hearing aids/amplifiers, Glasses  Expression:  Independent  Expression Description:     Social Interaction:  Independent  Social Interaction Description:     Problem Solving:  Modified Independent  Problem Solving Description:  Bed/chair alarm  Memory:  Modified Independent  Memory Description:  Increased time, Therapy schedule, Verbal cueing       IMario M.D., personally performed a complete drug regimen review and no potential clinically significant medication issues were identified.   Discharge Medication:     Medication List        START taking these medications        Instructions   clopidogrel 75 MG Tabs  Commonly known as: Plavix   Take 1 Tablet by mouth every day. Indications: Stroke Due To Limited Blood Flow  Dose: 75 mg     omeprazole 20 MG delayed-release capsule  Commonly  known as: PriLOSEC   Take 1 Capsule by mouth every day.  Dose: 20 mg     traZODone 50 MG Tabs  Commonly known as: Desyrel   Take 1 Tablet by mouth at bedtime as needed for Sleep.  Dose: 50 mg            CHANGE how you take these medications        Instructions   atorvastatin 80 MG tablet  What changed:   medication strength  how to take this  when to take this  Commonly known as: Lipitor   Take 1 Tablet by mouth every evening.  Dose: 80 mg     melatonin 3 MG Tabs  What changed:   medication strength  how much to take  how to take this   Take 3 Tablets by mouth every evening.  Dose: 9 mg            CONTINUE taking these medications        Instructions   Eliquis 5 MG Tabs  Generic drug: apixaban   Take 1 Tablet by mouth 2 times a day. Indications: Atrial Fibrillation  Dose: 5 mg            STOP taking these medications      acetaminophen 500 MG Tabs  Commonly known as: Tylenol     aspirin 81 MG EC tablet     aspirin 81 MG tablet     guanFACINE 1 MG Tabs  Commonly known as: Tenex     insulin aspart 100 UNIT/ML Soln  Commonly known as: NovoLOG     insulin glargine 100 UNIT/ML Soln  Commonly known as: Lantus     lansoprazole 30 MG Tbdd  Commonly known as: Prevacid     lisinopril 5 MG Tabs  Commonly known as: Prinivil     metFORMIN 500 MG Tabs  Commonly known as: Glucophage     midodrine 5 MG Tabs  Commonly known as: Proamatine     nystatin [id number] UNIT/ML Susp  Commonly known as: Mycostatin     QUEtiapine 100 MG Tabs  Commonly known as: SEROquel     QUEtiapine 25 MG Tabs  Commonly known as: SEROquel     simvastatin 20 MG Tabs  Commonly known as: Zocor     thiamine 50 MG Tabs  Commonly known as: Vitamin B-1     ticagrelor 90 MG Tabs tablet  Commonly known as: Brilinta     valproate Sodium 250 MG/5ML Soln  Commonly known as: Depakene     vitamin D3 1000 Unit (25 mcg) Tabs  Commonly known as: Cholecalciferol              Discharge Diet:  Current Diet Order   Procedures    Diet Order Diet: Consistent CHO (Diabetic) (meds  whole with thins, straws ok)       Discharge Activity:  As tolerated     Disposition:  Patient to discharge home with family support and community resources.    Equipment:  Front wheeled walker, wheelchair    Follow-up & Discharge Instructions:  Follow up with your primary care provider (PCP) within 7-10 days of discharge to review your medications and take over your care.     If you develop chest pain, fever, chills, change in neurologic function (weakness, sensation changes, vision changes), or other concerning sxs, seek immediate medical attention or call 911.      Future Appointments   Date Time Provider Department Center   1/8/2025 10:30 AM Keena Hutchison, Therapy Tech [facility] None       Condition on Discharge:  Good    More than 35 minutes was spent on discharging this patient, including face-to-face time, prescription management, and the dictation of this note.    ____________________________________     Mario Cole MD  Physical Medicine & Rehabilitation   Brain Injury Medicine   ____________________________________    Date of Service: 1/8/2025

## 2025-01-08 NOTE — THERAPY
Physical Therapy   Discharge Summary     Patient Name: Kiara Qureshi  Age:  59 y.o., Sex:  female  Medical Record #: [id number]  Today's Date: 1/8/2025     Precautions  Precautions: Fall Risk  Comments: B lower abdominal wound VACs    Subjective    Confirmed DME order with CM per pt request.      Objective       01/07/25 1430   PT Charge Group   PT Gait Training (Units) 1   PT Therapeutic Activities (Units) 3   PT Total Time Spent   PT Individual Total Time Spent (Mins) 60   Gait Functional Level of Assist    Gait Level Of Assist Modified Independent   Assistive Device 4 Wheel Walker   Distance (Feet) 150   # of Times Distance was Traveled 3   Deviation Decreased Base Of Support   Stairs Functional Level of Assist   Level of Assist with Stairs Contact Guard Assist   # of Stairs Climbed 8   Transfer Functional Level of Assist   Bed, Chair, Wheelchair Transfer Modified Independent   Toilet Transfers Modified Independent   Interdisciplinary Plan of Care Collaboration   IDT Collaboration with  Family / Caregiver;   Patient Position at End of Therapy Seated;Edge of Bed;Family / Friend in Room;Call Light within Reach   Collaboration Comments preparation for discharge   Roll Left and Right   Assistance Needed Independent   CARE Score - Roll Left and Right 6   Sit to Lying   Assistance Needed Independent   CARE Score - Sit to Lying 6   Lying to Sitting on Side of Bed   Assistance Needed Independent   CARE Score - Lying to Sitting on Side of Bed 6   Sit to Stand   Assistance Needed Independent   CARE Score - Sit to Stand 6   Chair/Bed-to-Chair Transfer   Assistance Needed Independent   CARE Score - Chair/Bed-to-Chair Transfer 6   Toilet Transfer   Assistance Needed Independent   CARE Score - Toilet Transfer 6   Car Transfer   Assistance Needed Set-up / clean-up   CARE Score - Car Transfer 5   Walk 10 Feet   Assistance Needed Independent   CARE Score - Walk 10 Feet 6   Walk 50 Feet with Two Turns    Assistance Needed Independent   CARE Score - Walk 50 Feet with Two Turns 6   Walk 150 Feet   Assistance Needed Independent   CARE Score - Walk 150 Feet 6   Walking 10 Feet on Uneven Surfaces   Assistance Needed Independent   CARE Score - Walking 10 Feet on Uneven Surfaces 6   1 Step (Curb)   Assistance Needed Incidental touching   CARE Score - 1 Step (Curb) 4   4 Steps   Assistance Needed Incidental touching   CARE Score - 4 Steps 4   12 Steps   Assistance Needed Incidental touching   CARE Score - 12 Steps 4   Picking Up Object   Assistance Needed Independent   CARE Score - Picking Up Object 6   Wheel 50 Feet with Two Turns   Reason if not Attempted Activity not applicable   CARE Score - Wheel 50 Feet with Two Turns 9   Wheel 150 Feet   Reason if not Attempted Activity not applicable   CARE Score - Wheel 150 Feet 9   P.T. Discharge Summary   Discharge Location Home   Patient Discharging with Assist of Spouse / Significant Other   Level of Supervision Required Upon Discharge Intermittent Supervision   Recommended Equipment for Discharge 4-Wheeled Walker   Recommeded Services Upon Discharge Home Health Physical Therapy   Long Term Goals Met 4   Long Term Goals Not Met 0   Criteria for Termination of Services Maximum Function Achieved for Inpatient Rehabilitation       Completed mobility discharge IRF-Rachele, completed patient passport, discussed home safety and floor recovery/fall prevention. Reviewed relevant precautions to maximize safety during home and community mobility.     Patient reports understanding and agreement c/ discharge plan.       Assessment:         Kiara Qureshi has participated well in their inpatient rehabilitation program with functional gains as above in flowsheet. They are now appropriate for discharge to home with family support.    Tentative discharge on 01/09/25.     Patient passport completed.           Physical Therapy Problems (Active)       There are no active problems.

## 2025-01-08 NOTE — PROGRESS NOTES
Patient discharged to home per order.  Reviewed all discharge instructions, appointments and discharge medications with patient and her .They verbalize understanding.  Discharge paperwork completed, signed copies in chart. Patient alert, calm, stable: no change in status from morning assessment.  Patient left facitlity at 1300 accompanied by family and rehab staff.  Have enjoyed working with this pleasant patient.

## 2025-01-08 NOTE — DISCHARGE PLANNING
CM met with patient to go over DC instructions.  Answered questions.  CM available as needs arise.

## 2025-01-08 NOTE — PROGRESS NOTES
Received bedside shift report from Zita HERNANDEZ RN regarding patient and assumed care. Patient awake, calm and stable, currently positioned in bed for comfort and safety; call light within reach. Denies pain or discomfort at this time. Will continue to monitor.

## 2025-01-08 NOTE — PROGRESS NOTES
Physical Medicine & Rehabilitation Progress Note  _____________________________________  Interdisciplinary Team Conference   Most recent IDT on 1/7/2025    IMario M.D., was present and led the interdisciplinary team conference on 1/7/2025.  I led the IDT conference and agree with the IDT conference documentation and plan of care as noted below.     Nursing:  Diet Current Diet Order   Procedures    Diet Order Diet: Consistent CHO (Diabetic) (meds whole with thins, straws ok)       Eating ADL Supervision  Increased time, Modified diet, Supervision for safety, Verbal cueing   % of Last Meal  Oral Nutrition: Lunch, Between % Consumed   Sleep    Bowel Last BM: 01/05/25 (Per pt.)   Bladder    Barriers to Discharge Home: weakness      Physical Therapy:  Bed Mobility    Transfers Standby Assist  Adaptive equipment, Set-up of equipment, Non-hospital bed (Of note: Pt required CGA to simulate home enviornment with a step and retro amb to enter bed)   Mobility Standby Assist   Stairs    Barriers to Discharge Home: weakness      Occupational Therapy:  Grooming Modified Independent, Seated   Bathing Standby Assist   UB Dressing Modified Independent   LB Dressing Standby Assist   Toileting Contact Guard Assist   Shower & Transfer    Barriers to Discharge Home: weakness    Speech-Language Pathology:    Comprehension:  Modified Independent  Comprehension Description:  Hearing aids/amplifiers, Glasses  Expression:  Independent  Expression Description:     Social Interaction:  Independent  Social Interaction Description:     Problem Solving:  Modified Independent  Problem Solving Description:  Bed/chair alarm  Memory:  Modified Independent  Memory Description:  Increased time, Therapy schedule, Verbal cueing  Barriers to Discharge Home:    Respiratory Therapy:  O2 (LPM): 0  O2 Delivery Device: None - Room Air    Case Management:  Continues to work on disposition and DME needs.      Discharge  Date/Disposition:  1/8/24  _____________________________________   Encounter Date: 1/7/2025    Chief Complaint: Weakness    Interval Events (Subjective):  Seen in therapy. Tolerating therapy.    Objective:  VITAL SIGNS: /63   Pulse 78   Temp 36.6 °C (97.8 °F) (Oral)   Resp 18   Ht 1.524 m (5')   Wt 79.5 kg (175 lb 4.3 oz)   SpO2 99%   BMI 34.23 kg/m²   Gen: No acute distress, well developed well nourished adult  HEENT: Normal Cephalic Atraumatic, Normal conjunctiva.   CV: warm extremities, well perfused, no edema  Resp: symmetric chest rise, breathing comfortably on room air  Abd: Soft, Non distended  Extremities: normal bulk, no atrophy  Skin: no visible rashes or lesions.   Neuro: alert, awake  Psych: Mood and affect appropriate and congruent    Laboratory Values:  Recent Results (from the past 72 hours)   POCT glucose device results    Collection Time: 01/04/25  5:33 PM   Result Value Ref Range    POC Glucose, Blood 199 (H) 65 - 99 mg/dL   POCT glucose device results    Collection Time: 01/04/25  8:32 PM   Result Value Ref Range    POC Glucose, Blood 118 (H) 65 - 99 mg/dL   POCT glucose device results    Collection Time: 01/05/25  7:06 AM   Result Value Ref Range    POC Glucose, Blood 72 65 - 99 mg/dL   POCT glucose device results    Collection Time: 01/05/25 11:02 AM   Result Value Ref Range    POC Glucose, Blood 94 65 - 99 mg/dL   POCT glucose device results    Collection Time: 01/05/25  4:54 PM   Result Value Ref Range    POC Glucose, Blood 120 (H) 65 - 99 mg/dL   POCT glucose device results    Collection Time: 01/05/25  9:50 PM   Result Value Ref Range    POC Glucose, Blood 147 (H) 65 - 99 mg/dL   POCT glucose device results    Collection Time: 01/06/25  7:08 AM   Result Value Ref Range    POC Glucose, Blood 69 65 - 99 mg/dL   POCT glucose device results    Collection Time: 01/06/25 11:24 AM   Result Value Ref Range    POC Glucose, Blood 166 (H) 65 - 99 mg/dL   POCT glucose device results     Collection Time: 01/06/25  5:01 PM   Result Value Ref Range    POC Glucose, Blood 94 65 - 99 mg/dL   POCT glucose device results    Collection Time: 01/06/25  9:44 PM   Result Value Ref Range    POC Glucose, Blood 161 (H) 65 - 99 mg/dL   CBC WITHOUT DIFFERENTIAL    Collection Time: 01/07/25  5:58 AM   Result Value Ref Range    WBC 4.5 (L) 4.8 - 10.8 K/uL    RBC 3.75 (L) 4.20 - 5.40 M/uL    Hemoglobin 11.8 (L) 12.0 - 16.0 g/dL    Hematocrit 38.0 37.0 - 47.0 %    .3 (H) 81.4 - 97.8 fL    MCH 31.5 27.0 - 33.0 pg    MCHC 31.1 (L) 32.2 - 35.5 g/dL    RDW 59.9 (H) 35.9 - 50.0 fL    Platelet Count 196 164 - 446 K/uL    MPV 11.1 9.0 - 12.9 fL   Comp Metabolic Panel    Collection Time: 01/07/25  5:58 AM   Result Value Ref Range    Sodium 137 135 - 145 mmol/L    Potassium 3.6 3.6 - 5.5 mmol/L    Chloride 106 96 - 112 mmol/L    Co2 21 20 - 33 mmol/L    Anion Gap 10.0 7.0 - 16.0    Glucose 100 (H) 65 - 99 mg/dL    Bun 9 8 - 22 mg/dL    Creatinine 0.60 0.50 - 1.40 mg/dL    Calcium 8.4 (L) 8.5 - 10.5 mg/dL    Correct Calcium 9.5 8.5 - 10.5 mg/dL    AST(SGOT) 29 12 - 45 U/L    ALT(SGPT) 26 2 - 50 U/L    Alkaline Phosphatase 146 (H) 30 - 99 U/L    Total Bilirubin 0.4 0.1 - 1.5 mg/dL    Albumin 2.6 (L) 3.2 - 4.9 g/dL    Total Protein 5.5 (L) 6.0 - 8.2 g/dL    Globulin 2.9 1.9 - 3.5 g/dL    A-G Ratio 0.9 g/dL   ESTIMATED GFR    Collection Time: 01/07/25  5:58 AM   Result Value Ref Range    GFR (CKD-EPI) 103 >60 mL/min/1.73 m 2   POCT glucose device results    Collection Time: 01/07/25  7:16 AM   Result Value Ref Range    POC Glucose, Blood 85 65 - 99 mg/dL   POCT glucose device results    Collection Time: 01/07/25 11:01 AM   Result Value Ref Range    POC Glucose, Blood 112 (H) 65 - 99 mg/dL       Medications:  Scheduled Medications   Medication Dose Frequency    traZODone  50 mg QHS    melatonin  9 mg Nightly    Pharmacy Consult Request  1 Each PHARMACY TO DOSE    senna-docusate  2 Tablet Q EVENING    omeprazole  20 mg DAILY     apixaban  5 mg BID    atorvastatin  80 mg Q EVENING    clopidogrel  75 mg DAILY    [Held by provider] dapagliflozin propanediol  10 mg DAILY     PRN medications: Respiratory Therapy Consult, hydrALAZINE, senna-docusate **AND** polyethylene glycol/lytes, ondansetron **OR** ondansetron, traZODone, sodium chloride, [DISCONTINUED] insulin GLARGINE **AND** [DISCONTINUED] insulin lispro **AND** POC blood glucose manual result **AND** NOTIFY MD and PharmD **AND** Administer 20 grams of glucose (approximately 8 ounces of fruit juice) every 15 minutes PRN FSBG less than 70 mg/dL **AND** dextrose bolus, oxyCODONE immediate-release **OR** oxyCODONE immediate-release    Diet:  Current Diet Order   Procedures    Diet Order Diet: Consistent CHO (Diabetic) (meds whole with thins, straws ok)       Medical Decision Making and Plan:  Anoxic brain injury  Metabolic encephalopathy  Delirium  -anoxia from cardiogenic shock  -2 min in hospital PEA arrest  -required seroquel, valproate and haldol to control delirium at OSH.   -mental status seems improved  -melatonin 10mg nightly  -trazodone 100mg nightly  -1/2- dc abilify  -1/6- decrease trazadone to 50mg QHS scheduled     STEMI  -s/p PCI to LAD. Complicated by V-Fib and PEA arrest. Required ECMO and Impella due to cardiogenic shock.   -now off ECMO and Impella  -atrovastatin and plavix  -1/4- vomiting x1 episode. Bp, bs stable. No chest pain. Back to normal. monitor     HFrEF  -EF 25-30%  -soft pressure limiting GDMT  -farxiga 10mg  -no losartan due to hypotension  -no bisoprolol due to Chyne-florez breathing  -no lasix due to dehydration     Acute respiratory failure  -Cheyne-florez breathing pattern, thought to be from anoxic brain injury  -concern for aspiration   -now on room air     Pneumonia- resolved  -completed linezolid and zosyn 12/1     CVA  -MRI on 11/17/2024 at OSH: Punctate acute infarct of the right centrum semiovale. Scattered foci of enhancement in multiple vascular  distributions as described may represent recent subacute infarcts. Diffuse stippled susceptibility signal throughout the supratentorial and infratentorial brain. Overall findings may represent sequelae of fat emboli or amyloid related angiopathy.   -Eliquis 5mg BID  -atorvastatin 80mg, plavix     Retroperitoneal bleed  -required several transfusions.   -hgb now stable     Hypotension, History of HTN  -losartan on hold     Hyperglycemia, DM  -last A1c 6.2 on 11/1.   -lantus 9u qhs, SSI  -12/31- low bs, evening bs 39. Poor intake. Dc lantus, dc farsiga. Monitor closely  -1/1- sugars improved 2/2 increase intake po   -1/3- continue off all meds  -1/6- DC sliding scale     Cognitive Communicative deficits:  - Patient with significant cognitive deficits due to anoxic brain injury  - Environmental modifications to decrease excessive stimulation including turning off the lights  - Consider starting neurostimulants such Nuvigil, amantadine or methylphenidate  - SLP to evaluate and treat cognitive deficits.   -Neuropsych will follow and perform testing if/when appropriate.     Dysphagia/Nutrition:  - Patient currently on dysphagia DIET.  - Continue therapies with SLP. SLP to perform swallow evaluation and provide oral motor exercises if necessary.     Neurogenic bladder:  - Timed voids with PVR q4H x3. If PVR > 400mL or if patient is unable to void, straight cath patient.  -12/31- remove de jesus     Neurogenic bowel:  -  Colace, Senna BID on admission  - Goal of 1BM/day.  -      Circadian Rhythm disorder:   -Trazadone 50mg PRN for restlessness after 10pm  -maltatonin and trazadone sheduled  Recommend lights on during the day/off at night, minimize nighttime interruptions as able.     Mood  - at risk of adjustment disorder, depression, and anxiety due to functional decline     ID:  - at risk for Urinary tract infection     Skin/Wounds:  Bilateral groin  -wound vac, discussing with wound care  - Pressure relief q2h while in bed.  Close monitoring for signs of breakdown     Pain:  - Neuroceptic - continue tylenol prn        DVT prophylaxis:  continue eliquis     GI prophylaxis:  On Prilosec 20mg daily         -Follow-up Cardiology, PCP, neurology       ____________________________________    Mario Cole MD  Physical Medicine & Rehabilitation   Brain Injury Medicine   ____________________________________    Total time:  60 minutes. Time spent included pre-rounding, review of vitals and tests, unit/floor time, face-to-face time with the patient including physical examination, care coordination, counseling of patient and/or family, ordering medications/procedures/tests, discussion with CM, PT, OT, SLP and/or other healthcare providers, and documentation in the electronic medical record. Topics discussed included dispostion.

## 2025-01-08 NOTE — WOUND TEAM
[facility]  Inpatient Services  Wound and Skin Care Follow-up    Admission Date: 12/30/2024     Last order of IP CONSULT TO WOUND CARE was found on 12/31/2024 from Hospital Encounter on 12/30/2024     HPI, PMH, SH: Reviewed    Past Surgical History:   Procedure Laterality Date    INSERTION,CANNULA FOR ECMO,ADULT Left 10/30/2024    Procedure: INSERTION, CANNULA, FOR ECMO, ADULT;  Surgeon: Aime Elias D.O.;  Location: SURGERY [facility];  Service: Cardiothoracic     Social History     Tobacco Use    Smoking status: Never    Smokeless tobacco: Never   Substance Use Topics    Alcohol use: No     No chief complaint on file.    Diagnosis: Anoxic brain injury (HCC) [G93.1]    Unit where seen by Wound Team: [facility]15/02     WOUND FOLLOW UP RELATED TO:  bilateral groin-wound vac       WOUND TEAM PLAN OF CARE - Frequency of Follow-up:   Nursing to follow dressing orders written for wound care. Contact wound team if area fails to progress, deteriorates or with any questions/concerns if something comes up before next scheduled follow up (See below as to whether wound is following and frequency of wound follow up)  Dressing changes by wound team:                   NPWT change 3 times weekly - bilateral groin    WOUND HISTORY:       Wounds are present on admission. Pt originally had a STEMI on 11/27/2024. Patient has a history of obesity, hypertension, hyperlipidemia. She was seen at [facility] as a transfer initially for anterior STEMI requiring lytics. Patient underwent an LAD PCI but hospital course was complicated by retroperitoneal bleed, ventricular fibrillation and PEA arrest and cardiogenic shock requiring [facility] ECMO and Impella. She is now status post ECMO decannulation on November 4 and had left femoral embolectomy, PCI to the LAD, unsuccessful PCI to OM1 and Impella removal on November 8. Hospital course was complicated by patient developing encephalopathy and delirium, to which she was given valproic acid  and quetiapine.          WOUND ASSESSMENT/LDA  Wound 12/07/24 Full Thickness Wound Groin Left (Active)   Date First Assessed/Time First Assessed: 12/07/24 1721   Present on Original Admission: No  Hand Hygiene Completed: Yes  Primary Wound Type: Full Thickness Wound  Location: Groin  Laterality: Left      Assessments 1/8/2025 11:00 AM   Wound Image     Site Assessment Clean;Red;Fully granulated   Periwound Assessment Clean;Dry;Denuded   Margins Defined edges   Closure Secondary intention   Drainage Amount Small   Drainage Description Serosanguineous   Treatments Cleansed;Nonselective debridement;Site care   Wound Cleansing Approved Wound Cleanser   Periwound Protectant No-sting Skin Prep   Dressing Status Clean;Dry;Intact   Dressing Changed Changed   Dressing Options Wound Vac   Dressing Change/Treatment Frequency Monday, Wednesday, Friday, and As Needed   NEXT Dressing Change/Treatment Date 01/10/25   Wound Team Following 3x Weekly   Wound Length (cm) 1.5 cm   Wound Width (cm) 3.5 cm   Wound Depth (cm) 1 cm   Wound Surface Area (cm^2) 5.25 cm^2   Wound Volume (cm^3) 5.25 cm^3   Wound Healing % 88   Wound Bed Granulation (%) 100 %   Wound Odor None   WOUND NURSE ONLY - Time Spent with Patient (mins) 60       Wound 12/07/24 Full Thickness Wound Groin Right (Active)   Date First Assessed/Time First Assessed: 12/07/24 1721   Present on Original Admission: No  Hand Hygiene Completed: Yes  Primary Wound Type: Full Thickness Wound  Location: Groin  Laterality: Right      Assessments 1/8/2025 11:00 AM   Wound Image     Site Assessment Clean;Fully granulated   Periwound Assessment Clean;Dry;Intact;Denuded   Margins Defined edges   Closure Secondary intention   Drainage Amount Small   Drainage Description Serosanguineous   Treatments Cleansed;Nonselective debridement;Site care   Wound Cleansing Approved Wound Cleanser   Periwound Protectant No-sting Skin Prep   Dressing Status Clean;Dry;Intact   Dressing Changed Changed    Dressing Options Wound Vac   Dressing Change/Treatment Frequency Monday, Wednesday, Friday, and As Needed   NEXT Dressing Change/Treatment Date 01/10/25   Wound Team Following 3x Weekly   Non-staged Wound Description Full thickness   Wound Length (cm) 1.2 cm   Wound Width (cm) 1 cm   Wound Depth (cm) 3.5 cm   Wound Surface Area (cm^2) 1.2 cm^2   Wound Volume (cm^3) 4.2 cm^3   Wound Healing % 79   Wound Bed Granulation (%) 100 %   Wound Odor None   WOUND NURSE ONLY - Time Spent with Patient (mins) 60        Vascular:    JAGJIT:   No results found.    Lab Values:    Lab Results   Component Value Date/Time    WBC 4.5 (L) 01/07/2025 05:58 AM    RBC 3.75 (L) 01/07/2025 05:58 AM    HEMOGLOBIN 11.8 (L) 01/07/2025 05:58 AM    HEMATOCRIT 38.0 01/07/2025 05:58 AM    CREACTPROT 6.06 (H) 12/23/2024 11:20 AM    SEDRATEWES 140 (H) 11/28/2024 01:35 AM    HBA1C 6.2 (H) 11/01/2024 06:46 PM    HBA1C 6.4 (H) 10/30/2024 03:18 AM         Culture Results show:  No results found for this or any previous visit (from the past 720 hours).    Pain Level/Medicated:  PO pain medications administered by bedside RN Radha prior       INTERVENTIONS BY WOUND TEAM:  Chart and images reviewed. Discussed with bedside RN. All areas of concern (based on picture review, LDA review and discussion with bedside RN) have been thoroughly assessed. Documentation of areas based on significant findings. This RN in to assess patient. Performed standard wound care which includes appropriate positioning, dressing removal and non-selective debridement. Pictures and measurements obtained weekly if/when required.    Wound:  Right groin  Preparation for Dressing removal: Removed without difficulty, Dressing soaked with adhesive remover wipes, and Dressing soaked with wound cleanser  Cleansed/Non-selectively Debrided with:  Wound cleanser and Gauze  Brianna wound: Cleansed with Wound cleanser and Gauze, Prepped with No Sting  Primary Dressing:  black foam, wound vac        Wound:  left groin  Preparation for Dressing removal: Removed without difficulty, Dressing soaked with adhesive remover wipes, and Dressing soaked with wound cleanser  Cleansed/Non-selectively Debrided with:  Wound cleanser and Gauze  Brianna wound: Cleansed with Wound cleanser and Gauze, Prepped with No Sting  Primary Dressing:  black foam, wound vac    Advanced Wound Care Discharge Planning  Number of Clinicians necessary to complete wound care: 2  Is patient requiring IV pain medications for dressing changes:  No   Length of time for dressing change 30 min. (This does not include chart review, pre-medication time, set up, clean up or time spent charting.)    Interdisciplinary consultation: Patient, Bedside RN (Radha), N/A.  Pressure injury and staging reviewed with N/A.    EVALUATION / RATIONALE FOR TREATMENT:     Date:  01/08/25  Wound Status:  Wound progressing as expected    Right and left groin wounds-continue NPWT for expedited healing  No visible structures, so only needed black foam.     Date:  01/06/25  Wound Status:  Wound progressing as expected    Bilateral groin wounds -NPWT used to expedite healing and prevent infection.     Date:  01/03/25  Wound Status:  Wound improving    Bi-lateral groin incisions with NPWT changed. Left side 100% granular tissue scant drainage, healing depth wise LxW remains the same. 1 piece of black foam used to pack and attempt to bring the edges together.   Right side narrow hole depth is 4.5 remains the same. Scant drainage full granulation. Packed wound bed with josey collagen to promote healing from areas of depth loosly pack black foam and connected to vac.  Date:  01/01/25  Wound Status:  Wound improving    Bilateral groin sites: the measurements today compared to 12/23/24 are smaller. The depth of the right groin is the same (4.5cm). No underlying structures are seen. Fully granulated tissue.   Skin prep to protect the skin, wound vac to expedite wound healing.            Goals: Steady decrease in wound area and depth weekly.    NURSING PLAN OF CARE ORDERS:  Dressing changes: See Dressing Care orders  Skin care: See Skin Care orders    NUTRITION RECOMMENDATIONS   Wound Team Recommendations:  N/A     DIET ORDERS (From admission to next 24h)       Start     Ordered    01/01/25 1126  Diet Order Diet: Consistent CHO (Diabetic) (meds whole with thins, straws ok)  ALL MEALS        Question:  Diet:  Answer:  Consistent CHO (Diabetic)  Comment:  meds whole with thins, straws ok    01/01/25 1126    01/01/25 1126  Dietary Comment  ALL MEALS        Comments: Please prepare cheeseburger for lunch today 1/1/25. Thank you!    01/01/25 1126    01/01/25 0753  Dietary Comment  ALL MEALS        Comments: MBSS tray to be picked up at 1000 today, 1/1/25, thank you    01/01/25 0752                    PREVENTATIVE INTERVENTIONS:   Q shift Ray - performed per nursing policy  Q shift pressure point assessments - performed per nursing policy    Surface/Positioning  Standard/trauma mattress - Currently in Place  Waffle overlay  - Currently in Place    Offloading/Redistribution  Heels floated with waffle overlay - Currently in Place  Float Heels off Bed with Pillows - Currently in Place           Containment/Moisture Prevention    Dri-hemanth pad - Currently in Place  Brief with mobilization - Currently in Place  Barrier paste - Currently in Place    Mobilization      Up to chair     Anticipated discharge plans:  Self/Family Care and Home Health Care      Education provided on how to trouble shoot if vac machine is alarming, and went over when to switch to an alternative dressing, who to call if troubleshooting doesn't work, when to seek medical care. How often dressing to be changed and showed the home health phone number in her discharge paperwork. Used teach back on all the above.   She was switched over to her home wound vac machine. Education provided on how to return when therapy is over and showed  the QR code for a video to watch, used teach back.   Education on how to change cannister. Teach back and pt was able to demonstrate with proficiency how to change the cannister.  The [facility] activac was placed in dirty utility room.    Vac Discharge Needs:  Vac Discharge plan is purely a recommendation from wound team and not a requirement for discharge unless otherwise stated by physician.  Regular Vac in use and continued at discharge

## 2025-01-08 NOTE — PROGRESS NOTES
NURSING DAILY NOTE    Name: Kiara Qureshi   Date of Admission: 12/30/2024   Admitting Diagnosis: Anoxic brain injury (HCC)  Attending Physician: CHANTAL GREY M.D.  Allergies: Patient has no known allergies.    Safety  Patient Assist  Min Assist  Patient Precautions  Fall Risk  Precaution Comments  B lower abdominal wound VACs  Bed Transfer Status  Standby Assist  Toilet Transfer Status   Standby Assist  Assistive Devices  Rails, Wheelchair  Oxygen  None - Room Air  Diet/Therapeutic Dining  Current Diet Order   Procedures    Diet Order Diet: Consistent CHO (Diabetic) (meds whole with thins, straws ok)     Pill Administration  whole  Agitated Behavioral Scale     ABS Level of Severity       Fall Risk  Has the patient had a fall this admission?    (No)  Josefa Parra Fall Risk Scoring  14, MODERATE RISK  Fall Risk Safety Measures  Bed strip alarm    Vitals  Temperature: 36.7 °C (98.1 °F)  Temp src: Temporal  Pulse: 80  Respiration: 18  Blood Pressure: 111/54  Blood Pressure MAP (Calculated): 73 MM HG  BP Location: Left, Upper Arm  Patient BP Position: Supine     Oxygen  Pulse Oximetry: 98 %  O2 (LPM): 0  O2 Delivery Device: None - Room Air    Bowel and Bladder  Last Bowel Movement  01/05/25 (Per pt.)  Stool Type  Type 4: Like a sausage or snake, smooth and soft, Type 5: Soft blob with clear cut edges (passed easily)  Bowel Device  Bathroom, Other (Comment) (Bowel Meds)  Continent  Bladder: Did not void (Beckwith in place)   Bowel: No movement  Bladder Function  Urine Void (mL):  (Moderate)  Number of Times Voided: 1  Urinary Options: Yes  Urine Color: Yellow  Urine Clarity: Cloudy (Unable to see Through)  Genitourinary Assessment   Bladder Assessment (WDL):  Within Defined Limits  Beckwith Catheter: Not Applicable  Beckwith Care: Given with Soap and Water  Urinary Symptoms: Catheter (Document on LDA)  Urine Color: Yellow  Urine Clarity: Cloudy (Unable to see  Through)  Bladder Device: Bathroom  Bladder Scan: Post Void  $ Bladder Scan Results (mL): 1  Bladder Medications: No    Skin  Ray Score   17  Sensory Interventions   Bed Types: Standard/Trauma Mattress with Overlay  Skin Preventative Measures: Pillows in Use for Support / Positioning, Waffle Overlay  Moisture Interventions  Moisturizers/Barriers: Barrier Paste  Containment Devices: Indwelling Catheter      Pain  Pain Rating Scale  7 - Focus of attention, prevents doing daily activities  Pain Location  Leg  Pain Location Orientation  Right  Pain Interventions   Rest    ADLs    Bathing   Patient Refused Bathing (She has shower yesterday)  Linen Change      Personal Hygiene  Perineal Care, Moist Brianna Wipes  Chlorhexidine Bath      Oral Care  Brushed Teeth  Teeth/Dentures     Shave     Nutrition Percentage Eaten  Lunch, Between % Consumed  Environmental Precautions  Treaded Slipper Socks on Patient, Bed in Low Position  Patient Turns/Positioning  Patient turns self independently side to side without assistance, to offload sacral area  Patient Turns Assistance/Tolerance     Bed Positions  Bed Controls On, Bed Locked  Head of Bed Elevated  Self regulated      Psychosocial/Neurologic Assessment  Psychosocial Assessment  Psychosocial (WDL):  Within Defined Limits  Neurologic Assessment  Neuro (WDL): Exceptions to WDL  Level of Consciousness: Alert  Orientation Level: Oriented X4  Cognition: Follows commands  Speech: Clear  Pupil Assesment: No  Motor Function/Sensation Assessment: Motor strength  Muscle Strength Right Arm: Good Strength Against Gravity and Moderate Resistance  Muscle Strength Left Arm: Good Strength Against Gravity and Moderate Resistance  Muscle Strength Right Leg: Fair Strength against Gravity but No Resistance  Muscle Strength Left Leg: Fair Strength against Gravity but No Resistance  EENT (WDL):  WDL Except    Cardio/Pulmonary Assessment  Edema      Respiratory Breath Sounds  RUL Breath Sounds:  Clear  RML Breath Sounds: Clear  RLL Breath Sounds: Clear, Diminished  SHOSHANA Breath Sounds: Clear  LLL Breath Sounds: Clear, Diminished  Cardiac Assessment   Cardiac (WDL):  WDL Except (H/O HTN.)

## 2025-01-30 ENCOUNTER — TELEPHONE (OUTPATIENT)
Dept: HEALTH INFORMATION MANAGEMENT | Facility: OTHER | Age: 60
End: 2025-01-30
Payer: COMMERCIAL

## 2025-04-10 ENCOUNTER — TELEPHONE (OUTPATIENT)
Dept: CARDIOLOGY | Facility: MEDICAL CENTER | Age: 60
End: 2025-04-10
Payer: COMMERCIAL

## 2025-04-24 ENCOUNTER — OFFICE VISIT (OUTPATIENT)
Dept: CARDIOLOGY | Facility: MEDICAL CENTER | Age: 60
End: 2025-04-24
Attending: INTERNAL MEDICINE
Payer: COMMERCIAL

## 2025-04-24 VITALS
HEIGHT: 60 IN | OXYGEN SATURATION: 97 % | DIASTOLIC BLOOD PRESSURE: 60 MMHG | SYSTOLIC BLOOD PRESSURE: 104 MMHG | RESPIRATION RATE: 16 BRPM | BODY MASS INDEX: 33.18 KG/M2 | WEIGHT: 169 LBS | HEART RATE: 69 BPM

## 2025-04-24 DIAGNOSIS — D68.318 CIRCULATING ANTICOAGULANT DISORDER (HCC): ICD-10-CM

## 2025-04-24 DIAGNOSIS — I50.20 HEART FAILURE WITH REDUCED EJECTION FRACTION (HCC): ICD-10-CM

## 2025-04-24 DIAGNOSIS — I25.2 HISTORY OF ST ELEVATION MYOCARDIAL INFARCTION (STEMI): Chronic | ICD-10-CM

## 2025-04-24 DIAGNOSIS — I10 ESSENTIAL HYPERTENSION: ICD-10-CM

## 2025-04-24 DIAGNOSIS — I25.83 CORONARY ARTERY DISEASE DUE TO LIPID RICH PLAQUE: Chronic | ICD-10-CM

## 2025-04-24 DIAGNOSIS — E78.5 DYSLIPIDEMIA: ICD-10-CM

## 2025-04-24 DIAGNOSIS — I50.22 CHRONIC SYSTOLIC CONGESTIVE HEART FAILURE (HCC): ICD-10-CM

## 2025-04-24 DIAGNOSIS — I25.10 CORONARY ARTERY DISEASE DUE TO LIPID RICH PLAQUE: Chronic | ICD-10-CM

## 2025-04-24 DIAGNOSIS — I51.89 SEVERE LEFT VENTRICULAR SYSTOLIC DYSFUNCTION: ICD-10-CM

## 2025-04-24 PROCEDURE — 99213 OFFICE O/P EST LOW 20 MIN: CPT | Performed by: INTERNAL MEDICINE

## 2025-04-24 PROCEDURE — 99204 OFFICE O/P NEW MOD 45 MIN: CPT | Performed by: INTERNAL MEDICINE

## 2025-04-24 PROCEDURE — 3078F DIAST BP <80 MM HG: CPT | Performed by: INTERNAL MEDICINE

## 2025-04-24 PROCEDURE — 3074F SYST BP LT 130 MM HG: CPT | Performed by: INTERNAL MEDICINE

## 2025-04-24 ASSESSMENT — FIBROSIS 4 INDEX: FIB4 SCORE: 1.71

## 2025-04-24 NOTE — PATIENT INSTRUCTIONS
A - Antiplatelet - Clopidogrel (PLAVIX) reduces your risk of cardiac event by 27% compared to Aspirin 81 mg daily (HOST EXAM study 2021), prasrugrel (EFFIENT), ticagrelor (BRILINTA)) may be used for the first year.  Aspirin 81 mg daily is associated with a 20% less use of heart event and is used the first year after a cardiac event, stent or CABG.  B - Blood Pressure Control - reduces your risk or heart attack and stroke, the goal is <130/80.  C - Cholesterol Management - statins dramatically reduce your risk; for those that are intolerant to statins, there are alternatives.  D - Diet - MEDITERRANEAN DIET or Cardiac rehab diets, [email or web address].  E - Exercise - at least 2.5 hours of moderate exercise weekly  (typical brisk walking or similar activity).  F - Fats - VASCEPA, or EPA Fish oil (if Vascepa too expensive) for elevated triglycerides (REDUCE IT trial showed reduction from 22% 5 year MACE to 17%).  G - Good Vibes - meditation, exercise, yoga, Pilates, mindfulness, Macario-Chi, stress reduction.  H - Heart Failure - betablockers, sucubatril (ENTRESTO) 16% less risk of dying over 3 years, spironolactone, empagliflozin (JARDIANCE) 17% less risk of dying over 2 years, CRT +/- ICD.  I - Inflammation - Colchicine in the LoDoCo2 study in 2020 reduced the risk of heart attack by 30% in 2.5 year follow up.  R - Rehab - Cardiac Rehab reduces risk of dying by 13-24% and need to go to the hospital by 30% within the first year. Compared to regular Cardiac Rehab, Intensive Cardiac Rehab ([place] [facility]) was shown to reduce the risk of major events 17% to 11% and hospitalization for CHF from 8% to 2%. (Nutrients [id number](11:5337)  S - Smoking - never smoke, if you do smoke ask for help to quit for good. Patients who quit smoking after heart attack have 36% less likely risk of dying.  Resources are 1-800-QUIT-NOW [employer] in addition to Chantix, bupropion (Zyban) or nicotine replacement  T - Type II Diabetes  - pills empagliflozin (JARDIANCE) 38% less risk of dying over 4 years, and/or weekly injections: tirzepatide (Mounjaro), semaglutide (Ozempic), liraglutide (Victoza), dulaglutide (Trulicity) ~26% less risk of MACE in 2 years.  V - Vaccines - Annual flu shot and COVID vaccine reduces the risk of serious cardiovascular complications from these deadly infections.  W - Weight - maintain a healthy weight. Semaglutide (WEGOVY) weekly injection was shown to reduce weight by 10% and heart events by 20% for patients with CAD and BMI > 27 in the SELECT trial (6.5% vs 8% in 48 month follow up [facility] 12/2023).      Work on at least 2.5 - 5 hours a week of moderate exercise    Please look into the following diets and incorporate them into your diet  LOW SALT DIET   KEEP YOUR SODIUM EQUAL TO CALORIES AND NO MORE THAN DOUBLE THE CALORIES FOR A LOW SALT DIET    Cardiosmart.org - great resource for American College of Cardiology on heart disease prevention and treatment    FOR TREATMENT OR PREVENTION OF CORONARY ARTERY DISEASE  These three programs are approved by Medicare/Insurers for those with heart disease  [facility]  Dr. Angulo's Program for Reversing Heart Disease - Riaz Page's Cardiologist vegetarian-based  [facility] - [place]-based mind-body Program    Mediterranean Diet has been shown to be a hearty healthy diet.    This is a commonly referenced Program  Dr Lund - [facility]ram over [place]ati (book and documentary) - vegetarian-based    FOR TREATMENT OF BLOOD PRESSURE  DASH DIET - American Heart Association for treatment of HYPERTENSION    FOR TREATMENT OF BAD CHOLESTEROL/FATS  REDUCE PROCESSED SUGAR AS MUCH AS POSSIBLE  INCREASE WHOLE GRAINS/VEGETABLES  INCREASE FIBER    Lowering total cholesterol and LDL (bad) cholesterol:  - Eat leaner cuts of meat, or eliminate altogether if possible red meat, and frequently substitute fish or chicken.  - Limit saturated  fat to no more than 7-10% of total calories no more than 10 g per day is recommended. Some sources of saturated fat include butter, animal fats, hydrogenated vegetable fats and oils, many desserts, whole milk dairy products.  - Replaced saturated fats with polyunsaturated fats and monounsaturated fats. Foods high in monounsaturated fat include nuts, canola oil, avocados, and olives.  - Limit trans fat (processed foods) and replaced with fresh fruits and vegetables  - Recommend nonfat dairy products  - Increase substantially the amount of soluble fiber intake (legumes such as beans, fruit, whole grains).  - Consider nutritional supplements: plant sterile spreads such as Benecol, fish oil,  flaxseed oil, omega-3 acids EPA capsules 2000 mg twice a day, or viscous fiber such as Metamucil  - Attain ideal weight and regular exercise (at least 30 minutes per day of moderate exercise)  ASK ABOUT STATIN OR NON STATIN MEDICATION TO REDUCE YOUR LDL AND HEART RISK    Lowering triglycerides:  - Reduce intake of simple sugar: Desserts, candy, pastries, honey, sodas, sugared cereals, yogurt, Gatorade, sports bars, canned fruit, smoothies, fruit juice, coffee drinks  - Reduced intake of refined starches: Refined Pasta, most bread  - Reduce or abstain from alcohol  - Increase omega-3 fatty acids: Orchard, Trout, Mackerel, Herring, Albacore tuna and supplements  - Attain ideal weight and regular exercise (at least 30 minutes per day of moderate exercise)  ASK ABOUT PURIFIED OMEGA 3 EPA or FISH OIL TO REDUCE YOUR TG AND HEART RISK    Elevating HDL (good) cholesterol:  - Increase physical activity  - Increase omega-3 fatty acids and supplements as listed above  - Incorporating appropriate amounts of monounsaturated fats such as nuts, olive oil, canola oil, avocados, olives  - Stop smoking  - Attain ideal weight and regular exercise (at least 30 minutes per day of moderate exercise)

## 2025-04-24 NOTE — LETTER
April 24, 2025       Patient: Kiara Qureshi   YOB: 1965   Date of Visit: 4/24/2025         To Whom It May Concern:    In my medical opinion, I recommend that Kiara Qureshi has had a history of severe myocardial infarction in October 2024 which will require an extended recovery I recommend that she not return to work before June 15th, 2025.    If you have any questions or concerns, please don't hesitate to call [phone]          Sincerely,          Rodrigue Gaitan M.D.  Electronically Signed

## 2025-04-24 NOTE — PROGRESS NOTES
Chief Complaint   Patient presents with    Chest Pain    MI (Non ST Segment Elevation MI)    Hypertension       Subjective     Kiarasa Catalina Qureshi is a 59 y.o. 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    Had bumps in pannus    Hjad dramatic weight loss from crtical illness    Past Medical History:   Diagnosis Date    Coronary artery disease due to lipid rich plaque - post STEMI lytics ECMO 10/2024     Dyslipidemia 07/05/2016    Essential hypertension 07/05/2016    History of ST elevation myocardial infarction (STEMI) - LAD complicated by shock ECMO 10/30/2024    Obesity, morbid, BMI 40.0-49.9 ([facility]) 07/05/2016    Type II diabetes mellitus, well controlled ([facility]) 07/05/2016     Past Surgical History:   Procedure Laterality Date    INSERTION,CANNULA FOR ECMO,ADULT Left 10/30/2024    Procedure: INSERTION, CANNULA, FOR ECMO, ADULT;  Surgeon: Aime Elias D.O.;  Location: SURGERY [facility];  Service: Cardiothoracic     Family History   Problem Relation Age of Onset    Heart Disease Mother         AF and CHF    Heart Disease Father 70        PCI, also aneurysm but no surgery    Heart Attack Paternal Grandmother 49        MI     Social History     Socioeconomic History    Marital status:      Spouse name: Not on file    Number of children: Not on file    Years of education: Not on file    Highest education level: Not on file   Occupational History    Not on file   Tobacco Use    Smoking status: Never    Smokeless tobacco: Never   Substance and Sexual Activity    Alcohol use: No    Drug use: No    Sexual activity: Not on file   Other Topics Concern    Not on file   Social History Narrative    Not on file     Social Drivers of Health     Financial Resource Strain: Not on file   Food Insecurity: No Food Insecurity (12/30/2024)    Hunger Vital Sign     Worried  About Running Out of Food in the Last Year: Never true     Ran Out of Food in the Last Year: Never true   Transportation Needs: No Transportation Needs (1/7/2025)    PRAPARE - Transportation     Lack of Transportation (Medical): No     Lack of Transportation (Non-Medical): No   Physical Activity: Not on file   Stress: Not on file   Social Connections: Not on file   Intimate Partner Violence: Not At Risk (12/30/2024)    Humiliation, Afraid, Rape, and Kick questionnaire     Fear of Current or Ex-Partner: No     Emotionally Abused: No     Physically Abused: No     Sexually Abused: No   Housing Stability: Low Risk  (12/30/2024)    Housing Stability Vital Sign     Unable to Pay for Housing in the Last Year: No     Number of Times Moved in the Last Year: 0     Homeless in the Last Year: No     No Known Allergies  Outpatient Encounter Medications as of 4/24/2025   Medication Sig Dispense Refill    apixaban (ELIQUIS) 5mg Tab Take 1 Tablet by mouth 2 times a day. Indications: Atrial Fibrillation 60 Tablet 2    atorvastatin (LIPITOR) 80 MG tablet Take 1 Tablet by mouth every evening. 30 Tablet 2    melatonin 3 MG Tab Take 3 Tablets by mouth every evening. 30 Tablet 2    clopidogrel (PLAVIX) 75 MG Tab Take 1 Tablet by mouth every day. Indications: Stroke Due To Limited Blood Flow 30 Tablet 2    omeprazole (PRILOSEC) 20 MG delayed-release capsule Take 1 Capsule by mouth every day. 30 Capsule 0    traZODone (DESYREL) 50 MG Tab Take 1 Tablet by mouth at bedtime as needed for Sleep. 30 Tablet 0     No facility-administered encounter medications on file as of 4/24/2025.     ROS           Objective     /60 (BP Location: Left arm, Patient Position: Sitting, BP Cuff Size: Adult)   Pulse 69   Resp 16   Ht 1.524 m (5')   Wt 76.7 kg (169 lb)   SpO2 97%   BMI 33.01 kg/m²     Physical Exam  Constitutional:       General: She is not in acute distress.     Appearance: She is not diaphoretic.   Eyes:      General: No scleral  icterus.  Neck:      Vascular: No JVD.   Cardiovascular:      Rate and Rhythm: Normal rate.      Heart sounds: Normal heart sounds. No murmur heard.     No friction rub. No gallop.   Pulmonary:      Effort: No respiratory distress.      Breath sounds: No wheezing or rales.   Abdominal:      General: Bowel sounds are normal.      Palpations: Abdomen is soft.   Musculoskeletal:      Right lower leg: No edema.      Left lower leg: No edema.   Skin:     Findings: No rash.   Neurological:      Mental Status: She is alert. Mental status is at baseline.   Psychiatric:         Mood and Affect: Mood normal.              Extensive labs reviewed    Echo reviewed from 12/2025     Assessment & Plan     1. History of ST elevation myocardial infarction (STEMI) - LAD complicated by shock ECMO  CANCELED: EC-ECHOCARDIOGRAM COMPLETE W/O CONT      2. Chronic systolic congestive heart failure (HCC)  EC-ECHOCARDIOGRAM COMPLETE W/ CONT    CANCELED: EC-ECHOCARDIOGRAM COMPLETE W/O CONT      3. Dyslipidemia  Lipid Profile      4. Essential hypertension        5. Severe left ventricular systolic dysfunction        6. Heart failure with reduced ejection fraction (HCC)  EC-ECHOCARDIOGRAM COMPLETE W/ CONT      7. Coronary artery disease due to lipid rich plaque - post STEMI lytics ECMO 10/2024  Comp Metabolic Panel    CBC WITHOUT DIFFERENTIAL      8. Circulating anticoagulant disorder (HCC)            Medical Decision Making: Today's Assessment/Status/Plan:        It was my pleasure to meet with Ms. Qureshi.    We addressed the management of hypertension at today's visit. Blood pressure is well controlled.  We specifically assessed the labs on hypertension treatment    We addressed the management of dyslipidemia and atherosclerosis at today's visit. She is on appropriate lipid lowering medication.    We addressed the management of atherosclerotic artery disease.  She is on proper antiplatelet, cholesterol management as appropriate.  We  addressed the potential side effects and laboratory follow-up for these medications.    Will do CONTRAST ECHO to see if has LV thrombus to see if we can get off eliquis    We addressed the management of congestive heart failure. We addressed the potential side effects and laboratory follow-up for these medications. She is on proper mineralacorticoid, angiotensin/ace inhibition with neprilysin inhibition, SGLTs inhibitor and beta-blockers if appropriate.  We addressed the potential side effects and regular laboratory follow-up for these medications.  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 Qureshi back in 1 year time and encouraged her to follow up with us over the phone or electronically using my [employer] as issues arise.    It is my pleasure to participate in the care of Ms. Qureshi.  Please do not hesitate to contact me with questions or concerns.    Rodrigue Gaitan MD PhD [facility]  Cardiologist [facility]    Please note that this dictation was created using voice recognition software. There may be errors I did not discover before finalizing the note.     () Today's E/M visit is associated with medical care services that serve as the continuing focal point for all needed health care services and/or with medical care services that  are part of ongoing care related to a patient's single, serious condition, or a complex condition: This includes  furnishing services to patients on an ongoing basis that result in care that is personalized  to the patient. The services result in a comprehensive, longitudinal, and continuous  relationship with the patient and involve delivery of team-based care that is accessible, coordinated with other practitioners and providers, and integrated with the broader health  care landscape.

## 2025-06-11 ENCOUNTER — OFFICE VISIT (OUTPATIENT)
Dept: BEHAVIORAL HEALTH | Facility: CLINIC | Age: 60
End: 2025-06-11
Payer: COMMERCIAL

## 2025-06-11 DIAGNOSIS — G93.1 ANOXIC BRAIN INJURY (HCC): Primary | ICD-10-CM

## 2025-06-11 PROCEDURE — 90791 PSYCH DIAGNOSTIC EVALUATION: CPT | Performed by: PSYCHOLOGIST

## 2025-06-11 NOTE — PROGRESS NOTES
NEUROPSYCHOLOGICAL EVALUATION  ** CONFIDENTIAL **     Name: Kiara Qureshi Education: 18   : 1965 Occupation: teacher   RODRIGUEZ: 2025 Handedness: right   FAUSTINO: 2025 MRN: [id number]   Referral: Dr. Alvarado          Identifying Information and Reason for Referral  Kiara Qureshi is a 60YO right-handed woman with a history of ST-segment Elevation Myocardial Infarction (STEMI) and anoxic brain injury who was referred for a neuropsychological evaluation by her rehabilitation psychologist in order to ascertain her current cognitive status and make updated recommendations about possible return to work.      Background Information  The following information was obtained from a clinical interview with the patient and her  and review of select medical records.       Presenting Concerns: The patient reported that on Oct 29 she recalls going to bed and feeling very hot.  They tried to use a wet cloth and fan to help, but she continued to decline.  Her  took her to a local ED.  Her last memory is of getting out of the car at the ED and then being in a medevac helicopter.  She reported that her memory is very limited for the surrounding events.  Her next consistent memory is around the winter holidays.  She reported that she has no memory of her acute inpatient hospitalization and has some memory for being in the rehabilitation setting.  The patient was told that she had a severe MI, required resuscitation, and had strokes.    The patient reported cognitive changed which include: short term memory decline, word finding difficulties (mainly people's names), and impaired attention.  Her  reported that she is much slower both cognitively and physically.  He also notices a decline in her memory.  They feel she has improved since her hospitalization, but is not back to baseline.     Current Functioning: The patient drives locally without difficulties.  She manages their finances without  difficulties.  She also completes household chores and manages her medications independently.  Of note, the patient reported that her level of fatigue impacts how much she is able to complete.  She is fatigued by about 1 PM.      She primarily spends her time at home.  She has been cross stitching, crocheting, doing sodoku, and completing puzzle books.  She has been cleared to return to work by her primary care provider.       Emotional Functioning: The patient that her mood is frustrated and that she can also feel anxious.   She denied suicidal ideation, plan, or intent.  Sleep is good with the aid of trazodone.  Energy is low by 1PM.  Appetite is good.        Medical History:  has a past medical history of Coronary artery disease due to lipid rich plaque - post STEMI lytics ECMO 10/2024, Dyslipidemia (07/05/2016), Essential hypertension (07/05/2016), History of ST elevation myocardial infarction (STEMI) - LAD complicated by shock ECMO (10/30/2024), Obesity, morbid, BMI 40.0-49.9 ([facility]) (07/05/2016), and Type II diabetes mellitus, well controlled ([facility]) (07/05/2016).      Problem List: Problem List[1]      Psychiatric History: None     Substance Use: None     Medications at the Time of the Evaluation (per records): has a current medication list which includes the following prescription(s): apixaban, atorvastatin, melatonin, clopidogrel, omeprazole, and trazodone.     Family Medical/Psychiatric History: family history includes Heart Attack (age of onset: 49) in her paternal grandmother; Heart Disease in her mother; Heart Disease (age of onset: 70) in her father.  The patient also reported that her mother had cancer, her father had diabetes, her grandmother had diabetes, and her father had a cerebral aneurysm.      Psychosocial History: The patient completed a master's degree without a history of difficulties with learning.  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  At present she is a 3rd and .  She is  and has no children.  She resides with her .         Brief Record Review:   Per a 12/20/2024 Rehab discharge note:  \"Hospital Course by Problem List:  Anoxic brain injury  Metabolic encephalopathy  Delirium  -anoxia from cardiogenic shock  -2 min in hospital PEA arrest  -required seroquel, valproate and haldol to control delirium at OSH. Now off all antipsychotics  -mental status seems improved....     CVA  -MRI on 11/17/2024 at OSH: Punctate acute infarct of the right centrum semiovale. Scattered foci of enhancement in multiple vascular distributions as described may represent recent subacute infarcts. Diffuse stippled susceptibility signal throughout the supratentorial and infratentorial brain. Overall findings may represent sequelae of fat emboli or amyloid related angiopathy.   -Eliquis 5mg BID  -atorvastatin 80mg, plavix     Cognitive Communicative deficits:  - Patient with significant cognitive deficits due to anoxic brain injury  - Environmental modifications to decrease excessive stimulation including turning off the lights  - SLP to evaluate and treat cognitive deficits.   Pt will benefit from participation in cardiac rehabilitation program to develop behaviors & skills to support self-mgmt of health conditions, stress, etc. Prioritizing this during her early recovery may increase self-confidence and self-efficacy in self mgmt of her medical conditions.....     STEMI  -s/p PCI to LAD. Complicated by V-Fib and PEA arrest. Required ECMO and Impella due to cardiogenic shock.   -now off ECMO and Impella  -atrovastatin and plavix  -follow up with Cardiology     HFrEF  -EF 25-30%....     Hypoglycemia, DM  -last A1c 6.2 on 11/1.   -lantus 9u qhs, SSI  -12/31- low bs, evening bs 39. Poor intake. Artemio manetus, artemio farkrys. Monitor " "closely  -DC all DM medications for now. Patient diet controlled  -BS checks at home at least BID, follow up with PCp  -     Acute respiratory failure- improved  -Cheyne-florez breathing pattern, thought to be from anoxic brain injury  -now on room air     Pneumonia- resolved  -completed linezolid and zosyn 12/1     Retroperitoneal bleed-- stable  -required several transfusions.   -hgb now stable     Hypotension, History of HTN  -losartan on hold     Skin/Wounds:  Bilateral groin wounds  -wound vac  -home health.     Pain:  - Neuroceptic - continue tylenol prn        DVT prophylaxis:  continue eliquis     -Follow-up Cardiology, PCP, neurology\"     Behavioral Observations/Neurobehavioral Examination  Informed consent procedures were reviewed with the patient, and written consent was obtained.  The patient arrived to her appointment on time and accompanied by her .  She was alert, oriented, and attentive.  No gross motor abnormalities were observed; her gait and posture were normal.  Spontaneous speech was normal in articulation, rate, volume, and prosody.  Comprehension was intact.  Thought processes were goal-directed and organized.  Affect was appropriate and reactive.  Of note, there were times that she appeared concerned about her performance.  Test scores are considered a valid representation of the patient's current neuropsychological functioning.        Tests Administered  Note, these tests were administered by a psychometrist.    [place] Naming Test-Second Edition (BNT-2)  Brief Visualspatial Memory Test-Revised (BVMT-R)  Geriatric Depression Scale (GDS)  [place]ry Verbal Learning Test-Revised (HVLT-R)  Repeatable Battery for the Assessment of Neuropsychological Status (RBANS)  Test of Premorbid Functioning (TOPF)  Trail Making Test  Verbal Fluency  Wechsler Adult Intelligence Test-Fourth Edition (WAIS-4)-selected subtests  [place] Card Sorting Test (WCST)     Test Results  Please see the attached data " table for a summary of all test scores. To be scanned in the media section of the EMR.  The values and ranges listed in the data table were derived from comparison of patient scores to available normative data sets. Multiple normative sets were used to calculate these values and ranges, and these data sets differ in the degree to which they control for demographic factors that have been shown to impact cognitive test performance (e.g., age, education, gender, race). For this reason, interpretation of these values and ranges is contingent upon knowledge of the psychometric properties of each test and the characteristics of the normative sets that were used to obtain the derived comparison. Please refer to the Summary and Impressions section of this report for an informed integration and interpretation of the data.     Summary and Impressions  Based on demographic information and word reading, the patient was estimated to be of high average premorbid intellectual functioning.  She performed within the intact range on most all measures administered.  That is, compared to same aged peers, her performances were low average or better on measures of: attention, working memory, processing speed, expressive language, learning and memory, visual-spatial skills, and executive functioning.  On a self-report measure, the patient's responses were consistent with minimal symptoms of depression.    In sum, the present profile is normal and not suggestive of a neurocognitive disorder at this time.  From a neuropsychological perspective the patient has made an excellent recovery from the anoxic brain injury.  As the patient continues to be within the acute recovery period, it is possible that she could experience improvement in the subjective cognitive symptoms that she described.     Based upon the present evaluation the following recommendations are offered:    The patient was offered the opportunity to discuss her test results  with me.  The patient will receive this report via [facility] and return to clinic should she have questions.  From a neuropsychological perspective, the patient could return to work as a teacher.  It is recommended that she do so initially on a part-time basis and increase hours as tolerated.    Due to her level of fatigue, the patient will require rest breaks during the work day.    The patient is encouraged to engage in the following lifestyle/healthy behaviors that have been associated with healthy aging and brain health:  She is recommended to keep physically, mentally, and socially active.  Physical activity, especially 150+ minutes of exercise/week has recently been associated with maintaining good brain health with age. As such, if medically cleared, adhering to a routine of daily light/low impact exercise may be beneficial for her.  Research has demonstrated that adherence to a Mediterranean style diet (e.g., the MIND diet) may have cognitive benefit.   Tight control of any cerebrovascular risk factors will be important to limit any vascular related cognitive change.  Regular social engagement is recommended, as it can benefit cognitive and psychological health.  A follow-up neuropsychological evaluation could be completed in the future if clinically indicated.      Thank you for allowing me to participate in Kiara Qureshi's care.  If you have any questions about the information contained in this report, please do not hesitate to contact me through the Neuropsychology Service at [phone].     Teri Carmichael, Ph.D, ABPP  Board Certified in Clinical Neuropsychology  Professor of Psychiatry  Licensed Psychologist [place] #[id number]     Neuropsychological Services Provided Code Total Hours Date/Time   Clinical Interview/Neurobehavioral Status Exam          Hour 1 27723 1  6/11 1-148   Professional Neuropsychological Testing Evaluation Services         Hour 1 31258 1 6/19 [id number]; 6/20 1115-12       Each  Additional Hour  99783 1     Neuropsychological Test Administration and Scoring by Technician         First 30-minutes 81381 1 6/11 148-359; 6/13 128-147; 6/16 905-952        Additional 30-minutes 29615  4            [1]   Patient Active Problem List  Diagnosis    Type II diabetes mellitus, well controlled (HCC)    Dyslipidemia    Obesity, morbid, BMI 40.0-49.9 (HCC)    Essential hypertension    Acute myocardial infarction (HCC)    Cardiogenic shock (HCC)    Acute respiratory failure with hypoxia (HCC)    History of ST elevation myocardial infarction (STEMI) - LAD complicated by shock ECMO    Severe left ventricular systolic dysfunction    Metabolic acidemia    Elevated troponin    Retroperitoneal bleed    JENNY (acute kidney injury) (HCC)    Transaminitis    Ventricular fibrillation (HCC)    Heart failure with reduced ejection fraction (HCC)    Hyperglycemia    Urinary retention    Coronary artery disease due to lipid rich plaque - post STEMI [employer] ECMO 10/2024    CVA (cerebral vascular accident) (HCC)    Acute metabolic encephalopathy    Aortic mural thrombus (HCC)    Pneumonia    ACP (advance care planning)    Abdominal hematoma    Hypotension    Constipation    Acute hypoxic respiratory failure (HCC)    Hypernatremia    Macrocytic anemia    Functional quadriplegia (HCC)    Circulating anticoagulant disorder (HCC)    Anoxic brain injury (HCC)    Personal history of ECMO    History of left ventricular assist device (LVAD) (HCC)

## 2025-06-20 PROCEDURE — 96139 PSYCL/NRPSYC TST TECH EA: CPT | Performed by: PSYCHOLOGIST

## 2025-06-20 PROCEDURE — 96132 NRPSYC TST EVAL PHYS/QHP 1ST: CPT | Performed by: PSYCHOLOGIST

## 2025-06-20 PROCEDURE — 96138 PSYCL/NRPSYC TECH 1ST: CPT | Performed by: PSYCHOLOGIST

## 2025-07-02 ENCOUNTER — TELEMEDICINE (OUTPATIENT)
Dept: BEHAVIORAL HEALTH | Facility: CLINIC | Age: 60
End: 2025-07-02
Payer: COMMERCIAL

## 2025-07-02 DIAGNOSIS — G93.1 ANOXIC BRAIN INJURY (HCC): Primary | ICD-10-CM

## 2025-07-02 NOTE — PROGRESS NOTES
The patient was provided with her test results.  Mailed the report along with a form for work to her home address.  Questions and concerns were addressed.

## 2025-07-28 NOTE — PROGRESS NOTES
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 stable range  Patient was on lovenox due to rigth common iliac art thrombosis and then transitioned to eliquis per [facility]  - plan to continue for 3-4 months with repeat imaging at that time to see if it can be d/c     Hgb stable. Continue to monitor    ACP (advance care planning)  Assessment & Plan  Dr. Mobley: Discussed plan of care and code status with patient  Bruno ([phone]) and sister-in-law Laina ([phone]). I explained that Kiara has experienced a suspected anoxic brain injury during her current medical course that has required ECMO and impella, CVA, Vfib arrest. I shared my concern that Laina may not make a meaningful recovery from this and that her current and future quality of life appears limited. I explained that in the event of another CP arrest her quality of life would be further impacted in the event she were to survive such an episode. Laina and Bruno made the decision to change Kiara's code status to DNR, yes to intubaiton. Laina will be driving in from [place] in the coming days to have further goals of care conversations. Total of 48 minutes spent in discussion and coordination of advance care planning.    12/16 - my conversation with Laina and Bruno, they will like  Chronic, continuous use of opioids    Severe episode of recurrent major depressive disorder, without psychotic features (H)    Low bone density    Major depressive disorder, recurrent, severe with psychotic symptoms (H)    Urinary incontinence     Past Medical History:   Diagnosis Date    Bipolar 2 disorder (H)     Breast cancer (H) 1986    lumpectomy, radiation, chemo    Chronic pain syndrome     COPD (chronic obstructive pulmonary disease) (H)     asthma    Cord compression (H) 12/21/2021    Dizzy     Drug tolerance     opioid    Esophageal reflux     Fatigue     Generalized anxiety disorder     Graves disease 1994    Hemochromatosis 02/14/2018    C282Y homozygote; H63D not detected    History of corticosteroid therapy 11/19/2019    History of partial adrenalectomy 11/19/2019    Hx antineoplastic chemotherapy     Hx of radiation therapy     Hyperlipidaemia     Hypertension     Impaired fasting glucose 2017    Infection due to 2019 novel coronavirus 06/04/2021    Injury of neck, whiplash 07/15/2021    Joint pain     KYAW (obstructive sleep apnea) 2016    Osteopenia     Paroxysmal atrial fibrillation (H) 11/2024    Pheochromocytoma, left 08/02/2017    laparoscopically removed    Postablative hypothyroidism 1995    Prediabetes 10/03/2019    by A1c    Psoriasis     Psoriatic arthropathy (H)     Pulmonary hypertension (H)     Right rotator cuff tear     RLS (restless legs syndrome)     on ropinorole    Sacroiliitis     Serotonin syndrome 08/28/2020    [facility] - While on desvenlafaxine 100mg    Snoring     Spinal stenosis     Status post coronary angiogram 10/03/2019    Urinary incontinence     Vitamin B 12 deficiency 2009    Vitamin D deficiency 2010                 Medications   Current Outpatient Medications   Medication Sig Dispense Refill    acetaminophen (TYLENOL) 325 MG tablet Take 325-650 mg by mouth every 6 hours as needed for mild pain.      albuterol (VENTOLIN HFA) 108 (90 Base) MCG/ACT inhaler  Inhale 2 puffs into the lungs every 6 hours as needed for shortness of breath, wheezing or cough. 18 g 11    B Complex-C (SUPER B COMPLEX PO) Take 1 capsule by mouth daily.      benzonatate (TESSALON) 100 MG capsule Take 1 capsule (100 mg) by mouth 3 times daily as needed for cough. 90 capsule 1    colchicine (COLCRYS) 0.6 MG tablet Two tablets now, and one tablet after six hours 3 tablet 1    Cyanocobalamin (VITAMIN B-12) 5000 MCG SUBL Place 2-3 sprays under the tongue every morning. Unknown dose. 2 or 3 sprays/day      ethacrynic acid (EDECRIN) 25 MG tablet Take 1 tablet (25 mg) by mouth daily. 30 tablet 0    fluticasone-salmeterol (ADVAIR) 250-50 MCG/ACT inhaler Inhale 1 puff into the lungs every 12 hours. 60 each 2    gabapentin (NEURONTIN) 100 MG capsule Take 1-3 capsules (100-300 mg) by mouth 3 times daily. 270 capsule 0    guaiFENesin (MUCINEX) 600 MG 12 hr tablet Take 600 mg by mouth every morning.      ketoconazole (NIZORAL) 2 % external shampoo Apply topically daily as needed.      KLOR-CON M20 20 MEQ CR tablet Take 1 tablet (20 mEq) by mouth every morning. (Patient taking differently: Take 20 mEq by mouth daily as needed.) 90 tablet 3    Lavender Oil 80 MG CAPS Take 160 mg by mouth at bedtime.      MAGNESIUM PO Take 2 capsules by mouth 2 times daily. Strength unknown      medical cannabis (Patient's own supply) See Admin Instructions (The purpose of this order is to document that the patient reports taking medical cannabis.  This is not a prescription, and is not used to certify that the patient has a qualifying medical condition.)  Flower      melatonin 1 MG CAPS Take 1 mg by mouth at bedtime.      Misc Natural Products (CORTISOL MANAGER PO) Take 1 capsule by mouth daily.      naloxone (NARCAN) 4 MG/0.1ML nasal spray Spray 1 spray (4 mg) into one nostril alternating nostrils as needed for opioid reversal every 2-3 minutes until assistance arrives 0.2 mL 0    OMEGA-3 FATTY ACIDS PO Take 1 capsule by mouth  to continue care and plan for PAMs    Pneumonia  Assessment & Plan  12/23/2024  S/p completed course of pip/tazo and vanc  Cultures were negative    Aortic mural thrombus (HCC)  Assessment & Plan  12/23/2024  CT at outside facility showing focal aortic mural thrombus along the lateral wall of the descending thoracic aorta and also a tiny filling defect/mural thrombus within the posterior right common iliac artery  Continue on apixaban  Continue to monitor CBC    Acute metabolic encephalopathy  Assessment & Plan  12/23/2024  Hospital course complicated by encephalopathy.  Likely from combination of CVA, STEMI, hyperglycemia; likely also with anoxic brain injury   Removing restraints as tolerated, following  Has a nasojejunal feeding tube placed by interventional radiology from outside facility  Was on Seroquel and Depakene as recommended by neurology/psychiatry at outside facility.    Multifactorial  Continues to be encephalopathic. CTH no acute findings. EEG neg for seizure activity  Neuro evaluated patient at [facility].   Have been titrating off seroquel and pt's mental status has improved as her agitation has diminished  DC seroquel qhs dose  Decrease valproic acid TID to BID    12/18  Decrease Valproic acid to once daily starting tomorrow and then discontinue. Med has interactions with Eliquis.   Noted EKG yesterday with Qtc >600. Repeat today. Defer restarting seroquel. Will hold off adding possible alternatives such as geodon, zyprexa, or trazodone at night as this can increase Qtc/risk of arrhythmias, pending new EKG. Increase melatonin to 10mg once daily   Unable to tolerate MRI    12/19   Continue valproic acid taper. EKG with improved Qtc. Continue on current medications.   Failed swallow screen with SLP. Has been pulling feeding tubes before when more encephalopathic.   GI consulted for possible PEG tube placement  Unable to tolerate MRI.     12/20  Start seroquel 50mg once daily and trazodone 50mg once daily    Plan for PEG tube placement Monday 12/21  Continue meds as above.  Off valproic acid already today.    12/22  Continue medications.  Psychiatry consulted.  Plan for PEG on either Thursday or Friday.    CVA (cerebral vascular accident) (HCC)  Assessment & Plan  12/23/2024  Patient had MRI on 11/17/24 at [facility] which showed Punctate acute infarct of the right centrum semiovale. Scattered foci of enhancement in multiple vascular distributions as described may represent recent subacute infarcts. Diffuse stippled susceptibility signal throughout the supratentorial and infratentorial brain. Overall findings may represent sequelae of fat emboli or amyloid related angiopathy.   High dose statin  Apixaban  plavix    Continues to be encephalopathic. CTH no acute findings. EEG neg for seizure activity  Neuro evaluated patient at [facility].   Have been titrating off seroquel and pt's mental status has improved as her agitation has diminished  DC seroquel qhs dose  I changed valproic acid TID to BID  Repeat MRI brain if possible, unble to tolerate at this time    CAD (coronary artery disease)  Assessment & Plan  12/23/2024  Patient with recent complicated STEMI with PCI to LAD and mid LAD.  Complicated by V-fib arrest and cardiogenic shock requiring VA ECMO and Impella and retroperitoneal hematomas. At [facility] underwent impella supported PCI to mid LAD just distal to prior stent but unsuccessful with PCI to 100% occluded OM1 on 11/6. Patient was decannulated and had Impella removed.    Discussed with cardiology and asa stopped and replaced with plavix. Cont statin  Repeat echo shows persistently low EF, 25-30%  Cardiology had recommended bisoprolol but due to cheyne florez critical care recommends we stop this - so will hold   Continue to hold losartan for low BP and lasix for hypernatremia    Urinary retention  Assessment & Plan  12/23/2024  Beckwith catheter in place    Hyperglycemia  Assessment & Plan  12/23/2024  Glargine 9  daily.      omeprazole (PRILOSEC) 20 MG DR capsule Take 1 capsule (20 mg) by mouth as needed. 90 capsule 3    oxyCODONE (ROXICODONE) 5 MG tablet Take 1 tablet (5 mg) by mouth 5 times daily. May dispense/start 07/23/25 Covering provider 70 tablet 0    rOPINIRole (REQUIP) 2 MG tablet Take 1 tablet (2 mg) by mouth 3 times daily. 270 tablet 3    STATIN NOT PRESCRIBED (INTENTIONAL) Please choose reason not prescribed from choices below.      SYNTHROID 150 MCG tablet Take 1 tablet (150 mcg) by mouth every morning. MON to SAT 1 tablet/day; SUN 0.5 tablet - 90 tablet 4    UNABLE TO FIND Take 1 tablet by mouth every morning. MEDICATION NAME: CETYL-MYRISFOLEATE      vitamin D3 (CHOLECALCIFEROL) 250 mcg (76098 units) capsule Take 1 capsule by mouth once a week. SUNDAY'S                   Physical Exam  (Vitals Only)  LMP  (LMP Unknown)     Pulse Readings from Last 5 Encounters:   07/14/25 50   07/02/25 90   07/02/25 86   06/11/25 84   06/09/25 90     Wt Readings from Last 5 Encounters:   07/14/25 82.6 kg (182 lb)   07/02/25 81.2 kg (179 lb)   05/13/25 79.4 kg (175 lb)   05/01/25 81.2 kg (179 lb)   03/11/25 79.8 kg (176 lb)     BP Readings from Last 5 Encounters:   07/14/25 117/69   07/02/25 105/83   07/02/25 105/80   06/11/25 127/89   06/09/25 104/68                 Mental Status Exam  Alertness: alert  and oriented  Appearance: adequately groomed  Behavior/Demeanor: cooperative, pleasant, calm, and interruptive, with good eye contact   Speech: normal and regular rate and rhythm  Language: no obvious problem  Psychomotor: fidgety  Mood: depressed, anxious, and angry  Affect: labile and tearful; was congruent to mood  Thought Process/Associations: expansive responses, tangential, difficult to redirect at times, difficult to articulate primary concern  Thought Content:  Reports none;  Denies suicidal ideation, violent ideation, and delusions  Perception:  Reports none;  Denies auditory hallucinations and visual  hallucinations  Insight: fair  Judgment: fair and adequate for safety  Cognition: oriented: time, person, and place  attention span: fair  concentration: fair  recent memory: intact  remote memory: intact  fund of knowledge: appropriate  Gait and Station: ELIEZER ([facility])              Data       4/21/2025    10:39 AM 7/9/2025     8:51 AM 7/21/2025     7:37 AM   PROMIS-10 Total Score w/o Sub Scores   PROMIS TOTAL - SUBSCORES 15  20  15        Patient-reported         6/22/2020     7:12 PM 1/18/2022    11:15 PM 6/6/2024     8:00 AM   CAGE-AID Total Score   Total Score 4 4 4   Total Score MyChart 4 (A total score of 2 or greater is considered clinically significant) 4 (A total score of 2 or greater is considered clinically significant)          7/14/2025     9:06 AM 7/21/2025     7:35 AM 7/27/2025     7:36 PM   PHQ   PHQ-9 Total Score 15  14  15    Q9: Thoughts of better off dead/self-harm past 2 weeks Not at all Not at all Not at all       Patient-reported         5/13/2025     7:56 PM 6/30/2025     9:06 AM 7/14/2025     9:07 AM   CHAVO-7 SCORE   Total Score 13 (moderate anxiety) 10 (moderate anxiety) 16 (severe anxiety)   Total Score 13  10  16        Patient-reported       Liver/kidney function Metabolic Blood counts   Recent Labs   Lab Test 07/02/25  1312 06/10/25  1346   CR 0.52 0.57   AST 23 27   ALT 15 19   ALKPHOS 69 61    Recent Labs   Lab Test 07/02/25  1312 06/10/25  1346 05/01/25  1128 08/21/24  0956 08/06/24  1220   CHOL  --   --   --   --  205*   TRIG  --   --   --   --  98   LDL  --   --   --   --  122*   HDL  --   --   --   --  63   A1C  --   --  6.0*   < >  --    TSH 1.32   < >  --    < >  --     < > = values in this interval not displayed.    Recent Labs   Lab Test 07/02/25  1312   WBC 8.3   HGB 15.4   HCT 42.9   MCV 93             Administrative Billing:     Level of Medical Decision Making:   - At least 1 chronic problem that is not stable  - Engaged in prescription drug management during visit  units  ISS    Metabolic acidemia- (present on admission)  Assessment & Plan  12/23/2024  Improved         VTE prophylaxis: Previously on apixaban.  Being held for possible PEG.    I have performed a physical exam and reviewed and updated ROS and Plan today (12/23/2024). In review of yesterday's note (12/22/2024), there are no changes except as documented above.    Greater than 50 minutes spent prepping to see patient (e.g. review of tests) obtaining and/or reviewing separately obtained history. Performing a medically appropriate examination and/ evaluation.  Counseling and educating the patient/family/caregiver.  Ordering medications, tests, or procedures.  Referring and communicating with other health care professionals.  Documenting clinical information in EPIC.  Independently interpreting results and communicating results to patient/family/caregiver.  Care coordination.          (discussed any medication benefits, side effects, alternatives, etc.)    The longitudinal plan of care for the diagnosis(es)/condition(s) as documented above were addressed during this visit. Due to the added complexity in care, I will continue to support Winnie in the subsequent management and with ongoing continuity of care.    Psychiatry Individual Psychotherapy Note   Psychotherapy start time - 1:20 PM  Psychotherapy end time - 1:38 PM  Date treatment plan last reviewed with patient - 07/29/25    Subjective: This supportive psychotherapy session addressed issues related to goals of therapy and current psychosocial stressors. Patient's reaction: Preparatory in the context of mental status appropriate for ambulatory setting.    Interactive complexity indicated? No  Plan: RTC in timeframe noted above  Psychotherapy services during this visit included myself and the patient.   Treatment Plan      SYMPTOMS; PROBLEMS   MEASURABLE GOALS;    FUNCTIONAL IMPROVEMENT / GAINS INTERVENTIONS DISCHARGE CRITERIA   Depression: depressed mood, suicidal ideation, feeling hopelesss, and excessive crying  Dysregulation: mood dysregulation and irritable   reduce depressive symptoms, reduce suicidal thoughts, build solid foundation of health coping skills, and develop strategies for thought distraction when ruminating Supportive / psychodynamic marked symptom improvement

## 2025-08-28 ENCOUNTER — RESULTS FOLLOW-UP (OUTPATIENT)
Dept: CARDIOLOGY | Facility: MEDICAL CENTER | Age: 60
End: 2025-08-28
Payer: COMMERCIAL

## 2025-08-28 ENCOUNTER — HOSPITAL ENCOUNTER (OUTPATIENT)
Dept: CARDIOLOGY | Facility: MEDICAL CENTER | Age: 60
End: 2025-08-28
Attending: INTERNAL MEDICINE
Payer: COMMERCIAL

## 2025-08-28 ENCOUNTER — HOSPITAL ENCOUNTER (OUTPATIENT)
Dept: LAB | Facility: MEDICAL CENTER | Age: 60
End: 2025-08-28
Attending: INTERNAL MEDICINE
Payer: COMMERCIAL

## 2025-08-28 DIAGNOSIS — I25.10 CORONARY ARTERY DISEASE DUE TO LIPID RICH PLAQUE: Chronic | ICD-10-CM

## 2025-08-28 DIAGNOSIS — I51.89 SEVERE LEFT VENTRICULAR SYSTOLIC DYSFUNCTION: ICD-10-CM

## 2025-08-28 DIAGNOSIS — I25.83 CORONARY ARTERY DISEASE DUE TO LIPID RICH PLAQUE: Chronic | ICD-10-CM

## 2025-08-28 DIAGNOSIS — E78.5 DYSLIPIDEMIA: ICD-10-CM

## 2025-08-28 DIAGNOSIS — E78.5 DYSLIPIDEMIA: Primary | ICD-10-CM

## 2025-08-28 DIAGNOSIS — I50.20 HEART FAILURE WITH REDUCED EJECTION FRACTION (HCC): ICD-10-CM

## 2025-08-28 DIAGNOSIS — I25.2 HISTORY OF ST ELEVATION MYOCARDIAL INFARCTION (STEMI): Chronic | ICD-10-CM

## 2025-08-28 DIAGNOSIS — I50.22 CHRONIC SYSTOLIC CONGESTIVE HEART FAILURE (HCC): ICD-10-CM

## 2025-08-28 LAB
ALBUMIN SERPL BCP-MCNC: 3.7 G/DL (ref 3.2–4.9)
ALBUMIN/GLOB SERPL: 1.4 G/DL
ALP SERPL-CCNC: 159 U/L (ref 30–99)
ALT SERPL-CCNC: 63 U/L (ref 2–50)
ANION GAP SERPL CALC-SCNC: 11 MMOL/L (ref 7–16)
AST SERPL-CCNC: 40 U/L (ref 12–45)
BILIRUB SERPL-MCNC: 0.8 MG/DL (ref 0.1–1.5)
BUN SERPL-MCNC: 23 MG/DL (ref 8–22)
CALCIUM ALBUM COR SERPL-MCNC: 9.2 MG/DL (ref 8.5–10.5)
CALCIUM SERPL-MCNC: 9 MG/DL (ref 8.5–10.5)
CHLORIDE SERPL-SCNC: 109 MMOL/L (ref 96–112)
CHOLEST SERPL-MCNC: 131 MG/DL (ref 100–199)
CO2 SERPL-SCNC: 20 MMOL/L (ref 20–33)
CREAT SERPL-MCNC: 0.82 MG/DL (ref 0.5–1.4)
ERYTHROCYTE [DISTWIDTH] IN BLOOD BY AUTOMATED COUNT: 50.9 FL (ref 35.9–50)
FASTING STATUS PATIENT QL REPORTED: NORMAL
GFR SERPLBLD CREATININE-BSD FMLA CKD-EPI: 82 ML/MIN/1.73 M 2
GLOBULIN SER CALC-MCNC: 2.6 G/DL (ref 1.9–3.5)
GLUCOSE SERPL-MCNC: 126 MG/DL (ref 65–99)
HCT VFR BLD AUTO: 37.7 % (ref 37–47)
HDLC SERPL-MCNC: 33 MG/DL
HGB BLD-MCNC: 12.2 G/DL (ref 12–16)
LDLC SERPL CALC-MCNC: 84 MG/DL
LV EJECT FRACT  99904: 20
LV EJECT FRACT MOD 2C 99903: 31.18
LV EJECT FRACT MOD 4C 99902: 26.14
LV EJECT FRACT MOD BP 99901: 28.32
MCH RBC QN AUTO: 31.4 PG (ref 27–33)
MCHC RBC AUTO-ENTMCNC: 32.4 G/DL (ref 32.2–35.5)
MCV RBC AUTO: 97.2 FL (ref 81.4–97.8)
PLATELET # BLD AUTO: 192 K/UL (ref 164–446)
PMV BLD AUTO: 11.8 FL (ref 9–12.9)
POTASSIUM SERPL-SCNC: 4.4 MMOL/L (ref 3.6–5.5)
PROT SERPL-MCNC: 6.3 G/DL (ref 6–8.2)
RBC # BLD AUTO: 3.88 M/UL (ref 4.2–5.4)
SODIUM SERPL-SCNC: 140 MMOL/L (ref 135–145)
TRIGL SERPL-MCNC: 68 MG/DL (ref 0–149)
WBC # BLD AUTO: 5.3 K/UL (ref 4.8–10.8)

## 2025-08-28 PROCEDURE — 85027 COMPLETE CBC AUTOMATED: CPT

## 2025-08-28 PROCEDURE — 93306 TTE W/DOPPLER COMPLETE: CPT | Mod: 26 | Performed by: INTERNAL MEDICINE

## 2025-08-28 PROCEDURE — 93306 TTE W/DOPPLER COMPLETE: CPT

## 2025-08-28 PROCEDURE — 80061 LIPID PANEL: CPT

## 2025-08-28 PROCEDURE — 36415 COLL VENOUS BLD VENIPUNCTURE: CPT

## 2025-08-28 PROCEDURE — 700117 HCHG RX CONTRAST REV CODE 255: Performed by: INTERNAL MEDICINE

## 2025-08-28 PROCEDURE — 80053 COMPREHEN METABOLIC PANEL: CPT

## 2025-08-28 RX ORDER — EZETIMIBE 10 MG/1
10 TABLET ORAL DAILY
Qty: 90 TABLET | Refills: 3 | Status: SHIPPED | OUTPATIENT
Start: 2025-08-28

## 2025-08-28 RX ORDER — EMPAGLIFLOZIN 10 MG/1
10 TABLET, FILM COATED ORAL DAILY
Qty: 90 TABLET | Refills: 3 | Status: SHIPPED | OUTPATIENT
Start: 2025-08-28

## 2025-08-28 RX ORDER — SPIRONOLACTONE 25 MG/1
25 TABLET ORAL DAILY
Qty: 90 TABLET | Refills: 3 | Status: SHIPPED | OUTPATIENT
Start: 2025-08-28

## 2025-08-28 RX ADMIN — HUMAN ALBUMIN MICROSPHERES AND PERFLUTREN 3 ML: 10; .22 INJECTION, SOLUTION INTRAVENOUS at 12:05

## (undated) DEVICE — MICRODRIP PRIMARY VENTED 60 (48EA/CA) THIS WAS PART #2C8428 WHICH WAS DISCONTINUED

## (undated) DEVICE — KIT DOUBLE LUMEN 9 FR DISTAL (5/CA)

## (undated) DEVICE — CHLORAPREP 26 ML APPLICATOR - ORANGE TINT(25/CA)

## (undated) DEVICE — SOD. CHL. INJ. 0.9% 1000 ML - (14EA/CA 60CA/PF)

## (undated) DEVICE — SUTURE GENERAL

## (undated) DEVICE — SHEATH RO 6F 6CM (10EA/BX)

## (undated) DEVICE — SYS DLV COST CLS RM TEMP - INJECTATE (CO-SET II) (10EA/CA)

## (undated) DEVICE — SET LEADWIRE 5 LEAD BEDSIDE DISPOSABLE ECG (1SET OF 5/EA)

## (undated) DEVICE — GLOVE SZ 6.5 BIOGEL PI MICRO - PF LF (50PR/BX)

## (undated) DEVICE — SYRINGE 30 ML LL (56/BX)

## (undated) DEVICE — D-5-W INJ. 500 ML - (24EA/CA)

## (undated) DEVICE — SENSOR OXIMETER ADULT SPO2 RD SET (20EA/BX)

## (undated) DEVICE — GLOVE BIOGEL PI INDICATOR SZ 7.0 SURGICAL PF LF - (50/BX 4BX/CA)

## (undated) DEVICE — GLOVE BIOGEL INDICATOR SZ 9 SURGICAL PF LTX - (160/CA)

## (undated) DEVICE — GLOVE SIZE 7.0 SURGEON ACCELERATOR FREE GREEN (50PR/BX 4BX/CA)

## (undated) DEVICE — SET EXTENSION WITH 2 PORTS (48EA/CA) ***PART #2C8610 IS A SUBSTITUTE*****

## (undated) DEVICE — SODIUM CHL. INJ. 0.9% 500ML (24EA/CA 50CA/PF)

## (undated) DEVICE — CATHETER THERMALDILUTION SWAN - (5EA/CA)

## (undated) DEVICE — TUBING CLEARLINK DUO-VENT - C-FLO (48EA/CA)

## (undated) DEVICE — Device

## (undated) DEVICE — SUTURE 0 SILK CT-1 (36PK/BX)

## (undated) DEVICE — KIT VASCULAR DILATOR

## (undated) DEVICE — COVER PROBE STERILE CONE (12EA/CA)

## (undated) DEVICE — SUCTION INSTRUMENT YANKAUER BULBOUS TIP W/O VENT (50EA/CA)

## (undated) DEVICE — LACTATED RINGERS INJ 1000 ML - (14EA/CA 60CA/PF)

## (undated) DEVICE — SET INTRO MIRCROPUNCTURE - MPIS-501-SST

## (undated) DEVICE — ELECTRODE DFBR ADLT 6.5X5IN (12PR/CA) *8900-4003 PART # FOR CA QTY. PART #8900-4004 IS EACH QTY

## (undated) DEVICE — STOPCOCK MALE 4-WAY - (50/CA)

## (undated) DEVICE — GLOVE SZ 7 BIOGEL PI MICRO - PF LF (50PR/BX 4BX/CA)

## (undated) DEVICE — PACK MAJOR BASIN - (2EA/CA)

## (undated) DEVICE — SHEET THYROID - (10EA/CA)

## (undated) DEVICE — COVER LIGHT HANDLE ALC PLUS DISP (18EA/BX)

## (undated) DEVICE — ELECTRODE DUAL RETURN W/ CORD - (50/PK)

## (undated) DEVICE — GLOVE BIOGEL SZ 8.5 SURGICAL PF LTX - (50PR/BX 4BX/CA)

## (undated) DEVICE — DRAPE 36X28IN RAD CARM BND BG - (25/CA) O

## (undated) DEVICE — BLANKET UNDERBODY FULL ACCES - (5/CA)

## (undated) DEVICE — BLADE SURGICAL #11 - (50/BX)

## (undated) DEVICE — GLOVE BIOGEL PI INDICATOR SZ 6.5 SURGICAL PF LF - (50/BX 4BX/CA)

## (undated) DEVICE — GOWN SURGEONS X-LARGE - DISP. (30/CA)

## (undated) DEVICE — CANISTER SUCTION 3000ML MECHANICAL FILTER AUTO SHUTOFF MEDI-VAC NONSTERILE LF DISP (40EA/CA)